# Patient Record
Sex: FEMALE | Race: WHITE | Employment: OTHER | URBAN - METROPOLITAN AREA
[De-identification: names, ages, dates, MRNs, and addresses within clinical notes are randomized per-mention and may not be internally consistent; named-entity substitution may affect disease eponyms.]

---

## 2017-10-04 PROBLEM — R01.1 MURMUR: Status: ACTIVE | Noted: 2017-10-04

## 2017-10-04 PROBLEM — R60.0 BILATERAL LEG EDEMA: Status: ACTIVE | Noted: 2017-10-04

## 2017-10-04 PROBLEM — R94.31 ABNORMAL EKG: Status: ACTIVE | Noted: 2017-10-04

## 2017-10-04 PROBLEM — R60.9 SWELLING: Status: ACTIVE | Noted: 2017-10-04

## 2017-10-27 PROBLEM — I10 ESSENTIAL HYPERTENSION WITH GOAL BLOOD PRESSURE LESS THAN 130/85: Status: ACTIVE | Noted: 2017-10-27

## 2018-01-01 ENCOUNTER — HOSPITAL ENCOUNTER (OUTPATIENT)
Dept: GENERAL RADIOLOGY | Age: 83
Discharge: HOME | End: 2018-12-27
Payer: MEDICARE

## 2018-01-01 ENCOUNTER — HOME CARE VISIT (OUTPATIENT)
Dept: SCHEDULING | Facility: HOME HEALTH | Age: 83
End: 2018-01-01
Payer: MEDICARE

## 2018-01-01 ENCOUNTER — HOME CARE VISIT (OUTPATIENT)
Dept: HOME HEALTH SERVICES | Facility: HOME HEALTH | Age: 83
End: 2018-01-01
Payer: MEDICARE

## 2018-01-01 VITALS
HEART RATE: 97 BPM | TEMPERATURE: 98.3 F | OXYGEN SATURATION: 94 % | RESPIRATION RATE: 16 BRPM | DIASTOLIC BLOOD PRESSURE: 50 MMHG | SYSTOLIC BLOOD PRESSURE: 130 MMHG

## 2018-01-01 VITALS
OXYGEN SATURATION: 96 % | HEART RATE: 82 BPM | TEMPERATURE: 98 F | SYSTOLIC BLOOD PRESSURE: 112 MMHG | DIASTOLIC BLOOD PRESSURE: 58 MMHG | RESPIRATION RATE: 18 BRPM

## 2018-01-01 VITALS
SYSTOLIC BLOOD PRESSURE: 120 MMHG | DIASTOLIC BLOOD PRESSURE: 60 MMHG | RESPIRATION RATE: 18 BRPM | TEMPERATURE: 97 F | OXYGEN SATURATION: 97 % | HEART RATE: 75 BPM

## 2018-01-01 VITALS
SYSTOLIC BLOOD PRESSURE: 136 MMHG | HEART RATE: 89 BPM | RESPIRATION RATE: 18 BRPM | DIASTOLIC BLOOD PRESSURE: 64 MMHG | TEMPERATURE: 97.3 F | OXYGEN SATURATION: 97 %

## 2018-01-01 VITALS
HEART RATE: 76 BPM | TEMPERATURE: 97.1 F | SYSTOLIC BLOOD PRESSURE: 126 MMHG | OXYGEN SATURATION: 94 % | RESPIRATION RATE: 18 BRPM | DIASTOLIC BLOOD PRESSURE: 62 MMHG

## 2018-01-01 VITALS
TEMPERATURE: 96.8 F | HEART RATE: 85 BPM | DIASTOLIC BLOOD PRESSURE: 72 MMHG | SYSTOLIC BLOOD PRESSURE: 120 MMHG | RESPIRATION RATE: 18 BRPM | OXYGEN SATURATION: 96 %

## 2018-01-01 VITALS
OXYGEN SATURATION: 98 % | RESPIRATION RATE: 18 BRPM | TEMPERATURE: 97.2 F | HEART RATE: 85 BPM | DIASTOLIC BLOOD PRESSURE: 70 MMHG | SYSTOLIC BLOOD PRESSURE: 140 MMHG

## 2018-01-01 VITALS
DIASTOLIC BLOOD PRESSURE: 50 MMHG | SYSTOLIC BLOOD PRESSURE: 98 MMHG | OXYGEN SATURATION: 92 % | RESPIRATION RATE: 16 BRPM | HEART RATE: 74 BPM | TEMPERATURE: 97.3 F

## 2018-01-01 VITALS
SYSTOLIC BLOOD PRESSURE: 118 MMHG | RESPIRATION RATE: 16 BRPM | DIASTOLIC BLOOD PRESSURE: 57 MMHG | OXYGEN SATURATION: 100 % | TEMPERATURE: 98.2 F | HEART RATE: 80 BPM

## 2018-01-01 VITALS
SYSTOLIC BLOOD PRESSURE: 132 MMHG | RESPIRATION RATE: 16 BRPM | TEMPERATURE: 98 F | HEART RATE: 90 BPM | DIASTOLIC BLOOD PRESSURE: 77 MMHG

## 2018-01-01 VITALS
DIASTOLIC BLOOD PRESSURE: 70 MMHG | TEMPERATURE: 97.4 F | SYSTOLIC BLOOD PRESSURE: 138 MMHG | HEART RATE: 88 BPM | OXYGEN SATURATION: 97 % | RESPIRATION RATE: 20 BRPM

## 2018-01-01 VITALS
SYSTOLIC BLOOD PRESSURE: 111 MMHG | RESPIRATION RATE: 18 BRPM | HEART RATE: 69 BPM | OXYGEN SATURATION: 99 % | TEMPERATURE: 96.4 F | DIASTOLIC BLOOD PRESSURE: 46 MMHG

## 2018-01-01 DIAGNOSIS — M1A.00X0 IDIOPATHIC CHRONIC GOUT: ICD-10-CM

## 2018-01-01 PROCEDURE — 3331090001 HH PPS REVENUE CREDIT

## 2018-01-01 PROCEDURE — 3331090002 HH PPS REVENUE DEBIT

## 2018-01-01 PROCEDURE — G0299 HHS/HOSPICE OF RN EA 15 MIN: HCPCS

## 2018-01-01 PROCEDURE — A6454 SELF-ADHER BAND W>=3" <5"/YD: HCPCS

## 2018-01-01 PROCEDURE — G0152 HHCP-SERV OF OT,EA 15 MIN: HCPCS

## 2018-01-01 PROCEDURE — 73120 X-RAY EXAM OF HAND: CPT

## 2018-01-01 PROCEDURE — 400014 HH F/U

## 2018-01-26 PROBLEM — I35.8 AORTIC VALVE SCLEROSIS: Status: ACTIVE | Noted: 2018-01-26

## 2018-01-26 PROBLEM — R00.0 RAPID HEART RATE: Status: ACTIVE | Noted: 2018-01-26

## 2018-04-30 ENCOUNTER — HOME HEALTH ADMISSION (OUTPATIENT)
Dept: HOME HEALTH SERVICES | Facility: HOME HEALTH | Age: 83
End: 2018-04-30
Payer: MEDICARE

## 2018-05-04 ENCOUNTER — HOME CARE VISIT (OUTPATIENT)
Dept: SCHEDULING | Facility: HOME HEALTH | Age: 83
End: 2018-05-04
Payer: MEDICARE

## 2018-05-04 VITALS
RESPIRATION RATE: 18 BRPM | SYSTOLIC BLOOD PRESSURE: 110 MMHG | TEMPERATURE: 97.4 F | DIASTOLIC BLOOD PRESSURE: 62 MMHG | HEART RATE: 80 BPM

## 2018-05-04 PROCEDURE — 400013 HH SOC

## 2018-05-04 PROCEDURE — G0151 HHCP-SERV OF PT,EA 15 MIN: HCPCS

## 2018-05-04 PROCEDURE — 3331090002 HH PPS REVENUE DEBIT

## 2018-05-04 PROCEDURE — 3331090001 HH PPS REVENUE CREDIT

## 2018-05-05 PROCEDURE — 3331090002 HH PPS REVENUE DEBIT

## 2018-05-05 PROCEDURE — 3331090001 HH PPS REVENUE CREDIT

## 2018-05-06 PROCEDURE — 3331090001 HH PPS REVENUE CREDIT

## 2018-05-06 PROCEDURE — 3331090002 HH PPS REVENUE DEBIT

## 2018-05-07 ENCOUNTER — HOME CARE VISIT (OUTPATIENT)
Dept: SCHEDULING | Facility: HOME HEALTH | Age: 83
End: 2018-05-07
Payer: MEDICARE

## 2018-05-07 VITALS
SYSTOLIC BLOOD PRESSURE: 118 MMHG | TEMPERATURE: 98.6 F | HEART RATE: 78 BPM | RESPIRATION RATE: 18 BRPM | DIASTOLIC BLOOD PRESSURE: 68 MMHG

## 2018-05-07 PROCEDURE — G0157 HHC PT ASSISTANT EA 15: HCPCS

## 2018-05-07 PROCEDURE — 3331090002 HH PPS REVENUE DEBIT

## 2018-05-07 PROCEDURE — 3331090001 HH PPS REVENUE CREDIT

## 2018-05-08 PROCEDURE — 3331090002 HH PPS REVENUE DEBIT

## 2018-05-08 PROCEDURE — 3331090001 HH PPS REVENUE CREDIT

## 2018-05-09 ENCOUNTER — HOME CARE VISIT (OUTPATIENT)
Dept: SCHEDULING | Facility: HOME HEALTH | Age: 83
End: 2018-05-09
Payer: MEDICARE

## 2018-05-09 VITALS
RESPIRATION RATE: 18 BRPM | DIASTOLIC BLOOD PRESSURE: 82 MMHG | HEART RATE: 78 BPM | TEMPERATURE: 97.6 F | SYSTOLIC BLOOD PRESSURE: 140 MMHG

## 2018-05-09 PROCEDURE — G0157 HHC PT ASSISTANT EA 15: HCPCS

## 2018-05-09 PROCEDURE — 3331090002 HH PPS REVENUE DEBIT

## 2018-05-09 PROCEDURE — 3331090001 HH PPS REVENUE CREDIT

## 2018-05-10 PROCEDURE — 3331090001 HH PPS REVENUE CREDIT

## 2018-05-10 PROCEDURE — 3331090002 HH PPS REVENUE DEBIT

## 2018-05-11 ENCOUNTER — HOME CARE VISIT (OUTPATIENT)
Dept: SCHEDULING | Facility: HOME HEALTH | Age: 83
End: 2018-05-11
Payer: MEDICARE

## 2018-05-11 VITALS
SYSTOLIC BLOOD PRESSURE: 100 MMHG | RESPIRATION RATE: 16 BRPM | HEART RATE: 72 BPM | TEMPERATURE: 97.1 F | DIASTOLIC BLOOD PRESSURE: 58 MMHG

## 2018-05-11 PROCEDURE — G0152 HHCP-SERV OF OT,EA 15 MIN: HCPCS

## 2018-05-11 PROCEDURE — 3331090001 HH PPS REVENUE CREDIT

## 2018-05-11 PROCEDURE — 3331090002 HH PPS REVENUE DEBIT

## 2018-05-12 PROCEDURE — 3331090001 HH PPS REVENUE CREDIT

## 2018-05-12 PROCEDURE — 3331090002 HH PPS REVENUE DEBIT

## 2018-05-13 PROCEDURE — 3331090002 HH PPS REVENUE DEBIT

## 2018-05-13 PROCEDURE — 3331090001 HH PPS REVENUE CREDIT

## 2018-05-14 ENCOUNTER — HOME CARE VISIT (OUTPATIENT)
Dept: SCHEDULING | Facility: HOME HEALTH | Age: 83
End: 2018-05-14
Payer: MEDICARE

## 2018-05-14 ENCOUNTER — HOME CARE VISIT (OUTPATIENT)
Dept: HOME HEALTH SERVICES | Facility: HOME HEALTH | Age: 83
End: 2018-05-14
Payer: MEDICARE

## 2018-05-14 VITALS
HEART RATE: 78 BPM | DIASTOLIC BLOOD PRESSURE: 66 MMHG | RESPIRATION RATE: 18 BRPM | SYSTOLIC BLOOD PRESSURE: 122 MMHG | TEMPERATURE: 96.8 F

## 2018-05-14 VITALS
TEMPERATURE: 97.9 F | RESPIRATION RATE: 16 BRPM | SYSTOLIC BLOOD PRESSURE: 140 MMHG | OXYGEN SATURATION: 96 % | DIASTOLIC BLOOD PRESSURE: 82 MMHG | HEART RATE: 90 BPM

## 2018-05-14 PROCEDURE — 3331090001 HH PPS REVENUE CREDIT

## 2018-05-14 PROCEDURE — 3331090002 HH PPS REVENUE DEBIT

## 2018-05-14 PROCEDURE — G0157 HHC PT ASSISTANT EA 15: HCPCS

## 2018-05-14 PROCEDURE — G0299 HHS/HOSPICE OF RN EA 15 MIN: HCPCS

## 2018-05-15 ENCOUNTER — HOME CARE VISIT (OUTPATIENT)
Dept: SCHEDULING | Facility: HOME HEALTH | Age: 83
End: 2018-05-15
Payer: MEDICARE

## 2018-05-15 VITALS
RESPIRATION RATE: 17 BRPM | TEMPERATURE: 97.5 F | DIASTOLIC BLOOD PRESSURE: 70 MMHG | HEART RATE: 89 BPM | SYSTOLIC BLOOD PRESSURE: 132 MMHG

## 2018-05-15 PROCEDURE — G0156 HHCP-SVS OF AIDE,EA 15 MIN: HCPCS

## 2018-05-15 PROCEDURE — 3331090001 HH PPS REVENUE CREDIT

## 2018-05-15 PROCEDURE — 3331090002 HH PPS REVENUE DEBIT

## 2018-05-16 ENCOUNTER — HOME CARE VISIT (OUTPATIENT)
Dept: SCHEDULING | Facility: HOME HEALTH | Age: 83
End: 2018-05-16
Payer: MEDICARE

## 2018-05-16 VITALS
RESPIRATION RATE: 17 BRPM | HEART RATE: 76 BPM | DIASTOLIC BLOOD PRESSURE: 62 MMHG | TEMPERATURE: 97.3 F | SYSTOLIC BLOOD PRESSURE: 118 MMHG

## 2018-05-16 VITALS
TEMPERATURE: 96.8 F | RESPIRATION RATE: 18 BRPM | HEART RATE: 78 BPM | SYSTOLIC BLOOD PRESSURE: 160 MMHG | DIASTOLIC BLOOD PRESSURE: 80 MMHG

## 2018-05-16 PROCEDURE — 3331090001 HH PPS REVENUE CREDIT

## 2018-05-16 PROCEDURE — G0157 HHC PT ASSISTANT EA 15: HCPCS

## 2018-05-16 PROCEDURE — 3331090002 HH PPS REVENUE DEBIT

## 2018-05-16 PROCEDURE — G0152 HHCP-SERV OF OT,EA 15 MIN: HCPCS

## 2018-05-17 ENCOUNTER — HOME CARE VISIT (OUTPATIENT)
Dept: SCHEDULING | Facility: HOME HEALTH | Age: 83
End: 2018-05-17
Payer: MEDICARE

## 2018-05-17 VITALS
OXYGEN SATURATION: 98 % | DIASTOLIC BLOOD PRESSURE: 62 MMHG | RESPIRATION RATE: 20 BRPM | SYSTOLIC BLOOD PRESSURE: 118 MMHG | TEMPERATURE: 98.2 F

## 2018-05-17 PROCEDURE — 3331090001 HH PPS REVENUE CREDIT

## 2018-05-17 PROCEDURE — G0299 HHS/HOSPICE OF RN EA 15 MIN: HCPCS

## 2018-05-17 PROCEDURE — 3331090002 HH PPS REVENUE DEBIT

## 2018-05-18 ENCOUNTER — HOME CARE VISIT (OUTPATIENT)
Dept: SCHEDULING | Facility: HOME HEALTH | Age: 83
End: 2018-05-18
Payer: MEDICARE

## 2018-05-18 VITALS
RESPIRATION RATE: 16 BRPM | SYSTOLIC BLOOD PRESSURE: 140 MMHG | HEART RATE: 80 BPM | TEMPERATURE: 97.6 F | DIASTOLIC BLOOD PRESSURE: 78 MMHG

## 2018-05-18 PROCEDURE — 3331090002 HH PPS REVENUE DEBIT

## 2018-05-18 PROCEDURE — 3331090001 HH PPS REVENUE CREDIT

## 2018-05-18 PROCEDURE — G0156 HHCP-SVS OF AIDE,EA 15 MIN: HCPCS

## 2018-05-18 PROCEDURE — G0152 HHCP-SERV OF OT,EA 15 MIN: HCPCS

## 2018-05-18 PROCEDURE — G0155 HHCP-SVS OF CSW,EA 15 MIN: HCPCS

## 2018-05-19 PROCEDURE — 3331090001 HH PPS REVENUE CREDIT

## 2018-05-19 PROCEDURE — 3331090002 HH PPS REVENUE DEBIT

## 2018-05-20 PROCEDURE — 3331090002 HH PPS REVENUE DEBIT

## 2018-05-20 PROCEDURE — 3331090001 HH PPS REVENUE CREDIT

## 2018-05-21 ENCOUNTER — HOME CARE VISIT (OUTPATIENT)
Dept: SCHEDULING | Facility: HOME HEALTH | Age: 83
End: 2018-05-21
Payer: MEDICARE

## 2018-05-21 VITALS
HEART RATE: 72 BPM | DIASTOLIC BLOOD PRESSURE: 76 MMHG | TEMPERATURE: 97 F | RESPIRATION RATE: 18 BRPM | SYSTOLIC BLOOD PRESSURE: 138 MMHG

## 2018-05-21 PROCEDURE — 3331090001 HH PPS REVENUE CREDIT

## 2018-05-21 PROCEDURE — 3331090002 HH PPS REVENUE DEBIT

## 2018-05-21 PROCEDURE — G0157 HHC PT ASSISTANT EA 15: HCPCS

## 2018-05-22 PROCEDURE — 3331090002 HH PPS REVENUE DEBIT

## 2018-05-22 PROCEDURE — 3331090001 HH PPS REVENUE CREDIT

## 2018-05-23 ENCOUNTER — HOME CARE VISIT (OUTPATIENT)
Dept: SCHEDULING | Facility: HOME HEALTH | Age: 83
End: 2018-05-23
Payer: MEDICARE

## 2018-05-23 VITALS
TEMPERATURE: 97.4 F | DIASTOLIC BLOOD PRESSURE: 72 MMHG | RESPIRATION RATE: 15 BRPM | HEART RATE: 68 BPM | SYSTOLIC BLOOD PRESSURE: 118 MMHG

## 2018-05-23 VITALS
TEMPERATURE: 97.2 F | DIASTOLIC BLOOD PRESSURE: 60 MMHG | RESPIRATION RATE: 18 BRPM | SYSTOLIC BLOOD PRESSURE: 122 MMHG | HEART RATE: 72 BPM

## 2018-05-23 PROCEDURE — G0157 HHC PT ASSISTANT EA 15: HCPCS

## 2018-05-23 PROCEDURE — G0152 HHCP-SERV OF OT,EA 15 MIN: HCPCS

## 2018-05-23 PROCEDURE — 3331090001 HH PPS REVENUE CREDIT

## 2018-05-23 PROCEDURE — 3331090002 HH PPS REVENUE DEBIT

## 2018-05-23 PROCEDURE — G0156 HHCP-SVS OF AIDE,EA 15 MIN: HCPCS

## 2018-05-24 VITALS — SYSTOLIC BLOOD PRESSURE: 110 MMHG | HEART RATE: 86 BPM | DIASTOLIC BLOOD PRESSURE: 56 MMHG | RESPIRATION RATE: 16 BRPM

## 2018-05-24 PROCEDURE — 3331090001 HH PPS REVENUE CREDIT

## 2018-05-24 PROCEDURE — 3331090002 HH PPS REVENUE DEBIT

## 2018-05-25 ENCOUNTER — HOME CARE VISIT (OUTPATIENT)
Dept: SCHEDULING | Facility: HOME HEALTH | Age: 83
End: 2018-05-25
Payer: MEDICARE

## 2018-05-25 PROCEDURE — 3331090001 HH PPS REVENUE CREDIT

## 2018-05-25 PROCEDURE — G0152 HHCP-SERV OF OT,EA 15 MIN: HCPCS

## 2018-05-25 PROCEDURE — 3331090002 HH PPS REVENUE DEBIT

## 2018-05-26 PROCEDURE — 3331090002 HH PPS REVENUE DEBIT

## 2018-05-26 PROCEDURE — 3331090001 HH PPS REVENUE CREDIT

## 2018-05-27 PROCEDURE — 3331090001 HH PPS REVENUE CREDIT

## 2018-05-27 PROCEDURE — 3331090002 HH PPS REVENUE DEBIT

## 2018-05-28 PROCEDURE — 3331090002 HH PPS REVENUE DEBIT

## 2018-05-28 PROCEDURE — 3331090001 HH PPS REVENUE CREDIT

## 2018-05-29 ENCOUNTER — HOME CARE VISIT (OUTPATIENT)
Dept: SCHEDULING | Facility: HOME HEALTH | Age: 83
End: 2018-05-29
Payer: MEDICARE

## 2018-05-29 ENCOUNTER — HOME CARE VISIT (OUTPATIENT)
Dept: HOME HEALTH SERVICES | Facility: HOME HEALTH | Age: 83
End: 2018-05-29
Payer: MEDICARE

## 2018-05-29 VITALS
SYSTOLIC BLOOD PRESSURE: 124 MMHG | RESPIRATION RATE: 16 BRPM | HEART RATE: 82 BPM | DIASTOLIC BLOOD PRESSURE: 68 MMHG | TEMPERATURE: 98.3 F

## 2018-05-29 VITALS
SYSTOLIC BLOOD PRESSURE: 140 MMHG | TEMPERATURE: 96.8 F | HEART RATE: 78 BPM | DIASTOLIC BLOOD PRESSURE: 78 MMHG | RESPIRATION RATE: 18 BRPM

## 2018-05-29 PROCEDURE — G0156 HHCP-SVS OF AIDE,EA 15 MIN: HCPCS

## 2018-05-29 PROCEDURE — 3331090001 HH PPS REVENUE CREDIT

## 2018-05-29 PROCEDURE — G0157 HHC PT ASSISTANT EA 15: HCPCS

## 2018-05-29 PROCEDURE — 3331090002 HH PPS REVENUE DEBIT

## 2018-05-30 ENCOUNTER — HOME CARE VISIT (OUTPATIENT)
Dept: SCHEDULING | Facility: HOME HEALTH | Age: 83
End: 2018-05-30
Payer: MEDICARE

## 2018-05-30 VITALS
TEMPERATURE: 97 F | HEART RATE: 84 BPM | DIASTOLIC BLOOD PRESSURE: 65 MMHG | SYSTOLIC BLOOD PRESSURE: 120 MMHG | RESPIRATION RATE: 16 BRPM

## 2018-05-30 PROCEDURE — 3331090002 HH PPS REVENUE DEBIT

## 2018-05-30 PROCEDURE — 3331090001 HH PPS REVENUE CREDIT

## 2018-05-30 PROCEDURE — G0151 HHCP-SERV OF PT,EA 15 MIN: HCPCS

## 2018-05-31 PROCEDURE — 3331090002 HH PPS REVENUE DEBIT

## 2018-05-31 PROCEDURE — 3331090001 HH PPS REVENUE CREDIT

## 2018-06-01 ENCOUNTER — HOME CARE VISIT (OUTPATIENT)
Dept: HOME HEALTH SERVICES | Facility: HOME HEALTH | Age: 83
End: 2018-06-01
Payer: MEDICARE

## 2018-06-01 ENCOUNTER — HOME CARE VISIT (OUTPATIENT)
Dept: SCHEDULING | Facility: HOME HEALTH | Age: 83
End: 2018-06-01
Payer: MEDICARE

## 2018-06-01 PROCEDURE — G0299 HHS/HOSPICE OF RN EA 15 MIN: HCPCS

## 2018-06-01 PROCEDURE — 3331090002 HH PPS REVENUE DEBIT

## 2018-06-01 PROCEDURE — 3331090001 HH PPS REVENUE CREDIT

## 2018-06-02 PROCEDURE — 3331090001 HH PPS REVENUE CREDIT

## 2018-06-02 PROCEDURE — 3331090002 HH PPS REVENUE DEBIT

## 2018-06-03 VITALS
SYSTOLIC BLOOD PRESSURE: 130 MMHG | RESPIRATION RATE: 16 BRPM | TEMPERATURE: 97.7 F | HEART RATE: 78 BPM | OXYGEN SATURATION: 94 % | DIASTOLIC BLOOD PRESSURE: 70 MMHG

## 2018-06-03 PROCEDURE — 3331090002 HH PPS REVENUE DEBIT

## 2018-06-03 PROCEDURE — 3331090001 HH PPS REVENUE CREDIT

## 2018-06-04 ENCOUNTER — HOME CARE VISIT (OUTPATIENT)
Dept: SCHEDULING | Facility: HOME HEALTH | Age: 83
End: 2018-06-04
Payer: MEDICARE

## 2018-06-04 VITALS
OXYGEN SATURATION: 96 % | DIASTOLIC BLOOD PRESSURE: 72 MMHG | HEART RATE: 92 BPM | RESPIRATION RATE: 16 BRPM | SYSTOLIC BLOOD PRESSURE: 140 MMHG | TEMPERATURE: 97.3 F

## 2018-06-04 PROCEDURE — A6456 ZINC PASTE BAND W >=3"<5"/YD: HCPCS

## 2018-06-04 PROCEDURE — 3331090001 HH PPS REVENUE CREDIT

## 2018-06-04 PROCEDURE — 3331090002 HH PPS REVENUE DEBIT

## 2018-06-04 PROCEDURE — A6454 SELF-ADHER BAND W>=3" <5"/YD: HCPCS

## 2018-06-04 PROCEDURE — G0299 HHS/HOSPICE OF RN EA 15 MIN: HCPCS

## 2018-06-04 PROCEDURE — A9270 NON-COVERED ITEM OR SERVICE: HCPCS

## 2018-06-05 PROCEDURE — 3331090002 HH PPS REVENUE DEBIT

## 2018-06-05 PROCEDURE — 3331090001 HH PPS REVENUE CREDIT

## 2018-06-06 ENCOUNTER — HOME CARE VISIT (OUTPATIENT)
Dept: SCHEDULING | Facility: HOME HEALTH | Age: 83
End: 2018-06-06
Payer: MEDICARE

## 2018-06-06 PROCEDURE — 3331090001 HH PPS REVENUE CREDIT

## 2018-06-06 PROCEDURE — 3331090002 HH PPS REVENUE DEBIT

## 2018-06-06 PROCEDURE — G0299 HHS/HOSPICE OF RN EA 15 MIN: HCPCS

## 2018-06-07 VITALS
TEMPERATURE: 98.1 F | RESPIRATION RATE: 16 BRPM | SYSTOLIC BLOOD PRESSURE: 106 MMHG | HEART RATE: 81 BPM | OXYGEN SATURATION: 95 % | DIASTOLIC BLOOD PRESSURE: 62 MMHG

## 2018-06-07 PROCEDURE — 3331090001 HH PPS REVENUE CREDIT

## 2018-06-07 PROCEDURE — 3331090002 HH PPS REVENUE DEBIT

## 2018-06-08 ENCOUNTER — HOME CARE VISIT (OUTPATIENT)
Dept: SCHEDULING | Facility: HOME HEALTH | Age: 83
End: 2018-06-08
Payer: MEDICARE

## 2018-06-08 PROCEDURE — G0299 HHS/HOSPICE OF RN EA 15 MIN: HCPCS

## 2018-06-08 PROCEDURE — 3331090001 HH PPS REVENUE CREDIT

## 2018-06-08 PROCEDURE — 3331090002 HH PPS REVENUE DEBIT

## 2018-06-09 VITALS
RESPIRATION RATE: 16 BRPM | HEART RATE: 96 BPM | DIASTOLIC BLOOD PRESSURE: 70 MMHG | TEMPERATURE: 97.8 F | OXYGEN SATURATION: 95 % | SYSTOLIC BLOOD PRESSURE: 130 MMHG

## 2018-06-09 PROCEDURE — 3331090002 HH PPS REVENUE DEBIT

## 2018-06-09 PROCEDURE — 3331090001 HH PPS REVENUE CREDIT

## 2018-06-10 PROCEDURE — 3331090001 HH PPS REVENUE CREDIT

## 2018-06-10 PROCEDURE — 3331090002 HH PPS REVENUE DEBIT

## 2018-06-11 ENCOUNTER — HOME CARE VISIT (OUTPATIENT)
Dept: SCHEDULING | Facility: HOME HEALTH | Age: 83
End: 2018-06-11
Payer: MEDICARE

## 2018-06-11 VITALS
TEMPERATURE: 97.8 F | RESPIRATION RATE: 16 BRPM | SYSTOLIC BLOOD PRESSURE: 122 MMHG | HEART RATE: 88 BPM | DIASTOLIC BLOOD PRESSURE: 62 MMHG | OXYGEN SATURATION: 96 %

## 2018-06-11 PROCEDURE — 3331090001 HH PPS REVENUE CREDIT

## 2018-06-11 PROCEDURE — G0299 HHS/HOSPICE OF RN EA 15 MIN: HCPCS

## 2018-06-11 PROCEDURE — 3331090002 HH PPS REVENUE DEBIT

## 2018-06-12 PROCEDURE — 3331090001 HH PPS REVENUE CREDIT

## 2018-06-12 PROCEDURE — 3331090002 HH PPS REVENUE DEBIT

## 2018-06-13 ENCOUNTER — HOME CARE VISIT (OUTPATIENT)
Dept: SCHEDULING | Facility: HOME HEALTH | Age: 83
End: 2018-06-13
Payer: MEDICARE

## 2018-06-13 VITALS
SYSTOLIC BLOOD PRESSURE: 128 MMHG | RESPIRATION RATE: 16 BRPM | OXYGEN SATURATION: 96 % | HEART RATE: 92 BPM | DIASTOLIC BLOOD PRESSURE: 60 MMHG | TEMPERATURE: 97.5 F

## 2018-06-13 PROCEDURE — G0299 HHS/HOSPICE OF RN EA 15 MIN: HCPCS

## 2018-06-13 PROCEDURE — 3331090002 HH PPS REVENUE DEBIT

## 2018-06-13 PROCEDURE — 3331090001 HH PPS REVENUE CREDIT

## 2018-06-14 PROCEDURE — 3331090001 HH PPS REVENUE CREDIT

## 2018-06-14 PROCEDURE — 3331090002 HH PPS REVENUE DEBIT

## 2018-06-15 ENCOUNTER — HOME CARE VISIT (OUTPATIENT)
Dept: SCHEDULING | Facility: HOME HEALTH | Age: 83
End: 2018-06-15
Payer: MEDICARE

## 2018-06-15 VITALS
HEART RATE: 92 BPM | TEMPERATURE: 97.7 F | SYSTOLIC BLOOD PRESSURE: 130 MMHG | RESPIRATION RATE: 16 BRPM | DIASTOLIC BLOOD PRESSURE: 72 MMHG | OXYGEN SATURATION: 93 %

## 2018-06-15 PROCEDURE — 3331090001 HH PPS REVENUE CREDIT

## 2018-06-15 PROCEDURE — 3331090002 HH PPS REVENUE DEBIT

## 2018-06-15 PROCEDURE — A6454 SELF-ADHER BAND W>=3" <5"/YD: HCPCS

## 2018-06-15 PROCEDURE — A6209 FOAM DRSG <=16 SQ IN W/O BDR: HCPCS

## 2018-06-15 PROCEDURE — G0299 HHS/HOSPICE OF RN EA 15 MIN: HCPCS

## 2018-06-15 PROCEDURE — A6456 ZINC PASTE BAND W >=3"<5"/YD: HCPCS

## 2018-06-16 PROCEDURE — 3331090001 HH PPS REVENUE CREDIT

## 2018-06-16 PROCEDURE — 3331090002 HH PPS REVENUE DEBIT

## 2018-06-17 PROCEDURE — 3331090001 HH PPS REVENUE CREDIT

## 2018-06-17 PROCEDURE — 3331090002 HH PPS REVENUE DEBIT

## 2018-06-18 ENCOUNTER — HOME CARE VISIT (OUTPATIENT)
Dept: SCHEDULING | Facility: HOME HEALTH | Age: 83
End: 2018-06-18
Payer: MEDICARE

## 2018-06-18 VITALS — DIASTOLIC BLOOD PRESSURE: 70 MMHG | OXYGEN SATURATION: 98 % | SYSTOLIC BLOOD PRESSURE: 140 MMHG | HEART RATE: 95 BPM

## 2018-06-18 PROCEDURE — 3331090001 HH PPS REVENUE CREDIT

## 2018-06-18 PROCEDURE — 3331090002 HH PPS REVENUE DEBIT

## 2018-06-18 PROCEDURE — G0299 HHS/HOSPICE OF RN EA 15 MIN: HCPCS

## 2018-06-19 PROCEDURE — 3331090001 HH PPS REVENUE CREDIT

## 2018-06-19 PROCEDURE — 3331090002 HH PPS REVENUE DEBIT

## 2018-06-20 ENCOUNTER — HOME CARE VISIT (OUTPATIENT)
Dept: SCHEDULING | Facility: HOME HEALTH | Age: 83
End: 2018-06-20
Payer: MEDICARE

## 2018-06-20 VITALS
SYSTOLIC BLOOD PRESSURE: 140 MMHG | RESPIRATION RATE: 16 BRPM | TEMPERATURE: 97 F | DIASTOLIC BLOOD PRESSURE: 60 MMHG | HEART RATE: 91 BPM | OXYGEN SATURATION: 94 %

## 2018-06-20 PROCEDURE — 3331090001 HH PPS REVENUE CREDIT

## 2018-06-20 PROCEDURE — 3331090002 HH PPS REVENUE DEBIT

## 2018-06-20 PROCEDURE — G0299 HHS/HOSPICE OF RN EA 15 MIN: HCPCS

## 2018-06-21 PROCEDURE — 3331090001 HH PPS REVENUE CREDIT

## 2018-06-21 PROCEDURE — 3331090002 HH PPS REVENUE DEBIT

## 2018-06-22 ENCOUNTER — HOME CARE VISIT (OUTPATIENT)
Dept: SCHEDULING | Facility: HOME HEALTH | Age: 83
End: 2018-06-22
Payer: MEDICARE

## 2018-06-22 VITALS
RESPIRATION RATE: 16 BRPM | SYSTOLIC BLOOD PRESSURE: 120 MMHG | TEMPERATURE: 97 F | HEART RATE: 89 BPM | OXYGEN SATURATION: 96 % | DIASTOLIC BLOOD PRESSURE: 62 MMHG

## 2018-06-22 PROCEDURE — 3331090002 HH PPS REVENUE DEBIT

## 2018-06-22 PROCEDURE — 3331090001 HH PPS REVENUE CREDIT

## 2018-06-22 PROCEDURE — G0299 HHS/HOSPICE OF RN EA 15 MIN: HCPCS

## 2018-06-23 PROCEDURE — 3331090001 HH PPS REVENUE CREDIT

## 2018-06-23 PROCEDURE — 3331090002 HH PPS REVENUE DEBIT

## 2018-06-24 PROCEDURE — 3331090002 HH PPS REVENUE DEBIT

## 2018-06-24 PROCEDURE — 3331090001 HH PPS REVENUE CREDIT

## 2018-06-25 ENCOUNTER — HOME CARE VISIT (OUTPATIENT)
Dept: SCHEDULING | Facility: HOME HEALTH | Age: 83
End: 2018-06-25
Payer: MEDICARE

## 2018-06-25 PROCEDURE — G0299 HHS/HOSPICE OF RN EA 15 MIN: HCPCS

## 2018-06-25 PROCEDURE — 3331090002 HH PPS REVENUE DEBIT

## 2018-06-25 PROCEDURE — 3331090001 HH PPS REVENUE CREDIT

## 2018-06-26 VITALS
SYSTOLIC BLOOD PRESSURE: 138 MMHG | HEART RATE: 100 BPM | OXYGEN SATURATION: 100 % | DIASTOLIC BLOOD PRESSURE: 68 MMHG | RESPIRATION RATE: 16 BRPM | TEMPERATURE: 97.3 F

## 2018-06-26 PROCEDURE — 3331090001 HH PPS REVENUE CREDIT

## 2018-06-26 PROCEDURE — 3331090002 HH PPS REVENUE DEBIT

## 2018-06-27 ENCOUNTER — HOME CARE VISIT (OUTPATIENT)
Dept: SCHEDULING | Facility: HOME HEALTH | Age: 83
End: 2018-06-27
Payer: MEDICARE

## 2018-06-27 PROCEDURE — 3331090001 HH PPS REVENUE CREDIT

## 2018-06-27 PROCEDURE — 3331090002 HH PPS REVENUE DEBIT

## 2018-06-28 ENCOUNTER — HOME CARE VISIT (OUTPATIENT)
Dept: SCHEDULING | Facility: HOME HEALTH | Age: 83
End: 2018-06-28
Payer: MEDICARE

## 2018-06-28 VITALS
SYSTOLIC BLOOD PRESSURE: 130 MMHG | OXYGEN SATURATION: 93 % | HEART RATE: 64 BPM | DIASTOLIC BLOOD PRESSURE: 70 MMHG | TEMPERATURE: 98.6 F | RESPIRATION RATE: 16 BRPM

## 2018-06-28 PROCEDURE — 3331090002 HH PPS REVENUE DEBIT

## 2018-06-28 PROCEDURE — 3331090001 HH PPS REVENUE CREDIT

## 2018-06-28 PROCEDURE — G0299 HHS/HOSPICE OF RN EA 15 MIN: HCPCS

## 2018-06-29 PROCEDURE — 3331090001 HH PPS REVENUE CREDIT

## 2018-06-29 PROCEDURE — 3331090002 HH PPS REVENUE DEBIT

## 2018-06-30 PROCEDURE — 3331090002 HH PPS REVENUE DEBIT

## 2018-06-30 PROCEDURE — 3331090001 HH PPS REVENUE CREDIT

## 2018-07-01 PROCEDURE — 3331090002 HH PPS REVENUE DEBIT

## 2018-07-01 PROCEDURE — 3331090001 HH PPS REVENUE CREDIT

## 2018-07-02 PROCEDURE — 3331090001 HH PPS REVENUE CREDIT

## 2018-07-02 PROCEDURE — 3331090002 HH PPS REVENUE DEBIT

## 2018-07-03 ENCOUNTER — HOME CARE VISIT (OUTPATIENT)
Dept: SCHEDULING | Facility: HOME HEALTH | Age: 83
End: 2018-07-03
Payer: MEDICARE

## 2018-07-03 PROCEDURE — 400014 HH F/U

## 2018-07-03 PROCEDURE — 3331090002 HH PPS REVENUE DEBIT

## 2018-07-03 PROCEDURE — G0299 HHS/HOSPICE OF RN EA 15 MIN: HCPCS

## 2018-07-03 PROCEDURE — 3331090001 HH PPS REVENUE CREDIT

## 2018-07-04 PROCEDURE — 3331090001 HH PPS REVENUE CREDIT

## 2018-07-04 PROCEDURE — 3331090002 HH PPS REVENUE DEBIT

## 2018-07-05 VITALS
HEART RATE: 62 BPM | OXYGEN SATURATION: 99 % | DIASTOLIC BLOOD PRESSURE: 72 MMHG | TEMPERATURE: 98.2 F | RESPIRATION RATE: 20 BRPM | SYSTOLIC BLOOD PRESSURE: 140 MMHG

## 2018-07-05 PROCEDURE — 3331090002 HH PPS REVENUE DEBIT

## 2018-07-05 PROCEDURE — 3331090001 HH PPS REVENUE CREDIT

## 2018-07-06 ENCOUNTER — HOME CARE VISIT (OUTPATIENT)
Dept: SCHEDULING | Facility: HOME HEALTH | Age: 83
End: 2018-07-06
Payer: MEDICARE

## 2018-07-06 PROCEDURE — 3331090002 HH PPS REVENUE DEBIT

## 2018-07-06 PROCEDURE — 3331090001 HH PPS REVENUE CREDIT

## 2018-07-06 PROCEDURE — G0299 HHS/HOSPICE OF RN EA 15 MIN: HCPCS

## 2018-07-07 PROCEDURE — 3331090001 HH PPS REVENUE CREDIT

## 2018-07-07 PROCEDURE — 3331090002 HH PPS REVENUE DEBIT

## 2018-07-08 VITALS
SYSTOLIC BLOOD PRESSURE: 100 MMHG | TEMPERATURE: 97.8 F | DIASTOLIC BLOOD PRESSURE: 54 MMHG | HEART RATE: 82 BPM | OXYGEN SATURATION: 98 % | RESPIRATION RATE: 16 BRPM

## 2018-07-08 PROCEDURE — 3331090001 HH PPS REVENUE CREDIT

## 2018-07-08 PROCEDURE — 3331090002 HH PPS REVENUE DEBIT

## 2018-07-09 PROCEDURE — 3331090001 HH PPS REVENUE CREDIT

## 2018-07-09 PROCEDURE — 3331090002 HH PPS REVENUE DEBIT

## 2018-07-10 ENCOUNTER — HOME CARE VISIT (OUTPATIENT)
Dept: SCHEDULING | Facility: HOME HEALTH | Age: 83
End: 2018-07-10
Payer: MEDICARE

## 2018-07-10 VITALS
HEART RATE: 90 BPM | RESPIRATION RATE: 16 BRPM | OXYGEN SATURATION: 96 % | DIASTOLIC BLOOD PRESSURE: 62 MMHG | TEMPERATURE: 97.7 F | SYSTOLIC BLOOD PRESSURE: 112 MMHG

## 2018-07-10 PROCEDURE — 3331090002 HH PPS REVENUE DEBIT

## 2018-07-10 PROCEDURE — G0299 HHS/HOSPICE OF RN EA 15 MIN: HCPCS

## 2018-07-10 PROCEDURE — 3331090001 HH PPS REVENUE CREDIT

## 2018-07-11 PROCEDURE — 3331090001 HH PPS REVENUE CREDIT

## 2018-07-11 PROCEDURE — 3331090002 HH PPS REVENUE DEBIT

## 2018-07-12 PROCEDURE — 3331090001 HH PPS REVENUE CREDIT

## 2018-07-12 PROCEDURE — 3331090002 HH PPS REVENUE DEBIT

## 2018-07-13 ENCOUNTER — HOME CARE VISIT (OUTPATIENT)
Dept: SCHEDULING | Facility: HOME HEALTH | Age: 83
End: 2018-07-13
Payer: MEDICARE

## 2018-07-13 VITALS
SYSTOLIC BLOOD PRESSURE: 118 MMHG | OXYGEN SATURATION: 96 % | HEART RATE: 86 BPM | DIASTOLIC BLOOD PRESSURE: 76 MMHG | TEMPERATURE: 97.7 F | RESPIRATION RATE: 16 BRPM

## 2018-07-13 PROCEDURE — G0299 HHS/HOSPICE OF RN EA 15 MIN: HCPCS

## 2018-07-13 PROCEDURE — 3331090001 HH PPS REVENUE CREDIT

## 2018-07-13 PROCEDURE — 3331090002 HH PPS REVENUE DEBIT

## 2018-07-14 PROCEDURE — 3331090002 HH PPS REVENUE DEBIT

## 2018-07-14 PROCEDURE — 3331090001 HH PPS REVENUE CREDIT

## 2018-07-15 PROCEDURE — 3331090002 HH PPS REVENUE DEBIT

## 2018-07-15 PROCEDURE — 3331090001 HH PPS REVENUE CREDIT

## 2018-07-16 ENCOUNTER — HOME CARE VISIT (OUTPATIENT)
Dept: SCHEDULING | Facility: HOME HEALTH | Age: 83
End: 2018-07-16
Payer: MEDICARE

## 2018-07-16 VITALS
RESPIRATION RATE: 16 BRPM | SYSTOLIC BLOOD PRESSURE: 130 MMHG | OXYGEN SATURATION: 97 % | HEART RATE: 92 BPM | DIASTOLIC BLOOD PRESSURE: 64 MMHG | TEMPERATURE: 97.4 F

## 2018-07-16 PROCEDURE — A6212 FOAM DRG <=16 SQ IN W/BORDER: HCPCS

## 2018-07-16 PROCEDURE — G0299 HHS/HOSPICE OF RN EA 15 MIN: HCPCS

## 2018-07-16 PROCEDURE — 3331090001 HH PPS REVENUE CREDIT

## 2018-07-16 PROCEDURE — 3331090002 HH PPS REVENUE DEBIT

## 2018-07-17 PROCEDURE — 3331090001 HH PPS REVENUE CREDIT

## 2018-07-17 PROCEDURE — 3331090002 HH PPS REVENUE DEBIT

## 2018-07-18 PROCEDURE — 3331090002 HH PPS REVENUE DEBIT

## 2018-07-18 PROCEDURE — 3331090001 HH PPS REVENUE CREDIT

## 2018-07-19 ENCOUNTER — HOME CARE VISIT (OUTPATIENT)
Dept: SCHEDULING | Facility: HOME HEALTH | Age: 83
End: 2018-07-19
Payer: MEDICARE

## 2018-07-19 PROCEDURE — A6454 SELF-ADHER BAND W>=3" <5"/YD: HCPCS

## 2018-07-19 PROCEDURE — 3331090002 HH PPS REVENUE DEBIT

## 2018-07-19 PROCEDURE — 3331090001 HH PPS REVENUE CREDIT

## 2018-07-19 PROCEDURE — G0299 HHS/HOSPICE OF RN EA 15 MIN: HCPCS

## 2018-07-19 PROCEDURE — A6456 ZINC PASTE BAND W >=3"<5"/YD: HCPCS

## 2018-07-20 PROCEDURE — 3331090001 HH PPS REVENUE CREDIT

## 2018-07-20 PROCEDURE — 3331090002 HH PPS REVENUE DEBIT

## 2018-07-21 ENCOUNTER — HOME CARE VISIT (OUTPATIENT)
Dept: SCHEDULING | Facility: HOME HEALTH | Age: 83
End: 2018-07-21
Payer: MEDICARE

## 2018-07-21 VITALS
SYSTOLIC BLOOD PRESSURE: 132 MMHG | RESPIRATION RATE: 18 BRPM | HEART RATE: 85 BPM | OXYGEN SATURATION: 97 % | DIASTOLIC BLOOD PRESSURE: 69 MMHG | TEMPERATURE: 97.9 F

## 2018-07-21 PROCEDURE — G0299 HHS/HOSPICE OF RN EA 15 MIN: HCPCS

## 2018-07-21 PROCEDURE — 3331090002 HH PPS REVENUE DEBIT

## 2018-07-21 PROCEDURE — 3331090001 HH PPS REVENUE CREDIT

## 2018-07-22 PROCEDURE — 3331090001 HH PPS REVENUE CREDIT

## 2018-07-22 PROCEDURE — 3331090002 HH PPS REVENUE DEBIT

## 2018-07-23 ENCOUNTER — HOME CARE VISIT (OUTPATIENT)
Dept: SCHEDULING | Facility: HOME HEALTH | Age: 83
End: 2018-07-23
Payer: MEDICARE

## 2018-07-23 VITALS
HEART RATE: 96 BPM | TEMPERATURE: 98.7 F | DIASTOLIC BLOOD PRESSURE: 68 MMHG | DIASTOLIC BLOOD PRESSURE: 72 MMHG | RESPIRATION RATE: 16 BRPM | SYSTOLIC BLOOD PRESSURE: 124 MMHG | RESPIRATION RATE: 16 BRPM | TEMPERATURE: 97.8 F | SYSTOLIC BLOOD PRESSURE: 130 MMHG | OXYGEN SATURATION: 95 % | OXYGEN SATURATION: 98 % | HEART RATE: 78 BPM

## 2018-07-23 PROCEDURE — 3331090001 HH PPS REVENUE CREDIT

## 2018-07-23 PROCEDURE — G0299 HHS/HOSPICE OF RN EA 15 MIN: HCPCS

## 2018-07-23 PROCEDURE — 3331090002 HH PPS REVENUE DEBIT

## 2018-07-24 PROCEDURE — 3331090001 HH PPS REVENUE CREDIT

## 2018-07-24 PROCEDURE — 3331090002 HH PPS REVENUE DEBIT

## 2018-07-25 PROCEDURE — 3331090001 HH PPS REVENUE CREDIT

## 2018-07-25 PROCEDURE — 3331090002 HH PPS REVENUE DEBIT

## 2018-07-26 ENCOUNTER — HOME CARE VISIT (OUTPATIENT)
Dept: SCHEDULING | Facility: HOME HEALTH | Age: 83
End: 2018-07-26
Payer: MEDICARE

## 2018-07-26 VITALS
RESPIRATION RATE: 16 BRPM | DIASTOLIC BLOOD PRESSURE: 62 MMHG | OXYGEN SATURATION: 91 % | SYSTOLIC BLOOD PRESSURE: 120 MMHG | TEMPERATURE: 97.8 F | HEART RATE: 78 BPM

## 2018-07-26 PROCEDURE — A6454 SELF-ADHER BAND W>=3" <5"/YD: HCPCS

## 2018-07-26 PROCEDURE — 3331090001 HH PPS REVENUE CREDIT

## 2018-07-26 PROCEDURE — A6456 ZINC PASTE BAND W >=3"<5"/YD: HCPCS

## 2018-07-26 PROCEDURE — G0299 HHS/HOSPICE OF RN EA 15 MIN: HCPCS

## 2018-07-26 PROCEDURE — 3331090002 HH PPS REVENUE DEBIT

## 2018-07-27 PROBLEM — E11.9 TYPE 2 DIABETES MELLITUS WITHOUT COMPLICATION, WITHOUT LONG-TERM CURRENT USE OF INSULIN (HCC): Status: ACTIVE | Noted: 2018-07-27

## 2018-07-27 PROBLEM — E78.2 MIXED HYPERLIPIDEMIA: Status: ACTIVE | Noted: 2018-07-27

## 2018-07-27 PROCEDURE — 3331090002 HH PPS REVENUE DEBIT

## 2018-07-27 PROCEDURE — 3331090001 HH PPS REVENUE CREDIT

## 2018-07-28 PROCEDURE — 3331090002 HH PPS REVENUE DEBIT

## 2018-07-28 PROCEDURE — 3331090001 HH PPS REVENUE CREDIT

## 2018-07-29 PROCEDURE — 3331090001 HH PPS REVENUE CREDIT

## 2018-07-29 PROCEDURE — 3331090002 HH PPS REVENUE DEBIT

## 2018-07-30 ENCOUNTER — HOME CARE VISIT (OUTPATIENT)
Dept: SCHEDULING | Facility: HOME HEALTH | Age: 83
End: 2018-07-30
Payer: MEDICARE

## 2018-07-30 VITALS
TEMPERATURE: 97.4 F | DIASTOLIC BLOOD PRESSURE: 82 MMHG | OXYGEN SATURATION: 98 % | SYSTOLIC BLOOD PRESSURE: 140 MMHG | RESPIRATION RATE: 16 BRPM | HEART RATE: 78 BPM

## 2018-07-30 PROCEDURE — G0299 HHS/HOSPICE OF RN EA 15 MIN: HCPCS

## 2018-07-30 PROCEDURE — 3331090001 HH PPS REVENUE CREDIT

## 2018-07-30 PROCEDURE — 3331090002 HH PPS REVENUE DEBIT

## 2018-07-31 PROCEDURE — 3331090002 HH PPS REVENUE DEBIT

## 2018-07-31 PROCEDURE — 3331090001 HH PPS REVENUE CREDIT

## 2018-08-01 PROCEDURE — 3331090001 HH PPS REVENUE CREDIT

## 2018-08-01 PROCEDURE — 3331090002 HH PPS REVENUE DEBIT

## 2018-08-02 ENCOUNTER — HOME CARE VISIT (OUTPATIENT)
Dept: SCHEDULING | Facility: HOME HEALTH | Age: 83
End: 2018-08-02
Payer: MEDICARE

## 2018-08-02 VITALS
SYSTOLIC BLOOD PRESSURE: 150 MMHG | TEMPERATURE: 97.7 F | HEART RATE: 62 BPM | OXYGEN SATURATION: 99 % | DIASTOLIC BLOOD PRESSURE: 70 MMHG | RESPIRATION RATE: 20 BRPM

## 2018-08-02 PROCEDURE — G0299 HHS/HOSPICE OF RN EA 15 MIN: HCPCS

## 2018-08-02 PROCEDURE — 3331090002 HH PPS REVENUE DEBIT

## 2018-08-02 PROCEDURE — 3331090001 HH PPS REVENUE CREDIT

## 2018-08-03 PROCEDURE — 3331090002 HH PPS REVENUE DEBIT

## 2018-08-03 PROCEDURE — 3331090001 HH PPS REVENUE CREDIT

## 2018-08-04 PROCEDURE — 3331090001 HH PPS REVENUE CREDIT

## 2018-08-04 PROCEDURE — 3331090002 HH PPS REVENUE DEBIT

## 2018-08-05 PROCEDURE — 3331090001 HH PPS REVENUE CREDIT

## 2018-08-05 PROCEDURE — 3331090002 HH PPS REVENUE DEBIT

## 2018-08-06 PROCEDURE — 3331090002 HH PPS REVENUE DEBIT

## 2018-08-06 PROCEDURE — 3331090001 HH PPS REVENUE CREDIT

## 2018-08-07 PROCEDURE — 3331090001 HH PPS REVENUE CREDIT

## 2018-08-07 PROCEDURE — 3331090002 HH PPS REVENUE DEBIT

## 2018-08-08 PROCEDURE — 3331090001 HH PPS REVENUE CREDIT

## 2018-08-08 PROCEDURE — 3331090002 HH PPS REVENUE DEBIT

## 2018-08-09 ENCOUNTER — HOME CARE VISIT (OUTPATIENT)
Dept: SCHEDULING | Facility: HOME HEALTH | Age: 83
End: 2018-08-09
Payer: MEDICARE

## 2018-08-09 VITALS
DIASTOLIC BLOOD PRESSURE: 60 MMHG | OXYGEN SATURATION: 97 % | TEMPERATURE: 97.7 F | RESPIRATION RATE: 20 BRPM | SYSTOLIC BLOOD PRESSURE: 102 MMHG | HEART RATE: 81 BPM

## 2018-08-09 PROCEDURE — 3331090001 HH PPS REVENUE CREDIT

## 2018-08-09 PROCEDURE — G0299 HHS/HOSPICE OF RN EA 15 MIN: HCPCS

## 2018-08-09 PROCEDURE — 3331090002 HH PPS REVENUE DEBIT

## 2018-08-10 PROCEDURE — 3331090001 HH PPS REVENUE CREDIT

## 2018-08-10 PROCEDURE — 3331090002 HH PPS REVENUE DEBIT

## 2018-08-11 ENCOUNTER — APPOINTMENT (OUTPATIENT)
Dept: GENERAL RADIOLOGY | Age: 83
End: 2018-08-11
Attending: EMERGENCY MEDICINE
Payer: MEDICARE

## 2018-08-11 ENCOUNTER — APPOINTMENT (OUTPATIENT)
Dept: CT IMAGING | Age: 83
End: 2018-08-11
Attending: EMERGENCY MEDICINE
Payer: MEDICARE

## 2018-08-11 ENCOUNTER — HOSPITAL ENCOUNTER (EMERGENCY)
Age: 83
Discharge: HOME | End: 2018-08-11
Attending: EMERGENCY MEDICINE
Payer: MEDICARE

## 2018-08-11 VITALS
WEIGHT: 160 LBS | SYSTOLIC BLOOD PRESSURE: 180 MMHG | OXYGEN SATURATION: 96 % | HEIGHT: 64 IN | HEART RATE: 80 BPM | TEMPERATURE: 98.3 F | RESPIRATION RATE: 16 BRPM | DIASTOLIC BLOOD PRESSURE: 90 MMHG | BODY MASS INDEX: 27.31 KG/M2

## 2018-08-11 DIAGNOSIS — S16.1XXA STRAIN OF NECK MUSCLE, INITIAL ENCOUNTER: ICD-10-CM

## 2018-08-11 DIAGNOSIS — S39.012A BACK STRAIN, INITIAL ENCOUNTER: ICD-10-CM

## 2018-08-11 DIAGNOSIS — S50.01XA CONTUSION OF RIGHT ELBOW, INITIAL ENCOUNTER: Primary | ICD-10-CM

## 2018-08-11 DIAGNOSIS — S40.011A CONTUSION OF RIGHT SHOULDER, INITIAL ENCOUNTER: ICD-10-CM

## 2018-08-11 PROCEDURE — 73030 X-RAY EXAM OF SHOULDER: CPT

## 2018-08-11 PROCEDURE — 72125 CT NECK SPINE W/O DYE: CPT

## 2018-08-11 PROCEDURE — 71046 X-RAY EXAM CHEST 2 VIEWS: CPT

## 2018-08-11 PROCEDURE — 74011250637 HC RX REV CODE- 250/637: Performed by: EMERGENCY MEDICINE

## 2018-08-11 PROCEDURE — 73080 X-RAY EXAM OF ELBOW: CPT

## 2018-08-11 PROCEDURE — 72070 X-RAY EXAM THORAC SPINE 2VWS: CPT

## 2018-08-11 PROCEDURE — 3331090001 HH PPS REVENUE CREDIT

## 2018-08-11 PROCEDURE — 73060 X-RAY EXAM OF HUMERUS: CPT

## 2018-08-11 PROCEDURE — 3331090002 HH PPS REVENUE DEBIT

## 2018-08-11 PROCEDURE — 99283 EMERGENCY DEPT VISIT LOW MDM: CPT | Performed by: EMERGENCY MEDICINE

## 2018-08-11 RX ORDER — MORPHINE SULFATE 15 MG/1
15 TABLET ORAL
Status: COMPLETED | OUTPATIENT
Start: 2018-08-11 | End: 2018-08-11

## 2018-08-11 RX ADMIN — MORPHINE SULFATE 15 MG: 15 TABLET ORAL at 23:27

## 2018-08-12 PROCEDURE — 3331090001 HH PPS REVENUE CREDIT

## 2018-08-12 PROCEDURE — 3331090002 HH PPS REVENUE DEBIT

## 2018-08-12 NOTE — ED NOTES
I have reviewed discharge instructions with the patient. The patient verbalized understanding. Patient left ED via Discharge Method: wheelchair to Home with nephew. Opportunity for questions and clarification provided. Patient given 0 scripts. To continue your aftercare when you leave the hospital, you may receive an automated call from our care team to check in on how you are doing. This is a free service and part of our promise to provide the best care and service to meet your aftercare needs.  If you have questions, or wish to unsubscribe from this service please call [phone]. Thank you for Choosing our [facility] Emergency Department.

## 2018-08-12 NOTE — ED PROVIDER NOTES
HPI Comments: Patient reports injury to this shoulder a few weeks ago also. Patient is a 80 y.o. female presenting with fall. The history is provided by the patient. Fall   The accident occurred 1 to 2 hours ago. The fall occurred while walking. She fell from a height of ground level. She landed on carpet. Point of impact: right elbow and right upper arm/shoulder area. The pain is present in the neck, right shoulder and right elbow. The pain is moderate. She was ambulatory at the scene. There was no entrapment after the fall. Pertinent negatives include no numbness, no abdominal pain, no nausea, no vomiting, no headaches, no loss of consciousness and no tingling. The symptoms are aggravated by use of injured limb. She has tried nothing for the symptoms.         Past Medical History:   Diagnosis Date    Arthritis     Chronic pain     bilat feet & hands    Edema     BLE & bilat fingers; pt takes diuretic daily; pt states her swelling is related to the nerve disease she is being treated for at [facility] arthritis     History of MI (myocardial infarction) 18's    \"light heart attack\"; pt states MD at [facility] mentioned it to her; pt stated MD told her \"was on the back part of her heart & would not give her any problems\"; pt takes aspirin 81 mg daily    Hypertension     managed w/med    Hypothyroidism     s/p partial thyroidectomy 1960; managed w/med    Mild heartburn     takes tums prn    Neuropathy 1980's    pt states has been treated by [facility] for \"nerve problems in her feet & hands\"    Post-operative nausea and vomiting     Type 2 diabetes mellitus without complication, without long-term current use of insulin ([facility] [address] 7/27/2018       Past Surgical History:   Procedure Laterality Date    HX CYST REMOVAL Right 1950's    upper eyelid    HX DILATION AND CURETTAGE  [id number]    HX GYN  1963    ectopic pregnancy; right BSO done    HX HEART CATHETERIZATION  late 1979    pt states no stents    HX HYSTERECTOMY  1972    HX LEFT SALPINGO-OOPHORECTOMY  1965    HX OPEN CHOLECYSTECTOMY  1970    pt states was exploratory abdominal surgery too    HX ORTHOPAEDIC Bilateral [id number]    Foot; bone taken out of 5th toe on right foot; 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 Social History Main Topics    Smoking status: Never Smoker    Smokeless tobacco: Never Used    Alcohol use No    Drug use: No    Sexual activity: Not on file     Other Topics Concern    Not on file     Social History Narrative         ALLERGIES: Contrast agent [iodine]; Actifed [triprolidine-pseudoephedrine]; Allopurinol; Decongestant [pseudoephedrine hcl]; Lexapro [escitalopram oxalate]; Lyrica [pregabalin]; Minizide [prazosin-polythiazide]; Mobic [meloxicam]; Omnipaque [iohexol]; Sertraline; Spironolactone; and Sulfamethazine    Review of Systems   HENT: Negative for facial swelling. Respiratory: Negative for shortness of breath. Cardiovascular: Negative for chest pain. Gastrointestinal: Negative for abdominal pain, nausea and vomiting. Musculoskeletal: Negative for back pain and neck pain. Neurological: Negative for tingling, loss of consciousness, weakness, numbness and headaches. Vitals:    08/11/18 2157   BP: 193/84   Pulse: 85   Resp: 20   Temp: 98.3 °F (36.8 °C)   SpO2: 96%   Weight: 72.6 kg (160 lb)   Height: 5' 3.5\" (1.613 m)            Physical Exam   Constitutional: She is oriented to person, place, and time. She appears well-developed and well-nourished.    HENT:   Head: Normocephalic and atraumatic. Mouth/Throat: Oropharynx is clear and moist.   Eyes: Conjunctivae and EOM are normal. Pupils are equal, round, and reactive to light. Neck: Trachea normal. Spinous process tenderness and muscular tenderness present. Cardiovascular: Normal rate, regular rhythm, normal heart sounds and intact distal pulses. Pulmonary/Chest: Effort normal and breath sounds normal. She exhibits no tenderness. Abdominal: Soft. Bowel sounds are normal. She exhibits no distension. There is no tenderness. There is no rebound and no guarding. Musculoskeletal: Normal range of motion. She exhibits tenderness (Midline tenderness and mid thoracic area). She exhibits no edema. Cervical back: She exhibits tenderness. Decreased range of motion of right shoulder and right elbow secondary to pain. Rather diffuse tenderness in the right upper arm down to the elbow. 2+ radial pulse. Motor and sensation intact. Neurological: She is alert and oriented to person, place, and time. She has normal strength. No sensory deficit. GCS eye subscore is 4. GCS verbal subscore is 5. GCS motor subscore is 6. Skin: Skin is warm and dry. Nursing note and vitals reviewed. MDM  Number of Diagnoses or Management Options  Diagnosis management comments: X-rays of the tender painful areas to exclude fracture. No headache or head injury or loss of consciousness. Patient tripped and fell there was no syncope. 11:35 PM  X-rays and CT cervical spine negative for acute fracture. Follow-up as previously instructed with PCP. Rice therapy.        Amount and/or Complexity of Data Reviewed  Tests in the radiology section of CPT®: ordered and reviewed (Results for orders placed or performed in visit on 16/48/81  -METABOLIC PANEL, BASIC       Result                                            Value                         Ref Range                       Glucose                                           143 (H) 65 - 99 mg/dL                   BUN                                               25                            8 - 27 mg/dL                    Creatinine                                        1.13 (H)                      0.57 - 1.00 mg/dL               GFR est non-AA                                    45 (L)                        >59 mL/min/1.73                 GFR est AA                                        52 (L)                        >59 mL/min/1.73                 BUN/Creatinine ratio                              22                            12 - 28                         Sodium                                            139                           134 - 144 mmol/L                Potassium                                         4.4                           3.5 - 5.2 mmol/L                Chloride                                          93 (L)                        96 - 106 mmol/L                 CO2                                               28                            18 - 29 mmol/L                  Calcium                                           9.3                           8.7 - 10.3 mg/dL           ')          ED Course       Procedures

## 2018-08-12 NOTE — DISCHARGE INSTRUCTIONS
Back Strain: Care Instructions  Your Care Instructions    Back strain happens when you overstretch, or pull, a muscle in your back. You may hurt your back in an accident or when you exercise or lift something. Most back pain will get better with rest and time. You can take care of yourself at home to help your back heal.  Follow-up care is a key part of your treatment and safety. Be sure to make and go to all appointments, and call your doctor if you are having problems. It's also a good idea to know your test results and keep a list of the medicines you take. How can you care for yourself at home? · Try to stay as active as you can, but stop or reduce any activity that causes pain. · Put ice or a cold pack on the sore muscle for 10 to 20 minutes at a time to stop swelling. Try this every 1 to 2 hours for 3 days (when you are awake) or until the swelling goes down. Put a thin cloth between the ice pack and your skin. · After 2 or 3 days, apply a heating pad on low or a warm cloth to your back. Some doctors suggest that you go back and forth between hot and cold treatments. · Take pain medicines exactly as directed. ¨ If the doctor gave you a prescription medicine for pain, take it as prescribed. ¨ If you are not taking a prescription pain medicine, ask your doctor if you can take an over-the-counter medicine. · Try sleeping on your side with a pillow between your legs. Or put a pillow under your knees when you lie on your back. These measures can ease pain in your lower back. · Return to your usual level of activity slowly. When should you call for help? Call 911 anytime you think you may need emergency care. For example, call if:    · You are unable to move a leg at all.   [facility] your doctor now or seek immediate medical care if:    · You have new or worse symptoms in your legs, belly, or buttocks. Symptoms may include:  ¨ Numbness or tingling. ¨ Weakness.   ¨ Pain.     · You lose bladder or bowel control.    Watch closely for changes in your health, and be sure to contact your doctor if:    · You have a fever, lose weight, or don't feel well.     · You are not getting better as expected. Where can you learn more? Go to http://ana maría-farrah.info/. Enter T221 in the search box to learn more about \"Back Strain: Care Instructions. \"  Current as of: November 29, 2017  Content Version: 11.7  © 0279-2331 [employer]. Care instructions adapted under license by [employer]Â® (which disclaims liability or warranty for this information). If you have questions about a medical condition or this instruction, always ask your healthcare professional. [address] any warranty or liability for your use of this information.

## 2018-08-13 ENCOUNTER — HOME CARE VISIT (OUTPATIENT)
Dept: SCHEDULING | Facility: HOME HEALTH | Age: 83
End: 2018-08-13
Payer: MEDICARE

## 2018-08-13 PROCEDURE — 3331090001 HH PPS REVENUE CREDIT

## 2018-08-13 PROCEDURE — G0299 HHS/HOSPICE OF RN EA 15 MIN: HCPCS

## 2018-08-13 PROCEDURE — 3331090002 HH PPS REVENUE DEBIT

## 2018-08-14 ENCOUNTER — HOSPITAL ENCOUNTER (OUTPATIENT)
Dept: LAB | Age: 83
Discharge: HOME | End: 2018-08-14
Attending: FAMILY MEDICINE
Payer: MEDICARE

## 2018-08-14 ENCOUNTER — HOME CARE VISIT (OUTPATIENT)
Dept: SCHEDULING | Facility: HOME HEALTH | Age: 83
End: 2018-08-14
Payer: MEDICARE

## 2018-08-14 VITALS
SYSTOLIC BLOOD PRESSURE: 132 MMHG | HEART RATE: 85 BPM | RESPIRATION RATE: 16 BRPM | DIASTOLIC BLOOD PRESSURE: 80 MMHG | OXYGEN SATURATION: 96 % | TEMPERATURE: 98 F

## 2018-08-14 LAB
APPEARANCE UR: ABNORMAL
BACTERIA URNS QL MICRO: ABNORMAL /HPF
BILIRUB UR QL: NEGATIVE
CASTS URNS QL MICRO: ABNORMAL /LPF
COLOR UR: YELLOW
CRYSTALS URNS QL MICRO: 0 /LPF
EPI CELLS #/AREA URNS HPF: ABNORMAL /HPF
GLUCOSE UR STRIP.AUTO-MCNC: NEGATIVE MG/DL
HGB UR QL STRIP: NEGATIVE
KETONES UR QL STRIP.AUTO: NEGATIVE MG/DL
LEUKOCYTE ESTERASE UR QL STRIP.AUTO: ABNORMAL
MUCOUS THREADS URNS QL MICRO: 0 /LPF
NITRITE UR QL STRIP.AUTO: NEGATIVE
PH UR STRIP: 5.5 [PH] (ref 5–9)
PROT UR STRIP-MCNC: NEGATIVE MG/DL
RBC #/AREA URNS HPF: 0 /HPF
SP GR UR REFRACTOMETRY: <1.005 (ref 1–1.02)
UROBILINOGEN UR QL STRIP.AUTO: 0.2 EU/DL (ref 0.2–1)
WBC URNS QL MICRO: ABNORMAL /HPF

## 2018-08-14 PROCEDURE — 81003 URINALYSIS AUTO W/O SCOPE: CPT

## 2018-08-14 PROCEDURE — 81015 MICROSCOPIC EXAM OF URINE: CPT

## 2018-08-14 PROCEDURE — 3331090001 HH PPS REVENUE CREDIT

## 2018-08-14 PROCEDURE — 3331090002 HH PPS REVENUE DEBIT

## 2018-08-14 PROCEDURE — G0299 HHS/HOSPICE OF RN EA 15 MIN: HCPCS

## 2018-08-15 PROCEDURE — 3331090002 HH PPS REVENUE DEBIT

## 2018-08-15 PROCEDURE — 3331090001 HH PPS REVENUE CREDIT

## 2018-08-16 ENCOUNTER — HOME CARE VISIT (OUTPATIENT)
Dept: SCHEDULING | Facility: HOME HEALTH | Age: 83
End: 2018-08-16
Payer: MEDICARE

## 2018-08-16 VITALS
DIASTOLIC BLOOD PRESSURE: 86 MMHG | OXYGEN SATURATION: 94 % | HEART RATE: 89 BPM | RESPIRATION RATE: 12 BRPM | TEMPERATURE: 98 F | SYSTOLIC BLOOD PRESSURE: 131 MMHG

## 2018-08-16 VITALS
HEART RATE: 93 BPM | SYSTOLIC BLOOD PRESSURE: 130 MMHG | TEMPERATURE: 97.4 F | DIASTOLIC BLOOD PRESSURE: 72 MMHG | RESPIRATION RATE: 16 BRPM | OXYGEN SATURATION: 96 %

## 2018-08-16 PROCEDURE — 3331090002 HH PPS REVENUE DEBIT

## 2018-08-16 PROCEDURE — 3331090001 HH PPS REVENUE CREDIT

## 2018-08-16 PROCEDURE — G0299 HHS/HOSPICE OF RN EA 15 MIN: HCPCS

## 2018-08-17 PROCEDURE — 3331090001 HH PPS REVENUE CREDIT

## 2018-08-17 PROCEDURE — 3331090002 HH PPS REVENUE DEBIT

## 2018-08-18 ENCOUNTER — HOME CARE VISIT (OUTPATIENT)
Dept: HOME HEALTH SERVICES | Facility: HOME HEALTH | Age: 83
End: 2018-08-18
Payer: MEDICARE

## 2018-08-18 PROCEDURE — 3331090002 HH PPS REVENUE DEBIT

## 2018-08-18 PROCEDURE — 3331090001 HH PPS REVENUE CREDIT

## 2018-08-19 PROCEDURE — 3331090001 HH PPS REVENUE CREDIT

## 2018-08-19 PROCEDURE — 3331090002 HH PPS REVENUE DEBIT

## 2018-08-20 ENCOUNTER — HOME CARE VISIT (OUTPATIENT)
Dept: SCHEDULING | Facility: HOME HEALTH | Age: 83
End: 2018-08-20
Payer: MEDICARE

## 2018-08-20 VITALS
RESPIRATION RATE: 16 BRPM | OXYGEN SATURATION: 95 % | SYSTOLIC BLOOD PRESSURE: 128 MMHG | DIASTOLIC BLOOD PRESSURE: 64 MMHG | TEMPERATURE: 98.1 F | HEART RATE: 84 BPM

## 2018-08-20 PROCEDURE — 3331090002 HH PPS REVENUE DEBIT

## 2018-08-20 PROCEDURE — A6454 SELF-ADHER BAND W>=3" <5"/YD: HCPCS

## 2018-08-20 PROCEDURE — A6260 WOUND CLEANSER ANY TYPE/SIZE: HCPCS

## 2018-08-20 PROCEDURE — G0299 HHS/HOSPICE OF RN EA 15 MIN: HCPCS

## 2018-08-20 PROCEDURE — A6209 FOAM DRSG <=16 SQ IN W/O BDR: HCPCS

## 2018-08-20 PROCEDURE — A6456 ZINC PASTE BAND W >=3"<5"/YD: HCPCS

## 2018-08-20 PROCEDURE — A4450 NON-WATERPROOF TAPE: HCPCS

## 2018-08-20 PROCEDURE — A6266 IMPREG GAUZE NO H20/SAL/YARD: HCPCS

## 2018-08-20 PROCEDURE — 3331090001 HH PPS REVENUE CREDIT

## 2018-08-20 PROCEDURE — A6223 GAUZE >16<=48 NO W/SAL W/O B: HCPCS

## 2018-08-20 PROCEDURE — A6222 GAUZE <=16 IN NO W/SAL W/O B: HCPCS

## 2018-08-21 PROCEDURE — 3331090001 HH PPS REVENUE CREDIT

## 2018-08-21 PROCEDURE — 3331090002 HH PPS REVENUE DEBIT

## 2018-08-22 ENCOUNTER — HOME CARE VISIT (OUTPATIENT)
Dept: SCHEDULING | Facility: HOME HEALTH | Age: 83
End: 2018-08-22
Payer: MEDICARE

## 2018-08-22 PROCEDURE — 3331090002 HH PPS REVENUE DEBIT

## 2018-08-22 PROCEDURE — 3331090001 HH PPS REVENUE CREDIT

## 2018-08-22 PROCEDURE — G0299 HHS/HOSPICE OF RN EA 15 MIN: HCPCS

## 2018-08-23 VITALS
SYSTOLIC BLOOD PRESSURE: 120 MMHG | RESPIRATION RATE: 16 BRPM | TEMPERATURE: 97.7 F | OXYGEN SATURATION: 93 % | DIASTOLIC BLOOD PRESSURE: 64 MMHG | HEART RATE: 82 BPM

## 2018-08-23 PROCEDURE — 3331090002 HH PPS REVENUE DEBIT

## 2018-08-23 PROCEDURE — 3331090001 HH PPS REVENUE CREDIT

## 2018-08-24 PROCEDURE — 3331090002 HH PPS REVENUE DEBIT

## 2018-08-24 PROCEDURE — 3331090001 HH PPS REVENUE CREDIT

## 2018-08-25 PROCEDURE — 3331090002 HH PPS REVENUE DEBIT

## 2018-08-25 PROCEDURE — 3331090001 HH PPS REVENUE CREDIT

## 2018-08-26 PROCEDURE — 3331090002 HH PPS REVENUE DEBIT

## 2018-08-26 PROCEDURE — 3331090001 HH PPS REVENUE CREDIT

## 2018-08-27 ENCOUNTER — HOME CARE VISIT (OUTPATIENT)
Dept: SCHEDULING | Facility: HOME HEALTH | Age: 83
End: 2018-08-27
Payer: MEDICARE

## 2018-08-27 VITALS
OXYGEN SATURATION: 97 % | DIASTOLIC BLOOD PRESSURE: 70 MMHG | SYSTOLIC BLOOD PRESSURE: 120 MMHG | TEMPERATURE: 98.1 F | RESPIRATION RATE: 16 BRPM | HEART RATE: 80 BPM

## 2018-08-27 PROCEDURE — G0299 HHS/HOSPICE OF RN EA 15 MIN: HCPCS

## 2018-08-27 PROCEDURE — 3331090001 HH PPS REVENUE CREDIT

## 2018-08-27 PROCEDURE — 3331090002 HH PPS REVENUE DEBIT

## 2018-08-28 ENCOUNTER — HOME CARE VISIT (OUTPATIENT)
Dept: SCHEDULING | Facility: HOME HEALTH | Age: 83
End: 2018-08-28
Payer: MEDICARE

## 2018-08-28 PROCEDURE — 3331090001 HH PPS REVENUE CREDIT

## 2018-08-28 PROCEDURE — 3331090002 HH PPS REVENUE DEBIT

## 2018-08-29 PROCEDURE — 3331090002 HH PPS REVENUE DEBIT

## 2018-08-29 PROCEDURE — 3331090001 HH PPS REVENUE CREDIT

## 2018-08-30 ENCOUNTER — HOME CARE VISIT (OUTPATIENT)
Dept: SCHEDULING | Facility: HOME HEALTH | Age: 83
End: 2018-08-30
Payer: MEDICARE

## 2018-08-30 PROCEDURE — 3331090002 HH PPS REVENUE DEBIT

## 2018-08-30 PROCEDURE — 3331090001 HH PPS REVENUE CREDIT

## 2018-08-30 PROCEDURE — G0299 HHS/HOSPICE OF RN EA 15 MIN: HCPCS

## 2018-08-31 VITALS
DIASTOLIC BLOOD PRESSURE: 60 MMHG | RESPIRATION RATE: 16 BRPM | OXYGEN SATURATION: 93 % | HEART RATE: 86 BPM | SYSTOLIC BLOOD PRESSURE: 110 MMHG | TEMPERATURE: 98.2 F

## 2018-08-31 PROCEDURE — 3331090002 HH PPS REVENUE DEBIT

## 2018-08-31 PROCEDURE — 3331090001 HH PPS REVENUE CREDIT

## 2018-09-01 PROCEDURE — 3331090002 HH PPS REVENUE DEBIT

## 2018-09-01 PROCEDURE — 3331090001 HH PPS REVENUE CREDIT

## 2018-09-02 PROCEDURE — 3331090002 HH PPS REVENUE DEBIT

## 2018-09-02 PROCEDURE — 3331090001 HH PPS REVENUE CREDIT

## 2018-09-03 PROCEDURE — 3331090002 HH PPS REVENUE DEBIT

## 2018-09-03 PROCEDURE — 3331090001 HH PPS REVENUE CREDIT

## 2018-09-04 ENCOUNTER — HOME CARE VISIT (OUTPATIENT)
Dept: SCHEDULING | Facility: HOME HEALTH | Age: 83
End: 2018-09-04
Payer: MEDICARE

## 2018-09-04 VITALS
OXYGEN SATURATION: 95 % | HEART RATE: 82 BPM | SYSTOLIC BLOOD PRESSURE: 100 MMHG | DIASTOLIC BLOOD PRESSURE: 52 MMHG | TEMPERATURE: 98.3 F | RESPIRATION RATE: 16 BRPM

## 2018-09-04 PROCEDURE — 3331090001 HH PPS REVENUE CREDIT

## 2018-09-04 PROCEDURE — 400014 HH F/U

## 2018-09-04 PROCEDURE — 3331090002 HH PPS REVENUE DEBIT

## 2018-09-04 PROCEDURE — G0299 HHS/HOSPICE OF RN EA 15 MIN: HCPCS

## 2018-09-05 PROCEDURE — 3331090001 HH PPS REVENUE CREDIT

## 2018-09-05 PROCEDURE — 3331090002 HH PPS REVENUE DEBIT

## 2018-09-06 PROCEDURE — 3331090002 HH PPS REVENUE DEBIT

## 2018-09-06 PROCEDURE — 3331090001 HH PPS REVENUE CREDIT

## 2018-09-07 ENCOUNTER — HOME CARE VISIT (OUTPATIENT)
Dept: SCHEDULING | Facility: HOME HEALTH | Age: 83
End: 2018-09-07
Payer: MEDICARE

## 2018-09-07 PROCEDURE — 3331090002 HH PPS REVENUE DEBIT

## 2018-09-07 PROCEDURE — G0299 HHS/HOSPICE OF RN EA 15 MIN: HCPCS

## 2018-09-07 PROCEDURE — 3331090001 HH PPS REVENUE CREDIT

## 2018-09-08 PROCEDURE — 3331090002 HH PPS REVENUE DEBIT

## 2018-09-08 PROCEDURE — 3331090001 HH PPS REVENUE CREDIT

## 2018-09-09 VITALS
RESPIRATION RATE: 16 BRPM | DIASTOLIC BLOOD PRESSURE: 58 MMHG | SYSTOLIC BLOOD PRESSURE: 130 MMHG | HEART RATE: 88 BPM | TEMPERATURE: 98.6 F | OXYGEN SATURATION: 92 %

## 2018-09-09 PROCEDURE — 3331090001 HH PPS REVENUE CREDIT

## 2018-09-09 PROCEDURE — 3331090002 HH PPS REVENUE DEBIT

## 2018-09-10 PROCEDURE — 3331090002 HH PPS REVENUE DEBIT

## 2018-09-10 PROCEDURE — 3331090001 HH PPS REVENUE CREDIT

## 2018-09-11 ENCOUNTER — HOME CARE VISIT (OUTPATIENT)
Dept: SCHEDULING | Facility: HOME HEALTH | Age: 83
End: 2018-09-11
Payer: MEDICARE

## 2018-09-11 VITALS
SYSTOLIC BLOOD PRESSURE: 142 MMHG | DIASTOLIC BLOOD PRESSURE: 74 MMHG | HEART RATE: 72 BPM | OXYGEN SATURATION: 95 % | RESPIRATION RATE: 16 BRPM | TEMPERATURE: 97.9 F

## 2018-09-11 PROCEDURE — A6456 ZINC PASTE BAND W >=3"<5"/YD: HCPCS

## 2018-09-11 PROCEDURE — 3331090001 HH PPS REVENUE CREDIT

## 2018-09-11 PROCEDURE — 3331090002 HH PPS REVENUE DEBIT

## 2018-09-11 PROCEDURE — A6454 SELF-ADHER BAND W>=3" <5"/YD: HCPCS

## 2018-09-11 PROCEDURE — G0299 HHS/HOSPICE OF RN EA 15 MIN: HCPCS

## 2018-09-12 PROCEDURE — 3331090002 HH PPS REVENUE DEBIT

## 2018-09-12 PROCEDURE — 3331090001 HH PPS REVENUE CREDIT

## 2018-09-13 PROCEDURE — 3331090001 HH PPS REVENUE CREDIT

## 2018-09-13 PROCEDURE — 3331090002 HH PPS REVENUE DEBIT

## 2018-09-14 ENCOUNTER — HOME CARE VISIT (OUTPATIENT)
Dept: SCHEDULING | Facility: HOME HEALTH | Age: 83
End: 2018-09-14
Payer: MEDICARE

## 2018-09-14 PROCEDURE — 3331090001 HH PPS REVENUE CREDIT

## 2018-09-14 PROCEDURE — G0299 HHS/HOSPICE OF RN EA 15 MIN: HCPCS

## 2018-09-14 PROCEDURE — 3331090002 HH PPS REVENUE DEBIT

## 2018-09-15 PROCEDURE — 3331090001 HH PPS REVENUE CREDIT

## 2018-09-15 PROCEDURE — 3331090002 HH PPS REVENUE DEBIT

## 2018-09-16 VITALS
SYSTOLIC BLOOD PRESSURE: 130 MMHG | OXYGEN SATURATION: 97 % | TEMPERATURE: 98 F | HEART RATE: 80 BPM | DIASTOLIC BLOOD PRESSURE: 68 MMHG | RESPIRATION RATE: 18 BRPM

## 2018-09-16 PROCEDURE — 3331090002 HH PPS REVENUE DEBIT

## 2018-09-16 PROCEDURE — 3331090001 HH PPS REVENUE CREDIT

## 2018-09-17 ENCOUNTER — HOME CARE VISIT (OUTPATIENT)
Dept: SCHEDULING | Facility: HOME HEALTH | Age: 83
End: 2018-09-17
Payer: MEDICARE

## 2018-09-17 VITALS — SYSTOLIC BLOOD PRESSURE: 104 MMHG | DIASTOLIC BLOOD PRESSURE: 60 MMHG | TEMPERATURE: 97.9 F | RESPIRATION RATE: 18 BRPM

## 2018-09-17 PROCEDURE — G0299 HHS/HOSPICE OF RN EA 15 MIN: HCPCS

## 2018-09-17 PROCEDURE — 3331090002 HH PPS REVENUE DEBIT

## 2018-09-17 PROCEDURE — 3331090001 HH PPS REVENUE CREDIT

## 2018-09-18 PROCEDURE — 3331090001 HH PPS REVENUE CREDIT

## 2018-09-18 PROCEDURE — 3331090002 HH PPS REVENUE DEBIT

## 2018-09-19 PROCEDURE — 3331090002 HH PPS REVENUE DEBIT

## 2018-09-19 PROCEDURE — 3331090001 HH PPS REVENUE CREDIT

## 2018-09-20 ENCOUNTER — HOME CARE VISIT (OUTPATIENT)
Dept: SCHEDULING | Facility: HOME HEALTH | Age: 83
End: 2018-09-20
Payer: MEDICARE

## 2018-09-20 PROCEDURE — 3331090002 HH PPS REVENUE DEBIT

## 2018-09-20 PROCEDURE — 3331090001 HH PPS REVENUE CREDIT

## 2018-09-20 PROCEDURE — G0299 HHS/HOSPICE OF RN EA 15 MIN: HCPCS

## 2018-09-21 PROCEDURE — 3331090002 HH PPS REVENUE DEBIT

## 2018-09-21 PROCEDURE — 3331090001 HH PPS REVENUE CREDIT

## 2018-09-22 PROCEDURE — 3331090002 HH PPS REVENUE DEBIT

## 2018-09-22 PROCEDURE — 3331090001 HH PPS REVENUE CREDIT

## 2018-09-23 VITALS
SYSTOLIC BLOOD PRESSURE: 122 MMHG | OXYGEN SATURATION: 96 % | TEMPERATURE: 98.2 F | DIASTOLIC BLOOD PRESSURE: 60 MMHG | HEART RATE: 77 BPM | RESPIRATION RATE: 16 BRPM

## 2018-09-23 PROCEDURE — 3331090001 HH PPS REVENUE CREDIT

## 2018-09-23 PROCEDURE — 3331090002 HH PPS REVENUE DEBIT

## 2018-09-24 PROCEDURE — 3331090001 HH PPS REVENUE CREDIT

## 2018-09-24 PROCEDURE — 3331090002 HH PPS REVENUE DEBIT

## 2018-09-25 ENCOUNTER — HOME CARE VISIT (OUTPATIENT)
Dept: SCHEDULING | Facility: HOME HEALTH | Age: 83
End: 2018-09-25
Payer: MEDICARE

## 2018-09-25 PROCEDURE — 3331090001 HH PPS REVENUE CREDIT

## 2018-09-25 PROCEDURE — 3331090002 HH PPS REVENUE DEBIT

## 2018-09-26 PROCEDURE — 3331090001 HH PPS REVENUE CREDIT

## 2018-09-26 PROCEDURE — 3331090002 HH PPS REVENUE DEBIT

## 2018-09-27 ENCOUNTER — HOME CARE VISIT (OUTPATIENT)
Dept: SCHEDULING | Facility: HOME HEALTH | Age: 83
End: 2018-09-27
Payer: MEDICARE

## 2018-09-27 VITALS
TEMPERATURE: 97.5 F | HEART RATE: 76 BPM | DIASTOLIC BLOOD PRESSURE: 64 MMHG | OXYGEN SATURATION: 98 % | SYSTOLIC BLOOD PRESSURE: 138 MMHG | RESPIRATION RATE: 18 BRPM

## 2018-09-27 PROCEDURE — G0299 HHS/HOSPICE OF RN EA 15 MIN: HCPCS

## 2018-09-27 PROCEDURE — 3331090002 HH PPS REVENUE DEBIT

## 2018-09-27 PROCEDURE — 3331090001 HH PPS REVENUE CREDIT

## 2018-09-28 PROCEDURE — 3331090002 HH PPS REVENUE DEBIT

## 2018-09-28 PROCEDURE — 3331090001 HH PPS REVENUE CREDIT

## 2018-09-29 PROCEDURE — 3331090001 HH PPS REVENUE CREDIT

## 2018-09-29 PROCEDURE — 3331090002 HH PPS REVENUE DEBIT

## 2018-09-30 PROCEDURE — 3331090001 HH PPS REVENUE CREDIT

## 2018-09-30 PROCEDURE — 3331090002 HH PPS REVENUE DEBIT

## 2018-10-01 PROCEDURE — 3331090002 HH PPS REVENUE DEBIT

## 2018-10-01 PROCEDURE — 3331090001 HH PPS REVENUE CREDIT

## 2018-10-02 ENCOUNTER — HOME CARE VISIT (OUTPATIENT)
Dept: SCHEDULING | Facility: HOME HEALTH | Age: 83
End: 2018-10-02
Payer: MEDICARE

## 2018-10-02 VITALS
RESPIRATION RATE: 18 BRPM | HEART RATE: 78 BPM | TEMPERATURE: 97.3 F | SYSTOLIC BLOOD PRESSURE: 142 MMHG | OXYGEN SATURATION: 98 % | DIASTOLIC BLOOD PRESSURE: 62 MMHG

## 2018-10-02 PROCEDURE — A6456 ZINC PASTE BAND W >=3"<5"/YD: HCPCS

## 2018-10-02 PROCEDURE — A4927 NON-STERILE GLOVES: HCPCS

## 2018-10-02 PROCEDURE — A6454 SELF-ADHER BAND W>=3" <5"/YD: HCPCS

## 2018-10-02 PROCEDURE — 3331090002 HH PPS REVENUE DEBIT

## 2018-10-02 PROCEDURE — G0299 HHS/HOSPICE OF RN EA 15 MIN: HCPCS

## 2018-10-02 PROCEDURE — 3331090001 HH PPS REVENUE CREDIT

## 2018-10-03 PROCEDURE — 3331090002 HH PPS REVENUE DEBIT

## 2018-10-03 PROCEDURE — 3331090001 HH PPS REVENUE CREDIT

## 2018-10-04 ENCOUNTER — HOME CARE VISIT (OUTPATIENT)
Dept: SCHEDULING | Facility: HOME HEALTH | Age: 83
End: 2018-10-04
Payer: MEDICARE

## 2018-10-04 VITALS
TEMPERATURE: 98 F | DIASTOLIC BLOOD PRESSURE: 60 MMHG | OXYGEN SATURATION: 98 % | SYSTOLIC BLOOD PRESSURE: 110 MMHG | RESPIRATION RATE: 20 BRPM | HEART RATE: 74 BPM

## 2018-10-04 PROCEDURE — 3331090002 HH PPS REVENUE DEBIT

## 2018-10-04 PROCEDURE — G0299 HHS/HOSPICE OF RN EA 15 MIN: HCPCS

## 2018-10-04 PROCEDURE — 3331090001 HH PPS REVENUE CREDIT

## 2019-01-01 ENCOUNTER — APPOINTMENT (OUTPATIENT)
Dept: CT IMAGING | Age: 84
DRG: 189 | End: 2019-01-01
Attending: EMERGENCY MEDICINE
Payer: MEDICARE

## 2019-01-01 ENCOUNTER — APPOINTMENT (OUTPATIENT)
Dept: GENERAL RADIOLOGY | Age: 84
DRG: 682 | End: 2019-01-01
Attending: INTERNAL MEDICINE
Payer: MEDICARE

## 2019-01-01 ENCOUNTER — HOSPITAL ENCOUNTER (INPATIENT)
Age: 84
LOS: 2 days | Discharge: HOME HEALTH | DRG: 189 | End: 2019-07-06
Attending: EMERGENCY MEDICINE | Admitting: INTERNAL MEDICINE
Payer: MEDICARE

## 2019-01-01 ENCOUNTER — PATIENT OUTREACH (OUTPATIENT)
Dept: CASE MANAGEMENT | Age: 84
End: 2019-01-01

## 2019-01-01 ENCOUNTER — APPOINTMENT (OUTPATIENT)
Dept: ULTRASOUND IMAGING | Age: 84
DRG: 682 | End: 2019-01-01
Attending: INTERNAL MEDICINE
Payer: MEDICARE

## 2019-01-01 ENCOUNTER — HOSPITAL ENCOUNTER (OUTPATIENT)
Dept: WOUND CARE | Age: 84
Discharge: HOME | End: 2019-09-13
Attending: SURGERY
Payer: MEDICARE

## 2019-01-01 ENCOUNTER — APPOINTMENT (OUTPATIENT)
Dept: MRI IMAGING | Age: 84
DRG: 682 | End: 2019-01-01
Attending: INTERNAL MEDICINE
Payer: MEDICARE

## 2019-01-01 ENCOUNTER — APPOINTMENT (OUTPATIENT)
Dept: ULTRASOUND IMAGING | Age: 84
DRG: 853 | End: 2019-01-01
Attending: HOSPITALIST
Payer: MEDICARE

## 2019-01-01 ENCOUNTER — HOSPITAL ENCOUNTER (OUTPATIENT)
Dept: WOUND CARE | Age: 84
Discharge: HOME | End: 2019-07-26
Attending: SURGERY
Payer: MEDICARE

## 2019-01-01 ENCOUNTER — APPOINTMENT (OUTPATIENT)
Dept: ULTRASOUND IMAGING | Age: 84
DRG: 252 | End: 2019-01-01
Attending: INTERNAL MEDICINE
Payer: MEDICARE

## 2019-01-01 ENCOUNTER — APPOINTMENT (OUTPATIENT)
Dept: GENERAL RADIOLOGY | Age: 84
DRG: 252 | End: 2019-01-01
Attending: INTERNAL MEDICINE
Payer: MEDICARE

## 2019-01-01 ENCOUNTER — APPOINTMENT (OUTPATIENT)
Dept: GENERAL RADIOLOGY | Age: 84
DRG: 252 | End: 2019-01-01
Attending: EMERGENCY MEDICINE
Payer: MEDICARE

## 2019-01-01 ENCOUNTER — HOSPITAL ENCOUNTER (OUTPATIENT)
Dept: WOUND CARE | Age: 84
Discharge: HOME | End: 2019-10-04
Attending: SURGERY

## 2019-01-01 ENCOUNTER — APPOINTMENT (OUTPATIENT)
Dept: GENERAL RADIOLOGY | Age: 84
DRG: 872 | End: 2019-01-01
Attending: EMERGENCY MEDICINE
Payer: MEDICARE

## 2019-01-01 ENCOUNTER — HOSPICE ADMISSION (OUTPATIENT)
Dept: HOSPICE | Facility: HOSPICE | Age: 84
End: 2019-01-01

## 2019-01-01 ENCOUNTER — APPOINTMENT (OUTPATIENT)
Dept: MRI IMAGING | Age: 84
DRG: 252 | End: 2019-01-01
Attending: INTERNAL MEDICINE
Payer: MEDICARE

## 2019-01-01 ENCOUNTER — APPOINTMENT (OUTPATIENT)
Dept: GENERAL RADIOLOGY | Age: 84
DRG: 189 | End: 2019-01-01
Attending: INTERNAL MEDICINE
Payer: MEDICARE

## 2019-01-01 ENCOUNTER — APPOINTMENT (OUTPATIENT)
Dept: GENERAL RADIOLOGY | Age: 84
DRG: 853 | End: 2019-01-01
Attending: EMERGENCY MEDICINE
Payer: MEDICARE

## 2019-01-01 ENCOUNTER — ANESTHESIA (OUTPATIENT)
Dept: SURGERY | Age: 84
DRG: 252 | End: 2019-01-01
Payer: MEDICARE

## 2019-01-01 ENCOUNTER — HOSPITAL ENCOUNTER (OUTPATIENT)
Dept: WOUND CARE | Age: 84
Discharge: HOME | End: 2019-08-23
Attending: SURGERY
Payer: MEDICARE

## 2019-01-01 ENCOUNTER — APPOINTMENT (OUTPATIENT)
Dept: GENERAL RADIOLOGY | Age: 84
DRG: 638 | End: 2019-01-01
Attending: EMERGENCY MEDICINE
Payer: MEDICARE

## 2019-01-01 ENCOUNTER — HOSPITAL ENCOUNTER (OUTPATIENT)
Dept: WOUND CARE | Age: 84
Discharge: HOME | End: 2019-06-14
Attending: SURGERY
Payer: MEDICARE

## 2019-01-01 ENCOUNTER — HOSPITAL ENCOUNTER (INPATIENT)
Age: 84
LOS: 5 days | Discharge: HOME HEALTH | DRG: 252 | End: 2019-05-24
Attending: EMERGENCY MEDICINE | Admitting: INTERNAL MEDICINE
Payer: MEDICARE

## 2019-01-01 ENCOUNTER — HOSPITAL ENCOUNTER (OUTPATIENT)
Dept: WOUND CARE | Age: 84
Discharge: HOME | End: 2019-06-28
Attending: SURGERY
Payer: MEDICARE

## 2019-01-01 ENCOUNTER — HOSPITAL ENCOUNTER (INPATIENT)
Age: 84
LOS: 8 days | DRG: 853 | End: 2019-10-05
Attending: EMERGENCY MEDICINE | Admitting: HOSPITALIST
Payer: MEDICARE

## 2019-01-01 ENCOUNTER — HOSPITAL ENCOUNTER (INPATIENT)
Age: 84
LOS: 4 days | Discharge: NURSING, REHAB OR LONG-TERM CARE | DRG: 872 | End: 2019-03-29
Attending: EMERGENCY MEDICINE | Admitting: INTERNAL MEDICINE
Payer: MEDICARE

## 2019-01-01 ENCOUNTER — APPOINTMENT (OUTPATIENT)
Dept: GENERAL RADIOLOGY | Age: 84
DRG: 872 | End: 2019-01-01
Attending: INTERNAL MEDICINE
Payer: MEDICARE

## 2019-01-01 ENCOUNTER — HOSPITAL ENCOUNTER (INPATIENT)
Age: 84
LOS: 4 days | Discharge: NURSING, REHAB OR LONG-TERM CARE | DRG: 638 | End: 2019-02-09
Attending: EMERGENCY MEDICINE | Admitting: HOSPITALIST
Payer: MEDICARE

## 2019-01-01 ENCOUNTER — ANESTHESIA EVENT (OUTPATIENT)
Dept: SURGERY | Age: 84
DRG: 252 | End: 2019-01-01
Payer: MEDICARE

## 2019-01-01 ENCOUNTER — APPOINTMENT (OUTPATIENT)
Dept: GENERAL RADIOLOGY | Age: 84
DRG: 189 | End: 2019-01-01
Attending: EMERGENCY MEDICINE
Payer: MEDICARE

## 2019-01-01 ENCOUNTER — ANESTHESIA (OUTPATIENT)
Dept: SURGERY | Age: 84
DRG: 853 | End: 2019-01-01
Payer: MEDICARE

## 2019-01-01 ENCOUNTER — APPOINTMENT (OUTPATIENT)
Dept: ULTRASOUND IMAGING | Age: 84
DRG: 252 | End: 2019-01-01
Attending: PHYSICIAN ASSISTANT
Payer: MEDICARE

## 2019-01-01 ENCOUNTER — HOSPITAL ENCOUNTER (INPATIENT)
Age: 84
LOS: 4 days | Discharge: NURSING, REHAB OR LONG-TERM CARE | DRG: 189 | End: 2019-01-22
Attending: EMERGENCY MEDICINE | Admitting: HOSPITALIST
Payer: MEDICARE

## 2019-01-01 ENCOUNTER — APPOINTMENT (OUTPATIENT)
Dept: ULTRASOUND IMAGING | Age: 84
DRG: 853 | End: 2019-01-01
Attending: NURSE PRACTITIONER
Payer: MEDICARE

## 2019-01-01 ENCOUNTER — ANESTHESIA EVENT (OUTPATIENT)
Dept: SURGERY | Age: 84
DRG: 853 | End: 2019-01-01
Payer: MEDICARE

## 2019-01-01 ENCOUNTER — APPOINTMENT (OUTPATIENT)
Dept: INTERVENTIONAL RADIOLOGY/VASCULAR | Age: 84
DRG: 252 | End: 2019-01-01
Attending: INTERNAL MEDICINE
Payer: MEDICARE

## 2019-01-01 ENCOUNTER — HOSPITAL ENCOUNTER (INPATIENT)
Age: 84
LOS: 8 days | Discharge: NURSING, REHAB OR LONG-TERM CARE | DRG: 682 | End: 2019-03-04
Attending: EMERGENCY MEDICINE | Admitting: INTERNAL MEDICINE
Payer: MEDICARE

## 2019-01-01 ENCOUNTER — HOSPITAL ENCOUNTER (OUTPATIENT)
Dept: WOUND CARE | Age: 84
Discharge: HOME | End: 2019-07-12
Attending: SURGERY
Payer: MEDICARE

## 2019-01-01 VITALS
HEART RATE: 86 BPM | WEIGHT: 172.6 LBS | RESPIRATION RATE: 16 BRPM | SYSTOLIC BLOOD PRESSURE: 126 MMHG | DIASTOLIC BLOOD PRESSURE: 69 MMHG | HEIGHT: 64 IN | OXYGEN SATURATION: 95 % | BODY MASS INDEX: 29.47 KG/M2 | TEMPERATURE: 98 F

## 2019-01-01 VITALS
SYSTOLIC BLOOD PRESSURE: 119 MMHG | DIASTOLIC BLOOD PRESSURE: 74 MMHG | BODY MASS INDEX: 27.79 KG/M2 | HEART RATE: 91 BPM | RESPIRATION RATE: 16 BRPM | WEIGHT: 162.8 LBS | OXYGEN SATURATION: 95 % | TEMPERATURE: 98.8 F | HEIGHT: 64 IN

## 2019-01-01 VITALS
RESPIRATION RATE: 20 BRPM | DIASTOLIC BLOOD PRESSURE: 76 MMHG | HEART RATE: 84 BPM | HEIGHT: 64 IN | WEIGHT: 170 LBS | OXYGEN SATURATION: 92 % | TEMPERATURE: 97.5 F | SYSTOLIC BLOOD PRESSURE: 133 MMHG | BODY MASS INDEX: 29.02 KG/M2

## 2019-01-01 VITALS
SYSTOLIC BLOOD PRESSURE: 165 MMHG | TEMPERATURE: 98.9 F | HEART RATE: 82 BPM | OXYGEN SATURATION: 95 % | RESPIRATION RATE: 18 BRPM | DIASTOLIC BLOOD PRESSURE: 81 MMHG

## 2019-01-01 VITALS
HEIGHT: 64 IN | BODY MASS INDEX: 29.09 KG/M2 | HEART RATE: 85 BPM | DIASTOLIC BLOOD PRESSURE: 86 MMHG | SYSTOLIC BLOOD PRESSURE: 169 MMHG | RESPIRATION RATE: 16 BRPM | TEMPERATURE: 98.3 F | WEIGHT: 170.4 LBS

## 2019-01-01 VITALS
RESPIRATION RATE: 18 BRPM | BODY MASS INDEX: 28.94 KG/M2 | DIASTOLIC BLOOD PRESSURE: 64 MMHG | HEIGHT: 64 IN | OXYGEN SATURATION: 95 % | TEMPERATURE: 97.8 F | WEIGHT: 169.5 LBS | HEART RATE: 70 BPM | SYSTOLIC BLOOD PRESSURE: 134 MMHG

## 2019-01-01 VITALS
WEIGHT: 163.8 LBS | RESPIRATION RATE: 18 BRPM | DIASTOLIC BLOOD PRESSURE: 64 MMHG | SYSTOLIC BLOOD PRESSURE: 122 MMHG | BODY MASS INDEX: 27.96 KG/M2 | TEMPERATURE: 98 F | HEART RATE: 82 BPM | HEIGHT: 64 IN | OXYGEN SATURATION: 92 %

## 2019-01-01 VITALS
WEIGHT: 159.4 LBS | HEART RATE: 75 BPM | BODY MASS INDEX: 27.21 KG/M2 | DIASTOLIC BLOOD PRESSURE: 81 MMHG | SYSTOLIC BLOOD PRESSURE: 159 MMHG | HEIGHT: 64 IN | RESPIRATION RATE: 18 BRPM | OXYGEN SATURATION: 94 % | TEMPERATURE: 98.3 F

## 2019-01-01 VITALS
OXYGEN SATURATION: 98 % | HEART RATE: 77 BPM | BODY MASS INDEX: 35.21 KG/M2 | SYSTOLIC BLOOD PRESSURE: 127 MMHG | TEMPERATURE: 98.6 F | WEIGHT: 198.7 LBS | HEIGHT: 63 IN | RESPIRATION RATE: 20 BRPM | DIASTOLIC BLOOD PRESSURE: 58 MMHG

## 2019-01-01 VITALS
DIASTOLIC BLOOD PRESSURE: 75 MMHG | TEMPERATURE: 98 F | HEART RATE: 76 BPM | OXYGEN SATURATION: 94 % | WEIGHT: 188.8 LBS | BODY MASS INDEX: 32.23 KG/M2 | RESPIRATION RATE: 20 BRPM | HEIGHT: 64 IN | SYSTOLIC BLOOD PRESSURE: 147 MMHG

## 2019-01-01 VITALS
HEIGHT: 64 IN | SYSTOLIC BLOOD PRESSURE: 143 MMHG | HEART RATE: 93 BPM | TEMPERATURE: 98.4 F | OXYGEN SATURATION: 92 % | DIASTOLIC BLOOD PRESSURE: 80 MMHG | RESPIRATION RATE: 18 BRPM | BODY MASS INDEX: 27.21 KG/M2 | WEIGHT: 159.4 LBS

## 2019-01-01 VITALS
SYSTOLIC BLOOD PRESSURE: 139 MMHG | TEMPERATURE: 98 F | DIASTOLIC BLOOD PRESSURE: 75 MMHG | OXYGEN SATURATION: 95 % | HEART RATE: 81 BPM | RESPIRATION RATE: 18 BRPM

## 2019-01-01 VITALS
DIASTOLIC BLOOD PRESSURE: 51 MMHG | HEART RATE: 81 BPM | TEMPERATURE: 98.1 F | RESPIRATION RATE: 18 BRPM | BODY MASS INDEX: 29.02 KG/M2 | HEIGHT: 64 IN | WEIGHT: 170 LBS | SYSTOLIC BLOOD PRESSURE: 165 MMHG | OXYGEN SATURATION: 92 %

## 2019-01-01 DIAGNOSIS — M86.9 OSTEOMYELITIS OF FINGER (HCC): ICD-10-CM

## 2019-01-01 DIAGNOSIS — M86.171 OTHER ACUTE OSTEOMYELITIS OF RIGHT FOOT (HCC): ICD-10-CM

## 2019-01-01 DIAGNOSIS — Z79.4 TYPE 2 DIABETES MELLITUS WITH COMPLICATION, WITH LONG-TERM CURRENT USE OF INSULIN (HCC): ICD-10-CM

## 2019-01-01 DIAGNOSIS — L97.514 DIABETIC ULCER OF TOE OF RIGHT FOOT ASSOCIATED WITH TYPE 2 DIABETES MELLITUS, WITH NECROSIS OF BONE (HCC): Primary | ICD-10-CM

## 2019-01-01 DIAGNOSIS — N17.9 ACUTE KIDNEY INJURY (HCC): ICD-10-CM

## 2019-01-01 DIAGNOSIS — L03.115 CELLULITIS OF RIGHT LOWER EXTREMITY: ICD-10-CM

## 2019-01-01 DIAGNOSIS — E13.621 DIABETIC ULCER OF LEFT FOOT ASSOCIATED WITH OTHER SPECIFIED DIABETES MELLITUS, UNSPECIFIED PART OF FOOT, UNSPECIFIED ULCER STAGE (HCC): Primary | ICD-10-CM

## 2019-01-01 DIAGNOSIS — A41.9 SEPSIS, DUE TO UNSPECIFIED ORGANISM: ICD-10-CM

## 2019-01-01 DIAGNOSIS — F41.9 ANXIETY: ICD-10-CM

## 2019-01-01 DIAGNOSIS — E11.621 DIABETIC ULCER OF TOE OF RIGHT FOOT ASSOCIATED WITH TYPE 2 DIABETES MELLITUS, WITH NECROSIS OF BONE (HCC): Primary | ICD-10-CM

## 2019-01-01 DIAGNOSIS — R41.82 ALTERED MENTAL STATUS, UNSPECIFIED ALTERED MENTAL STATUS TYPE: Primary | ICD-10-CM

## 2019-01-01 DIAGNOSIS — L97.529 DIABETIC ULCER OF LEFT FOOT ASSOCIATED WITH OTHER SPECIFIED DIABETES MELLITUS, UNSPECIFIED PART OF FOOT, UNSPECIFIED ULCER STAGE (HCC): Primary | ICD-10-CM

## 2019-01-01 DIAGNOSIS — N39.0 URINARY TRACT INFECTION WITHOUT HEMATURIA, SITE UNSPECIFIED: ICD-10-CM

## 2019-01-01 DIAGNOSIS — R82.81 BACTERIURIA WITH PYURIA: ICD-10-CM

## 2019-01-01 DIAGNOSIS — E11.8 TYPE 2 DIABETES MELLITUS WITH COMPLICATION, WITH LONG-TERM CURRENT USE OF INSULIN (HCC): ICD-10-CM

## 2019-01-01 DIAGNOSIS — M86.9 OSTEOMYELITIS OF FINGER (HCC): Primary | ICD-10-CM

## 2019-01-01 DIAGNOSIS — R82.71 BACTERIURIA WITH PYURIA: ICD-10-CM

## 2019-01-01 DIAGNOSIS — R50.9 ACUTE FEBRILE ILLNESS: Primary | ICD-10-CM

## 2019-01-01 DIAGNOSIS — G93.41 ACUTE METABOLIC ENCEPHALOPATHY: ICD-10-CM

## 2019-01-01 DIAGNOSIS — R09.02 HYPOXIA: ICD-10-CM

## 2019-01-01 DIAGNOSIS — S22.31XA CLOSED FRACTURE OF ONE RIB OF RIGHT SIDE, INITIAL ENCOUNTER: Primary | ICD-10-CM

## 2019-01-01 DIAGNOSIS — A41.9 SEVERE SEPSIS (HCC): ICD-10-CM

## 2019-01-01 DIAGNOSIS — R65.20 SEVERE SEPSIS (HCC): ICD-10-CM

## 2019-01-01 DIAGNOSIS — A41.9 SEPSIS, DUE TO UNSPECIFIED ORGANISM: Primary | ICD-10-CM

## 2019-01-01 LAB
ALBUMIN SERPL ELPH-MCNC: 2.65 G/DL (ref 3.2–5.6)
ALBUMIN SERPL-MCNC: 2.2 G/DL (ref 3.2–4.6)
ALBUMIN SERPL-MCNC: 2.2 G/DL (ref 3.2–4.6)
ALBUMIN SERPL-MCNC: 2.3 G/DL (ref 3.2–4.6)
ALBUMIN SERPL-MCNC: 2.5 G/DL (ref 3.2–4.6)
ALBUMIN SERPL-MCNC: 2.7 G/DL (ref 3.2–4.6)
ALBUMIN SERPL-MCNC: 2.7 G/DL (ref 3.2–4.6)
ALBUMIN SERPL-MCNC: 2.9 G/DL (ref 3.2–4.6)
ALBUMIN SERPL-MCNC: 2.9 G/DL (ref 3.2–4.6)
ALBUMIN SERPL-MCNC: 3 G/DL (ref 3.2–4.6)
ALBUMIN SERPL-MCNC: 3 G/DL (ref 3.2–4.6)
ALBUMIN UR ELPH-MCNC: 25.6 MG/DL
ALBUMIN/GLOB SERPL: 0.5 {RATIO} (ref 1.2–3.5)
ALBUMIN/GLOB SERPL: 0.5 {RATIO} (ref 1.2–3.5)
ALBUMIN/GLOB SERPL: 0.6 {RATIO} (ref 1.2–3.5)
ALBUMIN/GLOB SERPL: 0.7 {RATIO} (ref 1.2–3.5)
ALBUMIN/GLOB SERPL: 1 {RATIO}
ALBUMIN/GLOB SERPL: 1.8 {RATIO}
ALP SERPL-CCNC: 115 U/L (ref 50–136)
ALP SERPL-CCNC: 142 U/L (ref 50–136)
ALP SERPL-CCNC: 143 U/L (ref 50–136)
ALP SERPL-CCNC: 182 U/L (ref 50–136)
ALP SERPL-CCNC: 288 U/L (ref 50–136)
ALP SERPL-CCNC: 81 U/L (ref 50–136)
ALP SERPL-CCNC: 84 U/L (ref 50–136)
ALP SERPL-CCNC: 87 U/L (ref 50–136)
ALPHA1 GLOB 24H UR ELPH-MCNC: 2.1 MG/DL
ALPHA1 GLOB SERPL ELPH-MCNC: 0.18 G/DL (ref 0.1–0.4)
ALPHA2 GLOB SERPL ELPH-MCNC: 1.21 G/DL (ref 0.4–1.2)
ALPHA2 GLOB SERPL ELPH-MCNC: 3.8 MG/DL
ALT SERPL-CCNC: 22 U/L (ref 12–65)
ALT SERPL-CCNC: 23 U/L (ref 12–65)
ALT SERPL-CCNC: 24 U/L (ref 12–65)
ALT SERPL-CCNC: 25 U/L (ref 12–65)
ALT SERPL-CCNC: 25 U/L (ref 12–65)
ALT SERPL-CCNC: 31 U/L (ref 12–65)
ALT SERPL-CCNC: 37 U/L (ref 12–65)
ALT SERPL-CCNC: 43 U/L (ref 12–65)
ANA SER QL: NEGATIVE
ANION GAP SERPL CALC-SCNC: 10 MMOL/L (ref 7–16)
ANION GAP SERPL CALC-SCNC: 11 MMOL/L (ref 7–16)
ANION GAP SERPL CALC-SCNC: 5 MMOL/L (ref 7–16)
ANION GAP SERPL CALC-SCNC: 6 MMOL/L (ref 7–16)
ANION GAP SERPL CALC-SCNC: 7 MMOL/L (ref 7–16)
ANION GAP SERPL CALC-SCNC: 8 MMOL/L (ref 7–16)
ANION GAP SERPL CALC-SCNC: 9 MMOL/L (ref 7–16)
APPEARANCE UR: ABNORMAL
APPEARANCE UR: ABNORMAL
APPEARANCE UR: CLEAR
AST SERPL-CCNC: 13 U/L (ref 15–37)
AST SERPL-CCNC: 14 U/L (ref 15–37)
AST SERPL-CCNC: 17 U/L (ref 15–37)
AST SERPL-CCNC: 19 U/L (ref 15–37)
AST SERPL-CCNC: 20 U/L (ref 15–37)
AST SERPL-CCNC: 26 U/L (ref 15–37)
AST SERPL-CCNC: 30 U/L (ref 15–37)
AST SERPL-CCNC: 33 U/L (ref 15–37)
ATRIAL RATE: 115 BPM
ATRIAL RATE: 75 BPM
ATRIAL RATE: 82 BPM
ATRIAL RATE: 88 BPM
ATRIAL RATE: 91 BPM
B-GLOBULIN SERPL QL ELPH: 0.81 G/DL (ref 0.6–1.3)
B-GLOBULIN UR QL ELPH: 3.9 MG/DL
BACTERIA SPEC CULT: ABNORMAL
BACTERIA SPEC CULT: NORMAL
BACTERIA URNS QL MICRO: 0 /HPF
BACTERIA URNS QL MICRO: ABNORMAL /HPF
BACTERIA URNS QL MICRO: NORMAL /HPF
BASOPHILS # BLD: 0 K/UL (ref 0–0.2)
BASOPHILS # BLD: 0.1 K/UL (ref 0–0.2)
BASOPHILS NFR BLD: 0 % (ref 0–2)
BASOPHILS NFR BLD: 1 % (ref 0–2)
BILIRUB SERPL-MCNC: 0.2 MG/DL (ref 0.2–1.1)
BILIRUB SERPL-MCNC: 0.2 MG/DL (ref 0.2–1.1)
BILIRUB SERPL-MCNC: 0.3 MG/DL (ref 0.2–1.1)
BILIRUB SERPL-MCNC: 0.4 MG/DL (ref 0.2–1.1)
BILIRUB SERPL-MCNC: 0.5 MG/DL (ref 0.2–1.1)
BILIRUB UR QL: NEGATIVE
BNP SERPL-MCNC: 154 PG/ML
BNP SERPL-MCNC: 63 PG/ML
BNP SERPL-MCNC: 99 PG/ML
BUN SERPL-MCNC: 101 MG/DL (ref 8–23)
BUN SERPL-MCNC: 106 MG/DL (ref 8–23)
BUN SERPL-MCNC: 107 MG/DL (ref 8–23)
BUN SERPL-MCNC: 19 MG/DL (ref 8–23)
BUN SERPL-MCNC: 21 MG/DL (ref 8–23)
BUN SERPL-MCNC: 22 MG/DL (ref 8–23)
BUN SERPL-MCNC: 23 MG/DL (ref 8–23)
BUN SERPL-MCNC: 23 MG/DL (ref 8–23)
BUN SERPL-MCNC: 24 MG/DL (ref 8–23)
BUN SERPL-MCNC: 25 MG/DL (ref 8–23)
BUN SERPL-MCNC: 25 MG/DL (ref 8–23)
BUN SERPL-MCNC: 26 MG/DL (ref 8–23)
BUN SERPL-MCNC: 27 MG/DL (ref 8–23)
BUN SERPL-MCNC: 27 MG/DL (ref 8–23)
BUN SERPL-MCNC: 29 MG/DL (ref 8–23)
BUN SERPL-MCNC: 30 MG/DL (ref 8–23)
BUN SERPL-MCNC: 30 MG/DL (ref 8–23)
BUN SERPL-MCNC: 31 MG/DL (ref 8–23)
BUN SERPL-MCNC: 32 MG/DL (ref 8–23)
BUN SERPL-MCNC: 35 MG/DL (ref 8–23)
BUN SERPL-MCNC: 35 MG/DL (ref 8–23)
BUN SERPL-MCNC: 36 MG/DL (ref 8–23)
BUN SERPL-MCNC: 38 MG/DL (ref 8–23)
BUN SERPL-MCNC: 39 MG/DL (ref 8–23)
BUN SERPL-MCNC: 40 MG/DL (ref 8–23)
BUN SERPL-MCNC: 40 MG/DL (ref 8–23)
BUN SERPL-MCNC: 47 MG/DL (ref 8–23)
BUN SERPL-MCNC: 49 MG/DL (ref 8–23)
BUN SERPL-MCNC: 56 MG/DL (ref 8–23)
BUN SERPL-MCNC: 64 MG/DL (ref 8–23)
BUN SERPL-MCNC: 65 MG/DL (ref 8–23)
BUN SERPL-MCNC: 70 MG/DL (ref 8–23)
BUN SERPL-MCNC: 86 MG/DL (ref 8–23)
BUN SERPL-MCNC: 94 MG/DL (ref 8–23)
BUN SERPL-MCNC: 98 MG/DL (ref 8–23)
C-ANCA TITR SER IF: NORMAL TITER
C3 SERPL-MCNC: 143 MG/DL (ref 82–167)
C4 SERPL-MCNC: 31 MG/DL (ref 14–44)
CALCIUM SERPL-MCNC: 6.3 MG/DL (ref 8.3–10.4)
CALCIUM SERPL-MCNC: 7.2 MG/DL (ref 8.3–10.4)
CALCIUM SERPL-MCNC: 7.3 MG/DL (ref 8.3–10.4)
CALCIUM SERPL-MCNC: 7.4 MG/DL (ref 8.3–10.4)
CALCIUM SERPL-MCNC: 7.4 MG/DL (ref 8.3–10.4)
CALCIUM SERPL-MCNC: 7.5 MG/DL (ref 8.3–10.4)
CALCIUM SERPL-MCNC: 7.7 MG/DL (ref 8.3–10.4)
CALCIUM SERPL-MCNC: 7.8 MG/DL (ref 8.3–10.4)
CALCIUM SERPL-MCNC: 7.8 MG/DL (ref 8.3–10.4)
CALCIUM SERPL-MCNC: 7.9 MG/DL (ref 8.3–10.4)
CALCIUM SERPL-MCNC: 7.9 MG/DL (ref 8.3–10.4)
CALCIUM SERPL-MCNC: 8 MG/DL (ref 8.3–10.4)
CALCIUM SERPL-MCNC: 8 MG/DL (ref 8.3–10.4)
CALCIUM SERPL-MCNC: 8.1 MG/DL (ref 8.3–10.4)
CALCIUM SERPL-MCNC: 8.2 MG/DL (ref 8.3–10.4)
CALCIUM SERPL-MCNC: 8.2 MG/DL (ref 8.3–10.4)
CALCIUM SERPL-MCNC: 8.3 MG/DL (ref 8.3–10.4)
CALCIUM SERPL-MCNC: 8.3 MG/DL (ref 8.3–10.4)
CALCIUM SERPL-MCNC: 8.4 MG/DL (ref 8.3–10.4)
CALCIUM SERPL-MCNC: 8.5 MG/DL (ref 8.3–10.4)
CALCIUM SERPL-MCNC: 8.6 MG/DL (ref 8.3–10.4)
CALCIUM SERPL-MCNC: 8.7 MG/DL (ref 8.3–10.4)
CALCIUM SERPL-MCNC: 8.8 MG/DL (ref 8.3–10.4)
CALCIUM SERPL-MCNC: 9 MG/DL (ref 8.3–10.4)
CALCIUM SERPL-MCNC: 9.1 MG/DL (ref 8.3–10.4)
CALCIUM SERPL-MCNC: 9.1 MG/DL (ref 8.3–10.4)
CALCIUM SERPL-MCNC: 9.2 MG/DL (ref 8.3–10.4)
CALCIUM SERPL-MCNC: 9.9 MG/DL (ref 8.3–10.4)
CALCULATED P AXIS, ECG09: 42 DEGREES
CALCULATED P AXIS, ECG09: 45 DEGREES
CALCULATED P AXIS, ECG09: 50 DEGREES
CALCULATED P AXIS, ECG09: 57 DEGREES
CALCULATED P AXIS, ECG09: 70 DEGREES
CALCULATED R AXIS, ECG10: -16 DEGREES
CALCULATED R AXIS, ECG10: -19 DEGREES
CALCULATED R AXIS, ECG10: -29 DEGREES
CALCULATED R AXIS, ECG10: -34 DEGREES
CALCULATED R AXIS, ECG10: -38 DEGREES
CALCULATED T AXIS, ECG11: 15 DEGREES
CALCULATED T AXIS, ECG11: 20 DEGREES
CALCULATED T AXIS, ECG11: 25 DEGREES
CALCULATED T AXIS, ECG11: 27 DEGREES
CALCULATED T AXIS, ECG11: 7 DEGREES
CASTS URNS QL MICRO: 0 /LPF
CASTS URNS QL MICRO: ABNORMAL /LPF
CHLORIDE SERPL-SCNC: 100 MMOL/L (ref 98–107)
CHLORIDE SERPL-SCNC: 101 MMOL/L (ref 98–107)
CHLORIDE SERPL-SCNC: 102 MMOL/L (ref 98–107)
CHLORIDE SERPL-SCNC: 103 MMOL/L (ref 98–107)
CHLORIDE SERPL-SCNC: 104 MMOL/L (ref 98–107)
CHLORIDE SERPL-SCNC: 106 MMOL/L (ref 98–107)
CHLORIDE SERPL-SCNC: 106 MMOL/L (ref 98–107)
CHLORIDE SERPL-SCNC: 107 MMOL/L (ref 98–107)
CHLORIDE SERPL-SCNC: 108 MMOL/L (ref 98–107)
CHLORIDE SERPL-SCNC: 110 MMOL/L (ref 98–107)
CHLORIDE SERPL-SCNC: 110 MMOL/L (ref 98–107)
CHLORIDE SERPL-SCNC: 111 MMOL/L (ref 98–107)
CHLORIDE SERPL-SCNC: 97 MMOL/L (ref 98–107)
CHLORIDE SERPL-SCNC: 98 MMOL/L (ref 98–107)
CHLORIDE SERPL-SCNC: 99 MMOL/L (ref 98–107)
CK SERPL-CCNC: 81 U/L (ref 21–215)
CO2 SERPL-SCNC: 22 MMOL/L (ref 21–32)
CO2 SERPL-SCNC: 23 MMOL/L (ref 21–32)
CO2 SERPL-SCNC: 24 MMOL/L (ref 21–32)
CO2 SERPL-SCNC: 25 MMOL/L (ref 21–32)
CO2 SERPL-SCNC: 25 MMOL/L (ref 21–32)
CO2 SERPL-SCNC: 26 MMOL/L (ref 21–32)
CO2 SERPL-SCNC: 26 MMOL/L (ref 21–32)
CO2 SERPL-SCNC: 27 MMOL/L (ref 21–32)
CO2 SERPL-SCNC: 28 MMOL/L (ref 21–32)
CO2 SERPL-SCNC: 29 MMOL/L (ref 21–32)
CO2 SERPL-SCNC: 30 MMOL/L (ref 21–32)
CO2 SERPL-SCNC: 31 MMOL/L (ref 21–32)
CO2 SERPL-SCNC: 32 MMOL/L (ref 21–32)
CO2 SERPL-SCNC: 32 MMOL/L (ref 21–32)
CO2 SERPL-SCNC: 35 MMOL/L (ref 21–32)
COLOR UR: YELLOW
CREAT SERPL-MCNC: 0.89 MG/DL (ref 0.6–1)
CREAT SERPL-MCNC: 0.9 MG/DL (ref 0.6–1)
CREAT SERPL-MCNC: 0.91 MG/DL (ref 0.6–1)
CREAT SERPL-MCNC: 0.92 MG/DL (ref 0.6–1)
CREAT SERPL-MCNC: 0.93 MG/DL (ref 0.6–1)
CREAT SERPL-MCNC: 0.94 MG/DL (ref 0.6–1)
CREAT SERPL-MCNC: 0.95 MG/DL (ref 0.6–1)
CREAT SERPL-MCNC: 0.96 MG/DL (ref 0.6–1)
CREAT SERPL-MCNC: 1 MG/DL (ref 0.6–1)
CREAT SERPL-MCNC: 1.03 MG/DL (ref 0.6–1)
CREAT SERPL-MCNC: 1.04 MG/DL (ref 0.6–1)
CREAT SERPL-MCNC: 1.06 MG/DL (ref 0.6–1)
CREAT SERPL-MCNC: 1.09 MG/DL (ref 0.6–1)
CREAT SERPL-MCNC: 1.12 MG/DL (ref 0.6–1)
CREAT SERPL-MCNC: 1.14 MG/DL (ref 0.6–1)
CREAT SERPL-MCNC: 1.14 MG/DL (ref 0.6–1)
CREAT SERPL-MCNC: 1.15 MG/DL (ref 0.6–1)
CREAT SERPL-MCNC: 1.15 MG/DL (ref 0.6–1)
CREAT SERPL-MCNC: 1.2 MG/DL (ref 0.6–1)
CREAT SERPL-MCNC: 1.21 MG/DL (ref 0.6–1)
CREAT SERPL-MCNC: 1.29 MG/DL (ref 0.6–1)
CREAT SERPL-MCNC: 1.41 MG/DL (ref 0.6–1)
CREAT SERPL-MCNC: 1.43 MG/DL (ref 0.6–1)
CREAT SERPL-MCNC: 1.47 MG/DL (ref 0.6–1)
CREAT SERPL-MCNC: 1.48 MG/DL (ref 0.6–1)
CREAT SERPL-MCNC: 1.61 MG/DL (ref 0.6–1)
CREAT SERPL-MCNC: 1.65 MG/DL (ref 0.6–1)
CREAT SERPL-MCNC: 1.93 MG/DL (ref 0.6–1)
CREAT SERPL-MCNC: 1.94 MG/DL (ref 0.6–1)
CREAT SERPL-MCNC: 2.08 MG/DL (ref 0.6–1)
CREAT SERPL-MCNC: 2.1 MG/DL (ref 0.6–1)
CREAT SERPL-MCNC: 2.13 MG/DL (ref 0.6–1)
CREAT SERPL-MCNC: 2.21 MG/DL (ref 0.6–1)
CREAT SERPL-MCNC: 2.26 MG/DL (ref 0.6–1)
CREAT SERPL-MCNC: 2.27 MG/DL (ref 0.6–1)
CREAT SERPL-MCNC: 2.63 MG/DL (ref 0.6–1)
CREAT SERPL-MCNC: 2.89 MG/DL (ref 0.6–1)
CREAT SERPL-MCNC: 3.13 MG/DL (ref 0.6–1)
CREAT SERPL-MCNC: 3.35 MG/DL (ref 0.6–1)
CREAT UR-MCNC: 36.8 MG/DL
CREAT UR-MCNC: 43.4 MG/DL
CRP SERPL-MCNC: 0.8 MG/DL (ref 0–0.9)
CRP SERPL-MCNC: 2.7 MG/DL (ref 0–0.9)
CRP SERPL-MCNC: 7.6 MG/DL (ref 0–0.9)
CRP SERPL-MCNC: 8.6 MG/DL (ref 0–0.9)
CRYSTALS URNS QL MICRO: 0 /LPF
CRYSTALS URNS QL MICRO: 0 /LPF
DIAGNOSIS, 93000: NORMAL
DIFFERENTIAL METHOD BLD: ABNORMAL
EOSINOPHIL # BLD: 0 K/UL (ref 0–0.8)
EOSINOPHIL # BLD: 0.1 K/UL (ref 0–0.8)
EOSINOPHIL # BLD: 0.2 K/UL (ref 0–0.8)
EOSINOPHIL # BLD: 0.3 K/UL (ref 0–0.8)
EOSINOPHIL NFR BLD: 0 % (ref 0.5–7.8)
EOSINOPHIL NFR BLD: 1 % (ref 0.5–7.8)
EOSINOPHIL NFR BLD: 2 % (ref 0.5–7.8)
EOSINOPHIL NFR BLD: 3 % (ref 0.5–7.8)
EPI CELLS #/AREA URNS HPF: 0 /HPF
EPI CELLS #/AREA URNS HPF: 0 /HPF
EPI CELLS #/AREA URNS HPF: ABNORMAL /HPF
EPI CELLS #/AREA URNS HPF: NORMAL /HPF
ERYTHROCYTE [DISTWIDTH] IN BLOOD BY AUTOMATED COUNT: 14.6 % (ref 11.9–14.6)
ERYTHROCYTE [DISTWIDTH] IN BLOOD BY AUTOMATED COUNT: 14.6 % (ref 11.9–14.6)
ERYTHROCYTE [DISTWIDTH] IN BLOOD BY AUTOMATED COUNT: 15.5 % (ref 11.9–14.6)
ERYTHROCYTE [DISTWIDTH] IN BLOOD BY AUTOMATED COUNT: 15.6 % (ref 11.9–14.6)
ERYTHROCYTE [DISTWIDTH] IN BLOOD BY AUTOMATED COUNT: 15.6 % (ref 11.9–14.6)
ERYTHROCYTE [DISTWIDTH] IN BLOOD BY AUTOMATED COUNT: 15.7 % (ref 11.9–14.6)
ERYTHROCYTE [DISTWIDTH] IN BLOOD BY AUTOMATED COUNT: 15.8 % (ref 11.9–14.6)
ERYTHROCYTE [DISTWIDTH] IN BLOOD BY AUTOMATED COUNT: 15.8 % (ref 11.9–14.6)
ERYTHROCYTE [DISTWIDTH] IN BLOOD BY AUTOMATED COUNT: 15.9 % (ref 11.9–14.6)
ERYTHROCYTE [DISTWIDTH] IN BLOOD BY AUTOMATED COUNT: 16 % (ref 11.9–14.6)
ERYTHROCYTE [DISTWIDTH] IN BLOOD BY AUTOMATED COUNT: 16.2 % (ref 11.9–14.6)
ERYTHROCYTE [DISTWIDTH] IN BLOOD BY AUTOMATED COUNT: 16.3 % (ref 11.9–14.6)
ERYTHROCYTE [DISTWIDTH] IN BLOOD BY AUTOMATED COUNT: 16.8 % (ref 11.9–14.6)
ERYTHROCYTE [DISTWIDTH] IN BLOOD BY AUTOMATED COUNT: 17 % (ref 11.9–14.6)
ERYTHROCYTE [DISTWIDTH] IN BLOOD BY AUTOMATED COUNT: 17.1 % (ref 11.9–14.6)
ERYTHROCYTE [DISTWIDTH] IN BLOOD BY AUTOMATED COUNT: 17.2 % (ref 11.9–14.6)
ERYTHROCYTE [DISTWIDTH] IN BLOOD BY AUTOMATED COUNT: 17.7 % (ref 11.9–14.6)
ERYTHROCYTE [DISTWIDTH] IN BLOOD BY AUTOMATED COUNT: 17.9 % (ref 11.9–14.6)
ERYTHROCYTE [DISTWIDTH] IN BLOOD BY AUTOMATED COUNT: 18.3 % (ref 11.9–14.6)
ERYTHROCYTE [DISTWIDTH] IN BLOOD BY AUTOMATED COUNT: 18.5 % (ref 11.9–14.6)
ERYTHROCYTE [SEDIMENTATION RATE] IN BLOOD: 112 MM/HR (ref 0–30)
ERYTHROCYTE [SEDIMENTATION RATE] IN BLOOD: 67 MM/HR (ref 0–30)
EST. AVERAGE GLUCOSE BLD GHB EST-MCNC: 197 MG/DL
EST. AVERAGE GLUCOSE BLD GHB EST-MCNC: 246 MG/DL
GAMMA GLOB MFR SERPL ELPH: 0.55 G/DL (ref 0.5–1.6)
GAMMA GLOB MFR UR ELPH: 4.7 MG/DL
GLOBULIN SER CALC-MCNC: 4.1 G/DL (ref 2.3–3.5)
GLOBULIN SER CALC-MCNC: 4.1 G/DL (ref 2.3–3.5)
GLOBULIN SER CALC-MCNC: 4.2 G/DL (ref 2.3–3.5)
GLOBULIN SER CALC-MCNC: 4.4 G/DL (ref 2.3–3.5)
GLOBULIN SER CALC-MCNC: 4.5 G/DL (ref 2.3–3.5)
GLOBULIN SER CALC-MCNC: 4.7 G/DL (ref 2.3–3.5)
GLOBULIN SER CALC-MCNC: 5.1 G/DL (ref 2.3–3.5)
GLOBULIN SER CALC-MCNC: 5.1 G/DL (ref 2.3–3.5)
GLUCOSE BLD STRIP.AUTO-MCNC: 101 MG/DL (ref 65–100)
GLUCOSE BLD STRIP.AUTO-MCNC: 102 MG/DL (ref 65–100)
GLUCOSE BLD STRIP.AUTO-MCNC: 102 MG/DL (ref 65–100)
GLUCOSE BLD STRIP.AUTO-MCNC: 106 MG/DL (ref 65–100)
GLUCOSE BLD STRIP.AUTO-MCNC: 108 MG/DL (ref 65–100)
GLUCOSE BLD STRIP.AUTO-MCNC: 111 MG/DL (ref 65–100)
GLUCOSE BLD STRIP.AUTO-MCNC: 115 MG/DL (ref 65–100)
GLUCOSE BLD STRIP.AUTO-MCNC: 116 MG/DL (ref 65–100)
GLUCOSE BLD STRIP.AUTO-MCNC: 116 MG/DL (ref 65–100)
GLUCOSE BLD STRIP.AUTO-MCNC: 117 MG/DL (ref 65–100)
GLUCOSE BLD STRIP.AUTO-MCNC: 117 MG/DL (ref 65–100)
GLUCOSE BLD STRIP.AUTO-MCNC: 119 MG/DL (ref 65–100)
GLUCOSE BLD STRIP.AUTO-MCNC: 121 MG/DL (ref 65–100)
GLUCOSE BLD STRIP.AUTO-MCNC: 121 MG/DL (ref 65–100)
GLUCOSE BLD STRIP.AUTO-MCNC: 122 MG/DL (ref 65–100)
GLUCOSE BLD STRIP.AUTO-MCNC: 123 MG/DL (ref 65–100)
GLUCOSE BLD STRIP.AUTO-MCNC: 126 MG/DL (ref 65–100)
GLUCOSE BLD STRIP.AUTO-MCNC: 127 MG/DL (ref 65–100)
GLUCOSE BLD STRIP.AUTO-MCNC: 127 MG/DL (ref 65–100)
GLUCOSE BLD STRIP.AUTO-MCNC: 128 MG/DL (ref 65–100)
GLUCOSE BLD STRIP.AUTO-MCNC: 129 MG/DL (ref 65–100)
GLUCOSE BLD STRIP.AUTO-MCNC: 131 MG/DL (ref 65–100)
GLUCOSE BLD STRIP.AUTO-MCNC: 131 MG/DL (ref 65–100)
GLUCOSE BLD STRIP.AUTO-MCNC: 132 MG/DL (ref 65–100)
GLUCOSE BLD STRIP.AUTO-MCNC: 133 MG/DL (ref 65–100)
GLUCOSE BLD STRIP.AUTO-MCNC: 135 MG/DL (ref 65–100)
GLUCOSE BLD STRIP.AUTO-MCNC: 136 MG/DL (ref 65–100)
GLUCOSE BLD STRIP.AUTO-MCNC: 138 MG/DL (ref 65–100)
GLUCOSE BLD STRIP.AUTO-MCNC: 139 MG/DL (ref 65–100)
GLUCOSE BLD STRIP.AUTO-MCNC: 139 MG/DL (ref 65–100)
GLUCOSE BLD STRIP.AUTO-MCNC: 140 MG/DL (ref 65–100)
GLUCOSE BLD STRIP.AUTO-MCNC: 140 MG/DL (ref 65–100)
GLUCOSE BLD STRIP.AUTO-MCNC: 141 MG/DL (ref 65–100)
GLUCOSE BLD STRIP.AUTO-MCNC: 143 MG/DL (ref 65–100)
GLUCOSE BLD STRIP.AUTO-MCNC: 145 MG/DL (ref 65–100)
GLUCOSE BLD STRIP.AUTO-MCNC: 145 MG/DL (ref 65–100)
GLUCOSE BLD STRIP.AUTO-MCNC: 147 MG/DL (ref 65–100)
GLUCOSE BLD STRIP.AUTO-MCNC: 150 MG/DL (ref 65–100)
GLUCOSE BLD STRIP.AUTO-MCNC: 153 MG/DL (ref 65–100)
GLUCOSE BLD STRIP.AUTO-MCNC: 156 MG/DL (ref 65–100)
GLUCOSE BLD STRIP.AUTO-MCNC: 156 MG/DL (ref 65–100)
GLUCOSE BLD STRIP.AUTO-MCNC: 158 MG/DL (ref 65–100)
GLUCOSE BLD STRIP.AUTO-MCNC: 159 MG/DL (ref 65–100)
GLUCOSE BLD STRIP.AUTO-MCNC: 160 MG/DL (ref 65–100)
GLUCOSE BLD STRIP.AUTO-MCNC: 161 MG/DL (ref 65–100)
GLUCOSE BLD STRIP.AUTO-MCNC: 162 MG/DL (ref 65–100)
GLUCOSE BLD STRIP.AUTO-MCNC: 162 MG/DL (ref 65–100)
GLUCOSE BLD STRIP.AUTO-MCNC: 165 MG/DL (ref 65–100)
GLUCOSE BLD STRIP.AUTO-MCNC: 170 MG/DL (ref 65–100)
GLUCOSE BLD STRIP.AUTO-MCNC: 170 MG/DL (ref 65–100)
GLUCOSE BLD STRIP.AUTO-MCNC: 174 MG/DL (ref 65–100)
GLUCOSE BLD STRIP.AUTO-MCNC: 175 MG/DL (ref 65–100)
GLUCOSE BLD STRIP.AUTO-MCNC: 176 MG/DL (ref 65–100)
GLUCOSE BLD STRIP.AUTO-MCNC: 177 MG/DL (ref 65–100)
GLUCOSE BLD STRIP.AUTO-MCNC: 178 MG/DL (ref 65–100)
GLUCOSE BLD STRIP.AUTO-MCNC: 181 MG/DL (ref 65–100)
GLUCOSE BLD STRIP.AUTO-MCNC: 182 MG/DL (ref 65–100)
GLUCOSE BLD STRIP.AUTO-MCNC: 182 MG/DL (ref 65–100)
GLUCOSE BLD STRIP.AUTO-MCNC: 184 MG/DL (ref 65–100)
GLUCOSE BLD STRIP.AUTO-MCNC: 185 MG/DL (ref 65–100)
GLUCOSE BLD STRIP.AUTO-MCNC: 185 MG/DL (ref 65–100)
GLUCOSE BLD STRIP.AUTO-MCNC: 187 MG/DL (ref 65–100)
GLUCOSE BLD STRIP.AUTO-MCNC: 189 MG/DL (ref 65–100)
GLUCOSE BLD STRIP.AUTO-MCNC: 190 MG/DL (ref 65–100)
GLUCOSE BLD STRIP.AUTO-MCNC: 190 MG/DL (ref 65–100)
GLUCOSE BLD STRIP.AUTO-MCNC: 192 MG/DL (ref 65–100)
GLUCOSE BLD STRIP.AUTO-MCNC: 194 MG/DL (ref 65–100)
GLUCOSE BLD STRIP.AUTO-MCNC: 195 MG/DL (ref 65–100)
GLUCOSE BLD STRIP.AUTO-MCNC: 195 MG/DL (ref 65–100)
GLUCOSE BLD STRIP.AUTO-MCNC: 198 MG/DL (ref 65–100)
GLUCOSE BLD STRIP.AUTO-MCNC: 200 MG/DL (ref 65–100)
GLUCOSE BLD STRIP.AUTO-MCNC: 200 MG/DL (ref 65–100)
GLUCOSE BLD STRIP.AUTO-MCNC: 201 MG/DL (ref 65–100)
GLUCOSE BLD STRIP.AUTO-MCNC: 203 MG/DL (ref 65–100)
GLUCOSE BLD STRIP.AUTO-MCNC: 205 MG/DL (ref 65–100)
GLUCOSE BLD STRIP.AUTO-MCNC: 205 MG/DL (ref 65–100)
GLUCOSE BLD STRIP.AUTO-MCNC: 206 MG/DL (ref 65–100)
GLUCOSE BLD STRIP.AUTO-MCNC: 210 MG/DL (ref 65–100)
GLUCOSE BLD STRIP.AUTO-MCNC: 210 MG/DL (ref 65–100)
GLUCOSE BLD STRIP.AUTO-MCNC: 211 MG/DL (ref 65–100)
GLUCOSE BLD STRIP.AUTO-MCNC: 211 MG/DL (ref 65–100)
GLUCOSE BLD STRIP.AUTO-MCNC: 213 MG/DL (ref 65–100)
GLUCOSE BLD STRIP.AUTO-MCNC: 214 MG/DL (ref 65–100)
GLUCOSE BLD STRIP.AUTO-MCNC: 217 MG/DL (ref 65–100)
GLUCOSE BLD STRIP.AUTO-MCNC: 225 MG/DL (ref 65–100)
GLUCOSE BLD STRIP.AUTO-MCNC: 226 MG/DL (ref 65–100)
GLUCOSE BLD STRIP.AUTO-MCNC: 227 MG/DL (ref 65–100)
GLUCOSE BLD STRIP.AUTO-MCNC: 227 MG/DL (ref 65–100)
GLUCOSE BLD STRIP.AUTO-MCNC: 228 MG/DL (ref 65–100)
GLUCOSE BLD STRIP.AUTO-MCNC: 231 MG/DL (ref 65–100)
GLUCOSE BLD STRIP.AUTO-MCNC: 231 MG/DL (ref 65–100)
GLUCOSE BLD STRIP.AUTO-MCNC: 232 MG/DL (ref 65–100)
GLUCOSE BLD STRIP.AUTO-MCNC: 232 MG/DL (ref 65–100)
GLUCOSE BLD STRIP.AUTO-MCNC: 233 MG/DL (ref 65–100)
GLUCOSE BLD STRIP.AUTO-MCNC: 240 MG/DL (ref 65–100)
GLUCOSE BLD STRIP.AUTO-MCNC: 241 MG/DL (ref 65–100)
GLUCOSE BLD STRIP.AUTO-MCNC: 243 MG/DL (ref 65–100)
GLUCOSE BLD STRIP.AUTO-MCNC: 245 MG/DL (ref 65–100)
GLUCOSE BLD STRIP.AUTO-MCNC: 246 MG/DL (ref 65–100)
GLUCOSE BLD STRIP.AUTO-MCNC: 247 MG/DL (ref 65–100)
GLUCOSE BLD STRIP.AUTO-MCNC: 249 MG/DL (ref 65–100)
GLUCOSE BLD STRIP.AUTO-MCNC: 251 MG/DL (ref 65–100)
GLUCOSE BLD STRIP.AUTO-MCNC: 254 MG/DL (ref 65–100)
GLUCOSE BLD STRIP.AUTO-MCNC: 255 MG/DL (ref 65–100)
GLUCOSE BLD STRIP.AUTO-MCNC: 258 MG/DL (ref 65–100)
GLUCOSE BLD STRIP.AUTO-MCNC: 258 MG/DL (ref 65–100)
GLUCOSE BLD STRIP.AUTO-MCNC: 260 MG/DL (ref 65–100)
GLUCOSE BLD STRIP.AUTO-MCNC: 260 MG/DL (ref 65–100)
GLUCOSE BLD STRIP.AUTO-MCNC: 261 MG/DL (ref 65–100)
GLUCOSE BLD STRIP.AUTO-MCNC: 265 MG/DL (ref 65–100)
GLUCOSE BLD STRIP.AUTO-MCNC: 268 MG/DL (ref 65–100)
GLUCOSE BLD STRIP.AUTO-MCNC: 270 MG/DL (ref 65–100)
GLUCOSE BLD STRIP.AUTO-MCNC: 274 MG/DL (ref 65–100)
GLUCOSE BLD STRIP.AUTO-MCNC: 276 MG/DL (ref 65–100)
GLUCOSE BLD STRIP.AUTO-MCNC: 278 MG/DL (ref 65–100)
GLUCOSE BLD STRIP.AUTO-MCNC: 279 MG/DL (ref 65–100)
GLUCOSE BLD STRIP.AUTO-MCNC: 283 MG/DL (ref 65–100)
GLUCOSE BLD STRIP.AUTO-MCNC: 285 MG/DL (ref 65–100)
GLUCOSE BLD STRIP.AUTO-MCNC: 292 MG/DL (ref 65–100)
GLUCOSE BLD STRIP.AUTO-MCNC: 305 MG/DL (ref 65–100)
GLUCOSE BLD STRIP.AUTO-MCNC: 305 MG/DL (ref 65–100)
GLUCOSE BLD STRIP.AUTO-MCNC: 312 MG/DL (ref 65–100)
GLUCOSE BLD STRIP.AUTO-MCNC: 312 MG/DL (ref 65–100)
GLUCOSE BLD STRIP.AUTO-MCNC: 313 MG/DL (ref 65–100)
GLUCOSE BLD STRIP.AUTO-MCNC: 315 MG/DL (ref 65–100)
GLUCOSE BLD STRIP.AUTO-MCNC: 325 MG/DL (ref 65–100)
GLUCOSE BLD STRIP.AUTO-MCNC: 326 MG/DL (ref 65–100)
GLUCOSE BLD STRIP.AUTO-MCNC: 328 MG/DL (ref 65–100)
GLUCOSE BLD STRIP.AUTO-MCNC: 331 MG/DL (ref 65–100)
GLUCOSE BLD STRIP.AUTO-MCNC: 333 MG/DL (ref 65–100)
GLUCOSE BLD STRIP.AUTO-MCNC: 339 MG/DL (ref 65–100)
GLUCOSE BLD STRIP.AUTO-MCNC: 353 MG/DL (ref 65–100)
GLUCOSE BLD STRIP.AUTO-MCNC: 356 MG/DL (ref 65–100)
GLUCOSE BLD STRIP.AUTO-MCNC: 359 MG/DL (ref 65–100)
GLUCOSE BLD STRIP.AUTO-MCNC: 360 MG/DL (ref 65–100)
GLUCOSE BLD STRIP.AUTO-MCNC: 362 MG/DL (ref 65–100)
GLUCOSE BLD STRIP.AUTO-MCNC: 363 MG/DL (ref 65–100)
GLUCOSE BLD STRIP.AUTO-MCNC: 430 MG/DL (ref 65–100)
GLUCOSE BLD STRIP.AUTO-MCNC: 468 MG/DL (ref 65–100)
GLUCOSE BLD STRIP.AUTO-MCNC: 61 MG/DL (ref 65–100)
GLUCOSE BLD STRIP.AUTO-MCNC: 63 MG/DL (ref 65–100)
GLUCOSE BLD STRIP.AUTO-MCNC: 68 MG/DL (ref 65–100)
GLUCOSE BLD STRIP.AUTO-MCNC: 73 MG/DL (ref 65–100)
GLUCOSE BLD STRIP.AUTO-MCNC: 74 MG/DL (ref 65–100)
GLUCOSE BLD STRIP.AUTO-MCNC: 74 MG/DL (ref 65–100)
GLUCOSE BLD STRIP.AUTO-MCNC: 75 MG/DL (ref 65–100)
GLUCOSE BLD STRIP.AUTO-MCNC: 76 MG/DL (ref 65–100)
GLUCOSE BLD STRIP.AUTO-MCNC: 77 MG/DL (ref 65–100)
GLUCOSE BLD STRIP.AUTO-MCNC: 82 MG/DL (ref 65–100)
GLUCOSE BLD STRIP.AUTO-MCNC: 84 MG/DL (ref 65–100)
GLUCOSE BLD STRIP.AUTO-MCNC: 86 MG/DL (ref 65–100)
GLUCOSE BLD STRIP.AUTO-MCNC: 87 MG/DL (ref 65–100)
GLUCOSE BLD STRIP.AUTO-MCNC: 89 MG/DL (ref 65–100)
GLUCOSE BLD STRIP.AUTO-MCNC: 90 MG/DL (ref 65–100)
GLUCOSE BLD STRIP.AUTO-MCNC: 93 MG/DL (ref 65–100)
GLUCOSE BLD STRIP.AUTO-MCNC: 94 MG/DL (ref 65–100)
GLUCOSE BLD STRIP.AUTO-MCNC: 95 MG/DL (ref 65–100)
GLUCOSE BLD STRIP.AUTO-MCNC: 95 MG/DL (ref 65–100)
GLUCOSE BLD STRIP.AUTO-MCNC: 96 MG/DL (ref 65–100)
GLUCOSE BLD STRIP.AUTO-MCNC: 98 MG/DL (ref 65–100)
GLUCOSE SERPL-MCNC: 100 MG/DL (ref 65–100)
GLUCOSE SERPL-MCNC: 105 MG/DL (ref 65–100)
GLUCOSE SERPL-MCNC: 110 MG/DL (ref 65–100)
GLUCOSE SERPL-MCNC: 111 MG/DL (ref 65–100)
GLUCOSE SERPL-MCNC: 113 MG/DL (ref 65–100)
GLUCOSE SERPL-MCNC: 114 MG/DL (ref 65–100)
GLUCOSE SERPL-MCNC: 128 MG/DL (ref 65–100)
GLUCOSE SERPL-MCNC: 128 MG/DL (ref 65–100)
GLUCOSE SERPL-MCNC: 135 MG/DL (ref 65–100)
GLUCOSE SERPL-MCNC: 142 MG/DL (ref 65–100)
GLUCOSE SERPL-MCNC: 143 MG/DL (ref 65–100)
GLUCOSE SERPL-MCNC: 145 MG/DL (ref 65–100)
GLUCOSE SERPL-MCNC: 146 MG/DL (ref 65–100)
GLUCOSE SERPL-MCNC: 148 MG/DL (ref 65–100)
GLUCOSE SERPL-MCNC: 148 MG/DL (ref 65–100)
GLUCOSE SERPL-MCNC: 151 MG/DL (ref 65–100)
GLUCOSE SERPL-MCNC: 159 MG/DL (ref 65–100)
GLUCOSE SERPL-MCNC: 160 MG/DL (ref 65–100)
GLUCOSE SERPL-MCNC: 165 MG/DL (ref 65–100)
GLUCOSE SERPL-MCNC: 194 MG/DL (ref 65–100)
GLUCOSE SERPL-MCNC: 195 MG/DL (ref 65–100)
GLUCOSE SERPL-MCNC: 215 MG/DL (ref 65–100)
GLUCOSE SERPL-MCNC: 226 MG/DL (ref 65–100)
GLUCOSE SERPL-MCNC: 238 MG/DL (ref 65–100)
GLUCOSE SERPL-MCNC: 265 MG/DL (ref 65–100)
GLUCOSE SERPL-MCNC: 268 MG/DL (ref 65–100)
GLUCOSE SERPL-MCNC: 269 MG/DL (ref 65–100)
GLUCOSE SERPL-MCNC: 290 MG/DL (ref 65–100)
GLUCOSE SERPL-MCNC: 294 MG/DL (ref 65–100)
GLUCOSE SERPL-MCNC: 297 MG/DL (ref 65–100)
GLUCOSE SERPL-MCNC: 304 MG/DL (ref 65–100)
GLUCOSE SERPL-MCNC: 352 MG/DL (ref 65–100)
GLUCOSE SERPL-MCNC: 46 MG/DL (ref 65–100)
GLUCOSE SERPL-MCNC: 64 MG/DL (ref 65–100)
GLUCOSE SERPL-MCNC: 67 MG/DL (ref 65–100)
GLUCOSE SERPL-MCNC: 69 MG/DL (ref 65–100)
GLUCOSE SERPL-MCNC: 71 MG/DL (ref 65–100)
GLUCOSE SERPL-MCNC: 77 MG/DL (ref 65–100)
GLUCOSE SERPL-MCNC: 81 MG/DL (ref 65–100)
GLUCOSE SERPL-MCNC: 82 MG/DL (ref 65–100)
GLUCOSE SERPL-MCNC: 94 MG/DL (ref 65–100)
GLUCOSE SERPL-MCNC: 96 MG/DL (ref 65–100)
GLUCOSE UR STRIP.AUTO-MCNC: 100 MG/DL
GLUCOSE UR STRIP.AUTO-MCNC: 250 MG/DL
GLUCOSE UR STRIP.AUTO-MCNC: 250 MG/DL
GLUCOSE UR STRIP.AUTO-MCNC: NEGATIVE MG/DL
GRAM STN SPEC: ABNORMAL
GRAM STN SPEC: NORMAL
HBA1C MFR BLD: 10.2 % (ref 4.8–6)
HBA1C MFR BLD: 8.5 % (ref 4.8–6)
HCT VFR BLD AUTO: 25.1 % (ref 35.8–46.3)
HCT VFR BLD AUTO: 26.8 % (ref 35.8–46.3)
HCT VFR BLD AUTO: 28.5 % (ref 35.8–46.3)
HCT VFR BLD AUTO: 28.9 % (ref 35.8–46.3)
HCT VFR BLD AUTO: 28.9 % (ref 35.8–46.3)
HCT VFR BLD AUTO: 31.1 % (ref 35.8–46.3)
HCT VFR BLD AUTO: 31.3 % (ref 35.8–46.3)
HCT VFR BLD AUTO: 31.4 % (ref 35.8–46.3)
HCT VFR BLD AUTO: 31.4 % (ref 35.8–46.3)
HCT VFR BLD AUTO: 31.5 % (ref 35.8–46.3)
HCT VFR BLD AUTO: 31.7 % (ref 35.8–46.3)
HCT VFR BLD AUTO: 31.8 % (ref 35.8–46.3)
HCT VFR BLD AUTO: 32.5 % (ref 35.8–46.3)
HCT VFR BLD AUTO: 33 % (ref 35.8–46.3)
HCT VFR BLD AUTO: 33.5 % (ref 35.8–46.3)
HCT VFR BLD AUTO: 33.7 % (ref 35.8–46.3)
HCT VFR BLD AUTO: 34.4 % (ref 35.8–46.3)
HCT VFR BLD AUTO: 34.8 % (ref 35.8–46.3)
HCT VFR BLD AUTO: 35 % (ref 35.8–46.3)
HCT VFR BLD AUTO: 35.1 % (ref 35.8–46.3)
HCT VFR BLD AUTO: 35.5 % (ref 35.8–46.3)
HCT VFR BLD AUTO: 35.7 % (ref 35.8–46.3)
HCT VFR BLD AUTO: 36.1 % (ref 35.8–46.3)
HCT VFR BLD AUTO: 37.1 % (ref 35.8–46.3)
HCT VFR BLD AUTO: 37.8 % (ref 35.8–46.3)
HCT VFR BLD AUTO: 37.9 % (ref 35.8–46.3)
HCT VFR BLD AUTO: 40 % (ref 35.8–46.3)
HCT VFR BLD AUTO: 40.8 % (ref 35.8–46.3)
HCT VFR BLD AUTO: 42.9 % (ref 35.8–46.3)
HEMOCCULT STL QL: NEGATIVE
HGB BLD-MCNC: 10.2 G/DL (ref 11.7–15.4)
HGB BLD-MCNC: 10.4 G/DL (ref 11.7–15.4)
HGB BLD-MCNC: 10.5 G/DL (ref 11.7–15.4)
HGB BLD-MCNC: 10.7 G/DL (ref 11.7–15.4)
HGB BLD-MCNC: 10.8 G/DL (ref 11.7–15.4)
HGB BLD-MCNC: 11.1 G/DL (ref 11.7–15.4)
HGB BLD-MCNC: 11.2 G/DL (ref 11.7–15.4)
HGB BLD-MCNC: 11.4 G/DL (ref 11.7–15.4)
HGB BLD-MCNC: 11.4 G/DL (ref 11.7–15.4)
HGB BLD-MCNC: 11.9 G/DL (ref 11.7–15.4)
HGB BLD-MCNC: 11.9 G/DL (ref 11.7–15.4)
HGB BLD-MCNC: 12.2 G/DL (ref 11.7–15.4)
HGB BLD-MCNC: 12.4 G/DL (ref 11.7–15.4)
HGB BLD-MCNC: 13.3 G/DL (ref 11.7–15.4)
HGB BLD-MCNC: 7.5 G/DL (ref 11.7–15.4)
HGB BLD-MCNC: 8.1 G/DL (ref 11.7–15.4)
HGB BLD-MCNC: 8.3 G/DL (ref 11.7–15.4)
HGB BLD-MCNC: 8.6 G/DL (ref 11.7–15.4)
HGB BLD-MCNC: 8.8 G/DL (ref 11.7–15.4)
HGB BLD-MCNC: 9.1 G/DL (ref 11.7–15.4)
HGB BLD-MCNC: 9.2 G/DL (ref 11.7–15.4)
HGB BLD-MCNC: 9.2 G/DL (ref 11.7–15.4)
HGB BLD-MCNC: 9.3 G/DL (ref 11.7–15.4)
HGB BLD-MCNC: 9.3 G/DL (ref 11.7–15.4)
HGB BLD-MCNC: 9.4 G/DL (ref 11.7–15.4)
HGB BLD-MCNC: 9.5 G/DL (ref 11.7–15.4)
HGB BLD-MCNC: 9.5 G/DL (ref 11.7–15.4)
HGB BLD-MCNC: 9.7 G/DL (ref 11.7–15.4)
HGB BLD-MCNC: 9.7 G/DL (ref 11.7–15.4)
HGB UR QL STRIP: ABNORMAL
HGB UR QL STRIP: ABNORMAL
HGB UR QL STRIP: NEGATIVE
IGA SERPL-MCNC: 223 MG/DL (ref 85–499)
IGG SERPL-MCNC: 499 MG/DL (ref 610–1616)
IGM SERPL-MCNC: 67 MG/DL (ref 35–242)
IMM GRANULOCYTES # BLD AUTO: 0.1 K/UL (ref 0–0.5)
IMM GRANULOCYTES # BLD AUTO: 0.2 K/UL (ref 0–0.5)
IMM GRANULOCYTES # BLD AUTO: 0.3 K/UL (ref 0–0.5)
IMM GRANULOCYTES # BLD AUTO: 0.4 K/UL (ref 0–0.5)
IMM GRANULOCYTES # BLD AUTO: 0.5 K/UL (ref 0–0.5)
IMM GRANULOCYTES # BLD AUTO: 0.5 K/UL (ref 0–0.5)
IMM GRANULOCYTES # BLD AUTO: 0.8 K/UL (ref 0–0.5)
IMM GRANULOCYTES # BLD AUTO: 0.9 K/UL (ref 0–0.5)
IMM GRANULOCYTES # BLD AUTO: 0.9 K/UL (ref 0–0.5)
IMM GRANULOCYTES NFR BLD AUTO: 1 % (ref 0–5)
IMM GRANULOCYTES NFR BLD AUTO: 2 % (ref 0–5)
IMM GRANULOCYTES NFR BLD AUTO: 3 % (ref 0–5)
IMM GRANULOCYTES NFR BLD AUTO: 3 % (ref 0–5)
IMM GRANULOCYTES NFR BLD AUTO: 4 % (ref 0–5)
IMM GRANULOCYTES NFR BLD AUTO: 4 % (ref 0–5)
IMM GRANULOCYTES NFR BLD AUTO: 5 % (ref 0–5)
IMM GRANULOCYTES NFR BLD AUTO: 5 % (ref 0–5)
IMM GRANULOCYTES NFR BLD AUTO: 6 % (ref 0–5)
KAPPA LC FREE SER-MCNC: 51.78 MG/L (ref 3.3–19.4)
KAPPA LC FREE/LAMBDA FREE SER: 1.32 {RATIO} (ref 0.26–1.65)
KETONES UR QL STRIP.AUTO: ABNORMAL MG/DL
KETONES UR QL STRIP.AUTO: NEGATIVE MG/DL
LACTATE BLD-SCNC: 1.04 MMOL/L (ref 0.5–1.9)
LACTATE BLD-SCNC: 1.22 MMOL/L (ref 0.5–1.9)
LACTATE BLD-SCNC: 2.03 MMOL/L (ref 0.5–1.9)
LACTATE BLD-SCNC: 2.05 MMOL/L (ref 0.5–1.9)
LACTATE BLD-SCNC: 2.75 MMOL/L (ref 0.5–1.9)
LACTATE BLD-SCNC: 3.03 MMOL/L (ref 0.5–1.9)
LACTATE BLD-SCNC: 3.2 MMOL/L (ref 0.5–1.9)
LACTATE BLD-SCNC: 3.52 MMOL/L (ref 0.5–1.9)
LACTATE SERPL-SCNC: 0.9 MMOL/L (ref 0.4–2)
LACTATE SERPL-SCNC: 1.2 MMOL/L (ref 0.4–2)
LACTATE SERPL-SCNC: 1.8 MMOL/L (ref 0.4–2)
LACTATE SERPL-SCNC: 2 MMOL/L (ref 0.4–2)
LACTATE SERPL-SCNC: 2.8 MMOL/L (ref 0.4–2)
LACTATE SERPL-SCNC: 4.1 MMOL/L (ref 0.4–2)
LAMBDA LC FREE SERPL-MCNC: 39.1 MG/L (ref 5.71–26.3)
LEUKOCYTE ESTERASE UR QL STRIP.AUTO: ABNORMAL
LEUKOCYTE ESTERASE UR QL STRIP.AUTO: ABNORMAL
LEUKOCYTE ESTERASE UR QL STRIP.AUTO: NEGATIVE
LYMPHOCYTES # BLD: 0.9 K/UL (ref 0.5–4.6)
LYMPHOCYTES # BLD: 1.3 K/UL (ref 0.5–4.6)
LYMPHOCYTES # BLD: 1.5 K/UL (ref 0.5–4.6)
LYMPHOCYTES # BLD: 1.7 K/UL (ref 0.5–4.6)
LYMPHOCYTES # BLD: 2 K/UL (ref 0.5–4.6)
LYMPHOCYTES # BLD: 2.1 K/UL (ref 0.5–4.6)
LYMPHOCYTES # BLD: 2.1 K/UL (ref 0.5–4.6)
LYMPHOCYTES # BLD: 2.2 K/UL (ref 0.5–4.6)
LYMPHOCYTES # BLD: 2.3 K/UL (ref 0.5–4.6)
LYMPHOCYTES # BLD: 2.4 K/UL (ref 0.5–4.6)
LYMPHOCYTES # BLD: 2.4 K/UL (ref 0.5–4.6)
LYMPHOCYTES # BLD: 2.8 K/UL (ref 0.5–4.6)
LYMPHOCYTES # BLD: 3 K/UL (ref 0.5–4.6)
LYMPHOCYTES # BLD: 3.1 K/UL (ref 0.5–4.6)
LYMPHOCYTES # BLD: 3.2 K/UL (ref 0.5–4.6)
LYMPHOCYTES # BLD: 3.3 K/UL (ref 0.5–4.6)
LYMPHOCYTES # BLD: 3.7 K/UL (ref 0.5–4.6)
LYMPHOCYTES NFR BLD: 10 % (ref 13–44)
LYMPHOCYTES NFR BLD: 12 % (ref 13–44)
LYMPHOCYTES NFR BLD: 13 % (ref 13–44)
LYMPHOCYTES NFR BLD: 15 % (ref 13–44)
LYMPHOCYTES NFR BLD: 15 % (ref 13–44)
LYMPHOCYTES NFR BLD: 16 % (ref 13–44)
LYMPHOCYTES NFR BLD: 16 % (ref 13–44)
LYMPHOCYTES NFR BLD: 17 % (ref 13–44)
LYMPHOCYTES NFR BLD: 21 % (ref 13–44)
LYMPHOCYTES NFR BLD: 21 % (ref 13–44)
LYMPHOCYTES NFR BLD: 23 % (ref 13–44)
LYMPHOCYTES NFR BLD: 26 % (ref 13–44)
LYMPHOCYTES NFR BLD: 27 % (ref 13–44)
LYMPHOCYTES NFR BLD: 28 % (ref 13–44)
LYMPHOCYTES NFR BLD: 29 % (ref 13–44)
LYMPHOCYTES NFR BLD: 8 % (ref 13–44)
LYMPHOCYTES NFR BLD: 8 % (ref 13–44)
M PROTEIN SERPL ELPH-MCNC: ABNORMAL G/DL
M PROTEIN UR-MCNC: ABNORMAL MG/DL
MAGNESIUM SERPL-MCNC: 2.1 MG/DL (ref 1.8–2.4)
MAGNESIUM SERPL-MCNC: 2.2 MG/DL (ref 1.8–2.4)
MCH RBC QN AUTO: 22.9 PG (ref 26.1–32.9)
MCH RBC QN AUTO: 23 PG (ref 26.1–32.9)
MCH RBC QN AUTO: 23.2 PG (ref 26.1–32.9)
MCH RBC QN AUTO: 23.3 PG (ref 26.1–32.9)
MCH RBC QN AUTO: 23.8 PG (ref 26.1–32.9)
MCH RBC QN AUTO: 23.9 PG (ref 26.1–32.9)
MCH RBC QN AUTO: 24.3 PG (ref 26.1–32.9)
MCH RBC QN AUTO: 24.3 PG (ref 26.1–32.9)
MCH RBC QN AUTO: 24.6 PG (ref 26.1–32.9)
MCH RBC QN AUTO: 24.6 PG (ref 26.1–32.9)
MCH RBC QN AUTO: 25 PG (ref 26.1–32.9)
MCH RBC QN AUTO: 26.9 PG (ref 26.1–32.9)
MCH RBC QN AUTO: 27 PG (ref 26.1–32.9)
MCH RBC QN AUTO: 27.4 PG (ref 26.1–32.9)
MCH RBC QN AUTO: 27.6 PG (ref 26.1–32.9)
MCH RBC QN AUTO: 27.8 PG (ref 26.1–32.9)
MCH RBC QN AUTO: 27.9 PG (ref 26.1–32.9)
MCH RBC QN AUTO: 27.9 PG (ref 26.1–32.9)
MCH RBC QN AUTO: 28 PG (ref 26.1–32.9)
MCH RBC QN AUTO: 28.1 PG (ref 26.1–32.9)
MCH RBC QN AUTO: 28.2 PG (ref 26.1–32.9)
MCH RBC QN AUTO: 28.3 PG (ref 26.1–32.9)
MCH RBC QN AUTO: 28.3 PG (ref 26.1–32.9)
MCH RBC QN AUTO: 28.5 PG (ref 26.1–32.9)
MCH RBC QN AUTO: 28.5 PG (ref 26.1–32.9)
MCH RBC QN AUTO: 28.6 PG (ref 26.1–32.9)
MCH RBC QN AUTO: 28.7 PG (ref 26.1–32.9)
MCHC RBC AUTO-ENTMCNC: 28.9 G/DL (ref 31.4–35)
MCHC RBC AUTO-ENTMCNC: 29 G/DL (ref 31.4–35)
MCHC RBC AUTO-ENTMCNC: 29 G/DL (ref 31.4–35)
MCHC RBC AUTO-ENTMCNC: 29.1 G/DL (ref 31.4–35)
MCHC RBC AUTO-ENTMCNC: 29.2 G/DL (ref 31.4–35)
MCHC RBC AUTO-ENTMCNC: 29.3 G/DL (ref 31.4–35)
MCHC RBC AUTO-ENTMCNC: 29.4 G/DL (ref 31.4–35)
MCHC RBC AUTO-ENTMCNC: 29.6 G/DL (ref 31.4–35)
MCHC RBC AUTO-ENTMCNC: 29.7 G/DL (ref 31.4–35)
MCHC RBC AUTO-ENTMCNC: 29.7 G/DL (ref 31.4–35)
MCHC RBC AUTO-ENTMCNC: 29.8 G/DL (ref 31.4–35)
MCHC RBC AUTO-ENTMCNC: 29.8 G/DL (ref 31.4–35)
MCHC RBC AUTO-ENTMCNC: 29.9 G/DL (ref 31.4–35)
MCHC RBC AUTO-ENTMCNC: 30 G/DL (ref 31.4–35)
MCHC RBC AUTO-ENTMCNC: 30.2 G/DL (ref 31.4–35)
MCHC RBC AUTO-ENTMCNC: 30.2 G/DL (ref 31.4–35)
MCHC RBC AUTO-ENTMCNC: 30.3 G/DL (ref 31.4–35)
MCHC RBC AUTO-ENTMCNC: 30.4 G/DL (ref 31.4–35)
MCHC RBC AUTO-ENTMCNC: 30.5 G/DL (ref 31.4–35)
MCHC RBC AUTO-ENTMCNC: 30.5 G/DL (ref 31.4–35)
MCHC RBC AUTO-ENTMCNC: 30.9 G/DL (ref 31.4–35)
MCHC RBC AUTO-ENTMCNC: 31 G/DL (ref 31.4–35)
MCHC RBC AUTO-ENTMCNC: 31.4 G/DL (ref 31.4–35)
MCHC RBC AUTO-ENTMCNC: 31.4 G/DL (ref 31.4–35)
MCHC RBC AUTO-ENTMCNC: 31.6 G/DL (ref 31.4–35)
MCHC RBC AUTO-ENTMCNC: 31.9 G/DL (ref 31.4–35)
MCHC RBC AUTO-ENTMCNC: 32.1 G/DL (ref 31.4–35)
MCV RBC AUTO: 78 FL (ref 79.6–97.8)
MCV RBC AUTO: 78.4 FL (ref 79.6–97.8)
MCV RBC AUTO: 78.7 FL (ref 79.6–97.8)
MCV RBC AUTO: 80.5 FL (ref 79.6–97.8)
MCV RBC AUTO: 82.1 FL (ref 79.6–97.8)
MCV RBC AUTO: 82.6 FL (ref 79.6–97.8)
MCV RBC AUTO: 82.6 FL (ref 79.6–97.8)
MCV RBC AUTO: 82.9 FL (ref 79.6–97.8)
MCV RBC AUTO: 83.1 FL (ref 79.6–97.8)
MCV RBC AUTO: 83.6 FL (ref 79.6–97.8)
MCV RBC AUTO: 84.1 FL (ref 79.6–97.8)
MCV RBC AUTO: 84.7 FL (ref 79.6–97.8)
MCV RBC AUTO: 85.6 FL (ref 79.6–97.8)
MCV RBC AUTO: 88 FL (ref 79.6–97.8)
MCV RBC AUTO: 88.3 FL (ref 79.6–97.8)
MCV RBC AUTO: 90.3 FL (ref 79.6–97.8)
MCV RBC AUTO: 90.5 FL (ref 79.6–97.8)
MCV RBC AUTO: 90.6 FL (ref 79.6–97.8)
MCV RBC AUTO: 90.8 FL (ref 79.6–97.8)
MCV RBC AUTO: 91.3 FL (ref 79.6–97.8)
MCV RBC AUTO: 91.4 FL (ref 79.6–97.8)
MCV RBC AUTO: 92.4 FL (ref 79.6–97.8)
MCV RBC AUTO: 92.7 FL (ref 79.6–97.8)
MCV RBC AUTO: 93.3 FL (ref 79.6–97.8)
MCV RBC AUTO: 93.6 FL (ref 79.6–97.8)
MCV RBC AUTO: 94 FL (ref 79.6–97.8)
MCV RBC AUTO: 94.2 FL (ref 79.6–97.8)
MCV RBC AUTO: 95 FL (ref 79.6–97.8)
MCV RBC AUTO: 95.4 FL (ref 79.6–97.8)
MM INDURATION POC: 0 MM (ref 0–5)
MM INDURATION POC: NORMAL (ref 0–5)
MM INDURATION POC: NORMAL (ref 0–5)
MM INDURATION POC: NORMAL MM (ref 0–5)
MONOCYTES # BLD: 0.5 K/UL (ref 0.1–1.3)
MONOCYTES # BLD: 0.6 K/UL (ref 0.1–1.3)
MONOCYTES # BLD: 0.6 K/UL (ref 0.1–1.3)
MONOCYTES # BLD: 0.7 K/UL (ref 0.1–1.3)
MONOCYTES # BLD: 0.8 K/UL (ref 0.1–1.3)
MONOCYTES # BLD: 0.9 K/UL (ref 0.1–1.3)
MONOCYTES # BLD: 1 K/UL (ref 0.1–1.3)
MONOCYTES # BLD: 1.1 K/UL (ref 0.1–1.3)
MONOCYTES # BLD: 1.3 K/UL (ref 0.1–1.3)
MONOCYTES # BLD: 1.6 K/UL (ref 0.1–1.3)
MONOCYTES NFR BLD: 4 % (ref 4–12)
MONOCYTES NFR BLD: 5 % (ref 4–12)
MONOCYTES NFR BLD: 6 % (ref 4–12)
MONOCYTES NFR BLD: 7 % (ref 4–12)
MONOCYTES NFR BLD: 8 % (ref 4–12)
MONOCYTES NFR BLD: 9 % (ref 4–12)
MUCOUS THREADS URNS QL MICRO: 0 /LPF
MUCOUS THREADS URNS QL MICRO: 0 /LPF
MYELOPEROXIDASE AB SER IA-ACNC: <9 U/ML (ref 0–9)
NEUTS SEG # BLD: 10.4 K/UL (ref 1.7–8.2)
NEUTS SEG # BLD: 10.9 K/UL (ref 1.7–8.2)
NEUTS SEG # BLD: 11 K/UL (ref 1.7–8.2)
NEUTS SEG # BLD: 11.6 K/UL (ref 1.7–8.2)
NEUTS SEG # BLD: 11.8 K/UL (ref 1.7–8.2)
NEUTS SEG # BLD: 12.5 K/UL (ref 1.7–8.2)
NEUTS SEG # BLD: 12.6 K/UL (ref 1.7–8.2)
NEUTS SEG # BLD: 13.6 K/UL (ref 1.7–8.2)
NEUTS SEG # BLD: 17.5 K/UL (ref 1.7–8.2)
NEUTS SEG # BLD: 6.3 K/UL (ref 1.7–8.2)
NEUTS SEG # BLD: 6.4 K/UL (ref 1.7–8.2)
NEUTS SEG # BLD: 6.8 K/UL (ref 1.7–8.2)
NEUTS SEG # BLD: 6.8 K/UL (ref 1.7–8.2)
NEUTS SEG # BLD: 7.5 K/UL (ref 1.7–8.2)
NEUTS SEG # BLD: 7.6 K/UL (ref 1.7–8.2)
NEUTS SEG # BLD: 7.6 K/UL (ref 1.7–8.2)
NEUTS SEG # BLD: 7.9 K/UL (ref 1.7–8.2)
NEUTS SEG # BLD: 9.7 K/UL (ref 1.7–8.2)
NEUTS SEG # BLD: 9.8 K/UL (ref 1.7–8.2)
NEUTS SEG NFR BLD: 58 % (ref 43–78)
NEUTS SEG NFR BLD: 59 % (ref 43–78)
NEUTS SEG NFR BLD: 60 % (ref 43–78)
NEUTS SEG NFR BLD: 63 % (ref 43–78)
NEUTS SEG NFR BLD: 64 % (ref 43–78)
NEUTS SEG NFR BLD: 66 % (ref 43–78)
NEUTS SEG NFR BLD: 69 % (ref 43–78)
NEUTS SEG NFR BLD: 72 % (ref 43–78)
NEUTS SEG NFR BLD: 73 % (ref 43–78)
NEUTS SEG NFR BLD: 74 % (ref 43–78)
NEUTS SEG NFR BLD: 74 % (ref 43–78)
NEUTS SEG NFR BLD: 75 % (ref 43–78)
NEUTS SEG NFR BLD: 76 % (ref 43–78)
NEUTS SEG NFR BLD: 78 % (ref 43–78)
NEUTS SEG NFR BLD: 78 % (ref 43–78)
NEUTS SEG NFR BLD: 82 % (ref 43–78)
NEUTS SEG NFR BLD: 82 % (ref 43–78)
NEUTS SEG NFR BLD: 85 % (ref 43–78)
NEUTS SEG NFR BLD: 86 % (ref 43–78)
NITRITE UR QL STRIP.AUTO: NEGATIVE
NITRITE UR QL STRIP.AUTO: POSITIVE
NRBC # BLD: 0 K/UL (ref 0–0.2)
NRBC # BLD: 0.02 K/UL (ref 0–0.2)
NRBC # BLD: 0.02 K/UL (ref 0–0.2)
NRBC # BLD: 0.03 K/UL (ref 0–0.2)
NRBC # BLD: 0.05 K/UL (ref 0–0.2)
NRBC # BLD: 0.09 K/UL (ref 0–0.2)
NRBC # BLD: 0.09 K/UL (ref 0–0.2)
OTHER OBSERVATIONS,UCOM: NORMAL
P-ANCA ATYPICAL TITR SER IF: NORMAL TITER
P-ANCA TITR SER IF: NORMAL TITER
P-R INTERVAL, ECG05: 150 MS
P-R INTERVAL, ECG05: 156 MS
P-R INTERVAL, ECG05: 158 MS
P-R INTERVAL, ECG05: 170 MS
P-R INTERVAL, ECG05: 184 MS
PH UR STRIP: 5 [PH] (ref 5–9)
PH UR STRIP: 5.5 [PH] (ref 5–9)
PH UR STRIP: 5.5 [PH] (ref 5–9)
PH UR STRIP: 6 [PH] (ref 5–9)
PH UR STRIP: 6 [PH] (ref 5–9)
PH UR STRIP: 7 [PH] (ref 5–9)
PH UR STRIP: 7 [PH] (ref 5–9)
PHOSPHATE SERPL-MCNC: 4.4 MG/DL (ref 2.3–3.7)
PHOSPHATE SERPL-MCNC: 4.7 MG/DL (ref 2.3–3.7)
PLATELET # BLD AUTO: 210 K/UL (ref 150–450)
PLATELET # BLD AUTO: 215 K/UL (ref 150–450)
PLATELET # BLD AUTO: 222 K/UL (ref 150–450)
PLATELET # BLD AUTO: 222 K/UL (ref 150–450)
PLATELET # BLD AUTO: 224 K/UL (ref 150–450)
PLATELET # BLD AUTO: 224 K/UL (ref 150–450)
PLATELET # BLD AUTO: 234 K/UL (ref 150–450)
PLATELET # BLD AUTO: 238 K/UL (ref 150–450)
PLATELET # BLD AUTO: 242 K/UL (ref 150–450)
PLATELET # BLD AUTO: 245 K/UL (ref 150–450)
PLATELET # BLD AUTO: 247 K/UL (ref 150–450)
PLATELET # BLD AUTO: 248 K/UL (ref 150–450)
PLATELET # BLD AUTO: 252 K/UL (ref 150–450)
PLATELET # BLD AUTO: 275 K/UL (ref 150–450)
PLATELET # BLD AUTO: 289 K/UL (ref 150–450)
PLATELET # BLD AUTO: 297 K/UL (ref 150–450)
PLATELET # BLD AUTO: 305 K/UL (ref 150–450)
PLATELET # BLD AUTO: 338 K/UL (ref 150–450)
PLATELET # BLD AUTO: 345 K/UL (ref 150–450)
PLATELET # BLD AUTO: 349 K/UL (ref 150–450)
PLATELET # BLD AUTO: 351 K/UL (ref 150–450)
PLATELET # BLD AUTO: 371 K/UL (ref 150–450)
PLATELET # BLD AUTO: 390 K/UL (ref 150–450)
PLATELET # BLD AUTO: 393 K/UL (ref 150–450)
PLATELET # BLD AUTO: 415 K/UL (ref 150–450)
PLATELET # BLD AUTO: 429 K/UL (ref 150–450)
PLATELET # BLD AUTO: 446 K/UL (ref 150–450)
PLATELET # BLD AUTO: 449 K/UL (ref 150–450)
PLATELET # BLD AUTO: 452 K/UL (ref 150–450)
PLATELET COMMENTS,PCOM: ADEQUATE
PMV BLD AUTO: 10 FL (ref 9.4–12.3)
PMV BLD AUTO: 10 FL (ref 9.4–12.3)
PMV BLD AUTO: 10.1 FL (ref 9.4–12.3)
PMV BLD AUTO: 10.2 FL (ref 9.4–12.3)
PMV BLD AUTO: 10.3 FL (ref 9.4–12.3)
PMV BLD AUTO: 10.4 FL (ref 9.4–12.3)
PMV BLD AUTO: 10.5 FL (ref 9.4–12.3)
PMV BLD AUTO: 10.7 FL (ref 9.4–12.3)
PMV BLD AUTO: 10.9 FL (ref 9.4–12.3)
PMV BLD AUTO: 10.9 FL (ref 9.4–12.3)
PMV BLD AUTO: 11 FL (ref 9.4–12.3)
PMV BLD AUTO: 11.2 FL (ref 9.4–12.3)
PMV BLD AUTO: 11.2 FL (ref 9.4–12.3)
PMV BLD AUTO: 9.5 FL (ref 9.4–12.3)
PMV BLD AUTO: 9.5 FL (ref 9.4–12.3)
PMV BLD AUTO: 9.6 FL (ref 9.4–12.3)
PMV BLD AUTO: 9.7 FL (ref 9.4–12.3)
PMV BLD AUTO: 9.8 FL (ref 9.4–12.3)
PMV BLD AUTO: 9.9 FL (ref 9.4–12.3)
POTASSIUM SERPL-SCNC: 3 MMOL/L (ref 3.5–5.1)
POTASSIUM SERPL-SCNC: 3.1 MMOL/L (ref 3.5–5.1)
POTASSIUM SERPL-SCNC: 3.1 MMOL/L (ref 3.5–5.1)
POTASSIUM SERPL-SCNC: 3.3 MMOL/L (ref 3.5–5.1)
POTASSIUM SERPL-SCNC: 3.3 MMOL/L (ref 3.5–5.1)
POTASSIUM SERPL-SCNC: 3.4 MMOL/L (ref 3.5–5.1)
POTASSIUM SERPL-SCNC: 3.4 MMOL/L (ref 3.5–5.1)
POTASSIUM SERPL-SCNC: 3.5 MMOL/L (ref 3.5–5.1)
POTASSIUM SERPL-SCNC: 3.6 MMOL/L (ref 3.5–5.1)
POTASSIUM SERPL-SCNC: 3.7 MMOL/L (ref 3.5–5.1)
POTASSIUM SERPL-SCNC: 3.8 MMOL/L (ref 3.5–5.1)
POTASSIUM SERPL-SCNC: 3.9 MMOL/L (ref 3.5–5.1)
POTASSIUM SERPL-SCNC: 4 MMOL/L (ref 3.5–5.1)
POTASSIUM SERPL-SCNC: 4.1 MMOL/L (ref 3.5–5.1)
POTASSIUM SERPL-SCNC: 4.2 MMOL/L (ref 3.5–5.1)
POTASSIUM SERPL-SCNC: 4.3 MMOL/L (ref 3.5–5.1)
POTASSIUM SERPL-SCNC: 4.4 MMOL/L (ref 3.5–5.1)
POTASSIUM SERPL-SCNC: 4.4 MMOL/L (ref 3.5–5.1)
POTASSIUM SERPL-SCNC: 4.5 MMOL/L (ref 3.5–5.1)
POTASSIUM SERPL-SCNC: 4.8 MMOL/L (ref 3.5–5.1)
POTASSIUM SERPL-SCNC: 4.8 MMOL/L (ref 3.5–5.1)
POTASSIUM SERPL-SCNC: 4.9 MMOL/L (ref 3.5–5.1)
POTASSIUM SERPL-SCNC: 5 MMOL/L (ref 3.5–5.1)
POTASSIUM SERPL-SCNC: 5 MMOL/L (ref 3.5–5.1)
POTASSIUM SERPL-SCNC: 5.1 MMOL/L (ref 3.5–5.1)
POTASSIUM SERPL-SCNC: 5.1 MMOL/L (ref 3.5–5.1)
POTASSIUM SERPL-SCNC: 5.3 MMOL/L (ref 3.5–5.1)
POTASSIUM SERPL-SCNC: 5.4 MMOL/L (ref 3.5–5.1)
PPD POC: NEGATIVE NEGATIVE
PPD POC: NEGATIVE NEGATIVE
PPD POC: NORMAL
PPD POC: NORMAL
PPD POC: NORMAL NEGATIVE
PROCALCITONIN SERPL-MCNC: 0.1 NG/ML
PROCALCITONIN SERPL-MCNC: 0.2 NG/ML
PROCALCITONIN SERPL-MCNC: 0.3 NG/ML
PROCALCITONIN SERPL-MCNC: 0.4 NG/ML
PROCALCITONIN SERPL-MCNC: 1.6 NG/ML
PROT PATTERN SERPL ELPH-IMP: ABNORMAL
PROT PATTERN SPEC IFE-IMP: ABNORMAL
PROT PATTERN SPEC IFE-IMP: ABNORMAL
PROT PATTERN UR ELPH-IMP: ABNORMAL
PROT SERPL-MCNC: 5.4 G/DL (ref 6.3–8.2)
PROT SERPL-MCNC: 6.7 G/DL (ref 6.3–8.2)
PROT SERPL-MCNC: 6.8 G/DL (ref 6.3–8.2)
PROT SERPL-MCNC: 7 G/DL (ref 6.3–8.2)
PROT SERPL-MCNC: 7.1 G/DL (ref 6.3–8.2)
PROT SERPL-MCNC: 7.4 G/DL (ref 6.3–8.2)
PROT SERPL-MCNC: 7.4 G/DL (ref 6.3–8.2)
PROT SERPL-MCNC: 7.8 G/DL (ref 6.3–8.2)
PROT SERPL-MCNC: 8 G/DL (ref 6.3–8.2)
PROT UR STRIP-MCNC: 100 MG/DL
PROT UR STRIP-MCNC: 30 MG/DL
PROT UR STRIP-MCNC: NEGATIVE MG/DL
PROT UR-MCNC: 135 MG/DL
PROT UR-MCNC: 40 MG/DL
PROT UR-MCNC: 41 MG/DL
PROT/CREAT UR-RTO: 1.1
PROT/CREAT UR-RTO: 3.1
PROTEINASE3 AB SER IA-ACNC: <3.5 U/ML (ref 0–3.5)
Q-T INTERVAL, ECG07: 336 MS
Q-T INTERVAL, ECG07: 352 MS
Q-T INTERVAL, ECG07: 370 MS
Q-T INTERVAL, ECG07: 378 MS
Q-T INTERVAL, ECG07: 402 MS
QRS DURATION, ECG06: 112 MS
QRS DURATION, ECG06: 116 MS
QRS DURATION, ECG06: 126 MS
QRS DURATION, ECG06: 130 MS
QRS DURATION, ECG06: 132 MS
QTC CALCULATION (BEZET), ECG08: 432 MS
QTC CALCULATION (BEZET), ECG08: 432 MS
QTC CALCULATION (BEZET), ECG08: 448 MS
QTC CALCULATION (BEZET), ECG08: 457 MS
QTC CALCULATION (BEZET), ECG08: 464 MS
RBC # BLD AUTO: 2.63 M/UL (ref 4.05–5.2)
RBC # BLD AUTO: 2.82 M/UL (ref 4.05–5.2)
RBC # BLD AUTO: 3.19 M/UL (ref 4.05–5.2)
RBC # BLD AUTO: 3.35 M/UL (ref 4.05–5.2)
RBC # BLD AUTO: 3.41 M/UL (ref 4.05–5.2)
RBC # BLD AUTO: 3.47 M/UL (ref 4.05–5.2)
RBC # BLD AUTO: 3.5 M/UL (ref 4.05–5.2)
RBC # BLD AUTO: 3.72 M/UL (ref 4.05–5.2)
RBC # BLD AUTO: 3.73 M/UL (ref 4.05–5.2)
RBC # BLD AUTO: 3.75 M/UL (ref 4.05–5.2)
RBC # BLD AUTO: 3.75 M/UL (ref 4.05–5.2)
RBC # BLD AUTO: 3.78 M/UL (ref 4.05–5.2)
RBC # BLD AUTO: 3.83 M/UL (ref 4.05–5.2)
RBC # BLD AUTO: 3.84 M/UL (ref 4.05–5.2)
RBC # BLD AUTO: 3.85 M/UL (ref 4.05–5.2)
RBC # BLD AUTO: 3.86 M/UL (ref 4.05–5.2)
RBC # BLD AUTO: 3.93 M/UL (ref 4.05–5.2)
RBC # BLD AUTO: 4.04 M/UL (ref 4.05–5.2)
RBC # BLD AUTO: 4.1 M/UL (ref 4.05–5.2)
RBC # BLD AUTO: 4.11 M/UL (ref 4.05–5.2)
RBC # BLD AUTO: 4.13 M/UL (ref 4.05–5.2)
RBC # BLD AUTO: 4.16 M/UL (ref 4.05–5.2)
RBC # BLD AUTO: 4.2 M/UL (ref 4.05–5.2)
RBC # BLD AUTO: 4.23 M/UL (ref 4.05–5.2)
RBC # BLD AUTO: 4.33 M/UL (ref 4.05–5.2)
RBC # BLD AUTO: 4.33 M/UL (ref 4.05–5.2)
RBC # BLD AUTO: 4.39 M/UL (ref 4.05–5.2)
RBC # BLD AUTO: 4.43 M/UL (ref 4.05–5.2)
RBC # BLD AUTO: 4.7 M/UL (ref 4.05–5.2)
RBC #/AREA URNS HPF: 0 /HPF
RBC #/AREA URNS HPF: ABNORMAL /HPF
RBC #/AREA URNS HPF: ABNORMAL /HPF
RBC MORPH BLD: ABNORMAL
RBC MORPH BLD: ABNORMAL
SERVICE CMNT-IMP: ABNORMAL
SERVICE CMNT-IMP: NORMAL
SODIUM SERPL-SCNC: 132 MMOL/L (ref 136–145)
SODIUM SERPL-SCNC: 136 MMOL/L (ref 136–145)
SODIUM SERPL-SCNC: 136 MMOL/L (ref 136–145)
SODIUM SERPL-SCNC: 138 MMOL/L (ref 136–145)
SODIUM SERPL-SCNC: 139 MMOL/L (ref 136–145)
SODIUM SERPL-SCNC: 140 MMOL/L (ref 136–145)
SODIUM SERPL-SCNC: 141 MMOL/L (ref 136–145)
SODIUM SERPL-SCNC: 142 MMOL/L (ref 136–145)
SODIUM SERPL-SCNC: 143 MMOL/L (ref 136–145)
SODIUM SERPL-SCNC: 144 MMOL/L (ref 136–145)
SODIUM SERPL-SCNC: 145 MMOL/L (ref 136–145)
SODIUM SERPL-SCNC: 146 MMOL/L (ref 136–145)
SODIUM UR-SCNC: 35 MMOL/L
SODIUM UR-SCNC: 78 MMOL/L
SP GR UR REFRACTOMETRY: 1.01 (ref 1–1.02)
SP GR UR REFRACTOMETRY: 1.02 (ref 1–1.02)
TROPONIN I SERPL-MCNC: 0.02 NG/ML (ref 0.02–0.05)
TROPONIN I SERPL-MCNC: <0.02 NG/ML (ref 0.02–0.05)
TSH SERPL DL<=0.005 MIU/L-ACNC: 0.43 UIU/ML (ref 0.36–3.74)
UROBILINOGEN UR QL STRIP.AUTO: 0.2 EU/DL (ref 0.2–1)
VANCOMYCIN SERPL-MCNC: 13.6 UG/ML
VANCOMYCIN SERPL-MCNC: 15.2 UG/ML
VANCOMYCIN SERPL-MCNC: 15.7 UG/ML
VANCOMYCIN TROUGH SERPL-MCNC: 23.2 UG/ML (ref 5–20)
VENTRICULAR RATE, ECG03: 115 BPM
VENTRICULAR RATE, ECG03: 75 BPM
VENTRICULAR RATE, ECG03: 82 BPM
VENTRICULAR RATE, ECG03: 88 BPM
VENTRICULAR RATE, ECG03: 91 BPM
WBC # BLD AUTO: 10 K/UL (ref 4.3–11.1)
WBC # BLD AUTO: 10.4 K/UL (ref 4.3–11.1)
WBC # BLD AUTO: 10.6 K/UL (ref 4.3–11.1)
WBC # BLD AUTO: 10.6 K/UL (ref 4.3–11.1)
WBC # BLD AUTO: 10.7 K/UL (ref 4.3–11.1)
WBC # BLD AUTO: 10.9 K/UL (ref 4.3–11.1)
WBC # BLD AUTO: 11 K/UL (ref 4.3–11.1)
WBC # BLD AUTO: 11.1 K/UL (ref 4.3–11.1)
WBC # BLD AUTO: 11.4 K/UL (ref 4.3–11.1)
WBC # BLD AUTO: 11.8 K/UL (ref 4.3–11.1)
WBC # BLD AUTO: 11.8 K/UL (ref 4.3–11.1)
WBC # BLD AUTO: 12.1 K/UL (ref 4.3–11.1)
WBC # BLD AUTO: 12.4 K/UL (ref 4.3–11.1)
WBC # BLD AUTO: 12.6 K/UL (ref 4.3–11.1)
WBC # BLD AUTO: 12.6 K/UL (ref 4.3–11.1)
WBC # BLD AUTO: 13.6 K/UL (ref 4.3–11.1)
WBC # BLD AUTO: 14.6 K/UL (ref 4.3–11.1)
WBC # BLD AUTO: 14.7 K/UL (ref 4.3–11.1)
WBC # BLD AUTO: 14.9 K/UL (ref 4.3–11.1)
WBC # BLD AUTO: 15.6 K/UL (ref 4.3–11.1)
WBC # BLD AUTO: 17.4 K/UL (ref 4.3–11.1)
WBC # BLD AUTO: 17.4 K/UL (ref 4.3–11.1)
WBC # BLD AUTO: 17.7 K/UL (ref 4.3–11.1)
WBC # BLD AUTO: 21.3 K/UL (ref 4.3–11.1)
WBC # BLD AUTO: 9 K/UL (ref 4.3–11.1)
WBC # BLD AUTO: 9.3 K/UL (ref 4.3–11.1)
WBC # BLD AUTO: 9.9 K/UL (ref 4.3–11.1)
WBC #/AREA STL HPF: NORMAL /HPF (ref 0–4)
WBC MORPH BLD: ABNORMAL
WBC URNS QL MICRO: ABNORMAL /HPF
WBC URNS QL MICRO: NORMAL /HPF
WBC URNS QL MICRO: NORMAL /HPF
YEAST URNS QL MICRO: ABNORMAL

## 2019-01-01 PROCEDURE — 74011000258 HC RX REV CODE- 258: Performed by: NURSE PRACTITIONER

## 2019-01-01 PROCEDURE — 77010033678 HC OXYGEN DAILY

## 2019-01-01 PROCEDURE — 36415 COLL VENOUS BLD VENIPUNCTURE: CPT

## 2019-01-01 PROCEDURE — 77030012939 HC DRSG HYDRCOIL BMS -A

## 2019-01-01 PROCEDURE — 74011250637 HC RX REV CODE- 250/637: Performed by: NURSE PRACTITIONER

## 2019-01-01 PROCEDURE — 86160 COMPLEMENT ANTIGEN: CPT

## 2019-01-01 PROCEDURE — 81003 URINALYSIS AUTO W/O SCOPE: CPT

## 2019-01-01 PROCEDURE — 65270000029 HC RM PRIVATE

## 2019-01-01 PROCEDURE — 74011250636 HC RX REV CODE- 250/636: Performed by: INTERNAL MEDICINE

## 2019-01-01 PROCEDURE — 80048 BASIC METABOLIC PNL TOTAL CA: CPT

## 2019-01-01 PROCEDURE — 84145 PROCALCITONIN (PCT): CPT

## 2019-01-01 PROCEDURE — 74011000302 HC RX REV CODE- 302: Performed by: HOSPITALIST

## 2019-01-01 PROCEDURE — 0Y6R0Z0 DETACHMENT AT RIGHT 2ND TOE, COMPLETE, OPEN APPROACH: ICD-10-PCS | Performed by: SURGERY

## 2019-01-01 PROCEDURE — 74011250636 HC RX REV CODE- 250/636: Performed by: NURSE PRACTITIONER

## 2019-01-01 PROCEDURE — 82962 GLUCOSE BLOOD TEST: CPT

## 2019-01-01 PROCEDURE — 97110 THERAPEUTIC EXERCISES: CPT

## 2019-01-01 PROCEDURE — 86580 TB INTRADERMAL TEST: CPT | Performed by: HOSPITALIST

## 2019-01-01 PROCEDURE — 97162 PT EVAL MOD COMPLEX 30 MIN: CPT

## 2019-01-01 PROCEDURE — 85025 COMPLETE CBC W/AUTO DIFF WBC: CPT

## 2019-01-01 PROCEDURE — 74011250637 HC RX REV CODE- 250/637: Performed by: INTERNAL MEDICINE

## 2019-01-01 PROCEDURE — 74011636637 HC RX REV CODE- 636/637: Performed by: INTERNAL MEDICINE

## 2019-01-01 PROCEDURE — 74011000258 HC RX REV CODE- 258: Performed by: EMERGENCY MEDICINE

## 2019-01-01 PROCEDURE — C1751 CATH, INF, PER/CENT/MIDLINE: HCPCS

## 2019-01-01 PROCEDURE — 84156 ASSAY OF PROTEIN URINE: CPT

## 2019-01-01 PROCEDURE — 74011000258 HC RX REV CODE- 258: Performed by: INTERNAL MEDICINE

## 2019-01-01 PROCEDURE — 74011250636 HC RX REV CODE- 250/636: Performed by: HOSPITALIST

## 2019-01-01 PROCEDURE — 74011636637 HC RX REV CODE- 636/637: Performed by: FAMILY MEDICINE

## 2019-01-01 PROCEDURE — 74011250637 HC RX REV CODE- 250/637: Performed by: HOSPITALIST

## 2019-01-01 PROCEDURE — 99218 HC RM OBSERVATION: CPT

## 2019-01-01 PROCEDURE — 86335 IMMUNFIX E-PHORSIS/URINE/CSF: CPT

## 2019-01-01 PROCEDURE — 77030020263 HC SOL INJ SOD CL0.9% LFCR 1000ML

## 2019-01-01 PROCEDURE — 81015 MICROSCOPIC EXAM OF URINE: CPT

## 2019-01-01 PROCEDURE — 37224 HC PTA FEMPOP UNI: CPT

## 2019-01-01 PROCEDURE — 83880 ASSAY OF NATRIURETIC PEPTIDE: CPT

## 2019-01-01 PROCEDURE — 86140 C-REACTIVE PROTEIN: CPT

## 2019-01-01 PROCEDURE — 97535 SELF CARE MNGMENT TRAINING: CPT

## 2019-01-01 PROCEDURE — 80202 ASSAY OF VANCOMYCIN: CPT

## 2019-01-01 PROCEDURE — 97530 THERAPEUTIC ACTIVITIES: CPT

## 2019-01-01 PROCEDURE — 77030019908 HC STETH ESOPH SIMS -A: Performed by: ANESTHESIOLOGY

## 2019-01-01 PROCEDURE — 83520 IMMUNOASSAY QUANT NOS NONAB: CPT

## 2019-01-01 PROCEDURE — 85027 COMPLETE CBC AUTOMATED: CPT

## 2019-01-01 PROCEDURE — 85652 RBC SED RATE AUTOMATED: CPT

## 2019-01-01 PROCEDURE — 97166 OT EVAL MOD COMPLEX 45 MIN: CPT

## 2019-01-01 PROCEDURE — 74011000258 HC RX REV CODE- 258: Performed by: HOSPITALIST

## 2019-01-01 PROCEDURE — 74011000250 HC RX REV CODE- 250: Performed by: NURSE PRACTITIONER

## 2019-01-01 PROCEDURE — 74011250637 HC RX REV CODE- 250/637: Performed by: EMERGENCY MEDICINE

## 2019-01-01 PROCEDURE — 77030012935 HC DRSG AQUACEL BMS -B

## 2019-01-01 PROCEDURE — 93005 ELECTROCARDIOGRAM TRACING: CPT | Performed by: EMERGENCY MEDICINE

## 2019-01-01 PROCEDURE — 74011250636 HC RX REV CODE- 250/636: Performed by: SURGERY

## 2019-01-01 PROCEDURE — C1725 CATH, TRANSLUMIN NON-LASER: HCPCS

## 2019-01-01 PROCEDURE — 84443 ASSAY THYROID STIM HORMONE: CPT

## 2019-01-01 PROCEDURE — 94760 N-INVAS EAR/PLS OXIMETRY 1: CPT

## 2019-01-01 PROCEDURE — 73723 MRI JOINT LWR EXTR W/O&W/DYE: CPT

## 2019-01-01 PROCEDURE — 94760 N-INVAS EAR/PLS OXIMETRY 1: CPT | Performed by: EMERGENCY MEDICINE

## 2019-01-01 PROCEDURE — 96367 TX/PROPH/DG ADDL SEQ IV INF: CPT | Performed by: EMERGENCY MEDICINE

## 2019-01-01 PROCEDURE — 71045 X-RAY EXAM CHEST 1 VIEW: CPT

## 2019-01-01 PROCEDURE — 87205 SMEAR GRAM STAIN: CPT

## 2019-01-01 PROCEDURE — 87077 CULTURE AEROBIC IDENTIFY: CPT

## 2019-01-01 PROCEDURE — 87045 FECES CULTURE AEROBIC BACT: CPT

## 2019-01-01 PROCEDURE — 74011250636 HC RX REV CODE- 250/636: Performed by: PHYSICIAN ASSISTANT

## 2019-01-01 PROCEDURE — 74011250636 HC RX REV CODE- 250/636: Performed by: EMERGENCY MEDICINE

## 2019-01-01 PROCEDURE — 76010000138 HC OR TIME 0.5 TO 1 HR: Performed by: SURGERY

## 2019-01-01 PROCEDURE — 74011636637 HC RX REV CODE- 636/637: Performed by: NURSE PRACTITIONER

## 2019-01-01 PROCEDURE — 77030002996 HC SUT SLK J&J -A: Performed by: SURGERY

## 2019-01-01 PROCEDURE — 74011636637 HC RX REV CODE- 636/637: Performed by: HOSPITALIST

## 2019-01-01 PROCEDURE — 83605 ASSAY OF LACTIC ACID: CPT

## 2019-01-01 PROCEDURE — 77030019605

## 2019-01-01 PROCEDURE — 70551 MRI BRAIN STEM W/O DYE: CPT

## 2019-01-01 PROCEDURE — 74011250636 HC RX REV CODE- 250/636

## 2019-01-01 PROCEDURE — 77030019609 HC DSG COLGN S&N -A

## 2019-01-01 PROCEDURE — 80069 RENAL FUNCTION PANEL: CPT

## 2019-01-01 PROCEDURE — A9575 INJ GADOTERATE MEGLUMI 0.1ML: HCPCS | Performed by: INTERNAL MEDICINE

## 2019-01-01 PROCEDURE — 74011000250 HC RX REV CODE- 250

## 2019-01-01 PROCEDURE — C1887 CATHETER, GUIDING: HCPCS

## 2019-01-01 PROCEDURE — 87040 BLOOD CULTURE FOR BACTERIA: CPT

## 2019-01-01 PROCEDURE — 36569 INSJ PICC 5 YR+ W/O IMAGING: CPT | Performed by: INTERNAL MEDICINE

## 2019-01-01 PROCEDURE — 86580 TB INTRADERMAL TEST: CPT | Performed by: NURSE PRACTITIONER

## 2019-01-01 PROCEDURE — A9270 NON-COVERED ITEM OR SERVICE: HCPCS | Performed by: INTERNAL MEDICINE

## 2019-01-01 PROCEDURE — 80053 COMPREHEN METABOLIC PANEL: CPT

## 2019-01-01 PROCEDURE — 74011000302 HC RX REV CODE- 302: Performed by: INTERNAL MEDICINE

## 2019-01-01 PROCEDURE — 83883 ASSAY NEPHELOMETRY NOT SPEC: CPT

## 2019-01-01 PROCEDURE — 047N3ZZ DILATION OF LEFT POPLITEAL ARTERY, PERCUTANEOUS APPROACH: ICD-10-PCS | Performed by: SURGERY

## 2019-01-01 PROCEDURE — C1894 INTRO/SHEATH, NON-LASER: HCPCS

## 2019-01-01 PROCEDURE — 93925 LOWER EXTREMITY STUDY: CPT

## 2019-01-01 PROCEDURE — C8929 TTE W OR WO FOL WCON,DOPPLER: HCPCS

## 2019-01-01 PROCEDURE — 77030027688 HC DRSG MEPILEX 16-48IN NO BORD MOLN -A

## 2019-01-01 PROCEDURE — 87088 URINE BACTERIA CULTURE: CPT

## 2019-01-01 PROCEDURE — 96360 HYDRATION IV INFUSION INIT: CPT | Performed by: EMERGENCY MEDICINE

## 2019-01-01 PROCEDURE — 93922 UPR/L XTREMITY ART 2 LEVELS: CPT

## 2019-01-01 PROCEDURE — 82272 OCCULT BLD FECES 1-3 TESTS: CPT

## 2019-01-01 PROCEDURE — 97161 PT EVAL LOW COMPLEX 20 MIN: CPT

## 2019-01-01 PROCEDURE — C1760 CLOSURE DEV, VASC: HCPCS

## 2019-01-01 PROCEDURE — 74011000250 HC RX REV CODE- 250: Performed by: SURGERY

## 2019-01-01 PROCEDURE — 81003 URINALYSIS AUTO W/O SCOPE: CPT | Performed by: EMERGENCY MEDICINE

## 2019-01-01 PROCEDURE — 77030020256 HC SOL INJ NACL 0.9%  500ML

## 2019-01-01 PROCEDURE — 70450 CT HEAD/BRAIN W/O DYE: CPT

## 2019-01-01 PROCEDURE — 87186 SC STD MICRODIL/AGAR DIL: CPT

## 2019-01-01 PROCEDURE — 83036 HEMOGLOBIN GLYCOSYLATED A1C: CPT

## 2019-01-01 PROCEDURE — 96361 HYDRATE IV INFUSION ADD-ON: CPT | Performed by: EMERGENCY MEDICINE

## 2019-01-01 PROCEDURE — 84300 ASSAY OF URINE SODIUM: CPT

## 2019-01-01 PROCEDURE — 77030011256 HC DRSG MEPILEX <16IN NO BORD MOLN -A

## 2019-01-01 PROCEDURE — 96365 THER/PROPH/DIAG IV INF INIT: CPT | Performed by: EMERGENCY MEDICINE

## 2019-01-01 PROCEDURE — 81001 URINALYSIS AUTO W/SCOPE: CPT

## 2019-01-01 PROCEDURE — 77030030708 HC OINT IODOSRB S&N -A

## 2019-01-01 PROCEDURE — 83735 ASSAY OF MAGNESIUM: CPT

## 2019-01-01 PROCEDURE — 73140 X-RAY EXAM OF FINGER(S): CPT

## 2019-01-01 PROCEDURE — 77030020257 HC SOL INJ SOD CL 0.45% 1000ML BG

## 2019-01-01 PROCEDURE — 99214 OFFICE O/P EST MOD 30 MIN: CPT

## 2019-01-01 PROCEDURE — 77030027138 HC INCENT SPIROMETER -A

## 2019-01-01 PROCEDURE — 77030020255 HC SOL INJ LR 1000ML BG

## 2019-01-01 PROCEDURE — 96374 THER/PROPH/DIAG INJ IV PUSH: CPT | Performed by: EMERGENCY MEDICINE

## 2019-01-01 PROCEDURE — 90686 IIV4 VACC NO PRSV 0.5 ML IM: CPT | Performed by: HOSPITALIST

## 2019-01-01 PROCEDURE — B4101ZZ FLUOROSCOPY OF ABDOMINAL AORTA USING LOW OSMOLAR CONTRAST: ICD-10-PCS | Performed by: SURGERY

## 2019-01-01 PROCEDURE — 84165 PROTEIN E-PHORESIS SERUM: CPT

## 2019-01-01 PROCEDURE — 94761 N-INVAS EAR/PLS OXIMETRY MLT: CPT

## 2019-01-01 PROCEDURE — 77030013519 HC DEV INFL BASIX MRTM -B

## 2019-01-01 PROCEDURE — 77030020186 HC BOOT HL PROTCT SAGE -B

## 2019-01-01 PROCEDURE — 97163 PT EVAL HIGH COMPLEX 45 MIN: CPT

## 2019-01-01 PROCEDURE — 77030038269 HC DRN EXT URIN PURWCK BARD -A

## 2019-01-01 PROCEDURE — 84484 ASSAY OF TROPONIN QUANT: CPT

## 2019-01-01 PROCEDURE — 87086 URINE CULTURE/COLONY COUNT: CPT

## 2019-01-01 PROCEDURE — 74011000250 HC RX REV CODE- 250: Performed by: INTERNAL MEDICINE

## 2019-01-01 PROCEDURE — 97167 OT EVAL HIGH COMPLEX 60 MIN: CPT

## 2019-01-01 PROCEDURE — 99284 EMERGENCY DEPT VISIT MOD MDM: CPT | Performed by: EMERGENCY MEDICINE

## 2019-01-01 PROCEDURE — 86334 IMMUNOFIX E-PHORESIS SERUM: CPT

## 2019-01-01 PROCEDURE — 76060000032 HC ANESTHESIA 0.5 TO 1 HR: Performed by: SURGERY

## 2019-01-01 PROCEDURE — 0T9B70Z DRAINAGE OF BLADDER WITH DRAINAGE DEVICE, VIA NATURAL OR ARTIFICIAL OPENING: ICD-10-PCS | Performed by: INTERNAL MEDICINE

## 2019-01-01 PROCEDURE — 71046 X-RAY EXAM CHEST 2 VIEWS: CPT

## 2019-01-01 PROCEDURE — 99285 EMERGENCY DEPT VISIT HI MDM: CPT | Performed by: EMERGENCY MEDICINE

## 2019-01-01 PROCEDURE — 51701 INSERT BLADDER CATHETER: CPT | Performed by: EMERGENCY MEDICINE

## 2019-01-01 PROCEDURE — 74011000250 HC RX REV CODE- 250: Performed by: PHYSICIAN ASSISTANT

## 2019-01-01 PROCEDURE — 77030005518 HC CATH URETH FOL 2W BARD -B

## 2019-01-01 PROCEDURE — 77030031642 HC BOOT FT ROOKE HFS OSBM -B

## 2019-01-01 PROCEDURE — 89055 LEUKOCYTE ASSESSMENT FECAL: CPT

## 2019-01-01 PROCEDURE — 86580 TB INTRADERMAL TEST: CPT | Performed by: INTERNAL MEDICINE

## 2019-01-01 PROCEDURE — 74011250637 HC RX REV CODE- 250/637

## 2019-01-01 PROCEDURE — 77030031139 HC SUT VCRL2 J&J -A: Performed by: SURGERY

## 2019-01-01 PROCEDURE — 73660 X-RAY EXAM OF TOE(S): CPT

## 2019-01-01 PROCEDURE — 76210000063 HC OR PH I REC FIRST 0.5 HR: Performed by: SURGERY

## 2019-01-01 PROCEDURE — 76775 US EXAM ABDO BACK WALL LIM: CPT

## 2019-01-01 PROCEDURE — 76060000033 HC ANESTHESIA 1 TO 1.5 HR: Performed by: SURGERY

## 2019-01-01 PROCEDURE — 73630 X-RAY EXAM OF FOOT: CPT

## 2019-01-01 PROCEDURE — 99213 OFFICE O/P EST LOW 20 MIN: CPT

## 2019-01-01 PROCEDURE — 96366 THER/PROPH/DIAG IV INF ADDON: CPT | Performed by: EMERGENCY MEDICINE

## 2019-01-01 PROCEDURE — 71101 X-RAY EXAM UNILAT RIBS/CHEST: CPT

## 2019-01-01 PROCEDURE — 74011000302 HC RX REV CODE- 302: Performed by: NURSE PRACTITIONER

## 2019-01-01 PROCEDURE — C1769 GUIDE WIRE: HCPCS

## 2019-01-01 PROCEDURE — 86038 ANTINUCLEAR ANTIBODIES: CPT

## 2019-01-01 PROCEDURE — 74011636320 HC RX REV CODE- 636/320: Performed by: SURGERY

## 2019-01-01 PROCEDURE — 77030011943

## 2019-01-01 PROCEDURE — 96375 TX/PRO/DX INJ NEW DRUG ADDON: CPT | Performed by: EMERGENCY MEDICINE

## 2019-01-01 PROCEDURE — 97165 OT EVAL LOW COMPLEX 30 MIN: CPT

## 2019-01-01 PROCEDURE — 99215 OFFICE O/P EST HI 40 MIN: CPT

## 2019-01-01 PROCEDURE — 77030002916 HC SUT ETHLN J&J -A: Performed by: SURGERY

## 2019-01-01 PROCEDURE — 76770 US EXAM ABDO BACK WALL COMP: CPT

## 2019-01-01 PROCEDURE — 75716 ARTERY X-RAYS ARMS/LEGS: CPT

## 2019-01-01 PROCEDURE — 0T9B70Z DRAINAGE OF BLADDER WITH DRAINAGE DEVICE, VIA NATURAL OR ARTIFICIAL OPENING: ICD-10-PCS | Performed by: EMERGENCY MEDICINE

## 2019-01-01 PROCEDURE — 77030019940 HC BLNKT HYPOTHRM STRY -B: Performed by: ANESTHESIOLOGY

## 2019-01-01 PROCEDURE — 76000 FLUOROSCOPY <1 HR PHYS/QHP: CPT

## 2019-01-01 PROCEDURE — 77030018836 HC SOL IRR NACL ICUM -A: Performed by: SURGERY

## 2019-01-01 PROCEDURE — 77030011254 HC DRSG HYDRGEL S&N -A

## 2019-01-01 PROCEDURE — 74011250636 HC RX REV CODE- 250/636: Performed by: ANESTHESIOLOGY

## 2019-01-01 PROCEDURE — 73502 X-RAY EXAM HIP UNI 2-3 VIEWS: CPT

## 2019-01-01 PROCEDURE — 51798 US URINE CAPACITY MEASURE: CPT

## 2019-01-01 PROCEDURE — 90471 IMMUNIZATION ADMIN: CPT

## 2019-01-01 PROCEDURE — 76210000006 HC OR PH I REC 0.5 TO 1 HR: Performed by: SURGERY

## 2019-01-01 PROCEDURE — 76937 US GUIDE VASCULAR ACCESS: CPT

## 2019-01-01 PROCEDURE — 93971 EXTREMITY STUDY: CPT

## 2019-01-01 PROCEDURE — 93005 ELECTROCARDIOGRAM TRACING: CPT | Performed by: HOSPITALIST

## 2019-01-01 PROCEDURE — 88305 TISSUE EXAM BY PATHOLOGIST: CPT

## 2019-01-01 PROCEDURE — 82550 ASSAY OF CK (CPK): CPT

## 2019-01-01 PROCEDURE — 77030010509 HC AIRWY LMA MSK TELE -A: Performed by: ANESTHESIOLOGY

## 2019-01-01 RX ORDER — CEFPODOXIME PROXETIL 200 MG/1
400 TABLET, FILM COATED ORAL EVERY 12 HOURS
Status: DISCONTINUED | OUTPATIENT
Start: 2019-01-01 | End: 2019-01-01 | Stop reason: HOSPADM

## 2019-01-01 RX ORDER — FAMOTIDINE 20 MG/1
20 TABLET, FILM COATED ORAL 2 TIMES DAILY
Status: DISCONTINUED | OUTPATIENT
Start: 2019-01-01 | End: 2019-01-01

## 2019-01-01 RX ORDER — INSULIN LISPRO 100 [IU]/ML
INJECTION, SOLUTION INTRAVENOUS; SUBCUTANEOUS
Status: DISCONTINUED | OUTPATIENT
Start: 2019-01-01 | End: 2019-01-01 | Stop reason: HOSPADM

## 2019-01-01 RX ORDER — FUROSEMIDE 10 MG/ML
20 INJECTION INTRAMUSCULAR; INTRAVENOUS 2 TIMES DAILY
Status: DISCONTINUED | OUTPATIENT
Start: 2019-01-01 | End: 2019-01-01

## 2019-01-01 RX ORDER — PREDNISONE 10 MG/1
10 TABLET ORAL
Status: DISCONTINUED | OUTPATIENT
Start: 2019-01-01 | End: 2019-01-01 | Stop reason: HOSPADM

## 2019-01-01 RX ORDER — SODIUM CHLORIDE 0.9 % (FLUSH) 0.9 %
5-40 SYRINGE (ML) INJECTION AS NEEDED
Status: DISCONTINUED | OUTPATIENT
Start: 2019-01-01 | End: 2019-01-01 | Stop reason: HOSPADM

## 2019-01-01 RX ORDER — AMLODIPINE BESYLATE 10 MG/1
10 TABLET ORAL DAILY
Qty: 10 TAB | Refills: 0 | Status: SHIPPED
Start: 2019-01-01

## 2019-01-01 RX ORDER — SODIUM CHLORIDE, SODIUM LACTATE, POTASSIUM CHLORIDE, CALCIUM CHLORIDE 600; 310; 30; 20 MG/100ML; MG/100ML; MG/100ML; MG/100ML
50 INJECTION, SOLUTION INTRAVENOUS CONTINUOUS
Status: DISCONTINUED | OUTPATIENT
Start: 2019-01-01 | End: 2019-01-01

## 2019-01-01 RX ORDER — HYDRALAZINE HYDROCHLORIDE 20 MG/ML
10 INJECTION INTRAMUSCULAR; INTRAVENOUS
Status: DISCONTINUED | OUTPATIENT
Start: 2019-01-01 | End: 2019-01-01 | Stop reason: HOSPADM

## 2019-01-01 RX ORDER — POLYETHYLENE GLYCOL 3350 17 G/17G
17 POWDER, FOR SOLUTION ORAL
Status: ON HOLD | COMMUNITY
End: 2019-01-01

## 2019-01-01 RX ORDER — SAME BUTANEDISULFONATE/BETAINE 400-600 MG
500 POWDER IN PACKET (EA) ORAL 2 TIMES DAILY
Qty: 28 CAP | Refills: 0 | Status: SHIPPED | OUTPATIENT
Start: 2019-01-01 | End: 2019-10-12

## 2019-01-01 RX ORDER — HYDRALAZINE HYDROCHLORIDE 25 MG/1
25 TABLET, FILM COATED ORAL 3 TIMES DAILY
Status: DISCONTINUED | OUTPATIENT
Start: 2019-01-01 | End: 2019-01-01 | Stop reason: HOSPADM

## 2019-01-01 RX ORDER — HEPARIN SODIUM 5000 [USP'U]/ML
5000 INJECTION, SOLUTION INTRAVENOUS; SUBCUTANEOUS EVERY 12 HOURS
Status: DISCONTINUED | OUTPATIENT
Start: 2019-01-01 | End: 2019-01-01 | Stop reason: HOSPADM

## 2019-01-01 RX ORDER — ASPIRIN 81 MG/1
81 TABLET ORAL
Status: DISCONTINUED | OUTPATIENT
Start: 2019-01-01 | End: 2019-01-01 | Stop reason: HOSPADM

## 2019-01-01 RX ORDER — GLIPIZIDE 10 MG/1
10 TABLET, FILM COATED, EXTENDED RELEASE ORAL 2 TIMES DAILY WITH MEALS
Status: DISCONTINUED | OUTPATIENT
Start: 2019-01-01 | End: 2019-01-01

## 2019-01-01 RX ORDER — DULOXETIN HYDROCHLORIDE 60 MG/1
60 CAPSULE, DELAYED RELEASE ORAL DAILY
Qty: 10 CAP | Refills: 0 | Status: SHIPPED | OUTPATIENT
Start: 2019-01-01 | End: 2019-01-01

## 2019-01-01 RX ORDER — DEXTROSE 50 % IN WATER (D50W) INTRAVENOUS SYRINGE
25 AS NEEDED
Status: DISCONTINUED | OUTPATIENT
Start: 2019-01-01 | End: 2019-01-01 | Stop reason: HOSPADM

## 2019-01-01 RX ORDER — PREDNISONE 10 MG/1
20 TABLET ORAL 2 TIMES DAILY
Status: DISCONTINUED | OUTPATIENT
Start: 2019-01-01 | End: 2019-01-01

## 2019-01-01 RX ORDER — DOCUSATE SODIUM 100 MG/1
100 CAPSULE, LIQUID FILLED ORAL
Qty: 1 CAP | Refills: 0 | Status: SHIPPED
Start: 2019-01-01

## 2019-01-01 RX ORDER — CLINDAMYCIN PHOSPHATE 600 MG/50ML
600 INJECTION INTRAVENOUS EVERY 8 HOURS
Status: DISCONTINUED | OUTPATIENT
Start: 2019-01-01 | End: 2019-01-01

## 2019-01-01 RX ORDER — SODIUM CHLORIDE 9 MG/ML
100 INJECTION, SOLUTION INTRAVENOUS CONTINUOUS
Status: DISCONTINUED | OUTPATIENT
Start: 2019-01-01 | End: 2019-01-01

## 2019-01-01 RX ORDER — ENOXAPARIN SODIUM 100 MG/ML
40 INJECTION SUBCUTANEOUS EVERY 24 HOURS
Status: DISCONTINUED | OUTPATIENT
Start: 2019-01-01 | End: 2019-01-01 | Stop reason: HOSPADM

## 2019-01-01 RX ORDER — POLYETHYLENE GLYCOL 3350 17 G/17G
17 POWDER, FOR SOLUTION ORAL
Status: DISCONTINUED | OUTPATIENT
Start: 2019-01-01 | End: 2019-01-01

## 2019-01-01 RX ORDER — PREDNISONE 5 MG/1
5 TABLET ORAL
Status: DISCONTINUED | OUTPATIENT
Start: 2019-01-01 | End: 2019-01-01 | Stop reason: HOSPADM

## 2019-01-01 RX ORDER — BACITRACIN 50000 [IU]/1
INJECTION, POWDER, FOR SOLUTION INTRAMUSCULAR AS NEEDED
Status: DISCONTINUED | OUTPATIENT
Start: 2019-01-01 | End: 2019-01-01 | Stop reason: HOSPADM

## 2019-01-01 RX ORDER — OXYBUTYNIN CHLORIDE 5 MG/1
5 TABLET, EXTENDED RELEASE ORAL DAILY
Status: DISCONTINUED | OUTPATIENT
Start: 2019-01-01 | End: 2019-01-01 | Stop reason: HOSPADM

## 2019-01-01 RX ORDER — POTASSIUM CHLORIDE 20 MEQ/1
40 TABLET, EXTENDED RELEASE ORAL 3 TIMES DAILY
Status: COMPLETED | OUTPATIENT
Start: 2019-01-01 | End: 2019-01-01

## 2019-01-01 RX ORDER — LIDOCAINE HYDROCHLORIDE 10 MG/ML
0.1 INJECTION INFILTRATION; PERINEURAL AS NEEDED
Status: DISCONTINUED | OUTPATIENT
Start: 2019-01-01 | End: 2019-01-01 | Stop reason: HOSPADM

## 2019-01-01 RX ORDER — CIPROFLOXACIN 250 MG/1
250 TABLET, FILM COATED ORAL 2 TIMES DAILY
COMMUNITY
Start: 2019-01-01 | End: 2019-01-01

## 2019-01-01 RX ORDER — TORSEMIDE 20 MG/1
10 TABLET ORAL DAILY
Status: DISCONTINUED | OUTPATIENT
Start: 2019-01-01 | End: 2019-01-01 | Stop reason: HOSPADM

## 2019-01-01 RX ORDER — AMLODIPINE BESYLATE 5 MG/1
5 TABLET ORAL DAILY
Status: DISCONTINUED | OUTPATIENT
Start: 2019-01-01 | End: 2019-01-01 | Stop reason: HOSPADM

## 2019-01-01 RX ORDER — CARVEDILOL 3.12 MG/1
6.25 TABLET ORAL 2 TIMES DAILY WITH MEALS
Qty: 60 TAB | Refills: 11 | Status: SHIPPED | OUTPATIENT
Start: 2019-01-01 | End: 2019-01-01

## 2019-01-01 RX ORDER — MINOCYCLINE HYDROCHLORIDE 50 MG/1
100 CAPSULE ORAL EVERY 12 HOURS
Status: DISCONTINUED | OUTPATIENT
Start: 2019-01-01 | End: 2019-01-01 | Stop reason: HOSPADM

## 2019-01-01 RX ORDER — INSULIN GLARGINE 100 [IU]/ML
5 INJECTION, SOLUTION SUBCUTANEOUS EVERY 12 HOURS
Status: DISCONTINUED | OUTPATIENT
Start: 2019-01-01 | End: 2019-01-01

## 2019-01-01 RX ORDER — DULOXETIN HYDROCHLORIDE 60 MG/1
60 CAPSULE, DELAYED RELEASE ORAL DAILY
Status: DISCONTINUED | OUTPATIENT
Start: 2019-01-01 | End: 2019-01-01

## 2019-01-01 RX ORDER — PROTAMINE SULFATE 10 MG/ML
INJECTION, SOLUTION INTRAVENOUS AS NEEDED
Status: DISCONTINUED | OUTPATIENT
Start: 2019-01-01 | End: 2019-01-01 | Stop reason: HOSPADM

## 2019-01-01 RX ORDER — MIDAZOLAM HYDROCHLORIDE 1 MG/ML
2 INJECTION, SOLUTION INTRAMUSCULAR; INTRAVENOUS ONCE
Status: ACTIVE | OUTPATIENT
Start: 2019-01-01 | End: 2019-01-01

## 2019-01-01 RX ORDER — POTASSIUM CHLORIDE 1.5 G/1.77G
20 POWDER, FOR SOLUTION ORAL 2 TIMES DAILY WITH MEALS
Status: DISCONTINUED | OUTPATIENT
Start: 2019-01-01 | End: 2019-01-01 | Stop reason: HOSPADM

## 2019-01-01 RX ORDER — DULOXETIN HYDROCHLORIDE 60 MG/1
60 CAPSULE, DELAYED RELEASE ORAL DAILY
Status: DISCONTINUED | OUTPATIENT
Start: 2019-01-01 | End: 2019-01-01 | Stop reason: HOSPADM

## 2019-01-01 RX ORDER — METHADONE HYDROCHLORIDE 10 MG/1
10 TABLET ORAL EVERY 6 HOURS
Status: DISCONTINUED | OUTPATIENT
Start: 2019-01-01 | End: 2019-01-01 | Stop reason: HOSPADM

## 2019-01-01 RX ORDER — AMOXICILLIN 250 MG
1 CAPSULE ORAL 2 TIMES DAILY
Status: DISCONTINUED | OUTPATIENT
Start: 2019-01-01 | End: 2019-01-01 | Stop reason: HOSPADM

## 2019-01-01 RX ORDER — SODIUM CHLORIDE, SODIUM LACTATE, POTASSIUM CHLORIDE, CALCIUM CHLORIDE 600; 310; 30; 20 MG/100ML; MG/100ML; MG/100ML; MG/100ML
INJECTION, SOLUTION INTRAVENOUS
Status: DISCONTINUED | OUTPATIENT
Start: 2019-01-01 | End: 2019-01-01 | Stop reason: HOSPADM

## 2019-01-01 RX ORDER — PROPOFOL 10 MG/ML
INJECTION, EMULSION INTRAVENOUS AS NEEDED
Status: DISCONTINUED | OUTPATIENT
Start: 2019-01-01 | End: 2019-01-01 | Stop reason: HOSPADM

## 2019-01-01 RX ORDER — CLONAZEPAM 0.5 MG/1
0.5 TABLET ORAL 3 TIMES DAILY
Status: DISCONTINUED | OUTPATIENT
Start: 2019-01-01 | End: 2019-01-01 | Stop reason: HOSPADM

## 2019-01-01 RX ORDER — FENTANYL CITRATE 50 UG/ML
100 INJECTION, SOLUTION INTRAMUSCULAR; INTRAVENOUS ONCE
Status: ACTIVE | OUTPATIENT
Start: 2019-01-01 | End: 2019-01-01

## 2019-01-01 RX ORDER — SODIUM CHLORIDE 0.9 % (FLUSH) 0.9 %
5-40 SYRINGE (ML) INJECTION EVERY 8 HOURS
Status: DISCONTINUED | OUTPATIENT
Start: 2019-01-01 | End: 2019-01-01 | Stop reason: SDUPTHER

## 2019-01-01 RX ORDER — METHADONE HYDROCHLORIDE 5 MG/1
5 TABLET ORAL EVERY 6 HOURS
Qty: 12 TAB | Refills: 0 | Status: SHIPPED | OUTPATIENT
Start: 2019-01-01 | End: 2019-10-08

## 2019-01-01 RX ORDER — NALOXONE HYDROCHLORIDE 0.4 MG/ML
0.4 INJECTION, SOLUTION INTRAMUSCULAR; INTRAVENOUS; SUBCUTANEOUS AS NEEDED
Status: DISCONTINUED | OUTPATIENT
Start: 2019-01-01 | End: 2019-01-01 | Stop reason: HOSPADM

## 2019-01-01 RX ORDER — LEVOTHYROXINE SODIUM 75 UG/1
75 TABLET ORAL
Status: DISCONTINUED | OUTPATIENT
Start: 2019-01-01 | End: 2019-01-01 | Stop reason: HOSPADM

## 2019-01-01 RX ORDER — METRONIDAZOLE 500 MG/1
500 TABLET ORAL EVERY 12 HOURS
Status: DISCONTINUED | OUTPATIENT
Start: 2019-01-01 | End: 2019-01-01 | Stop reason: HOSPADM

## 2019-01-01 RX ORDER — POLYETHYLENE GLYCOL 3350 17 G/17G
17 POWDER, FOR SOLUTION ORAL DAILY
Status: DISCONTINUED | OUTPATIENT
Start: 2019-01-01 | End: 2019-01-01 | Stop reason: HOSPADM

## 2019-01-01 RX ORDER — SODIUM CHLORIDE 0.9 % (FLUSH) 0.9 %
5-40 SYRINGE (ML) INJECTION EVERY 8 HOURS
Status: DISCONTINUED | OUTPATIENT
Start: 2019-01-01 | End: 2019-01-01 | Stop reason: HOSPADM

## 2019-01-01 RX ORDER — ACETAMINOPHEN 500 MG
500 TABLET ORAL
Status: DISCONTINUED | OUTPATIENT
Start: 2019-01-01 | End: 2019-01-01 | Stop reason: HOSPADM

## 2019-01-01 RX ORDER — OXYCODONE AND ACETAMINOPHEN 5; 325 MG/1; MG/1
1 TABLET ORAL
Status: DISCONTINUED | OUTPATIENT
Start: 2019-01-01 | End: 2019-01-01 | Stop reason: HOSPADM

## 2019-01-01 RX ORDER — INSULIN GLARGINE 100 [IU]/ML
25 INJECTION, SOLUTION SUBCUTANEOUS
Status: DISCONTINUED | OUTPATIENT
Start: 2019-01-01 | End: 2019-01-01 | Stop reason: HOSPADM

## 2019-01-01 RX ORDER — FAMOTIDINE 20 MG/1
20 TABLET, FILM COATED ORAL DAILY
Status: DISCONTINUED | OUTPATIENT
Start: 2019-01-01 | End: 2019-01-01 | Stop reason: HOSPADM

## 2019-01-01 RX ORDER — ONDANSETRON 2 MG/ML
4 INJECTION INTRAMUSCULAR; INTRAVENOUS
Status: DISCONTINUED | OUTPATIENT
Start: 2019-01-01 | End: 2019-01-01 | Stop reason: HOSPADM

## 2019-01-01 RX ORDER — SODIUM CHLORIDE, SODIUM LACTATE, POTASSIUM CHLORIDE, CALCIUM CHLORIDE 600; 310; 30; 20 MG/100ML; MG/100ML; MG/100ML; MG/100ML
500 INJECTION, SOLUTION INTRAVENOUS ONCE
Status: COMPLETED | OUTPATIENT
Start: 2019-01-01 | End: 2019-01-01

## 2019-01-01 RX ORDER — LANOLIN ALCOHOL/MO/W.PET/CERES
200 CREAM (GRAM) TOPICAL DAILY
Status: ON HOLD | COMMUNITY
End: 2019-01-01 | Stop reason: ALTCHOICE

## 2019-01-01 RX ORDER — HEPARIN SODIUM 1000 [USP'U]/ML
INJECTION, SOLUTION INTRAVENOUS; SUBCUTANEOUS AS NEEDED
Status: DISCONTINUED | OUTPATIENT
Start: 2019-01-01 | End: 2019-01-01 | Stop reason: HOSPADM

## 2019-01-01 RX ORDER — METRONIDAZOLE 500 MG/1
500 TABLET ORAL EVERY 12 HOURS
Qty: 24 TAB | Refills: 0 | Status: SHIPPED | OUTPATIENT
Start: 2019-01-01 | End: 2019-01-01

## 2019-01-01 RX ORDER — CARVEDILOL 6.25 MG/1
6.25 TABLET ORAL 2 TIMES DAILY WITH MEALS
Status: DISCONTINUED | OUTPATIENT
Start: 2019-01-01 | End: 2019-01-01 | Stop reason: HOSPADM

## 2019-01-01 RX ORDER — LOPERAMIDE HYDROCHLORIDE 2 MG/1
4 CAPSULE ORAL
Status: DISCONTINUED | OUTPATIENT
Start: 2019-01-01 | End: 2019-01-01 | Stop reason: HOSPADM

## 2019-01-01 RX ORDER — PROPOFOL 10 MG/ML
INJECTION, EMULSION INTRAVENOUS
Status: DISCONTINUED | OUTPATIENT
Start: 2019-01-01 | End: 2019-01-01 | Stop reason: HOSPADM

## 2019-01-01 RX ORDER — INSULIN LISPRO 100 [IU]/ML
INJECTION, SOLUTION INTRAVENOUS; SUBCUTANEOUS
Status: CANCELLED | OUTPATIENT
Start: 2019-01-01

## 2019-01-01 RX ORDER — PREDNISONE 10 MG/1
10 TABLET ORAL 2 TIMES DAILY
Status: DISCONTINUED | OUTPATIENT
Start: 2019-01-01 | End: 2019-01-01 | Stop reason: HOSPADM

## 2019-01-01 RX ORDER — CARVEDILOL 3.12 MG/1
6.25 TABLET ORAL 2 TIMES DAILY WITH MEALS
Status: DISCONTINUED | OUTPATIENT
Start: 2019-01-01 | End: 2019-01-01

## 2019-01-01 RX ORDER — LISINOPRIL 5 MG/1
10 TABLET ORAL DAILY
Status: DISCONTINUED | OUTPATIENT
Start: 2019-01-01 | End: 2019-01-01

## 2019-01-01 RX ORDER — INSULIN GLARGINE 100 [IU]/ML
8 INJECTION, SOLUTION SUBCUTANEOUS EVERY 12 HOURS
Status: DISCONTINUED | OUTPATIENT
Start: 2019-01-01 | End: 2019-01-01

## 2019-01-01 RX ORDER — MIRTAZAPINE 15 MG/1
7.5 TABLET, FILM COATED ORAL
Status: DISCONTINUED | OUTPATIENT
Start: 2019-01-01 | End: 2019-01-01 | Stop reason: HOSPADM

## 2019-01-01 RX ORDER — CARVEDILOL 12.5 MG/1
12.5 TABLET ORAL 2 TIMES DAILY WITH MEALS
Status: DISCONTINUED | OUTPATIENT
Start: 2019-01-01 | End: 2019-01-01 | Stop reason: HOSPADM

## 2019-01-01 RX ORDER — SAME BUTANEDISULFONATE/BETAINE 400-600 MG
500 POWDER IN PACKET (EA) ORAL 2 TIMES DAILY
Status: DISCONTINUED | OUTPATIENT
Start: 2019-01-01 | End: 2019-01-01 | Stop reason: HOSPADM

## 2019-01-01 RX ORDER — HYDROMORPHONE HYDROCHLORIDE 1 MG/ML
1 INJECTION, SOLUTION INTRAMUSCULAR; INTRAVENOUS; SUBCUTANEOUS
Status: DISCONTINUED | OUTPATIENT
Start: 2019-01-01 | End: 2019-01-01 | Stop reason: SDUPTHER

## 2019-01-01 RX ORDER — INSULIN LISPRO 100 [IU]/ML
5 INJECTION, SOLUTION INTRAVENOUS; SUBCUTANEOUS
Qty: 1 ADJUSTABLE DOSE PRE-FILLED PEN SYRINGE | Refills: 0 | Status: SHIPPED | OUTPATIENT
Start: 2019-01-01 | End: 2019-01-01

## 2019-01-01 RX ORDER — ACETAMINOPHEN 325 MG/1
650 TABLET ORAL
Status: DISCONTINUED | OUTPATIENT
Start: 2019-01-01 | End: 2019-01-01 | Stop reason: HOSPADM

## 2019-01-01 RX ORDER — INSULIN LISPRO 100 [IU]/ML
INJECTION, SOLUTION INTRAVENOUS; SUBCUTANEOUS
Status: DISCONTINUED | OUTPATIENT
Start: 2019-01-01 | End: 2019-01-01

## 2019-01-01 RX ORDER — LOPERAMIDE HYDROCHLORIDE 2 MG/1
4 CAPSULE ORAL 2 TIMES DAILY
Status: DISCONTINUED | OUTPATIENT
Start: 2019-01-01 | End: 2019-01-01

## 2019-01-01 RX ORDER — CLONAZEPAM 1 MG/1
0.25 TABLET ORAL 3 TIMES DAILY
Status: DISCONTINUED | OUTPATIENT
Start: 2019-01-01 | End: 2019-01-01 | Stop reason: HOSPADM

## 2019-01-01 RX ORDER — HYDROMORPHONE HYDROCHLORIDE 1 MG/ML
0.5 INJECTION, SOLUTION INTRAMUSCULAR; INTRAVENOUS; SUBCUTANEOUS
Status: DISCONTINUED | OUTPATIENT
Start: 2019-01-01 | End: 2019-01-01

## 2019-01-01 RX ORDER — AMLODIPINE BESYLATE 5 MG/1
5 TABLET ORAL DAILY
Status: DISCONTINUED | OUTPATIENT
Start: 2019-01-01 | End: 2019-01-01

## 2019-01-01 RX ORDER — CEPHALEXIN 500 MG/1
500 CAPSULE ORAL EVERY 8 HOURS
Status: DISCONTINUED | OUTPATIENT
Start: 2019-01-01 | End: 2019-01-01 | Stop reason: HOSPADM

## 2019-01-01 RX ORDER — NITROGLYCERIN 0.4 MG/1
0.4 TABLET SUBLINGUAL AS NEEDED
Status: DISCONTINUED | OUTPATIENT
Start: 2019-01-01 | End: 2019-01-01

## 2019-01-01 RX ORDER — LIDOCAINE HYDROCHLORIDE 10 MG/ML
INJECTION INFILTRATION; PERINEURAL AS NEEDED
Status: DISCONTINUED | OUTPATIENT
Start: 2019-01-01 | End: 2019-01-01 | Stop reason: HOSPADM

## 2019-01-01 RX ORDER — GLIPIZIDE 10 MG/1
10 TABLET, FILM COATED, EXTENDED RELEASE ORAL DAILY
Status: DISCONTINUED | OUTPATIENT
Start: 2019-01-01 | End: 2019-01-01

## 2019-01-01 RX ORDER — NYSTATIN 100000 [USP'U]/ML
500000 SUSPENSION ORAL 4 TIMES DAILY
Status: DISCONTINUED | OUTPATIENT
Start: 2019-01-01 | End: 2019-01-01 | Stop reason: HOSPADM

## 2019-01-01 RX ORDER — CEFPODOXIME PROXETIL 200 MG/1
100 TABLET, FILM COATED ORAL EVERY 12 HOURS
Status: DISCONTINUED | OUTPATIENT
Start: 2019-01-01 | End: 2019-01-01 | Stop reason: HOSPADM

## 2019-01-01 RX ORDER — VANCOMYCIN/0.9 % SOD CHLORIDE 1.5G/250ML
1500 PLASTIC BAG, INJECTION (ML) INTRAVENOUS EVERY 24 HOURS
Status: COMPLETED | OUTPATIENT
Start: 2019-01-01 | End: 2019-01-01

## 2019-01-01 RX ORDER — MORPHINE SULFATE 2 MG/ML
1 INJECTION, SOLUTION INTRAMUSCULAR; INTRAVENOUS ONCE
Status: COMPLETED | OUTPATIENT
Start: 2019-01-01 | End: 2019-01-01

## 2019-01-01 RX ORDER — CEFPODOXIME PROXETIL 100 MG/1
100 TABLET, FILM COATED ORAL EVERY 12 HOURS
Qty: 4 TAB | Refills: 0 | Status: SHIPPED | OUTPATIENT
Start: 2019-01-01 | End: 2019-01-01

## 2019-01-01 RX ORDER — ACETAMINOPHEN 10 MG/ML
1000 INJECTION, SOLUTION INTRAVENOUS
Status: COMPLETED | OUTPATIENT
Start: 2019-01-01 | End: 2019-01-01

## 2019-01-01 RX ORDER — NYSTATIN 100000 [USP'U]/G
POWDER TOPICAL 2 TIMES DAILY
Status: DISCONTINUED | OUTPATIENT
Start: 2019-01-01 | End: 2019-01-01 | Stop reason: HOSPADM

## 2019-01-01 RX ORDER — INSULIN LISPRO 100 [IU]/ML
INJECTION, SOLUTION INTRAVENOUS; SUBCUTANEOUS
Status: ON HOLD | COMMUNITY
End: 2019-01-01

## 2019-01-01 RX ORDER — SODIUM CHLORIDE 9 MG/ML
125 INJECTION, SOLUTION INTRAVENOUS CONTINUOUS
Status: DISCONTINUED | OUTPATIENT
Start: 2019-01-01 | End: 2019-01-01

## 2019-01-01 RX ORDER — AMLODIPINE BESYLATE 10 MG/1
10 TABLET ORAL DAILY
Status: ON HOLD | COMMUNITY
End: 2019-01-01 | Stop reason: SDUPTHER

## 2019-01-01 RX ORDER — FERROUS GLUCONATE 324(38)MG
1 TABLET ORAL
Status: DISCONTINUED | OUTPATIENT
Start: 2019-01-01 | End: 2019-01-01 | Stop reason: HOSPADM

## 2019-01-01 RX ORDER — DIPHENHYDRAMINE HCL 25 MG
25 CAPSULE ORAL
Status: DISCONTINUED | OUTPATIENT
Start: 2019-01-01 | End: 2019-01-01

## 2019-01-01 RX ORDER — CLONAZEPAM 1 MG/1
0.5 TABLET ORAL 3 TIMES DAILY
Status: DISCONTINUED | OUTPATIENT
Start: 2019-01-01 | End: 2019-01-01 | Stop reason: HOSPADM

## 2019-01-01 RX ORDER — POTASSIUM CHLORIDE 750 MG/1
10 TABLET, EXTENDED RELEASE ORAL 2 TIMES DAILY
Status: DISCONTINUED | OUTPATIENT
Start: 2019-01-01 | End: 2019-01-01

## 2019-01-01 RX ORDER — MELATONIN
1000 DAILY
Status: DISCONTINUED | OUTPATIENT
Start: 2019-01-01 | End: 2019-01-01 | Stop reason: HOSPADM

## 2019-01-01 RX ORDER — LINEZOLID 2 MG/ML
600 INJECTION, SOLUTION INTRAVENOUS EVERY 12 HOURS
Status: DISCONTINUED | OUTPATIENT
Start: 2019-01-01 | End: 2019-01-01

## 2019-01-01 RX ORDER — POTASSIUM CHLORIDE 20 MEQ/1
60 TABLET, EXTENDED RELEASE ORAL
Status: COMPLETED | OUTPATIENT
Start: 2019-01-01 | End: 2019-01-01

## 2019-01-01 RX ORDER — INSULIN GLARGINE 100 [IU]/ML
10 INJECTION, SOLUTION SUBCUTANEOUS DAILY
Status: DISCONTINUED | OUTPATIENT
Start: 2019-01-01 | End: 2019-01-01 | Stop reason: HOSPADM

## 2019-01-01 RX ORDER — CEFAZOLIN SODIUM/WATER 2 G/20 ML
2 SYRINGE (ML) INTRAVENOUS
Status: COMPLETED | OUTPATIENT
Start: 2019-01-01 | End: 2019-01-01

## 2019-01-01 RX ORDER — SODIUM CHLORIDE, SODIUM LACTATE, POTASSIUM CHLORIDE, CALCIUM CHLORIDE 600; 310; 30; 20 MG/100ML; MG/100ML; MG/100ML; MG/100ML
100 INJECTION, SOLUTION INTRAVENOUS CONTINUOUS
Status: DISPENSED | OUTPATIENT
Start: 2019-01-01 | End: 2019-01-01

## 2019-01-01 RX ORDER — DEXTROSE 40 %
15 GEL (GRAM) ORAL AS NEEDED
Status: DISCONTINUED | OUTPATIENT
Start: 2019-01-01 | End: 2019-01-01 | Stop reason: HOSPADM

## 2019-01-01 RX ORDER — OXYBUTYNIN CHLORIDE 5 MG/1
5 TABLET, EXTENDED RELEASE ORAL DAILY
COMMUNITY

## 2019-01-01 RX ORDER — SILVER NITRATE 38.21; 12.74 MG/1; MG/1
3 STICK TOPICAL ONCE
Status: DISCONTINUED | OUTPATIENT
Start: 2019-01-01 | End: 2019-01-01

## 2019-01-01 RX ORDER — DULOXETIN HYDROCHLORIDE 60 MG/1
60 CAPSULE, DELAYED RELEASE ORAL DAILY
Qty: 30 CAP | Refills: 0 | Status: SHIPPED | OUTPATIENT
Start: 2019-10-06

## 2019-01-01 RX ORDER — VANCOMYCIN 2 GRAM/500 ML IN 0.9 % SODIUM CHLORIDE INTRAVENOUS
2000 ONCE
Status: COMPLETED | OUTPATIENT
Start: 2019-01-01 | End: 2019-01-01

## 2019-01-01 RX ORDER — INSULIN LISPRO 100 [IU]/ML
INJECTION, SOLUTION INTRAVENOUS; SUBCUTANEOUS
Qty: 1 VIAL | Refills: 0 | Status: SHIPPED
Start: 2019-01-01

## 2019-01-01 RX ORDER — MAGNESIUM CITRATE
296 SOLUTION, ORAL ORAL
Status: DISCONTINUED | OUTPATIENT
Start: 2019-01-01 | End: 2019-01-01 | Stop reason: HOSPADM

## 2019-01-01 RX ORDER — AMOXICILLIN 250 MG
1 CAPSULE ORAL DAILY
Status: DISCONTINUED | OUTPATIENT
Start: 2019-01-01 | End: 2019-01-01 | Stop reason: HOSPADM

## 2019-01-01 RX ORDER — HYDROXYZINE PAMOATE 25 MG/1
25 CAPSULE ORAL
Status: DISCONTINUED | OUTPATIENT
Start: 2019-01-01 | End: 2019-01-01 | Stop reason: HOSPADM

## 2019-01-01 RX ORDER — HEPARIN 100 UNIT/ML
600 SYRINGE INTRAVENOUS EVERY 8 HOURS
Status: DISCONTINUED | OUTPATIENT
Start: 2019-01-01 | End: 2019-01-01 | Stop reason: HOSPADM

## 2019-01-01 RX ORDER — AMLODIPINE BESYLATE 2.5 MG/1
2.5 TABLET ORAL DAILY
Status: ON HOLD | COMMUNITY
End: 2019-01-01 | Stop reason: SDUPTHER

## 2019-01-01 RX ORDER — DOXYCYCLINE 100 MG/1
100 CAPSULE ORAL EVERY 12 HOURS
Qty: 24 CAP | Refills: 0 | Status: SHIPPED | OUTPATIENT
Start: 2019-01-01 | End: 2019-01-01

## 2019-01-01 RX ORDER — METHADONE HYDROCHLORIDE 5 MG/1
5 TABLET ORAL EVERY 6 HOURS
Status: DISCONTINUED | OUTPATIENT
Start: 2019-01-01 | End: 2019-01-01 | Stop reason: HOSPADM

## 2019-01-01 RX ORDER — LIDOCAINE 4 G/100G
1 PATCH TOPICAL EVERY 24 HOURS
Status: DISCONTINUED | OUTPATIENT
Start: 2019-01-01 | End: 2019-01-01 | Stop reason: HOSPADM

## 2019-01-01 RX ORDER — AMLODIPINE BESYLATE 5 MG/1
2.5 TABLET ORAL DAILY
Status: DISCONTINUED | OUTPATIENT
Start: 2019-01-01 | End: 2019-01-01

## 2019-01-01 RX ORDER — INSULIN GLARGINE 100 [IU]/ML
10 INJECTION, SOLUTION SUBCUTANEOUS
Status: DISCONTINUED | OUTPATIENT
Start: 2019-01-01 | End: 2019-01-01 | Stop reason: HOSPADM

## 2019-01-01 RX ORDER — LOPERAMIDE HYDROCHLORIDE 2 MG/1
4 CAPSULE ORAL AS NEEDED
Status: DISCONTINUED | OUTPATIENT
Start: 2019-01-01 | End: 2019-01-01 | Stop reason: HOSPADM

## 2019-01-01 RX ORDER — INSULIN LISPRO 100 [IU]/ML
5 INJECTION, SOLUTION INTRAVENOUS; SUBCUTANEOUS
Status: DISCONTINUED | OUTPATIENT
Start: 2019-01-01 | End: 2019-01-01 | Stop reason: HOSPADM

## 2019-01-01 RX ORDER — HEPARIN SODIUM 5000 [USP'U]/ML
INJECTION, SOLUTION INTRAVENOUS; SUBCUTANEOUS AS NEEDED
Status: DISCONTINUED | OUTPATIENT
Start: 2019-01-01 | End: 2019-01-01 | Stop reason: HOSPADM

## 2019-01-01 RX ORDER — MIDAZOLAM HYDROCHLORIDE 1 MG/ML
2 INJECTION, SOLUTION INTRAMUSCULAR; INTRAVENOUS
Status: ACTIVE | OUTPATIENT
Start: 2019-01-01 | End: 2019-01-01

## 2019-01-01 RX ORDER — FERROUS GLUCONATE 324(38)MG
1 TABLET ORAL
Status: DISCONTINUED | OUTPATIENT
Start: 2019-01-01 | End: 2019-01-01

## 2019-01-01 RX ORDER — HEPARIN SODIUM 5000 [USP'U]/ML
5000 INJECTION, SOLUTION INTRAVENOUS; SUBCUTANEOUS EVERY 8 HOURS
Status: DISCONTINUED | OUTPATIENT
Start: 2019-01-01 | End: 2019-01-01 | Stop reason: HOSPADM

## 2019-01-01 RX ORDER — SODIUM CHLORIDE 0.9 % (FLUSH) 0.9 %
20 SYRINGE (ML) INJECTION EVERY 8 HOURS
Status: DISCONTINUED | OUTPATIENT
Start: 2019-01-01 | End: 2019-01-01 | Stop reason: HOSPADM

## 2019-01-01 RX ORDER — MIRTAZAPINE 7.5 MG/1
7.5 TABLET, FILM COATED ORAL
COMMUNITY

## 2019-01-01 RX ORDER — SODIUM CHLORIDE 9 MG/ML
125 INJECTION, SOLUTION INTRAVENOUS CONTINUOUS
Status: DISPENSED | OUTPATIENT
Start: 2019-01-01 | End: 2019-01-01

## 2019-01-01 RX ORDER — ONDANSETRON 2 MG/ML
INJECTION INTRAMUSCULAR; INTRAVENOUS AS NEEDED
Status: DISCONTINUED | OUTPATIENT
Start: 2019-01-01 | End: 2019-01-01 | Stop reason: HOSPADM

## 2019-01-01 RX ORDER — DIPHENHYDRAMINE HYDROCHLORIDE 50 MG/ML
50 INJECTION, SOLUTION INTRAMUSCULAR; INTRAVENOUS
Status: COMPLETED | OUTPATIENT
Start: 2019-01-01 | End: 2019-01-01

## 2019-01-01 RX ORDER — DIPHENHYDRAMINE HYDROCHLORIDE 50 MG/ML
12.5 INJECTION, SOLUTION INTRAMUSCULAR; INTRAVENOUS
Status: DISCONTINUED | OUTPATIENT
Start: 2019-01-01 | End: 2019-01-01 | Stop reason: HOSPADM

## 2019-01-01 RX ORDER — HYDROCODONE BITARTRATE AND ACETAMINOPHEN 7.5; 325 MG/1; MG/1
1 TABLET ORAL
Status: DISCONTINUED | OUTPATIENT
Start: 2019-01-01 | End: 2019-01-01 | Stop reason: HOSPADM

## 2019-01-01 RX ORDER — SODIUM CHLORIDE 9 MG/ML
75 INJECTION, SOLUTION INTRAVENOUS CONTINUOUS
Status: DISCONTINUED | OUTPATIENT
Start: 2019-01-01 | End: 2019-01-01

## 2019-01-01 RX ORDER — LISINOPRIL 20 MG/1
10 TABLET ORAL DAILY
Qty: 30 TAB | Refills: 0 | Status: SHIPPED | OUTPATIENT
Start: 2019-01-01 | End: 2019-01-01

## 2019-01-01 RX ORDER — HEPARIN SODIUM 5000 [USP'U]/ML
5000 INJECTION, SOLUTION INTRAVENOUS; SUBCUTANEOUS EVERY 8 HOURS
Status: DISCONTINUED | OUTPATIENT
Start: 2019-01-01 | End: 2019-01-01

## 2019-01-01 RX ORDER — METHADONE HYDROCHLORIDE 5 MG/1
5 TABLET ORAL 4 TIMES DAILY
Status: DISCONTINUED | OUTPATIENT
Start: 2019-01-01 | End: 2019-01-01 | Stop reason: HOSPADM

## 2019-01-01 RX ORDER — INSULIN GLARGINE 100 [IU]/ML
10 INJECTION, SOLUTION SUBCUTANEOUS
Status: DISCONTINUED | OUTPATIENT
Start: 2019-01-01 | End: 2019-01-01

## 2019-01-01 RX ORDER — SODIUM CHLORIDE, SODIUM LACTATE, POTASSIUM CHLORIDE, CALCIUM CHLORIDE 600; 310; 30; 20 MG/100ML; MG/100ML; MG/100ML; MG/100ML
75 INJECTION, SOLUTION INTRAVENOUS CONTINUOUS
Status: DISPENSED | OUTPATIENT
Start: 2019-01-01 | End: 2019-01-01

## 2019-01-01 RX ORDER — SENNOSIDES 8.6 MG/1
1 TABLET ORAL
Status: DISCONTINUED | OUTPATIENT
Start: 2019-01-01 | End: 2019-01-01 | Stop reason: HOSPADM

## 2019-01-01 RX ORDER — HYDRALAZINE HYDROCHLORIDE 20 MG/ML
10 INJECTION INTRAMUSCULAR; INTRAVENOUS EVERY 6 HOURS
Status: DISCONTINUED | OUTPATIENT
Start: 2019-01-01 | End: 2019-01-01

## 2019-01-01 RX ORDER — INSULIN GLARGINE 100 [IU]/ML
5 INJECTION, SOLUTION SUBCUTANEOUS
Status: DISCONTINUED | OUTPATIENT
Start: 2019-01-01 | End: 2019-01-01 | Stop reason: HOSPADM

## 2019-01-01 RX ORDER — AMLODIPINE BESYLATE 10 MG/1
10 TABLET ORAL DAILY
Status: DISCONTINUED | OUTPATIENT
Start: 2019-01-01 | End: 2019-01-01 | Stop reason: HOSPADM

## 2019-01-01 RX ORDER — SODIUM CHLORIDE 9 MG/ML
75 INJECTION, SOLUTION INTRAVENOUS CONTINUOUS
Status: DISCONTINUED | OUTPATIENT
Start: 2019-01-01 | End: 2019-01-01 | Stop reason: HOSPADM

## 2019-01-01 RX ORDER — CEPHALEXIN 500 MG/1
500 CAPSULE ORAL EVERY 8 HOURS
Qty: 6 CAP | Refills: 0 | Status: SHIPPED | OUTPATIENT
Start: 2019-01-01 | End: 2019-01-01

## 2019-01-01 RX ORDER — ACETAMINOPHEN 500 MG
500 TABLET ORAL
Status: DISCONTINUED | OUTPATIENT
Start: 2019-01-01 | End: 2019-01-01 | Stop reason: SDUPTHER

## 2019-01-01 RX ORDER — INSULIN LISPRO 100 [IU]/ML
3 INJECTION, SOLUTION INTRAVENOUS; SUBCUTANEOUS
Status: DISCONTINUED | OUTPATIENT
Start: 2019-01-01 | End: 2019-01-01 | Stop reason: HOSPADM

## 2019-01-01 RX ORDER — TAMSULOSIN HYDROCHLORIDE 0.4 MG/1
0.4 CAPSULE ORAL
Status: DISCONTINUED | OUTPATIENT
Start: 2019-01-01 | End: 2019-01-01 | Stop reason: HOSPADM

## 2019-01-01 RX ORDER — AMLODIPINE BESYLATE 2.5 MG/1
5 TABLET ORAL DAILY
Qty: 30 TAB | Refills: 0 | Status: ON HOLD
Start: 2019-01-01 | End: 2019-01-01

## 2019-01-01 RX ORDER — FAMOTIDINE 20 MG/1
20 TABLET, FILM COATED ORAL 2 TIMES DAILY
Status: DISCONTINUED | OUTPATIENT
Start: 2019-01-01 | End: 2019-01-01 | Stop reason: HOSPADM

## 2019-01-01 RX ORDER — ATORVASTATIN CALCIUM 40 MG/1
40 TABLET, FILM COATED ORAL
Qty: 30 TAB | Refills: 0 | Status: SHIPPED | OUTPATIENT
Start: 2019-01-01

## 2019-01-01 RX ORDER — FUROSEMIDE 10 MG/ML
40 INJECTION INTRAMUSCULAR; INTRAVENOUS ONCE
Status: COMPLETED | OUTPATIENT
Start: 2019-01-01 | End: 2019-01-01

## 2019-01-01 RX ORDER — ATORVASTATIN CALCIUM 40 MG/1
40 TABLET, FILM COATED ORAL
Status: DISCONTINUED | OUTPATIENT
Start: 2019-01-01 | End: 2019-01-01 | Stop reason: HOSPADM

## 2019-01-01 RX ORDER — OXYCODONE HYDROCHLORIDE 5 MG/1
5 TABLET ORAL
Status: DISPENSED | OUTPATIENT
Start: 2019-01-01 | End: 2019-01-01

## 2019-01-01 RX ORDER — KETOROLAC TROMETHAMINE 15 MG/ML
15 INJECTION, SOLUTION INTRAMUSCULAR; INTRAVENOUS
Status: ACTIVE | OUTPATIENT
Start: 2019-01-01 | End: 2019-01-01

## 2019-01-01 RX ORDER — PANTOPRAZOLE SODIUM 40 MG/1
40 TABLET, DELAYED RELEASE ORAL
Status: DISCONTINUED | OUTPATIENT
Start: 2019-01-01 | End: 2019-01-01 | Stop reason: HOSPADM

## 2019-01-01 RX ORDER — LIDOCAINE HYDROCHLORIDE 20 MG/ML
INJECTION, SOLUTION EPIDURAL; INFILTRATION; INTRACAUDAL; PERINEURAL AS NEEDED
Status: DISCONTINUED | OUTPATIENT
Start: 2019-01-01 | End: 2019-01-01 | Stop reason: HOSPADM

## 2019-01-01 RX ORDER — UREA 10 %
2 LOTION (ML) TOPICAL 2 TIMES DAILY
Status: DISCONTINUED | OUTPATIENT
Start: 2019-01-01 | End: 2019-01-01 | Stop reason: HOSPADM

## 2019-01-01 RX ORDER — SODIUM CHLORIDE 0.9 % (FLUSH) 0.9 %
20 SYRINGE (ML) INJECTION AS NEEDED
Status: DISCONTINUED | OUTPATIENT
Start: 2019-01-01 | End: 2019-01-01 | Stop reason: HOSPADM

## 2019-01-01 RX ORDER — SODIUM CHLORIDE 0.9 % (FLUSH) 0.9 %
5-40 SYRINGE (ML) INJECTION EVERY 8 HOURS
Status: DISCONTINUED | OUTPATIENT
Start: 2019-01-01 | End: 2019-01-01

## 2019-01-01 RX ORDER — HYDROMORPHONE HYDROCHLORIDE 1 MG/ML
0.5 INJECTION, SOLUTION INTRAMUSCULAR; INTRAVENOUS; SUBCUTANEOUS ONCE
Status: COMPLETED | OUTPATIENT
Start: 2019-01-01 | End: 2019-01-01

## 2019-01-01 RX ORDER — LIDOCAINE HYDROCHLORIDE 20 MG/ML
15 SOLUTION OROPHARYNGEAL 4 TIMES DAILY
Status: DISCONTINUED | OUTPATIENT
Start: 2019-01-01 | End: 2019-01-01

## 2019-01-01 RX ORDER — KETOROLAC TROMETHAMINE 15 MG/ML
15 INJECTION, SOLUTION INTRAMUSCULAR; INTRAVENOUS
Status: DISCONTINUED | OUTPATIENT
Start: 2019-01-01 | End: 2019-01-01 | Stop reason: HOSPADM

## 2019-01-01 RX ORDER — MORPHINE SULFATE 10 MG/ML
1 INJECTION, SOLUTION INTRAMUSCULAR; INTRAVENOUS
Status: DISCONTINUED | OUTPATIENT
Start: 2019-01-01 | End: 2019-01-01 | Stop reason: HOSPADM

## 2019-01-01 RX ORDER — SODIUM CHLORIDE 0.9 % (FLUSH) 0.9 %
5-10 SYRINGE (ML) INJECTION AS NEEDED
Status: DISCONTINUED | OUTPATIENT
Start: 2019-01-01 | End: 2019-01-01 | Stop reason: HOSPADM

## 2019-01-01 RX ORDER — NITROGLYCERIN 0.4 MG/1
0.4 TABLET SUBLINGUAL AS NEEDED
Status: DISCONTINUED | OUTPATIENT
Start: 2019-01-01 | End: 2019-01-01 | Stop reason: HOSPADM

## 2019-01-01 RX ORDER — GABAPENTIN 100 MG/1
100 CAPSULE ORAL 2 TIMES DAILY
Status: DISCONTINUED | OUTPATIENT
Start: 2019-01-01 | End: 2019-01-01

## 2019-01-01 RX ORDER — FERROUS SULFATE, DRIED 160(50) MG
1 TABLET, EXTENDED RELEASE ORAL DAILY
Status: DISCONTINUED | OUTPATIENT
Start: 2019-01-01 | End: 2019-01-01 | Stop reason: HOSPADM

## 2019-01-01 RX ORDER — CLONAZEPAM 0.5 MG/1
0.5 TABLET ORAL 3 TIMES DAILY
Qty: 5 TAB | Refills: 0 | Status: ON HOLD | OUTPATIENT
Start: 2019-01-01 | End: 2019-01-01 | Stop reason: SDUPTHER

## 2019-01-01 RX ORDER — CLONAZEPAM 1 MG/1
0.5 TABLET ORAL 3 TIMES DAILY
Status: DISCONTINUED | OUTPATIENT
Start: 2019-01-01 | End: 2019-01-01

## 2019-01-01 RX ORDER — NYSTATIN 100000 [USP'U]/G
POWDER TOPICAL 3 TIMES DAILY
COMMUNITY
End: 2019-01-01

## 2019-01-01 RX ORDER — FUROSEMIDE 10 MG/ML
60 INJECTION INTRAMUSCULAR; INTRAVENOUS ONCE
Status: COMPLETED | OUTPATIENT
Start: 2019-01-01 | End: 2019-01-01

## 2019-01-01 RX ORDER — METHADONE HYDROCHLORIDE 5 MG/1
5 TABLET ORAL EVERY 6 HOURS
Status: DISCONTINUED | OUTPATIENT
Start: 2019-01-01 | End: 2019-01-01

## 2019-01-01 RX ORDER — MINOCYCLINE HYDROCHLORIDE 100 MG/1
100 CAPSULE ORAL EVERY 12 HOURS
Qty: 4 CAP | Refills: 0 | Status: SHIPPED | OUTPATIENT
Start: 2019-01-01 | End: 2019-01-01

## 2019-01-01 RX ORDER — NYSTATIN 100000 [USP'U]/ML
500000 SUSPENSION ORAL 4 TIMES DAILY
Status: DISCONTINUED | OUTPATIENT
Start: 2019-01-01 | End: 2019-01-01

## 2019-01-01 RX ORDER — POTASSIUM CHLORIDE 750 MG/1
10 TABLET, EXTENDED RELEASE ORAL 2 TIMES DAILY
Status: DISCONTINUED | OUTPATIENT
Start: 2019-01-01 | End: 2019-01-01 | Stop reason: HOSPADM

## 2019-01-01 RX ORDER — SODIUM CHLORIDE 0.9 % (FLUSH) 0.9 %
10 SYRINGE (ML) INJECTION
Status: COMPLETED | OUTPATIENT
Start: 2019-01-01 | End: 2019-01-01

## 2019-01-01 RX ORDER — CLONAZEPAM 0.5 MG/1
0.5 TABLET ORAL 3 TIMES DAILY
Qty: 10 TAB | Refills: 0 | Status: SHIPPED | OUTPATIENT
Start: 2019-01-01 | End: 2019-01-01

## 2019-01-01 RX ORDER — PREDNISONE 5 MG/1
5 TABLET ORAL 2 TIMES DAILY
Status: DISCONTINUED | OUTPATIENT
Start: 2019-01-01 | End: 2019-01-01 | Stop reason: HOSPADM

## 2019-01-01 RX ORDER — PREDNISONE 5 MG/1
5 TABLET ORAL 2 TIMES DAILY
Status: DISCONTINUED | OUTPATIENT
Start: 2019-01-01 | End: 2019-01-01

## 2019-01-01 RX ORDER — INSULIN GLARGINE 100 [IU]/ML
5 INJECTION, SOLUTION SUBCUTANEOUS
Status: DISCONTINUED | OUTPATIENT
Start: 2019-01-01 | End: 2019-01-01

## 2019-01-01 RX ORDER — LISINOPRIL 5 MG/1
10 TABLET ORAL DAILY
Status: DISCONTINUED | OUTPATIENT
Start: 2019-01-01 | End: 2019-01-01 | Stop reason: HOSPADM

## 2019-01-01 RX ORDER — TORSEMIDE 10 MG/1
10 TABLET ORAL 2 TIMES DAILY
COMMUNITY

## 2019-01-01 RX ORDER — HYDROMORPHONE HYDROCHLORIDE 2 MG/ML
0.5 INJECTION, SOLUTION INTRAMUSCULAR; INTRAVENOUS; SUBCUTANEOUS
Status: DISCONTINUED | OUTPATIENT
Start: 2019-01-01 | End: 2019-01-01

## 2019-01-01 RX ORDER — COLCHICINE 0.6 MG/1
0.6 TABLET ORAL 2 TIMES DAILY
Status: DISCONTINUED | OUTPATIENT
Start: 2019-01-01 | End: 2019-01-01

## 2019-01-01 RX ORDER — ALBUTEROL SULFATE 0.83 MG/ML
2.5 SOLUTION RESPIRATORY (INHALATION) AS NEEDED
Status: DISCONTINUED | OUTPATIENT
Start: 2019-01-01 | End: 2019-01-01 | Stop reason: HOSPADM

## 2019-01-01 RX ORDER — HYDROCODONE BITARTRATE AND ACETAMINOPHEN 5; 325 MG/1; MG/1
1 TABLET ORAL
Qty: 9 TAB | Refills: 0 | Status: SHIPPED | OUTPATIENT
Start: 2019-01-01 | End: 2019-01-01

## 2019-01-01 RX ORDER — HYDROCORTISONE SODIUM SUCCINATE 100 MG/2ML
100 INJECTION, POWDER, FOR SOLUTION INTRAMUSCULAR; INTRAVENOUS
Status: COMPLETED | OUTPATIENT
Start: 2019-01-01 | End: 2019-01-01

## 2019-01-01 RX ORDER — METHADONE HYDROCHLORIDE 10 MG/1
5 TABLET ORAL EVERY 6 HOURS
Status: DISCONTINUED | OUTPATIENT
Start: 2019-01-01 | End: 2019-01-01 | Stop reason: HOSPADM

## 2019-01-01 RX ORDER — SODIUM CHLORIDE, SODIUM LACTATE, POTASSIUM CHLORIDE, CALCIUM CHLORIDE 600; 310; 30; 20 MG/100ML; MG/100ML; MG/100ML; MG/100ML
100 INJECTION, SOLUTION INTRAVENOUS CONTINUOUS
Status: DISCONTINUED | OUTPATIENT
Start: 2019-01-01 | End: 2019-01-01 | Stop reason: HOSPADM

## 2019-01-01 RX ORDER — PREDNISONE 5 MG/1
5 TABLET ORAL 2 TIMES DAILY
Qty: 1 TAB | Refills: 0 | Status: ON HOLD
Start: 2019-01-01 | End: 2019-01-01 | Stop reason: SDUPTHER

## 2019-01-01 RX ORDER — FACIAL-BODY WIPES
10 EACH TOPICAL DAILY PRN
Status: DISCONTINUED | OUTPATIENT
Start: 2019-01-01 | End: 2019-01-01 | Stop reason: HOSPADM

## 2019-01-01 RX ORDER — GADOTERATE MEGLUMINE 376.9 MG/ML
16 INJECTION INTRAVENOUS
Status: COMPLETED | OUTPATIENT
Start: 2019-01-01 | End: 2019-01-01

## 2019-01-01 RX ORDER — CEFADROXIL 500 MG/1
500 CAPSULE ORAL 2 TIMES DAILY
COMMUNITY
Start: 2019-01-01 | End: 2019-01-01

## 2019-01-01 RX ORDER — COLCHICINE 0.6 MG/1
0.6 TABLET ORAL 2 TIMES DAILY
COMMUNITY

## 2019-01-01 RX ORDER — TAMSULOSIN HYDROCHLORIDE 0.4 MG/1
0.4 CAPSULE ORAL DAILY
Qty: 30 CAP | Refills: 0 | Status: ON HOLD | OUTPATIENT
Start: 2019-01-01 | End: 2019-01-01 | Stop reason: ALTCHOICE

## 2019-01-01 RX ORDER — CARVEDILOL 3.12 MG/1
3.12 TABLET ORAL 2 TIMES DAILY WITH MEALS
Status: DISCONTINUED | OUTPATIENT
Start: 2019-01-01 | End: 2019-01-01

## 2019-01-01 RX ORDER — INSULIN GLARGINE 100 [IU]/ML
5 INJECTION, SOLUTION SUBCUTANEOUS DAILY
Status: DISCONTINUED | OUTPATIENT
Start: 2019-01-01 | End: 2019-01-01 | Stop reason: HOSPADM

## 2019-01-01 RX ORDER — POTASSIUM CHLORIDE 20 MEQ/1
40 TABLET, EXTENDED RELEASE ORAL
Status: COMPLETED | OUTPATIENT
Start: 2019-01-01 | End: 2019-01-01

## 2019-01-01 RX ORDER — CARVEDILOL 3.12 MG/1
3.12 TABLET ORAL 2 TIMES DAILY WITH MEALS
Status: DISCONTINUED | OUTPATIENT
Start: 2019-01-01 | End: 2019-01-01 | Stop reason: HOSPADM

## 2019-01-01 RX ORDER — LABETALOL HYDROCHLORIDE 5 MG/ML
20 INJECTION, SOLUTION INTRAVENOUS ONCE
Status: ACTIVE | OUTPATIENT
Start: 2019-01-01 | End: 2019-01-01

## 2019-01-01 RX ORDER — ACETAMINOPHEN 500 MG
1000 TABLET ORAL
Status: COMPLETED | OUTPATIENT
Start: 2019-01-01 | End: 2019-01-01

## 2019-01-01 RX ORDER — INSULIN GLARGINE 100 [IU]/ML
15 INJECTION, SOLUTION SUBCUTANEOUS
Status: DISCONTINUED | OUTPATIENT
Start: 2019-01-01 | End: 2019-01-01 | Stop reason: HOSPADM

## 2019-01-01 RX ORDER — CLINDAMYCIN PHOSPHATE 600 MG/50ML
600 INJECTION INTRAVENOUS
Status: COMPLETED | OUTPATIENT
Start: 2019-01-01 | End: 2019-01-01

## 2019-01-01 RX ORDER — DOCUSATE SODIUM 100 MG/1
100 CAPSULE, LIQUID FILLED ORAL 2 TIMES DAILY
Status: DISCONTINUED | OUTPATIENT
Start: 2019-01-01 | End: 2019-01-01 | Stop reason: HOSPADM

## 2019-01-01 RX ORDER — CARVEDILOL 6.25 MG/1
6.25 TABLET ORAL 2 TIMES DAILY WITH MEALS
Qty: 10 TAB | Refills: 0 | Status: SHIPPED
Start: 2019-01-01

## 2019-01-01 RX ORDER — OXYBUTYNIN CHLORIDE 5 MG/1
5 TABLET ORAL 3 TIMES DAILY
Status: DISCONTINUED | OUTPATIENT
Start: 2019-01-01 | End: 2019-01-01 | Stop reason: HOSPADM

## 2019-01-01 RX ORDER — CEFAZOLIN SODIUM/WATER 2 G/20 ML
2 SYRINGE (ML) INTRAVENOUS ONCE
Status: ACTIVE | OUTPATIENT
Start: 2019-01-01 | End: 2019-01-01

## 2019-01-01 RX ORDER — HYDROMORPHONE HYDROCHLORIDE 1 MG/ML
0.2 INJECTION, SOLUTION INTRAMUSCULAR; INTRAVENOUS; SUBCUTANEOUS
Status: DISCONTINUED | OUTPATIENT
Start: 2019-01-01 | End: 2019-01-01 | Stop reason: HOSPADM

## 2019-01-01 RX ORDER — PREDNISONE 5 MG/1
5 TABLET ORAL 2 TIMES DAILY
Qty: 1 TAB | Refills: 0 | Status: SHIPPED
Start: 2019-01-01

## 2019-01-01 RX ORDER — DOXYCYCLINE 100 MG/1
100 CAPSULE ORAL EVERY 12 HOURS
Status: DISCONTINUED | OUTPATIENT
Start: 2019-01-01 | End: 2019-01-01 | Stop reason: HOSPADM

## 2019-01-01 RX ORDER — METHADONE HYDROCHLORIDE 5 MG/1
5 TABLET ORAL EVERY 6 HOURS
Qty: 20 TAB | Refills: 0 | Status: ON HOLD | OUTPATIENT
Start: 2019-01-01 | End: 2019-01-01 | Stop reason: SDUPTHER

## 2019-01-01 RX ORDER — DIPHENHYDRAMINE HCL 25 MG
50 CAPSULE ORAL
Status: DISCONTINUED | OUTPATIENT
Start: 2019-01-01 | End: 2019-01-01 | Stop reason: HOSPADM

## 2019-01-01 RX ORDER — METHADONE HYDROCHLORIDE 10 MG/1
5 TABLET ORAL EVERY 6 HOURS
Status: DISCONTINUED | OUTPATIENT
Start: 2019-01-01 | End: 2019-01-01

## 2019-01-01 RX ORDER — TAMSULOSIN HYDROCHLORIDE 0.4 MG/1
0.4 CAPSULE ORAL DAILY
Status: DISCONTINUED | OUTPATIENT
Start: 2019-01-01 | End: 2019-01-01 | Stop reason: HOSPADM

## 2019-01-01 RX ORDER — INSULIN GLARGINE 100 [IU]/ML
15 INJECTION, SOLUTION SUBCUTANEOUS DAILY
Status: DISCONTINUED | OUTPATIENT
Start: 2019-01-01 | End: 2019-01-01 | Stop reason: SDUPTHER

## 2019-01-01 RX ORDER — CEFPODOXIME PROXETIL 200 MG/1
400 TABLET, FILM COATED ORAL EVERY 12 HOURS
Qty: 48 TAB | Refills: 0 | Status: SHIPPED | OUTPATIENT
Start: 2019-01-01 | End: 2019-01-01

## 2019-01-01 RX ORDER — INSULIN LISPRO 100 [IU]/ML
5 INJECTION, SOLUTION INTRAVENOUS; SUBCUTANEOUS
COMMUNITY
End: 2019-01-01

## 2019-01-01 RX ORDER — METHADONE HYDROCHLORIDE 5 MG/1
5 TABLET ORAL EVERY 6 HOURS
Status: DISCONTINUED | OUTPATIENT
Start: 2019-01-01 | End: 2019-01-01 | Stop reason: CLARIF

## 2019-01-01 RX ORDER — DOCUSATE SODIUM 100 MG/1
200 CAPSULE, LIQUID FILLED ORAL
Status: ON HOLD | COMMUNITY
End: 2019-01-01 | Stop reason: SDUPTHER

## 2019-01-01 RX ORDER — CLONAZEPAM 0.5 MG/1
0.5 TABLET ORAL 3 TIMES DAILY
Qty: 9 TAB | Refills: 0 | Status: SHIPPED | OUTPATIENT
Start: 2019-01-01 | End: 2019-10-08

## 2019-01-01 RX ORDER — HEPARIN 100 UNIT/ML
600 SYRINGE INTRAVENOUS AS NEEDED
Status: DISCONTINUED | OUTPATIENT
Start: 2019-01-01 | End: 2019-01-01 | Stop reason: HOSPADM

## 2019-01-01 RX ORDER — SENNOSIDES 8.6 MG/1
1 TABLET ORAL DAILY PRN
Status: DISCONTINUED | OUTPATIENT
Start: 2019-01-01 | End: 2019-01-01 | Stop reason: HOSPADM

## 2019-01-01 RX ORDER — HYDROMORPHONE HYDROCHLORIDE 1 MG/ML
0.2 INJECTION, SOLUTION INTRAMUSCULAR; INTRAVENOUS; SUBCUTANEOUS ONCE
Status: COMPLETED | OUTPATIENT
Start: 2019-01-01 | End: 2019-01-01

## 2019-01-01 RX ORDER — HYDROMORPHONE HYDROCHLORIDE 1 MG/ML
0.2 INJECTION, SOLUTION INTRAMUSCULAR; INTRAVENOUS; SUBCUTANEOUS
Status: DISCONTINUED | OUTPATIENT
Start: 2019-01-01 | End: 2019-01-01

## 2019-01-01 RX ORDER — COLCHICINE 0.6 MG/1
0.6 TABLET ORAL 3 TIMES DAILY
Status: COMPLETED | OUTPATIENT
Start: 2019-01-01 | End: 2019-01-01

## 2019-01-01 RX ORDER — VANCOMYCIN/0.9 % SOD CHLORIDE 1.5G/250ML
1500 PLASTIC BAG, INJECTION (ML) INTRAVENOUS EVERY 24 HOURS
Status: DISCONTINUED | OUTPATIENT
Start: 2019-01-01 | End: 2019-01-01 | Stop reason: SDUPTHER

## 2019-01-01 RX ORDER — AMLODIPINE BESYLATE 5 MG/1
2.5 TABLET ORAL DAILY
Status: DISCONTINUED | OUTPATIENT
Start: 2019-01-01 | End: 2019-01-01 | Stop reason: HOSPADM

## 2019-01-01 RX ORDER — TORSEMIDE 20 MG/1
10 TABLET ORAL DAILY
Status: DISCONTINUED | OUTPATIENT
Start: 2019-01-01 | End: 2019-01-01

## 2019-01-01 RX ORDER — SODIUM CHLORIDE 9 MG/ML
150 INJECTION, SOLUTION INTRAVENOUS CONTINUOUS
Status: DISCONTINUED | OUTPATIENT
Start: 2019-01-01 | End: 2019-01-01

## 2019-01-01 RX ORDER — INSULIN GLARGINE 100 [IU]/ML
20 INJECTION, SOLUTION SUBCUTANEOUS
Status: DISCONTINUED | OUTPATIENT
Start: 2019-01-01 | End: 2019-01-01

## 2019-01-01 RX ORDER — DEXTROSE 50 % IN WATER (D50W) INTRAVENOUS SYRINGE
25-50 AS NEEDED
Status: DISCONTINUED | OUTPATIENT
Start: 2019-01-01 | End: 2019-01-01 | Stop reason: HOSPADM

## 2019-01-01 RX ORDER — LISINOPRIL 10 MG/1
10 TABLET ORAL DAILY
COMMUNITY
End: 2019-01-01

## 2019-01-01 RX ORDER — FUROSEMIDE 40 MG/1
40 TABLET ORAL 2 TIMES DAILY
Status: DISCONTINUED | OUTPATIENT
Start: 2019-01-01 | End: 2019-01-01 | Stop reason: HOSPADM

## 2019-01-01 RX ORDER — NITROGLYCERIN 0.4 MG/1
TABLET SUBLINGUAL
Status: COMPLETED
Start: 2019-01-01 | End: 2019-01-01

## 2019-01-01 RX ORDER — DOCUSATE SODIUM 100 MG/1
100 CAPSULE, LIQUID FILLED ORAL
Status: DISCONTINUED | OUTPATIENT
Start: 2019-01-01 | End: 2019-01-01

## 2019-01-01 RX ORDER — NITROFURANTOIN (MACROCRYSTALS) 100 MG/1
100 CAPSULE ORAL
COMMUNITY
End: 2019-01-01

## 2019-01-01 RX ADMIN — Medication 10 ML: at 12:18

## 2019-01-01 RX ADMIN — LEVOTHYROXINE SODIUM 75 MCG: 75 TABLET ORAL at 06:50

## 2019-01-01 RX ADMIN — Medication 10 ML: at 14:26

## 2019-01-01 RX ADMIN — OXYBUTYNIN CHLORIDE 5 MG: 5 TABLET, EXTENDED RELEASE ORAL at 08:03

## 2019-01-01 RX ADMIN — DULOXETINE HYDROCHLORIDE 60 MG: 60 CAPSULE, DELAYED RELEASE ORAL at 07:52

## 2019-01-01 RX ADMIN — DULOXETINE HYDROCHLORIDE 60 MG: 60 CAPSULE, DELAYED RELEASE ORAL at 08:30

## 2019-01-01 RX ADMIN — METRONIDAZOLE 500 MG: 500 TABLET, FILM COATED ORAL at 21:09

## 2019-01-01 RX ADMIN — ATORVASTATIN CALCIUM 40 MG: 40 TABLET, FILM COATED ORAL at 20:56

## 2019-01-01 RX ADMIN — CLONAZEPAM 0.5 MG: 1 TABLET ORAL at 08:10

## 2019-01-01 RX ADMIN — SODIUM CHLORIDE 1000 ML: 900 INJECTION, SOLUTION INTRAVENOUS at 22:05

## 2019-01-01 RX ADMIN — METHADONE HYDROCHLORIDE 5 MG: 5 TABLET ORAL at 00:05

## 2019-01-01 RX ADMIN — CLONAZEPAM 0.5 MG: 1 TABLET ORAL at 21:43

## 2019-01-01 RX ADMIN — ATORVASTATIN CALCIUM 40 MG: 40 TABLET, FILM COATED ORAL at 22:11

## 2019-01-01 RX ADMIN — LIDOCAINE HYDROCHLORIDE 80 MG: 20 INJECTION, SOLUTION EPIDURAL; INFILTRATION; INTRACAUDAL; PERINEURAL at 12:50

## 2019-01-01 RX ADMIN — Medication 5 ML: at 06:49

## 2019-01-01 RX ADMIN — INSULIN LISPRO 4 UNITS: 100 INJECTION, SOLUTION INTRAVENOUS; SUBCUTANEOUS at 17:26

## 2019-01-01 RX ADMIN — INSULIN LISPRO 6 UNITS: 100 INJECTION, SOLUTION INTRAVENOUS; SUBCUTANEOUS at 11:47

## 2019-01-01 RX ADMIN — METHADONE HYDROCHLORIDE 5 MG: 5 TABLET ORAL at 12:17

## 2019-01-01 RX ADMIN — Medication 10 ML: at 22:45

## 2019-01-01 RX ADMIN — METHADONE HYDROCHLORIDE 5 MG: 5 TABLET ORAL at 17:28

## 2019-01-01 RX ADMIN — INSULIN LISPRO 6 UNITS: 100 INJECTION, SOLUTION INTRAVENOUS; SUBCUTANEOUS at 21:35

## 2019-01-01 RX ADMIN — Medication 1 AMPULE: at 10:27

## 2019-01-01 RX ADMIN — LOPERAMIDE HYDROCHLORIDE 4 MG: 2 CAPSULE ORAL at 16:38

## 2019-01-01 RX ADMIN — METHADONE HYDROCHLORIDE 5 MG: 5 TABLET ORAL at 17:00

## 2019-01-01 RX ADMIN — SENNOSIDES, DOCUSATE SODIUM 1 TABLET: 50; 8.6 TABLET, FILM COATED ORAL at 11:17

## 2019-01-01 RX ADMIN — INSULIN LISPRO 4 UNITS: 100 INJECTION, SOLUTION INTRAVENOUS; SUBCUTANEOUS at 21:52

## 2019-01-01 RX ADMIN — Medication 10 ML: at 15:04

## 2019-01-01 RX ADMIN — CARVEDILOL 6.25 MG: 3.12 TABLET, FILM COATED ORAL at 16:50

## 2019-01-01 RX ADMIN — CARVEDILOL 3.12 MG: 3.12 TABLET, FILM COATED ORAL at 08:28

## 2019-01-01 RX ADMIN — LEVOTHYROXINE SODIUM 75 MCG: 75 TABLET ORAL at 05:44

## 2019-01-01 RX ADMIN — INSULIN LISPRO 5 UNITS: 100 INJECTION, SOLUTION INTRAVENOUS; SUBCUTANEOUS at 08:57

## 2019-01-01 RX ADMIN — INSULIN LISPRO 5 UNITS: 100 INJECTION, SOLUTION INTRAVENOUS; SUBCUTANEOUS at 12:17

## 2019-01-01 RX ADMIN — BISACODYL 10 MG: 10 SUPPOSITORY RECTAL at 14:43

## 2019-01-01 RX ADMIN — HEPARIN SODIUM 5000 UNITS: 5000 INJECTION INTRAVENOUS; SUBCUTANEOUS at 05:45

## 2019-01-01 RX ADMIN — Medication 10 ML: at 05:16

## 2019-01-01 RX ADMIN — LEVOTHYROXINE SODIUM 75 MCG: 75 TABLET ORAL at 06:26

## 2019-01-01 RX ADMIN — METHADONE HYDROCHLORIDE 10 MG: 10 TABLET ORAL at 17:25

## 2019-01-01 RX ADMIN — PREDNISONE 10 MG: 10 TABLET ORAL at 08:59

## 2019-01-01 RX ADMIN — ASPIRIN 81 MG: 81 TABLET, COATED ORAL at 22:37

## 2019-01-01 RX ADMIN — POTASSIUM CHLORIDE 60 MEQ: 20 TABLET, EXTENDED RELEASE ORAL at 17:47

## 2019-01-01 RX ADMIN — PROPOFOL 30 MG: 10 INJECTION, EMULSION INTRAVENOUS at 16:15

## 2019-01-01 RX ADMIN — TUBERCULIN PURIFIED PROTEIN DERIVATIVE 5 UNITS: 5 INJECTION, SOLUTION INTRADERMAL at 15:11

## 2019-01-01 RX ADMIN — Medication 1 AMPULE: at 21:26

## 2019-01-01 RX ADMIN — CLONAZEPAM 0.5 MG: 1 TABLET ORAL at 11:18

## 2019-01-01 RX ADMIN — HYDRALAZINE HYDROCHLORIDE 25 MG: 25 TABLET, FILM COATED ORAL at 11:09

## 2019-01-01 RX ADMIN — METRONIDAZOLE 500 MG: 500 TABLET, FILM COATED ORAL at 21:42

## 2019-01-01 RX ADMIN — INSULIN GLARGINE 10 UNITS: 100 INJECTION, SOLUTION SUBCUTANEOUS at 07:44

## 2019-01-01 RX ADMIN — POTASSIUM CHLORIDE 10 MEQ: 10 TABLET, EXTENDED RELEASE ORAL at 17:04

## 2019-01-01 RX ADMIN — AMLODIPINE BESYLATE 2.5 MG: 5 TABLET ORAL at 08:33

## 2019-01-01 RX ADMIN — METHADONE HYDROCHLORIDE 5 MG: 5 TABLET ORAL at 12:29

## 2019-01-01 RX ADMIN — FAMOTIDINE 20 MG: 20 TABLET ORAL at 17:15

## 2019-01-01 RX ADMIN — Medication 500 MG: at 22:37

## 2019-01-01 RX ADMIN — CLONAZEPAM 0.25 MG: 1 TABLET ORAL at 10:28

## 2019-01-01 RX ADMIN — HYDRALAZINE HYDROCHLORIDE 25 MG: 25 TABLET, FILM COATED ORAL at 18:41

## 2019-01-01 RX ADMIN — Medication 10 ML: at 02:12

## 2019-01-01 RX ADMIN — ASPIRIN 81 MG: 81 TABLET, COATED ORAL at 21:42

## 2019-01-01 RX ADMIN — SODIUM CHLORIDE 75 ML/HR: 900 INJECTION, SOLUTION INTRAVENOUS at 11:08

## 2019-01-01 RX ADMIN — METHADONE HYDROCHLORIDE 10 MG: 10 TABLET ORAL at 22:37

## 2019-01-01 RX ADMIN — ACETAMINOPHEN 650 MG: 325 TABLET, FILM COATED ORAL at 16:10

## 2019-01-01 RX ADMIN — ASPIRIN 81 MG: 81 TABLET, COATED ORAL at 21:00

## 2019-01-01 RX ADMIN — TAMSULOSIN HYDROCHLORIDE 0.4 MG: 0.4 CAPSULE ORAL at 09:03

## 2019-01-01 RX ADMIN — PREDNISONE 10 MG: 10 TABLET ORAL at 08:35

## 2019-01-01 RX ADMIN — OXYBUTYNIN CHLORIDE 5 MG: 5 TABLET, EXTENDED RELEASE ORAL at 08:35

## 2019-01-01 RX ADMIN — FERROUS GLUCONATE 1 TABLET: 324 TABLET ORAL at 05:43

## 2019-01-01 RX ADMIN — PIPERACILLIN SODIUM,TAZOBACTAM SODIUM 3.38 G: 3; .375 INJECTION, POWDER, FOR SOLUTION INTRAVENOUS at 21:05

## 2019-01-01 RX ADMIN — CARVEDILOL 6.25 MG: 6.25 TABLET, FILM COATED ORAL at 17:27

## 2019-01-01 RX ADMIN — LEVOTHYROXINE SODIUM 75 MCG: 75 TABLET ORAL at 05:32

## 2019-01-01 RX ADMIN — FAMOTIDINE 20 MG: 20 TABLET ORAL at 17:38

## 2019-01-01 RX ADMIN — STANDARDIZED SENNA CONCENTRATE AND DOCUSATE SODIUM 1 TABLET: 8.6; 5 TABLET, FILM COATED ORAL at 08:26

## 2019-01-01 RX ADMIN — FAMOTIDINE 20 MG: 20 TABLET ORAL at 08:33

## 2019-01-01 RX ADMIN — METHADONE HYDROCHLORIDE 5 MG: 5 TABLET ORAL at 12:56

## 2019-01-01 RX ADMIN — PREDNISONE 10 MG: 10 TABLET ORAL at 10:30

## 2019-01-01 RX ADMIN — GLIPIZIDE 10 MG: 10 TABLET, FILM COATED, EXTENDED RELEASE ORAL at 08:45

## 2019-01-01 RX ADMIN — HEPARIN SODIUM 5000 UNITS: 5000 INJECTION INTRAVENOUS; SUBCUTANEOUS at 15:04

## 2019-01-01 RX ADMIN — PREDNISONE 10 MG: 10 TABLET ORAL at 17:25

## 2019-01-01 RX ADMIN — Medication 10 ML: at 06:14

## 2019-01-01 RX ADMIN — DULOXETINE HYDROCHLORIDE 60 MG: 60 CAPSULE, DELAYED RELEASE ORAL at 08:46

## 2019-01-01 RX ADMIN — AMLODIPINE BESYLATE 10 MG: 10 TABLET ORAL at 08:15

## 2019-01-01 RX ADMIN — METHADONE HYDROCHLORIDE 5 MG: 5 TABLET ORAL at 08:30

## 2019-01-01 RX ADMIN — METRONIDAZOLE 500 MG: 500 TABLET, FILM COATED ORAL at 08:32

## 2019-01-01 RX ADMIN — METHADONE HYDROCHLORIDE 5 MG: 5 TABLET ORAL at 05:13

## 2019-01-01 RX ADMIN — HYDROMORPHONE HYDROCHLORIDE 0.2 MG: 1 INJECTION, SOLUTION INTRAMUSCULAR; INTRAVENOUS; SUBCUTANEOUS at 08:04

## 2019-01-01 RX ADMIN — STANDARDIZED SENNA CONCENTRATE AND DOCUSATE SODIUM 1 TABLET: 8.6; 5 TABLET, FILM COATED ORAL at 18:18

## 2019-01-01 RX ADMIN — METHADONE HYDROCHLORIDE 10 MG: 10 TABLET ORAL at 23:37

## 2019-01-01 RX ADMIN — AMLODIPINE BESYLATE 10 MG: 10 TABLET ORAL at 09:40

## 2019-01-01 RX ADMIN — TUBERCULIN PURIFIED PROTEIN DERIVATIVE 5 UNITS: 5 INJECTION, SOLUTION INTRADERMAL at 11:50

## 2019-01-01 RX ADMIN — AMLODIPINE BESYLATE 10 MG: 10 TABLET ORAL at 08:32

## 2019-01-01 RX ADMIN — INSULIN HUMAN 4 UNITS: 100 INJECTION, SOLUTION PARENTERAL at 05:42

## 2019-01-01 RX ADMIN — LEVOTHYROXINE SODIUM 75 MCG: 75 TABLET ORAL at 05:13

## 2019-01-01 RX ADMIN — Medication 10 ML: at 05:21

## 2019-01-01 RX ADMIN — CARVEDILOL 6.25 MG: 6.25 TABLET, FILM COATED ORAL at 10:28

## 2019-01-01 RX ADMIN — PIPERACILLIN SODIUM,TAZOBACTAM SODIUM 3.38 G: 3; .375 INJECTION, POWDER, FOR SOLUTION INTRAVENOUS at 12:28

## 2019-01-01 RX ADMIN — METHADONE HYDROCHLORIDE 10 MG: 10 TABLET ORAL at 22:11

## 2019-01-01 RX ADMIN — INSULIN LISPRO 5 UNITS: 100 INJECTION, SOLUTION INTRAVENOUS; SUBCUTANEOUS at 08:50

## 2019-01-01 RX ADMIN — METHADONE HYDROCHLORIDE 5 MG: 5 TABLET ORAL at 22:37

## 2019-01-01 RX ADMIN — FUROSEMIDE 20 MG: 10 INJECTION, SOLUTION INTRAMUSCULAR; INTRAVENOUS at 17:10

## 2019-01-01 RX ADMIN — HYDRALAZINE HYDROCHLORIDE 25 MG: 25 TABLET, FILM COATED ORAL at 21:31

## 2019-01-01 RX ADMIN — INSULIN LISPRO 3 UNITS: 100 INJECTION, SOLUTION INTRAVENOUS; SUBCUTANEOUS at 12:31

## 2019-01-01 RX ADMIN — POTASSIUM CHLORIDE 10 MEQ: 10 TABLET, EXTENDED RELEASE ORAL at 09:41

## 2019-01-01 RX ADMIN — NYSTATIN 500000 UNITS: 100000 SUSPENSION ORAL at 18:00

## 2019-01-01 RX ADMIN — INSULIN LISPRO 2 UNITS: 100 INJECTION, SOLUTION INTRAVENOUS; SUBCUTANEOUS at 17:27

## 2019-01-01 RX ADMIN — HYDROMORPHONE HYDROCHLORIDE 1 MG: 1 INJECTION, SOLUTION INTRAMUSCULAR; INTRAVENOUS; SUBCUTANEOUS at 19:41

## 2019-01-01 RX ADMIN — PREDNISONE 10 MG: 10 TABLET ORAL at 08:00

## 2019-01-01 RX ADMIN — FUROSEMIDE 20 MG: 10 INJECTION, SOLUTION INTRAMUSCULAR; INTRAVENOUS at 07:58

## 2019-01-01 RX ADMIN — INSULIN LISPRO 6 UNITS: 100 INJECTION, SOLUTION INTRAVENOUS; SUBCUTANEOUS at 11:55

## 2019-01-01 RX ADMIN — DULOXETINE HYDROCHLORIDE 60 MG: 60 CAPSULE, DELAYED RELEASE ORAL at 08:25

## 2019-01-01 RX ADMIN — INSULIN GLARGINE 20 UNITS: 100 INJECTION, SOLUTION SUBCUTANEOUS at 22:46

## 2019-01-01 RX ADMIN — Medication 10 ML: at 05:18

## 2019-01-01 RX ADMIN — METHADONE HYDROCHLORIDE 5 MG: 5 TABLET ORAL at 18:45

## 2019-01-01 RX ADMIN — METHADONE HYDROCHLORIDE 5 MG: 5 TABLET ORAL at 21:37

## 2019-01-01 RX ADMIN — Medication 10 ML: at 21:10

## 2019-01-01 RX ADMIN — OXYBUTYNIN CHLORIDE 5 MG: 5 TABLET ORAL at 16:50

## 2019-01-01 RX ADMIN — SENNOSIDES, DOCUSATE SODIUM 1 TABLET: 50; 8.6 TABLET, FILM COATED ORAL at 07:54

## 2019-01-01 RX ADMIN — AMLODIPINE BESYLATE 10 MG: 10 TABLET ORAL at 07:50

## 2019-01-01 RX ADMIN — Medication 10 ML: at 21:36

## 2019-01-01 RX ADMIN — Medication 10 ML: at 03:29

## 2019-01-01 RX ADMIN — HEPARIN SODIUM 5000 UNITS: 5000 INJECTION INTRAVENOUS; SUBCUTANEOUS at 05:44

## 2019-01-01 RX ADMIN — METHADONE HYDROCHLORIDE 5 MG: 10 TABLET ORAL at 05:32

## 2019-01-01 RX ADMIN — HEPARIN SODIUM 5000 UNITS: 5000 INJECTION INTRAVENOUS; SUBCUTANEOUS at 14:19

## 2019-01-01 RX ADMIN — Medication 10 ML: at 21:50

## 2019-01-01 RX ADMIN — METHADONE HYDROCHLORIDE 5 MG: 5 TABLET ORAL at 11:35

## 2019-01-01 RX ADMIN — INSULIN LISPRO 10 UNITS: 100 INJECTION, SOLUTION INTRAVENOUS; SUBCUTANEOUS at 18:17

## 2019-01-01 RX ADMIN — FUROSEMIDE 20 MG: 10 INJECTION, SOLUTION INTRAMUSCULAR; INTRAVENOUS at 08:40

## 2019-01-01 RX ADMIN — POLYETHYLENE GLYCOL 3350 17 G: 17 POWDER, FOR SOLUTION ORAL at 15:55

## 2019-01-01 RX ADMIN — Medication 1 AMPULE: at 21:36

## 2019-01-01 RX ADMIN — Medication 10 ML: at 06:26

## 2019-01-01 RX ADMIN — LACTOBACILLUS TAB 2 TABLET: TAB at 17:11

## 2019-01-01 RX ADMIN — CLONAZEPAM 0.5 MG: 0.5 TABLET ORAL at 21:53

## 2019-01-01 RX ADMIN — Medication 10 ML: at 22:25

## 2019-01-01 RX ADMIN — INSULIN LISPRO 2 UNITS: 100 INJECTION, SOLUTION INTRAVENOUS; SUBCUTANEOUS at 07:30

## 2019-01-01 RX ADMIN — OXYBUTYNIN CHLORIDE 5 MG: 5 TABLET, EXTENDED RELEASE ORAL at 08:15

## 2019-01-01 RX ADMIN — CARVEDILOL 6.25 MG: 6.25 TABLET, FILM COATED ORAL at 09:47

## 2019-01-01 RX ADMIN — DULOXETINE 60 MG: 60 CAPSULE, DELAYED RELEASE ORAL at 08:29

## 2019-01-01 RX ADMIN — PREDNISONE 10 MG: 10 TABLET ORAL at 17:14

## 2019-01-01 RX ADMIN — METHADONE HYDROCHLORIDE 5 MG: 5 TABLET ORAL at 04:12

## 2019-01-01 RX ADMIN — CLONAZEPAM 0.5 MG: 0.5 TABLET ORAL at 17:36

## 2019-01-01 RX ADMIN — METHADONE HYDROCHLORIDE 5 MG: 5 TABLET ORAL at 23:38

## 2019-01-01 RX ADMIN — Medication 1 AMPULE: at 08:32

## 2019-01-01 RX ADMIN — SODIUM CHLORIDE, SODIUM LACTATE, POTASSIUM CHLORIDE, AND CALCIUM CHLORIDE 100 ML/HR: 600; 310; 30; 20 INJECTION, SOLUTION INTRAVENOUS at 22:46

## 2019-01-01 RX ADMIN — ASPIRIN 81 MG: 81 TABLET, COATED ORAL at 21:50

## 2019-01-01 RX ADMIN — CLONAZEPAM 0.5 MG: 0.5 TABLET ORAL at 08:30

## 2019-01-01 RX ADMIN — HEPARIN SODIUM 5000 UNITS: 5000 INJECTION INTRAVENOUS; SUBCUTANEOUS at 10:14

## 2019-01-01 RX ADMIN — CLONAZEPAM 0.5 MG: 0.5 TABLET ORAL at 22:43

## 2019-01-01 RX ADMIN — VANCOMYCIN HYDROCHLORIDE 1000 MG: 1 INJECTION, POWDER, LYOPHILIZED, FOR SOLUTION INTRAVENOUS at 09:10

## 2019-01-01 RX ADMIN — INSULIN LISPRO 6 UNITS: 100 INJECTION, SOLUTION INTRAVENOUS; SUBCUTANEOUS at 17:44

## 2019-01-01 RX ADMIN — PREDNISONE 10 MG: 10 TABLET ORAL at 09:00

## 2019-01-01 RX ADMIN — HEPARIN SODIUM 5000 UNITS: 5000 INJECTION INTRAVENOUS; SUBCUTANEOUS at 17:37

## 2019-01-01 RX ADMIN — FERROUS GLUCONATE 1 TABLET: 324 TABLET ORAL at 06:32

## 2019-01-01 RX ADMIN — SODIUM CHLORIDE, SODIUM LACTATE, POTASSIUM CHLORIDE, AND CALCIUM CHLORIDE 50 ML/HR: 600; 310; 30; 20 INJECTION, SOLUTION INTRAVENOUS at 19:30

## 2019-01-01 RX ADMIN — NYSTATIN 500000 UNITS: 100000 SUSPENSION ORAL at 10:30

## 2019-01-01 RX ADMIN — AMLODIPINE BESYLATE 10 MG: 10 TABLET ORAL at 08:46

## 2019-01-01 RX ADMIN — LEVOTHYROXINE SODIUM 75 MCG: 75 TABLET ORAL at 05:19

## 2019-01-01 RX ADMIN — INSULIN LISPRO 5 UNITS: 100 INJECTION, SOLUTION INTRAVENOUS; SUBCUTANEOUS at 08:16

## 2019-01-01 RX ADMIN — Medication 10 ML: at 16:36

## 2019-01-01 RX ADMIN — CLONAZEPAM 0.25 MG: 1 TABLET ORAL at 21:27

## 2019-01-01 RX ADMIN — LIDOCAINE HYDROCHLORIDE 15 ML: 20 SOLUTION ORAL; TOPICAL at 23:01

## 2019-01-01 RX ADMIN — METHADONE HYDROCHLORIDE 5 MG: 5 TABLET ORAL at 03:10

## 2019-01-01 RX ADMIN — Medication 500 MG: at 11:08

## 2019-01-01 RX ADMIN — METHADONE HYDROCHLORIDE 5 MG: 5 TABLET ORAL at 05:25

## 2019-01-01 RX ADMIN — PIPERACILLIN SODIUM,TAZOBACTAM SODIUM 4.5 G: 4; .5 INJECTION, POWDER, FOR SOLUTION INTRAVENOUS at 16:49

## 2019-01-01 RX ADMIN — LACTOBACILLUS TAB 2 TABLET: TAB at 09:46

## 2019-01-01 RX ADMIN — INSULIN LISPRO 5 UNITS: 100 INJECTION, SOLUTION INTRAVENOUS; SUBCUTANEOUS at 18:45

## 2019-01-01 RX ADMIN — CARVEDILOL 6.25 MG: 6.25 TABLET, FILM COATED ORAL at 08:19

## 2019-01-01 RX ADMIN — CLONAZEPAM 0.5 MG: 1 TABLET ORAL at 17:13

## 2019-01-01 RX ADMIN — METHADONE HYDROCHLORIDE 5 MG: 5 TABLET ORAL at 11:47

## 2019-01-01 RX ADMIN — Medication 1 AMPULE: at 08:30

## 2019-01-01 RX ADMIN — CARVEDILOL 3.12 MG: 3.12 TABLET, FILM COATED ORAL at 17:36

## 2019-01-01 RX ADMIN — PREDNISONE 10 MG: 10 TABLET ORAL at 08:46

## 2019-01-01 RX ADMIN — CLONAZEPAM 0.5 MG: 0.5 TABLET ORAL at 09:08

## 2019-01-01 RX ADMIN — NYSTATIN 500000 UNITS: 100000 SUSPENSION ORAL at 14:01

## 2019-01-01 RX ADMIN — SODIUM CHLORIDE 500 ML: 900 INJECTION, SOLUTION INTRAVENOUS at 17:38

## 2019-01-01 RX ADMIN — LEVOTHYROXINE SODIUM 75 MCG: 75 TABLET ORAL at 05:06

## 2019-01-01 RX ADMIN — CALCIUM CARBONATE 500 MG (1,250 MG)-VITAMIN D3 200 UNIT TABLET 1 TABLET: at 08:51

## 2019-01-01 RX ADMIN — METHADONE HYDROCHLORIDE 5 MG: 5 TABLET ORAL at 12:23

## 2019-01-01 RX ADMIN — HEPARIN SODIUM 5000 UNITS: 5000 INJECTION INTRAVENOUS; SUBCUTANEOUS at 12:47

## 2019-01-01 RX ADMIN — MIRTAZAPINE 7.5 MG: 15 TABLET, FILM COATED ORAL at 22:38

## 2019-01-01 RX ADMIN — LACTOBACILLUS TAB 2 TABLET: TAB at 08:17

## 2019-01-01 RX ADMIN — CEFEPIME 2 G: 2 INJECTION, POWDER, FOR SOLUTION INTRAVENOUS at 01:54

## 2019-01-01 RX ADMIN — ATORVASTATIN CALCIUM 40 MG: 40 TABLET, FILM COATED ORAL at 21:47

## 2019-01-01 RX ADMIN — LISINOPRIL 10 MG: 5 TABLET ORAL at 08:33

## 2019-01-01 RX ADMIN — CLONAZEPAM 0.5 MG: 0.5 TABLET ORAL at 17:25

## 2019-01-01 RX ADMIN — Medication 10 ML: at 21:54

## 2019-01-01 RX ADMIN — FUROSEMIDE 20 MG: 10 INJECTION, SOLUTION INTRAMUSCULAR; INTRAVENOUS at 15:09

## 2019-01-01 RX ADMIN — METHADONE HYDROCHLORIDE 10 MG: 10 TABLET ORAL at 09:17

## 2019-01-01 RX ADMIN — CEFEPIME 2 G: 2 INJECTION, POWDER, FOR SOLUTION INTRAVENOUS at 15:13

## 2019-01-01 RX ADMIN — AMLODIPINE BESYLATE 5 MG: 5 TABLET ORAL at 08:33

## 2019-01-01 RX ADMIN — INSULIN LISPRO 4 UNITS: 100 INJECTION, SOLUTION INTRAVENOUS; SUBCUTANEOUS at 13:02

## 2019-01-01 RX ADMIN — AMLODIPINE BESYLATE 10 MG: 10 TABLET ORAL at 08:23

## 2019-01-01 RX ADMIN — METHADONE HYDROCHLORIDE 5 MG: 5 TABLET ORAL at 22:43

## 2019-01-01 RX ADMIN — ASPIRIN 81 MG: 81 TABLET, COATED ORAL at 21:53

## 2019-01-01 RX ADMIN — METHADONE HYDROCHLORIDE 5 MG: 5 TABLET ORAL at 00:14

## 2019-01-01 RX ADMIN — CARVEDILOL 12.5 MG: 12.5 TABLET, FILM COATED ORAL at 07:51

## 2019-01-01 RX ADMIN — AMLODIPINE BESYLATE 5 MG: 5 TABLET ORAL at 08:30

## 2019-01-01 RX ADMIN — ACETAMINOPHEN 500 MG: 500 TABLET, FILM COATED ORAL at 08:33

## 2019-01-01 RX ADMIN — CARVEDILOL 12.5 MG: 12.5 TABLET, FILM COATED ORAL at 17:14

## 2019-01-01 RX ADMIN — Medication 500 MG: at 17:17

## 2019-01-01 RX ADMIN — Medication 1 AMPULE: at 21:37

## 2019-01-01 RX ADMIN — HEPARIN SODIUM 5000 UNITS: 5000 INJECTION INTRAVENOUS; SUBCUTANEOUS at 06:47

## 2019-01-01 RX ADMIN — HYDROCODONE BITARTRATE AND ACETAMINOPHEN 1 TABLET: 7.5; 325 TABLET ORAL at 10:42

## 2019-01-01 RX ADMIN — INSULIN LISPRO 5 UNITS: 100 INJECTION, SOLUTION INTRAVENOUS; SUBCUTANEOUS at 17:10

## 2019-01-01 RX ADMIN — INSULIN HUMAN 6 UNITS: 100 INJECTION, SOLUTION PARENTERAL at 17:29

## 2019-01-01 RX ADMIN — CARVEDILOL 6.25 MG: 6.25 TABLET, FILM COATED ORAL at 17:51

## 2019-01-01 RX ADMIN — PIPERACILLIN SODIUM,TAZOBACTAM SODIUM 3.38 G: 3; .375 INJECTION, POWDER, FOR SOLUTION INTRAVENOUS at 11:58

## 2019-01-01 RX ADMIN — CLONAZEPAM 0.5 MG: 0.5 TABLET ORAL at 16:49

## 2019-01-01 RX ADMIN — METHADONE HYDROCHLORIDE 5 MG: 5 TABLET ORAL at 17:08

## 2019-01-01 RX ADMIN — CARVEDILOL 3.12 MG: 3.12 TABLET, FILM COATED ORAL at 08:18

## 2019-01-01 RX ADMIN — INSULIN LISPRO 2 UNITS: 100 INJECTION, SOLUTION INTRAVENOUS; SUBCUTANEOUS at 08:58

## 2019-01-01 RX ADMIN — Medication 5 ML: at 14:21

## 2019-01-01 RX ADMIN — HEPARIN SODIUM 5000 UNITS: 5000 INJECTION INTRAVENOUS; SUBCUTANEOUS at 05:25

## 2019-01-01 RX ADMIN — AMLODIPINE BESYLATE 10 MG: 10 TABLET ORAL at 08:50

## 2019-01-01 RX ADMIN — Medication 1 AMPULE: at 08:50

## 2019-01-01 RX ADMIN — INSULIN LISPRO 5 UNITS: 100 INJECTION, SOLUTION INTRAVENOUS; SUBCUTANEOUS at 17:00

## 2019-01-01 RX ADMIN — CARVEDILOL 6.25 MG: 6.25 TABLET, FILM COATED ORAL at 17:33

## 2019-01-01 RX ADMIN — INSULIN GLARGINE 20 UNITS: 100 INJECTION, SOLUTION SUBCUTANEOUS at 22:11

## 2019-01-01 RX ADMIN — METHADONE HYDROCHLORIDE 5 MG: 5 TABLET ORAL at 06:20

## 2019-01-01 RX ADMIN — Medication 500 MG: at 17:04

## 2019-01-01 RX ADMIN — LIDOCAINE HYDROCHLORIDE 60 MG: 20 INJECTION, SOLUTION EPIDURAL; INFILTRATION; INTRACAUDAL; PERINEURAL at 16:15

## 2019-01-01 RX ADMIN — Medication 2 G: at 16:20

## 2019-01-01 RX ADMIN — SODIUM CHLORIDE, PRESERVATIVE FREE 600 UNITS: 5 INJECTION INTRAVENOUS at 13:48

## 2019-01-01 RX ADMIN — METHADONE HYDROCHLORIDE 10 MG: 10 TABLET ORAL at 06:09

## 2019-01-01 RX ADMIN — METHADONE HYDROCHLORIDE 5 MG: 5 TABLET ORAL at 23:21

## 2019-01-01 RX ADMIN — ACETAMINOPHEN 1000 MG: 10 INJECTION, SOLUTION INTRAVENOUS at 13:57

## 2019-01-01 RX ADMIN — Medication 10 ML: at 05:34

## 2019-01-01 RX ADMIN — METHADONE HYDROCHLORIDE 5 MG: 5 TABLET ORAL at 23:01

## 2019-01-01 RX ADMIN — Medication 5 ML: at 22:00

## 2019-01-01 RX ADMIN — HEPARIN SODIUM 5000 UNITS: 5000 INJECTION INTRAVENOUS; SUBCUTANEOUS at 23:36

## 2019-01-01 RX ADMIN — OXYBUTYNIN CHLORIDE 5 MG: 5 TABLET ORAL at 21:23

## 2019-01-01 RX ADMIN — INSULIN LISPRO 2 UNITS: 100 INJECTION, SOLUTION INTRAVENOUS; SUBCUTANEOUS at 18:44

## 2019-01-01 RX ADMIN — LEVOTHYROXINE SODIUM 75 MCG: 75 TABLET ORAL at 06:47

## 2019-01-01 RX ADMIN — PREDNISONE 10 MG: 10 TABLET ORAL at 08:53

## 2019-01-01 RX ADMIN — PREDNISONE 10 MG: 10 TABLET ORAL at 09:36

## 2019-01-01 RX ADMIN — AMLODIPINE BESYLATE 10 MG: 10 TABLET ORAL at 08:04

## 2019-01-01 RX ADMIN — HYDRALAZINE HYDROCHLORIDE 25 MG: 25 TABLET, FILM COATED ORAL at 08:35

## 2019-01-01 RX ADMIN — METHADONE HYDROCHLORIDE 5 MG: 5 TABLET ORAL at 21:45

## 2019-01-01 RX ADMIN — DOXYCYCLINE HYCLATE 100 MG: 100 CAPSULE ORAL at 08:32

## 2019-01-01 RX ADMIN — Medication 10 ML: at 05:37

## 2019-01-01 RX ADMIN — INSULIN LISPRO 2 UNITS: 100 INJECTION, SOLUTION INTRAVENOUS; SUBCUTANEOUS at 08:11

## 2019-01-01 RX ADMIN — POTASSIUM CHLORIDE 40 MEQ: 20 TABLET, EXTENDED RELEASE ORAL at 21:09

## 2019-01-01 RX ADMIN — STANDARDIZED SENNA CONCENTRATE AND DOCUSATE SODIUM 1 TABLET: 8.6; 5 TABLET, FILM COATED ORAL at 16:50

## 2019-01-01 RX ADMIN — Medication 5 ML: at 21:58

## 2019-01-01 RX ADMIN — METHADONE HYDROCHLORIDE 5 MG: 5 TABLET ORAL at 17:44

## 2019-01-01 RX ADMIN — DULOXETINE HYDROCHLORIDE 60 MG: 60 CAPSULE, DELAYED RELEASE ORAL at 09:02

## 2019-01-01 RX ADMIN — CLONAZEPAM 0.5 MG: 0.5 TABLET ORAL at 08:28

## 2019-01-01 RX ADMIN — METHADONE HYDROCHLORIDE 5 MG: 5 TABLET ORAL at 22:02

## 2019-01-01 RX ADMIN — CLONAZEPAM 0.5 MG: 0.5 TABLET ORAL at 08:54

## 2019-01-01 RX ADMIN — DOXYCYCLINE HYCLATE 100 MG: 100 CAPSULE ORAL at 21:06

## 2019-01-01 RX ADMIN — VANCOMYCIN HYDROCHLORIDE 1000 MG: 1 INJECTION, POWDER, LYOPHILIZED, FOR SOLUTION INTRAVENOUS at 14:14

## 2019-01-01 RX ADMIN — SODIUM CHLORIDE 125 ML/HR: 900 INJECTION, SOLUTION INTRAVENOUS at 15:04

## 2019-01-01 RX ADMIN — CARVEDILOL 6.25 MG: 6.25 TABLET, FILM COATED ORAL at 16:57

## 2019-01-01 RX ADMIN — INSULIN GLARGINE 20 UNITS: 100 INJECTION, SOLUTION SUBCUTANEOUS at 21:47

## 2019-01-01 RX ADMIN — HEPARIN SODIUM 5000 UNITS: 5000 INJECTION INTRAVENOUS; SUBCUTANEOUS at 05:02

## 2019-01-01 RX ADMIN — NYSTATIN 500000 UNITS: 100000 SUSPENSION ORAL at 13:00

## 2019-01-01 RX ADMIN — INSULIN LISPRO 2 UNITS: 100 INJECTION, SOLUTION INTRAVENOUS; SUBCUTANEOUS at 11:45

## 2019-01-01 RX ADMIN — METHADONE HYDROCHLORIDE 5 MG: 5 TABLET ORAL at 12:31

## 2019-01-01 RX ADMIN — CARVEDILOL 6.25 MG: 3.12 TABLET, FILM COATED ORAL at 16:57

## 2019-01-01 RX ADMIN — NYSTATIN: 100000 POWDER TOPICAL at 17:19

## 2019-01-01 RX ADMIN — Medication 10 ML: at 22:52

## 2019-01-01 RX ADMIN — INSULIN LISPRO 4 UNITS: 100 INJECTION, SOLUTION INTRAVENOUS; SUBCUTANEOUS at 07:40

## 2019-01-01 RX ADMIN — HEPARIN SODIUM 5000 UNITS: 5000 INJECTION INTRAVENOUS; SUBCUTANEOUS at 10:03

## 2019-01-01 RX ADMIN — NYSTATIN 500000 UNITS: 100000 SUSPENSION ORAL at 17:51

## 2019-01-01 RX ADMIN — CEFPODOXIME PROXETIL 400 MG: 200 TABLET, FILM COATED ORAL at 13:01

## 2019-01-01 RX ADMIN — SODIUM CHLORIDE, PRESERVATIVE FREE 600 UNITS: 5 INJECTION INTRAVENOUS at 21:43

## 2019-01-01 RX ADMIN — FERROUS GLUCONATE 1 TABLET: 324 TABLET ORAL at 04:37

## 2019-01-01 RX ADMIN — HEPARIN SODIUM 5000 UNITS: 5000 INJECTION INTRAVENOUS; SUBCUTANEOUS at 02:08

## 2019-01-01 RX ADMIN — CLONAZEPAM 0.5 MG: 1 TABLET ORAL at 21:23

## 2019-01-01 RX ADMIN — METHADONE HYDROCHLORIDE 5 MG: 5 TABLET ORAL at 05:07

## 2019-01-01 RX ADMIN — CALCIUM CARBONATE 500 MG (1,250 MG)-VITAMIN D3 200 UNIT TABLET 1 TABLET: at 08:18

## 2019-01-01 RX ADMIN — MIRTAZAPINE 7.5 MG: 15 TABLET, FILM COATED ORAL at 21:50

## 2019-01-01 RX ADMIN — METHADONE HYDROCHLORIDE 10 MG: 10 TABLET ORAL at 17:04

## 2019-01-01 RX ADMIN — CEFTRIAXONE SODIUM 2 G: 2 INJECTION, POWDER, FOR SOLUTION INTRAMUSCULAR; INTRAVENOUS at 11:45

## 2019-01-01 RX ADMIN — ASPIRIN 81 MG: 81 TABLET, COATED ORAL at 03:05

## 2019-01-01 RX ADMIN — FAMOTIDINE 20 MG: 20 TABLET ORAL at 08:35

## 2019-01-01 RX ADMIN — INSULIN HUMAN 10 UNITS: 100 INJECTION, SOLUTION PARENTERAL at 11:43

## 2019-01-01 RX ADMIN — METHADONE HYDROCHLORIDE 5 MG: 10 TABLET ORAL at 17:14

## 2019-01-01 RX ADMIN — INFLUENZA VIRUS VACCINE 0.5 ML: 15; 15; 15; 15 SUSPENSION INTRAMUSCULAR at 11:03

## 2019-01-01 RX ADMIN — PREDNISONE 10 MG: 10 TABLET ORAL at 17:08

## 2019-01-01 RX ADMIN — INSULIN LISPRO 2 UNITS: 100 INJECTION, SOLUTION INTRAVENOUS; SUBCUTANEOUS at 11:58

## 2019-01-01 RX ADMIN — AMLODIPINE BESYLATE 5 MG: 5 TABLET ORAL at 08:08

## 2019-01-01 RX ADMIN — HYDROMORPHONE HYDROCHLORIDE 0.2 MG: 1 INJECTION, SOLUTION INTRAMUSCULAR; INTRAVENOUS; SUBCUTANEOUS at 01:51

## 2019-01-01 RX ADMIN — POTASSIUM CHLORIDE 10 MEQ: 10 TABLET, EXTENDED RELEASE ORAL at 16:59

## 2019-01-01 RX ADMIN — NITROGLYCERIN 0.4 MG: 0.4 TABLET SUBLINGUAL at 20:00

## 2019-01-01 RX ADMIN — OXYBUTYNIN CHLORIDE 5 MG: 5 TABLET ORAL at 08:33

## 2019-01-01 RX ADMIN — INSULIN LISPRO 2 UNITS: 100 INJECTION, SOLUTION INTRAVENOUS; SUBCUTANEOUS at 17:10

## 2019-01-01 RX ADMIN — METHADONE HYDROCHLORIDE 5 MG: 5 TABLET ORAL at 12:49

## 2019-01-01 RX ADMIN — HYDRALAZINE HYDROCHLORIDE 25 MG: 25 TABLET, FILM COATED ORAL at 16:58

## 2019-01-01 RX ADMIN — DULOXETINE HYDROCHLORIDE 60 MG: 60 CAPSULE, DELAYED RELEASE ORAL at 07:47

## 2019-01-01 RX ADMIN — AMLODIPINE BESYLATE 5 MG: 5 TABLET ORAL at 09:46

## 2019-01-01 RX ADMIN — CEPHALEXIN 500 MG: 500 CAPSULE ORAL at 21:24

## 2019-01-01 RX ADMIN — CARVEDILOL 6.25 MG: 6.25 TABLET, FILM COATED ORAL at 17:28

## 2019-01-01 RX ADMIN — CARVEDILOL 6.25 MG: 6.25 TABLET, FILM COATED ORAL at 17:09

## 2019-01-01 RX ADMIN — INSULIN LISPRO 8 UNITS: 100 INJECTION, SOLUTION INTRAVENOUS; SUBCUTANEOUS at 16:49

## 2019-01-01 RX ADMIN — FAMOTIDINE 20 MG: 20 TABLET ORAL at 08:00

## 2019-01-01 RX ADMIN — CLONAZEPAM 0.5 MG: 0.5 TABLET ORAL at 20:42

## 2019-01-01 RX ADMIN — LEVOTHYROXINE SODIUM 75 MCG: 75 TABLET ORAL at 05:10

## 2019-01-01 RX ADMIN — NYSTATIN 500000 UNITS: 100000 SUSPENSION ORAL at 09:50

## 2019-01-01 RX ADMIN — AMLODIPINE BESYLATE 5 MG: 5 TABLET ORAL at 08:34

## 2019-01-01 RX ADMIN — METRONIDAZOLE 500 MG: 500 TABLET, FILM COATED ORAL at 21:08

## 2019-01-01 RX ADMIN — SENNOSIDES AND DOCUSATE SODIUM 1 TABLET: 8.6; 5 TABLET ORAL at 07:50

## 2019-01-01 RX ADMIN — Medication 10 ML: at 05:14

## 2019-01-01 RX ADMIN — AMLODIPINE BESYLATE 10 MG: 10 TABLET ORAL at 07:51

## 2019-01-01 RX ADMIN — HEPARIN SODIUM 5000 UNITS: 5000 INJECTION INTRAVENOUS; SUBCUTANEOUS at 14:31

## 2019-01-01 RX ADMIN — CLONAZEPAM 0.5 MG: 1 TABLET ORAL at 07:52

## 2019-01-01 RX ADMIN — INSULIN GLARGINE 5 UNITS: 100 INJECTION, SOLUTION SUBCUTANEOUS at 21:43

## 2019-01-01 RX ADMIN — Medication 500 MG: at 08:19

## 2019-01-01 RX ADMIN — Medication 10 ML: at 06:29

## 2019-01-01 RX ADMIN — CLONAZEPAM 0.5 MG: 1 TABLET ORAL at 18:42

## 2019-01-01 RX ADMIN — INSULIN GLARGINE 5 UNITS: 100 INJECTION, SOLUTION SUBCUTANEOUS at 09:47

## 2019-01-01 RX ADMIN — SODIUM CHLORIDE, SODIUM LACTATE, POTASSIUM CHLORIDE, AND CALCIUM CHLORIDE 100 ML/HR: 600; 310; 30; 20 INJECTION, SOLUTION INTRAVENOUS at 03:28

## 2019-01-01 RX ADMIN — Medication 10 ML: at 21:28

## 2019-01-01 RX ADMIN — ASPIRIN 81 MG: 81 TABLET, COATED ORAL at 21:37

## 2019-01-01 RX ADMIN — FAMOTIDINE 20 MG: 20 TABLET ORAL at 08:32

## 2019-01-01 RX ADMIN — HEPARIN SODIUM 5000 UNITS: 5000 INJECTION INTRAVENOUS; SUBCUTANEOUS at 21:24

## 2019-01-01 RX ADMIN — PREDNISONE 10 MG: 10 TABLET ORAL at 22:38

## 2019-01-01 RX ADMIN — NYSTATIN 500000 UNITS: 100000 SUSPENSION ORAL at 09:06

## 2019-01-01 RX ADMIN — CARVEDILOL 6.25 MG: 6.25 TABLET, FILM COATED ORAL at 09:40

## 2019-01-01 RX ADMIN — LEVOTHYROXINE SODIUM 75 MCG: 75 TABLET ORAL at 06:25

## 2019-01-01 RX ADMIN — HEPARIN SODIUM 3000 UNITS: 1000 INJECTION, SOLUTION INTRAVENOUS; SUBCUTANEOUS at 16:35

## 2019-01-01 RX ADMIN — PIPERACILLIN SODIUM,TAZOBACTAM SODIUM 3.38 G: 3; .375 INJECTION, POWDER, FOR SOLUTION INTRAVENOUS at 06:45

## 2019-01-01 RX ADMIN — INSULIN GLARGINE 10 UNITS: 100 INJECTION, SOLUTION SUBCUTANEOUS at 08:22

## 2019-01-01 RX ADMIN — PROPOFOL 120 MG: 10 INJECTION, EMULSION INTRAVENOUS at 12:50

## 2019-01-01 RX ADMIN — SODIUM CHLORIDE, SODIUM LACTATE, POTASSIUM CHLORIDE, AND CALCIUM CHLORIDE 100 ML/HR: 600; 310; 30; 20 INJECTION, SOLUTION INTRAVENOUS at 09:47

## 2019-01-01 RX ADMIN — Medication 500 MG: at 17:12

## 2019-01-01 RX ADMIN — AMLODIPINE BESYLATE 2.5 MG: 5 TABLET ORAL at 08:50

## 2019-01-01 RX ADMIN — INSULIN LISPRO 6 UNITS: 100 INJECTION, SOLUTION INTRAVENOUS; SUBCUTANEOUS at 21:59

## 2019-01-01 RX ADMIN — CARVEDILOL 6.25 MG: 6.25 TABLET, FILM COATED ORAL at 16:38

## 2019-01-01 RX ADMIN — PIPERACILLIN SODIUM,TAZOBACTAM SODIUM 3.38 G: 3; .375 INJECTION, POWDER, FOR SOLUTION INTRAVENOUS at 04:30

## 2019-01-01 RX ADMIN — INSULIN GLARGINE 20 UNITS: 100 INJECTION, SOLUTION SUBCUTANEOUS at 22:00

## 2019-01-01 RX ADMIN — INSULIN LISPRO 4 UNITS: 100 INJECTION, SOLUTION INTRAVENOUS; SUBCUTANEOUS at 21:25

## 2019-01-01 RX ADMIN — Medication 10 ML: at 06:00

## 2019-01-01 RX ADMIN — ASPIRIN 81 MG: 81 TABLET, COATED ORAL at 22:12

## 2019-01-01 RX ADMIN — OXYBUTYNIN CHLORIDE 5 MG: 5 TABLET ORAL at 08:26

## 2019-01-01 RX ADMIN — METHADONE HYDROCHLORIDE 5 MG: 5 TABLET ORAL at 22:12

## 2019-01-01 RX ADMIN — PIPERACILLIN SODIUM,TAZOBACTAM SODIUM 3.38 G: 3; .375 INJECTION, POWDER, FOR SOLUTION INTRAVENOUS at 13:27

## 2019-01-01 RX ADMIN — DOXYCYCLINE HYCLATE 100 MG: 100 CAPSULE ORAL at 21:42

## 2019-01-01 RX ADMIN — NYSTATIN 500000 UNITS: 100000 SUSPENSION ORAL at 23:10

## 2019-01-01 RX ADMIN — HEPARIN SODIUM 5000 UNITS: 5000 INJECTION INTRAVENOUS; SUBCUTANEOUS at 17:10

## 2019-01-01 RX ADMIN — CLONAZEPAM 0.5 MG: 0.5 TABLET ORAL at 08:44

## 2019-01-01 RX ADMIN — VITAMIN D, TAB 1000IU (100/BT) 1000 UNITS: 25 TAB at 12:07

## 2019-01-01 RX ADMIN — CARVEDILOL 6.25 MG: 3.12 TABLET, FILM COATED ORAL at 08:34

## 2019-01-01 RX ADMIN — LACTOBACILLUS TAB 2 TABLET: TAB at 17:44

## 2019-01-01 RX ADMIN — CLONAZEPAM 0.25 MG: 1 TABLET ORAL at 22:25

## 2019-01-01 RX ADMIN — INSULIN HUMAN 4 UNITS: 100 INJECTION, SOLUTION PARENTERAL at 12:54

## 2019-01-01 RX ADMIN — HEPARIN SODIUM 5000 UNITS: 5000 INJECTION INTRAVENOUS; SUBCUTANEOUS at 22:44

## 2019-01-01 RX ADMIN — LEVOTHYROXINE SODIUM 75 MCG: 75 TABLET ORAL at 05:15

## 2019-01-01 RX ADMIN — VANCOMYCIN HYDROCHLORIDE 1000 MG: 1 INJECTION, POWDER, LYOPHILIZED, FOR SOLUTION INTRAVENOUS at 08:20

## 2019-01-01 RX ADMIN — SENNOSIDES 8.6 MG: 8.6 TABLET, FILM COATED ORAL at 08:32

## 2019-01-01 RX ADMIN — VITAMIN D, TAB 1000IU (100/BT) 1000 UNITS: 25 TAB at 08:15

## 2019-01-01 RX ADMIN — INSULIN LISPRO 4 UNITS: 100 INJECTION, SOLUTION INTRAVENOUS; SUBCUTANEOUS at 11:48

## 2019-01-01 RX ADMIN — Medication 10 ML: at 22:00

## 2019-01-01 RX ADMIN — CLONAZEPAM 0.5 MG: 0.5 TABLET ORAL at 23:00

## 2019-01-01 RX ADMIN — PREDNISONE 10 MG: 10 TABLET ORAL at 17:00

## 2019-01-01 RX ADMIN — AMLODIPINE BESYLATE 5 MG: 5 TABLET ORAL at 09:36

## 2019-01-01 RX ADMIN — METRONIDAZOLE 500 MG: 500 TABLET, FILM COATED ORAL at 21:12

## 2019-01-01 RX ADMIN — SENNOSIDES AND DOCUSATE SODIUM 1 TABLET: 8.6; 5 TABLET ORAL at 08:00

## 2019-01-01 RX ADMIN — VITAMIN D, TAB 1000IU (100/BT) 1000 UNITS: 25 TAB at 08:30

## 2019-01-01 RX ADMIN — DULOXETINE 60 MG: 60 CAPSULE, DELAYED RELEASE ORAL at 08:44

## 2019-01-01 RX ADMIN — HYDROMORPHONE HYDROCHLORIDE 0.2 MG: 1 INJECTION, SOLUTION INTRAMUSCULAR; INTRAVENOUS; SUBCUTANEOUS at 17:15

## 2019-01-01 RX ADMIN — OXYBUTYNIN CHLORIDE 5 MG: 5 TABLET ORAL at 22:01

## 2019-01-01 RX ADMIN — MIRTAZAPINE 7.5 MG: 15 TABLET, FILM COATED ORAL at 21:46

## 2019-01-01 RX ADMIN — SODIUM CHLORIDE 1000 ML: 900 INJECTION, SOLUTION INTRAVENOUS at 14:22

## 2019-01-01 RX ADMIN — CARVEDILOL 6.25 MG: 6.25 TABLET, FILM COATED ORAL at 09:01

## 2019-01-01 RX ADMIN — SODIUM CHLORIDE 100 ML/HR: 900 INJECTION, SOLUTION INTRAVENOUS at 18:19

## 2019-01-01 RX ADMIN — HEPARIN SODIUM 5000 UNITS: 5000 INJECTION INTRAVENOUS; SUBCUTANEOUS at 08:47

## 2019-01-01 RX ADMIN — METHADONE HYDROCHLORIDE 5 MG: 5 TABLET ORAL at 05:50

## 2019-01-01 RX ADMIN — HEPARIN SODIUM 5000 UNITS: 5000 INJECTION INTRAVENOUS; SUBCUTANEOUS at 05:07

## 2019-01-01 RX ADMIN — PREDNISONE 5 MG: 5 TABLET ORAL at 17:22

## 2019-01-01 RX ADMIN — Medication 10 ML: at 06:47

## 2019-01-01 RX ADMIN — Medication 10 ML: at 17:24

## 2019-01-01 RX ADMIN — VITAMIN D, TAB 1000IU (100/BT) 1000 UNITS: 25 TAB at 09:01

## 2019-01-01 RX ADMIN — VITAMIN D, TAB 1000IU (100/BT) 1000 UNITS: 25 TAB at 08:50

## 2019-01-01 RX ADMIN — Medication 5 ML: at 01:55

## 2019-01-01 RX ADMIN — FERROUS GLUCONATE 1 TABLET: 324 TABLET ORAL at 05:10

## 2019-01-01 RX ADMIN — Medication 5 ML: at 21:59

## 2019-01-01 RX ADMIN — LEVOTHYROXINE SODIUM 75 MCG: 75 TABLET ORAL at 06:20

## 2019-01-01 RX ADMIN — INSULIN LISPRO 4 UNITS: 100 INJECTION, SOLUTION INTRAVENOUS; SUBCUTANEOUS at 17:39

## 2019-01-01 RX ADMIN — INSULIN HUMAN 8 UNITS: 100 INJECTION, SOLUTION PARENTERAL at 21:32

## 2019-01-01 RX ADMIN — ACETAMINOPHEN 650 MG: 325 TABLET, FILM COATED ORAL at 23:42

## 2019-01-01 RX ADMIN — HYDROMORPHONE HYDROCHLORIDE 0.2 MG: 1 INJECTION, SOLUTION INTRAMUSCULAR; INTRAVENOUS; SUBCUTANEOUS at 08:03

## 2019-01-01 RX ADMIN — SODIUM CHLORIDE, SODIUM LACTATE, POTASSIUM CHLORIDE, AND CALCIUM CHLORIDE 100 ML/HR: 600; 310; 30; 20 INJECTION, SOLUTION INTRAVENOUS at 00:29

## 2019-01-01 RX ADMIN — INSULIN LISPRO 6 UNITS: 100 INJECTION, SOLUTION INTRAVENOUS; SUBCUTANEOUS at 17:38

## 2019-01-01 RX ADMIN — METHADONE HYDROCHLORIDE 10 MG: 10 TABLET ORAL at 12:29

## 2019-01-01 RX ADMIN — INSULIN LISPRO 2 UNITS: 100 INJECTION, SOLUTION INTRAVENOUS; SUBCUTANEOUS at 12:07

## 2019-01-01 RX ADMIN — CLONAZEPAM 0.5 MG: 1 TABLET ORAL at 16:57

## 2019-01-01 RX ADMIN — FAMOTIDINE 20 MG: 20 TABLET ORAL at 17:19

## 2019-01-01 RX ADMIN — HYDROMORPHONE HYDROCHLORIDE 0.2 MG: 1 INJECTION, SOLUTION INTRAMUSCULAR; INTRAVENOUS; SUBCUTANEOUS at 08:58

## 2019-01-01 RX ADMIN — NYSTATIN 500000 UNITS: 100000 SUSPENSION ORAL at 09:36

## 2019-01-01 RX ADMIN — ACETAMINOPHEN 650 MG: 325 TABLET, FILM COATED ORAL at 10:11

## 2019-01-01 RX ADMIN — METHADONE HYDROCHLORIDE 5 MG: 5 TABLET ORAL at 18:41

## 2019-01-01 RX ADMIN — DOXYCYCLINE HYCLATE 100 MG: 100 CAPSULE ORAL at 08:40

## 2019-01-01 RX ADMIN — HYDROMORPHONE HYDROCHLORIDE 0.2 MG: 1 INJECTION, SOLUTION INTRAMUSCULAR; INTRAVENOUS; SUBCUTANEOUS at 12:13

## 2019-01-01 RX ADMIN — HYDROCODONE BITARTRATE AND ACETAMINOPHEN 1 TABLET: 7.5; 325 TABLET ORAL at 22:36

## 2019-01-01 RX ADMIN — Medication 10 ML: at 05:15

## 2019-01-01 RX ADMIN — METHADONE HYDROCHLORIDE 5 MG: 5 TABLET ORAL at 12:22

## 2019-01-01 RX ADMIN — METHADONE HYDROCHLORIDE 5 MG: 5 TABLET ORAL at 12:26

## 2019-01-01 RX ADMIN — HYDRALAZINE HYDROCHLORIDE 25 MG: 25 TABLET, FILM COATED ORAL at 17:04

## 2019-01-01 RX ADMIN — HYDROCODONE BITARTRATE AND ACETAMINOPHEN 1 TABLET: 7.5; 325 TABLET ORAL at 06:31

## 2019-01-01 RX ADMIN — FAMOTIDINE 20 MG: 20 TABLET ORAL at 08:15

## 2019-01-01 RX ADMIN — CALCIUM CARBONATE 500 MG (1,250 MG)-VITAMIN D3 200 UNIT TABLET 1 TABLET: at 08:15

## 2019-01-01 RX ADMIN — PREDNISONE 20 MG: 10 TABLET ORAL at 17:33

## 2019-01-01 RX ADMIN — CEFTRIAXONE SODIUM 1 G: 1 INJECTION, POWDER, FOR SOLUTION INTRAMUSCULAR; INTRAVENOUS at 22:05

## 2019-01-01 RX ADMIN — METHADONE HYDROCHLORIDE 5 MG: 5 TABLET ORAL at 01:06

## 2019-01-01 RX ADMIN — CARVEDILOL 3.12 MG: 3.12 TABLET, FILM COATED ORAL at 18:45

## 2019-01-01 RX ADMIN — CLONAZEPAM 0.25 MG: 1 TABLET ORAL at 17:56

## 2019-01-01 RX ADMIN — INSULIN LISPRO 4 UNITS: 100 INJECTION, SOLUTION INTRAVENOUS; SUBCUTANEOUS at 21:50

## 2019-01-01 RX ADMIN — HEPARIN SODIUM 5000 UNITS: 5000 INJECTION INTRAVENOUS; SUBCUTANEOUS at 05:11

## 2019-01-01 RX ADMIN — Medication 10 ML: at 12:27

## 2019-01-01 RX ADMIN — CLONAZEPAM 0.5 MG: 0.5 TABLET ORAL at 07:50

## 2019-01-01 RX ADMIN — INSULIN LISPRO 2 UNITS: 100 INJECTION, SOLUTION INTRAVENOUS; SUBCUTANEOUS at 12:55

## 2019-01-01 RX ADMIN — LEVOTHYROXINE SODIUM 75 MCG: 75 TABLET ORAL at 05:35

## 2019-01-01 RX ADMIN — PROPOFOL 20 MG: 10 INJECTION, EMULSION INTRAVENOUS at 13:06

## 2019-01-01 RX ADMIN — PREDNISONE 5 MG: 5 TABLET ORAL at 12:26

## 2019-01-01 RX ADMIN — LIDOCAINE HYDROCHLORIDE 15 ML: 20 SOLUTION ORAL; TOPICAL at 10:30

## 2019-01-01 RX ADMIN — INSULIN LISPRO 5 UNITS: 100 INJECTION, SOLUTION INTRAVENOUS; SUBCUTANEOUS at 16:52

## 2019-01-01 RX ADMIN — NYSTATIN 500000 UNITS: 100000 SUSPENSION ORAL at 12:14

## 2019-01-01 RX ADMIN — SODIUM CHLORIDE 1 G: 900 INJECTION, SOLUTION INTRAVENOUS at 04:46

## 2019-01-01 RX ADMIN — PREDNISONE 10 MG: 10 TABLET ORAL at 08:41

## 2019-01-01 RX ADMIN — Medication 500 MG: at 07:54

## 2019-01-01 RX ADMIN — FUROSEMIDE 60 MG: 10 INJECTION, SOLUTION INTRAMUSCULAR; INTRAVENOUS at 05:05

## 2019-01-01 RX ADMIN — LEVOTHYROXINE SODIUM 75 MCG: 75 TABLET ORAL at 06:01

## 2019-01-01 RX ADMIN — Medication 10 ML: at 06:52

## 2019-01-01 RX ADMIN — Medication 10 ML: at 13:36

## 2019-01-01 RX ADMIN — METHADONE HYDROCHLORIDE 10 MG: 10 TABLET ORAL at 17:00

## 2019-01-01 RX ADMIN — METHADONE HYDROCHLORIDE 10 MG: 10 TABLET ORAL at 05:13

## 2019-01-01 RX ADMIN — PIPERACILLIN SODIUM,TAZOBACTAM SODIUM 3.38 G: 3; .375 INJECTION, POWDER, FOR SOLUTION INTRAVENOUS at 01:00

## 2019-01-01 RX ADMIN — INSULIN GLARGINE 10 UNITS: 100 INJECTION, SOLUTION SUBCUTANEOUS at 21:59

## 2019-01-01 RX ADMIN — Medication 10 ML: at 22:11

## 2019-01-01 RX ADMIN — TORSEMIDE 10 MG: 20 TABLET ORAL at 08:31

## 2019-01-01 RX ADMIN — Medication 10 ML: at 05:13

## 2019-01-01 RX ADMIN — DOCUSATE SODIUM 100 MG: 100 CAPSULE, LIQUID FILLED ORAL at 09:08

## 2019-01-01 RX ADMIN — SODIUM CHLORIDE, SODIUM LACTATE, POTASSIUM CHLORIDE, CALCIUM CHLORIDE: 600; 310; 30; 20 INJECTION, SOLUTION INTRAVENOUS at 16:03

## 2019-01-01 RX ADMIN — DOXYCYCLINE HYCLATE 100 MG: 100 CAPSULE ORAL at 08:15

## 2019-01-01 RX ADMIN — Medication 10 ML: at 06:03

## 2019-01-01 RX ADMIN — INSULIN LISPRO 2 UNITS: 100 INJECTION, SOLUTION INTRAVENOUS; SUBCUTANEOUS at 17:43

## 2019-01-01 RX ADMIN — HEPARIN SODIUM 5000 UNITS: 5000 INJECTION INTRAVENOUS; SUBCUTANEOUS at 10:30

## 2019-01-01 RX ADMIN — PIPERACILLIN SODIUM,TAZOBACTAM SODIUM 3.38 G: 3; .375 INJECTION, POWDER, FOR SOLUTION INTRAVENOUS at 11:47

## 2019-01-01 RX ADMIN — FERROUS GLUCONATE 1 TABLET: 324 TABLET ORAL at 09:02

## 2019-01-01 RX ADMIN — METHADONE HYDROCHLORIDE 5 MG: 5 TABLET ORAL at 08:25

## 2019-01-01 RX ADMIN — INSULIN LISPRO 2 UNITS: 100 INJECTION, SOLUTION INTRAVENOUS; SUBCUTANEOUS at 08:47

## 2019-01-01 RX ADMIN — Medication 1 AMPULE: at 08:58

## 2019-01-01 RX ADMIN — LISINOPRIL 10 MG: 5 TABLET ORAL at 08:45

## 2019-01-01 RX ADMIN — CARVEDILOL 6.25 MG: 6.25 TABLET, FILM COATED ORAL at 08:17

## 2019-01-01 RX ADMIN — VITAMIN D, TAB 1000IU (100/BT) 1000 UNITS: 25 TAB at 09:30

## 2019-01-01 RX ADMIN — PREDNISONE 10 MG: 10 TABLET ORAL at 18:45

## 2019-01-01 RX ADMIN — PREDNISONE 10 MG: 10 TABLET ORAL at 17:51

## 2019-01-01 RX ADMIN — PREDNISONE 5 MG: 5 TABLET ORAL at 22:38

## 2019-01-01 RX ADMIN — CARVEDILOL 12.5 MG: 12.5 TABLET, FILM COATED ORAL at 17:04

## 2019-01-01 RX ADMIN — PROPOFOL 20 MG: 10 INJECTION, EMULSION INTRAVENOUS at 13:10

## 2019-01-01 RX ADMIN — METHADONE HYDROCHLORIDE 10 MG: 10 TABLET ORAL at 21:43

## 2019-01-01 RX ADMIN — POTASSIUM CHLORIDE 10 MEQ: 10 TABLET, EXTENDED RELEASE ORAL at 08:15

## 2019-01-01 RX ADMIN — Medication 1 AMPULE: at 03:05

## 2019-01-01 RX ADMIN — ONDANSETRON 4 MG: 2 INJECTION INTRAMUSCULAR; INTRAVENOUS at 11:45

## 2019-01-01 RX ADMIN — INSULIN LISPRO 4 UNITS: 100 INJECTION, SOLUTION INTRAVENOUS; SUBCUTANEOUS at 17:21

## 2019-01-01 RX ADMIN — TAMSULOSIN HYDROCHLORIDE 0.4 MG: 0.4 CAPSULE ORAL at 08:41

## 2019-01-01 RX ADMIN — CLONAZEPAM 0.5 MG: 1 TABLET ORAL at 15:27

## 2019-01-01 RX ADMIN — GLIPIZIDE 10 MG: 10 TABLET, FILM COATED, EXTENDED RELEASE ORAL at 13:04

## 2019-01-01 RX ADMIN — METHADONE HYDROCHLORIDE 5 MG: 5 TABLET ORAL at 23:55

## 2019-01-01 RX ADMIN — HYDRALAZINE HYDROCHLORIDE 10 MG: 20 INJECTION INTRAMUSCULAR; INTRAVENOUS at 04:50

## 2019-01-01 RX ADMIN — LINEZOLID 600 MG: 600 INJECTION, SOLUTION INTRAVENOUS at 08:56

## 2019-01-01 RX ADMIN — SODIUM CHLORIDE, SODIUM LACTATE, POTASSIUM CHLORIDE, AND CALCIUM CHLORIDE 500 ML: 600; 310; 30; 20 INJECTION, SOLUTION INTRAVENOUS at 13:06

## 2019-01-01 RX ADMIN — MIRTAZAPINE 7.5 MG: 15 TABLET, FILM COATED ORAL at 20:56

## 2019-01-01 RX ADMIN — FAMOTIDINE 20 MG: 20 TABLET ORAL at 08:29

## 2019-01-01 RX ADMIN — AMLODIPINE BESYLATE 5 MG: 5 TABLET ORAL at 08:59

## 2019-01-01 RX ADMIN — HYDRALAZINE HYDROCHLORIDE 25 MG: 25 TABLET, FILM COATED ORAL at 15:41

## 2019-01-01 RX ADMIN — ASPIRIN 81 MG: 81 TABLET, COATED ORAL at 22:11

## 2019-01-01 RX ADMIN — HYDRALAZINE HYDROCHLORIDE 25 MG: 25 TABLET, FILM COATED ORAL at 07:47

## 2019-01-01 RX ADMIN — FERROUS GLUCONATE 1 TABLET: 324 TABLET ORAL at 05:22

## 2019-01-01 RX ADMIN — ASPIRIN 81 MG: 81 TABLET, COATED ORAL at 21:09

## 2019-01-01 RX ADMIN — LISINOPRIL 10 MG: 5 TABLET ORAL at 08:30

## 2019-01-01 RX ADMIN — METHADONE HYDROCHLORIDE 10 MG: 10 TABLET ORAL at 10:18

## 2019-01-01 RX ADMIN — INSULIN LISPRO 5 UNITS: 100 INJECTION, SOLUTION INTRAVENOUS; SUBCUTANEOUS at 22:14

## 2019-01-01 RX ADMIN — FUROSEMIDE 20 MG: 10 INJECTION, SOLUTION INTRAMUSCULAR; INTRAVENOUS at 07:49

## 2019-01-01 RX ADMIN — SODIUM CHLORIDE 100 ML/HR: 900 INJECTION, SOLUTION INTRAVENOUS at 11:01

## 2019-01-01 RX ADMIN — LIDOCAINE HYDROCHLORIDE 15 ML: 20 SOLUTION ORAL; TOPICAL at 14:01

## 2019-01-01 RX ADMIN — HYDRALAZINE HYDROCHLORIDE 25 MG: 25 TABLET, FILM COATED ORAL at 08:04

## 2019-01-01 RX ADMIN — DOCUSATE SODIUM 100 MG: 100 CAPSULE, LIQUID FILLED ORAL at 08:20

## 2019-01-01 RX ADMIN — POTASSIUM CHLORIDE 10 MEQ: 10 TABLET, EXTENDED RELEASE ORAL at 08:50

## 2019-01-01 RX ADMIN — ASPIRIN 81 MG: 81 TABLET, COATED ORAL at 21:12

## 2019-01-01 RX ADMIN — DULOXETINE HYDROCHLORIDE 60 MG: 60 CAPSULE, DELAYED RELEASE ORAL at 08:45

## 2019-01-01 RX ADMIN — PREDNISONE 10 MG: 10 TABLET ORAL at 08:33

## 2019-01-01 RX ADMIN — DULOXETINE HYDROCHLORIDE 60 MG: 60 CAPSULE, DELAYED RELEASE ORAL at 09:30

## 2019-01-01 RX ADMIN — TAMSULOSIN HYDROCHLORIDE 0.4 MG: 0.4 CAPSULE ORAL at 08:15

## 2019-01-01 RX ADMIN — CLONAZEPAM 0.5 MG: 0.5 TABLET ORAL at 21:06

## 2019-01-01 RX ADMIN — Medication 10 ML: at 22:46

## 2019-01-01 RX ADMIN — DULOXETINE HYDROCHLORIDE 60 MG: 60 CAPSULE, DELAYED RELEASE ORAL at 08:51

## 2019-01-01 RX ADMIN — NYSTATIN 500000 UNITS: 100000 SUSPENSION ORAL at 14:19

## 2019-01-01 RX ADMIN — HYDROCODONE BITARTRATE AND ACETAMINOPHEN 1 TABLET: 7.5; 325 TABLET ORAL at 16:45

## 2019-01-01 RX ADMIN — HEPARIN SODIUM 5000 UNITS: 5000 INJECTION INTRAVENOUS; SUBCUTANEOUS at 17:28

## 2019-01-01 RX ADMIN — OXYBUTYNIN CHLORIDE 5 MG: 5 TABLET ORAL at 22:43

## 2019-01-01 RX ADMIN — PREDNISONE 10 MG: 10 TABLET ORAL at 17:17

## 2019-01-01 RX ADMIN — HYDROCODONE BITARTRATE AND ACETAMINOPHEN 1 TABLET: 7.5; 325 TABLET ORAL at 14:42

## 2019-01-01 RX ADMIN — PREDNISONE 10 MG: 10 TABLET ORAL at 17:01

## 2019-01-01 RX ADMIN — METHADONE HYDROCHLORIDE 5 MG: 5 TABLET ORAL at 17:38

## 2019-01-01 RX ADMIN — VANCOMYCIN HYDROCHLORIDE 1000 MG: 1 INJECTION, POWDER, LYOPHILIZED, FOR SOLUTION INTRAVENOUS at 14:02

## 2019-01-01 RX ADMIN — Medication 10 ML: at 14:00

## 2019-01-01 RX ADMIN — HYDROCODONE BITARTRATE AND ACETAMINOPHEN 1 TABLET: 7.5; 325 TABLET ORAL at 05:02

## 2019-01-01 RX ADMIN — CLONAZEPAM 0.25 MG: 1 TABLET ORAL at 08:58

## 2019-01-01 RX ADMIN — METHADONE HYDROCHLORIDE 5 MG: 5 TABLET ORAL at 13:59

## 2019-01-01 RX ADMIN — LISINOPRIL 10 MG: 5 TABLET ORAL at 08:54

## 2019-01-01 RX ADMIN — HEPARIN SODIUM 5000 UNITS: 5000 INJECTION INTRAVENOUS; SUBCUTANEOUS at 22:39

## 2019-01-01 RX ADMIN — OXYBUTYNIN CHLORIDE 5 MG: 5 TABLET ORAL at 08:36

## 2019-01-01 RX ADMIN — METHADONE HYDROCHLORIDE 5 MG: 5 TABLET ORAL at 17:51

## 2019-01-01 RX ADMIN — POTASSIUM CHLORIDE 10 MEQ: 10 TABLET, EXTENDED RELEASE ORAL at 17:15

## 2019-01-01 RX ADMIN — INSULIN LISPRO 5 UNITS: 100 INJECTION, SOLUTION INTRAVENOUS; SUBCUTANEOUS at 21:44

## 2019-01-01 RX ADMIN — LEVOTHYROXINE SODIUM 75 MCG: 75 TABLET ORAL at 06:23

## 2019-01-01 RX ADMIN — NYSTATIN 500000 UNITS: 100000 SUSPENSION ORAL at 21:28

## 2019-01-01 RX ADMIN — COLCHICINE 0.6 MG: 0.6 TABLET, FILM COATED ORAL at 21:24

## 2019-01-01 RX ADMIN — MINOCYCLINE HYDROCHLORIDE 100 MG: 50 CAPSULE ORAL at 21:24

## 2019-01-01 RX ADMIN — POTASSIUM CHLORIDE 10 MEQ: 10 TABLET, EXTENDED RELEASE ORAL at 08:03

## 2019-01-01 RX ADMIN — PREDNISONE 10 MG: 10 TABLET ORAL at 17:56

## 2019-01-01 RX ADMIN — CARVEDILOL 6.25 MG: 6.25 TABLET, FILM COATED ORAL at 17:22

## 2019-01-01 RX ADMIN — CARVEDILOL 6.25 MG: 6.25 TABLET, FILM COATED ORAL at 08:33

## 2019-01-01 RX ADMIN — SODIUM CHLORIDE, SODIUM LACTATE, POTASSIUM CHLORIDE, AND CALCIUM CHLORIDE 100 ML/HR: 600; 310; 30; 20 INJECTION, SOLUTION INTRAVENOUS at 21:49

## 2019-01-01 RX ADMIN — METHADONE HYDROCHLORIDE 5 MG: 5 TABLET ORAL at 11:44

## 2019-01-01 RX ADMIN — VITAMIN D, TAB 1000IU (100/BT) 1000 UNITS: 25 TAB at 08:51

## 2019-01-01 RX ADMIN — CLONAZEPAM 0.25 MG: 1 TABLET ORAL at 08:18

## 2019-01-01 RX ADMIN — METHADONE HYDROCHLORIDE 5 MG: 5 TABLET ORAL at 23:30

## 2019-01-01 RX ADMIN — KETOROLAC TROMETHAMINE 15 MG: 15 INJECTION, SOLUTION INTRAMUSCULAR; INTRAVENOUS at 12:05

## 2019-01-01 RX ADMIN — INSULIN GLARGINE 10 UNITS: 100 INJECTION, SOLUTION SUBCUTANEOUS at 08:11

## 2019-01-01 RX ADMIN — METHADONE HYDROCHLORIDE 5 MG: 5 TABLET ORAL at 09:22

## 2019-01-01 RX ADMIN — HEPARIN SODIUM 5000 UNITS: 5000 INJECTION INTRAVENOUS; SUBCUTANEOUS at 11:09

## 2019-01-01 RX ADMIN — NYSTATIN 500000 UNITS: 100000 SUSPENSION ORAL at 09:00

## 2019-01-01 RX ADMIN — HEPARIN SODIUM 5000 UNITS: 5000 INJECTION INTRAVENOUS; SUBCUTANEOUS at 05:13

## 2019-01-01 RX ADMIN — LACTOBACILLUS TAB 2 TABLET: TAB at 17:38

## 2019-01-01 RX ADMIN — Medication 5 ML: at 13:41

## 2019-01-01 RX ADMIN — CLONAZEPAM 0.5 MG: 0.5 TABLET ORAL at 21:12

## 2019-01-01 RX ADMIN — CLONAZEPAM 0.25 MG: 1 TABLET ORAL at 16:37

## 2019-01-01 RX ADMIN — PREDNISONE 10 MG: 10 TABLET ORAL at 08:30

## 2019-01-01 RX ADMIN — MIRTAZAPINE 7.5 MG: 15 TABLET, FILM COATED ORAL at 21:27

## 2019-01-01 RX ADMIN — INSULIN LISPRO 5 UNITS: 100 INJECTION, SOLUTION INTRAVENOUS; SUBCUTANEOUS at 09:07

## 2019-01-01 RX ADMIN — Medication 10 ML: at 22:01

## 2019-01-01 RX ADMIN — PIPERACILLIN SODIUM AND TAZOBACTAM SODIUM 4.5 G: 4; .5 INJECTION, POWDER, LYOPHILIZED, FOR SOLUTION INTRAVENOUS at 20:19

## 2019-01-01 RX ADMIN — CLONAZEPAM 0.5 MG: 0.5 TABLET ORAL at 08:15

## 2019-01-01 RX ADMIN — METHADONE HYDROCHLORIDE 5 MG: 5 TABLET ORAL at 08:54

## 2019-01-01 RX ADMIN — INSULIN LISPRO 2 UNITS: 100 INJECTION, SOLUTION INTRAVENOUS; SUBCUTANEOUS at 08:27

## 2019-01-01 RX ADMIN — PIPERACILLIN SODIUM,TAZOBACTAM SODIUM 4.5 G: 4; .5 INJECTION, POWDER, FOR SOLUTION INTRAVENOUS at 08:48

## 2019-01-01 RX ADMIN — METHADONE HYDROCHLORIDE 5 MG: 10 TABLET ORAL at 00:28

## 2019-01-01 RX ADMIN — CLONAZEPAM 0.5 MG: 0.5 TABLET ORAL at 08:49

## 2019-01-01 RX ADMIN — INSULIN GLARGINE 25 UNITS: 100 INJECTION, SOLUTION SUBCUTANEOUS at 22:45

## 2019-01-01 RX ADMIN — AMLODIPINE BESYLATE 10 MG: 10 TABLET ORAL at 08:41

## 2019-01-01 RX ADMIN — CLONAZEPAM 0.5 MG: 0.5 TABLET ORAL at 08:19

## 2019-01-01 RX ADMIN — ASPIRIN 81 MG: 81 TABLET, COATED ORAL at 22:17

## 2019-01-01 RX ADMIN — Medication 500 MG: at 08:34

## 2019-01-01 RX ADMIN — LISINOPRIL 10 MG: 5 TABLET ORAL at 08:35

## 2019-01-01 RX ADMIN — METHADONE HYDROCHLORIDE 5 MG: 5 TABLET ORAL at 09:01

## 2019-01-01 RX ADMIN — DULOXETINE HYDROCHLORIDE 60 MG: 60 CAPSULE, DELAYED RELEASE ORAL at 08:15

## 2019-01-01 RX ADMIN — HYDROMORPHONE HYDROCHLORIDE 0.5 MG: 1 INJECTION, SOLUTION INTRAMUSCULAR; INTRAVENOUS; SUBCUTANEOUS at 10:53

## 2019-01-01 RX ADMIN — FUROSEMIDE 40 MG: 40 TABLET ORAL at 21:21

## 2019-01-01 RX ADMIN — LIDOCAINE HYDROCHLORIDE 15 ML: 20 SOLUTION ORAL; TOPICAL at 17:11

## 2019-01-01 RX ADMIN — CLONAZEPAM 0.5 MG: 1 TABLET ORAL at 22:37

## 2019-01-01 RX ADMIN — HEPARIN SODIUM 5000 UNITS: 5000 INJECTION INTRAVENOUS; SUBCUTANEOUS at 09:17

## 2019-01-01 RX ADMIN — Medication 10 ML: at 21:41

## 2019-01-01 RX ADMIN — Medication 1 AMPULE: at 08:54

## 2019-01-01 RX ADMIN — PREDNISONE 10 MG: 10 TABLET ORAL at 08:19

## 2019-01-01 RX ADMIN — CLONAZEPAM 0.5 MG: 0.5 TABLET ORAL at 21:42

## 2019-01-01 RX ADMIN — ONDANSETRON 4 MG: 2 INJECTION INTRAMUSCULAR; INTRAVENOUS at 18:41

## 2019-01-01 RX ADMIN — DOCUSATE SODIUM 100 MG: 100 CAPSULE, LIQUID FILLED ORAL at 17:12

## 2019-01-01 RX ADMIN — INSULIN GLARGINE 5 UNITS: 100 INJECTION, SOLUTION SUBCUTANEOUS at 22:00

## 2019-01-01 RX ADMIN — PREDNISONE 10 MG: 10 TABLET ORAL at 17:15

## 2019-01-01 RX ADMIN — TAMSULOSIN HYDROCHLORIDE 0.4 MG: 0.4 CAPSULE ORAL at 07:50

## 2019-01-01 RX ADMIN — METHADONE HYDROCHLORIDE 5 MG: 5 TABLET ORAL at 00:10

## 2019-01-01 RX ADMIN — METHADONE HYDROCHLORIDE 5 MG: 5 TABLET ORAL at 17:27

## 2019-01-01 RX ADMIN — TAMSULOSIN HYDROCHLORIDE 0.4 MG: 0.4 CAPSULE ORAL at 08:33

## 2019-01-01 RX ADMIN — INSULIN LISPRO 2 UNITS: 100 INJECTION, SOLUTION INTRAVENOUS; SUBCUTANEOUS at 17:25

## 2019-01-01 RX ADMIN — HEPARIN SODIUM 5000 UNITS: 5000 INJECTION INTRAVENOUS; SUBCUTANEOUS at 05:52

## 2019-01-01 RX ADMIN — HYDROCORTISONE SODIUM SUCCINATE 100 MG: 100 INJECTION, POWDER, FOR SOLUTION INTRAMUSCULAR; INTRAVENOUS at 15:04

## 2019-01-01 RX ADMIN — Medication 10 ML: at 16:37

## 2019-01-01 RX ADMIN — CEFEPIME 2 G: 2 INJECTION, POWDER, FOR SOLUTION INTRAVENOUS at 03:29

## 2019-01-01 RX ADMIN — Medication 1 AMPULE: at 21:35

## 2019-01-01 RX ADMIN — VANCOMYCIN HYDROCHLORIDE 1000 MG: 1 INJECTION, POWDER, LYOPHILIZED, FOR SOLUTION INTRAVENOUS at 08:59

## 2019-01-01 RX ADMIN — PREDNISONE 10 MG: 10 TABLET ORAL at 17:13

## 2019-01-01 RX ADMIN — CLONAZEPAM 0.5 MG: 0.5 TABLET ORAL at 15:55

## 2019-01-01 RX ADMIN — LIDOCAINE HYDROCHLORIDE 15 ML: 20 SOLUTION ORAL; TOPICAL at 09:36

## 2019-01-01 RX ADMIN — HEPARIN SODIUM 5000 UNITS: 5000 INJECTION INTRAVENOUS; SUBCUTANEOUS at 22:15

## 2019-01-01 RX ADMIN — CARVEDILOL 12.5 MG: 12.5 TABLET, FILM COATED ORAL at 08:10

## 2019-01-01 RX ADMIN — AMLODIPINE BESYLATE 2.5 MG: 5 TABLET ORAL at 08:55

## 2019-01-01 RX ADMIN — NYSTATIN 500000 UNITS: 100000 SUSPENSION ORAL at 22:23

## 2019-01-01 RX ADMIN — HEPARIN SODIUM 5000 UNITS: 5000 INJECTION INTRAVENOUS; SUBCUTANEOUS at 21:50

## 2019-01-01 RX ADMIN — VANCOMYCIN HYDROCHLORIDE 2500 MG: 10 INJECTION, POWDER, LYOPHILIZED, FOR SOLUTION INTRAVENOUS at 22:47

## 2019-01-01 RX ADMIN — INSULIN LISPRO 4 UNITS: 100 INJECTION, SOLUTION INTRAVENOUS; SUBCUTANEOUS at 16:52

## 2019-01-01 RX ADMIN — SODIUM CHLORIDE, SODIUM LACTATE, POTASSIUM CHLORIDE, AND CALCIUM CHLORIDE 500 ML: 600; 310; 30; 20 INJECTION, SOLUTION INTRAVENOUS at 10:36

## 2019-01-01 RX ADMIN — CLONAZEPAM 0.5 MG: 0.5 TABLET ORAL at 08:32

## 2019-01-01 RX ADMIN — HEPARIN SODIUM 5000 UNITS: 5000 INJECTION INTRAVENOUS; SUBCUTANEOUS at 23:46

## 2019-01-01 RX ADMIN — DULOXETINE HYDROCHLORIDE 60 MG: 60 CAPSULE, DELAYED RELEASE ORAL at 08:32

## 2019-01-01 RX ADMIN — PREDNISONE 5 MG: 5 TABLET ORAL at 16:59

## 2019-01-01 RX ADMIN — DOCUSATE SODIUM 100 MG: 100 CAPSULE, LIQUID FILLED ORAL at 15:55

## 2019-01-01 RX ADMIN — POTASSIUM CHLORIDE 10 MEQ: 10 TABLET, EXTENDED RELEASE ORAL at 08:35

## 2019-01-01 RX ADMIN — PROPOFOL 20 MG: 10 INJECTION, EMULSION INTRAVENOUS at 13:03

## 2019-01-01 RX ADMIN — AMLODIPINE BESYLATE 5 MG: 5 TABLET ORAL at 07:48

## 2019-01-01 RX ADMIN — Medication 20 ML: at 21:47

## 2019-01-01 RX ADMIN — METHADONE HYDROCHLORIDE 5 MG: 5 TABLET ORAL at 06:23

## 2019-01-01 RX ADMIN — CLONAZEPAM 0.5 MG: 1 TABLET ORAL at 08:16

## 2019-01-01 RX ADMIN — NYSTATIN 500000 UNITS: 100000 SUSPENSION ORAL at 17:29

## 2019-01-01 RX ADMIN — METHADONE HYDROCHLORIDE 5 MG: 5 TABLET ORAL at 15:58

## 2019-01-01 RX ADMIN — STANDARDIZED SENNA CONCENTRATE AND DOCUSATE SODIUM 1 TABLET: 8.6; 5 TABLET, FILM COATED ORAL at 08:37

## 2019-01-01 RX ADMIN — CLONAZEPAM 0.25 MG: 1 TABLET ORAL at 17:51

## 2019-01-01 RX ADMIN — METHADONE HYDROCHLORIDE 5 MG: 5 TABLET ORAL at 17:24

## 2019-01-01 RX ADMIN — VANCOMYCIN HYDROCHLORIDE 2000 MG: 10 INJECTION, POWDER, LYOPHILIZED, FOR SOLUTION INTRAVENOUS at 12:58

## 2019-01-01 RX ADMIN — CLONAZEPAM 0.25 MG: 1 TABLET ORAL at 21:49

## 2019-01-01 RX ADMIN — AMLODIPINE BESYLATE 5 MG: 5 TABLET ORAL at 08:17

## 2019-01-01 RX ADMIN — Medication 10 ML: at 21:46

## 2019-01-01 RX ADMIN — LEVOTHYROXINE SODIUM 75 MCG: 75 TABLET ORAL at 05:14

## 2019-01-01 RX ADMIN — CARVEDILOL 6.25 MG: 6.25 TABLET, FILM COATED ORAL at 08:08

## 2019-01-01 RX ADMIN — CARVEDILOL 6.25 MG: 6.25 TABLET, FILM COATED ORAL at 08:15

## 2019-01-01 RX ADMIN — HYDRALAZINE HYDROCHLORIDE 25 MG: 25 TABLET, FILM COATED ORAL at 22:11

## 2019-01-01 RX ADMIN — ASPIRIN 81 MG: 81 TABLET, COATED ORAL at 21:27

## 2019-01-01 RX ADMIN — INSULIN LISPRO 5 UNITS: 100 INJECTION, SOLUTION INTRAVENOUS; SUBCUTANEOUS at 08:20

## 2019-01-01 RX ADMIN — Medication 1 AMPULE: at 09:47

## 2019-01-01 RX ADMIN — TAMSULOSIN HYDROCHLORIDE 0.4 MG: 0.4 CAPSULE ORAL at 21:54

## 2019-01-01 RX ADMIN — HYDROMORPHONE HYDROCHLORIDE 0.2 MG: 1 INJECTION, SOLUTION INTRAMUSCULAR; INTRAVENOUS; SUBCUTANEOUS at 18:32

## 2019-01-01 RX ADMIN — ENOXAPARIN SODIUM 40 MG: 40 INJECTION SUBCUTANEOUS at 12:55

## 2019-01-01 RX ADMIN — CLONAZEPAM 0.25 MG: 1 TABLET ORAL at 21:37

## 2019-01-01 RX ADMIN — LEVOTHYROXINE SODIUM 75 MCG: 75 TABLET ORAL at 05:38

## 2019-01-01 RX ADMIN — Medication 10 ML: at 05:01

## 2019-01-01 RX ADMIN — Medication 10 ML: at 21:53

## 2019-01-01 RX ADMIN — METHADONE HYDROCHLORIDE 5 MG: 5 TABLET ORAL at 23:14

## 2019-01-01 RX ADMIN — SENNOSIDES, DOCUSATE SODIUM 1 TABLET: 50; 8.6 TABLET, FILM COATED ORAL at 08:04

## 2019-01-01 RX ADMIN — FERROUS GLUCONATE 1 TABLET: 324 TABLET ORAL at 06:24

## 2019-01-01 RX ADMIN — AMLODIPINE BESYLATE 5 MG: 5 TABLET ORAL at 08:26

## 2019-01-01 RX ADMIN — CARVEDILOL 6.25 MG: 6.25 TABLET, FILM COATED ORAL at 08:27

## 2019-01-01 RX ADMIN — Medication 10 ML: at 14:04

## 2019-01-01 RX ADMIN — METHADONE HYDROCHLORIDE 5 MG: 5 TABLET ORAL at 17:56

## 2019-01-01 RX ADMIN — FUROSEMIDE 40 MG: 10 INJECTION, SOLUTION INTRAMUSCULAR; INTRAVENOUS at 02:11

## 2019-01-01 RX ADMIN — Medication 500 MG: at 08:10

## 2019-01-01 RX ADMIN — ASPIRIN 81 MG: 81 TABLET, COATED ORAL at 21:07

## 2019-01-01 RX ADMIN — INSULIN GLARGINE 15 UNITS: 100 INJECTION, SOLUTION SUBCUTANEOUS at 21:36

## 2019-01-01 RX ADMIN — CEFTRIAXONE SODIUM 2 G: 2 INJECTION, POWDER, FOR SOLUTION INTRAMUSCULAR; INTRAVENOUS at 13:40

## 2019-01-01 RX ADMIN — LEVOTHYROXINE SODIUM 75 MCG: 75 TABLET ORAL at 05:18

## 2019-01-01 RX ADMIN — PREDNISONE 10 MG: 10 TABLET ORAL at 17:39

## 2019-01-01 RX ADMIN — FERROUS GLUCONATE 1 TABLET: 324 TABLET ORAL at 06:30

## 2019-01-01 RX ADMIN — FAMOTIDINE 20 MG: 20 TABLET ORAL at 08:49

## 2019-01-01 RX ADMIN — Medication 10 ML: at 13:25

## 2019-01-01 RX ADMIN — METRONIDAZOLE 500 MG: 500 TABLET, FILM COATED ORAL at 08:15

## 2019-01-01 RX ADMIN — SODIUM CHLORIDE 100 ML/HR: 900 INJECTION, SOLUTION INTRAVENOUS at 05:08

## 2019-01-01 RX ADMIN — METHADONE HYDROCHLORIDE 5 MG: 5 TABLET ORAL at 12:52

## 2019-01-01 RX ADMIN — Medication 5 ML: at 05:10

## 2019-01-01 RX ADMIN — HYDROCODONE BITARTRATE AND ACETAMINOPHEN 1 TABLET: 7.5; 325 TABLET ORAL at 04:34

## 2019-01-01 RX ADMIN — SENNOSIDES, DOCUSATE SODIUM 1 TABLET: 50; 8.6 TABLET, FILM COATED ORAL at 07:49

## 2019-01-01 RX ADMIN — PREDNISONE 10 MG: 10 TABLET ORAL at 09:08

## 2019-01-01 RX ADMIN — CLONAZEPAM 0.5 MG: 1 TABLET ORAL at 21:51

## 2019-01-01 RX ADMIN — INSULIN LISPRO 5 UNITS: 100 INJECTION, SOLUTION INTRAVENOUS; SUBCUTANEOUS at 07:30

## 2019-01-01 RX ADMIN — CARVEDILOL 3.12 MG: 3.12 TABLET, FILM COATED ORAL at 08:55

## 2019-01-01 RX ADMIN — INSULIN LISPRO 5 UNITS: 100 INJECTION, SOLUTION INTRAVENOUS; SUBCUTANEOUS at 17:25

## 2019-01-01 RX ADMIN — GADOTERATE MEGLUMINE 16 ML: 376.9 INJECTION INTRAVENOUS at 18:02

## 2019-01-01 RX ADMIN — SENNOSIDES AND DOCUSATE SODIUM 1 TABLET: 8.6; 5 TABLET ORAL at 08:41

## 2019-01-01 RX ADMIN — METHADONE HYDROCHLORIDE 5 MG: 5 TABLET ORAL at 16:50

## 2019-01-01 RX ADMIN — PREDNISONE 10 MG: 10 TABLET ORAL at 08:37

## 2019-01-01 RX ADMIN — HEPARIN SODIUM 5000 UNITS: 5000 INJECTION INTRAVENOUS; SUBCUTANEOUS at 05:38

## 2019-01-01 RX ADMIN — FERROUS GLUCONATE 1 TABLET: 324 TABLET ORAL at 05:51

## 2019-01-01 RX ADMIN — PIPERACILLIN SODIUM,TAZOBACTAM SODIUM 3.38 G: 3; .375 INJECTION, POWDER, FOR SOLUTION INTRAVENOUS at 20:17

## 2019-01-01 RX ADMIN — AMLODIPINE BESYLATE 2.5 MG: 5 TABLET ORAL at 09:30

## 2019-01-01 RX ADMIN — ASPIRIN 81 MG: 81 TABLET, COATED ORAL at 20:52

## 2019-01-01 RX ADMIN — POTASSIUM CHLORIDE 10 MEQ: 10 TABLET, EXTENDED RELEASE ORAL at 11:08

## 2019-01-01 RX ADMIN — Medication 1 AMPULE: at 22:24

## 2019-01-01 RX ADMIN — INSULIN LISPRO 2 UNITS: 100 INJECTION, SOLUTION INTRAVENOUS; SUBCUTANEOUS at 21:32

## 2019-01-01 RX ADMIN — PERFLUTREN 1 ML: 6.52 INJECTION, SUSPENSION INTRAVENOUS at 11:00

## 2019-01-01 RX ADMIN — POTASSIUM CHLORIDE 10 MEQ: 10 TABLET, EXTENDED RELEASE ORAL at 07:47

## 2019-01-01 RX ADMIN — INSULIN LISPRO 3 UNITS: 100 INJECTION, SOLUTION INTRAVENOUS; SUBCUTANEOUS at 09:24

## 2019-01-01 RX ADMIN — INSULIN LISPRO 2 UNITS: 100 INJECTION, SOLUTION INTRAVENOUS; SUBCUTANEOUS at 12:22

## 2019-01-01 RX ADMIN — INSULIN GLARGINE 10 UNITS: 100 INJECTION, SOLUTION SUBCUTANEOUS at 09:46

## 2019-01-01 RX ADMIN — Medication 1 AMPULE: at 08:07

## 2019-01-01 RX ADMIN — DULOXETINE HYDROCHLORIDE 60 MG: 60 CAPSULE, DELAYED RELEASE ORAL at 09:41

## 2019-01-01 RX ADMIN — METHADONE HYDROCHLORIDE 5 MG: 10 TABLET ORAL at 05:35

## 2019-01-01 RX ADMIN — POLYETHYLENE GLYCOL 3350 17 G: 17 POWDER, FOR SOLUTION ORAL at 08:45

## 2019-01-01 RX ADMIN — VANCOMYCIN HYDROCHLORIDE 1000 MG: 1 INJECTION, POWDER, LYOPHILIZED, FOR SOLUTION INTRAVENOUS at 22:50

## 2019-01-01 RX ADMIN — POLYETHYLENE GLYCOL 3350 17 G: 17 POWDER, FOR SOLUTION ORAL at 12:05

## 2019-01-01 RX ADMIN — HEPARIN SODIUM 5000 UNITS: 5000 INJECTION INTRAVENOUS; SUBCUTANEOUS at 23:29

## 2019-01-01 RX ADMIN — VITAMIN D, TAB 1000IU (100/BT) 1000 UNITS: 25 TAB at 08:31

## 2019-01-01 RX ADMIN — LACTOBACILLUS TAB 2 TABLET: TAB at 08:54

## 2019-01-01 RX ADMIN — NYSTATIN 500000 UNITS: 100000 SUSPENSION ORAL at 21:36

## 2019-01-01 RX ADMIN — INSULIN LISPRO 5 UNITS: 100 INJECTION, SOLUTION INTRAVENOUS; SUBCUTANEOUS at 11:30

## 2019-01-01 RX ADMIN — PIPERACILLIN SODIUM,TAZOBACTAM SODIUM 3.38 G: 3; .375 INJECTION, POWDER, FOR SOLUTION INTRAVENOUS at 01:23

## 2019-01-01 RX ADMIN — CLONAZEPAM 0.5 MG: 1 TABLET ORAL at 08:50

## 2019-01-01 RX ADMIN — FAMOTIDINE 20 MG: 20 TABLET ORAL at 09:02

## 2019-01-01 RX ADMIN — Medication 1 AMPULE: at 08:17

## 2019-01-01 RX ADMIN — METHADONE HYDROCHLORIDE 5 MG: 5 TABLET ORAL at 06:01

## 2019-01-01 RX ADMIN — METRONIDAZOLE 500 MG: 500 TABLET, FILM COATED ORAL at 07:50

## 2019-01-01 RX ADMIN — FERROUS GLUCONATE 1 TABLET: 324 TABLET ORAL at 09:30

## 2019-01-01 RX ADMIN — FERROUS GLUCONATE 1 TABLET: 324 TABLET ORAL at 08:47

## 2019-01-01 RX ADMIN — HYDROMORPHONE HYDROCHLORIDE 0.5 MG: 2 INJECTION INTRAMUSCULAR; INTRAVENOUS; SUBCUTANEOUS at 14:16

## 2019-01-01 RX ADMIN — DOXYCYCLINE HYCLATE 100 MG: 100 CAPSULE ORAL at 09:43

## 2019-01-01 RX ADMIN — ASPIRIN 81 MG: 81 TABLET, COATED ORAL at 21:58

## 2019-01-01 RX ADMIN — SODIUM CHLORIDE 1 G: 900 INJECTION, SOLUTION INTRAVENOUS at 05:25

## 2019-01-01 RX ADMIN — HYDROCORTISONE SODIUM SUCCINATE 100 MG: 100 INJECTION, POWDER, FOR SOLUTION INTRAMUSCULAR; INTRAVENOUS at 19:41

## 2019-01-01 RX ADMIN — CARVEDILOL 3.12 MG: 3.12 TABLET, FILM COATED ORAL at 16:45

## 2019-01-01 RX ADMIN — Medication 20 ML: at 17:08

## 2019-01-01 RX ADMIN — INSULIN LISPRO 6 UNITS: 100 INJECTION, SOLUTION INTRAVENOUS; SUBCUTANEOUS at 21:30

## 2019-01-01 RX ADMIN — METHADONE HYDROCHLORIDE 5 MG: 5 TABLET ORAL at 21:23

## 2019-01-01 RX ADMIN — METHADONE HYDROCHLORIDE 5 MG: 5 TABLET ORAL at 05:18

## 2019-01-01 RX ADMIN — INSULIN LISPRO 6 UNITS: 100 INJECTION, SOLUTION INTRAVENOUS; SUBCUTANEOUS at 22:41

## 2019-01-01 RX ADMIN — CLONAZEPAM 0.5 MG: 0.5 TABLET ORAL at 17:08

## 2019-01-01 RX ADMIN — Medication 10 ML: at 21:49

## 2019-01-01 RX ADMIN — METHADONE HYDROCHLORIDE 5 MG: 5 TABLET ORAL at 06:45

## 2019-01-01 RX ADMIN — LEVOTHYROXINE SODIUM 75 MCG: 75 TABLET ORAL at 05:22

## 2019-01-01 RX ADMIN — METHADONE HYDROCHLORIDE 5 MG: 5 TABLET ORAL at 00:13

## 2019-01-01 RX ADMIN — CARVEDILOL 6.25 MG: 6.25 TABLET, FILM COATED ORAL at 08:51

## 2019-01-01 RX ADMIN — DULOXETINE HYDROCHLORIDE 60 MG: 60 CAPSULE, DELAYED RELEASE ORAL at 09:08

## 2019-01-01 RX ADMIN — CARVEDILOL 6.25 MG: 6.25 TABLET, FILM COATED ORAL at 08:55

## 2019-01-01 RX ADMIN — INSULIN LISPRO 6 UNITS: 100 INJECTION, SOLUTION INTRAVENOUS; SUBCUTANEOUS at 17:16

## 2019-01-01 RX ADMIN — HYDRALAZINE HYDROCHLORIDE 25 MG: 25 TABLET, FILM COATED ORAL at 08:19

## 2019-01-01 RX ADMIN — AMLODIPINE BESYLATE 2.5 MG: 5 TABLET ORAL at 08:43

## 2019-01-01 RX ADMIN — CARVEDILOL 12.5 MG: 12.5 TABLET, FILM COATED ORAL at 08:15

## 2019-01-01 RX ADMIN — ATORVASTATIN CALCIUM 40 MG: 40 TABLET, FILM COATED ORAL at 21:43

## 2019-01-01 RX ADMIN — CLONAZEPAM 0.5 MG: 1 TABLET ORAL at 16:53

## 2019-01-01 RX ADMIN — CLONAZEPAM 0.5 MG: 0.5 TABLET ORAL at 08:50

## 2019-01-01 RX ADMIN — ACETAMINOPHEN 500 MG: 500 TABLET, FILM COATED ORAL at 01:18

## 2019-01-01 RX ADMIN — MIRTAZAPINE 7.5 MG: 15 TABLET, FILM COATED ORAL at 22:36

## 2019-01-01 RX ADMIN — OXYBUTYNIN CHLORIDE 5 MG: 5 TABLET ORAL at 16:30

## 2019-01-01 RX ADMIN — INSULIN LISPRO 8 UNITS: 100 INJECTION, SOLUTION INTRAVENOUS; SUBCUTANEOUS at 22:12

## 2019-01-01 RX ADMIN — METHADONE HYDROCHLORIDE 5 MG: 5 TABLET ORAL at 05:00

## 2019-01-01 RX ADMIN — Medication 10 ML: at 05:19

## 2019-01-01 RX ADMIN — PIPERACILLIN SODIUM,TAZOBACTAM SODIUM 3.38 G: 3; .375 INJECTION, POWDER, FOR SOLUTION INTRAVENOUS at 04:16

## 2019-01-01 RX ADMIN — PREDNISONE 20 MG: 10 TABLET ORAL at 08:50

## 2019-01-01 RX ADMIN — ASPIRIN 81 MG: 81 TABLET, COATED ORAL at 22:48

## 2019-01-01 RX ADMIN — POLYETHYLENE GLYCOL 3350 17 G: 17 POWDER, FOR SOLUTION ORAL at 08:49

## 2019-01-01 RX ADMIN — METHADONE HYDROCHLORIDE 5 MG: 5 TABLET ORAL at 05:10

## 2019-01-01 RX ADMIN — OXYBUTYNIN CHLORIDE 5 MG: 5 TABLET ORAL at 08:53

## 2019-01-01 RX ADMIN — CLONAZEPAM 0.5 MG: 0.5 TABLET ORAL at 08:24

## 2019-01-01 RX ADMIN — AMLODIPINE BESYLATE 2.5 MG: 5 TABLET ORAL at 08:24

## 2019-01-01 RX ADMIN — SODIUM CHLORIDE 75 ML/HR: 900 INJECTION, SOLUTION INTRAVENOUS at 18:43

## 2019-01-01 RX ADMIN — METHADONE HYDROCHLORIDE 5 MG: 10 TABLET ORAL at 23:30

## 2019-01-01 RX ADMIN — CARVEDILOL 3.12 MG: 3.12 TABLET, FILM COATED ORAL at 08:50

## 2019-01-01 RX ADMIN — METHADONE HYDROCHLORIDE 10 MG: 10 TABLET ORAL at 15:41

## 2019-01-01 RX ADMIN — INSULIN LISPRO 5 UNITS: 100 INJECTION, SOLUTION INTRAVENOUS; SUBCUTANEOUS at 08:47

## 2019-01-01 RX ADMIN — LIDOCAINE HYDROCHLORIDE 15 ML: 20 SOLUTION ORAL; TOPICAL at 22:49

## 2019-01-01 RX ADMIN — CLONAZEPAM 0.5 MG: 0.5 TABLET ORAL at 15:54

## 2019-01-01 RX ADMIN — ONDANSETRON 4 MG: 2 INJECTION INTRAMUSCULAR; INTRAVENOUS at 08:17

## 2019-01-01 RX ADMIN — HEPARIN SODIUM 5000 UNITS: 5000 INJECTION INTRAVENOUS; SUBCUTANEOUS at 15:10

## 2019-01-01 RX ADMIN — LEVOTHYROXINE SODIUM 75 MCG: 75 TABLET ORAL at 06:32

## 2019-01-01 RX ADMIN — Medication 10 ML: at 05:05

## 2019-01-01 RX ADMIN — FERROUS GLUCONATE 1 TABLET: 324 TABLET ORAL at 05:06

## 2019-01-01 RX ADMIN — HEPARIN SODIUM 5000 UNITS: 5000 INJECTION INTRAVENOUS; SUBCUTANEOUS at 21:46

## 2019-01-01 RX ADMIN — VANCOMYCIN HYDROCHLORIDE 2000 MG: 10 INJECTION, POWDER, LYOPHILIZED, FOR SOLUTION INTRAVENOUS at 14:21

## 2019-01-01 RX ADMIN — SODIUM CHLORIDE 1 G: 900 INJECTION, SOLUTION INTRAVENOUS at 22:31

## 2019-01-01 RX ADMIN — Medication 10 ML: at 14:39

## 2019-01-01 RX ADMIN — OXYBUTYNIN CHLORIDE 5 MG: 5 TABLET, EXTENDED RELEASE ORAL at 08:50

## 2019-01-01 RX ADMIN — Medication 5 ML: at 13:27

## 2019-01-01 RX ADMIN — INSULIN LISPRO 5 UNITS: 100 INJECTION, SOLUTION INTRAVENOUS; SUBCUTANEOUS at 13:11

## 2019-01-01 RX ADMIN — HEPARIN SODIUM 5000 UNITS: 5000 INJECTION INTRAVENOUS; SUBCUTANEOUS at 17:39

## 2019-01-01 RX ADMIN — INSULIN GLARGINE 5 UNITS: 100 INJECTION, SOLUTION SUBCUTANEOUS at 23:37

## 2019-01-01 RX ADMIN — Medication 10 ML: at 14:19

## 2019-01-01 RX ADMIN — Medication 5 ML: at 14:30

## 2019-01-01 RX ADMIN — FAMOTIDINE 20 MG: 20 TABLET ORAL at 08:19

## 2019-01-01 RX ADMIN — INSULIN LISPRO 8 UNITS: 100 INJECTION, SOLUTION INTRAVENOUS; SUBCUTANEOUS at 22:00

## 2019-01-01 RX ADMIN — METRONIDAZOLE 500 MG: 500 TABLET, FILM COATED ORAL at 09:59

## 2019-01-01 RX ADMIN — CEFTRIAXONE SODIUM 1 G: 1 INJECTION, POWDER, FOR SOLUTION INTRAMUSCULAR; INTRAVENOUS at 14:41

## 2019-01-01 RX ADMIN — LEVOTHYROXINE SODIUM 75 MCG: 75 TABLET ORAL at 05:52

## 2019-01-01 RX ADMIN — SENNOSIDES, DOCUSATE SODIUM 1 TABLET: 50; 8.6 TABLET, FILM COATED ORAL at 08:11

## 2019-01-01 RX ADMIN — HYDROCODONE BITARTRATE AND ACETAMINOPHEN 1 TABLET: 7.5; 325 TABLET ORAL at 04:19

## 2019-01-01 RX ADMIN — METHADONE HYDROCHLORIDE 5 MG: 5 TABLET ORAL at 05:12

## 2019-01-01 RX ADMIN — PREDNISONE 5 MG: 5 TABLET ORAL at 17:00

## 2019-01-01 RX ADMIN — CARVEDILOL 12.5 MG: 12.5 TABLET, FILM COATED ORAL at 17:18

## 2019-01-01 RX ADMIN — ASPIRIN 81 MG: 81 TABLET, COATED ORAL at 22:24

## 2019-01-01 RX ADMIN — HEPARIN SODIUM 5000 UNITS: 5000 INJECTION INTRAVENOUS; SUBCUTANEOUS at 08:36

## 2019-01-01 RX ADMIN — METHADONE HYDROCHLORIDE 5 MG: 5 TABLET ORAL at 23:36

## 2019-01-01 RX ADMIN — PROPOFOL 20 MG: 10 INJECTION, EMULSION INTRAVENOUS at 13:15

## 2019-01-01 RX ADMIN — MIRTAZAPINE 7.5 MG: 15 TABLET, FILM COATED ORAL at 21:23

## 2019-01-01 RX ADMIN — CLONAZEPAM 0.5 MG: 1 TABLET ORAL at 08:15

## 2019-01-01 RX ADMIN — ASPIRIN 81 MG: 81 TABLET, COATED ORAL at 22:36

## 2019-01-01 RX ADMIN — CLONAZEPAM 0.5 MG: 1 TABLET ORAL at 07:48

## 2019-01-01 RX ADMIN — DULOXETINE HYDROCHLORIDE 60 MG: 60 CAPSULE, DELAYED RELEASE ORAL at 08:41

## 2019-01-01 RX ADMIN — DULOXETINE 60 MG: 60 CAPSULE, DELAYED RELEASE ORAL at 09:00

## 2019-01-01 RX ADMIN — VANCOMYCIN HYDROCHLORIDE 1500 MG: 10 INJECTION, POWDER, LYOPHILIZED, FOR SOLUTION INTRAVENOUS at 22:01

## 2019-01-01 RX ADMIN — CLONAZEPAM 0.5 MG: 0.5 TABLET ORAL at 22:37

## 2019-01-01 RX ADMIN — OXYBUTYNIN CHLORIDE 5 MG: 5 TABLET, EXTENDED RELEASE ORAL at 08:30

## 2019-01-01 RX ADMIN — LIDOCAINE HYDROCHLORIDE 15 ML: 20 SOLUTION ORAL; TOPICAL at 21:36

## 2019-01-01 RX ADMIN — INSULIN LISPRO 2 UNITS: 100 INJECTION, SOLUTION INTRAVENOUS; SUBCUTANEOUS at 12:12

## 2019-01-01 RX ADMIN — HYDRALAZINE HYDROCHLORIDE 25 MG: 25 TABLET, FILM COATED ORAL at 07:48

## 2019-01-01 RX ADMIN — PIPERACILLIN SODIUM,TAZOBACTAM SODIUM 3.38 G: 3; .375 INJECTION, POWDER, FOR SOLUTION INTRAVENOUS at 21:52

## 2019-01-01 RX ADMIN — SODIUM CHLORIDE 250 ML/HR: 900 INJECTION, SOLUTION INTRAVENOUS at 22:30

## 2019-01-01 RX ADMIN — CLONAZEPAM 0.25 MG: 1 TABLET ORAL at 21:25

## 2019-01-01 RX ADMIN — Medication 1 AMPULE: at 21:27

## 2019-01-01 RX ADMIN — PREDNISONE 10 MG: 10 TABLET ORAL at 08:50

## 2019-01-01 RX ADMIN — CLONAZEPAM 0.5 MG: 0.5 TABLET ORAL at 16:29

## 2019-01-01 RX ADMIN — CLONAZEPAM 0.5 MG: 0.5 TABLET ORAL at 21:54

## 2019-01-01 RX ADMIN — VANCOMYCIN HYDROCHLORIDE 1500 MG: 10 INJECTION, POWDER, LYOPHILIZED, FOR SOLUTION INTRAVENOUS at 21:58

## 2019-01-01 RX ADMIN — Medication 500 MG: at 17:14

## 2019-01-01 RX ADMIN — OXYBUTYNIN CHLORIDE 5 MG: 5 TABLET, EXTENDED RELEASE ORAL at 08:10

## 2019-01-01 RX ADMIN — HEPARIN SODIUM 5000 UNITS: 5000 INJECTION INTRAVENOUS; SUBCUTANEOUS at 08:15

## 2019-01-01 RX ADMIN — INSULIN LISPRO 4 UNITS: 100 INJECTION, SOLUTION INTRAVENOUS; SUBCUTANEOUS at 21:57

## 2019-01-01 RX ADMIN — PREDNISONE 5 MG: 5 TABLET ORAL at 08:45

## 2019-01-01 RX ADMIN — METHADONE HYDROCHLORIDE 5 MG: 5 TABLET ORAL at 08:36

## 2019-01-01 RX ADMIN — CEPHALEXIN 500 MG: 500 CAPSULE ORAL at 05:36

## 2019-01-01 RX ADMIN — LACTOBACILLUS TAB 2 TABLET: TAB at 08:59

## 2019-01-01 RX ADMIN — INSULIN LISPRO 2 UNITS: 100 INJECTION, SOLUTION INTRAVENOUS; SUBCUTANEOUS at 12:00

## 2019-01-01 RX ADMIN — Medication 10 ML: at 06:34

## 2019-01-01 RX ADMIN — CLONAZEPAM 0.25 MG: 1 TABLET ORAL at 09:45

## 2019-01-01 RX ADMIN — CLONAZEPAM 0.5 MG: 1 TABLET ORAL at 22:11

## 2019-01-01 RX ADMIN — PREDNISONE 10 MG: 10 TABLET ORAL at 17:44

## 2019-01-01 RX ADMIN — CLONAZEPAM 0.5 MG: 0.5 TABLET ORAL at 15:11

## 2019-01-01 RX ADMIN — CLONAZEPAM 0.5 MG: 1 TABLET ORAL at 21:27

## 2019-01-01 RX ADMIN — INSULIN LISPRO 5 UNITS: 100 INJECTION, SOLUTION INTRAVENOUS; SUBCUTANEOUS at 12:06

## 2019-01-01 RX ADMIN — CEFPODOXIME PROXETIL 400 MG: 200 TABLET, FILM COATED ORAL at 21:09

## 2019-01-01 RX ADMIN — METHADONE HYDROCHLORIDE 5 MG: 5 TABLET ORAL at 19:54

## 2019-01-01 RX ADMIN — CARVEDILOL 6.25 MG: 6.25 TABLET, FILM COATED ORAL at 08:00

## 2019-01-01 RX ADMIN — INSULIN GLARGINE 20 UNITS: 100 INJECTION, SOLUTION SUBCUTANEOUS at 21:51

## 2019-01-01 RX ADMIN — PREDNISONE 5 MG: 5 TABLET ORAL at 08:50

## 2019-01-01 RX ADMIN — CARVEDILOL 3.12 MG: 3.12 TABLET, FILM COATED ORAL at 08:49

## 2019-01-01 RX ADMIN — CLONAZEPAM 0.5 MG: 0.5 TABLET ORAL at 17:12

## 2019-01-01 RX ADMIN — METHADONE HYDROCHLORIDE 5 MG: 5 TABLET ORAL at 23:10

## 2019-01-01 RX ADMIN — METHADONE HYDROCHLORIDE 5 MG: 5 TABLET ORAL at 06:19

## 2019-01-01 RX ADMIN — FAMOTIDINE 20 MG: 20 TABLET ORAL at 07:50

## 2019-01-01 RX ADMIN — OXYBUTYNIN CHLORIDE 5 MG: 5 TABLET ORAL at 08:31

## 2019-01-01 RX ADMIN — METHADONE HYDROCHLORIDE 5 MG: 5 TABLET ORAL at 12:01

## 2019-01-01 RX ADMIN — Medication 10 ML: at 07:46

## 2019-01-01 RX ADMIN — ASPIRIN 81 MG: 81 TABLET, COATED ORAL at 21:45

## 2019-01-01 RX ADMIN — LEVOTHYROXINE SODIUM 75 MCG: 75 TABLET ORAL at 05:36

## 2019-01-01 RX ADMIN — LEVOTHYROXINE SODIUM 75 MCG: 75 TABLET ORAL at 07:26

## 2019-01-01 RX ADMIN — INSULIN LISPRO 2 UNITS: 100 INJECTION, SOLUTION INTRAVENOUS; SUBCUTANEOUS at 17:02

## 2019-01-01 RX ADMIN — CARVEDILOL 6.25 MG: 6.25 TABLET, FILM COATED ORAL at 16:17

## 2019-01-01 RX ADMIN — CALCIUM CARBONATE 500 MG (1,250 MG)-VITAMIN D3 200 UNIT TABLET 1 TABLET: at 08:45

## 2019-01-01 RX ADMIN — HYDROMORPHONE HYDROCHLORIDE 0.2 MG: 1 INJECTION, SOLUTION INTRAMUSCULAR; INTRAVENOUS; SUBCUTANEOUS at 07:46

## 2019-01-01 RX ADMIN — HEPARIN SODIUM 5000 UNITS: 5000 INJECTION INTRAVENOUS; SUBCUTANEOUS at 07:57

## 2019-01-01 RX ADMIN — AMLODIPINE BESYLATE 10 MG: 10 TABLET ORAL at 08:10

## 2019-01-01 RX ADMIN — Medication 10 ML: at 18:45

## 2019-01-01 RX ADMIN — CLONAZEPAM 0.25 MG: 1 TABLET ORAL at 17:27

## 2019-01-01 RX ADMIN — CLONAZEPAM 0.5 MG: 0.5 TABLET ORAL at 21:58

## 2019-01-01 RX ADMIN — CALCIUM CARBONATE 500 MG (1,250 MG)-VITAMIN D3 200 UNIT TABLET 1 TABLET: at 08:30

## 2019-01-01 RX ADMIN — HEPARIN SODIUM 5000 UNITS: 5000 INJECTION INTRAVENOUS; SUBCUTANEOUS at 05:09

## 2019-01-01 RX ADMIN — SODIUM CHLORIDE 1 G: 900 INJECTION, SOLUTION INTRAVENOUS at 09:49

## 2019-01-01 RX ADMIN — Medication 10 ML: at 22:24

## 2019-01-01 RX ADMIN — OXYBUTYNIN CHLORIDE 5 MG: 5 TABLET, EXTENDED RELEASE ORAL at 07:46

## 2019-01-01 RX ADMIN — INSULIN LISPRO 5 UNITS: 100 INJECTION, SOLUTION INTRAVENOUS; SUBCUTANEOUS at 12:03

## 2019-01-01 RX ADMIN — HEPARIN SODIUM 5000 UNITS: 5000 INJECTION INTRAVENOUS; SUBCUTANEOUS at 22:11

## 2019-01-01 RX ADMIN — Medication 1 AMPULE: at 08:15

## 2019-01-01 RX ADMIN — Medication 1 AMPULE: at 09:39

## 2019-01-01 RX ADMIN — NYSTATIN 500000 UNITS: 100000 SUSPENSION ORAL at 22:00

## 2019-01-01 RX ADMIN — OXYBUTYNIN CHLORIDE 5 MG: 5 TABLET, EXTENDED RELEASE ORAL at 08:20

## 2019-01-01 RX ADMIN — Medication 5 ML: at 21:30

## 2019-01-01 RX ADMIN — Medication 10 ML: at 15:13

## 2019-01-01 RX ADMIN — OXYBUTYNIN CHLORIDE 5 MG: 5 TABLET, EXTENDED RELEASE ORAL at 08:40

## 2019-01-01 RX ADMIN — VITAMIN D, TAB 1000IU (100/BT) 1000 UNITS: 25 TAB at 08:40

## 2019-01-01 RX ADMIN — CLONAZEPAM 0.5 MG: 0.5 TABLET ORAL at 21:36

## 2019-01-01 RX ADMIN — TAMSULOSIN HYDROCHLORIDE 0.4 MG: 0.4 CAPSULE ORAL at 08:00

## 2019-01-01 RX ADMIN — TAMSULOSIN HYDROCHLORIDE 0.4 MG: 0.4 CAPSULE ORAL at 09:41

## 2019-01-01 RX ADMIN — PREDNISONE 5 MG: 5 TABLET ORAL at 16:54

## 2019-01-01 RX ADMIN — Medication 1 AMPULE: at 21:23

## 2019-01-01 RX ADMIN — LINEZOLID 600 MG: 600 INJECTION, SOLUTION INTRAVENOUS at 21:35

## 2019-01-01 RX ADMIN — CARVEDILOL 6.25 MG: 6.25 TABLET, FILM COATED ORAL at 08:32

## 2019-01-01 RX ADMIN — INSULIN LISPRO 4 UNITS: 100 INJECTION, SOLUTION INTRAVENOUS; SUBCUTANEOUS at 21:31

## 2019-01-01 RX ADMIN — HEPARIN SODIUM 5000 UNITS: 5000 INJECTION INTRAVENOUS; SUBCUTANEOUS at 17:44

## 2019-01-01 RX ADMIN — METHADONE HYDROCHLORIDE 10 MG: 10 TABLET ORAL at 17:15

## 2019-01-01 RX ADMIN — METHADONE HYDROCHLORIDE 5 MG: 5 TABLET ORAL at 18:19

## 2019-01-01 RX ADMIN — FERROUS GLUCONATE 1 TABLET: 324 TABLET ORAL at 08:00

## 2019-01-01 RX ADMIN — CLONAZEPAM 0.5 MG: 0.5 TABLET ORAL at 17:09

## 2019-01-01 RX ADMIN — PIPERACILLIN SODIUM,TAZOBACTAM SODIUM 4.5 G: 4; .5 INJECTION, POWDER, FOR SOLUTION INTRAVENOUS at 08:19

## 2019-01-01 RX ADMIN — SODIUM CHLORIDE, SODIUM LACTATE, POTASSIUM CHLORIDE, CALCIUM CHLORIDE: 600; 310; 30; 20 INJECTION, SOLUTION INTRAVENOUS at 10:36

## 2019-01-01 RX ADMIN — ATORVASTATIN CALCIUM 40 MG: 40 TABLET, FILM COATED ORAL at 21:30

## 2019-01-01 RX ADMIN — PREDNISONE 5 MG: 5 TABLET ORAL at 12:47

## 2019-01-01 RX ADMIN — PIPERACILLIN SODIUM,TAZOBACTAM SODIUM 3.38 G: 3; .375 INJECTION, POWDER, FOR SOLUTION INTRAVENOUS at 17:33

## 2019-01-01 RX ADMIN — FUROSEMIDE 40 MG: 40 TABLET ORAL at 08:15

## 2019-01-01 RX ADMIN — METHADONE HYDROCHLORIDE 5 MG: 5 TABLET ORAL at 00:22

## 2019-01-01 RX ADMIN — GLIPIZIDE 10 MG: 10 TABLET, FILM COATED, EXTENDED RELEASE ORAL at 09:45

## 2019-01-01 RX ADMIN — ASPIRIN 81 MG: 81 TABLET, COATED ORAL at 21:23

## 2019-01-01 RX ADMIN — CLONAZEPAM 0.5 MG: 0.5 TABLET ORAL at 08:00

## 2019-01-01 RX ADMIN — PREDNISONE 10 MG: 10 TABLET ORAL at 08:31

## 2019-01-01 RX ADMIN — METHADONE HYDROCHLORIDE 5 MG: 10 TABLET ORAL at 11:58

## 2019-01-01 RX ADMIN — AMLODIPINE BESYLATE 10 MG: 10 TABLET ORAL at 08:00

## 2019-01-01 RX ADMIN — FAMOTIDINE 20 MG: 20 TABLET ORAL at 08:25

## 2019-01-01 RX ADMIN — LIDOCAINE HYDROCHLORIDE 15 ML: 20 SOLUTION ORAL; TOPICAL at 17:50

## 2019-01-01 RX ADMIN — OXYBUTYNIN CHLORIDE 5 MG: 5 TABLET ORAL at 15:09

## 2019-01-01 RX ADMIN — OXYBUTYNIN CHLORIDE 5 MG: 5 TABLET ORAL at 22:12

## 2019-01-01 RX ADMIN — TAMSULOSIN HYDROCHLORIDE 0.4 MG: 0.4 CAPSULE ORAL at 21:37

## 2019-01-01 RX ADMIN — HEPARIN SODIUM 5000 UNITS: 5000 INJECTION INTRAVENOUS; SUBCUTANEOUS at 22:43

## 2019-01-01 RX ADMIN — PANTOPRAZOLE SODIUM 40 MG: 40 TABLET, DELAYED RELEASE ORAL at 05:42

## 2019-01-01 RX ADMIN — LEVOTHYROXINE SODIUM 75 MCG: 75 TABLET ORAL at 04:36

## 2019-01-01 RX ADMIN — CLONAZEPAM 0.5 MG: 0.5 TABLET ORAL at 08:34

## 2019-01-01 RX ADMIN — HEPARIN SODIUM 5000 UNITS: 5000 INJECTION INTRAVENOUS; SUBCUTANEOUS at 22:50

## 2019-01-01 RX ADMIN — HEPARIN SODIUM 5000 UNITS: 5000 INJECTION INTRAVENOUS; SUBCUTANEOUS at 07:49

## 2019-01-01 RX ADMIN — AMLODIPINE BESYLATE 5 MG: 5 TABLET ORAL at 08:55

## 2019-01-01 RX ADMIN — INSULIN LISPRO 6 UNITS: 100 INJECTION, SOLUTION INTRAVENOUS; SUBCUTANEOUS at 17:52

## 2019-01-01 RX ADMIN — INSULIN LISPRO 2 UNITS: 100 INJECTION, SOLUTION INTRAVENOUS; SUBCUTANEOUS at 18:49

## 2019-01-01 RX ADMIN — ACETAMINOPHEN 500 MG: 500 TABLET, FILM COATED ORAL at 19:33

## 2019-01-01 RX ADMIN — LEVOTHYROXINE SODIUM 75 MCG: 75 TABLET ORAL at 05:42

## 2019-01-01 RX ADMIN — INSULIN LISPRO 5 UNITS: 100 INJECTION, SOLUTION INTRAVENOUS; SUBCUTANEOUS at 12:41

## 2019-01-01 RX ADMIN — AMLODIPINE BESYLATE 10 MG: 10 TABLET ORAL at 09:01

## 2019-01-01 RX ADMIN — METHADONE HYDROCHLORIDE 5 MG: 5 TABLET ORAL at 12:07

## 2019-01-01 RX ADMIN — Medication 10 ML: at 21:34

## 2019-01-01 RX ADMIN — METHADONE HYDROCHLORIDE 10 MG: 10 TABLET ORAL at 18:42

## 2019-01-01 RX ADMIN — Medication 10 ML: at 06:23

## 2019-01-01 RX ADMIN — INSULIN LISPRO 5 UNITS: 100 INJECTION, SOLUTION INTRAVENOUS; SUBCUTANEOUS at 16:54

## 2019-01-01 RX ADMIN — SODIUM CHLORIDE 1 G: 900 INJECTION, SOLUTION INTRAVENOUS at 04:14

## 2019-01-01 RX ADMIN — SODIUM CHLORIDE 500 ML: 900 INJECTION, SOLUTION INTRAVENOUS at 19:41

## 2019-01-01 RX ADMIN — PREDNISONE 10 MG: 10 TABLET ORAL at 07:50

## 2019-01-01 RX ADMIN — CEFPODOXIME PROXETIL 100 MG: 200 TABLET, FILM COATED ORAL at 22:43

## 2019-01-01 RX ADMIN — CARVEDILOL 6.25 MG: 6.25 TABLET, FILM COATED ORAL at 16:37

## 2019-01-01 RX ADMIN — HEPARIN SODIUM 5000 UNITS: 5000 INJECTION INTRAVENOUS; SUBCUTANEOUS at 08:40

## 2019-01-01 RX ADMIN — INSULIN LISPRO 5 UNITS: 100 INJECTION, SOLUTION INTRAVENOUS; SUBCUTANEOUS at 17:09

## 2019-01-01 RX ADMIN — DULOXETINE 60 MG: 60 CAPSULE, DELAYED RELEASE ORAL at 08:54

## 2019-01-01 RX ADMIN — INSULIN LISPRO 6 UNITS: 100 INJECTION, SOLUTION INTRAVENOUS; SUBCUTANEOUS at 11:42

## 2019-01-01 RX ADMIN — HEPARIN SODIUM 5000 UNITS: 5000 INJECTION INTRAVENOUS; SUBCUTANEOUS at 21:47

## 2019-01-01 RX ADMIN — INSULIN LISPRO 8 UNITS: 100 INJECTION, SOLUTION INTRAVENOUS; SUBCUTANEOUS at 22:16

## 2019-01-01 RX ADMIN — HEPARIN SODIUM 5000 UNITS: 5000 INJECTION INTRAVENOUS; SUBCUTANEOUS at 05:18

## 2019-01-01 RX ADMIN — INSULIN LISPRO 4 UNITS: 100 INJECTION, SOLUTION INTRAVENOUS; SUBCUTANEOUS at 21:47

## 2019-01-01 RX ADMIN — FUROSEMIDE 40 MG: 40 TABLET ORAL at 17:19

## 2019-01-01 RX ADMIN — HYDROMORPHONE HYDROCHLORIDE 0.2 MG: 1 INJECTION, SOLUTION INTRAMUSCULAR; INTRAVENOUS; SUBCUTANEOUS at 15:22

## 2019-01-01 RX ADMIN — PREDNISONE 10 MG: 10 TABLET ORAL at 18:43

## 2019-01-01 RX ADMIN — INSULIN LISPRO 2 UNITS: 100 INJECTION, SOLUTION INTRAVENOUS; SUBCUTANEOUS at 22:45

## 2019-01-01 RX ADMIN — PIPERACILLIN, TAZOBACTAM 4.5 G: 4; .5 INJECTION, POWDER, LYOPHILIZED, FOR SOLUTION INTRAVENOUS at 22:30

## 2019-01-01 RX ADMIN — Medication 10 ML: at 05:51

## 2019-01-01 RX ADMIN — DULOXETINE HYDROCHLORIDE 60 MG: 60 CAPSULE, DELAYED RELEASE ORAL at 08:00

## 2019-01-01 RX ADMIN — CLONAZEPAM 0.5 MG: 1 TABLET ORAL at 21:50

## 2019-01-01 RX ADMIN — ASPIRIN 81 MG: 81 TABLET, COATED ORAL at 21:54

## 2019-01-01 RX ADMIN — FERROUS GLUCONATE 1 TABLET: 324 TABLET ORAL at 06:23

## 2019-01-01 RX ADMIN — CARVEDILOL 3.12 MG: 3.12 TABLET, FILM COATED ORAL at 17:12

## 2019-01-01 RX ADMIN — ASPIRIN 81 MG: 81 TABLET, COATED ORAL at 21:35

## 2019-01-01 RX ADMIN — HYDROCODONE BITARTRATE AND ACETAMINOPHEN 1 TABLET: 7.5; 325 TABLET ORAL at 10:58

## 2019-01-01 RX ADMIN — SODIUM CHLORIDE 100 ML/HR: 900 INJECTION, SOLUTION INTRAVENOUS at 03:27

## 2019-01-01 RX ADMIN — INSULIN GLARGINE 5 UNITS: 100 INJECTION, SOLUTION SUBCUTANEOUS at 08:17

## 2019-01-01 RX ADMIN — PREDNISONE 10 MG: 10 TABLET ORAL at 17:37

## 2019-01-01 RX ADMIN — AMLODIPINE BESYLATE 2.5 MG: 5 TABLET ORAL at 09:08

## 2019-01-01 RX ADMIN — FERROUS GLUCONATE 1 TABLET: 324 TABLET ORAL at 12:07

## 2019-01-01 RX ADMIN — INSULIN LISPRO 8 UNITS: 100 INJECTION, SOLUTION INTRAVENOUS; SUBCUTANEOUS at 17:32

## 2019-01-01 RX ADMIN — CLINDAMYCIN PHOSPHATE 600 MG: 600 INJECTION, SOLUTION INTRAVENOUS at 01:41

## 2019-01-01 RX ADMIN — HEPARIN SODIUM 5000 UNITS: 5000 INJECTION INTRAVENOUS; SUBCUTANEOUS at 17:52

## 2019-01-01 RX ADMIN — INSULIN GLARGINE 10 UNITS: 100 INJECTION, SOLUTION SUBCUTANEOUS at 08:57

## 2019-01-01 RX ADMIN — HYDRALAZINE HYDROCHLORIDE 25 MG: 25 TABLET, FILM COATED ORAL at 21:43

## 2019-01-01 RX ADMIN — SODIUM CHLORIDE, PRESERVATIVE FREE 600 UNITS: 5 INJECTION INTRAVENOUS at 17:09

## 2019-01-01 RX ADMIN — HEPARIN SODIUM 5000 UNITS: 5000 INJECTION INTRAVENOUS; SUBCUTANEOUS at 17:11

## 2019-01-01 RX ADMIN — CARVEDILOL 3.12 MG: 3.12 TABLET, FILM COATED ORAL at 16:48

## 2019-01-01 RX ADMIN — INSULIN LISPRO 10 UNITS: 100 INJECTION, SOLUTION INTRAVENOUS; SUBCUTANEOUS at 17:00

## 2019-01-01 RX ADMIN — CLONAZEPAM 0.5 MG: 0.5 TABLET ORAL at 08:33

## 2019-01-01 RX ADMIN — CLONAZEPAM 0.25 MG: 1 TABLET ORAL at 08:55

## 2019-01-01 RX ADMIN — CALCIUM CARBONATE 500 MG (1,250 MG)-VITAMIN D3 200 UNIT TABLET 1 TABLET: at 09:31

## 2019-01-01 RX ADMIN — Medication 5 ML: at 20:43

## 2019-01-01 RX ADMIN — VITAMIN D, TAB 1000IU (100/BT) 1000 UNITS: 25 TAB at 08:00

## 2019-01-01 RX ADMIN — SODIUM CHLORIDE, SODIUM LACTATE, POTASSIUM CHLORIDE, AND CALCIUM CHLORIDE 100 ML/HR: 600; 310; 30; 20 INJECTION, SOLUTION INTRAVENOUS at 22:26

## 2019-01-01 RX ADMIN — INSULIN GLARGINE 25 UNITS: 100 INJECTION, SOLUTION SUBCUTANEOUS at 21:30

## 2019-01-01 RX ADMIN — KETOROLAC TROMETHAMINE 15 MG: 15 INJECTION, SOLUTION INTRAMUSCULAR; INTRAVENOUS at 08:52

## 2019-01-01 RX ADMIN — INSULIN GLARGINE 10 UNITS: 100 INJECTION, SOLUTION SUBCUTANEOUS at 21:57

## 2019-01-01 RX ADMIN — INSULIN GLARGINE 5 UNITS: 100 INJECTION, SOLUTION SUBCUTANEOUS at 12:25

## 2019-01-01 RX ADMIN — PREDNISONE 10 MG: 10 TABLET ORAL at 08:49

## 2019-01-01 RX ADMIN — PANTOPRAZOLE SODIUM 40 MG: 40 TABLET, DELAYED RELEASE ORAL at 09:22

## 2019-01-01 RX ADMIN — FAMOTIDINE 20 MG: 20 TABLET ORAL at 21:21

## 2019-01-01 RX ADMIN — CLONAZEPAM 0.25 MG: 1 TABLET ORAL at 09:36

## 2019-01-01 RX ADMIN — HEPARIN SODIUM 5000 UNITS: 5000 INJECTION INTRAVENOUS; SUBCUTANEOUS at 22:36

## 2019-01-01 RX ADMIN — HEPARIN SODIUM 5000 UNITS: 5000 INJECTION INTRAVENOUS; SUBCUTANEOUS at 00:05

## 2019-01-01 RX ADMIN — TAMSULOSIN HYDROCHLORIDE 0.4 MG: 0.4 CAPSULE ORAL at 21:31

## 2019-01-01 RX ADMIN — CALCIUM CARBONATE 500 MG (1,250 MG)-VITAMIN D3 200 UNIT TABLET 1 TABLET: at 08:50

## 2019-01-01 RX ADMIN — CARVEDILOL 6.25 MG: 6.25 TABLET, FILM COATED ORAL at 09:35

## 2019-01-01 RX ADMIN — INSULIN LISPRO 2 UNITS: 100 INJECTION, SOLUTION INTRAVENOUS; SUBCUTANEOUS at 12:30

## 2019-01-01 RX ADMIN — ONDANSETRON 4 MG: 2 INJECTION INTRAMUSCULAR; INTRAVENOUS at 22:36

## 2019-01-01 RX ADMIN — CLONAZEPAM 0.5 MG: 0.5 TABLET ORAL at 15:09

## 2019-01-01 RX ADMIN — HEPARIN SODIUM 5000 UNITS: 5000 INJECTION INTRAVENOUS; SUBCUTANEOUS at 15:55

## 2019-01-01 RX ADMIN — Medication 10 ML: at 21:32

## 2019-01-01 RX ADMIN — METHADONE HYDROCHLORIDE 5 MG: 5 TABLET ORAL at 17:42

## 2019-01-01 RX ADMIN — CARVEDILOL 6.25 MG: 6.25 TABLET, FILM COATED ORAL at 17:19

## 2019-01-01 RX ADMIN — CLONAZEPAM 0.5 MG: 0.5 TABLET ORAL at 15:46

## 2019-01-01 RX ADMIN — VITAMIN D, TAB 1000IU (100/BT) 1000 UNITS: 25 TAB at 09:08

## 2019-01-01 RX ADMIN — CARVEDILOL 12.5 MG: 12.5 TABLET, FILM COATED ORAL at 18:42

## 2019-01-01 RX ADMIN — FAMOTIDINE 20 MG: 10 INJECTION, SOLUTION INTRAVENOUS at 15:04

## 2019-01-01 RX ADMIN — DULOXETINE HYDROCHLORIDE 60 MG: 60 CAPSULE, DELAYED RELEASE ORAL at 11:07

## 2019-01-01 RX ADMIN — ENOXAPARIN SODIUM 40 MG: 40 INJECTION SUBCUTANEOUS at 14:19

## 2019-01-01 RX ADMIN — MINOCYCLINE HYDROCHLORIDE 100 MG: 50 CAPSULE ORAL at 09:41

## 2019-01-01 RX ADMIN — Medication 10 ML: at 13:07

## 2019-01-01 RX ADMIN — FAMOTIDINE 20 MG: 20 TABLET ORAL at 08:45

## 2019-01-01 RX ADMIN — HYDRALAZINE HYDROCHLORIDE 25 MG: 25 TABLET, FILM COATED ORAL at 17:23

## 2019-01-01 RX ADMIN — SODIUM CHLORIDE 100 ML/HR: 900 INJECTION, SOLUTION INTRAVENOUS at 21:00

## 2019-01-01 RX ADMIN — INSULIN LISPRO 4 UNITS: 100 INJECTION, SOLUTION INTRAVENOUS; SUBCUTANEOUS at 22:00

## 2019-01-01 RX ADMIN — VITAMIN D, TAB 1000IU (100/BT) 1000 UNITS: 25 TAB at 08:19

## 2019-01-01 RX ADMIN — PREDNISONE 10 MG: 10 TABLET ORAL at 17:28

## 2019-01-01 RX ADMIN — CLONAZEPAM 0.5 MG: 0.5 TABLET ORAL at 08:41

## 2019-01-01 RX ADMIN — PIPERACILLIN SODIUM,TAZOBACTAM SODIUM 3.38 G: 3; .375 INJECTION, POWDER, FOR SOLUTION INTRAVENOUS at 14:14

## 2019-01-01 RX ADMIN — VANCOMYCIN HYDROCHLORIDE 2000 MG: 10 INJECTION, POWDER, LYOPHILIZED, FOR SOLUTION INTRAVENOUS at 20:53

## 2019-01-01 RX ADMIN — ATORVASTATIN CALCIUM 40 MG: 40 TABLET, FILM COATED ORAL at 22:36

## 2019-01-01 RX ADMIN — Medication 1 AMPULE: at 09:34

## 2019-01-01 RX ADMIN — CEPHALEXIN 500 MG: 500 CAPSULE ORAL at 13:36

## 2019-01-01 RX ADMIN — METHADONE HYDROCHLORIDE 10 MG: 10 TABLET ORAL at 10:14

## 2019-01-01 RX ADMIN — DOCUSATE SODIUM 100 MG: 100 CAPSULE, LIQUID FILLED ORAL at 17:37

## 2019-01-01 RX ADMIN — Medication 1 AMPULE: at 21:49

## 2019-01-01 RX ADMIN — METHADONE HYDROCHLORIDE 5 MG: 5 TABLET ORAL at 17:14

## 2019-01-01 RX ADMIN — AMLODIPINE BESYLATE 10 MG: 10 TABLET ORAL at 11:19

## 2019-01-01 RX ADMIN — VANCOMYCIN HYDROCHLORIDE 2000 MG: 10 INJECTION, POWDER, LYOPHILIZED, FOR SOLUTION INTRAVENOUS at 05:02

## 2019-01-01 RX ADMIN — HYDROCODONE BITARTRATE AND ACETAMINOPHEN 1 TABLET: 7.5; 325 TABLET ORAL at 19:44

## 2019-01-01 RX ADMIN — HEPARIN SODIUM 5000 UNITS: 5000 INJECTION INTRAVENOUS; SUBCUTANEOUS at 15:46

## 2019-01-01 RX ADMIN — HEPARIN SODIUM 5000 UNITS: 5000 INJECTION INTRAVENOUS; SUBCUTANEOUS at 06:25

## 2019-01-01 RX ADMIN — SODIUM CHLORIDE, SODIUM LACTATE, POTASSIUM CHLORIDE, AND CALCIUM CHLORIDE 100 ML/HR: 600; 310; 30; 20 INJECTION, SOLUTION INTRAVENOUS at 09:37

## 2019-01-01 RX ADMIN — CARVEDILOL 12.5 MG: 12.5 TABLET, FILM COATED ORAL at 11:09

## 2019-01-01 RX ADMIN — CARVEDILOL 12.5 MG: 12.5 TABLET, FILM COATED ORAL at 17:22

## 2019-01-01 RX ADMIN — NYSTATIN: 100000 POWDER TOPICAL at 08:16

## 2019-01-01 RX ADMIN — NYSTATIN 500000 UNITS: 100000 SUSPENSION ORAL at 12:00

## 2019-01-01 RX ADMIN — CLONAZEPAM 0.5 MG: 0.5 TABLET ORAL at 09:01

## 2019-01-01 RX ADMIN — TUBERCULIN PURIFIED PROTEIN DERIVATIVE 5 UNITS: 5 INJECTION, SOLUTION INTRADERMAL at 22:50

## 2019-01-01 RX ADMIN — INSULIN GLARGINE 5 UNITS: 100 INJECTION, SOLUTION SUBCUTANEOUS at 21:51

## 2019-01-01 RX ADMIN — HEPARIN SODIUM 5000 UNITS: 5000 INJECTION INTRAVENOUS; SUBCUTANEOUS at 17:08

## 2019-01-01 RX ADMIN — LACTOBACILLUS TAB 2 TABLET: TAB at 10:30

## 2019-01-01 RX ADMIN — Medication 10 ML: at 21:12

## 2019-01-01 RX ADMIN — ONDANSETRON 4 MG: 2 INJECTION INTRAMUSCULAR; INTRAVENOUS at 13:00

## 2019-01-01 RX ADMIN — HYDRALAZINE HYDROCHLORIDE 25 MG: 25 TABLET, FILM COATED ORAL at 08:10

## 2019-01-01 RX ADMIN — TAMSULOSIN HYDROCHLORIDE 0.4 MG: 0.4 CAPSULE ORAL at 08:46

## 2019-01-01 RX ADMIN — METHADONE HYDROCHLORIDE 5 MG: 5 TABLET ORAL at 21:53

## 2019-01-01 RX ADMIN — INSULIN GLARGINE 15 UNITS: 100 INJECTION, SOLUTION SUBCUTANEOUS at 21:31

## 2019-01-01 RX ADMIN — HYDROMORPHONE HYDROCHLORIDE 0.2 MG: 1 INJECTION, SOLUTION INTRAMUSCULAR; INTRAVENOUS; SUBCUTANEOUS at 05:47

## 2019-01-01 RX ADMIN — INSULIN LISPRO 2 UNITS: 100 INJECTION, SOLUTION INTRAVENOUS; SUBCUTANEOUS at 13:12

## 2019-01-01 RX ADMIN — PROPOFOL 75 MCG/KG/MIN: 10 INJECTION, EMULSION INTRAVENOUS at 16:15

## 2019-01-01 RX ADMIN — CARVEDILOL 3.12 MG: 3.12 TABLET, FILM COATED ORAL at 16:30

## 2019-01-01 RX ADMIN — LEVOTHYROXINE SODIUM 75 MCG: 75 TABLET ORAL at 05:21

## 2019-01-01 RX ADMIN — CARVEDILOL 6.25 MG: 6.25 TABLET, FILM COATED ORAL at 16:53

## 2019-01-01 RX ADMIN — PROTAMINE SULFATE 20 MG: 10 INJECTION, SOLUTION INTRAVENOUS at 16:48

## 2019-01-01 RX ADMIN — ASPIRIN 81 MG: 81 TABLET, COATED ORAL at 23:02

## 2019-01-01 RX ADMIN — Medication 1 AMPULE: at 08:25

## 2019-01-01 RX ADMIN — OXYBUTYNIN CHLORIDE 5 MG: 5 TABLET, EXTENDED RELEASE ORAL at 07:49

## 2019-01-01 RX ADMIN — LEVOTHYROXINE SODIUM 75 MCG: 75 TABLET ORAL at 06:30

## 2019-01-01 RX ADMIN — INSULIN LISPRO 5 UNITS: 100 INJECTION, SOLUTION INTRAVENOUS; SUBCUTANEOUS at 11:34

## 2019-01-01 RX ADMIN — MIRTAZAPINE 7.5 MG: 15 TABLET, FILM COATED ORAL at 21:30

## 2019-01-01 RX ADMIN — FERROUS GLUCONATE 1 TABLET: 324 TABLET ORAL at 06:47

## 2019-01-01 RX ADMIN — SENNOSIDES, DOCUSATE SODIUM 1 TABLET: 50; 8.6 TABLET, FILM COATED ORAL at 08:35

## 2019-01-01 RX ADMIN — INSULIN LISPRO 2 UNITS: 100 INJECTION, SOLUTION INTRAVENOUS; SUBCUTANEOUS at 12:54

## 2019-01-01 RX ADMIN — CLONAZEPAM 0.25 MG: 1 TABLET ORAL at 16:25

## 2019-01-01 RX ADMIN — Medication 10 ML: at 12:56

## 2019-01-01 RX ADMIN — PREDNISONE 5 MG: 5 TABLET ORAL at 08:48

## 2019-01-01 RX ADMIN — CLONAZEPAM 0.5 MG: 0.5 TABLET ORAL at 22:01

## 2019-01-01 RX ADMIN — PIPERACILLIN SODIUM,TAZOBACTAM SODIUM 3.38 G: 3; .375 INJECTION, POWDER, FOR SOLUTION INTRAVENOUS at 06:19

## 2019-01-01 RX ADMIN — PREDNISONE 10 MG: 10 TABLET ORAL at 08:04

## 2019-01-01 RX ADMIN — INSULIN LISPRO 2 UNITS: 100 INJECTION, SOLUTION INTRAVENOUS; SUBCUTANEOUS at 08:38

## 2019-01-01 RX ADMIN — PREDNISONE 10 MG: 10 TABLET ORAL at 17:04

## 2019-01-01 RX ADMIN — CARVEDILOL 3.12 MG: 3.12 TABLET, FILM COATED ORAL at 09:08

## 2019-01-01 RX ADMIN — ASPIRIN 81 MG: 81 TABLET, COATED ORAL at 21:26

## 2019-01-01 RX ADMIN — CLONAZEPAM 0.5 MG: 0.5 TABLET ORAL at 08:46

## 2019-01-01 RX ADMIN — SODIUM CHLORIDE 75 ML/HR: 900 INJECTION, SOLUTION INTRAVENOUS at 12:55

## 2019-01-01 RX ADMIN — DULOXETINE HYDROCHLORIDE 60 MG: 60 CAPSULE, DELAYED RELEASE ORAL at 08:04

## 2019-01-01 RX ADMIN — SODIUM CHLORIDE 100 ML/HR: 900 INJECTION, SOLUTION INTRAVENOUS at 02:30

## 2019-01-01 RX ADMIN — LEVOTHYROXINE SODIUM 75 MCG: 75 TABLET ORAL at 05:16

## 2019-01-01 RX ADMIN — PIPERACILLIN, TAZOBACTAM 4.5 G: 4; .5 INJECTION, POWDER, LYOPHILIZED, FOR SOLUTION INTRAVENOUS at 17:40

## 2019-01-01 RX ADMIN — INSULIN LISPRO 2 UNITS: 100 INJECTION, SOLUTION INTRAVENOUS; SUBCUTANEOUS at 22:11

## 2019-01-01 RX ADMIN — CARVEDILOL 3.12 MG: 3.12 TABLET, FILM COATED ORAL at 08:33

## 2019-01-01 RX ADMIN — LEVOTHYROXINE SODIUM 75 MCG: 75 TABLET ORAL at 08:49

## 2019-01-01 RX ADMIN — INSULIN LISPRO 10 UNITS: 100 INJECTION, SOLUTION INTRAVENOUS; SUBCUTANEOUS at 21:40

## 2019-01-01 RX ADMIN — FERROUS GLUCONATE 1 TABLET: 324 TABLET ORAL at 08:54

## 2019-01-01 RX ADMIN — Medication 5 ML: at 14:00

## 2019-01-01 RX ADMIN — CARVEDILOL 6.25 MG: 6.25 TABLET, FILM COATED ORAL at 18:43

## 2019-01-01 RX ADMIN — NYSTATIN 500000 UNITS: 100000 SUSPENSION ORAL at 08:58

## 2019-01-01 RX ADMIN — CARVEDILOL 6.25 MG: 6.25 TABLET, FILM COATED ORAL at 17:39

## 2019-01-01 RX ADMIN — CARVEDILOL 6.25 MG: 6.25 TABLET, FILM COATED ORAL at 08:30

## 2019-01-01 RX ADMIN — POTASSIUM CHLORIDE 10 MEQ: 10 TABLET, EXTENDED RELEASE ORAL at 17:00

## 2019-01-01 RX ADMIN — HEPARIN SODIUM 5000 UNITS: 5000 INJECTION INTRAVENOUS; SUBCUTANEOUS at 17:21

## 2019-01-01 RX ADMIN — INSULIN GLARGINE 10 UNITS: 100 INJECTION, SOLUTION SUBCUTANEOUS at 10:29

## 2019-01-01 RX ADMIN — METHADONE HYDROCHLORIDE 5 MG: 5 TABLET ORAL at 17:12

## 2019-01-01 RX ADMIN — METHADONE HYDROCHLORIDE 5 MG: 5 TABLET ORAL at 12:12

## 2019-01-01 RX ADMIN — METHADONE HYDROCHLORIDE 5 MG: 5 TABLET ORAL at 17:19

## 2019-01-01 RX ADMIN — INSULIN LISPRO 2 UNITS: 100 INJECTION, SOLUTION INTRAVENOUS; SUBCUTANEOUS at 22:36

## 2019-01-01 RX ADMIN — PREDNISONE 10 MG: 10 TABLET ORAL at 08:55

## 2019-01-01 RX ADMIN — CARVEDILOL 6.25 MG: 6.25 TABLET, FILM COATED ORAL at 08:41

## 2019-01-01 RX ADMIN — DULOXETINE HYDROCHLORIDE 60 MG: 60 CAPSULE, DELAYED RELEASE ORAL at 08:19

## 2019-01-01 RX ADMIN — CARVEDILOL 6.25 MG: 6.25 TABLET, FILM COATED ORAL at 16:27

## 2019-01-01 RX ADMIN — CLONAZEPAM 0.5 MG: 0.5 TABLET ORAL at 17:19

## 2019-01-01 RX ADMIN — CLONAZEPAM 0.5 MG: 0.5 TABLET ORAL at 22:12

## 2019-01-01 RX ADMIN — CARVEDILOL 3.12 MG: 3.12 TABLET, FILM COATED ORAL at 08:44

## 2019-01-01 RX ADMIN — Medication 500 MG: at 08:03

## 2019-01-01 RX ADMIN — METHADONE HYDROCHLORIDE 10 MG: 10 TABLET ORAL at 04:28

## 2019-01-01 RX ADMIN — PREDNISONE 5 MG: 5 TABLET ORAL at 12:07

## 2019-01-01 RX ADMIN — POTASSIUM CHLORIDE 20 MEQ: 1.5 POWDER, FOR SOLUTION ORAL at 17:25

## 2019-01-01 RX ADMIN — ACETAMINOPHEN 650 MG: 325 TABLET, FILM COATED ORAL at 02:16

## 2019-01-01 RX ADMIN — LOPERAMIDE HYDROCHLORIDE 4 MG: 2 CAPSULE ORAL at 08:08

## 2019-01-01 RX ADMIN — CARVEDILOL 12.5 MG: 12.5 TABLET, FILM COATED ORAL at 08:04

## 2019-01-01 RX ADMIN — FAMOTIDINE 20 MG: 20 TABLET ORAL at 08:46

## 2019-01-01 RX ADMIN — INSULIN LISPRO 4 UNITS: 100 INJECTION, SOLUTION INTRAVENOUS; SUBCUTANEOUS at 17:00

## 2019-01-01 RX ADMIN — DOCUSATE SODIUM 100 MG: 100 CAPSULE, LIQUID FILLED ORAL at 08:51

## 2019-01-01 RX ADMIN — METHADONE HYDROCHLORIDE 5 MG: 5 TABLET ORAL at 11:38

## 2019-01-01 RX ADMIN — CLONAZEPAM 0.5 MG: 1 TABLET ORAL at 08:33

## 2019-01-01 RX ADMIN — LEVOTHYROXINE SODIUM 75 MCG: 75 TABLET ORAL at 05:59

## 2019-01-01 RX ADMIN — PIPERACILLIN SODIUM,TAZOBACTAM SODIUM 3.38 G: 3; .375 INJECTION, POWDER, FOR SOLUTION INTRAVENOUS at 14:47

## 2019-01-01 RX ADMIN — INSULIN LISPRO 4 UNITS: 100 INJECTION, SOLUTION INTRAVENOUS; SUBCUTANEOUS at 12:22

## 2019-01-01 RX ADMIN — CEFPODOXIME PROXETIL 400 MG: 200 TABLET, FILM COATED ORAL at 08:15

## 2019-01-01 RX ADMIN — CLONAZEPAM 0.5 MG: 0.5 TABLET ORAL at 22:17

## 2019-01-01 RX ADMIN — HYDRALAZINE HYDROCHLORIDE 25 MG: 25 TABLET, FILM COATED ORAL at 08:15

## 2019-01-01 RX ADMIN — INSULIN GLARGINE 10 UNITS: 100 INJECTION, SOLUTION SUBCUTANEOUS at 09:00

## 2019-01-01 RX ADMIN — CEFTRIAXONE SODIUM 1 G: 1 INJECTION, POWDER, FOR SOLUTION INTRAMUSCULAR; INTRAVENOUS at 15:04

## 2019-01-01 RX ADMIN — PREDNISONE 20 MG: 10 TABLET ORAL at 08:26

## 2019-01-01 RX ADMIN — MIRTAZAPINE 7.5 MG: 15 TABLET, FILM COATED ORAL at 21:24

## 2019-01-01 RX ADMIN — HYDROCODONE BITARTRATE AND ACETAMINOPHEN 1 TABLET: 7.5; 325 TABLET ORAL at 11:13

## 2019-01-01 RX ADMIN — Medication 10 ML: at 21:55

## 2019-01-01 RX ADMIN — ONDANSETRON 4 MG: 2 INJECTION INTRAMUSCULAR; INTRAVENOUS at 21:05

## 2019-01-01 RX ADMIN — Medication 500 MG: at 17:00

## 2019-01-01 RX ADMIN — METHADONE HYDROCHLORIDE 5 MG: 5 TABLET ORAL at 17:30

## 2019-01-01 RX ADMIN — CARVEDILOL 6.25 MG: 6.25 TABLET, FILM COATED ORAL at 08:45

## 2019-01-01 RX ADMIN — Medication 500 MG: at 08:15

## 2019-01-01 RX ADMIN — METHADONE HYDROCHLORIDE 5 MG: 5 TABLET ORAL at 05:04

## 2019-01-01 RX ADMIN — SODIUM CHLORIDE 1 G: 900 INJECTION, SOLUTION INTRAVENOUS at 10:03

## 2019-01-01 RX ADMIN — LACTOBACILLUS TAB 2 TABLET: TAB at 17:56

## 2019-01-01 RX ADMIN — PREDNISONE 10 MG: 10 TABLET ORAL at 08:32

## 2019-01-01 RX ADMIN — DULOXETINE HYDROCHLORIDE 60 MG: 60 CAPSULE, DELAYED RELEASE ORAL at 08:35

## 2019-01-01 RX ADMIN — CLONAZEPAM 0.5 MG: 1 TABLET ORAL at 08:29

## 2019-01-01 RX ADMIN — ASPIRIN 81 MG: 81 TABLET, COATED ORAL at 22:01

## 2019-01-01 RX ADMIN — Medication 1 AMPULE: at 08:44

## 2019-01-01 RX ADMIN — FERROUS GLUCONATE 1 TABLET: 324 TABLET ORAL at 08:29

## 2019-01-01 RX ADMIN — METHADONE HYDROCHLORIDE 5 MG: 5 TABLET ORAL at 05:43

## 2019-01-01 RX ADMIN — CLONAZEPAM 0.5 MG: 1 TABLET ORAL at 15:41

## 2019-01-01 RX ADMIN — CEFEPIME 2 G: 2 INJECTION, POWDER, FOR SOLUTION INTRAVENOUS at 13:09

## 2019-01-01 RX ADMIN — PREDNISONE 10 MG: 10 TABLET ORAL at 09:47

## 2019-01-01 RX ADMIN — HYDRALAZINE HYDROCHLORIDE 25 MG: 25 TABLET, FILM COATED ORAL at 16:51

## 2019-01-01 RX ADMIN — LEVOTHYROXINE SODIUM 75 MCG: 75 TABLET ORAL at 08:00

## 2019-01-01 RX ADMIN — VITAMIN D, TAB 1000IU (100/BT) 1000 UNITS: 25 TAB at 08:45

## 2019-01-01 RX ADMIN — INSULIN GLARGINE 5 UNITS: 100 INJECTION, SOLUTION SUBCUTANEOUS at 22:15

## 2019-01-01 RX ADMIN — Medication 5 ML: at 06:20

## 2019-01-01 RX ADMIN — CARVEDILOL 6.25 MG: 6.25 TABLET, FILM COATED ORAL at 07:51

## 2019-01-01 RX ADMIN — SODIUM CHLORIDE 100 ML/HR: 900 INJECTION, SOLUTION INTRAVENOUS at 16:08

## 2019-01-01 RX ADMIN — HYDROMORPHONE HYDROCHLORIDE 0.2 MG: 1 INJECTION, SOLUTION INTRAMUSCULAR; INTRAVENOUS; SUBCUTANEOUS at 07:40

## 2019-01-01 RX ADMIN — HYDROMORPHONE HYDROCHLORIDE 0.2 MG: 1 INJECTION, SOLUTION INTRAMUSCULAR; INTRAVENOUS; SUBCUTANEOUS at 14:05

## 2019-01-01 RX ADMIN — PREDNISONE 10 MG: 10 TABLET ORAL at 07:48

## 2019-01-01 RX ADMIN — PREDNISONE 5 MG: 5 TABLET ORAL at 08:15

## 2019-01-01 RX ADMIN — HEPARIN SODIUM 5000 UNITS: 5000 INJECTION INTRAVENOUS; SUBCUTANEOUS at 05:21

## 2019-01-01 RX ADMIN — METHADONE HYDROCHLORIDE 5 MG: 5 TABLET ORAL at 05:14

## 2019-01-01 RX ADMIN — Medication 5 ML: at 05:44

## 2019-01-01 RX ADMIN — CARVEDILOL 6.25 MG: 6.25 TABLET, FILM COATED ORAL at 16:26

## 2019-01-01 RX ADMIN — DULOXETINE HYDROCHLORIDE 60 MG: 60 CAPSULE, DELAYED RELEASE ORAL at 08:24

## 2019-01-01 RX ADMIN — CLONAZEPAM 0.5 MG: 1 TABLET ORAL at 08:03

## 2019-01-01 RX ADMIN — INSULIN LISPRO 10 UNITS: 100 INJECTION, SOLUTION INTRAVENOUS; SUBCUTANEOUS at 21:26

## 2019-01-01 RX ADMIN — ASPIRIN 81 MG: 81 TABLET, COATED ORAL at 20:42

## 2019-01-01 RX ADMIN — SODIUM CHLORIDE, SODIUM LACTATE, POTASSIUM CHLORIDE, AND CALCIUM CHLORIDE 100 ML/HR: 600; 310; 30; 20 INJECTION, SOLUTION INTRAVENOUS at 23:01

## 2019-01-01 RX ADMIN — Medication 10 ML: at 23:02

## 2019-01-01 RX ADMIN — METHADONE HYDROCHLORIDE 5 MG: 5 TABLET ORAL at 00:30

## 2019-01-01 RX ADMIN — CLONAZEPAM 0.5 MG: 0.5 TABLET ORAL at 21:45

## 2019-01-01 RX ADMIN — TAMSULOSIN HYDROCHLORIDE 0.4 MG: 0.4 CAPSULE ORAL at 08:50

## 2019-01-01 RX ADMIN — Medication 10 ML: at 14:03

## 2019-01-01 RX ADMIN — POTASSIUM CHLORIDE 10 MEQ: 10 TABLET, EXTENDED RELEASE ORAL at 22:38

## 2019-01-01 RX ADMIN — CLONAZEPAM 0.5 MG: 0.5 TABLET ORAL at 21:23

## 2019-01-01 RX ADMIN — METHADONE HYDROCHLORIDE 5 MG: 5 TABLET ORAL at 05:36

## 2019-01-01 RX ADMIN — CLINDAMYCIN PHOSPHATE 600 MG: 600 INJECTION, SOLUTION INTRAVENOUS at 10:00

## 2019-01-01 RX ADMIN — INSULIN LISPRO 2 UNITS: 100 INJECTION, SOLUTION INTRAVENOUS; SUBCUTANEOUS at 08:53

## 2019-01-01 RX ADMIN — CARVEDILOL 12.5 MG: 12.5 TABLET, FILM COATED ORAL at 08:24

## 2019-01-01 RX ADMIN — PIPERACILLIN SODIUM,TAZOBACTAM SODIUM 3.38 G: 3; .375 INJECTION, POWDER, FOR SOLUTION INTRAVENOUS at 20:15

## 2019-01-01 RX ADMIN — MIRTAZAPINE 7.5 MG: 15 TABLET, FILM COATED ORAL at 22:11

## 2019-01-01 RX ADMIN — Medication 10 ML: at 14:14

## 2019-01-01 RX ADMIN — METHADONE HYDROCHLORIDE 10 MG: 10 TABLET ORAL at 10:37

## 2019-01-01 RX ADMIN — FAMOTIDINE 20 MG: 20 TABLET ORAL at 08:51

## 2019-01-01 RX ADMIN — NYSTATIN 500000 UNITS: 100000 SUSPENSION ORAL at 17:38

## 2019-01-01 RX ADMIN — INSULIN LISPRO 5 UNITS: 100 INJECTION, SOLUTION INTRAVENOUS; SUBCUTANEOUS at 17:39

## 2019-01-01 RX ADMIN — ASPIRIN 81 MG: 81 TABLET, COATED ORAL at 21:30

## 2019-01-01 RX ADMIN — SENNOSIDES, DOCUSATE SODIUM 1 TABLET: 50; 8.6 TABLET, FILM COATED ORAL at 08:23

## 2019-01-01 RX ADMIN — PREDNISONE 10 MG: 10 TABLET ORAL at 08:15

## 2019-01-01 RX ADMIN — PREDNISONE 20 MG: 10 TABLET ORAL at 18:44

## 2019-01-01 RX ADMIN — CEFEPIME 2 G: 2 INJECTION, POWDER, FOR SOLUTION INTRAVENOUS at 14:39

## 2019-01-01 RX ADMIN — HEPARIN SODIUM 5000 UNITS: 5000 INJECTION INTRAVENOUS; SUBCUTANEOUS at 10:38

## 2019-01-01 RX ADMIN — VITAMIN D, TAB 1000IU (100/BT) 1000 UNITS: 25 TAB at 07:50

## 2019-01-01 RX ADMIN — Medication 5 ML: at 21:37

## 2019-01-01 RX ADMIN — METHADONE HYDROCHLORIDE 5 MG: 5 TABLET ORAL at 13:02

## 2019-01-01 RX ADMIN — METHADONE HYDROCHLORIDE 5 MG: 5 TABLET ORAL at 17:09

## 2019-01-01 RX ADMIN — FAMOTIDINE 20 MG: 20 TABLET ORAL at 08:41

## 2019-01-01 RX ADMIN — HEPARIN SODIUM 5000 UNITS: 5000 INJECTION INTRAVENOUS; SUBCUTANEOUS at 17:01

## 2019-01-01 RX ADMIN — Medication 20 ML: at 13:09

## 2019-01-01 RX ADMIN — Medication 10 ML: at 05:42

## 2019-01-01 RX ADMIN — SODIUM PHOSPHATE 1 ENEMA: 7; 19 ENEMA RECTAL at 11:50

## 2019-01-01 RX ADMIN — DULOXETINE HYDROCHLORIDE 60 MG: 60 CAPSULE, DELAYED RELEASE ORAL at 07:50

## 2019-01-01 RX ADMIN — CLONAZEPAM 0.5 MG: 1 TABLET ORAL at 09:06

## 2019-01-01 RX ADMIN — FAMOTIDINE 20 MG: 20 TABLET ORAL at 08:53

## 2019-01-01 RX ADMIN — PREDNISONE 10 MG: 10 TABLET ORAL at 08:10

## 2019-01-01 RX ADMIN — INSULIN GLARGINE 10 UNITS: 100 INJECTION, SOLUTION SUBCUTANEOUS at 08:59

## 2019-01-01 RX ADMIN — INSULIN LISPRO 2 UNITS: 100 INJECTION, SOLUTION INTRAVENOUS; SUBCUTANEOUS at 09:05

## 2019-01-01 RX ADMIN — CARVEDILOL 3.12 MG: 3.12 TABLET, FILM COATED ORAL at 08:24

## 2019-01-01 RX ADMIN — POTASSIUM CHLORIDE 10 MEQ: 10 TABLET, EXTENDED RELEASE ORAL at 07:49

## 2019-01-01 RX ADMIN — ACETAMINOPHEN 1000 MG: 500 TABLET, FILM COATED ORAL at 17:38

## 2019-01-01 RX ADMIN — CALCIUM CARBONATE 500 MG (1,250 MG)-VITAMIN D3 200 UNIT TABLET 1 TABLET: at 12:07

## 2019-01-01 RX ADMIN — NYSTATIN 500000 UNITS: 100000 SUSPENSION ORAL at 21:53

## 2019-01-01 RX ADMIN — CARVEDILOL 6.25 MG: 6.25 TABLET, FILM COATED ORAL at 17:37

## 2019-01-01 RX ADMIN — CLONAZEPAM 0.5 MG: 1 TABLET ORAL at 16:50

## 2019-01-01 RX ADMIN — CLONAZEPAM 0.5 MG: 1 TABLET ORAL at 21:46

## 2019-01-01 RX ADMIN — Medication 500 MG: at 18:43

## 2019-01-01 RX ADMIN — OXYBUTYNIN CHLORIDE 5 MG: 5 TABLET ORAL at 18:19

## 2019-01-01 RX ADMIN — POTASSIUM CHLORIDE 10 MEQ: 10 TABLET, EXTENDED RELEASE ORAL at 17:22

## 2019-01-01 RX ADMIN — METHADONE HYDROCHLORIDE 5 MG: 5 TABLET ORAL at 03:48

## 2019-01-01 RX ADMIN — HEPARIN SODIUM 5000 UNITS: 5000 INJECTION INTRAVENOUS; SUBCUTANEOUS at 15:54

## 2019-01-01 RX ADMIN — ASPIRIN 81 MG: 81 TABLET, COATED ORAL at 21:31

## 2019-01-01 RX ADMIN — Medication 1 AMPULE: at 21:28

## 2019-01-01 RX ADMIN — HEPARIN SODIUM 5000 UNITS: 5000 INJECTION INTRAVENOUS; SUBCUTANEOUS at 21:30

## 2019-01-01 RX ADMIN — LEVOTHYROXINE SODIUM 75 MCG: 75 TABLET ORAL at 05:26

## 2019-01-01 RX ADMIN — SODIUM CHLORIDE 1 G: 900 INJECTION, SOLUTION INTRAVENOUS at 04:00

## 2019-01-01 RX ADMIN — OXYBUTYNIN CHLORIDE 5 MG: 5 TABLET ORAL at 15:46

## 2019-01-01 RX ADMIN — INSULIN LISPRO 6 UNITS: 100 INJECTION, SOLUTION INTRAVENOUS; SUBCUTANEOUS at 21:51

## 2019-01-01 RX ADMIN — CLONAZEPAM 0.5 MG: 1 TABLET ORAL at 17:22

## 2019-01-01 RX ADMIN — INSULIN GLARGINE 5 UNITS: 100 INJECTION, SOLUTION SUBCUTANEOUS at 21:45

## 2019-01-01 RX ADMIN — Medication 10 ML: at 05:06

## 2019-01-01 RX ADMIN — HEPARIN SODIUM 5000 UNITS: 5000 INJECTION INTRAVENOUS; SUBCUTANEOUS at 06:26

## 2019-01-01 RX ADMIN — PIPERACILLIN SODIUM,TAZOBACTAM SODIUM 3.38 G: 3; .375 INJECTION, POWDER, FOR SOLUTION INTRAVENOUS at 18:44

## 2019-01-01 RX ADMIN — METHADONE HYDROCHLORIDE 5 MG: 5 TABLET ORAL at 11:24

## 2019-01-01 RX ADMIN — ATORVASTATIN CALCIUM 40 MG: 40 TABLET, FILM COATED ORAL at 21:50

## 2019-01-01 RX ADMIN — CARVEDILOL 3.12 MG: 3.12 TABLET, FILM COATED ORAL at 17:08

## 2019-01-01 RX ADMIN — METHADONE HYDROCHLORIDE 10 MG: 10 TABLET ORAL at 21:30

## 2019-01-01 RX ADMIN — INSULIN LISPRO 2 UNITS: 100 INJECTION, SOLUTION INTRAVENOUS; SUBCUTANEOUS at 16:30

## 2019-01-01 RX ADMIN — MORPHINE SULFATE 1 MG: 2 INJECTION, SOLUTION INTRAMUSCULAR; INTRAVENOUS at 04:16

## 2019-01-01 RX ADMIN — ASPIRIN 81 MG: 81 TABLET, COATED ORAL at 21:36

## 2019-01-01 RX ADMIN — LEVOTHYROXINE SODIUM 75 MCG: 75 TABLET ORAL at 05:47

## 2019-01-01 RX ADMIN — SODIUM CHLORIDE 1 G: 900 INJECTION, SOLUTION INTRAVENOUS at 03:28

## 2019-01-01 RX ADMIN — Medication 5 ML: at 15:17

## 2019-01-01 RX ADMIN — METHADONE HYDROCHLORIDE 5 MG: 5 TABLET ORAL at 12:47

## 2019-01-01 RX ADMIN — INSULIN LISPRO 5 UNITS: 100 INJECTION, SOLUTION INTRAVENOUS; SUBCUTANEOUS at 09:48

## 2019-01-01 RX ADMIN — PREDNISONE 5 MG: 5 TABLET ORAL at 12:56

## 2019-01-01 RX ADMIN — Medication 1 AMPULE: at 21:52

## 2019-01-01 RX ADMIN — Medication 10 ML: at 13:56

## 2019-01-01 RX ADMIN — INSULIN LISPRO 6 UNITS: 100 INJECTION, SOLUTION INTRAVENOUS; SUBCUTANEOUS at 12:24

## 2019-01-01 RX ADMIN — Medication 10 ML: at 14:30

## 2019-01-01 RX ADMIN — Medication 10 ML: at 05:35

## 2019-01-01 RX ADMIN — TAMSULOSIN HYDROCHLORIDE 0.4 MG: 0.4 CAPSULE ORAL at 12:05

## 2019-01-01 RX ADMIN — METHADONE HYDROCHLORIDE 5 MG: 5 TABLET ORAL at 23:13

## 2019-01-01 RX ADMIN — INSULIN LISPRO 5 UNITS: 100 INJECTION, SOLUTION INTRAVENOUS; SUBCUTANEOUS at 08:51

## 2019-01-01 RX ADMIN — METHADONE HYDROCHLORIDE 5 MG: 5 TABLET ORAL at 08:33

## 2019-01-01 RX ADMIN — POLYETHYLENE GLYCOL 3350 17 G: 17 POWDER, FOR SOLUTION ORAL at 09:41

## 2019-01-01 RX ADMIN — CLONAZEPAM 0.5 MG: 1 TABLET ORAL at 20:56

## 2019-01-01 RX ADMIN — CEFPODOXIME PROXETIL 100 MG: 200 TABLET, FILM COATED ORAL at 14:30

## 2019-01-01 RX ADMIN — OXYBUTYNIN CHLORIDE 5 MG: 5 TABLET, EXTENDED RELEASE ORAL at 11:07

## 2019-01-01 RX ADMIN — CARVEDILOL 6.25 MG: 3.12 TABLET, FILM COATED ORAL at 07:46

## 2019-01-01 RX ADMIN — FUROSEMIDE 20 MG: 10 INJECTION, SOLUTION INTRAMUSCULAR; INTRAVENOUS at 17:16

## 2019-01-01 RX ADMIN — MIRTAZAPINE 7.5 MG: 15 TABLET, FILM COATED ORAL at 21:53

## 2019-01-01 RX ADMIN — PREDNISONE 5 MG: 5 TABLET ORAL at 09:41

## 2019-01-01 RX ADMIN — PIPERACILLIN SODIUM,TAZOBACTAM SODIUM 4.5 G: 4; .5 INJECTION, POWDER, FOR SOLUTION INTRAVENOUS at 23:21

## 2019-01-01 RX ADMIN — METHADONE HYDROCHLORIDE 5 MG: 5 TABLET ORAL at 13:28

## 2019-01-01 RX ADMIN — HYDROMORPHONE HYDROCHLORIDE 0.2 MG: 1 INJECTION, SOLUTION INTRAMUSCULAR; INTRAVENOUS; SUBCUTANEOUS at 13:59

## 2019-01-01 RX ADMIN — Medication 10 ML: at 05:25

## 2019-01-01 RX ADMIN — HYDRALAZINE HYDROCHLORIDE 25 MG: 25 TABLET, FILM COATED ORAL at 21:51

## 2019-01-01 RX ADMIN — CEFPODOXIME PROXETIL 100 MG: 200 TABLET, FILM COATED ORAL at 08:33

## 2019-01-01 RX ADMIN — AMLODIPINE BESYLATE 5 MG: 5 TABLET ORAL at 10:28

## 2019-01-01 RX ADMIN — CLONAZEPAM 0.5 MG: 1 TABLET ORAL at 17:04

## 2019-01-01 RX ADMIN — METHADONE HYDROCHLORIDE 5 MG: 5 TABLET ORAL at 15:12

## 2019-01-01 RX ADMIN — METHADONE HYDROCHLORIDE 5 MG: 5 TABLET ORAL at 21:21

## 2019-01-01 RX ADMIN — POTASSIUM CHLORIDE 20 MEQ: 1.5 POWDER, FOR SOLUTION ORAL at 08:30

## 2019-01-01 RX ADMIN — Medication 10 ML: at 14:52

## 2019-01-01 RX ADMIN — METHADONE HYDROCHLORIDE 5 MG: 5 TABLET ORAL at 12:14

## 2019-01-01 RX ADMIN — METHADONE HYDROCHLORIDE 5 MG: 5 TABLET ORAL at 08:49

## 2019-01-01 RX ADMIN — Medication 10 ML: at 05:55

## 2019-01-01 RX ADMIN — CLONAZEPAM 0.5 MG: 0.5 TABLET ORAL at 18:18

## 2019-01-01 RX ADMIN — INSULIN LISPRO 10 UNITS: 100 INJECTION, SOLUTION INTRAVENOUS; SUBCUTANEOUS at 22:49

## 2019-01-01 RX ADMIN — FERROUS GLUCONATE 1 TABLET: 324 TABLET ORAL at 05:17

## 2019-01-01 RX ADMIN — SODIUM CHLORIDE 1 G: 900 INJECTION, SOLUTION INTRAVENOUS at 03:38

## 2019-01-01 RX ADMIN — DULOXETINE 60 MG: 60 CAPSULE, DELAYED RELEASE ORAL at 08:24

## 2019-01-01 RX ADMIN — Medication 10 ML: at 18:02

## 2019-01-01 RX ADMIN — POTASSIUM CHLORIDE 10 MEQ: 10 TABLET, EXTENDED RELEASE ORAL at 16:53

## 2019-01-01 RX ADMIN — GLIPIZIDE 10 MG: 10 TABLET, FILM COATED, EXTENDED RELEASE ORAL at 12:07

## 2019-01-01 RX ADMIN — LINEZOLID 600 MG: 600 INJECTION, SOLUTION INTRAVENOUS at 21:55

## 2019-01-01 RX ADMIN — HYDRALAZINE HYDROCHLORIDE 25 MG: 25 TABLET, FILM COATED ORAL at 21:47

## 2019-01-01 RX ADMIN — INSULIN GLARGINE 20 UNITS: 100 INJECTION, SOLUTION SUBCUTANEOUS at 22:36

## 2019-01-01 RX ADMIN — Medication 10 ML: at 22:02

## 2019-01-01 RX ADMIN — OXYBUTYNIN CHLORIDE 5 MG: 5 TABLET ORAL at 21:45

## 2019-01-01 RX ADMIN — STANDARDIZED SENNA CONCENTRATE AND DOCUSATE SODIUM 1 TABLET: 8.6; 5 TABLET, FILM COATED ORAL at 08:53

## 2019-01-01 RX ADMIN — POTASSIUM CHLORIDE 40 MEQ: 20 TABLET, EXTENDED RELEASE ORAL at 21:25

## 2019-01-01 RX ADMIN — CLONAZEPAM 0.25 MG: 1 TABLET ORAL at 22:48

## 2019-01-01 RX ADMIN — METHADONE HYDROCHLORIDE 5 MG: 5 TABLET ORAL at 17:15

## 2019-01-01 RX ADMIN — INSULIN LISPRO 4 UNITS: 100 INJECTION, SOLUTION INTRAVENOUS; SUBCUTANEOUS at 11:56

## 2019-01-01 RX ADMIN — FAMOTIDINE 20 MG: 20 TABLET ORAL at 17:09

## 2019-01-01 RX ADMIN — Medication 5 ML: at 23:01

## 2019-01-01 RX ADMIN — METHADONE HYDROCHLORIDE 10 MG: 10 TABLET ORAL at 05:21

## 2019-01-01 RX ADMIN — INSULIN LISPRO 3 UNITS: 100 INJECTION, SOLUTION INTRAVENOUS; SUBCUTANEOUS at 12:55

## 2019-01-01 RX ADMIN — CLONAZEPAM 0.5 MG: 1 TABLET ORAL at 08:23

## 2019-01-01 RX ADMIN — DOCUSATE SODIUM 100 MG: 100 CAPSULE, LIQUID FILLED ORAL at 08:30

## 2019-01-01 RX ADMIN — SODIUM CHLORIDE 1 G: 900 INJECTION, SOLUTION INTRAVENOUS at 21:45

## 2019-01-01 RX ADMIN — CLONAZEPAM 0.5 MG: 0.5 TABLET ORAL at 21:31

## 2019-01-01 RX ADMIN — HYDRALAZINE HYDROCHLORIDE 25 MG: 25 TABLET, FILM COATED ORAL at 22:39

## 2019-01-01 RX ADMIN — MIRTAZAPINE 7.5 MG: 15 TABLET, FILM COATED ORAL at 21:44

## 2019-01-01 RX ADMIN — Medication 10 ML: at 06:27

## 2019-01-01 RX ADMIN — HEPARIN SODIUM 5000 UNITS: 5000 INJECTION INTRAVENOUS; SUBCUTANEOUS at 09:02

## 2019-01-01 RX ADMIN — ASPIRIN 81 MG: 81 TABLET, COATED ORAL at 21:46

## 2019-01-01 RX ADMIN — METHADONE HYDROCHLORIDE 5 MG: 5 TABLET ORAL at 08:46

## 2019-01-01 RX ADMIN — DIPHENHYDRAMINE HYDROCHLORIDE 50 MG: 50 INJECTION, SOLUTION INTRAMUSCULAR; INTRAVENOUS at 15:04

## 2019-01-01 RX ADMIN — TUBERCULIN PURIFIED PROTEIN DERIVATIVE 5 UNITS: 5 INJECTION, SOLUTION INTRADERMAL at 11:13

## 2019-01-01 RX ADMIN — CLONAZEPAM 0.5 MG: 0.5 TABLET ORAL at 16:27

## 2019-01-01 RX ADMIN — Medication 1 AMPULE: at 22:48

## 2019-01-01 RX ADMIN — Medication 10 ML: at 21:37

## 2019-01-01 RX ADMIN — METHADONE HYDROCHLORIDE 5 MG: 5 TABLET ORAL at 06:30

## 2019-01-01 RX ADMIN — METHADONE HYDROCHLORIDE 10 MG: 10 TABLET ORAL at 11:08

## 2019-01-01 RX ADMIN — METHADONE HYDROCHLORIDE 5 MG: 5 TABLET ORAL at 17:37

## 2019-01-01 RX ADMIN — HEPARIN SODIUM 5000 UNITS: 5000 INJECTION INTRAVENOUS; SUBCUTANEOUS at 10:00

## 2019-01-01 RX ADMIN — FUROSEMIDE 40 MG: 40 TABLET ORAL at 08:32

## 2019-01-01 RX ADMIN — Medication 10 ML: at 14:46

## 2019-01-01 RX ADMIN — LISINOPRIL 10 MG: 5 TABLET ORAL at 08:25

## 2019-01-01 RX ADMIN — NYSTATIN 500000 UNITS: 100000 SUSPENSION ORAL at 18:11

## 2019-01-01 RX ADMIN — CLONAZEPAM 0.5 MG: 1 TABLET ORAL at 17:18

## 2019-01-01 RX ADMIN — CARVEDILOL 3.12 MG: 3.12 TABLET, FILM COATED ORAL at 18:19

## 2019-01-01 RX ADMIN — CALCIUM CARBONATE 500 MG (1,250 MG)-VITAMIN D3 200 UNIT TABLET 1 TABLET: at 09:08

## 2019-01-01 RX ADMIN — FAMOTIDINE 20 MG: 20 TABLET ORAL at 09:08

## 2019-01-01 RX ADMIN — PIPERACILLIN SODIUM,TAZOBACTAM SODIUM 3.38 G: 3; .375 INJECTION, POWDER, FOR SOLUTION INTRAVENOUS at 21:54

## 2019-01-01 RX ADMIN — AMLODIPINE BESYLATE 2.5 MG: 5 TABLET ORAL at 08:28

## 2019-01-01 RX ADMIN — INSULIN LISPRO 2 UNITS: 100 INJECTION, SOLUTION INTRAVENOUS; SUBCUTANEOUS at 22:26

## 2019-01-01 RX ADMIN — HEPARIN SODIUM 5000 UNITS: 5000 INJECTION INTRAVENOUS; SUBCUTANEOUS at 22:01

## 2019-01-01 RX ADMIN — INSULIN LISPRO 8 UNITS: 100 INJECTION, SOLUTION INTRAVENOUS; SUBCUTANEOUS at 21:33

## 2019-01-01 RX ADMIN — HYDROMORPHONE HYDROCHLORIDE 0.2 MG: 1 INJECTION, SOLUTION INTRAMUSCULAR; INTRAVENOUS; SUBCUTANEOUS at 09:48

## 2019-01-01 RX ADMIN — CARVEDILOL 6.25 MG: 6.25 TABLET, FILM COATED ORAL at 16:55

## 2019-01-01 RX ADMIN — PREDNISONE 10 MG: 10 TABLET ORAL at 08:26

## 2019-01-01 RX ADMIN — GLIPIZIDE 10 MG: 10 TABLET, FILM COATED, EXTENDED RELEASE ORAL at 06:30

## 2019-01-01 RX ADMIN — INSULIN GLARGINE 5 UNITS: 100 INJECTION, SOLUTION SUBCUTANEOUS at 08:56

## 2019-01-01 RX ADMIN — CLONAZEPAM 0.5 MG: 0.5 TABLET ORAL at 15:58

## 2019-01-01 RX ADMIN — INSULIN LISPRO 4 UNITS: 100 INJECTION, SOLUTION INTRAVENOUS; SUBCUTANEOUS at 21:59

## 2019-01-01 RX ADMIN — CEFTRIAXONE SODIUM 1 G: 1 INJECTION, POWDER, FOR SOLUTION INTRAMUSCULAR; INTRAVENOUS at 10:36

## 2019-01-01 RX ADMIN — LACTOBACILLUS TAB 2 TABLET: TAB at 17:27

## 2019-01-01 RX ADMIN — INSULIN GLARGINE 5 UNITS: 100 INJECTION, SOLUTION SUBCUTANEOUS at 08:46

## 2019-01-01 RX ADMIN — PIPERACILLIN SODIUM,TAZOBACTAM SODIUM 3.38 G: 3; .375 INJECTION, POWDER, FOR SOLUTION INTRAVENOUS at 04:10

## 2019-01-01 RX ADMIN — CLONAZEPAM 0.5 MG: 0.5 TABLET ORAL at 08:26

## 2019-01-01 RX ADMIN — SODIUM CHLORIDE 1 G: 900 INJECTION, SOLUTION INTRAVENOUS at 03:47

## 2019-01-01 RX ADMIN — CLONAZEPAM 0.5 MG: 1 TABLET ORAL at 09:40

## 2019-01-01 RX ADMIN — FERROUS GLUCONATE 1 TABLET: 324 TABLET ORAL at 08:35

## 2019-01-01 RX ADMIN — METHADONE HYDROCHLORIDE 5 MG: 5 TABLET ORAL at 05:37

## 2019-01-01 RX ADMIN — ASPIRIN 81 MG: 81 TABLET, COATED ORAL at 21:44

## 2019-01-01 RX ADMIN — INSULIN LISPRO 2 UNITS: 100 INJECTION, SOLUTION INTRAVENOUS; SUBCUTANEOUS at 21:08

## 2019-01-01 RX ADMIN — Medication 10 ML: at 12:00

## 2019-01-01 RX ADMIN — Medication 1 AMPULE: at 21:31

## 2019-01-01 RX ADMIN — SENNOSIDES AND DOCUSATE SODIUM 1 TABLET: 8.6; 5 TABLET ORAL at 09:03

## 2019-01-01 RX ADMIN — METRONIDAZOLE 500 MG: 500 TABLET, FILM COATED ORAL at 08:41

## 2019-01-01 RX ADMIN — DULOXETINE 60 MG: 60 CAPSULE, DELAYED RELEASE ORAL at 08:34

## 2019-01-01 RX ADMIN — ASPIRIN 81 MG: 81 TABLET, COATED ORAL at 02:09

## 2019-01-01 RX ADMIN — HEPARIN SODIUM 5000 UNITS: 5000 INJECTION INTRAVENOUS; SUBCUTANEOUS at 05:14

## 2019-01-01 RX ADMIN — INSULIN LISPRO 6 UNITS: 100 INJECTION, SOLUTION INTRAVENOUS; SUBCUTANEOUS at 12:25

## 2019-01-01 RX ADMIN — LACTOBACILLUS TAB 2 TABLET: TAB at 09:36

## 2019-01-01 RX ADMIN — METHADONE HYDROCHLORIDE 10 MG: 10 TABLET ORAL at 04:55

## 2019-01-01 RX ADMIN — Medication 10 ML: at 21:26

## 2019-01-01 RX ADMIN — METHADONE HYDROCHLORIDE 5 MG: 5 TABLET ORAL at 17:36

## 2019-01-01 RX ADMIN — PIPERACILLIN SODIUM,TAZOBACTAM SODIUM 3.38 G: 3; .375 INJECTION, POWDER, FOR SOLUTION INTRAVENOUS at 05:59

## 2019-01-01 RX ADMIN — INSULIN LISPRO 4 UNITS: 100 INJECTION, SOLUTION INTRAVENOUS; SUBCUTANEOUS at 17:08

## 2019-01-01 RX ADMIN — ACETAMINOPHEN 650 MG: 325 TABLET, FILM COATED ORAL at 02:35

## 2019-01-01 RX ADMIN — SENNOSIDES AND DOCUSATE SODIUM 1 TABLET: 8.6; 5 TABLET ORAL at 08:32

## 2019-01-01 RX ADMIN — HYDRALAZINE HYDROCHLORIDE 25 MG: 25 TABLET, FILM COATED ORAL at 17:17

## 2019-01-01 RX ADMIN — SODIUM CHLORIDE 1 G: 900 INJECTION, SOLUTION INTRAVENOUS at 08:50

## 2019-01-01 RX ADMIN — LEVOTHYROXINE SODIUM 75 MCG: 75 TABLET ORAL at 06:14

## 2019-01-01 RX ADMIN — INSULIN GLARGINE 10 UNITS: 100 INJECTION, SOLUTION SUBCUTANEOUS at 21:56

## 2019-01-01 RX ADMIN — POTASSIUM CHLORIDE 10 MEQ: 10 TABLET, EXTENDED RELEASE ORAL at 08:45

## 2019-01-01 RX ADMIN — HEPARIN SODIUM 5000 UNITS: 5000 INJECTION INTRAVENOUS; SUBCUTANEOUS at 16:27

## 2019-01-01 RX ADMIN — PREDNISONE 10 MG: 10 TABLET ORAL at 17:12

## 2019-01-01 RX ADMIN — PREDNISONE 10 MG: 10 TABLET ORAL at 08:08

## 2019-01-01 RX ADMIN — PREDNISONE 10 MG: 10 TABLET ORAL at 11:08

## 2019-01-01 RX ADMIN — SODIUM CHLORIDE 100 ML/HR: 900 INJECTION, SOLUTION INTRAVENOUS at 23:48

## 2019-01-01 RX ADMIN — Medication 500 MG: at 07:49

## 2019-01-01 RX ADMIN — HEPARIN SODIUM 5000 UNITS: 5000 INJECTION INTRAVENOUS; SUBCUTANEOUS at 22:38

## 2019-01-01 RX ADMIN — LIDOCAINE HYDROCHLORIDE 15 ML: 20 SOLUTION ORAL; TOPICAL at 17:58

## 2019-01-01 RX ADMIN — COLCHICINE 0.6 MG: 0.6 TABLET, FILM COATED ORAL at 14:03

## 2019-01-01 RX ADMIN — METHADONE HYDROCHLORIDE 5 MG: 5 TABLET ORAL at 17:39

## 2019-01-01 RX ADMIN — VITAMIN D, TAB 1000IU (100/BT) 1000 UNITS: 25 TAB at 08:46

## 2019-01-01 RX ADMIN — FAMOTIDINE 20 MG: 20 TABLET ORAL at 18:41

## 2019-01-01 RX ADMIN — HEPARIN SODIUM 5000 UNITS: 5000 INJECTION INTRAVENOUS; SUBCUTANEOUS at 05:15

## 2019-01-01 RX ADMIN — SENNOSIDES, DOCUSATE SODIUM 1 TABLET: 50; 8.6 TABLET, FILM COATED ORAL at 08:15

## 2019-01-01 RX ADMIN — DOXYCYCLINE HYCLATE 100 MG: 100 CAPSULE ORAL at 21:09

## 2019-01-01 RX ADMIN — COLCHICINE 0.6 MG: 0.6 TABLET, FILM COATED ORAL at 08:50

## 2019-01-01 RX ADMIN — FAMOTIDINE 20 MG: 20 TABLET ORAL at 08:30

## 2019-01-01 RX ADMIN — INSULIN GLARGINE 10 UNITS: 100 INJECTION, SOLUTION SUBCUTANEOUS at 08:24

## 2019-01-01 RX ADMIN — SODIUM CHLORIDE, SODIUM LACTATE, POTASSIUM CHLORIDE, AND CALCIUM CHLORIDE 100 ML/HR: 600; 310; 30; 20 INJECTION, SOLUTION INTRAVENOUS at 20:41

## 2019-01-01 RX ADMIN — Medication 10 ML: at 05:32

## 2019-01-01 RX ADMIN — CARVEDILOL 6.25 MG: 6.25 TABLET, FILM COATED ORAL at 08:59

## 2019-01-01 RX ADMIN — CARVEDILOL 6.25 MG: 6.25 TABLET, FILM COATED ORAL at 17:56

## 2019-01-01 RX ADMIN — INSULIN LISPRO 3 UNITS: 100 INJECTION, SOLUTION INTRAVENOUS; SUBCUTANEOUS at 17:26

## 2019-01-01 RX ADMIN — SODIUM CHLORIDE 1 G: 900 INJECTION, SOLUTION INTRAVENOUS at 09:43

## 2019-01-01 RX ADMIN — DULOXETINE HYDROCHLORIDE 60 MG: 60 CAPSULE, DELAYED RELEASE ORAL at 08:11

## 2019-01-01 RX ADMIN — POTASSIUM CHLORIDE 10 MEQ: 10 TABLET, EXTENDED RELEASE ORAL at 17:14

## 2019-01-01 RX ADMIN — Medication 10 ML: at 15:40

## 2019-01-01 RX ADMIN — MINOCYCLINE HYDROCHLORIDE 100 MG: 50 CAPSULE ORAL at 11:41

## 2019-01-01 RX ADMIN — SODIUM CHLORIDE 125 ML/HR: 900 INJECTION, SOLUTION INTRAVENOUS at 18:32

## 2019-01-01 RX ADMIN — PREDNISONE 5 MG: 5 TABLET ORAL at 12:31

## 2019-01-01 RX ADMIN — PREDNISONE 10 MG: 10 TABLET ORAL at 08:18

## 2019-01-01 RX ADMIN — INSULIN GLARGINE 10 UNITS: 100 INJECTION, SOLUTION SUBCUTANEOUS at 08:33

## 2019-01-01 RX ADMIN — PREDNISONE 10 MG: 10 TABLET ORAL at 08:47

## 2019-01-01 RX ADMIN — STANDARDIZED SENNA CONCENTRATE AND DOCUSATE SODIUM 1 TABLET: 8.6; 5 TABLET, FILM COATED ORAL at 17:27

## 2019-01-01 RX ADMIN — SENNOSIDES AND DOCUSATE SODIUM 1 TABLET: 8.6; 5 TABLET ORAL at 08:47

## 2019-01-01 RX ADMIN — VANCOMYCIN HYDROCHLORIDE 1500 MG: 10 INJECTION, POWDER, LYOPHILIZED, FOR SOLUTION INTRAVENOUS at 11:45

## 2019-01-01 RX ADMIN — LACTOBACILLUS TAB 2 TABLET: TAB at 17:52

## 2019-01-01 RX ADMIN — METHADONE HYDROCHLORIDE 10 MG: 10 TABLET ORAL at 20:59

## 2019-01-01 RX ADMIN — AMLODIPINE BESYLATE 10 MG: 10 TABLET ORAL at 08:44

## 2019-01-01 RX ADMIN — CEFTRIAXONE SODIUM 1 G: 1 INJECTION, POWDER, FOR SOLUTION INTRAMUSCULAR; INTRAVENOUS at 15:09

## 2019-01-01 RX ADMIN — INSULIN LISPRO 5 UNITS: 100 INJECTION, SOLUTION INTRAVENOUS; SUBCUTANEOUS at 11:45

## 2019-01-01 RX ADMIN — Medication 1 AMPULE: at 21:51

## 2019-01-01 RX ADMIN — METHADONE HYDROCHLORIDE 5 MG: 5 TABLET ORAL at 21:36

## 2019-01-01 RX ADMIN — INSULIN HUMAN 4 UNITS: 100 INJECTION, SOLUTION PARENTERAL at 17:32

## 2019-01-01 RX ADMIN — POLYETHYLENE GLYCOL 3350 17 G: 17 POWDER, FOR SOLUTION ORAL at 08:22

## 2019-01-01 RX ADMIN — AMLODIPINE BESYLATE 2.5 MG: 5 TABLET ORAL at 08:19

## 2019-01-01 RX ADMIN — INSULIN GLARGINE 10 UNITS: 100 INJECTION, SOLUTION SUBCUTANEOUS at 08:45

## 2019-01-01 RX ADMIN — SENNOSIDES AND DOCUSATE SODIUM 1 TABLET: 8.6; 5 TABLET ORAL at 08:15

## 2019-01-01 RX ADMIN — INSULIN LISPRO 2 UNITS: 100 INJECTION, SOLUTION INTRAVENOUS; SUBCUTANEOUS at 22:00

## 2019-01-01 RX ADMIN — ASPIRIN 81 MG: 81 TABLET, COATED ORAL at 21:49

## 2019-01-01 RX ADMIN — CLONAZEPAM 0.25 MG: 1 TABLET ORAL at 13:27

## 2019-01-01 RX ADMIN — SODIUM CHLORIDE, PRESERVATIVE FREE 600 UNITS: 5 INJECTION INTRAVENOUS at 05:18

## 2019-01-01 RX ADMIN — TAMSULOSIN HYDROCHLORIDE 0.4 MG: 0.4 CAPSULE ORAL at 08:45

## 2019-01-01 RX ADMIN — INSULIN LISPRO 2 UNITS: 100 INJECTION, SOLUTION INTRAVENOUS; SUBCUTANEOUS at 12:03

## 2019-01-01 RX ADMIN — CARVEDILOL 6.25 MG: 6.25 TABLET, FILM COATED ORAL at 08:46

## 2019-01-01 RX ADMIN — CLONAZEPAM 0.5 MG: 0.5 TABLET ORAL at 21:09

## 2019-01-01 RX ADMIN — LIDOCAINE HYDROCHLORIDE 15 ML: 20 SOLUTION ORAL; TOPICAL at 14:19

## 2019-01-01 RX ADMIN — LIDOCAINE HYDROCHLORIDE 15 ML: 20 SOLUTION ORAL; TOPICAL at 09:45

## 2019-01-01 RX ADMIN — METHADONE HYDROCHLORIDE 5 MG: 5 TABLET ORAL at 12:05

## 2019-01-01 RX ADMIN — ACETAMINOPHEN 650 MG: 325 TABLET, FILM COATED ORAL at 05:53

## 2019-01-01 RX ADMIN — CLONAZEPAM 0.25 MG: 1 TABLET ORAL at 17:39

## 2019-01-01 RX ADMIN — ASPIRIN 81 MG: 81 TABLET, COATED ORAL at 05:02

## 2019-01-01 RX ADMIN — HEPARIN SODIUM 5000 UNITS: 5000 INJECTION INTRAVENOUS; SUBCUTANEOUS at 23:38

## 2019-01-01 RX ADMIN — FERROUS GLUCONATE 1 TABLET: 324 TABLET ORAL at 06:25

## 2019-01-01 RX ADMIN — INSULIN LISPRO 4 UNITS: 100 INJECTION, SOLUTION INTRAVENOUS; SUBCUTANEOUS at 12:41

## 2019-01-01 RX ADMIN — INSULIN GLARGINE 10 UNITS: 100 INJECTION, SOLUTION SUBCUTANEOUS at 11:16

## 2019-01-01 RX ADMIN — HEPARIN SODIUM 5000 UNITS: 5000 INJECTION INTRAVENOUS; SUBCUTANEOUS at 17:57

## 2019-01-16 PROBLEM — S22.39XA CLOSED RIB FRACTURE: Status: ACTIVE | Noted: 2019-01-01

## 2019-01-16 PROBLEM — J96.01 ACUTE RESPIRATORY FAILURE WITH HYPOXIA (HCC): Status: ACTIVE | Noted: 2019-01-01

## 2019-01-16 PROBLEM — K21.9 GERD (GASTROESOPHAGEAL REFLUX DISEASE): Status: ACTIVE | Noted: 2019-01-01

## 2019-01-16 PROBLEM — N19 ACUTE PRERENAL AZOTEMIA: Status: ACTIVE | Noted: 2019-01-01

## 2019-01-16 PROBLEM — I50.32 CHRONIC DIASTOLIC CONGESTIVE HEART FAILURE (HCC): Status: ACTIVE | Noted: 2019-01-01

## 2019-01-16 PROBLEM — E03.9 ACQUIRED HYPOTHYROIDISM: Status: ACTIVE | Noted: 2019-01-01

## 2019-01-16 NOTE — ED TRIAGE NOTES
Pt arrives to room via EMS from home. Pt fell about 45 mins ago. Pt was in restroom and fell against side of tub on her right side/ rib cage and c/o pain to this area. Pt denies SOB in route. VSS in route per EMS with some /86. BGL = 380. Pt has had multiple falls over the last week. Pt appears drowsy and does take methadone at home. Pt last dose at 0830 and takes meds QID. Pt family at bedside and denies any new confusion.

## 2019-01-16 NOTE — H&P
HOSPITALIST H&P 
 
NAME:  Cipriano Griffiths Age:  80 y.o. 
:   1933 MRN:   [id number] PCP: Esteban Samson MD 
 
Attending MD: Addis Caballero DO Treatment Team: Attending Provider: Maranda Warner MD; Primary Nurse: Suly Benz RN 
 
HPI:  
 
Cipriano Griffiths is a 80year old CF with a PMH of chronic dCHF, DM2, heel ulcers, peripheral neuropathy, and chronic balance issues presented to the ER with right sided chest wall pain after sustaining 2 mechanical falls in the 3 days prior to admission. She fell into a window sill 3 days prior to admission after she lost her balance then fell again the day prior to admission into the side of the bath tub while trying to get up from the commode and she lost her balance. Then the day of admission, her daughter, who is a nurse tried to help her sit up in bed and in her chair and thought she was in too much pain to be functional so she called EMS to take her to the ER to be evaluated. Upon arrival to the ER the patient was hypoxic at 87% on room air. She then came up to 96%. CXR revealed a posterior rib fracture. She was monitored in the ER for a couple of hours and given an incentive spirometer. She was retested and was satting 88% on room air. The patient denies near syncope/syncope. Denies dizziness. Denies CP/SOB. Christina F/C/N/V. Complete ROS done and is as stated in HPI or otherwise negative Past Medical History:  
Diagnosis Date  Arthritis  Chronic diastolic congestive heart failure ([facility] [address] 2019  Chronic pain   
 bilat feet & hands  Edema BLE & bilat fingers; pt takes diuretic daily; pt states her swelling is related to the nerve disease she is being treated for at [facility]  GERD (gastroesophageal reflux disease) 2019  Gouty arthritis  History of MI (myocardial infarction) s  \"light heart attack\"; pt states MD at [place] mentioned it to her; pt stated MD told her \"was on the back part of her heart & would not give her any problems\"; pt takes aspirin 81 mg daily  Hypertension   
 managed w/med  Hypothyroidism   
 s/p partial thyroidectomy 1960; managed w/med  Mild heartburn   
 takes tums prn  Neuropathy 1980's  
 pt states has been treated by [facility] for \"nerve problems in her feet & hands\"  Post-operative nausea and vomiting  Type 2 diabetes mellitus without complication, without long-term current use of insulin ([facility] [address] 7/27/2018 Past Surgical History:  
Procedure Laterality Date  HX CYST REMOVAL Right 1950's  
 upper eyelid  HX DILATION AND CURETTAGE  [id number]  
 ectopic pregnancy; right BSO done  HX HEART CATHETERIZATION  late 1979  
 pt states no stents [address] [facility]  
 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  
 nerve removed out of left leg [address] [facility]  HX UROLOGICAL    
 cystoscopy Prior to Admission Medications Prescriptions Last Dose Informant Patient Reported? Taking? CALCIUM CARBONATE/VITAMIN D3 (CALCIUM 600 + D,3, PO) 1/16/2019 at Unknown time  Yes Yes Sig: Take 600 mg by mouth daily. CHOLECALCIFEROL, VITAMIN D3, (VITAMIN D3 PO) 1/16/2019 at Unknown time  Yes Yes Sig: Take 1,000 mg by mouth daily. DULoxetine (CYMBALTA) 30 mg capsule 1/16/2019 at Unknown time  Yes Yes Sig: Take 60 mg by mouth daily. KRILL/OM-3/DHA/EPA/PHOSPHO/AST (MEGARED OMEGA-3 KRILL OIL PO) 1/16/2019 at Unknown time  Yes Yes Sig: Take 1 Cap by mouth daily. SENNOSIDES (LAXATIVE PILLS PO) 12/16/2018 at Unknown time  Yes Yes Sig: Take  by mouth.   
acetaminophen (TYLENOL EXTRA STRENGTH) 500 mg tablet Not Taking at Unknown time  Yes No  
 Sig: Take 500 mg by mouth every six (6) hours as needed for Pain. amLODIPine (NORVASC) 2.5 mg tablet   Yes Yes Sig: Take 2.5 mg by mouth daily. aspirin delayed-release 81 mg tablet 1/16/2019 at Unknown time  Yes Yes Sig: Take 81 mg by mouth nightly. Ok to continue per anesthesia guidelines. carvedilol (COREG) 3.125 mg tablet 1/16/2019 at Unknown time  No Yes Sig: Take 1 Tab by mouth two (2) times daily (with meals). clonazePAM (KLONOPIN) 0.5 mg tablet 1/16/2019 at Unknown time  Yes Yes Sig: Take 0.5 mg by mouth three (3) times daily. colchicine (COLCRYS) 0.6 mg tablet 1/16/2019 at Unknown time  Yes Yes Sig: Take 0.6 mg by mouth two (2) times a day. diphenhydrAMINE-acetaminophen (PERCOGESIC EXTRA STRENGTH) 12.5-500 mg tab 1/9/2019 at Unknown time  Yes Yes Sig: Take 1 Tab by mouth. As needed for pain  
ferrous gluconate 324 mg (38 mg iron) tablet 1/16/2019 at Unknown time  Yes Yes Sig: Take 324 mg by mouth Daily (before breakfast). furosemide (LASIX) 40 mg tablet 1/16/2019 at Unknown time  Yes Yes Sig: Take 40 mg by mouth two (2) times a day. glipiZIDE SR (GLUCOTROL XL) 10 mg CR tablet   Yes No  
Sig: Take 10 mg by mouth daily. hydrOXYzine pamoate (VISTARIL) 50 mg capsule 1/16/2019 at Unknown time  Yes Yes Sig: Take 25 mg by mouth two (2) times daily as needed for Itching. levothyroxine (SYNTHROID) 75 mcg tablet 1/16/2019 at Unknown time  Yes Yes Sig: Take 75 mcg by mouth Daily (before breakfast). lisinopril (PRINIVIL, ZESTRIL) 20 mg tablet 1/16/2019 at Unknown time  No Yes Sig: Take 1 Tab by mouth daily. Indications: hypertension Patient taking differently: Take 10 mg by mouth daily. loperamide (IMODIUM) 2 mg capsule Not Taking at Unknown time  Yes No  
Sig: Take 4 mg by mouth as needed for Diarrhea. methadone (DOLOPHINE) 5 mg tablet 1/16/2019 at Unknown time  Yes Yes Sig: Take 5 mg by mouth every six (6) hours.  Instructed to take DOS per Anesthesia guidelines. Indications: CHRONIC PAIN  
multivitamin with minerals (HAIR,SKIN AND NAILS PO) 1/16/2019 at Unknown time  Yes Yes Sig: Take 1 Tab by mouth daily. nitrofurantoin (MACRODANTIN) 100 mg capsule   Yes Yes Sig: Take 100 mg by mouth every morning. potassium chloride (KLOR-CON) 10 mEq tablet 1/16/2019 at Unknown time  Yes Yes Sig: Take 10 mEq by mouth two (2) times a day. potassium chloride SR (KLOR-CON 10) 10 mEq tablet   Yes No  
Sig: Take 10 mEq by mouth two (2) times a day. predniSONE (DELTASONE) 10 mg tablet 1/16/2019 at Unknown time  Yes Yes Sig: Take 5 mg by mouth two (2) times a day. predniSONE (DELTASONE) 10 mg tablet 1/16/2019 at Unknown time  Yes Yes Sig: Take 10 mg by mouth two (2) times a day. 5mg BID  
raNITIdine (ZANTAC) 150 mg tablet 1/16/2019 at Unknown time  Yes Yes Sig: Take 150 mg by mouth two (2) times a day. sennosides (LAXATIVE, SENNOSIDES,) 25 mg tab 12/16/2018 at Unknown time  Yes Yes Sig: Take 25 mg by mouth as needed (constipaton). Facility-Administered Medications: None Allergies Allergen Reactions  Contrast Agent [Iodine] Anaphylaxis  Actifed [Triprolidine-Pseudoephedrine] Nausea Only  Allopurinol Other (comments) Elevated blood pressure & pt states could not walk or see until medication was out of her system.  Decongestant [Pseudoephedrine Hcl] Rash and Itching  Lexapro [Escitalopram Oxalate] Other (comments) Lips/Mouth swelling  Lyrica [Pregabalin] Unknown (comments)  Minizide [Prazosin-Polythiazide] Unknown (comments)  Mobic [Meloxicam] Other (comments) Facial swelling  Omnipaque [Iohexol] Hives, Swelling and Other (comments) Wheezing and/or shortness of breath  Sertraline Other (comments) Mouth, lips swell  Spironolactone Itching   Pt complains that she breaks out in clammy sweat with itching and stinging in her upper arms, dry mouth, funny feeling tongue, SOB, and increased anxiety.  Sulfamethazine Other (comments) Mouth/lips swelling Social History Tobacco Use  Smoking status: Never Smoker  Smokeless tobacco: Never Used Substance Use Topics  Alcohol use: No  
  
 
Family History Problem Relation Age of Onset  Cancer Mother  Heart Disease Father  Stroke Father Objective:  
 
 
Visit Vitals /81 Pulse 84 Temp 98.6 °F (37 °C) Resp 16 Ht 5' 4\" (1.626 m) Wt 72.6 kg (160 lb) SpO2 (!) 89% BMI 27.46 kg/m² Temp (24hrs), Av.6 °F (37 °C), Min:98.6 °F (37 °C), Max:98.6 °F (37 °C) Oxygen Therapy O2 Sat (%): (!) 89 % (19) Pulse via Oximetry: 86 beats per minute (19) O2 Device: Nasal cannula (19) O2 Flow Rate (L/min): 2 l/min (19) Physical Exam: 
 
General:    Alert, cooperative, no distress, appears stated age. Eyes:   PERRLA EOMI Anicteric Head:   Normocephalic, without obvious abnormality, atraumatic. ENT:  Nares normal. No drainage. Dry lips. Lungs:   Clear to auscultation bilaterally. No Wheezing or Rhonchi. No rales. Heart:   Regular rate and rhythm,  no murmur, rub or gallop. No JVD. Abdomen:   Soft, non-tender. Not distended. Bowel sounds normal.  
MSK:  1+ BLE edema. No clubbing or cyanosis. No deformities/lesions. Skin:     Right chest wall ecchymosis. Tender to touch. Hell wound with eschar. Neurologic: Alert and oriented x 3, no focal deficits Psychiatry:      No depression/anxiety. Mood congruent for context. Heme/Lymph/Immune: Right chest wall ecchymosis. No overt signs of bleeding. Lab/Data Review: 
Recent Results (from the past 24 hour(s)) CK Collection Time: 19  3:35 PM  
Result Value Ref Range CK 81 21 - 215 U/L  
CBC WITH AUTOMATED DIFF Collection Time: 19  3:35 PM  
Result Value Ref Range WBC 10.0 4.3 - 11.1 K/uL  
 RBC 4.43 4.05 - 5.2 M/uL  
 HGB 11.9 11.7 - 15.4 g/dL HCT 37.9 35.8 - 46.3 % MCV 85.6 79.6 - 97.8 FL  
 MCH 26.9 26.1 - 32.9 PG  
 MCHC 31.4 31.4 - 35.0 g/dL  
 RDW 14.6 11.9 - 14.6 % PLATELET 527 617 - 949 K/uL MPV 10.4 9.4 - 12.3 FL ABSOLUTE NRBC 0.00 0.0 - 0.2 K/uL  
 DF AUTOMATED NEUTROPHILS 75 43 - 78 % LYMPHOCYTES 17 13 - 44 % MONOCYTES 6 4.0 - 12.0 % EOSINOPHILS 0 (L) 0.5 - 7.8 % BASOPHILS 1 0.0 - 2.0 % IMMATURE GRANULOCYTES 2 0.0 - 5.0 %  
 ABS. NEUTROPHILS 7.5 1.7 - 8.2 K/UL  
 ABS. LYMPHOCYTES 1.7 0.5 - 4.6 K/UL  
 ABS. MONOCYTES 0.6 0.1 - 1.3 K/UL  
 ABS. EOSINOPHILS 0.0 0.0 - 0.8 K/UL  
 ABS. BASOPHILS 0.1 0.0 - 0.2 K/UL  
 ABS. IMM. GRANS. 0.2 0.0 - 0.5 K/UL METABOLIC PANEL, COMPREHENSIVE Collection Time: 01/16/19  3:35 PM  
Result Value Ref Range Sodium 136 136 - 145 mmol/L Potassium 5.0 3.5 - 5.1 mmol/L Chloride 100 98 - 107 mmol/L  
 CO2 26 21 - 32 mmol/L Anion gap 10 7 - 16 mmol/L Glucose 294 (H) 65 - 100 mg/dL BUN 40 (H) 8 - 23 MG/DL Creatinine 1.48 (H) 0.6 - 1.0 MG/DL  
 GFR est AA 43 (L) >60 ml/min/1.73m2 GFR est non-AA 36 (L) >60 ml/min/1.73m2 Calcium 8.8 8.3 - 10.4 MG/DL Bilirubin, total 0.5 0.2 - 1.1 MG/DL  
 ALT (SGPT) 31 12 - 65 U/L  
 AST (SGOT) 26 15 - 37 U/L Alk. 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 Ct Head Wo Cont Result Date: 1/16/2019 Impression: Chronic appearing white matter changes and generalized cerebral atrophy. No acute intracranial abnormality. Xr Ribs Rt W Pa Cxr Min 3 V Result Date: 1/16/2019 IMPRESSION: Nondisplaced right posterior lateral sixth rib fracture. Cultures: All Micro Results None Assessment/Plan:  
 
Principal Problem: Acute respiratory failure with hypoxia ([facility] [address] (1/16/2019) - Likely due to poor respiratory effort with rib fracture - Start incentive spirometer - Wean oxygen as appropriate Active Problems: 
  Closed rib fracture (1/16/2019) - Due to mechanical fall 
- Continue home Methadone - Add PRN Norco 
- PT/OT Acute prerenal azotemia (1/16/2019) - Likely due to decreased PO intake and increased Lasix 
- Hold Lasix - Give 1L NS @ 100 ml/hr Essential hypertension with goal blood pressure less than 130/85 (10/27/2017) - Stable - Continue Lisinopril 
- Continue Amlodipine - Continue Coreg Type 2 diabetes mellitus without complication, without long-term current use of insulin ([facility] [address] (7/27/2018) - No acute issues - Continue Glipizide - Add Humalog SSI Chronic diastolic congestive heart failure ([facility] [address] (1/16/2019) - No acute issues 
- Hold Lasix - Continue Lisinopril 
- Continue Coreg Acquired hypothyroidism (1/16/2019) 
- Continue Hypothyroidism GERD (gastroesophageal reflux disease) (1/16/2019) 
- Continue Pepcid Mixed hyperlipidemia (7/27/2018) Code Status: DNR (No CPR/Shock/ACLS)/DNI 
DVT Prophylaxis: Heparin Anticipated discharge: 48 hours Jaguar Mayer DO 
6:34 PM

## 2019-01-17 NOTE — PROGRESS NOTES
Problem: Self Care Deficits Care Plan (Adult) Goal: *Acute Goals and Plan of Care (Insert Text) 1. Pt will transfer to toilet min assist of 1. 
2.  Pt will toilet self with min assist. 
3.  Pt will feed self after set up. 4.  Pt will go from supine to sit with CGA. Time frame:  7 visits OCCUPATIONAL THERAPY: Initial Assessment, Daily Note, Treatment Day: 1st and PM 1/17/2019OBSERVATION: Hospital Day: 2 [place] / Product Type: Medicare /  
  
NAME/AGE/GENDER: Mignon Bartlett is a 80 y.o. female PRIMARY DIAGNOSIS:  Closed rib fracture Acute respiratory failure with hypoxia (HCC) Acute respiratory failure with hypoxia (HCC) Acute respiratory failure with hypoxia ([facility] [address] ICD-10: Treatment Diagnosis:  
 · Generalized Muscle Weakness (M62.81) · Other lack of cordination (R27.8) Precautions/Allergies:  Fall risk, rib fracture with severe pain Contrast agent [iodine]; Actifed [triprolidine-pseudoephedrine]; Allopurinol; Decongestant [pseudoephedrine hcl]; Lexapro [escitalopram oxalate]; Lyrica [pregabalin]; Minizide [prazosin-polythiazide]; Mobic [meloxicam]; Omnipaque [iohexol]; Sertraline; Spironolactone; and Sulfamethazine ASSESSMENT:  
Ms. Dwayne Bruce presents initially in bed with daughter present. Daughter is a nurse here and moved back to area to help care for her mother. A sitter stays with pt when daughter working. Pt feeds herself at home with set up and helps with her hair care. Daughter performs toileting, bathing and dressing for pt. Pt with contractures in bilateral hands primarily at PIP joints and fingers do not straighten out . Pt with rotator cuff tear in right shoulder and active range limited. Active range to about 90 degrees in left shoudler due to OA.   Pt does help get herself out of bed at home with min assist and walks with RW with supervision to min assist.  Pt has a hx of many falls, one with fractured sternum. After suffering this fall, pt sat on floor for 6 hours. She would not let the sitter call her daughter or EMS. Pt with impaired sensation bilateral UEs and LEs. Fine motor skills impaired due to contractures in fingers. Currently pt dependent in most self care. Pt helps brush her hair. Feeding is more dependent due to bed positioning. This therapist attempted to sit pt EOB but pt screamed in 10 /10 pain. Asked nurse about pain meds and meds given. Therapist returned in pm with physical therapist and pt attempted to help herself move much more. Pt transferred to [facility] with min assist of 2. Pt mod assist with toileting. Pt mod assist to get back into bed. Daughter reports pt receiving  OT and Pt  And was making progress. Pt is not functioning at her prior level and feel pt might benefit from more intense therapy as STR, but this may not be possible. Home health therapy is certainly an option. Will continue to see pt while hospitalized 3 X per week. This section established at most recent assessment PROBLEM LIST (Impairments causing functional limitations): 1. Decreased Strength 2. Decreased ADL/Functional Activities 3. Decreased Transfer Abilities 4. Decreased Balance 5. Increased Pain 6. Decreased Activity Tolerance 7. Increased Shortness of Breath INTERVENTIONS PLANNED: (Benefits and precautions of occupational therapy have been discussed with the patient.) 1. Activities of daily living training 2. Balance training 3. Clothing management 4. Ruel tech training 5. Therapeutic activity 6. Therapeutic exercise TREATMENT PLAN: Frequency/Duration: Follow patient 3 times per week to address above goals. Rehabilitation Potential For Stated Goals: Good RECOMMENDED REHABILITATION/EQUIPMENT: (at time of discharge pending progress): Due to the probability of continued deficits (see above) this patient will likely need continued skilled occupational therapy after discharge. Equipment:  
? None at this time OCCUPATIONAL PROFILE AND HISTORY:  
History of Present Injury/Illness (Reason for Referral): SEE H and P Past Medical History/Comorbidities: Ms. Tera Nichole  has a past medical history of Arthritis, Chronic diastolic congestive heart failure ([address] (1/16/2019), Chronic pain, Edema, GERD (gastroesophageal reflux disease) (1/16/2019), Gouty arthritis, History of MI (myocardial infarction) (1980's), Hypertension, Hypothyroidism, Mild heartburn, Neuropathy (1980's), Post-operative nausea and vomiting, and Type 2 diabetes mellitus without complication, without long-term current use of insulin ([address] (7/27/2018). She also has no past medical history of Adverse effect of anesthesia, Aneurysm ([address] Arrhythmia, Asthma, Autoimmune disease ([address] CAD (coronary artery disease), Cancer ([address] Chronic kidney disease, Chronic obstructive pulmonary disease ([address] Coagulation disorder ([address] Difficult intubation, Endocarditis, Ill-defined condition, Liver disease, Malignant hyperthermia due to anesthesia, Morbid obesity ([address] Pseudocholinesterase deficiency, Psychiatric disorder, PUD (peptic ulcer disease), Rheumatic fever, Seizures ([address] Sleep apnea, Stroke ([address] or Thromboembolus ([address] Ms. Tera Nichole  has a past surgical history that includes hx partial thyroidectomy (1960); hx orthopaedic (Bilateral, 9081-7344); hx orthopaedic (Left, 1992); hx open cholecystectomy (1970); hx cyst removal (Right, 1950's); hx urological; hx heart catheterization (late 1979); hx hysterectomy (1972); hx gyn (1963); hx dilation and curettage (4429-7241); and hx left salpingo-oophorectomy (1965). Social History/Living Environment:  
Home Environment: Private residence # Steps to Enter: 0 One/Two Story Residence: One story # of Interior Steps: 0 Interior Rails: None Lift Chair Available: No 
Living Alone: No 
 Support Systems: Family member(s) Patient Expects to be Discharged to[de-identified] Private residence Current DME Used/Available at Home: Hospital bed, [facility] Prior Level of Function/Work/Activity: Pt lives with daughter who is a nurse here. When daughter is working, a sitter stays with pt. Pt fed herself at home and helped with her hair care. Daughter bathed , dressed , and helped pt with toileting. Pt was CGA to min assist with transfers and ambulation. Dominant Side:  
      RIGHT Number of Personal Factors/Comorbidities that affect the Plan of Care: Extensive review of physical, cognitive, and psychosocial performance (3+):  HIGH COMPLEXITY ASSESSMENT OF OCCUPATIONAL PERFORMANCE[de-identified]  
Activities of Daily Living:  
Basic ADLs (From Assessment) Complex ADLs (From Assessment) Feeding: Moderate assistance(Pt feeds self with set up at home. Feeding  in bed hard) Oral Facial Hygiene/Grooming: Moderate assistance Bathing: Total assistance Upper Body Dressing: Total assistance Lower Body Dressing: Total assistance Toileting: Moderate assistance Instrumental ADL Meal Preparation: Total assistance Grooming/Bathing/Dressing Activities of Daily Living Cognitive Retraining Safety/Judgement: Fall prevention; Awareness of environment Functional Transfers Toilet Transfer : Minimum assistance;Assist x2 Bed/Mat Mobility Rolling: Contact guard assistance;Stand-by assistance; Additional time Supine to Sit: Minimum assistance; Moderate assistance; Additional time Sit to Supine: Minimum assistance; Additional time Sit to Stand: Minimum assistance; Moderate assistance;Assist x2(used bed elevation ) Bed to Chair: Minimum assistance(very slow to move herself) Scooting: Minimum assistance; Moderate assistance Most Recent Physical Functioning:  
Gross Assessment: 
AROM: Generally decreased, functional(Pt with rotator cuff tear on right and OAlimiting movementL) Strength: Generally decreased, functional 
Coordination: Generally decreased, functional 
Tone: Normal 
Sensation: Impaired Posture: 
Posture (WDL): Exceptions to [facility] Posture Assessment: Forward head, Trunk flexion, Rounded shoulders Balance: 
Sitting: Impaired Sitting - Static: Fair (occasional) Sitting - Dynamic: Fair (occasional) Standing: Impaired Standing - Static: Fair;Constant support Standing - Dynamic : Fair Bed Mobility: 
Rolling: Contact guard assistance;Stand-by assistance; Additional time Supine to Sit: Minimum assistance; Moderate assistance; Additional time Sit to Supine: Minimum assistance; Additional time Scooting: Minimum assistance; Moderate assistance Wheelchair Mobility: 
  
Transfers: 
Sit to Stand: Minimum assistance; Moderate assistance;Assist x2(used bed elevation ) Stand to Sit: Minimum assistance Bed to Chair: Minimum assistance(very slow to move herself) Patient Vitals for the past 6 hrs: 
 BP SpO2 Pulse 01/17/19 1120 130/61 95 % 71  
01/17/19 1535 144/63 95 % 75 Mental Status Neurologic State: Alert Orientation Level: Oriented X4 Cognition: Follows commands, Appropriate for age attention/concentration Perception: Appears intact Perseveration: Perseverates during conversation Safety/Judgement: Fall prevention, Awareness of environment Physical Skills Involved: 1. Range of Motion 2. Balance 3. Strength 4. Activity Tolerance 5. Fine Motor Control 6. Gross Motor Control 7. Pain (acute) Cognitive Skills Affected (resulting in the inability to perform in a timely and safe manner): 1. Executive Function 2. Short Term Recall Psychosocial Skills Affected: 1. Habits/Routines 2. Environmental Adaptation 3. Social Roles Number of elements that affect the Plan of Care: 5+:  HIGH COMPLEXITY CLINICAL DECISION MAKING:  
[employer] AM-PAC 6 Clicks Daily Activity Inpatient Short Form How much help from another person does the patient currently need. .. Total A Lot A Little None 1. Putting on and taking off regular lower body clothing? [x] 1   [] 2   [] 3   [] 4  
2. Bathing (including washing, rinsing, drying)? [] 1   [x] 2   [] 3   [] 4  
3. Toileting, which includes using toilet, bedpan or urinal?   [] 1   [x] 2   [] 3   [] 4  
4. Putting on and taking off regular upper body clothing? [x] 1   [] 2   [] 3   [] 4  
5. Taking care of personal grooming such as brushing teeth? [] 1   [] 2   [x] 3   [] 4  
6. Eating meals? [] 1   [x] 2   [] 3   [] 4  
© 2007, Trustees of [address] [place] [address] [address] [id number], under license to [employer]. All rights reserved Score:  Initial: 11 Most Recent: X (Date: -- ) Interpretation of Tool:  Represents activities that are increasingly more difficult (i.e. Bed mobility, Transfers, Gait). Score 24 23 22-20 19-15 14-10 9-7 6 Modifier CH CI CJ CK CL CM CN   
 
? Self Care:  
  - CURRENT STATUS: CL - 60%-79% impaired, limited or restricted  - GOAL STATUS: CK - 40%-59% impaired, limited or restricted  - D/C STATUS:  ---------------To be determined--------------- [place] / [place] / Product Type: Medicare /   
 
Medical Necessity:    
· Patient demonstrates good rehab potential due to higher previous functional level. Reason for Services/Other Comments: 
· Patient continues to require skilled intervention due to medical complications and patient unable to attend/participate in therapy as expected. Use of outcome tool(s) and clinical judgement create a POC that gives a: HIGH COMPLEXITY  
 
 
 
TREATMENT:  
(In addition to Assessment/Re-Assessment sessions the following treatments were rendered) Pre-treatment Symptoms/Complaints:   
Pain: Initial:  
Pain Intensity 1: 10 
Pain Location 1: Rib cage Pain Orientation 1: Right Pain Intervention(s) 1: Medication (see MAR)  Post Session:  6 Self Care: (25 min): Pt able to hold finger food and a cup. Daughter feeding pt. Pt min assist of 2 to transfer to toilet. Pt required max  assist to to manage clothing . Braces/Orthotics/Lines/Etc:  
· O2 Device: Nasal cannula Treatment/Session Assessment:   
· Response to Treatment:  After pain meds, pt cooperative with movement activities. · Interdisciplinary Collaboration:  
o Physical Therapist 
o Occupational Therapist 
o Registered Nurse · After treatment position/precautions:  
o Supine in bed 
o Bed alarm/tab alert on 
o Bed/Chair-wheels locked 
o Bed in low position 
o Call light within reach · Compliance with Program/Exercises: Will assess as treatment progresses. · Recommendations/Intent for next treatment session: \"Next visit will focus on advancements to more challenging activities and reduction in assistance provided\". Total Treatment Duration: OT Patient Time In/Time Out Time In: 8220(9907) Time Out: 9630(2265) Stacy Morfin OT

## 2019-01-17 NOTE — PROGRESS NOTES
In accordance with Medicare guidelines, a copy of the Medicare Outpatient Observation Notice was provided to the patient's daughter. Oral explanation was provided and all questions answered. This MOON document was signed by patient's daughter & placed in the medical record under media tab. Copy provided to patient's daughter.  notified.

## 2019-01-17 NOTE — PROGRESS NOTES
Patient with small stage 2 inside left buttock small applied and large foam dressing to sacrum coccyx.

## 2019-01-17 NOTE — PROGRESS NOTES
Results of Oximetry with Exercise Study Patient was unable to ambulate because of generalized weakness. Was able to raise arms and legs to promote exercise and determine need for O2. RA Restin% 1L  Restin% 2L  Restin% RA Exercisin% 1L  Exercisin% 2L  Exercisin% 3L Exercisin% 4L Exercisin% Patient requires 2L Resting and 4L Ambulating to maintain a SAT above 90%.

## 2019-01-17 NOTE — PROGRESS NOTES
Initial visit by  to convey care and concern and encourage patient that  services are available if desired. Ms. Breanna Garibay was receiving care from staff. Fe [facility] MDiv Board Certified Mathieu Dudley

## 2019-01-17 NOTE — PROGRESS NOTES
Pt seen for left heel ulcer. Noted open unstageable  wound over calcaneous bone. Wound measuring 3.7 X 3.5 X0.2. Pt family stated that it started as a blister and it just progressively got worse. Wound bed is pink, with slough and eschar, intact tiara wound, pt unable to feel BLE due to neuropathy. Pedal and post-tibialis pulses palpable, feet warm and normal capillary refill. Cleansed wound with dermal wound cleanser. Applied solisite wound gel to wound bed, covered with gauze and wrapped with kerlix gauze wrap. Recommend dressing change daily and as needed. Also recommend keeping heels offloaded with pillow. Will continue to follow.

## 2019-01-17 NOTE — PROGRESS NOTES
Spoke to Ms. Breanna Garibay and her daughter Lauralyn Bamberger (RN on 2nd floor) in room 218 about Case Management and discharge planning. Ms. Breanna Garibay and daughter live in [facility]. She is fairly independent with ADLs with use of a rollator. She also has OT and PT from [facility] coming out for home therapy. Discussed discharge options, including STR at a SNF (must have 3 inpatient, medically necessary midnights at the hospital; currently she is observation status) versus home with [facility] (per patient and daughter request). May also need home supplemental oxygen. Will follow. Care Management Interventions Plan discussed with Pt/Family/Caregiver:  Yes

## 2019-01-17 NOTE — ED NOTES
Pt has been using incentive spirometer per pt and her daughter at bedside. Pt daughter is a nurse and is encouraging pt to use incentive spirometer. Pt resting on stretcher at this time.

## 2019-01-17 NOTE — PROGRESS NOTES
Hospitalist Progress Note Admit Date:  2019  3:23 PM  
Name:  Austin Medrano Age:  80 y.o. 
:  1933 MRN:  [id number] PCP:  Masoud Andino MD 
Treatment Team: Attending Provider: Henry Yanes DO; Utilization Review: Alex Oh RN; Care Manager: Justin Montoya RN Subjective:  
CC: fall at home Pt is an 81 yo female with PMH of dCHF, DM2, peripheral neuropathy, balance issues, as well as a pressure ulcer on her left heel. Pt presented to the ED via EMS after 2 falls in the previous 3 days. In one fall the pt hit a window sill and in another struck the side of the bath tub. In the ED it was noted pt was hypoxic with a sat of 87% on room air, on oxygen came up to 96%. CXR demonstrated a posterior rib fracture. Per daughter the patient had a fall last year in which she broke her sternum. Pt denies any syncope or near syncope, dizziness, new CP, SOB, n/v/d. Pt lives with her daughter who is a nurse. Referrals to PT/OT as well as CM for d/c planning. PT sitting up in bed at time of exam, complains of right side and shoulder pain. Trying to use the IS but pain is a limiting factor. Will ask PT to do a 6 min walk test to determine if O2 is needed at home. Anticipate d/c to home in am if cleared by therapies. Objective:  
 
Patient Vitals for the past 24 hrs: 
 Temp Pulse Resp BP SpO2  
19 0825     93 % 19 0725 97.4 °F (36.3 °C) 73 18 163/86 96 % 19 0557 98.3 °F (36.8 °C) 80  151/78 95 % 19 0506 98 °F (36.7 °C) 79 18 (!) 170/101 99 % 19 0001 98.3 °F (36.8 °C) 89 18 153/71 98 % 19 98.6 °F (37 °C) 78 18 (!) 162/92 98 % 19 1931 98.7 °F (37.1 °C) 82 18 134/62 93 % 19 1902    132/64 95 % 19 1852     98 % 19 1802    169/74 96 % 19 1751     (!) 89 % 19 1750     (!) 89 % 19 1749     90 % 19 1748  84  196/81 (!) 88 % 01/16/19 1744  86   (!) 89 % 01/16/19 1740  89   90 % 01/16/19 1737  89   91 % 01/16/19 1733     94 % 01/16/19 1732     94 % 01/16/19 1731     97 % 01/16/19 1730     97 % 01/16/19 1659    193/86 96 % 01/16/19 1540 98.6 °F (37 °C) 97 16 163/73 96 % 01/16/19 1534  86   (!) 89 % 01/16/19 1532 98.6 °F (37 °C) 87 16 163/73 (!) 89 % 01/16/19 1529  88   (!) 88 % Oxygen Therapy O2 Sat (%): 93 % (01/17/19 0825) Pulse via Oximetry: 82 beats per minute (01/17/19 0825) O2 Device: Nasal cannula (01/17/19 0825) O2 Flow Rate (L/min): 2 l/min (01/17/19 0825) Intake/Output Summary (Last 24 hours) at 1/17/2019 1141 Last data filed at 1/17/2019 5053 Gross per 24 hour Intake 986 ml Output  Net 986 ml Physical Examination: 
General:    Well nourished. Awake and alert. Head:  Normocephalic, atraumatic Eyes:  Extraocular movements intact, normal sclera CV:   RRR. No  Murmurs, clicks, or gallops Lungs:   Unlabored, no cyanosis, CTAB but decreased in bases Abdomen:   Soft, nondistended, Pt tender on left side of chest and around the back Extremities: Warm and dry. No cyanosis or edema. 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 Skin:     No rashes or jaundice. Ecchymotic along right rib cage. Pressure ulcer left heel. Neuro:  No gross focal deficits Psych:  Mood and affect appropriate Data Review: 
I have reviewed all labs, meds, telemetry events, and studies from the last 24 hours. Recent Results (from the past 24 hour(s)) CK Collection Time: 01/16/19  3:35 PM  
Result Value Ref Range CK 81 21 - 215 U/L  
CBC WITH AUTOMATED DIFF Collection Time: 01/16/19  3:35 PM  
Result Value Ref Range WBC 10.0 4.3 - 11.1 K/uL  
 RBC 4.43 4.05 - 5.2 M/uL  
 HGB 11.9 11.7 - 15.4 g/dL HCT 37.9 35.8 - 46.3 % MCV 85.6 79.6 - 97.8 FL  
 MCH 26.9 26.1 - 32.9 PG  
 MCHC 31.4 31.4 - 35.0 g/dL  
 RDW 14.6 11.9 - 14.6 % PLATELET 687 801 - 181 K/uL MPV 10.4 9.4 - 12.3 FL ABSOLUTE NRBC 0.00 0.0 - 0.2 K/uL  
 DF AUTOMATED NEUTROPHILS 75 43 - 78 % LYMPHOCYTES 17 13 - 44 % MONOCYTES 6 4.0 - 12.0 % EOSINOPHILS 0 (L) 0.5 - 7.8 % BASOPHILS 1 0.0 - 2.0 % IMMATURE GRANULOCYTES 2 0.0 - 5.0 %  
 ABS. NEUTROPHILS 7.5 1.7 - 8.2 K/UL  
 ABS. LYMPHOCYTES 1.7 0.5 - 4.6 K/UL  
 ABS. MONOCYTES 0.6 0.1 - 1.3 K/UL  
 ABS. EOSINOPHILS 0.0 0.0 - 0.8 K/UL  
 ABS. BASOPHILS 0.1 0.0 - 0.2 K/UL  
 ABS. IMM. GRANS. 0.2 0.0 - 0.5 K/UL METABOLIC PANEL, COMPREHENSIVE Collection Time: 01/16/19  3:35 PM  
Result Value Ref Range Sodium 136 136 - 145 mmol/L Potassium 5.0 3.5 - 5.1 mmol/L Chloride 100 98 - 107 mmol/L  
 CO2 26 21 - 32 mmol/L Anion gap 10 7 - 16 mmol/L Glucose 294 (H) 65 - 100 mg/dL BUN 40 (H) 8 - 23 MG/DL Creatinine 1.48 (H) 0.6 - 1.0 MG/DL  
 GFR est AA 43 (L) >60 ml/min/1.73m2 GFR est non-AA 36 (L) >60 ml/min/1.73m2 Calcium 8.8 8.3 - 10.4 MG/DL Bilirubin, total 0.5 0.2 - 1.1 MG/DL  
 ALT (SGPT) 31 12 - 65 U/L  
 AST (SGOT) 26 15 - 37 U/L Alk. phosphatase 115 50 - 136 U/L Protein, total 7.1 6.3 - 8.2 g/dL Albumin 3.0 (L) 3.2 - 4.6 g/dL Globulin 4.1 (H) 2.3 - 3.5 g/dL A-G Ratio 0.7 (L) 1.2 - 3.5 URINALYSIS W/ RFLX MICROSCOPIC Collection Time: 01/16/19 10:16 PM  
Result Value Ref Range Color YELLOW Appearance CLOUDY Specific gravity 1.015 1.001 - 1.023    
 pH (UA) 6.0 5.0 - 9.0 Protein 30 (A) NEG mg/dL Glucose 250 mg/dL Ketone TRACE (A) NEG mg/dL Bilirubin NEGATIVE  NEG Blood SMALL (A) NEG Urobilinogen 0.2 0.2 - 1.0 EU/dL Nitrites POSITIVE (A) NEG Leukocyte Esterase SMALL (A) NEG Bacteria 0 0 /hpf  
GLUCOSE, POC Collection Time: 01/17/19  5:59 AM  
Result Value Ref Range Glucose (POC) 213 (H) 65 - 100 mg/dL METABOLIC PANEL, BASIC Collection Time: 01/17/19 10:04 AM  
Result Value Ref Range Sodium 136 136 - 145 mmol/L Potassium 4.3 3.5 - 5.1 mmol/L Chloride 101 98 - 107 mmol/L  
 CO2 27 21 - 32 mmol/L Anion gap 8 7 - 16 mmol/L Glucose 304 (H) 65 - 100 mg/dL BUN 30 (H) 8 - 23 MG/DL Creatinine 1.29 (H) 0.6 - 1.0 MG/DL  
 GFR est AA 51 (L) >60 ml/min/1.73m2 GFR est non-AA 42 (L) >60 ml/min/1.73m2 Calcium 8.2 (L) 8.3 - 10.4 MG/DL All Micro Results Procedure Component Value Units Date/Time CULTURE, URINE [[id number]] Collected:  01/17/19 9258 Order Status:  Completed Specimen:  Urine from Clean catch Updated:  01/17/19 5733 Current Meds: 
Current Facility-Administered Medications Medication Dose Route Frequency  oxybutynin (DITROPAN) tablet 5 mg  5 mg Oral TID  acetaminophen (TYLENOL) tablet 500 mg  500 mg Oral Q6H PRN  
 amLODIPine (NORVASC) tablet 2.5 mg  2.5 mg Oral DAILY  aspirin delayed-release tablet 81 mg  81 mg Oral QHS  carvedilol (COREG) tablet 3.125 mg  3.125 mg Oral BID WITH MEALS  clonazePAM (KlonoPIN) tablet 0.5 mg  0.5 mg Oral TID  DULoxetine (CYMBALTA) capsule 60 mg  60 mg Oral DAILY  ferrous gluconate 324 mg (38 mg iron) tablet 1 Tab  1 Tab Oral ACB  glipiZIDE SR (GLUCOTROL XL) tablet 10 mg  10 mg Oral DAILY  hydrOXYzine pamoate (VISTARIL) capsule 25 mg  25 mg Oral BID PRN  
 levothyroxine (SYNTHROID) tablet 75 mcg  75 mcg Oral ACB  lisinopril (PRINIVIL, ZESTRIL) tablet 10 mg  10 mg Oral DAILY  predniSONE (DELTASONE) tablet 5 mg  5 mg Oral BID  sodium chloride (NS) flush 5-40 mL  5-40 mL IntraVENous Q8H  
 sodium chloride (NS) flush 5-40 mL  5-40 mL IntraVENous PRN  
 HYDROcodone-acetaminophen (NORCO) 7.5-325 mg per tablet 1 Tab  1 Tab Oral Q4H PRN  
 naloxone (NARCAN) injection 0.4 mg  0.4 mg IntraVENous PRN  
 heparin (porcine) injection 5,000 Units  5,000 Units SubCUTAneous Q8H  
 methadone (DOLOPHINE) tablet 5 mg  5 mg Oral QID  predniSONE (DELTASONE) tablet 10 mg  10 mg Oral DAILY WITH BREAKFAST  predniSONE (DELTASONE) tablet 5 mg  5 mg Oral DAILY WITH LUNCH  
 famotidine (PEPCID) tablet 20 mg  20 mg Oral DAILY Diet: DIET DIABETIC CONSISTENT CARB Other Studies (last 24 hours): Xr Hip Rt W Or Wo Pelv 2-3 Vws Result Date: 1/16/2019 History: Fall with right hip pain EXAM: Right hip series FINDINGS: AP view the pelvis and AP and frog-leg lateral views of the right hip demonstrate bilateral osteoarthritic change of the hips and osteopenia. There is degenerative change of the low lumbar spine. No additional bony abnormality demonstrated. 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 Ct Head Wo Cont Result Date: 1/16/2019 History: fall with head injury Exam: CT head without contrast Technique: Thin section axial CT images were obtained from the skullbase through the vertex. Radiation dose reduction techniques were used for this study. Our CT scanners use one or all of the following: Automated exposure control, adjustment of the mA and/or kV according to patient size, use of iterative reconstruction. Findings: The ventricles are normal in size, shape, and position. There is generalized cerebral atrophy with chronic appearing white matter change in the corona radiata and centrum semiovale. No acute intracranial hemorrhage. No extra-axial fluid collection is present. There is no mass or mass-effect. The basilar cisterns are patent. The paranasal sinuses and mastoid air cells are clear. Impression: Chronic appearing white matter changes and generalized cerebral atrophy. No acute intracranial abnormality. Xr Ribs Rt W Pa Cxr Min 3 V Result Date: 1/16/2019 History: Right rib pain status post fall. EXAM: Right rib series and single view chest FINDINGS: There is a nondisplaced right posterior lateral sixth rib fracture.  There is no pneumothorax or widening the superior mediastinum. Again noted is elevation of the right hemidiaphragm, as was seen on a chest x-ray dated 8/11/2018. There is no new alveolar infiltrate or pleural effusion. IMPRESSION: Nondisplaced right posterior lateral sixth rib fracture. Assessment and Plan:  
 
Hospital Problems as of 1/17/2019 Date Reviewed: 5/25/2016 Codes Class Noted - Resolved POA Closed rib fracture ICD-10-CM: S22.39XA ICD-9-CM: 807.00  1/16/2019 - Present Yes * (Principal) Acute respiratory failure with hypoxia ([address] ICD-10-CM: J96.01 
ICD-9-CM: 518.81  1/16/2019 - Present Yes Acute prerenal azotemia ICD-10-CM: R79.89 ICD-9-CM: 790.6  1/16/2019 - Present Yes Chronic diastolic congestive heart failure (HCC) ICD-10-CM: I50.32 
ICD-9-CM: 428.32, 428.0  1/16/2019 - Present Yes Acquired hypothyroidism ICD-10-CM: E03.9 ICD-9-CM: 244.9  1/16/2019 - Present Yes GERD (gastroesophageal reflux disease) ICD-10-CM: K21.9 ICD-9-CM: 530.81  1/16/2019 - Present Yes Mixed hyperlipidemia ICD-10-CM: E78.2 ICD-9-CM: 272.2  7/27/2018 - Present Yes Type 2 diabetes mellitus without complication, without long-term current use of insulin (HCC) ICD-10-CM: E11.9 ICD-9-CM: 250.00  7/27/2018 - Present Yes Essential hypertension with goal blood pressure less than 130/85 ICD-10-CM: I10 
ICD-9-CM: 401.9  10/27/2017 - Present Yes A/P:   
1. Acute respiratory failure with hypoxia - Likely due to poor respiratory effort with rib fracture - Start incentive spirometer - Wean oxygen as appropriate 
- RT 6 min walk test 
  
2.Closed rib fracture - Due to mechanical fall 
- Continue home Methadone - Add PRN Norco 
- PT/OT 
  
3.Acute prerenal azotemia - Likely due to decreased PO intake and increased Lasix 
- Hold Lasix - Give 1L NS @ 100 ml/hr 
- Cr improving - 1.29 today 
  4.Essential hypertension with goal blood pressure less than 130/85  
- Stable - Continue Lisinopril 
- Continue Amlodipine - Continue Coreg 
  
5. DM2 
- No acute issues - Continue Glipizide - Add Humalog SSI 
  
6. Chronic diastolic congestive heart failure - No acute issues 
- Hold Lasix - Continue Lisinopril 
- Continue Coreg 
  
7. Acquired hypothyroidism 
- Continue synthroid 
  
8. GERD 
- Continue Pepcid 
  9. Mixed hyperlipidemia  
  
  
Code Status: DNR (No CPR/Shock/ACLS)/DNI 
DVT Prophylaxis: Heparin Case reviewed with supervising physician - ANJUM [id number] Signed: 
ADAIR Almonte

## 2019-01-17 NOTE — ED NOTES
TRANSFER - OUT REPORT: 
 
Verbal report given to HEATHER Morrison on Dodie Thomas  being transferred to  for routine progression of care Report consisted of patients Situation, Background, Assessment and  
Recommendations(SBAR). Information from the following report(s) SBAR, ED Summary, [facility] and Recent Results was reviewed with the receiving nurse. Lines:  
Peripheral IV 01/16/19 Left Antecubital (Active) Site Assessment Clean, dry, & intact 1/16/2019  3:41 PM  
Phlebitis Assessment 0 1/16/2019  3:41 PM  
Infiltration Assessment 0 1/16/2019  3:41 PM  
Dressing Status Clean, dry, & intact 1/16/2019  3:41 PM  
Dressing Type Transparent 1/16/2019  3:41 PM  
Hub Color/Line Status Green 1/16/2019  3:41 PM  
  
 
Opportunity for questions and clarification was provided. Patient transported with: 
 O2 @ 2 liters

## 2019-01-17 NOTE — PROGRESS NOTES
01/16/19 2237 Dual Skin Pressure Injury Assessment Dual Skin Pressure Injury Assessment X Second Care Provider (Based on [address] [place] [address]) [address] [place] Foot Left Sacrum  Mid  
Skin Integumentary Skin Integumentary (WDL) X Skin Color Appropriate for ethnicity; Red 
(redness to sacrum between buttock,feet) Skin Condition/Temp Dry; Warm  
Skin Integrity Wound (add Wound LDA) Redness to sacrum, between buttocks, BLE. Bruising to right back and right side. Small open area inside left buttock. Left heel wound black /gray color. xerofoam gauze, telfa and kerlix applied. Right heel red and boggy. BUE with very small scabbed over areas.

## 2019-01-17 NOTE — PROGRESS NOTES
Problem: Mobility Impaired (Adult and Pediatric) Goal: *Acute Goals and Plan of Care (Insert Text) Goals: 
(1.)Ms. Rhodia Spatz will move from supine to sit and sit to supine , scoot up and down and roll side to side with MODIFIED INDEPENDENCE within 6 treatment day(s). (2.)Ms. Rhodia Spatz will transfer from bed to chair and chair to bed with CONTACT GUARD ASSIST using the least restrictive device within 6 treatment day(s). (3.)Ms. Rhodia Spatz will ambulate with CONTACT GUARD ASSIST for 50-75 feet with the least restrictive device within 6 treatment day(s). PHYSICAL THERAPY: Initial Assessment, Treatment Day: Day of Assessment, PM 1/17/2019OBSERVATION: Hospital Day: 2 [place] / Product Type: Medicare /  
  
NAME/AGE/GENDER: Brian Man is a 80 y.o. female PRIMARY DIAGNOSIS: Closed rib fracture Acute respiratory failure with hypoxia (HCC) Acute respiratory failure with hypoxia (HCC) Acute respiratory failure with hypoxia ([facility] ICD-10: Treatment Diagnosis:  
 · Generalized Muscle Weakness (M62.81) · Difficulty in walking, Not elsewhere classified (R26.2) · Other abnormalities of gait and mobility (R26.89) Precaution/Allergies: 
Contrast agent [iodine]; Actifed [triprolidine-pseudoephedrine]; Allopurinol; Decongestant [pseudoephedrine hcl]; Lexapro [escitalopram oxalate]; Lyrica [pregabalin]; Minizide [prazosin-polythiazide]; Mobic [meloxicam]; Omnipaque [iohexol]; Sertraline; Spironolactone; and Sulfamethazine ASSESSMENT:  
Ms. Rhodia Spatz is an 80year old WF with an admitting diagnosis of closed rib Fx with acute respiratory failure. She has a PMH that includes CHF, DM2, neuropathy, arthritis and foot ulcers. She presents today supine in the bed with her daughter at the bedside (who is an nurse). The patient and daughter live together and the daughter has been providing support and care. The patient has a hospital bed and walker at home for use.   She reports that prior to her fall and fractured rib, she was able to ambulate with the r/walker short distances in her home. She is now very fearful of falling and has pain and sensitivity with all touch/pressure or mobility. She requests to do things herself, but she is very slow and methodical with all mobility and additional time is required for her to complete mobility tasks. She requires moderate assist with bed mobility with fair sitting balance. Sit to stand was moderate assist x 2 using bed elevation to assist.  Once up in standing with the r/walker, she managed 6-8 steps over to a bedside commode for voiding. Sit to stand from the toilet was moderate assist x 2 and she was able to transition back over to the bed. She returned to supine with min/mod assist and was positioned for comfort. She primarily complains of right side rib cage pain and SOB at times secondary to anxiety with mobility and pain. Ms. Lily Osborne would benefit from continued skilled PT to maximize her functional abilities while in the hospital.  
 
This section established at most recent assessment PROBLEM LIST (Impairments causing functional limitations): 1. Decreased Strength 2. Decreased ADL/Functional Activities 3. Decreased Transfer Abilities 4. Decreased Ambulation Ability/Technique 5. Decreased Balance 6. Increased Pain 7. Decreased Activity Tolerance INTERVENTIONS PLANNED: (Benefits and precautions of physical therapy have been discussed with the patient.) 1. Bed Mobility 2. Gait Training 3. Therapeutic Activites 4. Therapeutic Exercise/Strengthening 5. Transfer Training TREATMENT PLAN: Frequency/Duration: 3 times a week for duration of hospital stay Rehabilitation Potential For Stated Goals: Fair RECOMMENDED REHABILITATION/EQUIPMENT: (at time of discharge pending progress): Due to the probability of continued deficits (see above) this patient will likely need continued skilled physical therapy after discharge. Equipment:  
? would benefit from having a power w/c for mobility ? None at this time HISTORY:  
History of Present Injury/Illness (Reason for Referral): 
 
Past Heraclio Perkins is a 80year old CF with a PMH of chronic dCHF, DM2, heel ulcers, peripheral neuropathy, and chronic balance issues presented to the ER with right sided chest wall pain after sustaining 2 mechanical falls in the 3 days prior to admission. She fell into a window sill 3 days prior to admission after she lost her balance then fell again the day prior to admission into the side of the bath tub while trying to get up from the commode and she lost her balance. Then the day of admission, her daughter, who is a nurse tried to help her sit up in bed and in her chair and thought she was in too much pain to be functional so she called EMS to take her to the ER to be evaluated. Upon arrival to the ER the patient was hypoxic at 87% on room air. She then came up to 96%. CXR revealed a posterior rib fracture. She was monitored in the ER for a couple of hours and given an incentive spirometer. She was retested and was satting 88% on room air. The patient denies near syncope/syncope. Denies dizziness. Denies CP/SOB. Matias Gilliams F/C/N/V. 
 
edical History/Comorbidities: Ms. Brendan Choudhury  has a past medical history of Arthritis, Chronic diastolic congestive heart failure ([address] (1/16/2019), Chronic pain, Edema, GERD (gastroesophageal reflux disease) (1/16/2019), Gouty arthritis, History of MI (myocardial infarction) (1980's), Hypertension, Hypothyroidism, Mild heartburn, Neuropathy (1980's), Post-operative nausea and vomiting, and Type 2 diabetes mellitus without complication, without long-term current use of insulin ([address] (7/27/2018).  She also has no past medical history of Adverse effect of anesthesia, Aneurysm ([address] Arrhythmia, Asthma, Autoimmune disease ([address] CAD (coronary artery disease), Cancer ([address] Chronic kidney disease, Chronic obstructive pulmonary disease ([address] Coagulation disorder ([address] Difficult intubation, Endocarditis, Ill-defined condition, Liver disease, Malignant hyperthermia due to anesthesia, Morbid obesity ([address] Pseudocholinesterase deficiency, Psychiatric disorder, PUD (peptic ulcer disease), Rheumatic fever, Seizures ([address] Sleep apnea, Stroke ([address] or Thromboembolus ([address] Ms. Sweta Russo  has a past surgical history that includes hx partial thyroidectomy (1960); hx orthopaedic (Bilateral, 2084-4671); hx orthopaedic (Left, 1992); hx open cholecystectomy (1970); hx cyst removal (Right, 1950's); hx urological; hx heart catheterization (late 1979); hx hysterectomy (1972); hx gyn (1963); hx dilation and curettage (1799-6270); and hx left salpingo-oophorectomy (1965). Social History/Living Environment:  
Home Environment: Private residence # Steps to Enter: 0 One/Two Story Residence: One story # of Interior Steps: 0 Interior Rails: None Lift Chair Available: No 
Living Alone: No 
Support Systems: Family member(s) Patient Expects to be Discharged to[de-identified] Private residence Current DME Used/Available at Home: Hospital bed, [employer] Prior Level of Function/Work/Activity: 
Patient and her daughter report limited mobility with the r/walker at home with slow movements for all tasks Number of Personal Factors/Comorbidities that affect the Plan of Care: 3+: HIGH COMPLEXITY EXAMINATION:  
Most Recent Physical Functioning:  
Gross Assessment: 
AROM: Generally decreased, functional 
PROM: Generally decreased, functional 
Strength: Generally decreased, functional 
Coordination: Generally decreased, functional 
Tone: Normal 
Sensation: Intact Posture: 
Posture (WDL): Exceptions to [facility] Posture Assessment: Forward head, Trunk flexion, Rounded shoulders Balance: 
Sitting: Impaired Sitting - Static: Fair (occasional) Sitting - Dynamic: Fair (occasional) Standing: Impaired Standing - Static: Fair;Constant support Standing - Dynamic : Fair Bed Mobility: 
Rolling: Contact guard assistance;Stand-by assistance; Additional time Supine to Sit: Minimum assistance; Moderate assistance; Additional time Sit to Supine: Minimum assistance; Additional time Scooting: Minimum assistance; Moderate assistance Wheelchair Mobility: 
  
Transfers: 
Sit to Stand: Minimum assistance; Moderate assistance;Assist x2(used bed elevation ) Stand to Sit: Minimum assistance Bed to Chair: Minimum assistance(very slow to move herself) Gait: 
  
Base of Support: Narrowed Speed/Shannon: Slow;Shuffled Step Length: Left shortened;Right shortened Gait Abnormalities: Decreased step clearance;Trunk sway increased; Path deviations Distance (ft): 8 Feet (ft)(6-8 steps transitioning from bed to/from toilet) Assistive Device: Walker, rolling Ambulation - Level of Assistance: Minimal assistance;Assist x2 Interventions: Safety awareness training;Manual cues; Verbal cues Body Structures Involved: 1. Thoracic Cage 2. Metabolic 3. Endocrine Body Functions Affected: 1. Neuromusculoskeletal 
2. Movement Related 3. Metobolic/Endocrine Activities and Participation Affected: 1. General Tasks and Demands 2. Mobility 3. Self Care Number of elements that affect the Plan of Care: 3: MODERATE COMPLEXITY CLINICAL PRESENTATION:  
Presentation: Evolving clinical presentation with unstable and unpredictable characteristics: HIGH COMPLEXITY CLINICAL DECISION MAKING:  
[facility] 6 Clicks Basic Mobility Inpatient Short Form How much difficulty does the patient currently have. .. Unable A Lot A Little None 1. Turning over in bed (including adjusting bedclothes, sheets and blankets)? [] 1   [x] 2   [] 3   [] 4  
2.   Sitting down on and standing up from a chair with arms ( e.g., wheelchair, bedside commode, etc.)   [] 1   [x] 2   [] 3   [] 4  
 3. Moving from lying on back to sitting on the side of the bed? [] 1   [x] 2   [] 3   [] 4 How much help from another person does the patient currently need. .. Total A Lot A Little None 4. Moving to and from a bed to a chair (including a wheelchair)? [] 1   [x] 2   [] 3   [] 4  
5. Need to walk in hospital room? [] 1   [x] 2   [] 3   [] 4  
6. Climbing 3-5 steps with a railing? [] 1   [x] 2   [] 3   [] 4  
© 2007, Trustees of [facility], under license to [employer]. All rights reserved Score:  Initial: 12 Most Recent: X (Date: -- ) Interpretation of Tool:  Represents activities that are increasingly more difficult (i.e. Bed mobility, Transfers, Gait). Score 24 23 22-20 19-15 14-10 9-7 6 Modifier CH CI CJ CK CL CM CN   
 
? Mobility - Walking and Moving Around:  
  - CURRENT STATUS: CL - 60%-79% impaired, limited or restricted  - GOAL STATUS: CK - 40%-59% impaired, limited or restricted  - D/C STATUS:  ---------------To be determined--------------- [place] / [place] / Product Type: Medicare /   
 
Medical Necessity:    
· Patient is expected to demonstrate progress in strength and functional technique to decrease assistance required with bed mobility, SPT and gait with r/walker. Reason for Services/Other Comments: 
· Patient continues to require skilled intervention due to medical complications. Use of outcome tool(s) and clinical judgement create a POC that gives a: Questionable prediction of patient's progress: MODERATE COMPLEXITY  
  
 
 
 
TREATMENT:  
(In addition to Assessment/Re-Assessment sessions the following treatments were rendered) Pre-treatment Symptoms/Complaints:  Patient has complaints of pain with all mobility. Very sensitive with touch and pressure on body. Pain: Initial:  
Pain Intensity 1: 10 
Pain Location 1: Rib cage Pain Orientation 1: Right  Post Session:  10/10 Assessment/Reassessment only, no treatment provided today Braces/Orthotics/Lines/Etc:  
· O2 Device: Nasal cannula 2L with O2 sats at 97% at rest  
Treatment/Session Assessment:   
· Response to Treatment: Patient participated and tolerated therapy well but she moves very slow and methodically and additional time is required with all mobility. · Interdisciplinary Collaboration:  
o Physical Therapist 
o Occupational Therapist 
o Registered Nurse · After treatment position/precautions:  
o Supine in bed 
o Bed in low position 
o Call light within reach 
o RN notified · Compliance with Program/Exercises: Will assess as treatment progresses · Recommendations/Intent for next treatment session: \"Next visit will focus on advancements to more challenging activities and reduction in assistance provided\". Total Treatment Duration: PT Patient Time In/Time Out Time In: 4703 Time Out: 5409 Jaswinder Harvey [place]

## 2019-01-18 NOTE — PROGRESS NOTES
Dispo update:  Spoke to Ms. Christi Cee and her daughter Mayra Orourke again about discharge planning. Ms. Christal Horton admission status changed to inpatient, and thus will be eligible for short-term rehab at a skilled nursing facility. From list provided, they are interested in [facility]. Referral faxed to [facility] fax [phone] [facility] [place] via [employer] link. Await their reviews.

## 2019-01-18 NOTE — PROGRESS NOTES
END OF SHIFT NOTE: 
 
INTAKE/OUTPUT 
01/16 0701 - 01/17 0700 In: 636 [I.V.:636] Out: -  
Voiding: YES Catheter: NO 
Drain:   
 
 
 
 
 
Flatus: Patient does have flatus present. Stool:  0 occurrences. Characteristics: 
  
Emesis: 0 occurrences. Characteristics: VITAL SIGNS Patient Vitals for the past 12 hrs: 
 Temp Pulse Resp BP SpO2  
01/17/19 1535 97.9 °F (36.6 °C) 75 18 144/63 95 % 01/17/19 1120 97.4 °F (36.3 °C) 71 18 130/61 95 % 01/17/19 0825     93 % 01/17/19 0725 97.4 °F (36.3 °C) 73 18 163/86 96 % Pain Assessment Pain Intensity 1: 6 (01/17/19 1820) Pain Location 1: Rib cage Pain Intervention(s) 1: Medication (see MAR) Patient Stated Pain Goal: 0 Ambulating Yes, max 2 person assistance Shift report given to oncoming nurse at the bedside.  
 
Roland Angeles RN

## 2019-01-18 NOTE — PROGRESS NOTES
Pt's humalog SSI d/c due to drop in bgl from 300s to 70s. Daughter said at home she takes 10 mg glipizide BID with breakfast and lunch. 1630 bgl was 468. Pt was asymptomatic. Spoke with Kelly CARRANZA and she ordered humalog SSI to be ordered again. Gave pt the 10 units per protocol. Pt is on prednisone and antibiotics.

## 2019-01-18 NOTE — PROGRESS NOTES
Problem: Nutrition Deficit Goal: *Optimize nutritional status Nutrition Reason for assessment: Received pressure ulcer referral 
Assessment:  
Diet order(s): CCHO, 2 gram NaFood/Nutrition History:  PMH of chronic dCHF, DM2, heel ulcers, peripheral neuropathy, and chronic balance issues presented to the ER with right sided chest wall pain after sustaining 2 mechanical falls in the 3 days prior to admission. Findings of rib fracture. Pt reports she eats \"all I can\" which is around 50% of meals which she says is not a change for her. She had been drinking 2 bottles/d of Boost Glucose control up until the past ~ 1 month when she dropped down to 1 bottle a day. Decline in Boost intake was associated with UTI but meal intake remained the same per daughter. Pt has some difficulty with chewing and swallowing r/t ill fitting dentures and thyroid surgery but eats tender meats cooked in a pressure cooker at home. Pt would prefer ground meats with gravy while she is here. Skin Status: Unstageablee wound on left heel, stage 2 on buttock Anthropometrics:Height: 5' 4\" (162.6 cm),  Weight: 72.6 kg (160 lb)-unspecified source, Body mass index is 27.46 kg/m². [facility] BMI class of normal weight for age >71 Macronutrient needs: EER:  4701-5687 kcal /day (20-25 kcal/kg listed BW) EPR:  65-82 grams protein/day (1.2-1.5 grams/kg IBW) Intake/Comparative Standards: Average intake for past 2 day(s)/2 recorded meal(s): 75%. This potentially meets ~% of kcal and ~100% of protein needs Nutrition Diagnosis: Increased kcal and protein needs r/t wound healing as evidenced by wounds as above Intervention: 
Meals and snacks: Continue current diet. Nutritional supplement therapy: Glucerna shake tid. Discharge nutrition plan: Resume Boost Gluccse control once a day. Vianca Langley, [address], [facility] [address] [place] [address], [address] [place] [address]

## 2019-01-18 NOTE — PROGRESS NOTES
Hospitalist Progress Note Admit Date:  2019  3:23 PM  
Name:  Dena Perry Age:  80 y.o. 
:  1933 MRN:  [id number] PCP:  Jimmy Huynh MD 
Treatment Team: Attending Provider: Anayeli Leigh DO; Utilization Review: Kendra Cortez RN; Care Manager: Devante Barry RN Subjective:  
CC: fall at home Pt is an 81 yo female with PMH of dCHF, DM2, peripheral neuropathy, balance issues, as well as a pressure ulcer on her left heel. Pt presented to the ED via EMS after 2 falls in the previous 3 days. In one fall the pt hit a window sill and in another struck the side of the bath tub. In the ED it was noted pt was hypoxic with a sat of 87% on room air, on oxygen came up to 96%. CXR demonstrated a posterior rib fracture. Per daughter the patient had a fall last year in which she broke her sternum. Pt denies any syncope or near syncope, dizziness, new CP, SOB, n/v/d. Pt continues to be limited by pain, on oxygen 2L. Pt eating well, passing flatus. Urine culture postive for 10-50K GNR, sensitivities pending, started on IV rocephin. Pt lives with her daughter who is a nurse. Referrals to PT/OT as well as CM for d/c planning. PT sitting up in bed at time of exam, complains of right side and shoulder pain. Trying to use the IS but pain is a limiting factor. Therapy is recommending STR and pt/daughter are in agreement, referrals to facilities of choice, no prior auth needed. Anticipate d/c  or Monday. Objective:  
 
Patient Vitals for the past 24 hrs: 
 Temp Pulse Resp BP SpO2  
19 1121 97.9 °F (36.6 °C) 84 17 114/70 93 % 19 0719 98.1 °F (36.7 °C) 80 17 146/71 94 % 19 0325 99 °F (37.2 °C) 79 18 175/86 96 % 19 2339 98.9 °F (37.2 °C) 74 18 154/76 96 % 19 1935 99 °F (37.2 °C) 76 18 150/69 96 % 19 1535 97.9 °F (36.6 °C) 75 18 144/63 95 % Oxygen Therapy O2 Sat (%): 93 % (19 1121) Pulse via Oximetry: 82 beats per minute (01/17/19 0825) O2 Device: Nasal cannula (01/18/19 0745) O2 Flow Rate (L/min): 2 l/min (01/18/19 0745) Intake/Output Summary (Last 24 hours) at 1/18/2019 1401 Last data filed at 1/18/2019 2783 Gross per 24 hour Intake 240 ml Output 1150 ml Net -910 ml Physical Examination: 
General:    Well nourished. Awake and alert. Head:  Normocephalic, atraumatic Eyes:  Extraocular movements intact, normal sclera CV:   RRR. No  Murmurs, clicks, or gallops Lungs:   Unlabored, no cyanosis, CTAB but decreased in bases Abdomen:   Soft, nondistended, Pt tender on left side of chest and around the back Extremities: Warm and dry. No cyanosis or edema. 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 Skin:     No rashes or jaundice. Ecchymotic along right rib cage. Pressure ulcer left heel. Neuro:  No gross focal deficits Psych:  Mood and affect appropriate Data Review: 
I have reviewed all labs, meds, telemetry events, and studies from the last 24 hours. Recent Results (from the past 24 hour(s)) GLUCOSE, POC Collection Time: 01/17/19  9:34 PM  
Result Value Ref Range Glucose (POC) 333 (H) 65 - 100 mg/dL METABOLIC PANEL, BASIC Collection Time: 01/18/19  4:39 AM  
Result Value Ref Range Sodium 140 136 - 145 mmol/L Potassium 3.7 3.5 - 5.1 mmol/L Chloride 103 98 - 107 mmol/L  
 CO2 28 21 - 32 mmol/L Anion gap 9 7 - 16 mmol/L Glucose 64 (L) 65 - 100 mg/dL BUN 26 (H) 8 - 23 MG/DL Creatinine 1.04 (H) 0.6 - 1.0 MG/DL  
 GFR est AA >60 >60 ml/min/1.73m2 GFR est non-AA 54 (L) >60 ml/min/1.73m2 Calcium 8.7 8.3 - 10.4 MG/DL  
GLUCOSE, POC Collection Time: 01/18/19  6:43 AM  
Result Value Ref Range Glucose (POC) 74 65 - 100 mg/dL GLUCOSE, POC Collection Time: 01/18/19  9:37 AM  
Result Value Ref Range Glucose (POC) 203 (H) 65 - 100 mg/dL GLUCOSE, POC Collection Time: 01/18/19 11:23 AM  
Result Value Ref Range Glucose (POC) 305 (H) 65 - 100 mg/dL All Micro Results Procedure Component Value Units Date/Time CULTURE, URINE [[id number]]  (Abnormal) Collected:  01/17/19 0730 Order Status:  Completed Specimen:  Urine from Clean catch Updated:  01/18/19 5076 Special Requests: NO SPECIAL REQUESTS Culture result:    
  10,000 to 50,000 COLONIES/mL GRAM NEGATIVE RODS SUBCULTURE IN PROGRESS  
     
      
  10,000 to 50,000 COLONIES/mL MIXED SKIN PRITI ISOLATED  
     
      
  CULTURE IN PROGRESS,FURTHER UPDATES TO FOLLOW Current Meds: 
Current Facility-Administered Medications Medication Dose Route Frequency  tuberculin injection 5 Units  5 Units IntraDERMal ONCE  
 lip protectant (BLISTEX) ointment   Topical PRN  
 glipiZIDE SR (GLUCOTROL XL) tablet 10 mg  10 mg Oral BID WITH MEALS  diphenhydrAMINE (BENADRYL) capsule 25 mg  25 mg Oral QHS PRN  
 oxybutynin (DITROPAN) tablet 5 mg  5 mg Oral TID  acetaminophen (TYLENOL) tablet 500 mg  500 mg Oral Q6H PRN  
 amLODIPine (NORVASC) tablet 2.5 mg  2.5 mg Oral DAILY  aspirin delayed-release tablet 81 mg  81 mg Oral QHS  carvedilol (COREG) tablet 3.125 mg  3.125 mg Oral BID WITH MEALS  clonazePAM (KlonoPIN) tablet 0.5 mg  0.5 mg Oral TID  DULoxetine (CYMBALTA) capsule 60 mg  60 mg Oral DAILY  ferrous gluconate 324 mg (38 mg iron) tablet 1 Tab  1 Tab Oral ACB  hydrOXYzine pamoate (VISTARIL) capsule 25 mg  25 mg Oral BID PRN  
 levothyroxine (SYNTHROID) tablet 75 mcg  75 mcg Oral ACB  lisinopril (PRINIVIL, ZESTRIL) tablet 10 mg  10 mg Oral DAILY  sodium chloride (NS) flush 5-40 mL  5-40 mL IntraVENous Q8H  
 sodium chloride (NS) flush 5-40 mL  5-40 mL IntraVENous PRN  
 HYDROcodone-acetaminophen (NORCO) 7.5-325 mg per tablet 1 Tab  1 Tab Oral Q4H PRN  
 naloxone (NARCAN) injection 0.4 mg  0.4 mg IntraVENous PRN  
 heparin (porcine) injection 5,000 Units  5,000 Units SubCUTAneous Q8H  
  methadone (DOLOPHINE) tablet 5 mg  5 mg Oral QID  predniSONE (DELTASONE) tablet 10 mg  10 mg Oral DAILY WITH BREAKFAST  predniSONE (DELTASONE) tablet 5 mg  5 mg Oral DAILY WITH LUNCH  
 famotidine (PEPCID) tablet 20 mg  20 mg Oral DAILY Diet: DIET DIABETIC CONSISTENT CARB 
FOOD SVCS COMMENTS 
DIET NUTRITIONAL SUPPLEMENTS Other Studies (last 24 hours): No results found. Assessment and Plan:  
 
Hospital Problems as of 1/18/2019 Date Reviewed: 5/25/2016 Codes Class Noted - Resolved POA Closed rib fracture ICD-10-CM: S22.39XA ICD-9-CM: 807.00  1/16/2019 - Present Yes * (Principal) Acute respiratory failure with hypoxia ([facility] [address] ICD-10-CM: J96.01 
ICD-9-CM: 518.81  1/16/2019 - Present Yes Acute prerenal azotemia ICD-10-CM: R79.89 ICD-9-CM: 790.6  1/16/2019 - Present Yes Chronic diastolic congestive heart failure (HCC) ICD-10-CM: I50.32 
ICD-9-CM: 428.32, 428.0  1/16/2019 - Present Yes Acquired hypothyroidism ICD-10-CM: E03.9 ICD-9-CM: 244.9  1/16/2019 - Present Yes GERD (gastroesophageal reflux disease) ICD-10-CM: K21.9 ICD-9-CM: 530.81  1/16/2019 - Present Yes Mixed hyperlipidemia ICD-10-CM: E78.2 ICD-9-CM: 272.2  7/27/2018 - Present Yes Type 2 diabetes mellitus without complication, without long-term current use of insulin (HCC) ICD-10-CM: E11.9 ICD-9-CM: 250.00  7/27/2018 - Present Yes Essential hypertension with goal blood pressure less than 130/85 ICD-10-CM: I10 
ICD-9-CM: 401.9  10/27/2017 - Present Yes A/P:   
1. Acute respiratory failure with hypoxia - Likely due to poor respiratory effort with rib fracture - Start incentive spirometer - Wean oxygen as appropriate 
- RT 6 min walk test 
  
2.Closed rib fracture - Due to mechanical fall 
- Continue home Methadone - Add PRN Norco 
- PT/OT 
  
3.Acute prerenal azotemia - Likely due to decreased PO intake and increased Lasix 
- Hold Lasix - Give 1L NS @ 100 ml/hr 
- Cr improving - 1.29 today 
  4.Essential hypertension with goal blood pressure less than 130/85  
- Stable - Continue Lisinopril 
- Continue Amlodipine - Continue Coreg 
  
5. DM2 
- No acute issues - Continue Glipizide - Add Humalog SSI 
  
6. Chronic diastolic congestive heart failure - No acute issues 
- Hold Lasix - Continue Lisinopril 
- Continue Coreg 
  
7. Acquired hypothyroidism 
- Continue synthroid 
  
8. GERD 
- Continue Pepcid 
  9. Mixed hyperlipidemia 10. UTI 
-Culture with 10-50K GNR 
-Start IV rocephin 
  
  
Code Status: DNR (No CPR/Shock/ACLS)/DNI 
DVT Prophylaxis: Heparin Case reviewed with supervising physician - ARTEMIO. [address],  Signed: 
ADAIR De Jesus

## 2019-01-18 NOTE — PHYSICIAN ADVISORY
Letter of Determination: Upgrade from Observation to Inpatient Status This patient was originally hospitalized as Outpatient Status with Observation Services on 1/16/2019 for acute respiratory failure. This patient now meets for Inpatient Admission in accordance with CMS regulation Section 43 .3. Specifically, patient's stay is now over Two Midnights and was medically necessary. The patient's stay was medically necessary based on extreme advanced age, uncontrolled diabetes mellitus with hemoglobin A1c of 10.2 %, and vital signs significant for an oxygen saturation of 88% on room air. .  Consistent with CMS guidelines, patient meets for inpatient status. It is our recommendation that this patient's hospitalization status should be upgraded from OBSERVATION to INPATIENT status.  
  
The final decision regarding the patient's hospitalization status depends on the attending physician's judgement. Duong Johnson MD, ELEAZAR, Physician Advisor [address] [place] [address].

## 2019-01-18 NOTE — PROGRESS NOTES
Dispo update:  Have bed offers from [facility], no response from [facility], and just now sent referral to [facility] via [employer] link, as MsVern Mariel Grullon has decided that is now her first choice (based on talking to some friends). Await [facility] review.

## 2019-01-19 NOTE — PROGRESS NOTES
END OF SHIFT NOTE: 
 
INTAKE/OUTPUT 
01/18 0701 - 01/19 0700 In: -  
Out: 1000 [Urine:1000] Voiding: YES Catheter: NO 
Drain:   
 
 
 
 
 
Flatus: Patient does have flatus present. Stool:  0 occurrences. Characteristics: 
  
Emesis: 0 occurrences. Characteristics: VITAL SIGNS Patient Vitals for the past 12 hrs: 
 Temp Pulse Resp BP SpO2  
01/19/19 0400 98.3 °F (36.8 °C) 82 18 158/77 95 % 01/18/19 2020 99.5 °F (37.5 °C) 70 20 132/88  Pain Assessment Pain Intensity 1: 5 (01/19/19 0149) Pain Location 1: Rib cage Pain Intervention(s) 1: Medication (see MAR) Patient Stated Pain Goal: 0 Ambulating Yes Shift report given to oncoming nurse at the bedside.  
 
Irene Salazar, RN

## 2019-01-19 NOTE — PROGRESS NOTES
Hospitalist Progress Note Admit Date:  2019  3:23 PM  
Name:  Fernando Land Age:  80 y.o. 
:  1933 MRN:  [id number] PCP:  Pal Voss MD 
Treatment Team: Attending Provider: Dio Negron DO; Utilization Review: Lonny Resendiz RN; Care Manager: Kiko Silva RN Subjective:  
CC: fall at home Pt is an 81 yo female with PMH of dCHF, DM2, peripheral neuropathy, balance issues, as well as a pressure ulcer on her left heel. Pt presented to the ED via EMS after 2 falls in the previous 3 days. In one fall the pt hit a window sill and in another struck the side of the bath tub. In the ED it was noted pt was hypoxic with a sat of 87% on room air, on oxygen came up to 96%. CXR demonstrated a posterior rib fracture. Per daughter the patient had a fall last year in which she broke her sternum. Pt denies any syncope or near syncope, dizziness, new CP, SOB, n/v/d. Pt continues to be limited by pain, on oxygen 2L. Pt eating well, passing flatus. Urine culture postive for 10-50K GNR, sensitivities pending, started on IV rocephin. Pt lives with her daughter who is a nurse. Referrals to PT/OT as well as CM for d/c planning. PT sitting up in bed at time of exam, complains of right side and shoulder pain. Trying to use the IS but pain is a limiting factor. Therapy is recommending STR and pt/daughter are in agreement, referrals to facilities of choice, no prior auth needed. Anticipate d/c Monday. Objective:  
 
Patient Vitals for the past 24 hrs: 
 Temp Pulse Resp BP SpO2  
19 1518 97.8 °F (36.6 °C) 67 18 136/70 95 % 19 1141     92 % 19 1115 98.3 °F (36.8 °C) 66 18 136/70 95 % 19 0715 98 °F (36.7 °C) 77 18 166/72 95 % 19 0400 98.3 °F (36.8 °C) 82 18 158/77 95 % 19 2020 99.5 °F (37.5 °C) 70 20 132/88  Oxygen Therapy O2 Sat (%): 95 % (19 1518) Pulse via Oximetry: 71 beats per minute (19 1141) O2 Device: Nasal cannula (01/19/19 1141) O2 Flow Rate (L/min): 3 l/min (01/19/19 1141) ETCO2 (mmHg): 2 mmHg (01/19/19 0400) Intake/Output Summary (Last 24 hours) at 1/19/2019 1521 Last data filed at 1/19/2019 1224 Gross per 24 hour Intake 500 ml Output 1000 ml Net -500 ml Physical Examination: 
General:    Well nourished. Awake and alert. Head:  Normocephalic, atraumatic Eyes:  Extraocular movements intact, normal sclera CV:   RRR. No  Murmurs, clicks, or gallops Lungs:   Unlabored, no cyanosis, CTAB but decreased in bases Abdomen:   Soft, nondistended, Pt tender on left side of chest and around the back Extremities: Warm and dry. No cyanosis or edema. 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 Skin:     No rashes or jaundice. Ecchymotic along right rib cage. Pressure ulcer left heel. Neuro:  No gross focal deficits Psych:  Mood and affect appropriate Data Review: 
I have reviewed all labs, meds, telemetry events, and studies from the last 24 hours. Recent Results (from the past 24 hour(s)) GLUCOSE, POC Collection Time: 01/18/19  4:26 PM  
Result Value Ref Range Glucose (POC) 468 (HH) 65 - 100 mg/dL GLUCOSE, POC Collection Time: 01/18/19  5:46 PM  
Result Value Ref Range Glucose (POC) 430 (H) 65 - 100 mg/dL GLUCOSE, POC Collection Time: 01/18/19  6:35 PM  
Result Value Ref Range Glucose (POC) 339 (H) 65 - 100 mg/dL GLUCOSE, POC Collection Time: 01/18/19  9:41 PM  
Result Value Ref Range Glucose (POC) 328 (H) 65 - 100 mg/dL METABOLIC PANEL, BASIC Collection Time: 01/19/19  5:21 AM  
Result Value Ref Range Sodium 138 136 - 145 mmol/L Potassium 4.1 3.5 - 5.1 mmol/L Chloride 103 98 - 107 mmol/L  
 CO2 25 21 - 32 mmol/L Anion gap 10 7 - 16 mmol/L Glucose 96 65 - 100 mg/dL BUN 25 (H) 8 - 23 MG/DL Creatinine 1.00 0.6 - 1.0 MG/DL  
 GFR est AA >60 >60 ml/min/1.73m2 GFR est non-AA 56 (L) >60 ml/min/1.73m2 Calcium 8.7 8.3 - 10.4 MG/DL  
GLUCOSE, POC Collection Time: 01/19/19  7:20 AM  
Result Value Ref Range Glucose (POC) 90 65 - 100 mg/dL GLUCOSE, POC Collection Time: 01/19/19 12:10 PM  
Result Value Ref Range Glucose (POC) 279 (H) 65 - 100 mg/dL All Micro Results Procedure Component Value Units Date/Time CULTURE, URINE [[id number]]  (Abnormal) Collected:  01/17/19 6946 Order Status:  Completed Specimen:  Urine from Clean catch Updated:  01/19/19 9239 Special Requests: NO SPECIAL REQUESTS Culture result:    
  10,000 to 50,000 COLONIES/mL GRAM NEGATIVE RODS IDENTIFICATION AND SUSCEPTIBILITY TO FOLLOW  
     
      
  10,000 to 50,000 COLONIES/mL MIXED SKIN PRITI ISOLATED Current Meds: 
Current Facility-Administered Medications Medication Dose Route Frequency  lip protectant (BLISTEX) ointment   Topical PRN  
 glipiZIDE SR (GLUCOTROL XL) tablet 10 mg  10 mg Oral BID WITH MEALS  diphenhydrAMINE (BENADRYL) capsule 25 mg  25 mg Oral QHS PRN  
 cefTRIAXone (ROCEPHIN) 1 g in 0.9% sodium chloride (MBP/ADV) 50 mL  1 g IntraVENous Q24H  
 insulin lispro (HUMALOG) injection   SubCUTAneous AC&HS  insulin glargine (LANTUS) injection 5 Units  5 Units SubCUTAneous QHS  senna (SENOKOT) tablet 8.6 mg  1 Tab Oral BID PRN  
 oxybutynin (DITROPAN) tablet 5 mg  5 mg Oral TID  acetaminophen (TYLENOL) tablet 500 mg  500 mg Oral Q6H PRN  
 amLODIPine (NORVASC) tablet 2.5 mg  2.5 mg Oral DAILY  aspirin delayed-release tablet 81 mg  81 mg Oral QHS  carvedilol (COREG) tablet 3.125 mg  3.125 mg Oral BID WITH MEALS  clonazePAM (KlonoPIN) tablet 0.5 mg  0.5 mg Oral TID  DULoxetine (CYMBALTA) capsule 60 mg  60 mg Oral DAILY  ferrous gluconate 324 mg (38 mg iron) tablet 1 Tab  1 Tab Oral ACB  hydrOXYzine pamoate (VISTARIL) capsule 25 mg  25 mg Oral BID PRN  
 levothyroxine (SYNTHROID) tablet 75 mcg  75 mcg Oral ACB  lisinopril (PRINIVIL, ZESTRIL) tablet 10 mg  10 mg Oral DAILY  sodium chloride (NS) flush 5-40 mL  5-40 mL IntraVENous Q8H  
 sodium chloride (NS) flush 5-40 mL  5-40 mL IntraVENous PRN  
 HYDROcodone-acetaminophen (NORCO) 7.5-325 mg per tablet 1 Tab  1 Tab Oral Q4H PRN  
 naloxone (NARCAN) injection 0.4 mg  0.4 mg IntraVENous PRN  
 heparin (porcine) injection 5,000 Units  5,000 Units SubCUTAneous Q8H  
 methadone (DOLOPHINE) tablet 5 mg  5 mg Oral QID  predniSONE (DELTASONE) tablet 10 mg  10 mg Oral DAILY WITH BREAKFAST  predniSONE (DELTASONE) tablet 5 mg  5 mg Oral DAILY WITH LUNCH  
 famotidine (PEPCID) tablet 20 mg  20 mg Oral DAILY Diet: DIET DIABETIC CONSISTENT CARB 
FOOD SVCS COMMENTS 
DIET NUTRITIONAL SUPPLEMENTS Other Studies (last 24 hours): No results found. Assessment and Plan:  
 
Hospital Problems as of 1/19/2019 Date Reviewed: 5/25/2016 Codes Class Noted - Resolved POA Closed rib fracture ICD-10-CM: S22.39XA ICD-9-CM: 807.00  1/16/2019 - Present Yes * (Principal) Acute respiratory failure with hypoxia ([facility] [address] ICD-10-CM: J96.01 
ICD-9-CM: 518.81  1/16/2019 - Present Yes Acute prerenal azotemia ICD-10-CM: R79.89 ICD-9-CM: 790.6  1/16/2019 - Present Yes Chronic diastolic congestive heart failure (HCC) ICD-10-CM: I50.32 
ICD-9-CM: 428.32, 428.0  1/16/2019 - Present Yes Acquired hypothyroidism ICD-10-CM: E03.9 ICD-9-CM: 244.9  1/16/2019 - Present Yes GERD (gastroesophageal reflux disease) ICD-10-CM: K21.9 ICD-9-CM: 530.81  1/16/2019 - Present Yes Mixed hyperlipidemia ICD-10-CM: E78.2 ICD-9-CM: 272.2  7/27/2018 - Present Yes Type 2 diabetes mellitus without complication, without long-term current use of insulin (HCC) ICD-10-CM: E11.9 ICD-9-CM: 250.00  7/27/2018 - Present Yes Essential hypertension with goal blood pressure less than 130/85 ICD-10-CM: I10 
ICD-9-CM: 401.9  10/27/2017 - Present Yes A/P: 1. Acute respiratory failure with hypoxia - Likely due to poor respiratory effort with rib fracture - Start incentive spirometer - Wean oxygen as appropriate 
- RT 6 min walk test 
  
2.Closed rib fracture - Due to mechanical fall 
- Continue home Methadone 
- PRN Norco 
- PT/OT 
-1 x dose toradol 
-Lidocaine patch 
  
3. Acute prerenal azotemia - Likely due to decreased PO intake and increased Lasix - Resolved 
  4.Essential hypertension with goal blood pressure less than 130/85  
- Stable - Continue Lisinopril 
- Continue Amlodipine - Continue Coreg 
  
5. DM2 
- No acute issues - Continue Glipizide - Add Humalog SSI 
  
6. Chronic diastolic congestive heart failure - No acute issues 
- Hold Lasix - Continue Lisinopril 
- Continue Coreg 
  
7. Acquired hypothyroidism 
- Continue synthroid 
  
8. GERD 
- Continue Pepcid 
  9. Mixed hyperlipidemia  
- Home med 10. UTI 
-Culture with 10-50K GNR 
-Start IV rocephin 
  
  
Code Status: DNR (No CPR/Shock/ACLS)/DNI 
DVT Prophylaxis: Heparin Case reviewed with supervising physician - ANJUM [address],  Signed: 
ADAIR Lares

## 2019-01-20 NOTE — PROGRESS NOTES
END OF SHIFT NOTE: 
 
INTAKE/OUTPUT 
01/18 0701 - 01/19 0700 In: -  
Out: 1000 [Urine:1000] Voiding: YES Catheter: NO 
Drain:   
 
 
 
 
 
Flatus: Patient does have flatus present. Stool:  0 occurrences. Characteristics: 
  
Emesis: 0 occurrences. Characteristics: VITAL SIGNS Patient Vitals for the past 12 hrs: 
 Temp Pulse Resp BP SpO2  
01/19/19 1518 97.8 °F (36.6 °C) 67 18 136/70 95 % 01/19/19 1141     92 % 01/19/19 1115 98.3 °F (36.8 °C) 66 18 136/70 95 % Pain Assessment Pain Intensity 1: 9 (01/19/19 1518) Pain Location 1: Rib cage Pain Intervention(s) 1: Medication (see MAR) Patient Stated Pain Goal: 0 Ambulating Yes, max 2 person assistance Shift report given to oncoming nurse at the bedside.  
 
Reece Coello RN

## 2019-01-20 NOTE — PROGRESS NOTES
Hospitalist Progress Note Admit Date:  2019  3:23 PM  
Name:  Ravi Elliott Age:  80 y.o. 
:  1933 MRN:  [id number] PCP:  Ramses Johnson MD 
Treatment Team: Attending Provider: Diane Lundberg DO; Utilization Review: Amira Capone RN; Care Manager: Ehsan Head RN Subjective:  
CC: fall at home Pt is an 81 yo female with PMH of dCHF, DM2, peripheral neuropathy, balance issues, as well as a pressure ulcer on her left heel. Pt presented to the ED via EMS after 2 falls in the previous 3 days. In the ED pt was hypoxic with a sat of 87% on RA, 96% 2L. CXR demonstrated a posterior rib fracture. Pt denies any syncope or near syncope, dizziness, new CP, SOB, n/v/d. Pt eating, passing flatus. Urine culture postive for 10-50K klebsiella, sensitive to rocephin. Pt with hypoglycemia this am - discussed with pharmacy and daughter, will stop glipizide 2/2 renal fx, SSI only at meal time, basal rate of lantus 5 units starting at 50% of weight based calculation. Pt is limited by her rib pain. Antibx to be continued thru . Pt lives with her daughter who is a nurse. Therapy recommend STR and pt/daughter are in agreement. Anticipate d/c Monday. Objective:  
 
Patient Vitals for the past 24 hrs: 
 Temp Pulse Resp BP SpO2  
19 0730 97.4 °F (36.3 °C) 79 20 146/76 96 % 19 0533 97 °F (36.1 °C) 74 20 169/76 93 % 19 0035 98.2 °F (36.8 °C) 71 20 158/71 97 % 19 2052 98.6 °F (37 °C) 76 24 118/47 93 % 19 1518 97.8 °F (36.6 °C) 67 18 136/70 95 % 19 1141     92 % Oxygen Therapy O2 Sat (%): 96 % (19 0730) Pulse via Oximetry: 71 beats per minute (19 1141) O2 Device: Nasal cannula (19) O2 Flow Rate (L/min): 3 l/min (19) ETCO2 (mmHg): 2 mmHg (19 0400) Intake/Output Summary (Last 24 hours) at 2019 1123 Last data filed at 2019 205 Gross per 24 hour Intake 250 ml Output 400 ml Net -150 ml Physical Examination: 
General:    Well nourished. Awake and alert. Head:  Normocephalic, atraumatic Eyes:  Extraocular movements intact, normal sclera CV:   RRR. No  Murmurs, clicks, or gallops Lungs:   Unlabored, no cyanosis, CTAB but decreased in bases Abdomen:   Soft, nondistended, Pt tender on left side of chest and around the back Extremities: Warm and dry. No cyanosis or edema. 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 Skin:     No rashes or jaundice. Ecchymotic along right rib cage. Pressure ulcer left heel. Neuro:  No gross focal deficits Psych:  Mood and affect appropriate Data Review: 
I have reviewed all labs, meds, telemetry events, and studies from the last 24 hours. Recent Results (from the past 24 hour(s)) GLUCOSE, POC Collection Time: 01/19/19 12:10 PM  
Result Value Ref Range Glucose (POC) 279 (H) 65 - 100 mg/dL GLUCOSE, POC Collection Time: 01/19/19  4:33 PM  
Result Value Ref Range Glucose (POC) 331 (H) 65 - 100 mg/dL PLEASE READ & DOCUMENT PPD TEST IN 24 HRS Collection Time: 01/19/19  4:53 PM  
Result Value Ref Range PPD  Negative  
 mm Induration 0 mm GLUCOSE, POC Collection Time: 01/19/19  8:42 PM  
Result Value Ref Range Glucose (POC) 312 (H) 65 - 100 mg/dL METABOLIC PANEL, BASIC Collection Time: 01/20/19  4:32 AM  
Result Value Ref Range Sodium 139 136 - 145 mmol/L Potassium 4.8 3.5 - 5.1 mmol/L Chloride 103 98 - 107 mmol/L  
 CO2 27 21 - 32 mmol/L Anion gap 9 7 - 16 mmol/L Glucose 46 (L) 65 - 100 mg/dL BUN 23 8 - 23 MG/DL Creatinine 0.96 0.6 - 1.0 MG/DL  
 GFR est AA >60 >60 ml/min/1.73m2 GFR est non-AA 59 (L) >60 ml/min/1.73m2 Calcium 9.0 8.3 - 10.4 MG/DL  
GLUCOSE, POC Collection Time: 01/20/19  5:26 AM  
Result Value Ref Range Glucose (POC) 63 (L) 65 - 100 mg/dL GLUCOSE, POC Collection Time: 01/20/19  5:58 AM  
Result Value Ref Range Glucose (POC) 75 65 - 100 mg/dL GLUCOSE, POC Collection Time: 01/20/19  6:21 AM  
Result Value Ref Range Glucose (POC) 139 (H) 65 - 100 mg/dL All Micro Results Procedure Component Value Units Date/Time CULTURE, URINE [[id number]]  (Abnormal)  (Susceptibility) Collected:  01/17/19 0763 Order Status:  Completed Specimen:  Urine from Clean catch Updated:  01/20/19 0529 Special Requests: NO SPECIAL REQUESTS Culture result:    
  10,000 to 50,000 COLONIES/mL KLEBSIELLA PNEUMONIAE  
     
      
  10,000 to 50,000 COLONIES/mL MIXED SKIN PRITI ISOLATED Current Meds: 
Current Facility-Administered Medications Medication Dose Route Frequency  dextrose 40% (GLUTOSE) oral gel 1 Tube  15 g Oral PRN  
 glucagon (GLUCAGEN) injection 1 mg  1 mg IntraMUSCular PRN  
 dextrose (D50W) injection syrg 12.5-25 g  25-50 mL IntraVENous PRN  
 ketorolac (TORADOL) injection 15 mg  15 mg IntraVENous Q12H PRN  
 lidocaine 4 % patch 1 Patch  1 Patch TransDERmal Q24H  
 senna-docusate (PERICOLACE) 8.6-50 mg per tablet 1 Tab  1 Tab Oral BID  diphenhydrAMINE (BENADRYL) capsule 50 mg  50 mg Oral QHS PRN  
 insulin glargine (LANTUS) injection 5 Units  5 Units SubCUTAneous Q12H  lip protectant (BLISTEX) ointment   Topical PRN  
 glipiZIDE SR (GLUCOTROL XL) tablet 10 mg  10 mg Oral BID WITH MEALS  cefTRIAXone (ROCEPHIN) 1 g in 0.9% sodium chloride (MBP/ADV) 50 mL  1 g IntraVENous Q24H  
 insulin lispro (HUMALOG) injection   SubCUTAneous AC&HS  senna (SENOKOT) tablet 8.6 mg  1 Tab Oral BID PRN  
 oxybutynin (DITROPAN) tablet 5 mg  5 mg Oral TID  acetaminophen (TYLENOL) tablet 500 mg  500 mg Oral Q6H PRN  
 amLODIPine (NORVASC) tablet 2.5 mg  2.5 mg Oral DAILY  aspirin delayed-release tablet 81 mg  81 mg Oral QHS  carvedilol (COREG) tablet 3.125 mg  3.125 mg Oral BID WITH MEALS  clonazePAM (KlonoPIN) tablet 0.5 mg  0.5 mg Oral TID  DULoxetine (CYMBALTA) capsule 60 mg  60 mg Oral DAILY  ferrous gluconate 324 mg (38 mg iron) tablet 1 Tab  1 Tab Oral ACB  hydrOXYzine pamoate (VISTARIL) capsule 25 mg  25 mg Oral BID PRN  
 levothyroxine (SYNTHROID) tablet 75 mcg  75 mcg Oral ACB  lisinopril (PRINIVIL, ZESTRIL) tablet 10 mg  10 mg Oral DAILY  sodium chloride (NS) flush 5-40 mL  5-40 mL IntraVENous Q8H  
 sodium chloride (NS) flush 5-40 mL  5-40 mL IntraVENous PRN  
 HYDROcodone-acetaminophen (NORCO) 7.5-325 mg per tablet 1 Tab  1 Tab Oral Q4H PRN  
 naloxone (NARCAN) injection 0.4 mg  0.4 mg IntraVENous PRN  
 heparin (porcine) injection 5,000 Units  5,000 Units SubCUTAneous Q8H  
 methadone (DOLOPHINE) tablet 5 mg  5 mg Oral QID  predniSONE (DELTASONE) tablet 10 mg  10 mg Oral DAILY WITH BREAKFAST  predniSONE (DELTASONE) tablet 5 mg  5 mg Oral DAILY WITH LUNCH  
 famotidine (PEPCID) tablet 20 mg  20 mg Oral DAILY Diet: DIET DIABETIC CONSISTENT CARB 
FOOD SVCS COMMENTS 
DIET NUTRITIONAL SUPPLEMENTS Other Studies (last 24 hours): No results found. Assessment and Plan:  
 
Hospital Problems as of 1/20/2019 Date Reviewed: 5/25/2016 Codes Class Noted - Resolved POA Closed rib fracture ICD-10-CM: S22.39XA ICD-9-CM: 807.00  1/16/2019 - Present Yes * (Principal) Acute respiratory failure with hypoxia ([facility] ICD-10-CM: J96.01 
ICD-9-CM: 518.81  1/16/2019 - Present Yes Acute prerenal azotemia ICD-10-CM: R79.89 ICD-9-CM: 790.6  1/16/2019 - Present Yes Chronic diastolic congestive heart failure (HCC) ICD-10-CM: I50.32 
ICD-9-CM: 428.32, 428.0  1/16/2019 - Present Yes Acquired hypothyroidism ICD-10-CM: E03.9 ICD-9-CM: 244.9  1/16/2019 - Present Yes GERD (gastroesophageal reflux disease) ICD-10-CM: K21.9 ICD-9-CM: 530.81  1/16/2019 - Present Yes Mixed hyperlipidemia ICD-10-CM: E78.2 ICD-9-CM: 272.2  7/27/2018 - Present Yes Type 2 diabetes mellitus without complication, without long-term current use of insulin (HCC) ICD-10-CM: E11.9 ICD-9-CM: 250.00  7/27/2018 - Present Yes Essential hypertension with goal blood pressure less than 130/85 ICD-10-CM: I10 
ICD-9-CM: 401.9  10/27/2017 - Present Yes A/P:   
1. Acute respiratory failure with hypoxia - Likely due to poor respiratory effort with rib fracture - Start incentive spirometer - Wean oxygen as appropriate 
- RT 6 min walk test 
  
2.Closed rib fracture - Due to mechanical fall 
- Continue home Methadone 
- PRN Norco 
- PT/OT 
-1 x dose toradol 
-Lidocaine patch 
  
3. Acute prerenal azotemia - Likely due to decreased PO intake and increased Lasix - Resolved 
  4.Essential hypertension with goal blood pressure less than 130/85  
- Stable - Continue Lisinopril 
- Continue Amlodipine - Continue Coreg 
  
5. DM2 
- Try lantus at HS, titrate up as needed - Stop Glipizide - Add Humalog SSI at meal time only 
  6. Chronic diastolic congestive heart failure - No acute issues 
- Hold Lasix - Continue Lisinopril 
- Continue Coreg 
  
7. Acquired hypothyroidism 
- Continue synthroid 
  
8. GERD 
- Continue Pepcid 
  9. Mixed hyperlipidemia  
- Home med 10. UTI 
-Culture with Klebsiella 
-IV rocephin, end of treatment 1/24 
  
  
Code Status: DNR (No CPR/Shock/ACLS)/DNI 
DVT Prophylaxis: Heparin Case reviewed with supervising physician - ANJUM Mcgowan Found, DO Signed: 
ADAIR Wolfe

## 2019-01-20 NOTE — PROGRESS NOTES
END OF SHIFT NOTE: 
 
INTAKE/OUTPUT 
01/19 0701 - 01/20 0700 In: 500 [P.O.:500] Out: 400 [Urine:400] Voiding: YES Catheter: NO 
Drain:   
 
 
 
 
 
Flatus: Patient does have flatus present. Stool:  0 occurrences. Characteristics: 
  
Emesis: 0 occurrences. Characteristics: VITAL SIGNS Patient Vitals for the past 12 hrs: 
 Temp Pulse Resp BP SpO2  
01/20/19 0533 97 °F (36.1 °C) 74 20 169/76 93 % 01/20/19 0035 98.2 °F (36.8 °C) 71 20 158/71 97 % 01/19/19 2052 98.6 °F (37 °C) 76 24 118/47 93 % Pain Assessment Pain Intensity 1: 4 (01/19/19 2114) Pain Location 1: Rib cage Pain Intervention(s) 1: Medication (see MAR) Patient Stated Pain Goal: 0 Ambulating YES Shift report given to oncoming nurse at the bedside. [facility]

## 2019-01-20 NOTE — PROGRESS NOTES
Morning BS: 53.  
Hypoglycemic protocol initiated. 8 oz of juice given. 15 minutes BS: 75 
8 oz of juice, PB and nolberto crackers given. 15 minutes BS: 139 Held the morning Glucotrol due to BS this morning.

## 2019-01-21 NOTE — PROGRESS NOTES
Oxygen Qualifier Room air: SpO2 with O2 and liter flow Resting SpO2  86%  89% on 1L / 93% on 2L Ambulating SpO2   88% on 2L / 91% on 3L Pt. Was only able to move from lying down to sitting on the side of the bed with assistance. Unable not stand or walk.  
 
Completed by: 
 
Claudette Thomason, RT

## 2019-01-21 NOTE — PROGRESS NOTES
Problem: Mobility Impaired (Adult and Pediatric) Goal: *Acute Goals and Plan of Care (Insert Text) Goals: 
(1.)Ms. Jin Lawson will move from supine to sit and sit to supine , scoot up and down and roll side to side with MODIFIED INDEPENDENCE within 6 treatment day(s). (2.)Ms. Jin Lawson will transfer from bed to chair and chair to bed with CONTACT GUARD ASSIST using the least restrictive device within 6 treatment day(s). (3.)Ms. Jin Lawson will ambulate with CONTACT GUARD ASSIST for 50-75 feet with the least restrictive device within 6 treatment day(s). PHYSICAL THERAPY: Daily Note, Treatment Day: 1st, PM 1/21/2019INPATIENT: Hospital Day: 6 [place] / Product Type: Medicare /  
  
NAME/AGE/GENDER: Kvng Lauren is a 80 y.o. female PRIMARY DIAGNOSIS: Closed rib fracture Acute respiratory failure with hypoxia ([facility] Closed rib fracture Acute respiratory failure with hypoxia (HCC) Acute respiratory failure with hypoxia (HCC) Acute respiratory failure with hypoxia ([address] ICD-10: Treatment Diagnosis:  
 · Generalized Muscle Weakness (M62.81) · Difficulty in walking, Not elsewhere classified (R26.2) · Other abnormalities of gait and mobility (R26.89) Precaution/Allergies: 
Contrast agent [iodine]; Actifed [triprolidine-pseudoephedrine]; Allopurinol; Decongestant [pseudoephedrine hcl]; Lexapro [escitalopram oxalate]; Lyrica [pregabalin]; Minizide [prazosin-polythiazide]; Mobic [meloxicam]; Omnipaque [iohexol]; Sertraline; Spironolactone; and Sulfamethazine ASSESSMENT:  
Ms. Jin Lawson is an 80year old WF with an admitting diagnosis of closed rib Fx with acute respiratory failure. She has a PMH that includes CHF, DM2, neuropathy, arthritis and foot ulcers. 1/21/19:  Pt supine in bed on arrival.  She is agreeable to PT with some encouragement.  Supine to sit with lots of time due to pain in ribs with movement. Pt has her own way of doing things and does not like suggestions. Once sitting EOB, she sat for a few minutes and then stood to transfer over to chair. Pt left sitting up with needs in reach. She is to call when she is ready to go back to bed. This section established at most recent assessment PROBLEM LIST (Impairments causing functional limitations): 1. Decreased Strength 2. Decreased ADL/Functional Activities 3. Decreased Transfer Abilities 4. Decreased Ambulation Ability/Technique 5. Decreased Balance 6. Increased Pain 7. Decreased Activity Tolerance INTERVENTIONS PLANNED: (Benefits and precautions of physical therapy have been discussed with the patient.) 1. Bed Mobility 2. Gait Training 3. Therapeutic Activites 4. Therapeutic Exercise/Strengthening 5. Transfer Training TREATMENT PLAN: Frequency/Duration: 3 times a week for duration of hospital stay Rehabilitation Potential For Stated Goals: Fair RECOMMENDED REHABILITATION/EQUIPMENT: (at time of discharge pending progress): Due to the probability of continued deficits (see above) this patient will likely need continued skilled physical therapy after discharge. Equipment:  
? would benefit from having a power w/c for mobility ? None at this time HISTORY:  
History of Present Injury/Illness (Reason for Referral): 
 
Past Shannan Walker is a 80year old CF with a PMH of chronic dCHF, DM2, heel ulcers, peripheral neuropathy, and chronic balance issues presented to the ER with right sided chest wall pain after sustaining 2 mechanical falls in the 3 days prior to admission. She fell into a window sill 3 days prior to admission after she lost her balance then fell again the day prior to admission into the side of the bath tub while trying to get up from the commode and she lost her balance.  Then the day of admission, her daughter, who is a nurse tried to help her sit up in bed and in her chair and thought she was in too much pain to be functional so she called EMS to take her to the ER to be evaluated. Upon arrival to the ER the patient was hypoxic at 87% on room air. She then came up to 96%. CXR revealed a posterior rib fracture. She was monitored in the ER for a couple of hours and given an incentive spirometer. She was retested and was satting 88% on room air. The patient denies near syncope/syncope. Denies dizziness. Denies CP/SOB. Ardean Perfect F/C/N/V. 
 
edical History/Comorbidities: Ms. Jin Lawson  has a past medical history of Arthritis, Chronic diastolic congestive heart failure ([address] (1/16/2019), Chronic pain, Edema, GERD (gastroesophageal reflux disease) (1/16/2019), Gouty arthritis, History of MI (myocardial infarction) (1980's), Hypertension, Hypothyroidism, Mild heartburn, Neuropathy (1980's), Post-operative nausea and vomiting, and Type 2 diabetes mellitus without complication, without long-term current use of insulin ([address] (7/27/2018). She also has no past medical history of Adverse effect of anesthesia, Aneurysm ([address] Arrhythmia, Asthma, Autoimmune disease ([address] CAD (coronary artery disease), Cancer ([address] Chronic kidney disease, Chronic obstructive pulmonary disease ([address] Coagulation disorder ([address] Difficult intubation, Endocarditis, Ill-defined condition, Liver disease, Malignant hyperthermia due to anesthesia, Morbid obesity ([address] Pseudocholinesterase deficiency, Psychiatric disorder, PUD (peptic ulcer disease), Rheumatic fever, Seizures ([address] Sleep apnea, Stroke ([address] or Thromboembolus ([address] Ms. Jin Lawson  has a past surgical history that includes hx partial thyroidectomy (1960); hx orthopaedic (Bilateral, 9864-4032); hx orthopaedic (Left, 1992); hx open cholecystectomy (1970); hx cyst removal (Right, 1950's); hx urological; hx heart catheterization (late 1979); hx hysterectomy (1972); hx gyn (1963); hx dilation and curettage (1956-8610); and hx left salpingo-oophorectomy (1965). Social History/Living Environment:  
Home Environment: Private residence # Steps to Enter: 0 One/Two Story Residence: One story # of Interior Steps: 0 Interior Rails: None Lift Chair Available: No 
Living Alone: No 
Support Systems: Family member(s) Patient Expects to be Discharged to[de-identified] Private residence Current DME Used/Available at Home: Hospital bed, [address] Prior Level of Function/Work/Activity: 
Patient and her daughter report limited mobility with the r/walker at home with slow movements for all tasks Number of Personal Factors/Comorbidities that affect the Plan of Care: 3+: HIGH COMPLEXITY EXAMINATION:  
Most Recent Physical Functioning:  
Gross Assessment: 
  
         
  
Posture: 
  
Balance: 
  Bed Mobility: 
Supine to Sit: Moderate assistance; Additional time Sit to Supine: (pt left up in chair) Wheelchair Mobility: 
  
Transfers: 
Sit to Stand: Contact guard assistance Stand to Sit: Contact guard assistance Bed to Chair: Contact guard assistance Gait: 
  
Base of Support: Narrowed Speed/Shannon: Delayed;Pace decreased (<100 feet/min) Step Length: Left shortened;Right shortened Gait Abnormalities: Decreased step clearance Distance (ft): 5 Feet (ft) Assistive Device: Walker, rolling Ambulation - Level of Assistance: Contact guard assistance Interventions: Safety awareness training;Verbal cues Body Structures Involved: 1. Thoracic Cage 2. Metabolic 3. Endocrine Body Functions Affected: 1. Neuromusculoskeletal 
2. Movement Related 3. Metobolic/Endocrine Activities and Participation Affected: 1. General Tasks and Demands 2. Mobility 3. Self Care Number of elements that affect the Plan of Care: 3: MODERATE COMPLEXITY CLINICAL PRESENTATION:  
Presentation: Evolving clinical presentation with unstable and unpredictable characteristics: HIGH COMPLEXITY CLINICAL DECISION MAKIN [facility] [address] [address] AM-PAC 6 Clicks Basic Mobility Inpatient Short Form How much difficulty does the patient currently have. .. Unable A Lot A Little None 1. Turning over in bed (including adjusting bedclothes, sheets and blankets)? [] 1   [x] 2   [] 3   [] 4  
2. Sitting down on and standing up from a chair with arms ( e.g., wheelchair, bedside commode, etc.)   [] 1   [x] 2   [] 3   [] 4  
3. Moving from lying on back to sitting on the side of the bed? [] 1   [x] 2   [] 3   [] 4 How much help from another person does the patient currently need. .. Total A Lot A Little None 4. Moving to and from a bed to a chair (including a wheelchair)? [] 1   [x] 2   [] 3   [] 4  
5. Need to walk in hospital room? [] 1   [x] 2   [] 3   [] 4  
6. Climbing 3-5 steps with a railing? [] 1   [x] 2   [] 3   [] 4  
© 2007, Trustees of [facility] [employer], under license to [employer]. All rights reserved Score:  Initial: 12 Most Recent: X (Date: -- ) Interpretation of Tool:  Represents activities that are increasingly more difficult (i.e. Bed mobility, Transfers, Gait). Score 24 23 22-20 19-15 14-10 9-7 6 Modifier CH CI CJ CK CL CM CN   
 
? Mobility - Walking and Moving Around:  
  - CURRENT STATUS: CL - 60%-79% impaired, limited or restricted  - GOAL STATUS: CK - 40%-59% impaired, limited or restricted  - D/C STATUS:  ---------------To be determined--------------- [place] / [place] A [place] B / Product Type: Medicare /   
 
Medical Necessity:    
· Patient is expected to demonstrate progress in strength and functional technique to decrease assistance required with bed mobility, SPT and gait with r/walker. Reason for Services/Other Comments: 
· Patient continues to require skilled intervention due to medical complications.   
Use of outcome tool(s) and clinical judgement create a POC that gives a: Questionable prediction of patient's progress: MODERATE COMPLEXITY  
  
 
 
 
TREATMENT:  
 (In addition to Assessment/Re-Assessment sessions the following treatments were rendered) Pre-treatment Symptoms/Complaints:   Complaints of rib pain and shoulder pain. Pain: Initial:  
   Post Session:  Not rated Therapeutic Activity: (    25 minutes): Therapeutic activities including Chair transfers to improve mobility, strength and balance. Required minimal Safety awareness training;Verbal cues to promote dynamic balance in standing. Braces/Orthotics/Lines/Etc:  
· O2 Device: Nasal cannula Treatment/Session Assessment:   
· Response to Treatment:  Pt required increased time with movement. · Interdisciplinary Collaboration:  
o Physical Therapy Assistant 
o Registered Nurse · After treatment position/precautions:  
o Up in chair 
o Bed in low position 
o Call light within reach 
o RN notified · Compliance with Program/Exercises: Will assess as treatment progresses · Recommendations/Intent for next treatment session: \"Next visit will focus on advancements to more challenging activities and reduction in assistance provided\". Total Treatment Duration: PT Patient Time In/Time Out Time In: 1330 Time Out: 1355 Daniel Rodriguez PTA

## 2019-01-21 NOTE — DIABETES MGMT
Patient admitted 1/16/19 after fall at home seen by RN, TAMARA. History of type 2 DM since June 5398, chronic diastolic CHF, heel ulcers, HTN, hypothyroidism, arthritis, peripheral neuropathy and chronic balance issues. Pt had 2 falls in the 3 days prior to admission resulting in a posterior rib fracture. Daughter, who is a nurse here at [address] [place] [address], states that pt has been on steroids for years and was recently diagnosed in June 2018 with DM. Prior to admission medications per daughter include Glipizide 10 mg bid. Pt states that she lives with her daughter who helps her with medications. Pt has a working blood glucose meter and monitors blood glucose 3-4 times per day. Daughter states that pt's blood glucose is usually in the 118 range in the morning, but is elevated after the Prednisone doses are given. Pt states that she will occasionally have a low blood glucose from the Glipizide at home-60 in the early morning hours. Discussed signs, symptoms and treatments for hyper/hypoglycemia. FBS 71. Blood glucose range yesterday  with pt receiving a total of 26 units of insulin (Lantus 10 units and Humalog 16 units). Reviewed current insulin regimen: Humalog 3 units 3x/day ac and Humalog sliding scale coverage 3x/day ac, including type of insulin, timing with meals, onset, peak of action and duration of effect. Pt and daughter verbalize understanding. Stressed the importance of follow up care for diabetes management with PCP and to bring blood glucose log book to appointments to assist with medication titration. Plan is for pt to discharge to [facility]. Pt and daughter have no further questions at this time.

## 2019-01-21 NOTE — PROGRESS NOTES
Hospitalist Progress Note Admit Date:  2019  3:23 PM  
Name:  Shannan Wlaker Age:  80 y.o. 
:  1933 MRN:  [id number] PCP:  Shannen Venegas MD 
Treatment Team: Attending Provider: Alonzo Tipton DO; Utilization Review: Sindy Archer RN; Care Manager: Fadia Izaguirre RN Subjective:  
CC: fall at home Patient is an 81 yo female with PMH of dCHF, T2DM, peripheral neuropathy, balance issues, as well as a pressure ulcer on her left heel who presented to the ED via EMS with complaints of  2 falls in the previous 3 days. In the ER patient was hypoxic with an O2 sat of 87% on RA, 96% 2L. CXR demonstrated a posterior rib fracture. Patient with UTI. Urine cx positive for  Klebsiella pneumonae, sensitive to rocephin. Patient transitioned to oral Vantin tonight in anticipation of discharge in am.  
 
Patient with hypoglycemia in the mornings. With spikes in the afternoon after steroid administration. Diabetic educator consulting. Wants to dc lantus and observe overnight before discharging to STR. Objective:  
 
Patient Vitals for the past 24 hrs: 
 Temp Pulse Resp BP SpO2  
19 1107 98 °F (36.7 °C) 86 20 132/63 93 % 19 0650 98 °F (36.7 °C) 77 20 138/60 96 % 19 0312 98.5 °F (36.9 °C) 88 20 113/78 95 % 19 2351 98.8 °F (37.1 °C) 77 20 141/81 95 % 19 1928 98.1 °F (36.7 °C) 86 20 138/65 98 % Oxygen Therapy O2 Sat (%): 93 % (19 1107) Pulse via Oximetry: 71 beats per minute (19 1141) O2 Device: Room air (19 1107) O2 Flow Rate (L/min): 0 l/min (19 1107) ETCO2 (mmHg): 2 mmHg (19 0400) Intake/Output Summary (Last 24 hours) at 2019 1352 Last data filed at 2019 7293 Gross per 24 hour Intake  Output 950 ml Net -950 ml Physical Examination: 
General:    Well nourished. Awake and alert. Head:  Normocephalic, atraumatic Eyes:  Extraocular movements intact, normal sclera CV: RRR. No  Murmurs, clicks, or gallops Lungs:   Unlabored, no cyanosis, CTAB but decreased in bases Abdomen:   Soft, nondistended, Pt tender on left side of chest and around the back Extremities: Warm and dry. No cyanosis or edema. 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 Skin:     No rashes or jaundice. Ecchymotic along right rib cage. Pressure ulcer left heel. Neuro:  No gross focal deficits Psych:  Mood and affect appropriate Data Review: 
I have reviewed all labs, meds, telemetry events, and studies from the last 24 hours. Recent Results (from the past 24 hour(s)) GLUCOSE, POC Collection Time: 01/20/19  3:49 PM  
Result Value Ref Range Glucose (POC) 353 (H) 65 - 100 mg/dL GLUCOSE, POC Collection Time: 01/20/19  9:44 PM  
Result Value Ref Range Glucose (POC) 278 (H) 65 - 100 mg/dL METABOLIC PANEL, BASIC Collection Time: 01/21/19  3:37 AM  
Result Value Ref Range Sodium 138 136 - 145 mmol/L Potassium 4.0 3.5 - 5.1 mmol/L Chloride 102 98 - 107 mmol/L  
 CO2 31 21 - 32 mmol/L Anion gap 5 (L) 7 - 16 mmol/L Glucose 71 65 - 100 mg/dL BUN 26 (H) 8 - 23 MG/DL Creatinine 1.00 0.6 - 1.0 MG/DL  
 GFR est AA >60 >60 ml/min/1.73m2 GFR est non-AA 56 (L) >60 ml/min/1.73m2 Calcium 9.2 8.3 - 10.4 MG/DL  
GLUCOSE, POC Collection Time: 01/21/19  6:45 AM  
Result Value Ref Range Glucose (POC) 73 65 - 100 mg/dL GLUCOSE, POC Collection Time: 01/21/19  9:23 AM  
Result Value Ref Range Glucose (POC) 116 (H) 65 - 100 mg/dL GLUCOSE, POC Collection Time: 01/21/19 11:15 AM  
Result Value Ref Range Glucose (POC) 182 (H) 65 - 100 mg/dL All Micro Results Procedure Component Value Units Date/Time CULTURE, URINE [[id number]]  (Abnormal)  (Susceptibility) Collected:  01/17/19 1051 Order Status:  Completed Specimen:  Urine from Clean catch Updated:  01/20/19 1471 Special Requests: NO SPECIAL REQUESTS Culture result: 10,000 to 50,000 COLONIES/mL KLEBSIELLA PNEUMONIAE  
     
      
  10,000 to 50,000 COLONIES/mL MIXED SKIN PRITI ISOLATED Current Meds: 
Current Facility-Administered Medications Medication Dose Route Frequency  dextrose 40% (GLUTOSE) oral gel 1 Tube  15 g Oral PRN  
 glucagon (GLUCAGEN) injection 1 mg  1 mg IntraMUSCular PRN  
 dextrose (D50W) injection syrg 12.5-25 g  25-50 mL IntraVENous PRN  
 insulin lispro (HUMALOG) injection   SubCUTAneous TIDAC  insulin glargine (LANTUS) injection 5 Units  5 Units SubCUTAneous Q12H  
 dextrose (D50W) injection syrg 25 g  25 g IntraVENous PRN  
 insulin lispro (HUMALOG) injection 3 Units  3 Units SubCUTAneous TIDAC  lidocaine 4 % patch 1 Patch  1 Patch TransDERmal Q24H  
 senna-docusate (PERICOLACE) 8.6-50 mg per tablet 1 Tab  1 Tab Oral BID  diphenhydrAMINE (BENADRYL) capsule 50 mg  50 mg Oral QHS PRN  
 lip protectant (BLISTEX) ointment   Topical PRN  
 cefTRIAXone (ROCEPHIN) 1 g in 0.9% sodium chloride (MBP/ADV) 50 mL  1 g IntraVENous Q24H  
 senna (SENOKOT) tablet 8.6 mg  1 Tab Oral BID PRN  
 oxybutynin (DITROPAN) tablet 5 mg  5 mg Oral TID  acetaminophen (TYLENOL) tablet 500 mg  500 mg Oral Q6H PRN  
 amLODIPine (NORVASC) tablet 2.5 mg  2.5 mg Oral DAILY  aspirin delayed-release tablet 81 mg  81 mg Oral QHS  carvedilol (COREG) tablet 3.125 mg  3.125 mg Oral BID WITH MEALS  clonazePAM (KlonoPIN) tablet 0.5 mg  0.5 mg Oral TID  DULoxetine (CYMBALTA) capsule 60 mg  60 mg Oral DAILY  ferrous gluconate 324 mg (38 mg iron) tablet 1 Tab  1 Tab Oral ACB  hydrOXYzine pamoate (VISTARIL) capsule 25 mg  25 mg Oral BID PRN  
 levothyroxine (SYNTHROID) tablet 75 mcg  75 mcg Oral ACB  lisinopril (PRINIVIL, ZESTRIL) tablet 10 mg  10 mg Oral DAILY  sodium chloride (NS) flush 5-40 mL  5-40 mL IntraVENous Q8H  
 sodium chloride (NS) flush 5-40 mL  5-40 mL IntraVENous PRN  
  HYDROcodone-acetaminophen (NORCO) 7.5-325 mg per tablet 1 Tab  1 Tab Oral Q4H PRN  
 naloxone (NARCAN) injection 0.4 mg  0.4 mg IntraVENous PRN  
 heparin (porcine) injection 5,000 Units  5,000 Units SubCUTAneous Q8H  
 methadone (DOLOPHINE) tablet 5 mg  5 mg Oral QID  predniSONE (DELTASONE) tablet 10 mg  10 mg Oral DAILY WITH BREAKFAST  predniSONE (DELTASONE) tablet 5 mg  5 mg Oral DAILY WITH LUNCH  
 famotidine (PEPCID) tablet 20 mg  20 mg Oral DAILY Diet: DIET DIABETIC CONSISTENT CARB 
FOOD SVCS COMMENTS 
DIET NUTRITIONAL SUPPLEMENTS Other Studies (last 24 hours): No results found. Assessment and Plan:  
 
Hospital Problems as of 1/21/2019 Date Reviewed: 5/25/2016 Codes Class Noted - Resolved POA Closed rib fracture ICD-10-CM: S22.39XA ICD-9-CM: 807.00  1/16/2019 - Present Yes * (Principal) Acute respiratory failure with hypoxia ([facility] [address] ICD-10-CM: J96.01 
ICD-9-CM: 518.81  1/16/2019 - Present Yes Acute prerenal azotemia ICD-10-CM: R79.89 ICD-9-CM: 790.6  1/16/2019 - Present Yes Chronic diastolic congestive heart failure (HCC) ICD-10-CM: I50.32 
ICD-9-CM: 428.32, 428.0  1/16/2019 - Present Yes Acquired hypothyroidism ICD-10-CM: E03.9 ICD-9-CM: 244.9  1/16/2019 - Present Yes GERD (gastroesophageal reflux disease) ICD-10-CM: K21.9 ICD-9-CM: 530.81  1/16/2019 - Present Yes Mixed hyperlipidemia ICD-10-CM: E78.2 ICD-9-CM: 272.2  7/27/2018 - Present Yes Type 2 diabetes mellitus without complication, without long-term current use of insulin (HCC) ICD-10-CM: E11.9 ICD-9-CM: 250.00  7/27/2018 - Present Yes Essential hypertension with goal blood pressure less than 130/85 ICD-10-CM: I10 
ICD-9-CM: 401.9  10/27/2017 - Present Yes A/P:   
Acute respiratory failure with hypoxia - Likely due to poor respiratory effort with rib fracture - Start incentive spirometer - Wean oxygen as appropriate 
- RT 6 min walk test 
   
Closed rib fracture - Due to mechanical fall 
- Continue home Methadone 
- PRN Norco 
- PT/OT 
-Lidocaine patch 
  
Acute prerenal azotemia - Likely due to decreased PO intake and increased Lasix - Resolved 
  
Essential hypertension with goal blood pressure less than 130/85  
- Stable - Continue Lisinopril 
- Continue Amlodipine - Continue Coreg 
  
T2DM 
- Hold lantus for morning hypoglycemia - Stop Glipizide - Add Humalog SSI at meal time only 
-Diabetic educator consulting 
  
Chronic diastolic congestive heart failure - No acute issues 
- Hold Lasix - Continue Lisinopril 
- Continue Coreg 
  
Acquired hypothyroidism 
- Continue synthroid 
  
GERD 
- Continue Pepcid 
  
Mixed hyperlipidemia   
- Home med UTI 
-Culture with Klebsiella 
-IV rocephin changed to oral vantin  
  
  
Code Status: DNR (No CPR/Shock/ACLS)/DNI 
DVT Prophylaxis: Heparin Case reviewed with supervising physician - ANJUM [id number] Signed: 
ADAIR Du

## 2019-01-21 NOTE — PROGRESS NOTES
END OF SHIFT NOTE: 
 
INTAKE/OUTPUT 
01/20 0701 - 01/21 0700 In: 120 [P.O.:120] Out: 1000 [Urine:1000] Voiding: YES Catheter: NO 
Drain:   
 
 
 
 
 
Flatus: Patient does have flatus present. Stool:  0 occurrences. Characteristics: 
  
Emesis: 0 occurrences. Characteristics: VITAL SIGNS Patient Vitals for the past 12 hrs: 
 Temp Pulse Resp BP SpO2  
01/21/19 0312 98.5 °F (36.9 °C) 88 20 113/78 95 % 01/20/19 2351 98.8 °F (37.1 °C) 77 20 141/81 95 % 01/20/19 1928 98.1 °F (36.7 °C) 86 20 138/65 98 % Pain Assessment Pain Intensity 1: 0 (01/20/19 2351) Pain Location 1: Rib cage Pain Intervention(s) 1: Medication (see MAR) Patient Stated Pain Goal: 0 Ambulating Yes Shift report given to oncoming nurse at the bedside.  
 
Claire Dai RN

## 2019-01-21 NOTE — PROGRESS NOTES
END OF SHIFT NOTE: 
 
INTAKE/OUTPUT 
01/19 0701 - 01/20 0700 In: 500 [P.O.:500] Out: 400 [Urine:400] Voiding: YES Catheter: NO 
Drain:   
 
 
 
 
 
Flatus: Patient does have flatus present. Stool:  0 occurrences. Characteristics: 
  
Emesis: 0 occurrences. Characteristics: VITAL SIGNS Patient Vitals for the past 12 hrs: 
 Temp Pulse Resp BP SpO2  
01/20/19 1115 97.4 °F (36.3 °C) 69 20 136/80 95 % 01/20/19 0730 97.4 °F (36.3 °C) 79 20 146/76 96 % Pain Assessment Pain Intensity 1: 0 (01/20/19 1115) Pain Location 1: Rib cage Pain Intervention(s) 1: Medication (see MAR) Patient Stated Pain Goal: 0 Ambulating Yes with assistance Shift report given to oncoming nurse at the bedside.  
 
Shalini Monahan RN

## 2019-01-21 NOTE — CDMP QUERY
Patient admitted with acute respiratory failure. Per nursing, noted to also have stage 2 ulcer. If possible, please document in progress notes and D/C Summary the following regarding the ulcer: 
 
? TYPE of Ulcer (Decubitus, Diabetic, Venous stasis, other) ? SITE of Ulcer (Including laterality, if applicable) ? STAGE of Ulcer (If applicable) ? POA Status (Present on Admission (POA) or Hospital Acquired) ? Other, please specify ? Unable to be determined The medical record reflects the following: 
  Risk Factors: limited mobility, DM, advanced age Clinical Indicators: Per  Nursing and Nutrition notes patient has stage 2 pressure ulcer on buttocks Treatment: dressing changes, increased protein intake for healing per nutrition

## 2019-01-21 NOTE — PROGRESS NOTES
Advanced Directive Consult with patient and her daughter. HCPOA completed and information placed on the patient's chart. Satinder Edwards

## 2019-01-21 NOTE — PROGRESS NOTES
Provided paperwork for directives Will call when ready [place], staff [address], [id number] [facility] [place]  /   [email or web address]

## 2019-01-21 NOTE — PROGRESS NOTES
Dispo update:  Accepted rehab bed offer at [facility] (first choice), with potential transfer tomorrow.

## 2019-01-22 NOTE — PROGRESS NOTES
[facility] [place] up for 12:30 pm for transfer to [address] [place] [address], room 332b, report [phone]. This plan is ok with Ms. Lia Gruber and her daughter Abby Carpenter in room 218, as well as HEATHER Cooper.

## 2019-01-22 NOTE — DISCHARGE SUMMARY
Hospitalist Discharge Summary Admit Date:  2019  3:23 PM  
Name:  Dena Perry Age:  80 y.o. 
:  1933 MRN:  [id number] PCP:  Jimmy Huynh MD 
Treatment Team: Attending Provider: Anayeli Leigh DO; Utilization Review: Kendra Cortez RN; Care Manager: Devante Barry RN 
 
Problem List for this Hospitalization: 
Hospital Problems as of 2019 Date Reviewed: 2016 Codes Class Noted - Resolved POA Closed rib fracture ICD-10-CM: S22.39XA ICD-9-CM: 807.00  2019 - Present Yes * (Principal) Acute respiratory failure with hypoxia ([facility] [address] ICD-10-CM: J96.01 
ICD-9-CM: 518.81  2019 - Present Yes Acute prerenal azotemia ICD-10-CM: R79.89 ICD-9-CM: 790.6  2019 - Present Yes Chronic diastolic congestive heart failure (HCC) ICD-10-CM: I50.32 
ICD-9-CM: 428.32, 428.0  2019 - Present Yes Acquired hypothyroidism ICD-10-CM: E03.9 ICD-9-CM: 244.9  2019 - Present Yes GERD (gastroesophageal reflux disease) ICD-10-CM: K21.9 ICD-9-CM: 530.81  2019 - Present Yes Mixed hyperlipidemia ICD-10-CM: E78.2 ICD-9-CM: 272.2  2018 - Present Yes Type 2 diabetes mellitus without complication, without long-term current use of insulin (HCC) ICD-10-CM: E11.9 ICD-9-CM: 250.00  2018 - Present Yes Essential hypertension with goal blood pressure less than 130/85 ICD-10-CM: I10 
ICD-9-CM: 401.9  10/27/2017 - Present Yes Admission HPI from 2019: \"Review h&P for details of admission  \" Hospital Course: 
Patient is an 81 yo female with PMH of dCHF, T2DM, peripheral neuropathy, balance issues, as well as a pressure ulcer on her left heel who presented to the ED via EMS with complaints of  2 falls in the previous 3 days. In the ER patient was hypoxic with an O2 sat of 87% on RA, 96% 2L.  CXR demonstrated a posterior rib fracture.   Patient requiring 2L oxygen at rest and 3L with ambulation at discharge. Patient with UTI. Urine cx positive for  Klebsiella pneumonae, sensitive to rocephin. Patient transitioned to oral Vantin to continue at discharge. Patient with wound to left heel on admission that was unstageable Wound care consulted. Patient to have dressing changes daily and follow up with primary care provider to monitor. Pt has sacral stage 1  And left buttock stage wound. Patient evaluated by PT/OT and recommended further therapy at discharge. Patient to discharge to [facility] for further treatment.  
  
  
 
 
 
Follow up instructions and discharge meds at bottom of this note. Plan was discussed with patient and daughter. All questions answered. Patient was stable at time of discharge. Diagnostic Imaging/Tests:  
Xr Hip Rt W Or Wo Pelv 2-3 Vws Result Date: 1/16/2019 History: Fall with right hip pain EXAM: Right hip series FINDINGS: AP view the pelvis and AP and frog-leg lateral views of the right hip demonstrate bilateral osteoarthritic change of the hips and osteopenia. There is degenerative change of the low lumbar spine. No additional bony abnormality demonstrated. 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 Ct Head Wo Cont Result Date: 1/16/2019 History: fall with head injury Exam: CT head without contrast Technique: Thin section axial CT images were obtained from the skullbase through the vertex. Radiation dose reduction techniques were used for this study. Our CT scanners use one or all of the following: Automated exposure control, adjustment of the mA and/or kV according to patient size, use of iterative reconstruction. Findings: The ventricles are normal in size, shape, and position. There is generalized cerebral atrophy with chronic appearing white matter change in the corona radiata and centrum semiovale. No acute intracranial hemorrhage. No extra-axial fluid collection is present.  There is no mass or mass-effect. The basilar cisterns are patent. The paranasal sinuses and mastoid air cells are clear. Impression: Chronic appearing white matter changes and generalized cerebral atrophy. No acute intracranial abnormality. Xr Ribs Rt W Pa Cxr Min 3 V Result Date: 1/16/2019 History: Right rib pain status post fall. EXAM: Right rib series and single view chest FINDINGS: There is a nondisplaced right posterior lateral sixth rib fracture. There is no pneumothorax or widening the superior mediastinum. Again noted is elevation of the right hemidiaphragm, as was seen on a chest x-ray dated 8/11/2018. There is no new alveolar infiltrate or pleural effusion. IMPRESSION: Nondisplaced right posterior lateral sixth rib fracture. Echocardiogram results: No results found for this visit on 01/16/19. All Micro Results Procedure Component Value Units Date/Time CULTURE, URINE [[id number]]  (Abnormal)  (Susceptibility) Collected:  01/17/19 0730 Order Status:  Completed Specimen:  Urine from Clean catch Updated:  01/20/19 7350 Special Requests: NO SPECIAL REQUESTS Culture result:    
  10,000 to 50,000 COLONIES/mL KLEBSIELLA PNEUMONIAE  
     
      
  10,000 to 50,000 COLONIES/mL MIXED SKIN PRITI ISOLATED Labs: Results:  
   
BMP, Mg, Phos Recent Labs  
  01/22/19 
0604 01/21/19 
0075 01/20/19 
8224  138 139  
K 3.9 4.0 4.8  
 102 103 CO2 30 31 27 AGAP 6* 5* 9  
BUN 24* 26* 23  
CREA 0.94 1.00 0.96  
CA 9.1 9.2 9.0  
GLU 69 71 46* CBC Recent Labs  
  01/22/19 
0604 WBC 10.6 RBC 4.11  
HGB 11.1*  
HCT 34.8*  
 GRANS 64 LYMPH 23 EOS 2 MONOS 8  
BASOS 1 IG 3 ANEU 6.8 ABL 2.4 BOZENA 0.2 ABM 0.9 ABB 0.1 AIG 0.3 LFT No results for input(s): SGOT, ALT, TBIL, AP, TP, ALB, GLOB, AGRAT, GPT in the last 72 hours. Cardiac Testing Lab Results Component Value Date/Time  BNP 63 (H) 01/21/2019 03:36 AM  
 B-type Natriuretic Peptide 67.8 10/04/2017 11:45 AM  
 CK 81 01/16/2019 03:35 PM  
  
Coagulation Tests Lab Results Component Value Date/Time Prothrombin time 10.5 05/18/2016 04:32 PM  
 INR 1.0 05/18/2016 04:32 PM  
 aPTT 30.1 05/18/2016 04:32 PM  
  
A1c Lab Results Component Value Date/Time Hemoglobin A1c 10.2 (H) 01/17/2019 10:03 AM  
  
Lipid Panel No results found for: CHOL, CHOLPOCT, CHOLX, CHLST, CHOLV, 133682, HDL, LDL, LDLC, DLDLP, 677205, VLDLC, VLDL, TGLX, TRIGL, TRIGP, TGLPOCT, CHHD, CHHDX Thyroid Panel Lab Results Component Value Date/Time TSH 2.530 05/18/2016 04:32 PM  
 T4, Total 8.3 05/18/2016 04:32 PM  
    
Most Recent UA Lab Results Component Value Date/Time Color YELLOW 01/16/2019 10:16 PM  
 Appearance CLOUDY 01/16/2019 10:16 PM  
 Specific gravity 1.015 01/16/2019 10:16 PM  
 pH (UA) 6.0 01/16/2019 10:16 PM  
 Protein 30 (A) 01/16/2019 10:16 PM  
 Glucose 250 01/16/2019 10:16 PM  
 Ketone TRACE (A) 01/16/2019 10:16 PM  
 Bilirubin NEGATIVE  01/16/2019 10:16 PM  
 Blood SMALL (A) 01/16/2019 10:16 PM  
 Urobilinogen 0.2 01/16/2019 10:16 PM  
 Nitrites POSITIVE (A) 01/16/2019 10:16 PM  
 Leukocyte Esterase SMALL (A) 01/16/2019 10:16 PM  
  
 
Allergies Allergen Reactions  Contrast Agent [Iodine] Anaphylaxis  Actifed [Triprolidine-Pseudoephedrine] Nausea Only  Allopurinol Other (comments) Elevated blood pressure & pt states could not walk or see until medication was out of her system.  Decongestant [Pseudoephedrine Hcl] Rash and Itching  Lexapro [Escitalopram Oxalate] Other (comments) Lips/Mouth swelling  Lyrica [Pregabalin] Unknown (comments)  Minizide [Prazosin-Polythiazide] Unknown (comments)  Mobic [Meloxicam] Other (comments) Facial swelling  Omnipaque [Iohexol] Hives, Swelling and Other (comments) Wheezing and/or shortness of breath  Sertraline Other (comments) Mouth, lips swell  Spironolactone Itching Pt complains that she breaks out in clammy sweat with itching and stinging in her upper arms, dry mouth, funny feeling tongue, SOB, and increased anxiety.  Sulfamethazine Other (comments) Mouth/lips swelling Immunization History Administered Date(s) Administered  TB Skin Test (PPD) Intradermal 01/18/2019 All Labs from Last 24 Hrs: 
Recent Results (from the past 24 hour(s)) PLEASE READ & DOCUMENT PPD TEST IN 72 HRS Collection Time: 01/21/19 11:13 AM  
Result Value Ref Range PPD Negative Negative  
 mm Induration 0mm of reness or induration mm GLUCOSE, POC Collection Time: 01/21/19 11:15 AM  
Result Value Ref Range Glucose (POC) 182 (H) 65 - 100 mg/dL GLUCOSE, POC Collection Time: 01/21/19  4:09 PM  
Result Value Ref Range Glucose (POC) 214 (H) 65 - 100 mg/dL GLUCOSE, POC Collection Time: 01/21/19  9:20 PM  
Result Value Ref Range Glucose (POC) 176 (H) 65 - 100 mg/dL GLUCOSE, POC Collection Time: 01/22/19  5:51 AM  
Result Value Ref Range Glucose (POC) 74 65 - 100 mg/dL METABOLIC PANEL, BASIC Collection Time: 01/22/19  6:04 AM  
Result Value Ref Range Sodium 138 136 - 145 mmol/L Potassium 3.9 3.5 - 5.1 mmol/L Chloride 102 98 - 107 mmol/L  
 CO2 30 21 - 32 mmol/L Anion gap 6 (L) 7 - 16 mmol/L Glucose 69 65 - 100 mg/dL BUN 24 (H) 8 - 23 MG/DL Creatinine 0.94 0.6 - 1.0 MG/DL  
 GFR est AA >60 >60 ml/min/1.73m2 GFR est non-AA >60 >60 ml/min/1.73m2 Calcium 9.1 8.3 - 10.4 MG/DL  
CBC WITH AUTOMATED DIFF Collection Time: 01/22/19  6:04 AM  
Result Value Ref Range WBC 10.6 4.3 - 11.1 K/uL  
 RBC 4.11 4.05 - 5.2 M/uL  
 HGB 11.1 (L) 11.7 - 15.4 g/dL HCT 34.8 (L) 35.8 - 46.3 % MCV 84.7 79.6 - 97.8 FL  
 MCH 27.0 26.1 - 32.9 PG  
 MCHC 31.9 31.4 - 35.0 g/dL  
 RDW 14.6 11.9 - 14.6 % PLATELET 319 132 - 146 K/uL MPV 10.3 9.4 - 12.3 FL ABSOLUTE NRBC 0.00 0.0 - 0.2 K/uL  
 DF AUTOMATED NEUTROPHILS 64 43 - 78 % LYMPHOCYTES 23 13 - 44 % MONOCYTES 8 4.0 - 12.0 % EOSINOPHILS 2 0.5 - 7.8 % BASOPHILS 1 0.0 - 2.0 % IMMATURE GRANULOCYTES 3 0.0 - 5.0 %  
 ABS. NEUTROPHILS 6.8 1.7 - 8.2 K/UL  
 ABS. LYMPHOCYTES 2.4 0.5 - 4.6 K/UL  
 ABS. MONOCYTES 0.9 0.1 - 1.3 K/UL  
 ABS. EOSINOPHILS 0.2 0.0 - 0.8 K/UL  
 ABS. BASOPHILS 0.1 0.0 - 0.2 K/UL  
 ABS. IMM. GRANS. 0.3 0.0 - 0.5 K/UL Discharge Exam: 
Patient Vitals for the past 24 hrs: 
 Temp Pulse Resp BP SpO2  
01/22/19 0709 98.1 °F (36.7 °C) 81 18 165/51 92 % 01/22/19 0300 98 °F (36.7 °C) 73 18 161/72 91 % 01/21/19 2300 98 °F (36.7 °C) 77 19 177/86 94 % 01/21/19 1900 97.7 °F (36.5 °C) 76 18 129/71 98 % 01/21/19 1517 98.8 °F (37.1 °C) 82 20 108/40 93 % 01/21/19 1107 98 °F (36.7 °C) 86 20 132/63 93 % Oxygen Therapy O2 Sat (%): 92 % (01/22/19 0709) Pulse via Oximetry: 71 beats per minute (01/19/19 1141) O2 Device: Nasal cannula (01/21/19 1945) O2 Flow Rate (L/min): 2 l/min (01/21/19 1945) ETCO2 (mmHg): 2 mmHg (01/19/19 0400) Intake/Output Summary (Last 24 hours) at 1/22/2019 3467 Last data filed at 1/22/2019 0105 Gross per 24 hour Intake  Output 500 ml Net -500 ml General:    Well nourished. Alert. No distress. Eyes:   Normal sclera. Extraocular movements intact. ENT:  Normocephalic, atraumatic. Moist mucous membranes CV:   Regular rate and rhythm. No murmur, rub, or gallop. Lungs:  Clear to auscultation bilaterally. No wheezing, rhonchi, or rales. Abdomen: Soft, nontender, nondistended. Bowel sounds normal.  
Extremities: Warm and dry. No cyanosis or edema. Neurologic: CN II-XII grossly intact. Sensation intact. Skin:     No rashes or jaundice. Psych:  Normal mood and affect. Discharge Info:  
Current Discharge Medication List  
  
START taking these medications Details  
cefpodoxime (VANTIN) 100 mg tablet Take 1 Tab by mouth every twelve (12) hours for 2 days. Qty: 4 Tab, Refills: 0 HYDROcodone-acetaminophen (NORCO) 5-325 mg per tablet Take 1 Tab by mouth every eight (8) hours as needed for Pain for up to 3 days. Max Daily Amount: 3 Tabs. Qty: 9 Tab, Refills: 0 Associated Diagnoses: Closed fracture of one rib of right side, initial encounter  
  
insulin lispro (HUMALOG) 100 unit/mL kwikpen 5 Units by SubCUTAneous route Before breakfast, lunch, and dinner for 30 days. Qty: 1 Adjustable Dose Pre-filled Pen Syringe, Refills: 0 CONTINUE these medications which have CHANGED Details  
lisinopril (PRINIVIL, ZESTRIL) 20 mg tablet Take 0.5 Tabs by mouth daily for 30 days. Qty: 30 Tab, Refills: 0 CONTINUE these medications which have NOT CHANGED Details  
amLODIPine (NORVASC) 2.5 mg tablet Take 2.5 mg by mouth daily. sennosides (LAXATIVE, SENNOSIDES,) 25 mg tab Take 25 mg by mouth as needed (constipaton). predniSONE (DELTASONE) 10 mg tablet Take 10 mg by mouth two (2) times a day. 5mg BID  
  
potassium chloride (KLOR-CON) 10 mEq tablet Take 10 mEq by mouth two (2) times a day. ferrous gluconate 324 mg (38 mg iron) tablet Take 324 mg by mouth Daily (before breakfast). clonazePAM (KLONOPIN) 0.5 mg tablet Take 0.5 mg by mouth three (3) times daily. levothyroxine (SYNTHROID) 75 mcg tablet Take 75 mcg by mouth Daily (before breakfast). DULoxetine (CYMBALTA) 30 mg capsule Take 60 mg by mouth daily. hydrOXYzine pamoate (VISTARIL) 50 mg capsule Take 25 mg by mouth two (2) times daily as needed for Itching. multivitamin with minerals (HAIR,SKIN AND NAILS PO) Take 1 Tab by mouth daily. furosemide (LASIX) 40 mg tablet Take 40 mg by mouth two (2) times a day. raNITIdine (ZANTAC) 150 mg tablet Take 150 mg by mouth two (2) times a day. carvedilol (COREG) 3.125 mg tablet Take 1 Tab by mouth two (2) times daily (with meals).  
Qty: 60 Tab, Refills: 11  
  
 CHOLECALCIFEROL, VITAMIN D3, (VITAMIN D3 PO) Take 1,000 mg by mouth daily. CALCIUM CARBONATE/VITAMIN D3 (CALCIUM 600 + D,3, PO) Take 600 mg by mouth daily. KRILL/OM-3/DHA/EPA/PHOSPHO/AST (MEGARED OMEGA-3 KRILL OIL PO) Take 1 Cap by mouth daily. aspirin delayed-release 81 mg tablet Take 81 mg by mouth nightly. Ok to continue per anesthesia guidelines. methadone (DOLOPHINE) 5 mg tablet Take 5 mg by mouth every six (6) hours. Instructed to take DOS per Anesthesia guidelines. Indications: CHRONIC PAIN  
  
acetaminophen (TYLENOL EXTRA STRENGTH) 500 mg tablet Take 500 mg by mouth every six (6) hours as needed for Pain. loperamide (IMODIUM) 2 mg capsule Take 4 mg by mouth as needed for Diarrhea. STOP taking these medications  
  
 nitrofurantoin (MACRODANTIN) 100 mg capsule Comments:  
Reason for Stopping:   
   
 glipiZIDE SR (GLUCOTROL XL) 10 mg CR tablet Comments:  
Reason for Stopping:   
   
 diphenhydrAMINE-acetaminophen (PERCOGESIC EXTRA STRENGTH) 12.5-500 mg tab Comments:  
Reason for Stopping:   
   
 colchicine (COLCRYS) 0.6 mg tablet Comments:  
Reason for Stopping:   
   
 potassium chloride SR (KLOR-CON 10) 10 mEq tablet Comments:  
Reason for Stopping:   
   
  
 
 
 
Disposition: STR Activity: PT/OT Diet: DIET DIABETIC CONSISTENT CARB Mechanical Soft; 2 GM NA (House Low NA) FOOD SVCS COMMENTS 
DIET NUTRITIONAL SUPPLEMENTS All Meals; Glucerna Shake Follow-up Appointments Procedures  FOLLOW UP VISIT Appointment in: 3 - 5 Days Standing Status:   Standing Number of Occurrences:   1 Order Specific Question:   Appointment in Answer:   3 - 5 Days Follow-up Information Follow up With Specialties Details Why Contact Info [address] [facility] [id number] [phone]  Rekha Castro MD Family Practice In 3 days FAMILY WILL SCHEDULE AFTER DISCHARGE FROM [facility] [address] [facility] [address] 
[phone] Time spent in patient discharge planning and coordination 35 minutes. Discharge plan discussed with Dr. Denver Gower and Diabetic educator, Signed: 
ADAIR Aldrich

## 2019-01-22 NOTE — PROGRESS NOTES
TRANSFER - OUT REPORT: 
 
Verbal report given to Remberto FalconRosy on Sophie Aquas  being transferred to [facility] for routine progression of care Report consisted of patients Situation, Background, Assessment and  
Recommendations(SBAR). Information from the following report(s) Kardex was reviewed with the receiving nurse. Lines:    
 
Opportunity for questions and clarification was provided. Patient transported with: 
 [facility]

## 2019-01-22 NOTE — DISCHARGE SUMMARY
Hospitalist Discharge Summary Admit Date:  2019  3:23 PM  
Name:  Shannan Walker Age:  80 y.o. 
:  1933 MRN:  [id number] PCP:  Shannen Venegas MD 
Treatment Team: Attending Provider: Alonzo Tipton DO; Utilization Review: Sindy Archer RN; Care Manager: Fadia Izaguirre RN 
 
Problem List for this Hospitalization: 
Hospital Problems as of 2019 Date Reviewed: 2016 Codes Class Noted - Resolved POA Closed rib fracture ICD-10-CM: S22.39XA ICD-9-CM: 807.00  2019 - Present Yes * (Principal) Acute respiratory failure with hypoxia ([facility] [address] ICD-10-CM: J96.01 
ICD-9-CM: 518.81  2019 - Present Yes Acute prerenal azotemia ICD-10-CM: R79.89 ICD-9-CM: 790.6  2019 - Present Yes Chronic diastolic congestive heart failure (HCC) ICD-10-CM: I50.32 
ICD-9-CM: 428.32, 428.0  2019 - Present Yes Acquired hypothyroidism ICD-10-CM: E03.9 ICD-9-CM: 244.9  2019 - Present Yes GERD (gastroesophageal reflux disease) ICD-10-CM: K21.9 ICD-9-CM: 530.81  2019 - Present Yes Mixed hyperlipidemia ICD-10-CM: E78.2 ICD-9-CM: 272.2  2018 - Present Yes Type 2 diabetes mellitus without complication, without long-term current use of insulin (HCC) ICD-10-CM: E11.9 ICD-9-CM: 250.00  2018 - Present Yes Essential hypertension with goal blood pressure less than 130/85 ICD-10-CM: I10 
ICD-9-CM: 401.9  10/27/2017 - Present Yes Admission HPI from 2019:   
\" Review H&P for details of admission \" Hospital Course: 
Patient is an 79 yo female with PMH of dCHF, T2DM, peripheral neuropathy, balance issues, as well as a pressure ulcer on her left heel who presented to the ED via EMS with complaints of  2 falls in the previous 3 days. In the ER patient was hypoxic with an O2 sat of 87% on RA, 96% 2L. CXR demonstrated a posterior rib fracture.   Patient requiring 2L oxygen at rest and 3L with ambulation at discharge. Patient with UTI. Urine cx positive for  Klebsiella pneumonae, sensitive to rocephin. Patient transitioned to oral Vantin to continue at discharge. Patient with wound to left heel on admission that was unstageable Wound care consulted. Patient to have dressing changes daily and follow up with primary care provider to monitor. Patient evaluated by PT/OT and recommended further therapy at discharge. Patient to discharge to [facility] for further treatment. Follow up instructions and discharge meds at bottom of this note. Plan was discussed with patient. All questions answered. Patient was stable at time of discharge. Diagnostic Imaging/Tests:  
Xr Hip Rt W Or Wo Pelv 2-3 Vws Result Date: 1/16/2019 History: Fall with right hip pain EXAM: Right hip series FINDINGS: AP view the pelvis and AP and frog-leg lateral views of the right hip demonstrate bilateral osteoarthritic change of the hips and osteopenia. There is degenerative change of the low lumbar spine. No additional bony abnormality demonstrated. 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 Ct Head Wo Cont Result Date: 1/16/2019 History: fall with head injury Exam: CT head without contrast Technique: Thin section axial CT images were obtained from the skullbase through the vertex. Radiation dose reduction techniques were used for this study. Our CT scanners use one or all of the following: Automated exposure control, adjustment of the mA and/or kV according to patient size, use of iterative reconstruction. Findings: The ventricles are normal in size, shape, and position. There is generalized cerebral atrophy with chronic appearing white matter change in the corona radiata and centrum semiovale. No acute intracranial hemorrhage. No extra-axial fluid collection is present. There is no mass or mass-effect. The basilar cisterns are patent.  The paranasal sinuses and mastoid air cells are clear. Impression: Chronic appearing white matter changes and generalized cerebral atrophy. No acute intracranial abnormality. Xr Ribs Rt W Pa Cxr Min 3 V Result Date: 1/16/2019 History: Right rib pain status post fall. EXAM: Right rib series and single view chest FINDINGS: There is a nondisplaced right posterior lateral sixth rib fracture. There is no pneumothorax or widening the superior mediastinum. Again noted is elevation of the right hemidiaphragm, as was seen on a chest x-ray dated 8/11/2018. There is no new alveolar infiltrate or pleural effusion. IMPRESSION: Nondisplaced right posterior lateral sixth rib fracture. Echocardiogram results: No results found for this visit on 01/16/19. All Micro Results Procedure Component Value Units Date/Time CULTURE, URINE [[id number]]  (Abnormal)  (Susceptibility) Collected:  01/17/19 0730 Order Status:  Completed Specimen:  Urine from Clean catch Updated:  01/20/19 8333 Special Requests: NO SPECIAL REQUESTS Culture result:    
  10,000 to 50,000 COLONIES/mL KLEBSIELLA PNEUMONIAE  
     
      
  10,000 to 50,000 COLONIES/mL MIXED SKIN PRITI ISOLATED Labs: Results:  
   
BMP, Mg, Phos Recent Labs  
  01/22/19 
0604 01/21/19 
6143 01/20/19 
8812  138 139  
K 3.9 4.0 4.8  
 102 103 CO2 30 31 27 AGAP 6* 5* 9  
BUN 24* 26* 23  
CREA 0.94 1.00 0.96  
CA 9.1 9.2 9.0  
GLU 69 71 46* CBC Recent Labs  
  01/22/19 
0604 WBC 10.6 RBC 4.11  
HGB 11.1*  
HCT 34.8*  
 GRANS 64 LYMPH 23 EOS 2 MONOS 8  
BASOS 1 IG 3 ANEU 6.8 ABL 2.4 BOZENA 0.2 ABM 0.9 ABB 0.1 AIG 0.3 LFT No results for input(s): SGOT, ALT, TBIL, AP, TP, ALB, GLOB, AGRAT, GPT in the last 72 hours. Cardiac Testing Lab Results Component Value Date/Time  BNP 63 (H) 01/21/2019 03:36 AM  
 B-type Natriuretic Peptide 67.8 10/04/2017 11:45 AM  
 CK 81 01/16/2019 03:35 PM  
 Coagulation Tests Lab Results Component Value Date/Time Prothrombin time 10.5 05/18/2016 04:32 PM  
 INR 1.0 05/18/2016 04:32 PM  
 aPTT 30.1 05/18/2016 04:32 PM  
  
A1c Lab Results Component Value Date/Time Hemoglobin A1c 10.2 (H) 01/17/2019 10:03 AM  
  
Lipid Panel No results found for: CHOL, CHOLPOCT, CHOLX, CHLST, CHOLV, 074462, HDL, LDL, LDLC, DLDLP, 443611, VLDLC, VLDL, TGLX, TRIGL, TRIGP, TGLPOCT, CHHD, CHHDX Thyroid Panel Lab Results Component Value Date/Time TSH 2.530 05/18/2016 04:32 PM  
 T4, Total 8.3 05/18/2016 04:32 PM  
    
Most Recent UA Lab Results Component Value Date/Time Color YELLOW 01/16/2019 10:16 PM  
 Appearance CLOUDY 01/16/2019 10:16 PM  
 Specific gravity 1.015 01/16/2019 10:16 PM  
 pH (UA) 6.0 01/16/2019 10:16 PM  
 Protein 30 (A) 01/16/2019 10:16 PM  
 Glucose 250 01/16/2019 10:16 PM  
 Ketone TRACE (A) 01/16/2019 10:16 PM  
 Bilirubin NEGATIVE  01/16/2019 10:16 PM  
 Blood SMALL (A) 01/16/2019 10:16 PM  
 Urobilinogen 0.2 01/16/2019 10:16 PM  
 Nitrites POSITIVE (A) 01/16/2019 10:16 PM  
 Leukocyte Esterase SMALL (A) 01/16/2019 10:16 PM  
  
 
Allergies Allergen Reactions  Contrast Agent [Iodine] Anaphylaxis  Actifed [Triprolidine-Pseudoephedrine] Nausea Only  Allopurinol Other (comments) Elevated blood pressure & pt states could not walk or see until medication was out of her system.  Decongestant [Pseudoephedrine Hcl] Rash and Itching  Lexapro [Escitalopram Oxalate] Other (comments) Lips/Mouth swelling  Lyrica [Pregabalin] Unknown (comments)  Minizide [Prazosin-Polythiazide] Unknown (comments)  Mobic [Meloxicam] Other (comments) Facial swelling  Omnipaque [Iohexol] Hives, Swelling and Other (comments) Wheezing and/or shortness of breath  Sertraline Other (comments) Mouth, lips swell  Spironolactone Itching   Pt complains that she breaks out in clammy sweat with itching and stinging in her upper arms, dry mouth, funny feeling tongue, SOB, and increased anxiety.  Sulfamethazine Other (comments) Mouth/lips swelling Immunization History Administered Date(s) Administered  TB Skin Test (PPD) Intradermal 01/18/2019 All Labs from Last 24 Hrs: 
Recent Results (from the past 24 hour(s)) GLUCOSE, POC Collection Time: 01/21/19  9:23 AM  
Result Value Ref Range Glucose (POC) 116 (H) 65 - 100 mg/dL PLEASE READ & DOCUMENT PPD TEST IN 72 HRS Collection Time: 01/21/19 11:13 AM  
Result Value Ref Range PPD Negative Negative  
 mm Induration 0mm of reness or induration mm GLUCOSE, POC Collection Time: 01/21/19 11:15 AM  
Result Value Ref Range Glucose (POC) 182 (H) 65 - 100 mg/dL GLUCOSE, POC Collection Time: 01/21/19  4:09 PM  
Result Value Ref Range Glucose (POC) 214 (H) 65 - 100 mg/dL GLUCOSE, POC Collection Time: 01/21/19  9:20 PM  
Result Value Ref Range Glucose (POC) 176 (H) 65 - 100 mg/dL GLUCOSE, POC Collection Time: 01/22/19  5:51 AM  
Result Value Ref Range Glucose (POC) 74 65 - 100 mg/dL METABOLIC PANEL, BASIC Collection Time: 01/22/19  6:04 AM  
Result Value Ref Range Sodium 138 136 - 145 mmol/L Potassium 3.9 3.5 - 5.1 mmol/L Chloride 102 98 - 107 mmol/L  
 CO2 30 21 - 32 mmol/L Anion gap 6 (L) 7 - 16 mmol/L Glucose 69 65 - 100 mg/dL BUN 24 (H) 8 - 23 MG/DL Creatinine 0.94 0.6 - 1.0 MG/DL  
 GFR est AA >60 >60 ml/min/1.73m2 GFR est non-AA >60 >60 ml/min/1.73m2 Calcium 9.1 8.3 - 10.4 MG/DL  
CBC WITH AUTOMATED DIFF Collection Time: 01/22/19  6:04 AM  
Result Value Ref Range WBC 10.6 4.3 - 11.1 K/uL  
 RBC 4.11 4.05 - 5.2 M/uL  
 HGB 11.1 (L) 11.7 - 15.4 g/dL HCT 34.8 (L) 35.8 - 46.3 % MCV 84.7 79.6 - 97.8 FL  
 MCH 27.0 26.1 - 32.9 PG  
 MCHC 31.9 31.4 - 35.0 g/dL  
 RDW 14.6 11.9 - 14.6 % PLATELET 711 600 - 064 K/uL  MPV 10.3 9.4 - 12.3 FL  
 ABSOLUTE NRBC 0.00 0.0 - 0.2 K/uL  
 DF AUTOMATED NEUTROPHILS 64 43 - 78 % LYMPHOCYTES 23 13 - 44 % MONOCYTES 8 4.0 - 12.0 % EOSINOPHILS 2 0.5 - 7.8 % BASOPHILS 1 0.0 - 2.0 % IMMATURE GRANULOCYTES 3 0.0 - 5.0 %  
 ABS. NEUTROPHILS 6.8 1.7 - 8.2 K/UL  
 ABS. LYMPHOCYTES 2.4 0.5 - 4.6 K/UL  
 ABS. MONOCYTES 0.9 0.1 - 1.3 K/UL  
 ABS. EOSINOPHILS 0.2 0.0 - 0.8 K/UL  
 ABS. BASOPHILS 0.1 0.0 - 0.2 K/UL  
 ABS. IMM. GRANS. 0.3 0.0 - 0.5 K/UL Discharge Exam: 
Patient Vitals for the past 24 hrs: 
 Temp Pulse Resp BP SpO2  
01/22/19 0709 98.1 °F (36.7 °C) 81 18 165/51 92 % 01/22/19 0300 98 °F (36.7 °C) 73 18 161/72 91 % 01/21/19 2300 98 °F (36.7 °C) 77 19 177/86 94 % 01/21/19 1900 97.7 °F (36.5 °C) 76 18 129/71 98 % 01/21/19 1517 98.8 °F (37.1 °C) 82 20 108/40 93 % 01/21/19 1107 98 °F (36.7 °C) 86 20 132/63 93 % Oxygen Therapy O2 Sat (%): 92 % (01/22/19 0709) Pulse via Oximetry: 71 beats per minute (01/19/19 1141) O2 Device: Nasal cannula (01/21/19 1945) O2 Flow Rate (L/min): 2 l/min (01/21/19 1945) ETCO2 (mmHg): 2 mmHg (01/19/19 0400) Intake/Output Summary (Last 24 hours) at 1/22/2019 6173 Last data filed at 1/22/2019 0105 Gross per 24 hour Intake  Output 500 ml Net -500 ml General:    Well nourished. Alert. No distress. Eyes:   Normal sclera. Extraocular movements intact. ENT:  Normocephalic, atraumatic. Moist mucous membranes CV:   Regular rate and rhythm. No murmur, rub, or gallop. Lungs:  Clear to auscultation bilaterally. No wheezing, rhonchi, or rales. Abdomen: Soft, nontender, nondistended. Bowel sounds normal.  
Extremities: Warm and dry. No cyanosis or edema. Neurologic: CN II-XII grossly intact. Sensation intact. Skin:     No rashes or jaundice. Psych:  Normal mood and affect. Discharge Info:  
Current Discharge Medication List  
  
START taking these medications Details cefpodoxime (VANTIN) 100 mg tablet Take 1 Tab by mouth every twelve (12) hours for 2 days. Qty: 4 Tab, Refills: 0 HYDROcodone-acetaminophen (NORCO) 5-325 mg per tablet Take 1 Tab by mouth every eight (8) hours as needed for Pain for up to 3 days. Max Daily Amount: 3 Tabs. Qty: 9 Tab, Refills: 0 Associated Diagnoses: Closed fracture of one rib of right side, initial encounter CONTINUE these medications which have CHANGED Details  
lisinopril (PRINIVIL, ZESTRIL) 20 mg tablet Take 0.5 Tabs by mouth daily for 30 days. Qty: 30 Tab, Refills: 0 CONTINUE these medications which have NOT CHANGED Details  
amLODIPine (NORVASC) 2.5 mg tablet Take 2.5 mg by mouth daily. glipiZIDE SR (GLUCOTROL XL) 10 mg CR tablet Take 10 mg by mouth two (2) times a day. sennosides (LAXATIVE, SENNOSIDES,) 25 mg tab Take 25 mg by mouth as needed (constipaton). predniSONE (DELTASONE) 10 mg tablet Take 10 mg by mouth two (2) times a day. 5mg BID  
  
potassium chloride (KLOR-CON) 10 mEq tablet Take 10 mEq by mouth two (2) times a day. ferrous gluconate 324 mg (38 mg iron) tablet Take 324 mg by mouth Daily (before breakfast). clonazePAM (KLONOPIN) 0.5 mg tablet Take 0.5 mg by mouth three (3) times daily. levothyroxine (SYNTHROID) 75 mcg tablet Take 75 mcg by mouth Daily (before breakfast). DULoxetine (CYMBALTA) 30 mg capsule Take 60 mg by mouth daily. hydrOXYzine pamoate (VISTARIL) 50 mg capsule Take 25 mg by mouth two (2) times daily as needed for Itching. multivitamin with minerals (HAIR,SKIN AND NAILS PO) Take 1 Tab by mouth daily. furosemide (LASIX) 40 mg tablet Take 40 mg by mouth two (2) times a day. raNITIdine (ZANTAC) 150 mg tablet Take 150 mg by mouth two (2) times a day. carvedilol (COREG) 3.125 mg tablet Take 1 Tab by mouth two (2) times daily (with meals).  
Qty: 60 Tab, Refills: 11  
  
 CHOLECALCIFEROL, VITAMIN D3, (VITAMIN D3 PO) Take 1,000 mg by mouth daily. CALCIUM CARBONATE/VITAMIN D3 (CALCIUM 600 + D,3, PO) Take 600 mg by mouth daily. KRILL/OM-3/DHA/EPA/PHOSPHO/AST (MEGARED OMEGA-3 KRILL OIL PO) Take 1 Cap by mouth daily. aspirin delayed-release 81 mg tablet Take 81 mg by mouth nightly. Ok to continue per anesthesia guidelines. methadone (DOLOPHINE) 5 mg tablet Take 5 mg by mouth every six (6) hours. Instructed to take DOS per Anesthesia guidelines. Indications: CHRONIC PAIN  
  
acetaminophen (TYLENOL EXTRA STRENGTH) 500 mg tablet Take 500 mg by mouth every six (6) hours as needed for Pain. loperamide (IMODIUM) 2 mg capsule Take 4 mg by mouth as needed for Diarrhea. STOP taking these medications  
  
 nitrofurantoin (MACRODANTIN) 100 mg capsule Comments:  
Reason for Stopping:   
   
 diphenhydrAMINE-acetaminophen (PERCOGESIC EXTRA STRENGTH) 12.5-500 mg tab Comments:  
Reason for Stopping:   
   
 colchicine (COLCRYS) 0.6 mg tablet Comments:  
Reason for Stopping:   
   
 potassium chloride SR (KLOR-CON 10) 10 mEq tablet Comments:  
Reason for Stopping:   
   
  
 
 
 
Disposition: STR Activity: PT/OT Eval and Treat Diet: DIET DIABETIC CONSISTENT CARB Mechanical Soft; 2 GM NA (House Low NA) FOOD SVCS COMMENTS 
DIET NUTRITIONAL SUPPLEMENTS All Meals; Glucerna Shake Follow-up Appointments Procedures  FOLLOW UP VISIT Appointment in: 3 - 5 Days Standing Status:   Standing Number of Occurrences:   1 Order Specific Question:   Appointment in Answer:   3 - 5 Days Follow-up Information Follow up With Specialties Details Why Contact Info [address] [address] [phone] Masoud Andino MD Family Practice In 3 days follow up post hospitlaization [address] [facility] [address] [facility] [address] [phone] Time spent in patient discharge planning and coordination 35 minutes.  
Discharge plan discussed with Dr. Stan Ortiz,  
Signed: 
[facility] ADAIR GIFFORD

## 2019-01-22 NOTE — DISCHARGE INSTRUCTIONS
Acute Respiratory Failure improved    DISCHARGE SUMMARY from Nurse    PATIENT INSTRUCTIONS:    After general anesthesia or intravenous sedation, for 24 hours or while taking prescription Narcotics:  · Limit your activities  · Do not drive and operate hazardous machinery  · Do not make important personal or business decisions  · Do  not drink alcoholic beverages  · If you have not urinated within 8 hours after discharge, please contact your surgeon on call. Report the following to your surgeon:  · Excessive pain, swelling, redness or odor of or around the surgical area  · Temperature over 100.5  · Nausea and vomiting lasting longer than 4 hours or if unable to take medications  · Any signs of decreased circulation or nerve impairment to extremity: change in color, persistent  numbness, tingling, coldness or increase pain  · Any questions    What to do at Home:  Recommended activity: Activity as tolerated. If you experience any of the following symptoms: increased shortness of breath, fever greaterv than 100.5  please follow up with your MD.    *  Please give a list of your current medications to your Primary Care Provider. *  Please update this list whenever your medications are discontinued, doses are      changed, or new medications (including over-the-counter products) are added. *  Please carry medication information at all times in case of emergency situations. These are general instructions for a healthy lifestyle:    No smoking/ No tobacco products/ Avoid exposure to second hand smoke  Surgeon General's Warning:  Quitting smoking now greatly reduces serious risk to your health.     Obesity, smoking, and sedentary lifestyle greatly increases your risk for illness    A healthy diet, regular physical exercise & weight monitoring are important for maintaining a healthy lifestyle    You may be retaining fluid if you have a history of heart failure or if you experience any of the following symptoms: Weight gain of 3 pounds or more overnight or 5 pounds in a week, increased swelling in our hands or feet or shortness of breath while lying flat in bed. Please call your doctor as soon as you notice any of these symptoms; do not wait until your next office visit. Recognize signs and symptoms of STROKE:    F-face looks uneven    A-arms unable to move or move unevenly    S-speech slurred or non-existent    T-time-call 911 as soon as signs and symptoms begin-DO NOT go       Back to bed or wait to see if you get better-TIME IS BRAIN. Warning Signs of HEART ATTACK     Call 911 if you have these symptoms:   Chest discomfort. Most heart attacks involve discomfort in the center of the chest that lasts more than a few minutes, or that goes away and comes back. It can feel like uncomfortable pressure, squeezing, fullness, or pain.  Discomfort in other areas of the upper body. Symptoms can include pain or discomfort in one or both arms, the back, neck, jaw, or stomach.  Shortness of breath with or without chest discomfort.  Other signs may include breaking out in a cold sweat, nausea, or lightheadedness. Don't wait more than five minutes to call 911 - MINUTES MATTER! Fast action can save your life. Calling 911 is almost always the fastest way to get lifesaving treatment. Emergency Medical Services staff can begin treatment when they arrive -- up to an hour sooner than if someone gets to the hospital by car. The discharge information has been reviewed with the {PATIENT PARENT GUARDIAN:22015}. The {PATIENT PARENT GUARDIAN:83747} verbalized understanding. Discharge medications reviewed with the {Dishcarge meds reviewed IJKD:28360} and appropriate educational materials and side effects teaching were provided. ___________________________________________________________________________________________________________________________________NUTRITION       Continue Oral Nutrition Supplement (ONS) at discharge. Recommend Boost Glucose Control or a comparable/similar product Once daily Shani Thomas RD, RUSTAM     Cleanse wound with dermal wound cleanser. Apply solisite wound gel to wound bed, cover with gauze and wrap with kerlix gauze wrap. Dressing change daily. Also recommend keeping heels offloaded with pillow.

## 2019-01-22 NOTE — DIABETES MGMT
Patient's blood glucose ranged  yesterday with patient receiving Humalog 13 units and prednisone 15 mg. Blood glucose 74 this morning. Would recommend a regimen of Humalog 5 units with meals while patient is on steroids. Patient is steroid driven and once steroids wear off patient will be at risk for hypoglycemia on glipizide which patient was experiencing prior to admission. A1C 10.2 (eA) on glipizide regimen prior to admission. Discussed with provider. If steroids are ever tapered down patient's insulin needs would decrease as well.

## 2019-01-23 NOTE — PROGRESS NOTES
This note will not be viewable in [address] Patient discharged to [facility] on 1/22/19. Patient discharged to a  Preferred Provider [facility] facility. Patient will be included in weekly care coordination calls. Information forwarded to Armando Salazar, [facility] Provider [facility] RN Care Manager.

## 2019-01-23 NOTE — Clinical Note
Pt dc 1.22.19 to [address], dx: Acute respiratory failure with [id number] yo female with PMH of dCHF, T2DM, peripheral neuropathy, balance issues, as well as a pressure ulcer on her left heel who presented to the ED via EMS with complaints of  2 falls in the previous 3 days

## 2019-02-05 PROBLEM — M86.9 OSTEOMYELITIS OF FINGER (HCC): Status: ACTIVE | Noted: 2019-01-01

## 2019-02-05 PROBLEM — L08.9 FINGER INFECTION: Status: ACTIVE | Noted: 2019-01-01

## 2019-02-05 PROBLEM — D72.829 LEUKOCYTOSIS: Status: ACTIVE | Noted: 2019-01-01

## 2019-02-05 PROBLEM — N17.9 AKI (ACUTE KIDNEY INJURY) (HCC): Status: ACTIVE | Noted: 2019-01-01

## 2019-02-05 NOTE — PROGRESS NOTES
TRANSFER - IN REPORT: 
 
Verbal report received from HEATHER Coreas(name) on Phillip Cutter  being received from ED(unit) for routine progression of care Report consisted of patients Situation, Background, Assessment and  
Recommendations(SBAR). Information from the following report(s) SBAR, Kardex, ED Summary, Intake/Output, MAR, Accordion and Recent Results was reviewed with the receiving nurse. Opportunity for questions and clarification was provided. Assessment completed upon patients arrival to unit and care assumed.

## 2019-02-05 NOTE — H&P
Hospitalist H&P/Consult Note Admit Date:  2019  1:08 AM  
Name:  Dodie Thomas Age:  80 y.o. 
:  1933 MRN:  [id number] PCP:  Anita Marroquin MD 
Treatment Team: Attending Provider: Sharon Bell MD; Primary Nurse: Romana Cooler, RN 
 
HPI:  
Patient is a very pleasant 81 y/o female with hx DM2, HTN, hypothyroidism, neuropathy, chronic pain who was recently hospitalized - for acute respiratory failure with hypoxia. She had had a fall with resultant right rib fracture. She was discharged to rehab facility. When discharged she said she had a blister on the tip of her right forefinger. This later ruptured. At facility they noticed her fingertip draining and the finger red and swollen so sent to ED. She has open raw ulcer. Was cultured in ED. Antibiotics started. Xray is suggestive of osteomyelitis as well. Will admit for further treatment. 10 systems reviewed and negative except as noted in HPI. Past Medical History:  
Diagnosis Date  Arthritis  Chronic diastolic congestive heart failure ([address] 2019  Chronic pain   
 bilat feet & hands  Edema BLE & bilat fingers; pt takes diuretic daily; pt states her swelling is related to the nerve disease she is being treated for at [facility]  GERD (gastroesophageal reflux disease) 2019  Gouty arthritis  History of MI (myocardial infarction)  \"light heart attack\"; pt states MD at [facility] mentioned it to her; pt stated MD told her \"was on the back part of her heart & would not give her any problems\"; pt takes aspirin 81 mg daily  Hypertension   
 managed w/med  Hypothyroidism   
 s/p partial thyroidectomy ; managed w/med  Mild heartburn   
 takes tums prn  Neuropathy   
 pt states has been treated by [facility] for \"nerve problems in her feet & hands\"  Post-operative nausea and vomiting  Type 2 diabetes mellitus without complication, without long-term current use of insulin ([facility] 7/27/2018 Past Surgical History:  
Procedure Laterality Date  HX CYST REMOVAL Right 1950's  
 upper eyelid  HX DILATION AND CURETTAGE  [id number]  
 ectopic pregnancy; right BSO done  HX HEART CATHETERIZATION  late 1979  
 pt states no stents [address]  [address] Cardenas [facility]  
 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  
 nerve removed out of left leg [address] [facility]  HX UROLOGICAL    
 cystoscopy Prior to Admission Medications Prescriptions Last Dose Informant Patient Reported? Taking? CALCIUM CARBONATE/VITAMIN D3 (CALCIUM 600 + D,3, PO)   Yes No  
Sig: Take 600 mg by mouth daily. CHOLECALCIFEROL, VITAMIN D3, (VITAMIN D3 PO)   Yes No  
Sig: Take 1,000 mg by mouth daily. DULoxetine (CYMBALTA) 30 mg capsule   Yes No  
Sig: Take 60 mg by mouth daily. KRILL/OM-3/DHA/EPA/PHOSPHO/AST (MEGARED OMEGA-3 KRILL OIL PO)   Yes No  
Sig: Take 1 Cap by mouth daily. acetaminophen (TYLENOL EXTRA STRENGTH) 500 mg tablet   Yes No  
Sig: Take 500 mg by mouth every six (6) hours as needed for Pain. amLODIPine (NORVASC) 2.5 mg tablet   Yes No  
Sig: Take 2.5 mg by mouth daily. aspirin delayed-release 81 mg tablet   Yes No  
Sig: Take 81 mg by mouth nightly. Ok to continue per anesthesia guidelines. carvedilol (COREG) 3.125 mg tablet   No No  
Sig: Take 1 Tab by mouth two (2) times daily (with meals). clonazePAM (KLONOPIN) 0.5 mg tablet   Yes No  
Sig: Take 0.5 mg by mouth three (3) times daily. ferrous gluconate 324 mg (38 mg iron) tablet   Yes No  
Sig: Take 324 mg by mouth Daily (before breakfast). furosemide (LASIX) 40 mg tablet   Yes No  
Sig: Take 40 mg by mouth two (2) times a day. hydrOXYzine pamoate (VISTARIL) 50 mg capsule   Yes No  
Sig: Take 25 mg by mouth two (2) times daily as needed for Itching. insulin lispro (HUMALOG) 100 unit/mL kwikpen   No No  
Si Units by SubCUTAneous route Before breakfast, lunch, and dinner for 30 days. levothyroxine (SYNTHROID) 75 mcg tablet   Yes No  
Sig: Take 75 mcg by mouth Daily (before breakfast). lisinopril (PRINIVIL, ZESTRIL) 20 mg tablet   No No  
Sig: Take 0.5 Tabs by mouth daily for 30 days. loperamide (IMODIUM) 2 mg capsule   Yes No  
Sig: Take 4 mg by mouth as needed for Diarrhea. methadone (DOLOPHINE) 5 mg tablet   Yes No  
Sig: Take 5 mg by mouth every six (6) hours. Instructed to take DOS per Anesthesia guidelines. Indications: CHRONIC PAIN  
multivitamin with minerals (HAIR,SKIN AND NAILS PO)   Yes No  
Sig: Take 1 Tab by mouth daily. potassium chloride (KLOR-CON) 10 mEq tablet   Yes No  
Sig: Take 10 mEq by mouth two (2) times a day. predniSONE (DELTASONE) 10 mg tablet   Yes No  
Sig: Take 10 mg by mouth two (2) times a day. 5mg BID  
raNITIdine (ZANTAC) 150 mg tablet   Yes No  
Sig: Take 150 mg by mouth two (2) times a day. sennosides (LAXATIVE, SENNOSIDES,) 25 mg tab   Yes No  
Sig: Take 25 mg by mouth as needed (constipaton). Facility-Administered Medications: None Allergies Allergen Reactions  Contrast Agent [Iodine] Anaphylaxis  Actifed [Triprolidine-Pseudoephedrine] Nausea Only  Allopurinol Other (comments) Elevated blood pressure & pt states could not walk or see until medication was out of her system.  Decongestant [Pseudoephedrine Hcl] Rash and Itching  Lexapro [Escitalopram Oxalate] Other (comments) Lips/Mouth swelling  Lyrica [Pregabalin] Unknown (comments)  Minizide [Prazosin-Polythiazide] Unknown (comments)  Mobic [Meloxicam] Other (comments) Facial swelling  Omnipaque [Iohexol] Hives, Swelling and Other (comments) Wheezing and/or shortness of breath  Sertraline Other (comments) Mouth, lips swell  Spironolactone Itching Pt complains that she breaks out in clammy sweat with itching and stinging in her upper arms, dry mouth, funny feeling tongue, SOB, and increased anxiety.  Sulfamethazine Other (comments) Mouth/lips swelling Social History Tobacco Use  Smoking status: Never Smoker  Smokeless tobacco: Never Used Substance Use Topics  Alcohol use: No  
  
Family History Problem Relation Age of Onset  Cancer Mother  Heart Disease Father  Stroke Father Immunization History Administered Date(s) Administered  Influenza High Dose Vaccine PF 10/14/2014, 10/05/2017, 10/25/2018  Influenza Vaccine 09/16/2009, 09/09/2013, 10/12/2015, 09/15/2016  Influenza Vaccine (Whole Virus) 10/01/2011, 08/27/2012  Pneumococcal Conjugate (PCV-13) 03/18/2015  Pneumococcal Polysaccharide (PPSV-23) 10/01/2000  TB Skin Test (PPD) Intradermal 01/18/2019  Zoster Vaccine, Live 06/03/2013 Objective:  
 
Patient Vitals for the past 24 hrs: 
 Temp Pulse Resp BP SpO2  
02/05/19 0100     96 % 02/04/19 2204 98.6 °F (37 °C) 86 16 110/61 95 % Oxygen Therapy O2 Sat (%): 96 % (02/05/19 0100) O2 Device: Room air (02/05/19 0100) Intake/Output Summary (Last 24 hours) at 2/5/2019 0534 Last data filed at 2/5/2019 2769 Gross per 24 hour Intake  Output 1000 ml Net -1000 ml Physical Exam: 
General:    Well nourished. Alert. Pale. Chronically ill appearing Eyes:   Normal sclera. Extraocular movements intact. ENT:  Normocephalic, atraumatic. Moist mucous membranes CV:   RRR. No murmur, rub, or gallop. Lungs:  CTAB. No wheezing, rhonchi, or rales. Abdomen: Soft, nontender, nondistended. Bowel sounds normal.  
Extremities: Warm and dry. No cyanosis some edema Neurologic: CN II-XII grossly intact. Sensation intact. Skin:     No rashes or jaundice.  Right distal forefinger with exposed tissue at tip with ulceration. Has surrounding cellulitis Psych:  Normal mood and pleasant affect. I reviewed the labs, imaging, EKGs, telemetry, and other studies done this admission. Data Review:  
Recent Results (from the past 24 hour(s)) CBC WITH AUTOMATED DIFF Collection Time: 02/04/19 10:13 PM  
Result Value Ref Range WBC 14.9 (H) 4.3 - 11.1 K/uL  
 RBC 4.10 4.05 - 5.2 M/uL  
 HGB 11.4 (L) 11.7 - 15.4 g/dL HCT 36.1 35.8 - 46.3 % MCV 88.0 79.6 - 97.8 FL  
 MCH 27.8 26.1 - 32.9 PG  
 MCHC 31.6 31.4 - 35.0 g/dL  
 RDW 15.8 (H) 11.9 - 14.6 % PLATELET 892 717 - 063 K/uL MPV 10.5 9.4 - 12.3 FL ABSOLUTE NRBC 0.00 0.0 - 0.2 K/uL  
 DF AUTOMATED NEUTROPHILS 85 (H) 43 - 78 % LYMPHOCYTES 8 (L) 13 - 44 % MONOCYTES 5 4.0 - 12.0 % EOSINOPHILS 0 (L) 0.5 - 7.8 % BASOPHILS 0 0.0 - 2.0 % IMMATURE GRANULOCYTES 2 0.0 - 5.0 %  
 ABS. NEUTROPHILS 12.6 (H) 1.7 - 8.2 K/UL  
 ABS. LYMPHOCYTES 1.3 0.5 - 4.6 K/UL  
 ABS. MONOCYTES 0.7 0.1 - 1.3 K/UL  
 ABS. EOSINOPHILS 0.0 0.0 - 0.8 K/UL  
 ABS. BASOPHILS 0.0 0.0 - 0.2 K/UL  
 ABS. IMM. GRANS. 0.3 0.0 - 0.5 K/UL METABOLIC PANEL, COMPREHENSIVE Collection Time: 02/04/19 10:13 PM  
Result Value Ref Range Sodium 132 (L) 136 - 145 mmol/L Potassium 4.5 3.5 - 5.1 mmol/L Chloride 97 (L) 98 - 107 mmol/L  
 CO2 27 21 - 32 mmol/L Anion gap 8 7 - 16 mmol/L Glucose 268 (H) 65 - 100 mg/dL BUN 65 (H) 8 - 23 MG/DL Creatinine 1.93 (H) 0.6 - 1.0 MG/DL  
 GFR est AA 32 (L) >60 ml/min/1.73m2 GFR est non-AA 26 (L) >60 ml/min/1.73m2 Calcium 8.5 8.3 - 10.4 MG/DL Bilirubin, total 0.4 0.2 - 1.1 MG/DL  
 ALT (SGPT) 43 12 - 65 U/L  
 AST (SGOT) 33 15 - 37 U/L Alk. phosphatase 288 (H) 50 - 136 U/L Protein, total 7.4 6.3 - 8.2 g/dL Albumin 3.0 (L) 3.2 - 4.6 g/dL Globulin 4.4 (H) 2.3 - 3.5 g/dL A-G Ratio 0.7 (L) 1.2 - 3.5 POC LACTIC ACID Collection Time: 02/04/19 10:13 PM  
Result Value Ref Range Lactic Acid (POC) 2.75 (H) 0.5 - 1.9 mmol/L  
C REACTIVE PROTEIN, QT Collection Time: 02/04/19 10:13 PM  
Result Value Ref Range C-Reactive protein 8.6 (H) 0.0 - 0.9 mg/dL PROCALCITONIN Collection Time: 02/04/19 10:13 PM  
Result Value Ref Range Procalcitonin 0.3 ng/mL URINALYSIS W/ RFLX MICROSCOPIC Collection Time: 02/05/19  5:11 AM  
Result Value Ref Range Color YELLOW Appearance CLEAR Specific gravity 1.014 1.001 - 1.023    
 pH (UA) 5.5 5.0 - 9.0 Protein NEGATIVE  NEG mg/dL Glucose NEGATIVE  mg/dL Ketone NEGATIVE  NEG mg/dL Bilirubin NEGATIVE  NEG Blood NEGATIVE  NEG Urobilinogen 0.2 0.2 - 1.0 EU/dL Nitrites NEGATIVE  NEG Leukocyte Esterase NEGATIVE  NEG Imaging Studies: CXR Results  (Last 48 hours) None CT Results  (Last 48 hours) None Assessment and Plan:  
 
Hospital Problems as of 2/5/2019 Date Reviewed: 5/25/2016 Codes Class Noted - Resolved POA * (Principal) Finger infection ICD-10-CM: L08.9 ICD-9-CM: 686.9  2/5/2019 - Present Yes Osteomyelitis of finger (HCC) ICD-10-CM: M86.9 ICD-9-CM: 730.24  2/5/2019 - Present Yes RAYMON (acute kidney injury) ([facility] ICD-10-CM: N17.9 ICD-9-CM: 584.9  2/5/2019 - Present Yes DM2 (diabetes mellitus, type 2) (HCC) ICD-10-CM: E11.9 ICD-9-CM: 250.00  7/27/2018 - Present Yes Leukocytosis ICD-10-CM: W58.983 ICD-9-CM: 288.60  2/5/2019 - Present Yes Chronic diastolic congestive heart failure (HCC) ICD-10-CM: I50.32 
ICD-9-CM: 428.32, 428.0  1/16/2019 - Present Yes Acquired hypothyroidism ICD-10-CM: E03.9 ICD-9-CM: 244.9  1/16/2019 - Present Yes GERD (gastroesophageal reflux disease) ICD-10-CM: K21.9 ICD-9-CM: 530.81  1/16/2019 - Present Yes PLAN: 
· Admit patient to medical bed · It appears from exam and imaging that she has osteomyelitis of right forefinger distally at the tip. Has exposed tissue · Start IV clindamycin and vancomycin · Need tight glucose control · Consult ID in am to guide antibiotics · She may need surgical debridement · Also possible that tip may have to be amputated if no improvement · Continue home medications · IV fluids for RAYMON. Stop diuretics and lisinopril for now · SQ heparin for DVT prophylaxis Estimated LOS:  Greater than 2 midnights Signed: 
Bria Vallejo MD

## 2019-02-05 NOTE — ED TRIAGE NOTES
Pt arrives via EMS, from [facility], pt has a wound on her left index finger, the tip of finger is exposed, yellow/white pus. Pt has this wound from a blister, it opened up over the weekend sometime. Pt was in rehab at [facility] due to a fall. Pt states she still has some pain from her fall in her rib cage area, pt has some pain in her finger, pt is also diabetic. Will send one set of blood cultures with temp labels to the lab off of first draw in triage.

## 2019-02-05 NOTE — ED NOTES
TRANSFER - OUT REPORT: 
 
Verbal report given to Armandyaneli Salazar  on Shalom Dangelo  being transferred to for routine progression of care Report consisted of patients Situation, Background, Assessment and  
Recommendations(SBAR). Information from the following report(s) SBAR, MAR and Recent Results was reviewed with the receiving nurse. Lines:  
Peripheral IV Right; Outer Forearm (Active) Site Assessment Clean, dry, & intact 2/5/2019  4:09 AM  
Phlebitis Assessment 0 2/5/2019  4:09 AM  
Infiltration Assessment 0 2/5/2019  4:09 AM  
Dressing Status Clean, dry, & intact 2/5/2019  4:09 AM  
   
Peripheral IV 02/05/19 Left Antecubital (Active) Site Assessment Clean, dry, & intact 2/5/2019  4:09 AM  
Phlebitis Assessment 0 2/5/2019  4:09 AM  
Infiltration Assessment 0 2/5/2019  4:09 AM  
Dressing Status Clean, dry, & intact 2/5/2019  4:09 AM  
  
 
Opportunity for questions and clarification was provided. Patient transported with: 
 O2 @ 2 liters

## 2019-02-05 NOTE — CONSULTS
Infectious Disease Consult Today's Date: 2/5/2019 Admit Date: 2/5/2019 Impression: · Osteomyelitis, L index finger distal phalanx, doubt herpetic (dayo) more likely bacterial 
· CKD · DM Plan:  
·  Start vancomycin and ceftriaxone empirically pending culture, will likely recommend 4-6 week course IV therapy · Monitor culture result · Consult Case Management Anti-infectives: · Clindamycin Subjective:  
Date of Consultation:  February 5, 2019 Referring Physician: Deion Boogie Patient is a 80 y.o. female recently hospitalized for respiratory failure, discharged to rehab, had \"blister\" on L index finger, which has progressed to draining ulcer on tip of finger. X ray shows erosive changes of distal phalanx consistent with osteomyelitis. Patient Active Problem List  
Diagnosis Code  Urinary tract infection, site not specified N39.0  Murmur R01.1  Bilateral leg edema R60.0  Swelling R60.9  Abnormal EKG R94.31  
 Essential hypertension with goal blood pressure less than 130/85 I10  Aortic valve sclerosis I35.8  Rapid heart rate R00.0  Mixed hyperlipidemia E78.2  DM2 (diabetes mellitus, type 2) ([facility]) E11.9  Closed rib fracture S22.39XA  Acute respiratory failure with hypoxia ([facility]) J96.01  
 Acute prerenal azotemia R79.89  Chronic diastolic congestive heart failure ([facility]) I50.32  
 Acquired hypothyroidism E03.9  GERD (gastroesophageal reflux disease) K21.9  Finger infection L08.9  Leukocytosis D72.829  
 RAYMON (acute kidney injury) ([facility] [address] N17.9  Osteomyelitis of finger ([facility]) M86.9 Past Medical History:  
Diagnosis Date  Arthritis  Chronic diastolic congestive heart failure ([facility] 1/16/2019  Chronic pain   
 bilat feet & hands  Edema BLE & bilat fingers; pt takes diuretic daily; pt states her swelling is related to the nerve disease she is being treated for at [facility]  GERD (gastroesophageal reflux disease) 1/16/2019  Gouty arthritis  History of MI (myocardial infarction) 1980's \"light heart attack\"; pt states MD at [facility] mentioned it to her; pt stated MD told her \"was on the back part of her heart & would not give her any problems\"; pt takes aspirin 81 mg daily  Hypertension   
 managed w/med  Hypothyroidism   
 s/p partial thyroidectomy 1960; managed w/med  Mild heartburn   
 takes tums prn  Neuropathy 1980's  
 pt states has been treated by [facility] for \"nerve problems in her feet & hands\"  Post-operative nausea and vomiting  Type 2 diabetes mellitus without complication, without long-term current use of insulin ([facility] 7/27/2018 Family History Problem Relation Age of Onset  Cancer Mother  Heart Disease Father  Stroke Father Social History Tobacco Use  Smoking status: Never Smoker  Smokeless tobacco: Never Used Substance Use Topics  Alcohol use: No  
 
Past Surgical History:  
Procedure Laterality Date  HX CYST REMOVAL Right 1950's  
 upper eyelid  HX DILATION AND CURETTAGE  [id number] [address]  
 ectopic pregnancy; right BSO done  HX HEART CATHETERIZATION  late 1979  
 pt states no stents [address] [facility] [address] Valeriano [facility]  
 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  
 nerve removed out of left leg [address] [facility]  HX UROLOGICAL    
 cystoscopy Prior to Admission medications Medication Sig Start Date End Date Taking? Authorizing Provider  
lisinopril (PRINIVIL, ZESTRIL) 20 mg tablet Take 0.5 Tabs by mouth daily for 30 days.  1/22/19 2/21/19  Rosalva Hagan NP  
insulin lispro (HUMALOG) 100 unit/mL kwikpen 5 Units by SubCUTAneous route Before breakfast, lunch, and dinner for 30 days. 1/22/19 2/21/19  Gennaro Holley NP  
amLODIPine (NORVASC) 2.5 mg tablet Take 2.5 mg by mouth daily. Other, MD Rufino  
sennosides (LAXATIVE, SENNOSIDES,) 25 mg tab Take 25 mg by mouth as needed (constipaton). 9/10/18   Provider, Historical  
predniSONE (DELTASONE) 10 mg tablet Take 10 mg by mouth two (2) times a day. 5mg BID    Provider, Historical  
potassium chloride (KLOR-CON) 10 mEq tablet Take 10 mEq by mouth two (2) times a day. Provider, Historical  
acetaminophen (TYLENOL EXTRA STRENGTH) 500 mg tablet Take 500 mg by mouth every six (6) hours as needed for Pain. Provider, Historical  
ferrous gluconate 324 mg (38 mg iron) tablet Take 324 mg by mouth Daily (before breakfast). Provider, Historical  
clonazePAM (KLONOPIN) 0.5 mg tablet Take 0.5 mg by mouth three (3) times daily. Provider, Historical  
levothyroxine (SYNTHROID) 75 mcg tablet Take 75 mcg by mouth Daily (before breakfast). Provider, Historical  
DULoxetine (CYMBALTA) 30 mg capsule Take 60 mg by mouth daily. Provider, Historical  
hydrOXYzine pamoate (VISTARIL) 50 mg capsule Take 25 mg by mouth two (2) times daily as needed for Itching. Provider, Historical  
multivitamin with minerals (HAIR,SKIN AND NAILS PO) Take 1 Tab by mouth daily. Provider, Historical  
furosemide (LASIX) 40 mg tablet Take 40 mg by mouth two (2) times a day. Provider, Historical  
loperamide (IMODIUM) 2 mg capsule Take 4 mg by mouth as needed for Diarrhea. 8/87/57   Rekha Castro MD  
raNITIdine (ZANTAC) 150 mg tablet Take 150 mg by mouth two (2) times a day. Provider, Historical  
carvedilol (COREG) 3.125 mg tablet Take 1 Tab by mouth two (2) times daily (with meals). 1/26/18   Home Syed MD  
CHOLECALCIFEROL, VITAMIN D3, (VITAMIN D3 PO) Take 1,000 mg by mouth daily.     Provider, Historical  
CALCIUM CARBONATE/VITAMIN D3 (CALCIUM 600 + D,3, PO) Take 600 mg by mouth daily.    Provider, Historical  
KRILL/OM-3/DHA/EPA/PHOSPHO/AST (MEGARED OMEGA-3 KRILL OIL PO) Take 1 Cap by mouth daily. Provider, Historical  
aspirin delayed-release 81 mg tablet Take 81 mg by mouth nightly. Ok to continue per anesthesia guidelines. Provider, Historical  
methadone (DOLOPHINE) 5 mg tablet Take 5 mg by mouth every six (6) hours. Instructed to take DOS per Anesthesia guidelines. Indications: CHRONIC PAIN    Provider, Historical  
 
 
Allergies Allergen Reactions  Contrast Agent [Iodine] Anaphylaxis  Actifed [Triprolidine-Pseudoephedrine] Nausea Only  Allopurinol Other (comments) Elevated blood pressure & pt states could not walk or see until medication was out of her system.  Decongestant [Pseudoephedrine Hcl] Rash and Itching  Lexapro [Escitalopram Oxalate] Other (comments) Lips/Mouth swelling  Lyrica [Pregabalin] Unknown (comments)  Minizide [Prazosin-Polythiazide] Unknown (comments)  Mobic [Meloxicam] Other (comments) Facial swelling  Omnipaque [Iohexol] Hives, Swelling and Other (comments) Wheezing and/or shortness of breath  Sertraline Other (comments) Mouth, lips swell  Spironolactone Itching Pt complains that she breaks out in clammy sweat with itching and stinging in her upper arms, dry mouth, funny feeling tongue, SOB, and increased anxiety.  Sulfamethazine Other (comments) Mouth/lips swelling Review of Systems:  A comprehensive review of systems was negative except for that written in the History of Present Illness. Objective:  
 
Visit Vitals BP 92/52 Pulse 76 Temp 98.6 °F (37 °C) Resp 16 Ht 5' 4\" (1.626 m) Wt 77.1 kg (170 lb) SpO2 96% BMI 29.18 kg/m² Temp (24hrs), Av.6 °F (37 °C), Min:98.6 °F (37 °C), Max:98.6 °F (37 °C) Lines:  Peripheral IV:    
 
Physical Exam:   
General:  Alert, cooperative, well noursished, well developed, appears stated age Eyes:  Sclera anicteric. Pupils equally round and reactive to light. Mouth/Throat: Mucous membranes normal, oral pharynx clear Neck: Supple Lungs:   Clear to auscultation bilaterally, good effort CV:  Regular rate and rhythm,no murmur, click, rub or gallop Abdomen:   Soft, non-tender. bowel sounds normal. non-distended Extremities: Tip of L index finger with ulceration, serous drainage, surrounding erythema, swelling Skin: See above Musculoskeletal: See above Lines/Devices:  Intact, no erythema, drainage or tenderness Psych: Alert and oriented, normal mood affect given the setting Data Review: CBC: 
Recent Labs 02/04/19 2213 WBC 14.9* GRANS 85* MONOS 5  
EOS 0* ANEU 12.6* ABL 1.3 HGB 11.4* HCT 36.1  BMP: 
Recent Labs 02/04/19 2213 CREA 1.93* BUN 65* * K 4.5  
CL 97* CO2 27 AGAP 8  
* LFTS: 
Recent Labs 02/04/19 2213 TBILI 0.4 ALT 43 SGOT 33  
* TP 7.4 ALB 3.0* Microbiology:  
 
All Micro Results Procedure Component Value Units Date/Time Annette Farnsworth STAIN [[id number]] Collected:  02/05/19 6680 Order Status:  Completed Specimen:  Wound from Finger Updated:  02/05/19 1019 Special Requests: NO SPECIAL REQUESTS     
  GRAM STAIN PENDING Culture result:    
  NO GROWTH AFTER SHORT PERIOD OF INCUBATION. FURTHER RESULTS TO FOLLOW AFTER OVERNIGHT INCUBATION. CULTURE, BLOOD [[id number]] Collected:  02/04/19 2212 Order Status:  Completed Specimen:  Whole Blood Updated:  02/05/19 0315 CULTURE, BLOOD [[id number]] Collected:  02/05/19 0246 Order Status:  Completed Specimen:  Whole Blood Updated:  02/05/19 0315 Imaging:  
L finger xray Signed By: Luigi Romero MD   
 February 5, 2019

## 2019-02-05 NOTE — PROGRESS NOTES
Hospitalist Progress Note Admit Date:  2019  1:08 AM  
Name:  Teresa Acevedo Age:  80 y.o. 
:  1933 MRN:  [id number] PCP:  Zaria Quiles MD 
Treatment Team: Attending Provider: Berto Roque MD; Consulting Provider: Vania Kaur MD; Utilization Review: Su Rowell RN; Primary Nurse: Jaclyn Kaur, RN; Primary Nurse: Vel Gallardo RN; Consulting Provider: Kaylin Craig MD 
 
Subjective:  
 
 
Ms. Mary Lui is a 81 yo female with PMH of chronic pain, HTN, depression/anxiety, Dm2 admitted with left 2nd digit osteomyeltis. She has been seen by ID, placed on rocephin/vancomycin, pending EOT. BC pending. She will need to return to rehab upon discharge 19 daughter present, has pain/ drainage left 2nd digit tip, has neuropathy of hands, has constipation Objective:  
 
Patient Vitals for the past 24 hrs: 
 Temp Pulse Resp BP SpO2  
19 1116 98.1 °F (36.7 °C) 69 16 103/55 97 % 19 1005    92/52   
19 0742 98.6 °F (37 °C) 76 16 141/66 96 % 19 0536  84  138/65 98 % 19 0100     96 % 19 2204 98.6 °F (37 °C) 86 16 110/61 95 % Oxygen Therapy O2 Sat (%): 97 % (19 1116) Pulse via Oximetry: 84 beats per minute (19 0536) O2 Device: Nasal cannula (19 1116) Intake/Output Summary (Last 24 hours) at 2019 1433 Last data filed at 2019 1230 Gross per 24 hour Intake 500 ml Output 1330 ml Net -830 ml  
   
*Note that automatically entered I/Os may not be accurate; dependent on patient compliance with collection and accurate  by assistants. General:    Well nourished. Alert. Elderly, no distress CV:   RRR. No murmur, rub, or gallop. No edema Lungs:   CTAB. No wheezing, rhonchi, or rales. anterior exam 
Abdomen:   Soft, nontender, nondistended. Extremities: Left 2nd digit with ulcerated area with purulence Neuro:  No gross focal deficits Data Review: I have reviewed all labs, meds, telemetry events, and studies from the last 24 hours: 
 
Recent Results (from the past 24 hour(s)) CBC WITH AUTOMATED DIFF Collection Time: 02/04/19 10:13 PM  
Result Value Ref Range WBC 14.9 (H) 4.3 - 11.1 K/uL  
 RBC 4.10 4.05 - 5.2 M/uL  
 HGB 11.4 (L) 11.7 - 15.4 g/dL HCT 36.1 35.8 - 46.3 % MCV 88.0 79.6 - 97.8 FL  
 MCH 27.8 26.1 - 32.9 PG  
 MCHC 31.6 31.4 - 35.0 g/dL  
 RDW 15.8 (H) 11.9 - 14.6 % PLATELET 648 889 - 293 K/uL MPV 10.5 9.4 - 12.3 FL ABSOLUTE NRBC 0.00 0.0 - 0.2 K/uL  
 DF AUTOMATED NEUTROPHILS 85 (H) 43 - 78 % LYMPHOCYTES 8 (L) 13 - 44 % MONOCYTES 5 4.0 - 12.0 % EOSINOPHILS 0 (L) 0.5 - 7.8 % BASOPHILS 0 0.0 - 2.0 % IMMATURE GRANULOCYTES 2 0.0 - 5.0 %  
 ABS. NEUTROPHILS 12.6 (H) 1.7 - 8.2 K/UL  
 ABS. LYMPHOCYTES 1.3 0.5 - 4.6 K/UL  
 ABS. MONOCYTES 0.7 0.1 - 1.3 K/UL  
 ABS. EOSINOPHILS 0.0 0.0 - 0.8 K/UL  
 ABS. BASOPHILS 0.0 0.0 - 0.2 K/UL  
 ABS. IMM. GRANS. 0.3 0.0 - 0.5 K/UL METABOLIC PANEL, COMPREHENSIVE Collection Time: 02/04/19 10:13 PM  
Result Value Ref Range Sodium 132 (L) 136 - 145 mmol/L Potassium 4.5 3.5 - 5.1 mmol/L Chloride 97 (L) 98 - 107 mmol/L  
 CO2 27 21 - 32 mmol/L Anion gap 8 7 - 16 mmol/L Glucose 268 (H) 65 - 100 mg/dL BUN 65 (H) 8 - 23 MG/DL Creatinine 1.93 (H) 0.6 - 1.0 MG/DL  
 GFR est AA 32 (L) >60 ml/min/1.73m2 GFR est non-AA 26 (L) >60 ml/min/1.73m2 Calcium 8.5 8.3 - 10.4 MG/DL Bilirubin, total 0.4 0.2 - 1.1 MG/DL  
 ALT (SGPT) 43 12 - 65 U/L  
 AST (SGOT) 33 15 - 37 U/L Alk. phosphatase 288 (H) 50 - 136 U/L Protein, total 7.4 6.3 - 8.2 g/dL Albumin 3.0 (L) 3.2 - 4.6 g/dL Globulin 4.4 (H) 2.3 - 3.5 g/dL A-G Ratio 0.7 (L) 1.2 - 3.5 POC LACTIC ACID Collection Time: 02/04/19 10:13 PM  
Result Value Ref Range Lactic Acid (POC) 2.75 (H) 0.5 - 1.9 mmol/L  
C REACTIVE PROTEIN, QT  Collection Time: 02/04/19 10:13 PM  
 Result Value Ref Range C-Reactive protein 8.6 (H) 0.0 - 0.9 mg/dL PROCALCITONIN Collection Time: 02/04/19 10:13 PM  
Result Value Ref Range Procalcitonin 0.3 ng/mL CULTURE, WOUND W GRAM STAIN Collection Time: 02/05/19  2:33 AM  
Result Value Ref Range Special Requests: NO SPECIAL REQUESTS    
 GRAM STAIN PENDING Culture result:     
  NO GROWTH AFTER SHORT PERIOD OF INCUBATION. FURTHER RESULTS TO FOLLOW AFTER OVERNIGHT INCUBATION. URINALYSIS W/ RFLX MICROSCOPIC Collection Time: 02/05/19  5:11 AM  
Result Value Ref Range Color YELLOW Appearance CLEAR Specific gravity 1.014 1.001 - 1.023    
 pH (UA) 5.5 5.0 - 9.0 Protein NEGATIVE  NEG mg/dL Glucose NEGATIVE  mg/dL Ketone NEGATIVE  NEG mg/dL Bilirubin NEGATIVE  NEG Blood NEGATIVE  NEG Urobilinogen 0.2 0.2 - 1.0 EU/dL Nitrites NEGATIVE  NEG Leukocyte Esterase NEGATIVE  NEG    
GLUCOSE, POC Collection Time: 02/05/19  8:54 AM  
Result Value Ref Range Glucose (POC) 162 (H) 65 - 100 mg/dL GLUCOSE, POC Collection Time: 02/05/19  1:07 PM  
Result Value Ref Range Glucose (POC) 190 (H) 65 - 100 mg/dL All Micro Results Procedure Component Value Units Date/Time Dewain Sauce STAIN [[id number]] Collected:  02/05/19 1861 Order Status:  Completed Specimen:  Wound from Finger Updated:  02/05/19 1019 Special Requests: NO SPECIAL REQUESTS     
  GRAM STAIN PENDING Culture result:    
  NO GROWTH AFTER SHORT PERIOD OF INCUBATION. FURTHER RESULTS TO FOLLOW AFTER OVERNIGHT INCUBATION. CULTURE, BLOOD [[id number]] Collected:  02/04/19 2212 Order Status:  Completed Specimen:  Whole Blood Updated:  02/05/19 0315 CULTURE, BLOOD [[id number]] Collected:  02/05/19 0246 Order Status:  Completed Specimen:  Whole Blood Updated:  02/05/19 0315 No results found for this visit on 02/05/19. Current Meds: 
Current Facility-Administered Medications Medication Dose Route Frequency  amLODIPine (NORVASC) tablet 2.5 mg  2.5 mg Oral DAILY  aspirin delayed-release tablet 81 mg  81 mg Oral QHS  calcium-vitamin D (OS-JENN) 500 mg-200 unit tablet  1 Tab Oral DAILY  carvedilol (COREG) tablet 3.125 mg  3.125 mg Oral BID WITH MEALS  cholecalciferol (VITAMIN D3) tablet 1,000 Units  1,000 Units Oral DAILY  clonazePAM (KlonoPIN) tablet 0.5 mg  0.5 mg Oral TID  ferrous gluconate 324 mg (38 mg iron) tablet 1 Tab  1 Tab Oral ACB  DULoxetine (CYMBALTA) capsule 60 mg  60 mg Oral DAILY  hydrOXYzine pamoate (VISTARIL) capsule 25 mg  25 mg Oral BID PRN  
 insulin lispro (HUMALOG) injection 5 Units  5 Units SubCUTAneous TIDAC  levothyroxine (SYNTHROID) tablet 75 mcg  75 mcg Oral ACB  methadone (DOLOPHINE) tablet 5 mg  5 mg Oral Q6H  
 loperamide (IMODIUM) capsule 4 mg  4 mg Oral PRN  predniSONE (DELTASONE) tablet 10 mg  10 mg Oral BID  senna (SENOKOT) tablet 8.6 mg  1 Tab Oral DAILY PRN  
 famotidine (PEPCID) tablet 20 mg  20 mg Oral BID  dextrose 40% (GLUTOSE) oral gel 1 Tube  15 g Oral PRN  
 glucagon (GLUCAGEN) injection 1 mg  1 mg IntraMUSCular PRN  
 dextrose (D50W) injection syrg 12.5-25 g  25-50 mL IntraVENous PRN  
 insulin lispro (HUMALOG) injection   SubCUTAneous AC&HS  sodium chloride (NS) flush 5-40 mL  5-40 mL IntraVENous Q8H  
 sodium chloride (NS) flush 5-40 mL  5-40 mL IntraVENous PRN  
 0.9% sodium chloride infusion  100 mL/hr IntraVENous CONTINUOUS  
 acetaminophen (TYLENOL) tablet 650 mg  650 mg Oral Q4H PRN  
 naloxone (NARCAN) injection 0.4 mg  0.4 mg IntraVENous PRN  
 ondansetron (ZOFRAN) injection 4 mg  4 mg IntraVENous Q4H PRN  
 heparin (porcine) injection 5,000 Units  5,000 Units SubCUTAneous Q12H  cefTRIAXone (ROCEPHIN) 2 g in 0.9% sodium chloride (MBP/ADV) 50 mL  2 g IntraVENous Q24H  Vancomycin intermittent dosing placeholder   Other Rx Dosing/Monitoring Current Outpatient Medications Medication Sig  
 lisinopril (PRINIVIL, ZESTRIL) 20 mg tablet Take 0.5 Tabs by mouth daily for 30 days.  insulin lispro (HUMALOG) 100 unit/mL kwikpen 5 Units by SubCUTAneous route Before breakfast, lunch, and dinner for 30 days.  amLODIPine (NORVASC) 2.5 mg tablet Take 2.5 mg by mouth daily.  sennosides (LAXATIVE, SENNOSIDES,) 25 mg tab Take 25 mg by mouth as needed (constipaton).  predniSONE (DELTASONE) 10 mg tablet Take 10 mg by mouth two (2) times a day. 5mg BID  potassium chloride (KLOR-CON) 10 mEq tablet Take 10 mEq by mouth two (2) times a day.  acetaminophen (TYLENOL EXTRA STRENGTH) 500 mg tablet Take 500 mg by mouth every six (6) hours as needed for Pain.  ferrous gluconate 324 mg (38 mg iron) tablet Take 324 mg by mouth Daily (before breakfast).  clonazePAM (KLONOPIN) 0.5 mg tablet Take 0.5 mg by mouth three (3) times daily.  levothyroxine (SYNTHROID) 75 mcg tablet Take 75 mcg by mouth Daily (before breakfast).  DULoxetine (CYMBALTA) 30 mg capsule Take 60 mg by mouth daily.  hydrOXYzine pamoate (VISTARIL) 50 mg capsule Take 25 mg by mouth two (2) times daily as needed for Itching.  multivitamin with minerals (HAIR,SKIN AND NAILS PO) Take 1 Tab by mouth daily.  furosemide (LASIX) 40 mg tablet Take 40 mg by mouth two (2) times a day.  loperamide (IMODIUM) 2 mg capsule Take 4 mg by mouth as needed for Diarrhea.  raNITIdine (ZANTAC) 150 mg tablet Take 150 mg by mouth two (2) times a day.  carvedilol (COREG) 3.125 mg tablet Take 1 Tab by mouth two (2) times daily (with meals).  CHOLECALCIFEROL, VITAMIN D3, (VITAMIN D3 PO) Take 1,000 mg by mouth daily.  CALCIUM CARBONATE/VITAMIN D3 (CALCIUM 600 + D,3, PO) Take 600 mg by mouth daily.  KRILL/OM-3/DHA/EPA/PHOSPHO/AST (MEGARED OMEGA-3 KRILL OIL PO) Take 1 Cap by mouth daily.  aspirin delayed-release 81 mg tablet Take 81 mg by mouth nightly. Ok to continue per anesthesia guidelines.  methadone (DOLOPHINE) 5 mg tablet Take 5 mg by mouth every six (6) hours. Instructed to take DOS per Anesthesia guidelines. Indications: CHRONIC PAIN Other Studies (last 24 hours): Xr 2nd [id number] 2 V Result Date: 2/5/2019 EXAM: Left index finger x-rays. INDICATION: Pain. COMPARISON: December 27, 2018. TECHNIQUE: 3 views of the left index finger were obtained. FINDINGS: There is soft tissue ulceration in the distal tip of the index finger, with surrounding soft tissue swelling. The tuft of the distal phalanx is eroded, suggesting osteomyelitis in the distal phalanx as well. No fracture or radiopaque foreign body is identified. Moderate to advanced arthritic changes are seen in the fingers. IMPRESSION: Findings consistent with osteomyelitis in the tuft of the index finger distal phalanx. Assessment and Plan:  
 
Hospital Problems as of 2/5/2019 Date Reviewed: 5/25/2016 Codes Class Noted - Resolved POA * (Principal) Finger infection ICD-10-CM: L08.9 ICD-9-CM: 686.9  2/5/2019 - Present Yes Leukocytosis ICD-10-CM: U63.296 ICD-9-CM: 288.60  2/5/2019 - Present Yes [facility]acute kidney [facility]) [facility] ICD-10-CM: N17.9 ICD-9-CM: 584.9  2/5/2019 - Present Yes Osteomyelitis of finger (HCC) ICD-10-CM: M86.9 ICD-9-CM: 730.24  2/5/2019 - Present Yes Chronic diastolic congestive heart failure (HCC) ICD-10-CM: I50.32 
ICD-9-CM: 428.32, 428.0  1/16/2019 - Present Yes Acquired hypothyroidism ICD-10-CM: E03.9 ICD-9-CM: 244.9  1/16/2019 - Present Yes GERD (gastroesophageal reflux disease) ICD-10-CM: K21.9 ICD-9-CM: 530.81  1/16/2019 - Present Yes DM2 (diabetes mellitus, type 2) ([facility]) ICD-10-CM: E11.9 ICD-9-CM: 250.00  7/27/2018 - Present Yes Plan: · Left 2nd digit osteomyelitis: continue rocephin/vancomycin, appreciate ID input · DM2: on SSI and premeal insulin · Constipation: start miralax and colace · HTN: continue norvasc, coreg · Chronic pain: on methadone prior to admit · RAYMON: hydrate and recheck BMP · Chronic steroid use:  
 
DC planning/Dispo:  pending Diet:  DIET DIABETIC CONSISTENT CARB 
DVT ppx:  heparin Signed: Tj Hayden MD

## 2019-02-05 NOTE — PROGRESS NOTES
Pharmacokinetic Consult to Pharmacist 
 
Taiwo Maura is a 80 y.o. female being treated for osteomyelitis with vancomycin. Height: 5' 4\" (162.6 cm)  Weight: 77.1 kg (170 lb) Lab Results Component Value Date/Time BUN 65 (H) 02/04/2019 10:13 PM  
 Creatinine 1.93 (H) 02/04/2019 10:13 PM  
 WBC 14.9 (H) 02/04/2019 10:13 PM  
 Procalcitonin 0.3 02/04/2019 10:13 PM  
 Lactic Acid (POC) 2.75 (H) 02/04/2019 10:13 PM  
  
Estimated Creatinine Clearance: 21.4 mL/min (A) (based on SCr of 1.93 mg/dL (H)). Day 1 of vancomycin. Goal trough is 15-20. Vancomycin dose initiated at 2000 mg IV x 1. Will dose intermittently for now given RAYMON. Levels will be ordered as clinically indicated. Pharmacy will continue to follow. Please call with any questions. Thank you, Leidy Flor, PharmD Clinical Pharmacist 
[phone]

## 2019-02-05 NOTE — ED PROVIDER NOTES
Patient was sent to the ER for evaluation of a draining wound of the left index finger. The patient states she had a blister on that finger while hospitalized prior to discharge to the rehabilitation facility. The blister then ruptured and has been draining purulent material since. She denies any fever or chills she has been having some nausea but no vomiting and decreased appetite. Patient is diabetic and has a diabetic ulcer on the left heel as well as some sores on the bilateral shins. The history is provided by the patient and medical records. Finger Pain This is a new problem. The current episode started more than 1 week ago. The problem occurs constantly. The problem has not changed since onset. Pain location: left index finger. The quality of the pain is described as aching. The pain is mild. Pertinent negatives include no numbness, full range of motion, no stiffness, no tingling, no itching, no back pain and no neck pain. She has tried nothing for the symptoms. The treatment provided no relief. There has been no history of extremity trauma. Past Medical History:  
Diagnosis Date  Arthritis  Chronic diastolic congestive heart failure ([facility] 1/16/2019  Chronic pain   
 bilat feet & hands  Edema BLE & bilat fingers; pt takes diuretic daily; pt states her swelling is related to the nerve disease she is being treated for at [facility]  GERD (gastroesophageal reflux disease) 1/16/2019  Gouty arthritis  History of MI (myocardial infarction) 1980's \"light heart attack\"; pt states MD at [facility] mentioned it to her; pt stated MD told her \"was on the back part of her heart & would not give her any problems\"; pt takes aspirin 81 mg daily  Hypertension   
 managed w/med  Hypothyroidism   
 s/p partial thyroidectomy 1960; managed w/med  Mild heartburn   
 takes tums prn  Neuropathy 1980's  
 pt states has been treated by [facility] for \"nerve problems in her feet & hands\"  Post-operative nausea and vomiting  Type 2 diabetes mellitus without complication, without long-term current use of insulin ([facility] 7/27/2018 Past Surgical History:  
Procedure Laterality Date  HX CYST REMOVAL Right 1950's  
 upper eyelid  HX DILATION AND CURETTAGE  [id number]  
 ectopic pregnancy; right BSO done  HX HEART CATHETERIZATION  late 1979  
 pt states no stents [address] [facility]"" [address] [facility]  
 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  
 nerve removed out of left leg [address] [facility]  HX UROLOGICAL    
 cystoscopy Family History:  
Problem Relation Age of Onset  Cancer Mother  Heart Disease Father  Stroke Father Social History Socioeconomic History  Marital status:  Spouse name: Not on file  Number of children: Not on file  Years of education: Not on file  Highest education level: Not on file Social Needs  Financial resource strain: Not on file  Food insecurity - worry: Not on file  Food insecurity - inability: Not on file  Transportation needs - medical: Not on file  Transportation needs - non-medical: Not on file Occupational History  Not on file Tobacco Use  Smoking status: Never Smoker  Smokeless tobacco: Never Used Substance and Sexual Activity  Alcohol use: No  
 Drug use: No  
 Sexual activity: Not on file Other Topics Concern  Not on file Social History Narrative  Not on file ALLERGIES: Contrast agent [iodine]; Actifed [triprolidine-pseudoephedrine]; Allopurinol; Decongestant [pseudoephedrine hcl]; Lexapro [escitalopram oxalate]; Lyrica [pregabalin];  Minizide [prazosin-polythiazide]; Mobic [meloxicam]; Omnipaque [iohexol]; Sertraline; Spironolactone; and Sulfamethazine Review of Systems Musculoskeletal: Negative for back pain, neck pain and stiffness. Skin: Negative for itching. Neurological: Negative for tingling and numbness. All other systems reviewed and are negative. Vitals:  
 02/04/19 2204 BP: 110/61 Pulse: 86 Resp: 16 Temp: 98.6 °F (37 °C) SpO2: 95% Weight: 77.1 kg (170 lb) Height: 5' 4\" (1.626 m) Physical Exam  
Constitutional: She is oriented to person, place, and time. She appears well-developed and well-nourished. No distress. HENT:  
Head: Normocephalic and atraumatic. Eyes: Conjunctivae and EOM are normal. Pupils are equal, round, and reactive to light. Neck: Normal range of motion. Neck supple. Cardiovascular: Normal rate, regular rhythm, normal heart sounds and intact distal pulses. Pulmonary/Chest: Effort normal and breath sounds normal.  
Abdominal: Soft. Bowel sounds are normal.  
Musculoskeletal: Normal range of motion. She exhibits edema. She exhibits no tenderness or deformity. Neurological: She is alert and oriented to person, place, and time. Skin: Skin is warm and dry. She is not diaphoretic. The distal phalanx of the left index finger is notably swollen with ulcer at the tip draining purulent material.  
Psychiatric: She has a normal mood and affect. Her behavior is normal.  
Nursing note and vitals reviewed. MDM Number of Diagnoses or Management Options Amount and/or Complexity of Data Reviewed Clinical lab tests: ordered and reviewed Tests in the radiology section of CPT®: ordered and reviewed Discuss the patient with other providers: yes Independent visualization of images, tracings, or specimens: yes Risk of Complications, Morbidity, and/or Mortality Presenting problems: moderate Diagnostic procedures: moderate Management options: moderate Patient Progress Patient progress: stable Procedures

## 2019-02-06 NOTE — PROGRESS NOTES
Hospitalist Progress Note Admit Date:  2019  1:08 AM  
Name:  Taiwo Lorenzo Age:  80 y.o. 
:  1933 MRN:  [id number] PCP:  Crys Bull MD 
Treatment Team: Attending Provider: Clement Barraza MD; Consulting Provider: Shayy Chase MD; Utilization Review: Kurt Melendez RN; Consulting Provider: Neetu Bourgeois MD 
 
Subjective:  
 
 
Ms. Tabatha Baker is a 79 yo female with PMH of chronic pain, HTN, depression/anxiety, Dm2 admitted with left 2nd digit osteomyelitis. She has been seen by ID, placed on rocephin/vancomycin, pending EOT. BC pending. Pt reports her finger feels better now but was \"boiling\" this morning. States she does NOT want to return to rehab. Has a friend coming into town who could stay with her. Objective:  
 
Patient Vitals for the past 24 hrs: 
 Temp Pulse Resp BP SpO2  
19 0804 97.7 °F (36.5 °C) 73 17 121/63 91 % 19 0450 97.7 °F (36.5 °C) 75 17 122/67 93 % 19 0033 97.8 °F (36.6 °C) 76 18 121/65 94 % 19 1911 97.8 °F (36.6 °C) 74 17 124/57 92 % 19 1628 97.5 °F (36.4 °C) 77 16 112/56 97 % 19 1116 98.1 °F (36.7 °C) 69 16 103/55 97 % 19 [address] [facility] Oxygen Therapy O2 Sat (%): 91 % (19 0804) Pulse via Oximetry: 84 beats per minute (19 0536) O2 Device: Nasal cannula (19 1116) Intake/Output Summary (Last 24 hours) at 2019 1152 Last data filed at 2019 5323 Gross per 24 hour Intake 1405 ml Output 1780 ml Net -375 ml *Note that automatically entered I/Os may not be accurate; dependent on patient compliance with collection and accurate  by assistants. General:    Well nourished. Alert. Elderly, no distress CV:   RRR. No murmur, rub, or gallop. No edema Lungs:   CTAB. No wheezing, rhonchi, or rales. anterior exam 
Abdomen:   Soft, nontender, nondistended. Extremities: Left 2nd digit bandaged Neuro:  No gross focal deficits Data Review: 
I have reviewed all labs, meds, telemetry events, and studies from the last 24 hours: 
 
Recent Results (from the past 24 hour(s)) GLUCOSE, POC Collection Time: 02/05/19  1:07 PM  
Result Value Ref Range Glucose (POC) 190 (H) 65 - 100 mg/dL GLUCOSE, POC Collection Time: 02/05/19  4:21 PM  
Result Value Ref Range Glucose (POC) 174 (H) 65 - 100 mg/dL GLUCOSE, POC Collection Time: 02/05/19  9:16 PM  
Result Value Ref Range Glucose (POC) 119 (H) 65 - 100 mg/dL CBC W/O DIFF Collection Time: 02/06/19  5:49 AM  
Result Value Ref Range WBC 9.3 4.3 - 11.1 K/uL  
 RBC 3.47 (L) 4.05 - 5.2 M/uL HGB 9.5 (L) 11.7 - 15.4 g/dL HCT 31.4 (L) 35.8 - 46.3 % MCV 90.5 79.6 - 97.8 FL  
 MCH 27.4 26.1 - 32.9 PG  
 MCHC 30.3 (L) 31.4 - 35.0 g/dL  
 RDW 15.5 (H) 11.9 - 14.6 % PLATELET 390 663 - 642 K/uL MPV 10.7 9.4 - 12.3 FL ABSOLUTE NRBC 0.00 0.0 - 0.2 K/uL METABOLIC PANEL, BASIC Collection Time: 02/06/19  5:49 AM  
Result Value Ref Range Sodium 140 136 - 145 mmol/L Potassium 4.4 3.5 - 5.1 mmol/L Chloride 107 98 - 107 mmol/L  
 CO2 26 21 - 32 mmol/L Anion gap 7 7 - 16 mmol/L Glucose 111 (H) 65 - 100 mg/dL BUN 40 (H) 8 - 23 MG/DL Creatinine 1.09 (H) 0.6 - 1.0 MG/DL  
 GFR est AA >60 >60 ml/min/1.73m2 GFR est non-AA 51 (L) >60 ml/min/1.73m2 Calcium 7.8 (L) 8.3 - 10.4 MG/DL [facility] Collection Time: 02/06/19  5:49 AM  
Result Value Ref Range Vancomycin, random 13.6 UG/ML  
GLUCOSE, POC Collection Time: 02/06/19  8:02 AM  
Result Value Ref Range Glucose (POC) 108 (H) 65 - 100 mg/dL All Micro Results Procedure Component Value Units Date/Time CULTURE, BLOOD [[id number]] Collected:  02/05/19 0246 Order Status:  Completed Specimen:  Whole Blood Updated:  02/06/19 6771 Special Requests: --     
  NO SPECIAL REQUESTS 
LEFT Antecubital 
  
  Culture result: NO GROWTH 1 DAY CULTURE, BLOOD [[id number]] Collected:  02/04/19 2212 Order Status:  Completed Specimen:  Whole Blood Updated:  02/06/19 7417 Special Requests: NO SPECIAL REQUESTS Culture result: NO GROWTH 1 DAY     
 CULTURE, Ramses Lunch STAIN [[id number]] Collected:  02/05/19 0233 Order Status:  Completed Specimen:  Wound from Finger Updated:  02/05/19 1019 Special Requests: NO SPECIAL REQUESTS     
  GRAM STAIN PENDING Culture result:    
  NO GROWTH AFTER SHORT PERIOD OF INCUBATION. FURTHER RESULTS TO FOLLOW AFTER OVERNIGHT INCUBATION. No results found for this visit on 02/05/19. Current Meds: 
Current Facility-Administered Medications Medication Dose Route Frequency  vancomycin (VANCOCIN) 1,000 mg in 0.9% sodium chloride (MBP/ADV) 250 mL  1,000 mg IntraVENous Q24H  
 gabapentin (NEURONTIN) capsule 100 mg  100 mg Oral BID  amLODIPine (NORVASC) tablet 2.5 mg  2.5 mg Oral DAILY  aspirin delayed-release tablet 81 mg  81 mg Oral QHS  calcium-vitamin D (OS-JENN) 500 mg-200 unit tablet  1 Tab Oral DAILY  carvedilol (COREG) tablet 3.125 mg  3.125 mg Oral BID WITH MEALS  cholecalciferol (VITAMIN D3) tablet 1,000 Units  1,000 Units Oral DAILY  clonazePAM (KlonoPIN) tablet 0.5 mg  0.5 mg Oral TID  ferrous gluconate 324 mg (38 mg iron) tablet 1 Tab  1 Tab Oral ACB  DULoxetine (CYMBALTA) capsule 60 mg  60 mg Oral DAILY  hydrOXYzine pamoate (VISTARIL) capsule 25 mg  25 mg Oral BID PRN  
 insulin lispro (HUMALOG) injection 5 Units  5 Units SubCUTAneous TIDAC  levothyroxine (SYNTHROID) tablet 75 mcg  75 mcg Oral ACB  methadone (DOLOPHINE) tablet 5 mg  5 mg Oral Q6H  
 loperamide (IMODIUM) capsule 4 mg  4 mg Oral PRN  predniSONE (DELTASONE) tablet 10 mg  10 mg Oral BID  senna (SENOKOT) tablet 8.6 mg  1 Tab Oral DAILY PRN  
 dextrose 40% (GLUTOSE) oral gel 1 Tube  15 g Oral PRN  
 glucagon (GLUCAGEN) injection 1 mg  1 mg IntraMUSCular PRN  
  dextrose (D50W) injection syrg 12.5-25 g  25-50 mL IntraVENous PRN  
 insulin lispro (HUMALOG) injection   SubCUTAneous AC&HS  sodium chloride (NS) flush 5-40 mL  5-40 mL IntraVENous Q8H  
 sodium chloride (NS) flush 5-40 mL  5-40 mL IntraVENous PRN  
 0.9% sodium chloride infusion  100 mL/hr IntraVENous CONTINUOUS  
 acetaminophen (TYLENOL) tablet 650 mg  650 mg Oral Q4H PRN  
 naloxone (NARCAN) injection 0.4 mg  0.4 mg IntraVENous PRN  
 ondansetron (ZOFRAN) injection 4 mg  4 mg IntraVENous Q4H PRN  
 heparin (porcine) injection 5,000 Units  5,000 Units SubCUTAneous Q12H  cefTRIAXone (ROCEPHIN) 2 g in 0.9% sodium chloride (MBP/ADV) 50 mL  2 g IntraVENous Q24H  polyethylene glycol (MIRALAX) packet 17 g  17 g Oral DAILY  docusate sodium (COLACE) capsule 100 mg  100 mg Oral BID  famotidine (PEPCID) tablet 20 mg  20 mg Oral DAILY Other Studies (last 24 hours): No results found. Assessment and Plan:  
 
Hospital Problems as of 2/6/2019 Date Reviewed: 5/25/2016 Codes Class Noted - Resolved POA * (Principal) Finger infection ICD-10-CM: L08.9 ICD-9-CM: 686.9  2/5/2019 - Present Yes Leukocytosis ICD-10-CM: Z02.475 ICD-9-CM: 288.60  2/5/2019 - Present Yes RAYMON (acute kidney injury) ([facility] ICD-10-CM: N17.9 ICD-9-CM: 584.9  2/5/2019 - Present Yes Osteomyelitis of finger (HCC) ICD-10-CM: M86.9 ICD-9-CM: 730.24  2/5/2019 - Present Yes Chronic diastolic congestive heart failure (HCC) ICD-10-CM: I50.32 
ICD-9-CM: 428.32, 428.0  1/16/2019 - Present Yes Acquired hypothyroidism ICD-10-CM: E03.9 ICD-9-CM: 244.9  1/16/2019 - Present Yes GERD (gastroesophageal reflux disease) ICD-10-CM: K21.9 ICD-9-CM: 530.81  1/16/2019 - Present Yes DM2 (diabetes mellitus, type 2) (HCC) ICD-10-CM: E11.9 ICD-9-CM: 250.00  7/27/2018 - Present Yes Plan: · Left 2nd digit osteomyelitis · continue rocephin/vancomycin ·  appreciate ID input - anticipating 4-6 week IV abx · Will need PICC prior to d/c 
 
· DM2 
·  on SSI and premeal insulin · Constipation · miralax and colace · HTN 
· continue norvasc, coreg · Chronic pain · on methadone prior to admit · RAYMON · Resolved with IVFs. · Chronic steroid use DC planning/Dispo:  Daughter wants pt to return to [facility], pt is refusing. PT consulted. Diet:  DIET DIABETIC CONSISTENT CARB 
DVT ppx:  heparin Signed: 
Ponce Frazier MD

## 2019-02-06 NOTE — PROGRESS NOTES
Problem: Mobility Impaired (Adult and Pediatric) Goal: *Acute Goals and Plan of Care (Insert Text) Goals: 
(1.)Ms. Mahad Badillo will move from supine to sit and sit to supine , scoot up and down and roll side to side with CONTACT GUARD ASSIST within 5 treatment day(s). (2.)Ms. Mahad Badillo will transfer from bed to chair and chair to bed with MINIMAL ASSIST using the least restrictive device within 5 treatment day(s 
(3.)Ms. Mahad Badillo will transfer sit to stand with minimal assist with the least restrictive device within 5 day(s) (4.)Ms. Mahad Badillo will ambulate with MINIMAL ASSIST for 25-50 feet with the least restrictive device within 5 treatment day(s). PHYSICAL THERAPY: Initial Assessment and PM 2/6/2019INPATIENT:   
[place] / [place] / Product Type: Medicare /   
  
NAME/AGE/GENDER: Aleksandr Martin is a 80 y.o. female PRIMARY DIAGNOSIS: Finger infection [L08.9] Finger infection Finger infection ICD-10: Treatment Diagnosis:  
 · Generalized Muscle Weakness (M62.81) · Difficulty in walking, Not elsewhere classified (R26.2) · Other abnormalities of gait and mobility (R26.89) · History of falling (Z91.81) Precaution/Allergies: 
Contrast agent [iodine]; Actifed [triprolidine-pseudoephedrine]; Allopurinol; Decongestant [pseudoephedrine hcl]; Lexapro [escitalopram oxalate]; Lyrica [pregabalin]; Minizide [prazosin-polythiazide]; Mobic [meloxicam]; Omnipaque [iohexol]; Sertraline; Spironolactone; and Sulfamethazine ASSESSMENT:  
Ms. Mahad Badillo is an 80year old WF with an admitting diagnosis of LUE finger infection. Her PMH includes RAYMON, CHF, DM2, HTN, Neuorpathy, Chronic pain and a recent fall with right rib fractures. She presents sitting up in the bed agreeable to have therapy. She was on 2L of O2 with her resting sats at 96%. Her daughter ( a nurse here on 2nd floor) was a the bedside at the start of care.   Patient reports that she and her daughter live together in a 1 level home with a level entry and that prior to this admission she has been functioning independently with her r/walker. She reports she uses a lift chair for sit to stand at home. She reports recent falls at home related to weakness and her neuropathy. She has active isolated BLE movements. Supine to sit required mod/max assist with fair sitting balance on the edge of the bed. Sit to stand was mod/max assist using bed elevation to assist with standing. Once up on her feet she used the r/walker for BUE support and she was able to move her BLEs backward and get her balance at the bedside. She required minimal assist for static standing at the bedside. She stood ~ 2-3 minutes working on balance and active stepping in place 4-5 times with minimal/CGA. She returned to sitting then sit to supine with max assist.  She was positioned for comfort in the bed with her  at the bedside. Ms. Carole Moreno would benefit from continued skilled PT to maximize her functional abilities while in the hospital.   
 
This section established at most recent assessment PROBLEM LIST (Impairments causing functional limitations): 1. Decreased Strength 2. Decreased ADL/Functional Activities 3. Decreased Transfer Abilities 4. Decreased Ambulation Ability/Technique 5. Decreased Balance 6. Decreased Activity Tolerance 7. Increased Fatigue 8. Edema/Girth INTERVENTIONS PLANNED: (Benefits and precautions of physical therapy have been discussed with the patient.) 1. Bed Mobility 2. Gait Training 3. Therapeutic Activites 4. Therapeutic Exercise/Strengthening 5. Transfer Training TREATMENT PLAN: Frequency/Duration: 3 times a week for duration of hospital stay Rehabilitation Potential For Stated Goals: Good RECOMMENDED REHABILITATION/EQUIPMENT: (at time of discharge pending progress): Due to the probability of continued deficits (see above) this patient will likely need continued skilled physical therapy after discharge. Equipment:  
? None at this time HISTORY:  
History of Present Injury/Illness (Reason for Referral): 
Patient is a very pleasant 79 y/o female with hx DM2, HTN, hypothyroidism, neuropathy, chronic pain who was recently hospitalized 1/16-1/22 for acute respiratory failure with hypoxia. She had had a fall with resultant right rib fracture. She was discharged to rehab facility. When discharged she said she had a blister on the tip of her right forefinger. This later ruptured. At facility they noticed her fingertip draining and the finger red and swollen so sent to ED. She has open raw ulcer. Was cultured in ED. Antibiotics started. Xray is suggestive of osteomyelitis as well. Will admit for further treatment. Past Medical History/Comorbidities: Ms. Mahad Badillo  has a past medical history of Arthritis, Chronic diastolic congestive heart failure ([address] (1/16/2019), Chronic pain, Edema, GERD (gastroesophageal reflux disease) (1/16/2019), Gouty arthritis, History of MI (myocardial infarction) (1980's), Hypertension, Hypothyroidism, Mild heartburn, Neuropathy (1980's), Post-operative nausea and vomiting, and Type 2 diabetes mellitus without complication, without long-term current use of insulin ([address] (7/27/2018). She also has no past medical history of Adverse effect of anesthesia, Aneurysm ([address] Arrhythmia, Asthma, Autoimmune disease ([address] CAD (coronary artery disease), Cancer ([address] Chronic kidney disease, Chronic obstructive pulmonary disease ([address] Coagulation disorder ([address] Difficult intubation, Endocarditis, Ill-defined condition, Liver disease, Malignant hyperthermia due to anesthesia, Morbid obesity ([address] Pseudocholinesterase deficiency, Psychiatric disorder, PUD (peptic ulcer disease), Rheumatic fever, Seizures ([address] Sleep apnea, Stroke ([address] or Thromboembolus ([address]   Ms. Adalgisa Martinez  has a past surgical history that includes hx partial thyroidectomy (1960); hx orthopaedic (Bilateral, 1273-2060); hx orthopaedic (Left, 1992); hx open cholecystectomy (1970); hx cyst removal (Right, 1950's); hx urological; hx heart catheterization (late 1979); hx hysterectomy (1972); hx gyn (1963); hx dilation and curettage (1790-7282); and hx left salpingo-oophorectomy (1965). Social History/Living Environment:  
Home Environment: Private residence # Steps to Enter: 0 One/Two Story Residence: One story Living Alone: No 
Support Systems: Child(cassidy)(but daughter works) Patient Expects to be Discharged to[de-identified] Rehabilitation facility Current DME Used/Available at Home: Lift chair, Walker, rolling Prior Level of Function/Work/Activity: 
Patient reports that before she got sick this time, she has been able to functioning independently with her r/walker using her lift chair for sit to stand. Number of Personal Factors/Comorbidities that affect the Plan of Care: 3+: HIGH COMPLEXITY EXAMINATION:  
Most Recent Physical Functioning:  
Gross Assessment: 
AROM: Generally decreased, functional 
PROM: Generally decreased, functional 
Strength: Generally decreased, functional 
Coordination: Grossly decreased, non-functional 
Tone: Normal 
Sensation: Impaired Posture: 
Posture (WDL): (BLE neuropathy) Balance: 
Sitting: Impaired Sitting - Static: Fair (occasional) Sitting - Dynamic: Fair (occasional) Standing: Impaired Standing - Static: Fair;Constant support Standing - Dynamic : Poor Bed Mobility: 
Rolling: Minimum assistance Supine to Sit: Moderate assistance;Maximum assistance Sit to Supine: Moderate assistance;Maximum assistance Scooting: Maximum assistance Wheelchair Mobility: 
  
Transfers: 
Sit to Stand: Moderate assistance;Maximum assistance(used bed elevation to assist) Stand to Sit: Moderate assistance Gait: 
 Patient was able to step in place 4-5 steps using the r/walker with CGA/minimal assist at the bedside Body Structures Involved: 1. Metabolic 2. Endocrine 3. Bones 4. Joints 5. Muscles Body Functions Affected: 1. Neuromusculoskeletal 
2. Movement Related 3. Metobolic/Endocrine Activities and Participation Affected: 1. General Tasks and Demands 2. Mobility Number of elements that affect the Plan of Care: 3: MODERATE COMPLEXITY CLINICAL PRESENTATION:  
Presentation: Evolving clinical presentation with changing clinical characteristics: MODERATE COMPLEXITY CLINICAL DECISION MAKIN[address] [place] [address] AM-PAC 6 Clicks Basic Mobility Inpatient Short Form How much difficulty does the patient currently have. .. Unable A Lot A Little None 1. Turning over in bed (including adjusting bedclothes, sheets and blankets)? [] 1   [] 2   [x] 3   [] 4  
2. Sitting down on and standing up from a chair with arms ( e.g., wheelchair, bedside commode, etc.)   [] 1   [x] 2   [] 3   [] 4  
3. Moving from lying on back to sitting on the side of the bed? [] 1   [x] 2   [] 3   [] 4 How much help from another person does the patient currently need. .. Total A Lot A Little None 4. Moving to and from a bed to a chair (including a wheelchair)? [x] 1   [] 2   [] 3   [] 4  
5. Need to walk in hospital room? [x] 1   [] 2   [] 3   [] 4  
6. Climbing 3-5 steps with a railing? [x] 1   [] 2   [] 3   [] 4  
© , Trustees of [address] [place] [id number], under license to [employer]. All rights reserved Score:  Initial: 10 Most Recent: X (Date: -- ) Interpretation of Tool:  Represents activities that are increasingly more difficult (i.e. Bed mobility, Transfers, Gait). Medical Necessity:    
· Patient is expected to demonstrate progress in strength and functional technique to decrease assistance required with bed mobility, SPT and short distance amb. with r/walker.  
Reason for Services/Other Comments: 
· Patient continues to require skilled intervention due to medical complications. Use of outcome tool(s) and clinical judgement create a POC that gives a: Questionable prediction of patient's progress: MODERATE COMPLEXITY  
  
 
 
 
TREATMENT:  
(In addition to Assessment/Re-Assessment sessions the following treatments were rendered) Pre-treatment Symptoms/Complaints:  Patient complains of rib pain and LUE hand/finger pain 
Pain: Initial:  
Pain Intensity 1: 3 Pain Location 1: Rib cage, Finger (comment which one)(index on left hand)  Post Session:  3/10 Assessment/Reassessment only, no treatment provided today Braces/Orthotics/Lines/Etc:  
· IV 
· cortes catheter · O2 Device: Nasal cannula 2L with O2 sats at 96% in sitting Treatment/Session Assessment:   
· Response to Treatment:  Patient participated in therapy and tolerated the treatment well. She appears nervous with sit to stand, fearful of falling. · Interdisciplinary Collaboration:  
o Physical Therapist 
o Registered Nurse · After treatment position/precautions:  
o Supine in bed 
o Bed in low position 
o Call light within reach 
o RN notified 
o Visitors at bedside · Compliance with Program/Exercises: Will assess as treatment progresses · Recommendations/Intent for next treatment session: \"Next visit will focus on advancements to more challenging activities and reduction in assistance provided\". Total Treatment Duration: PT Patient Time In/Time Out Time In: 1325 Time Out: 2932 Rachael Neri

## 2019-02-06 NOTE — PROGRESS NOTES
Daughter spoke with patient regarding need for STR at time of hospital discharge. Patient is now agreeable to returning to [facility] at time of discharge. Shahrzad (clinical liason at [facility]) notified of patients request to return to facility. CM will continue to follow.

## 2019-02-06 NOTE — PROGRESS NOTES
Infectious Disease Progress Note Today's Date: 2019 Admit Date: 2019 Impression: · Osteomyelitis, L index finger distal phalanx, doubt herpetic (dayo) more likely bacterial 
· L heel wound · CKD · DM Plan:  
· Continue vancomycin and change ceftriaxone to cefepime. Will follow pending cultures. · If wound worsens, consider consult to hand surgery. Anti-infectives: · Vancomycin ( - 
· Cefepime ( - 
· Ceftriaxone ( - ) · Clindamycin Subjective: Interval History: Afebrile. WBCs 9.3K, creatinine improved, now 1.09, vancomycin trough 13.6. Blood and wound cultures no growth to date. She is concerned that her 3rd finger is also starting to get a wound, and she has similar pink areas on her L 5th finger. She has a draining wound on her L heel that concerns her. She denies nausea, vomiting, diarrhea, but reports that she has been cold and cannot breathe well if the room gets too hot. Review of Systems:  A comprehensive review of systems was negative except for that written in the History of Present Illness. Objective:  
 
Visit Vitals /78 (BP 1 Location: Right arm, BP Patient Position: Supine) Pulse 76 Temp 97.9 °F (36.6 °C) Resp 18 Ht 5' 4\" (1.626 m) Wt 77.1 kg (170 lb) SpO2 92% BMI 29.18 kg/m² Temp (24hrs), Av.7 °F (36.5 °C), Min:97.5 °F (36.4 °C), Max:97.9 °F (36.6 °C) Lines:  Peripheral IV:    
 
Physical Exam:   
General:  Alert, cooperative, no acute distress, appears stated age Eyes:  Sclera anicteric. Pupils equally round and reactive to light. Mouth/Throat: Mucous membranes normal, oral pharynx clear Neck: Supple Lungs:   Clear to auscultation bilaterally, good effort CV:  Regular rate and rhythm,no murmur, click, rub or gallop Abdomen:   Soft, non-tender. bowel sounds normal. non-distended Extremities: Decreased sensation all extremities, L heel wound with eschar and serous drainage; L hand 3rd finger with blood under the tip of the nail and small raised pink lesions on the distal phalanx; 2nd finger with raised nail, pus at the tip, violaceous discoloration; 5th finger with pink discoloration along the lateral surface Skin: No rash Musculoskeletal: Generally weak Lines/Devices:  Intact, no erythema, drainage or tenderness Psych: Alert and oriented, appropriate mood and affect given the setting Data Review: CBC: 
Recent Labs 02/06/19 0549 02/04/19 2213 WBC 9.3 14.9* GRANS  --  85* MONOS  --  5  
EOS  --  0* ANEU  --  12.6* ABL  --  1.3 HGB 9.5* 11.4* HCT 31.4* 36.1  351 BMP: 
Recent Labs 02/06/19 0549 02/04/19 2213 CREA 1.09* 1.93* BUN 40* 65*  132* K 4.4 4.5  
 97* CO2 26 27 AGAP 7 8 * 268* LFTS: 
Recent Labs 02/04/19 2213 TBILI 0.4 ALT 43 SGOT 33  
* TP 7.4 ALB 3.0* Microbiology:  
 
All Micro Results Procedure Component Value Units Date/Time CULTURE, BLOOD [[id number]] Collected:  02/05/19 0246 Order Status:  Completed Specimen:  Whole Blood Updated:  02/06/19 2021 Special Requests: --     
  NO SPECIAL REQUESTS 
LEFT Antecubital 
  
  Culture result: NO GROWTH 1 DAY     
 CULTURE, BLOOD [[id number]] Collected:  02/04/19 2212 Order Status:  Completed Specimen:  Whole Blood Updated:  02/06/19 4630 Special Requests: NO SPECIAL REQUESTS Culture result: NO GROWTH 1 DAY     
 CULTURE, [facility] STAIN [[id number]] Collected:  02/05/19 0233 Order Status:  Completed Specimen:  Wound from Finger Updated:  02/05/19 1019 Special Requests: NO SPECIAL REQUESTS     
  GRAM STAIN PENDING Culture result:    
  NO GROWTH AFTER SHORT PERIOD OF INCUBATION. FURTHER RESULTS TO FOLLOW AFTER OVERNIGHT INCUBATION. Imaging:  
L finger xray Signed By: Kaylah Nina NP February 6, 2019

## 2019-02-06 NOTE — PROGRESS NOTES
Problem: Pressure Injury - Risk of 
Goal: *Prevention of pressure injury Document Dimitry Scale and appropriate interventions in the flowsheet. Outcome: Progressing Towards Goal 
Pressure Injury Interventions: 
Sensory Interventions: Assess changes in LOC, Avoid rigorous massage over bony prominences, Check visual cues for pain, Discuss PT/OT consult with provider, Float heels, Keep linens dry and wrinkle-free, Maintain/enhance activity level, Minimize linen layers, Pad between skin to skin Moisture Interventions: Absorbent underpads, Check for incontinence Q2 hours and as needed, Contain wound drainage, Internal/External urinary devices, Limit adult briefs, Maintain skin hydration (lotion/cream), Minimize layers, Moisture barrier Activity Interventions: Increase time out of bed, Pressure redistribution bed/mattress(bed type), PT/OT evaluation Mobility Interventions: HOB 30 degrees or less, Pressure redistribution bed/mattress (bed type), PT/OT evaluation Nutrition Interventions: Document food/fluid/supplement intake Friction and Shear Interventions: Apply protective barrier, creams and emollients, Foam dressings/transparent film/skin sealants, HOB 30 degrees or less, Lift sheet, Lift team/patient mobility team, Minimize layers

## 2019-02-06 NOTE — WOUND CARE
Called by RN about pt heels and sacrum, noted on left heel ulcer measuring 4 x 2.5 X 0.4 cm, with slough and eschar present in wound bed, open wound edges, covered with mepilex dressing and wrapped with kerlix gauze. Recommend dressing change daily and as needed. Keep heels offloaded with pillows. Right heel noted blanchable redness. Asked to look at sacrum, noted blanchable redness, no open areas. Keep clean and dry. Wound team will continue to follow.

## 2019-02-06 NOTE — PROGRESS NOTES
Spoke with daughter Christina William regarding discharge plan. Daughter states patient is not able to return home after discharge from hospital as she was not able to ambulate with her walker safely. Daughter states patient had had 3 falls in 2 weeks and she is concerned for her safety. PT was working with her at [facility] and she was getting a little stronger but needed continued therapy and the discharge date was scheduled for 2-18-19 from rehab. Daughter is not sure why patient does not want to return to [facility] as they did nothing wrong she feels her mother just does not want to go to any rehab and wishes to return home. CM requested that daughter speak to patient regarding need for therapy and possible other referrals if patient really does not want to return to [facility]. Daughter will be in to speak with patient today. CM will continue to follow.

## 2019-02-06 NOTE — PROGRESS NOTES
02/05/19 1902 Dual Skin Pressure Injury Assessment Dual Skin Pressure Injury Assessment X Second Care Provider (Based on [address] [place] [address]) Cam Loaiza RN Sacrum  Left Side;Right Side 
(nonblanchable redness) Heel  Bilateral 
(L heel: open wound R heel: blachable redness) Skin Integumentary Skin Integumentary (WDL) X Skin Color Pale Skin Condition/Temp Warm;Dry;Flaky;Fragile Skin Integrity Excoriation;Tear;Wound (add Wound LDA); Cracked 
(wound to L heel and sacrum, BLE skin tear) Turgor Epidermis thin w/ loss of subcut tissue Hair Growth Sparce Varicosities Present Pressure  Injury Documentation Pressure Injury Noted-See Wound LDA to Document Excoriation to breast and groin area.  
Skin tears to BLE

## 2019-02-06 NOTE — PROGRESS NOTES
Spoke with patient regarding discharge planning. Pateint admitted to hospital from [facility] after a two week stay. Patient lives with daughter Abram Houser and AURORA in 2 story home with no step entry. She resides on main floor. Daughter, AURORA and son provide most of her support and assistance. However patient states he  was a  and she has multiple friends she can call if needed. Prior to last admission patient states daughter was assisting her with bathing and dressing. She was driving as well. PCP- Dr Frank Miranda and unsure of when she last saw MD POWER- walker, cane, crutches Patient denies any home oxygen or previous dialysis Has had previous [facility] services with [facility]. Denies any problems affording Rx at this time. Patient is very insistent that she does not want to return to Rehab and she is requesting to go home with [facility] services with [facility]. CM discussed the need for continued ABT and if she is to return home someone will need to be able to administer the medications for her. Patient states she understands and that her daughter would be able to provide the medication administration. CM left message for daughter to call back to discuss patients wishes for discharge. CM will wait for return call. Care Management Interventions PCP Verified by CM: Yes Mode of Transport at Discharge: Other (see comment)(family) Transition of Care Consult (CM Consult): Discharge Planning Discharge Durable Medical Equipment: No 
Physical Therapy Consult: Yes Occupational Therapy Consult: Yes Current Support Network: Relative's Home(Lives with daughter Reyes Shown and SIL) Confirm Follow Up Transport: Family Plan discussed with Pt/Family/Caregiver: Yes Freedom of Choice Offered: Yes Discharge Location Discharge Placement: Home with home health

## 2019-02-06 NOTE — WOUND CARE
Pt seen for wound on 2nd digit on left hand. Noted open area to tip of pointer finger that extends from the nail bed around to the back of the nail. Wound measuring 1 X 1.5 X0.3 wound bed pink with some purulent drainage noted. Cleansed with normal saline covered wound with xeroform gauze, covered with gauze and wrapped with ajay guaze wrap. Recommend dressing change daily and as needed. Wound recommend ortho surgery consult as well in pt or out pt. . Wound team will continue to follow.

## 2019-02-06 NOTE — INTERDISCIPLINARY ROUNDS
Interdisciplinary team rounds were held 2/6/2019 with the following team members:Care Management, Nursing, Physical Therapy and Physician and the patient. Plan of care discussed. See clinical pathway and/or care plan for interventions and desired outcomes. Returning to SNF thur/Fri is medically ready.

## 2019-02-07 NOTE — PROGRESS NOTES
Hospitalist Progress Note Admit Date:  2019  1:08 AM  
Name:  Elaine Aly Age:  80 y.o. 
:  1933 MRN:  [id number] PCP:  Shauna Monroe MD 
Treatment Team: Attending Provider: Sly Marc MD; Consulting Provider: Jefe Ayala MD; Utilization Review: Neeta Sterling RN; Consulting Provider: Yan Gay MD; Care Manager: Cheli Vega RN Subjective:  
 
 
Ms. Hailee Magallon is a 81 yo female with PMH of chronic pain, HTN, depression/anxiety, Dm2 admitted with left 2nd digit osteomyelitis. She has been seen by ID, placed on rocephin/vancomycin, pending EOT. BC pending. No new complaints this AM. Agreeable to return to [facility] at discharge. Objective:  
 
Patient Vitals for the past 24 hrs: 
 Temp Pulse Resp BP SpO2  
19 0725 98.2 °F (36.8 °C) 72 18 144/80 91 % 19 0308 98.3 °F (36.8 °C) 79 17 152/74 91 % 19 0013 98.3 °F (36.8 °C) 78 18 158/68 94 % 19 1951 98.4 °F (36.9 °C) 78 18 160/70 92 % 19 1502 98.1 °F (36.7 °C) 69 18 108/62 91 % 19 1215 97.9 °F (36.6 °C) 76 18 130/78 92 % Oxygen Therapy O2 Sat (%): 91 % (19 0725) Pulse via Oximetry: 84 beats per minute (19 0536) O2 Device: Nasal cannula (19 1116) Intake/Output Summary (Last 24 hours) at 2019 9016 Last data filed at 2019 0730 Gross per 24 hour Intake  Output 1900 ml Net -1900 ml *Note that automatically entered I/Os may not be accurate; dependent on patient compliance with collection and accurate  by assistants. General:    Well nourished. Alert. Elderly, no distress CV:   RRR. No murmur, rub, or gallop. No edema Lungs:   CTAB. No wheezing, rhonchi, or rales. anterior exam 
Abdomen:   Soft, nontender, nondistended. Extremities: Left 2nd digit bandaged Neuro:  No gross focal deficits Data Review: 
I have reviewed all labs, meds, telemetry events, and studies from the last 24 hours: Recent Results (from the past 24 hour(s)) GLUCOSE, POC Collection Time: 02/06/19 11:41 AM  
Result Value Ref Range Glucose (POC) 128 (H) 65 - 100 mg/dL GLUCOSE, POC Collection Time: 02/06/19  4:31 PM  
Result Value Ref Range Glucose (POC) 170 (H) 65 - 100 mg/dL GLUCOSE, POC Collection Time: 02/06/19  8:43 PM  
Result Value Ref Range Glucose (POC) 184 (H) 65 - 100 mg/dL GLUCOSE, POC Collection Time: 02/07/19  7:24 AM  
Result Value Ref Range Glucose (POC) 95 65 - 100 mg/dL All Micro Results Procedure Component Value Units Date/Time CULTURE, BLOOD [[id number]] Collected:  02/05/19 0246 Order Status:  Completed Specimen:  Whole Blood Updated:  02/07/19 8611 Special Requests: --     
  NO SPECIAL REQUESTS 
LEFT Antecubital 
  
  Culture result: NO GROWTH 2 DAYS     
 CULTURE, BLOOD [[id number]] Collected:  02/04/19 2212 Order Status:  Completed Specimen:  Whole Blood Updated:  02/07/19 9256 Special Requests: NO SPECIAL REQUESTS Culture result: NO GROWTH 2 DAYS     
 CULTURE, Adeelsusie Chavo STAIN [[id number]]  (Abnormal) Collected:  02/05/19 6403 Order Status:  Completed Specimen:  Wound from Finger Updated:  02/06/19 1334 Special Requests: NO SPECIAL REQUESTS     
  GRAM STAIN 2 TO 10 WBC'S/OIF  
   FEW GRAM POSITIVE COCCI     
   FEW GRAM NEGATIVE RODS Culture result:    
  HEAVY STAPHYLOCOCCUS AUREUS SENSITIVITY TO FOLLOW  
     
      
  HEAVY GRAM NEGATIVE RODS SUBCULTURE IN PROGRESS  
     
      
  CULTURE IN PROGRESS,FURTHER UPDATES TO FOLLOW No results found for this visit on 02/05/19. Current Meds: 
Current Facility-Administered Medications Medication Dose Route Frequency  vancomycin (VANCOCIN) 1,000 mg in 0.9% sodium chloride (MBP/ADV) 250 mL  1,000 mg IntraVENous Q24H  cefepime (MAXIPIME) 2 g in 0.9% sodium chloride (MBP/ADV) 100 mL  2 g IntraVENous Q24H  amLODIPine (NORVASC) tablet 2.5 mg  2.5 mg Oral DAILY  aspirin delayed-release tablet 81 mg  81 mg Oral QHS  calcium-vitamin D (OS-JENN) 500 mg-200 unit tablet  1 Tab Oral DAILY  carvedilol (COREG) tablet 3.125 mg  3.125 mg Oral BID WITH MEALS  cholecalciferol (VITAMIN D3) tablet 1,000 Units  1,000 Units Oral DAILY  clonazePAM (KlonoPIN) tablet 0.5 mg  0.5 mg Oral TID  ferrous gluconate 324 mg (38 mg iron) tablet 1 Tab  1 Tab Oral ACB  DULoxetine (CYMBALTA) capsule 60 mg  60 mg Oral DAILY  hydrOXYzine pamoate (VISTARIL) capsule 25 mg  25 mg Oral BID PRN  
 insulin lispro (HUMALOG) injection 5 Units  5 Units SubCUTAneous TIDAC  levothyroxine (SYNTHROID) tablet 75 mcg  75 mcg Oral ACB  methadone (DOLOPHINE) tablet 5 mg  5 mg Oral Q6H  
 loperamide (IMODIUM) capsule 4 mg  4 mg Oral PRN  predniSONE (DELTASONE) tablet 10 mg  10 mg Oral BID  senna (SENOKOT) tablet 8.6 mg  1 Tab Oral DAILY PRN  
 dextrose 40% (GLUTOSE) oral gel 1 Tube  15 g Oral PRN  
 glucagon (GLUCAGEN) injection 1 mg  1 mg IntraMUSCular PRN  
 dextrose (D50W) injection syrg 12.5-25 g  25-50 mL IntraVENous PRN  
 insulin lispro (HUMALOG) injection   SubCUTAneous AC&HS  sodium chloride (NS) flush 5-40 mL  5-40 mL IntraVENous Q8H  
 sodium chloride (NS) flush 5-40 mL  5-40 mL IntraVENous PRN  
 0.9% sodium chloride infusion  100 mL/hr IntraVENous CONTINUOUS  
 acetaminophen (TYLENOL) tablet 650 mg  650 mg Oral Q4H PRN  
 naloxone (NARCAN) injection 0.4 mg  0.4 mg IntraVENous PRN  
 ondansetron (ZOFRAN) injection 4 mg  4 mg IntraVENous Q4H PRN  
 heparin (porcine) injection 5,000 Units  5,000 Units SubCUTAneous Q12H  polyethylene glycol (MIRALAX) packet 17 g  17 g Oral DAILY  docusate sodium (COLACE) capsule 100 mg  100 mg Oral BID  famotidine (PEPCID) tablet 20 mg  20 mg Oral DAILY Other Studies (last 24 hours): No results found. Assessment and Plan: Hospital Problems as of 2/7/2019 Date Reviewed: 5/25/2016 Codes Class Noted - Resolved POA * (Principal) Finger infection ICD-10-CM: L08.9 ICD-9-CM: 686.9  2/5/2019 - Present Yes Leukocytosis ICD-10-CM: F09.880 ICD-9-CM: 288.60  2/5/2019 - Present Yes RAYMON (acute kidney injury) ([facility] [address] ICD-10-CM: N17.9 ICD-9-CM: 584.9  2/5/2019 - Present Yes Osteomyelitis of finger (HCC) ICD-10-CM: M86.9 ICD-9-CM: 730.24  2/5/2019 - Present Yes Chronic diastolic congestive heart failure (HCC) ICD-10-CM: I50.32 
ICD-9-CM: 428.32, 428.0  1/16/2019 - Present Yes Acquired hypothyroidism ICD-10-CM: E03.9 ICD-9-CM: 244.9  1/16/2019 - Present Yes GERD (gastroesophageal reflux disease) ICD-10-CM: K21.9 ICD-9-CM: 530.81  1/16/2019 - Present Yes DM2 (diabetes mellitus, type 2) (HCC) ICD-10-CM: E11.9 ICD-9-CM: 250.00  7/27/2018 - Present Yes Plan: · Left 2nd digit osteomyelitis · continue cefepime, Vanc · Wound culture growing heavy staph, GNR ·  appreciate ID input - anticipating 4-6 week IV abx · Will likely need PICC prior to d/c - will confirm with ID and proceed · DM2 
·  on SSI and premeal insulin · Constipation · miralax and colace · HTN 
· continue norvasc, coreg · Chronic pain · on methadone prior to admit · RAYMON · Resolved with IVFs. · Chronic steroid use DC planning/Dispo:  Back to [facility] Diet:  DIET DIABETIC CONSISTENT CARB 
DVT ppx:  heparin Signed: 
Josué Koroma MD

## 2019-02-07 NOTE — PROGRESS NOTES
Problem: Falls - Risk of 
Goal: *Absence of Falls Document Orbeni Ng Fall Risk and appropriate interventions in the flowsheet. Outcome: Progressing Towards Goal 
Fall Risk Interventions: 
Mobility Interventions: Bed/chair exit alarm, Communicate number of staff needed for ambulation/transfer, OT consult for ADLs, Patient to call before getting OOB, PT Consult for mobility concerns, PT Consult for assist device competence Medication Interventions: Assess postural VS orthostatic hypotension, Bed/chair exit alarm, Evaluate medications/consider consulting pharmacy, Patient to call before getting OOB, Teach patient to arise slowly Elimination Interventions: Bed/chair exit alarm, Call light in reach, Patient to call for help with toileting needs, Toilet paper/wipes in reach, Toileting schedule/hourly rounds History of Falls Interventions: Bed/chair exit alarm, Consult care management for discharge planning, Evaluate medications/consider consulting pharmacy, Investigate reason for fall

## 2019-02-07 NOTE — INTERDISCIPLINARY ROUNDS
Interdisciplinary team rounds were held 2/7/2019 with the following team members:Care Management, Nursing, Occupational Therapy and Physician and the patient. Plan of care discussed. See clinical pathway and/or care plan for interventions and desired outcomes. Plan to discharge back to [facility] when medically ready. Final cultures pending.

## 2019-02-07 NOTE — PROGRESS NOTES
Patient's ulcer located on left heal 4 x 2.5 cm was slough, no eschar, and no foul odor. Wound edges are open. Performed wound cleanse and dressing change with mepilex foam and kerlix guaze. Lower extremities elevated and bilateral heals off of the bed. Ulcer of second digit of left hand has slough, no eschar, and no foul odor. Wound cleanse and dressing change completed with xerofoam gauze. Patient denied pain.

## 2019-02-07 NOTE — PROGRESS NOTES
Infectious Disease Progress Note Today's Date: 2019 Admit Date: 2019 Impression: · Osteomyelitis, L index finger distal phalanx, ED swab culture with staph aureus and GNRs, pending · L heel wound · CKD · DM Plan:  
· Continue vancomycin and cefepime. · Follow pending cultures. Need susceptibilities before finalizing ID plan. · Ok to place PICC. Anti-infectives: · Vancomycin ( - 
· Cefepime ( - 
· Ceftriaxone ( - ) · Clindamycin Subjective: Interval History: Afebrile. Denies nausea, vomiting, diarrhea, but is complaining of significant abdominal pain related to constipation. Thinks her finger is improving a little. Review of Systems:  A comprehensive review of systems was negative except for that written in the History of Present Illness. Objective:  
 
Visit Vitals /70 Pulse 72 Temp 98 °F (36.7 °C) Resp 18 Ht 5' 4\" (1.626 m) Wt 78.7 kg (173 lb 6.4 oz) SpO2 91% BMI 29.76 kg/m² Temp (24hrs), Av.2 °F (36.8 °C), Min:98 °F (36.7 °C), Max:98.4 °F (36.9 °C) Lines:  Peripheral IV:    
 
Physical Exam:   
General:  Alert, cooperative, uncomfortable, appears stated age Eyes:  Sclera anicteric. Pupils equally round and reactive to light. Mouth/Throat: Mucous membranes normal, oral pharynx clear Neck: Supple Lungs:   Lungs clear without increased work of breathing CV:  RRR without audible murmur Abdomen:   Soft, tender, active bowel sounds all quadrants Extremities: Decreased sensation all extremities, L heel wound with eschar and serous drainage; L 2nd finger with pale tip, macerated skin and small amount of purulent drainage, 3rd finger nail deformed Skin: No rash Musculoskeletal: Generally weak Lines/Devices:  Intact, no erythema, drainage or tenderness Psych: Alert and oriented, appropriate mood and affect given the setting Data Review: CBC: 
Recent Labs 19 
0549 19 
2447 WBC 9.3 14.9* GRANS  --  85* MONOS  --  5  
EOS  --  0* ANEU  --  12.6* ABL  --  1.3 HGB 9.5* 11.4* HCT 31.4* 36.1  351 BMP: 
Recent Labs 02/06/19 
0549 02/04/19 2213 CREA 1.09* 1.93* BUN 40* 65*  132* K 4.4 4.5  
 97* CO2 26 27 AGAP 7 8 * 268* LFTS: 
Recent Labs 02/04/19 2213 TBILI 0.4 ALT 43 SGOT 33  
* TP 7.4 ALB 3.0* Microbiology:  
 
All Micro Results Procedure Component Value Units Date/Time CULTURE, Sherly Johnvinicio STAIN [[id number]]  (Abnormal) Collected:  02/05/19 0154 Order Status:  Completed Specimen:  Wound from Finger Updated:  02/07/19 3845 Special Requests: NO SPECIAL REQUESTS     
  GRAM STAIN 2 TO 10 WBC'S/OIF  
   FEW GRAM POSITIVE COCCI     
   FEW GRAM NEGATIVE RODS Culture result:    
  HEAVY STAPHYLOCOCCUS AUREUS SENSITIVITY TO FOLLOW  
     
      
  HEAVY GRAM NEGATIVE RODS IDENTIFICATION AND SUSCEPTIBILITY TO FOLLOW CULTURE IN PROGRESS,FURTHER UPDATES TO FOLLOW CULTURE, BLOOD [[id number]] Collected:  02/05/19 0246 Order Status:  Completed Specimen:  Whole Blood Updated:  02/07/19 9816 Special Requests: --     
  NO SPECIAL REQUESTS 
LEFT Antecubital 
  
  Culture result: NO GROWTH 2 DAYS     
 CULTURE, BLOOD [[id number]] Collected:  02/04/19 2212 Order Status:  Completed Specimen:  Whole Blood Updated:  02/07/19 6394 Special Requests: NO SPECIAL REQUESTS Culture result: NO GROWTH 2 DAYS Imaging:  
L finger xray 2/5/19 Radiologist's IMPRESSION: Findings consistent with osteomyelitis in the tuft of the index 
finger distal phalanx. Signed By: Yayo Calloway NP February 7, 2019

## 2019-02-07 NOTE — PROGRESS NOTES
Problem: Pressure Injury - Risk of 
Goal: *Prevention of pressure injury Document Dimitry Scale and appropriate interventions in the flowsheet. Outcome: Progressing Towards Goal 
Pressure Injury Interventions: 
Sensory Interventions: Assess changes in LOC, Check visual cues for pain Moisture Interventions: Absorbent underpads, Minimize layers, Limit adult briefs Activity Interventions: PT/OT evaluation, Increase time out of bed, Pressure redistribution bed/mattress(bed type) Mobility Interventions: HOB 30 degrees or less, Pressure redistribution bed/mattress (bed type), PT/OT evaluation Nutrition Interventions: Document food/fluid/supplement intake Friction and Shear Interventions: Foam dressings/transparent film/skin sealants, HOB 30 degrees or less Problem: Falls - Risk of 
Goal: *Absence of Falls Document Reino Horn Fall Risk and appropriate interventions in the flowsheet. Outcome: Progressing Towards Goal 
Fall Risk Interventions: 
Mobility Interventions: Bed/chair exit alarm, Communicate number of staff needed for ambulation/transfer, Patient to call before getting OOB Medication Interventions: Assess postural VS orthostatic hypotension, Teach patient to arise slowly Elimination Interventions: Call light in reach, Bed/chair exit alarm, Patient to call for help with toileting needs History of Falls Interventions: Bed/chair exit alarm, Door open when patient unattended, Investigate reason for fall, Room close to nurse's station

## 2019-02-08 NOTE — INTERDISCIPLINARY ROUNDS
Interdisciplinary team rounds were held 2/8/2019 with the following team members:Care Management, Nursing, Occupational Therapy, Physical Therapy and Physician and the patient. Plan of care discussed. See clinical pathway and/or care plan for interventions and desired outcomes.

## 2019-02-08 NOTE — PROGRESS NOTES
Infectious Disease Progress Note Today's Date: 2019 Admit Date: 2019 Impression: · Osteomyelitis, L index finger distal phalanx, ED swab culture with MSSA, pan-S E coli, and Morganella morganii, resistant to ampicillin and cefazolin. · L heel wound · CKD · DM Plan:  
· PICC placed today. · Would continue cefepime, which hopefully she can continue at [facility]. ID Recommendations. Please note that ID cannot follow this patient at [facility]. Routine PICC care Continue cefepime 2g IV Q 12 hrs for four weeks, EOT 3/12/19 Q Monday labs: CBC with diff, serum creatinine, LFTs Follow up with ID 3/12/19 at 0940. Pt seen and examined with ID Nurse Practitioner. Agree with exam, assessment, and plan except for any differences as noted above. Planning a course of cefepime as outlined. Anti-infectives: · Vancomycin (2/5 - 
· Cefepime (2/6 - 
· Ceftriaxone (2/5 - 2/6) · Clindamycin Subjective: Interval History:  
Resting quietly, afebrile. Had PICC placed today. Denies nausea, vomiting, diarrhea, fevers, chills or sweats. Review of Systems:  A comprehensive review of systems was negative except for that written in the History of Present Illness. Objective:  
 
Visit Vitals /70 (BP 1 Location: Right arm, BP Patient Position: At rest) Pulse 79 Temp 98.1 °F (36.7 °C) Resp 17 Ht 5' 4\" (1.626 m) Wt 83.1 kg (183 lb 3.2 oz) SpO2 98% BMI 31.45 kg/m² Temp (24hrs), Av.7 °F (37.1 °C), Min:98.1 °F (36.7 °C), Max:99.5 °F (37.5 °C) Lines:  PICC Physical Exam:   
General:  Drowsy, cooperative, appears stated age, no acute distress Eyes:  Sclera anicteric. Pupils equally round and reactive to light. Mouth/Throat: Mucous membranes normal, oral pharynx clear Neck: Supple Lungs:   Lungs clear without increased work of breathing CV:  RRR without audible murmur Abdomen:   Soft, tender, active bowel sounds all quadrants Extremities: Decreased sensation all extremities, L heel wound with eschar and serous drainage; L 2nd finger with pale tip, macerated skin, no active drainage, 3rd finger nail deformed Skin: No rash Musculoskeletal: Generally weak Lines/Devices:  Intact, no erythema, drainage or tenderness Psych: Alert and oriented, appropriate mood and affect given the setting Data Review: CBC: 
Recent Labs 02/06/19 
2803 WBC 9.3 HGB 9.5* HCT 31.4*  BMP: 
Recent Labs 02/06/19 
2512 CREA 1.09* BUN 40*   
K 4.4  
 CO2 26 AGAP 7 * LFTS: 
No results for input(s): TBILI, ALT, SGOT, AP, TP, ALB in the last 72 hours. Microbiology:  
 
All Micro Results Procedure Component Value Units Date/Time CULTURE, Saima Chimera STAIN [[id number]]  (Abnormal)  (Susceptibility) Collected:  02/05/19 9903 Order Status:  Completed Specimen:  Wound from Finger Updated:  02/08/19 1115 Special Requests: NO SPECIAL REQUESTS     
  GRAM STAIN 2 TO 10 WBC'S/OIF  
   FEW GRAM POSITIVE COCCI     
   FEW GRAM NEGATIVE RODS Culture result:    
  HEAVY STAPHYLOCOCCUS AUREUS  
     
   HEAVY ESCHERICHIA COLI MORGANELLA MORGANII ISOLATED FROM BROTH ONLY  
     
   SCANT 
NORMAL SKIN PRITI ISOLATED 
   
 CULTURE, BLOOD [[id number]] Collected:  02/05/19 0246 Order Status:  Completed Specimen:  Whole Blood Updated:  02/08/19 6003 Special Requests: --     
  NO SPECIAL REQUESTS 
LEFT Antecubital 
  
  Culture result: NO GROWTH 3 DAYS     
 CULTURE, BLOOD [[id number]] Collected:  02/04/19 2212 Order Status:  Completed Specimen:  Whole Blood Updated:  02/08/19 2226 Special Requests: NO SPECIAL REQUESTS Culture result: NO GROWTH 3 DAYS Imaging:  
L finger xray 2/5/19 Radiologist's IMPRESSION: Findings consistent with osteomyelitis in the tuft of the index 
finger distal phalanx.  
 
Signed By: Sanna Villanueva NP   
 February 8, 2019

## 2019-02-08 NOTE — PROGRESS NOTES
Hospitalist Progress Note Admit Date:  2019  1:08 AM  
Name:  Philip France Age:  80 y.o. 
:  1933 MRN:  [id number] PCP:  Anne Frye MD 
Treatment Team: Attending Provider: Sanaz Anthony MD; Consulting Provider: Fay Floyd MD; Utilization Review: Jose Temple RN; Consulting Provider: Terell Elizondo MD; Care Manager: Tiffanie Shelby, RN; Primary Nurse: Melanie Terrell RN; Charge Nurse: Ozzie Simon Subjective:  
 
 
Ms. Johnnie Feliz is a 81 yo female with PMH of chronic pain, HTN, depression/anxiety, Dm2 admitted with left 2nd digit osteomyelitis. She has been seen by ID, placed on rocephin/vancomycin, pending EOT. BC pending. No new complaints this AM. Feels well. Discussed PICC line, and she is agreeable. Objective:  
 
Patient Vitals for the past 24 hrs: 
 Temp Pulse Resp BP SpO2  
19 98.4 °F (36.9 °C) 76 15 171/77 93 % 19 0429 98.7 °F (37.1 °C) 77 18 173/79 93 % 19 0045 99.5 °F (37.5 °C) 72 17 146/70 92 % 19 2007 98.9 °F (37.2 °C) 67 18 149/76 93 % 19 1558 98.5 °F (36.9 °C) 88 18 171/76 92 % 19 1100 98 °F (36.7 °C) 72 18 154/70  Oxygen Therapy O2 Sat (%): 93 % (19) Pulse via Oximetry: 76 beats per minute (19) O2 Device: Nasal cannula (19) O2 Flow Rate (L/min): 2 l/min (19) Intake/Output Summary (Last 24 hours) at 2019 8903 Last data filed at 2019 3763 Gross per 24 hour Intake 650 ml Output 2910 ml Net -2260 ml  
   
*Note that automatically entered I/Os may not be accurate; dependent on patient compliance with collection and accurate  by assistants. General:    Well nourished. Alert. Elderly, no distress CV:   RRR. No murmur, rub, or gallop. No edema Lungs:   CTAB. No wheezing, rhonchi, or rales. anterior exam 
Abdomen:   Soft, nontender, nondistended. Extremities: Left 2nd digit bandaged Neuro:  No gross focal deficits Data Review: 
I have reviewed all labs, meds, telemetry events, and studies from the last 24 hours: 
 
Recent Results (from the past 24 hour(s)) GLUCOSE, POC Collection Time: 02/07/19 11:28 AM  
Result Value Ref Range Glucose (POC) 136 (H) 65 - 100 mg/dL GLUCOSE, POC Collection Time: 02/07/19  5:02 PM  
Result Value Ref Range Glucose (POC) 226 (H) 65 - 100 mg/dL GLUCOSE, POC Collection Time: 02/07/19  9:23 PM  
Result Value Ref Range Glucose (POC) 116 (H) 65 - 100 mg/dL GLUCOSE, POC Collection Time: 02/08/19  7:45 AM  
Result Value Ref Range Glucose (POC) 86 65 - 100 mg/dL Julius [place] Collection Time: 02/08/19  7:50 AM  
Result Value Ref Range Vancomycin, random 15.7 UG/ML All Micro Results Procedure Component Value Units Date/Time CULTURE, Marylee Roys STAIN [[id number]]  (Abnormal)  (Susceptibility) Collected:  02/05/19 1835 Order Status:  Completed Specimen:  Wound from Finger Updated:  02/08/19 4600 Special Requests: NO SPECIAL REQUESTS     
  GRAM STAIN 2 TO 10 WBC'S/OIF  
   FEW GRAM POSITIVE COCCI     
   FEW GRAM NEGATIVE RODS Culture result:    
  HEAVY STAPHYLOCOCCUS AUREUS  
     
   HEAVY ESCHERICHIA COLI MORGANELLA MORGANII ISOLATED FROM BROTH ONLY  
     
   SCANT 
NORMAL SKIN PRITI ISOLATED 
   
 CULTURE, BLOOD [[id number]] Collected:  02/05/19 0246 Order Status:  Completed Specimen:  Whole Blood Updated:  02/08/19 6963 Special Requests: --     
  NO SPECIAL REQUESTS 
LEFT Antecubital 
  
  Culture result: NO GROWTH 3 DAYS     
 CULTURE, BLOOD [[id number]] Collected:  02/04/19 2212 Order Status:  Completed Specimen:  Whole Blood Updated:  02/08/19 1576 Special Requests: NO SPECIAL REQUESTS Culture result: NO GROWTH 3 DAYS No results found for this visit on 02/05/19. Current Meds: 
Current Facility-Administered Medications Medication Dose Route Frequency  Vancomycin random reminder   Other ONCE  
 cefepime (MAXIPIME) 2 g in 0.9% sodium chloride (MBP/ADV) 100 mL  2 g IntraVENous Q12H  
 bisacodyl (DULCOLAX) suppository 10 mg  10 mg Rectal DAILY PRN  
 vancomycin (VANCOCIN) 1,000 mg in 0.9% sodium chloride (MBP/ADV) 250 mL  1,000 mg IntraVENous Q24H  
 amLODIPine (NORVASC) tablet 2.5 mg  2.5 mg Oral DAILY  aspirin delayed-release tablet 81 mg  81 mg Oral QHS  calcium-vitamin D (OS-JENN) 500 mg-200 unit tablet  1 Tab Oral DAILY  carvedilol (COREG) tablet 3.125 mg  3.125 mg Oral BID WITH MEALS  cholecalciferol (VITAMIN D3) tablet 1,000 Units  1,000 Units Oral DAILY  clonazePAM (KlonoPIN) tablet 0.5 mg  0.5 mg Oral TID  ferrous gluconate 324 mg (38 mg iron) tablet 1 Tab  1 Tab Oral ACB  DULoxetine (CYMBALTA) capsule 60 mg  60 mg Oral DAILY  hydrOXYzine pamoate (VISTARIL) capsule 25 mg  25 mg Oral BID PRN  
 insulin lispro (HUMALOG) injection 5 Units  5 Units SubCUTAneous TIDAC  levothyroxine (SYNTHROID) tablet 75 mcg  75 mcg Oral ACB  methadone (DOLOPHINE) tablet 5 mg  5 mg Oral Q6H  
 loperamide (IMODIUM) capsule 4 mg  4 mg Oral PRN  predniSONE (DELTASONE) tablet 10 mg  10 mg Oral BID  senna (SENOKOT) tablet 8.6 mg  1 Tab Oral DAILY PRN  
 dextrose 40% (GLUTOSE) oral gel 1 Tube  15 g Oral PRN  
 glucagon (GLUCAGEN) injection 1 mg  1 mg IntraMUSCular PRN  
 dextrose (D50W) injection syrg 12.5-25 g  25-50 mL IntraVENous PRN  
 insulin lispro (HUMALOG) injection   SubCUTAneous AC&HS  sodium chloride (NS) flush 5-40 mL  5-40 mL IntraVENous Q8H  
 sodium chloride (NS) flush 5-40 mL  5-40 mL IntraVENous PRN  
 acetaminophen (TYLENOL) tablet 650 mg  650 mg Oral Q4H PRN  
 naloxone (NARCAN) injection 0.4 mg  0.4 mg IntraVENous PRN  
 ondansetron (ZOFRAN) injection 4 mg  4 mg IntraVENous Q4H PRN  
 heparin (porcine) injection 5,000 Units  5,000 Units SubCUTAneous Q12H  polyethylene glycol (MIRALAX) packet 17 g  17 g Oral DAILY  docusate sodium (COLACE) capsule 100 mg  100 mg Oral BID  famotidine (PEPCID) tablet 20 mg  20 mg Oral DAILY Other Studies (last 24 hours): No results found. Assessment and Plan:  
 
Hospital Problems as of 2/8/2019 Date Reviewed: 5/25/2016 Codes Class Noted - Resolved POA * (Principal) Finger infection ICD-10-CM: L08.9 ICD-9-CM: 686.9  2/5/2019 - Present Yes Leukocytosis ICD-10-CM: K98.450 ICD-9-CM: 288.60  2/5/2019 - Present Yes RAYMON (acute kidney [facility]) [facility] ICD-10-CM: N17.9 ICD-9-CM: 584.9  2/5/2019 - Present Yes Osteomyelitis of finger (HCC) ICD-10-CM: M86.9 ICD-9-CM: 730.24  2/5/2019 - Present Yes Chronic diastolic congestive heart failure (HCC) ICD-10-CM: I50.32 
ICD-9-CM: 428.32, 428.0  1/16/2019 - Present Yes Acquired hypothyroidism ICD-10-CM: E03.9 ICD-9-CM: 244.9  1/16/2019 - Present Yes GERD (gastroesophageal reflux disease) ICD-10-CM: K21.9 ICD-9-CM: 530.81  1/16/2019 - Present Yes DM2 (diabetes mellitus, type 2) ([facility]) ICD-10-CM: E11.9 ICD-9-CM: 250.00  7/27/2018 - Present Yes Plan: · Left 2nd digit osteomyelitis · continue cefepime, Vanc · Wound culture growing heavy staph, GNR ·  appreciate ID input - anticipating 4-6 week IV abx · PICC order placed · DM2 
·  on SSI and premeal insulin · Constipation · miralax and colace · HTN 
· continue norvasc, coreg · Chronic pain · on methadone prior to admit · RAYMON · Resolved with IVFs. · Chronic steroid use DC planning/Dispo:  Back to STR once cultures result Diet:  DIET DIABETIC CONSISTENT CARB 
DVT ppx:  heparin Signed: 
Gala Arenas MD

## 2019-02-08 NOTE — PROGRESS NOTES
Problem: Pressure Injury - Risk of 
Goal: *Prevention of pressure injury Document Dimitry Scale and appropriate interventions in the flowsheet. Outcome: Progressing Towards Goal 
Pressure Injury Interventions: 
Sensory Interventions: Assess changes in LOC, Assess need for specialty bed, Avoid rigorous massage over bony prominences, Check visual cues for pain, Float heels, Keep linens dry and wrinkle-free, Minimize linen layers Moisture Interventions: Absorbent underpads, Limit adult briefs Activity Interventions: Increase time out of bed, Pressure redistribution bed/mattress(bed type), PT/OT evaluation Mobility Interventions: Float heels, HOB 30 degrees or less, Pressure redistribution bed/mattress (bed type), PT/OT evaluation Nutrition Interventions: Document food/fluid/supplement intake Friction and Shear Interventions: Foam dressings/transparent film/skin sealants Problem: Falls - Risk of 
Goal: *Absence of Falls Document Guerrero Gomez Fall Risk and appropriate interventions in the flowsheet. Outcome: Progressing Towards Goal 
Fall Risk Interventions: 
Mobility Interventions: Bed/chair exit alarm, Patient to call before getting OOB, PT Consult for mobility concerns, PT Consult for assist device competence, OT consult for ADLs Medication Interventions: Evaluate medications/consider consulting pharmacy, Patient to call before getting OOB Elimination Interventions: Call light in reach, Patient to call for help with toileting needs History of Falls Interventions: Consult care management for discharge planning, Evaluate medications/consider consulting pharmacy

## 2019-02-08 NOTE — PROGRESS NOTES
Pharmacokinetic Consult to Pharmacist 
 
Celeste Harrison is a 80 y.o. female being treated for osteomyelitis with vancomycin. Height: 5' 4\" (162.6 cm)  Weight: 83.1 kg (183 lb 3.2 oz) Lab Results Component Value Date/Time BUN 40 (H) 02/06/2019 05:49 AM  
 Creatinine 1.09 (H) 02/06/2019 05:49 AM  
 WBC 9.3 02/06/2019 05:49 AM  
 Procalcitonin 0.3 02/04/2019 10:13 PM  
 Lactic Acid (POC) 2.75 (H) 02/04/2019 10:13 PM  
  
Estimated Creatinine Clearance: 39.4 mL/min (A) (based on SCr of 1.09 mg/dL (H)). Day 4 of vancomycin. Goal trough is 15-20. RAYMON on admission seems to have improved. No current BMP to assess updated renal function. Trough drawn appropriately at 23 hours after last given dose (Q24 regimen) and resulted within therapeutic levels. Will continue current regimen. Pharmacy will continue to follow. Please call with any questions. Thank you, Francoise Arredondo, Pharmacy Student

## 2019-02-08 NOTE — PROGRESS NOTES
Problem: Mobility Impaired (Adult and Pediatric) Goal: *Acute Goals and Plan of Care (Insert Text) Goals: 
(1.)Ms. Angela Reynolds will move from supine to sit and sit to supine , scoot up and down and roll side to side with CONTACT GUARD ASSIST within 5 treatment day(s). (2.)Ms. Angela Reynolds will transfer from bed to chair and chair to bed with MINIMAL ASSIST using the least restrictive device within 5 treatment day(s 
(3.)Ms. Angela Reynolds will transfer sit to stand with minimal assist with the least restrictive device within 5 day(s) (4.)Ms. Angela Reynolds will ambulate with MINIMAL ASSIST for 25-50 feet with the least restrictive device within 5 treatment day(s). PHYSICAL THERAPY: Daily Note and PM 2/8/2019INPATIENT:   
[place] / [place] / Product Type: Medicare /   
  
NAME/AGE/GENDER: Melvina Loja is a 80 y.o. female PRIMARY DIAGNOSIS: Finger infection [L08.9] Finger infection Finger infection ICD-10: Treatment Diagnosis:  
 · Generalized Muscle Weakness (M62.81) · Difficulty in walking, Not elsewhere classified (R26.2) · Other abnormalities of gait and mobility (R26.89) · History of falling (Z91.81) Precaution/Allergies: 
Contrast agent [iodine]; Actifed [triprolidine-pseudoephedrine]; Allopurinol; Decongestant [pseudoephedrine hcl]; Lexapro [escitalopram oxalate]; Lyrica [pregabalin]; Minizide [prazosin-polythiazide]; Mobic [meloxicam]; Omnipaque [iohexol]; Sertraline; Spironolactone; and Sulfamethazine ASSESSMENT:  
Ms. Angela Reynolds is an 80year old WF with an admitting diagnosis of LUE finger infection. Her PMH includes RAYMON, CHF, DM2, HTN, Neuorpathy, Chronic pain and a recent fall with right rib fractures. She presents supine in the bed agreeable to have therapy after encouragement this PM. Patient recently had PICC placement earlier today and she states she is in pain and extremely fatigued.  She reports she doesn't have the energy to sit up today but is willing to work on bed mobility to facilitate in a brief change. She performed rolling to R and L sides with maxA x 2. She required multiple verbal, visual, and manual cues for proper hand placement and facilitation of hip postioning. Once in side-lying, she assisted holding on to bed rail with modA. After brief change, she reports extreme fatigue and states she is unable to do anything else today. She was positioned for comfort in the bed with RN at the bedside. No progress made towards goals. Ms. Pool Fritz would benefit from continued skilled PT to maximize her functional abilities while in the hospital.   
 
This section established at most recent assessment PROBLEM LIST (Impairments causing functional limitations): 1. Decreased Strength 2. Decreased ADL/Functional Activities 3. Decreased Transfer Abilities 4. Decreased Ambulation Ability/Technique 5. Decreased Balance 6. Decreased Activity Tolerance 7. Increased Fatigue 8. Edema/Girth INTERVENTIONS PLANNED: (Benefits and precautions of physical therapy have been discussed with the patient.) 1. Bed Mobility 2. Gait Training 3. Therapeutic Activites 4. Therapeutic Exercise/Strengthening 5. Transfer Training TREATMENT PLAN: Frequency/Duration: 3 times a week for duration of hospital stay Rehabilitation Potential For Stated Goals: Good RECOMMENDED REHABILITATION/EQUIPMENT: (at time of discharge pending progress): Due to the probability of continued deficits (see above) this patient will likely need continued skilled physical therapy after discharge. Equipment:  
? None at this time HISTORY:  
History of Present Injury/Illness (Reason for Referral): 
Patient is a very pleasant 79 y/o female with hx DM2, HTN, hypothyroidism, neuropathy, chronic pain who was recently hospitalized 1/16-1/22 for acute respiratory failure with hypoxia.  She had had a fall with resultant right rib fracture. She was discharged to rehab facility. When discharged she said she had a blister on the tip of her right forefinger. This later ruptured. At facility they noticed her fingertip draining and the finger red and swollen so sent to ED. She has open raw ulcer. Was cultured in ED. Antibiotics started. Xray is suggestive of osteomyelitis as well. Will admit for further treatment. Past Medical History/Comorbidities: Ms. Kaushik Gonzales  has a past medical history of Arthritis, Chronic diastolic congestive heart failure ([address] (1/16/2019), Chronic pain, Edema, GERD (gastroesophageal reflux disease) (1/16/2019), Gouty arthritis, History of MI (myocardial infarction) (1980's), Hypertension, Hypothyroidism, Mild heartburn, Neuropathy (1980's), Post-operative nausea and vomiting, and Type 2 diabetes mellitus without complication, without long-term current use of insulin ([address] (7/27/2018). She also has no past medical history of Adverse effect of anesthesia, Aneurysm ([address] Arrhythmia, Asthma, Autoimmune disease ([address] CAD (coronary artery disease), Cancer ([address] Chronic kidney disease, Chronic obstructive pulmonary disease ([address] Coagulation disorder ([address] Difficult intubation, Endocarditis, Ill-defined condition, Liver disease, Malignant hyperthermia due to anesthesia, Morbid obesity ([address] Pseudocholinesterase deficiency, Psychiatric disorder, PUD (peptic ulcer disease), Rheumatic fever, Seizures ([address] Sleep apnea, Stroke ([address] or Thromboembolus ([address] Ms. Kaushik Gonzales  has a past surgical history that includes hx partial thyroidectomy (1960); hx orthopaedic (Bilateral, 4541-3940); hx orthopaedic (Left, 1992); hx open cholecystectomy (1970); hx cyst removal (Right, 1950's); hx urological; hx heart catheterization (late 1979); hx hysterectomy (1972); hx gyn (1963); hx dilation and curettage (3961-5276); and hx left salpingo-oophorectomy (1965). Social History/Living Environment: Home Environment: Skilled nursing facility Care Facility Name: [facility] # Steps to Enter: 0 One/Two Story Residence: One story Living Alone: No 
Support Systems: Child(cassidy) Patient Expects to be Discharged to[de-identified] Rehabilitation facility Current DME Used/Available at Home: Walker, rolling, Lift chair, Shower chair, Commode, bedside Prior Level of Function/Work/Activity: 
Patient reports that before she got sick this time, she has been able to functioning independently with her r/walker using her lift chair for sit to stand. Number of Personal Factors/Comorbidities that affect the Plan of Care: 3+: HIGH COMPLEXITY EXAMINATION:  
Most Recent Physical Functioning:  
Gross Assessment: 
AROM: Generally decreased, functional 
Strength: Generally decreased, functional 
Coordination: Grossly decreased, non-functional 
Tone: Normal 
Sensation: Intact Posture: 
  
Balance: 
  Bed Mobility: 
Rolling: Maximum assistance;Assist x2 Scooting: Maximum assistance;Assist x2 Level of Assistance: Maximum assistance Interventions: Verbal cues; Tactile cues; Visual cues;Manual cues Wheelchair Mobility: 
  
Transfers: 
  
Gait: 
 Patient was able to step in place 4-5 steps using the r/walker with CGA/minimal assist at the bedside Body Structures Involved: 1. Metabolic 2. Endocrine 3. Bones 4. Joints 5. Muscles Body Functions Affected: 1. Neuromusculoskeletal 
2. Movement Related 3. Metobolic/Endocrine Activities and Participation Affected: 1. General Tasks and Demands 2. Mobility Number of elements that affect the Plan of Care: 3: MODERATE COMPLEXITY CLINICAL PRESENTATION:  
Presentation: Evolving clinical presentation with changing clinical characteristics: MODERATE COMPLEXITY CLINICAL DECISION MAKING:  
[employer] 6 Clicks Basic Mobility Inpatient Short Form How much difficulty does the patient currently have. .. Unable A Lot A Little None 1.  Turning over in bed (including adjusting bedclothes, sheets and blankets)? [] 1   [] 2   [x] 3   [] 4  
2. Sitting down on and standing up from a chair with arms ( e.g., wheelchair, bedside commode, etc.)   [] 1   [x] 2   [] 3   [] 4  
3. Moving from lying on back to sitting on the side of the bed? [] 1   [x] 2   [] 3   [] 4 How much help from another person does the patient currently need. .. Total A Lot A Little None 4. Moving to and from a bed to a chair (including a wheelchair)? [x] 1   [] 2   [] 3   [] 4  
5. Need to walk in hospital room? [x] 1   [] 2   [] 3   [] 4  
6. Climbing 3-5 steps with a railing? [x] 1   [] 2   [] 3   [] 4  
© 2007, Trustees of [address] [place] [address], under license to [employer]. All rights reserved Score:  Initial: 10 Most Recent: X (Date: -- ) Interpretation of Tool:  Represents activities that are increasingly more difficult (i.e. Bed mobility, Transfers, Gait). Medical Necessity:    
· Patient is expected to demonstrate progress in strength and functional technique to decrease assistance required with bed mobility, SPT and short distance amb. with r/walker. Reason for Services/Other Comments: 
· Patient continues to require skilled intervention due to medical complications. Use of outcome tool(s) and clinical judgement create a POC that gives a: Questionable prediction of patient's progress: MODERATE COMPLEXITY  
  
 
 
 
TREATMENT:  
(In addition to Assessment/Re-Assessment sessions the following treatments were rendered) Pre-treatment Symptoms/Complaints:  Patient complains of rib pain and LUE hand/finger pain 
Pain: Initial:  
   Post Session:  3/10 Therapeutic Activity: (    19 minutes): Therapeutic activities including bed transfers to improve mobility, strength, balance and coordination. Required moderate   to promote coordination of bilateral, upper extremity(s). Braces/Orthotics/Lines/Etc:  
· IV 
· cortes catheter · O2 Device: Nasal cannula Treatment/Session Assessment:   
· Response to Treatment:  Patient unable to participate in anything other than bed mobility today, secondary to \"being exhausted from the PICC line placement and fatigue. \" 
· Interdisciplinary Collaboration:  
o Physical Therapist 
o Registered Nurse 
o Rehabilitation Attendant 
o Certified Nursing Assistant/Patient Care Technician · After treatment position/precautions:  
o Supine in bed 
o Bed in low position 
o Call light within reach 
o Nurse at bedside · Compliance with Program/Exercises: Will assess as treatment progresses · Recommendations/Intent for next treatment session: \"Next visit will focus on advancements to more challenging activities and reduction in assistance provided\". Total Treatment Duration: PT Patient Time In/Time Out Time In: 9635 Time Out: 1515 Anna Raines DPT

## 2019-02-08 NOTE — PROGRESS NOTES
PICC Placement Note PRE-PROCEDURE VERIFICATION Correct Procedure: yes. Time out completed with assistant Jannie Chambers RN and all persons present in agreement with time out. Correct Site:  yes Temperature: Temp: 98.1 °F (36.7 °C), Temperature Source: Temp Source: Oral 
Recent Labs 02/06/19 
2507 BUN 40* CREA 1.09*  WBC 9.3 Allergies: Contrast agent [iodine]; Actifed [triprolidine-pseudoephedrine]; Allopurinol; Decongestant [pseudoephedrine hcl]; Lexapro [escitalopram oxalate]; Lyrica [pregabalin]; Minizide [prazosin-polythiazide]; Mobic [meloxicam]; Omnipaque [iohexol]; Sertraline; Spironolactone; and Sulfamethazine Education materials for [facility] given to patient or family. PROCEDURE DETAIL A double lumen PICC line was started for antibiotic therapy. The following documentation is in addition to the PICC properties in the lines/airways flowsheet : 
Lot #: [id number] 
xylocaine used: yes Mid-Arm Circumference: 34 (cm) Internal Catheter Length: 40 (cm) Internal Catheter Total Length: 40 (cm) Vein Selection for PICC:right basilic Central Line Bundle followed yes Complication Related to Insertion: none Both the insertion guidewire and ECG guidewire were removed intact all ports have positive blood return and were flush well with normal saline. The location of the tip of the PICC is verified using ECG technology. The tip is in the SVC per ECG reading. See image below. Line is okay to use: yes

## 2019-02-08 NOTE — PROGRESS NOTES
PICC line placement still pending and BC results pending. Patient is not medically cleared for transfer to [facility] at this time. Facility notified and CM will continue to follow.

## 2019-02-09 NOTE — PROGRESS NOTES
TRANSFER - OUT REPORT: 
 
Verbal report given to HEATHER Alvarez on Vicente Knox  being transferred to [facility] room 316A for routine progression of care Report consisted of patients Situation, Background, Assessment and  
Recommendations(SBAR). Information from the following report(s) SBAR, Kardex, ED Summary, Procedure Summary, Intake/Output, MAR and Recent Results was reviewed with the receiving nurse. Lines: PICC Double Lumen 87/22/30 Right;Basilic (Active) Central Line Being Utilized Yes 2/9/2019  8:34 AM  
Criteria for Appropriate Use Long term IV/antibiotic administration 2/9/2019  8:34 AM  
Site Assessment Clean, dry, & intact 2/9/2019  8:34 AM  
Phlebitis Assessment 0 2/9/2019  8:34 AM  
Infiltration Assessment 0 2/9/2019  8:34 AM  
Arm Circumference (cm) 34 cm 2/8/2019  3:17 PM  
Date of Last Dressing Change 02/08/19 2/9/2019  8:34 AM  
Dressing Status Clean, dry, & intact 2/9/2019  8:34 AM  
External Catheter Length (cm) 0 centimeters 2/8/2019  3:17 PM  
Dressing Type Disk with Chlorhexadine gluconate (CHG); Transparent 2/9/2019  8:34 AM  
Hub Color/Line Status Capped 2/9/2019  8:34 AM  
Positive Blood Return (Site #1) Yes 2/8/2019  7:35 PM  
Hub Color/Line Status Capped 2/9/2019  8:34 AM  
Positive Blood Return (Site #2) Yes 2/8/2019  7:35 PM  
   
 
Opportunity for questions and clarification was provided. Patient transported with: [facility]

## 2019-02-09 NOTE — PROGRESS NOTES
Made aware of antibiotic not being available at the facility patient is discharging too. Called MD and made aware. Plan is to give patient the 1400 dose of the antibiotic and she can discharge after. MD also made aware that patient might miss 0200 am dose. Still okay to set up transport for after 1400 dose and discharge patient.

## 2019-02-09 NOTE — PROGRESS NOTES
Pt discharged to [facility] today as planned via Leonela Galloway. Therese Masters [facility] updated receiving nurse Antonio that pt would be on IV abx cefepime 2 grams q 12 hours through 3/12/2019. Also has orders for routine PICC care and labs every Monday CBC with diff, serum creatinine, LFT's. Copy of this order included in pt packet with other discharge information. Pt will transport to SNF around 1400 after her next dose of IV abx that cant be given til after 1300.

## 2019-02-09 NOTE — DISCHARGE SUMMARY
Hospitalist Discharge Summary Patient ID: 
Anup Harding 
[id number] 
70 y.o. 
8/23/1933 Admit date: 2/5/2019  1:08 AM 
Discharge date and time: 2/9/2019 Attending: Kilo Rose MD 
PCP:  Josseline Blanc MD 
Treatment Team: Attending Provider: Kilo Rose MD; Consulting Provider: Rozina Crump MD; Utilization Review: Jack Gilliland RN; Consulting Provider: Jelani Patel MD; Care Manager: Kathy Marshall RN 
 
Principal Diagnosis Finger infection Principal Problem: 
  Finger infection (2/5/2019) Active Problems: 
  DM2 (diabetes mellitus, type 2) ([facility] [address] (7/27/2018) Chronic diastolic congestive heart failure ([facility] [facility] (1/16/2019) Acquired hypothyroidism (1/16/2019) GERD (gastroesophageal reflux disease) (1/16/2019) Leukocytosis (2/5/2019) RAYMON (acute kidney injury) ([facility] [address] (2/5/2019) Osteomyelitis of finger ([facility] [address] (2/5/2019) Hospital Course: 
Please refer to the admission H&P for details of presentation. In summary, the patient is a 79 yo female with PMH of chronic pain, HTN, depression/anxiety, Dm2 admitted with left 2nd digit osteomyelitis. She has been seen by ID, on Cefepime, Vanc during hospitalization. · Left 2nd digit osteomyelitis 
? continue cefepime through 3/12 per ID recs ? Wound culture growing MSSA, Ecoli, Morganella morganii 
? PICC placed ? Weekly CBC with diff, Cr, LFTs 
  
· DM2 
? SSI 
  
· Constipation 
? miralax and colace ineffective ? Some relief with enema on 2/8 ? Attempting another enema, mg citrate prior to d/c 
  
· HTN 
? continue norvasc, coreg, lisinopril 
  
· Chronic pain 
? on methadone prior to admit  
  
· RAYMON · Resolved with IVFs.   
· Chronic steroid use Significant Diagnostic Studies:  
XR 2ND FINGER LT MIN 2 V Final Result IMPRESSION: Findings consistent with osteomyelitis in the tuft of the index  
finger distal phalanx. Labs: Results: Chemistry No results for input(s): GLU, NA, K, CL, CO2, BUN, CREA, CA, AGAP, BUCR, TBIL, GPT, AP, TP, ALB, GLOB, AGRAT in the last 72 hours. CBC w/Diff No results for input(s): WBC, RBC, HGB, HCT, PLT, GRANS, LYMPH, EOS, HGBEXT, HCTEXT, PLTEXT in the last 72 hours. Cardiac Enzymes No results for input(s): CPK, CKND1, MARLENY in the last 72 hours. No lab exists for component: Adriana Maritza Coagulation No results for input(s): PTP, INR, APTT in the last 72 hours. No lab exists for component: INREXT Lipid Panel No results found for: CHOL, CHOLPOCT, CHOLX, CHLST, CHOLV, [id number], HDL, LDL, LDLC, DLDLP, [id number], VLDLC, VLDL, TGLX, TRIGL, TRIGP, TGLPOCT, CHHD, CHHDX  
BNP No results for input(s): BNPP in the last 72 hours. Liver Enzymes No results for input(s): TP, ALB, TBIL, AP, SGOT, GPT in the last 72 hours. No lab exists for component: DBIL Thyroid Studies Lab Results Component Value Date/Time T4, Total 8.3 05/18/2016 04:32 PM  
 TSH 2.530 05/18/2016 04:32 PM  
    
 
 
Discharge Exam: 
Visit Vitals /79 (BP 1 Location: Left arm, BP Patient Position: At rest) Pulse 66 Temp 97.6 °F (36.4 °C) Resp 18 Ht 5' 4\" (1.626 m) Wt 76.9 kg (169 lb 8 oz) SpO2 95% BMI 29.09 kg/m² General:          Well nourished. Alert. Elderly, no distress CV:                  RRR. No murmur, rub, or gallop. No edema Lungs:             CTAB. No wheezing, rhonchi, or rales. anterior exam 
Abdomen:        Soft, nontender, nondistended. Extremities:     Left 2nd digit bandaged Neuro:             No gross focal deficits Disposition: SNF Discharge Condition: stable Patient Instructions:  
Current Discharge Medication List  
  
START taking these medications Details  
cefepime 2 gram 2 g, ADDaptor 1 Device IVPB 2 g by IntraVENous route every twelve (12) hours every twelve (12) hours for 31 days. Qty: 5 Dose, Refills: 0 CONTINUE these medications which have CHANGED Details amLODIPine (NORVASC) 2.5 mg tablet Take 2 Tabs by mouth daily. Qty: 30 Tab, Refills: 0  
  
carvedilol (COREG) 3.125 mg tablet Take 2 Tabs by mouth two (2) times daily (with meals). Qty: 60 Tab, Refills: 11  
  
clonazePAM (KLONOPIN) 0.5 mg tablet Take 1 Tab by mouth three (3) times daily. Max Daily Amount: 1.5 mg. 
Qty: 5 Tab, Refills: 0 Associated Diagnoses: Anxiety CONTINUE these medications which have NOT CHANGED Details  
lisinopril (PRINIVIL, ZESTRIL) 20 mg tablet Take 0.5 Tabs by mouth daily for 30 days. Qty: 30 Tab, Refills: 0  
  
insulin lispro (HUMALOG) 100 unit/mL kwikpen 5 Units by SubCUTAneous route Before breakfast, lunch, and dinner for 30 days. Qty: 1 Adjustable Dose Pre-filled Pen Syringe, Refills: 0  
  
sennosides (LAXATIVE, SENNOSIDES,) 25 mg tab Take 25 mg by mouth as needed (constipaton). predniSONE (DELTASONE) 10 mg tablet Take 10 mg by mouth two (2) times a day. 5mg BID  
  
potassium chloride (KLOR-CON) 10 mEq tablet Take 10 mEq by mouth two (2) times a day. ferrous gluconate 324 mg (38 mg iron) tablet Take 324 mg by mouth Daily (before breakfast). levothyroxine (SYNTHROID) 75 mcg tablet Take 75 mcg by mouth Daily (before breakfast). DULoxetine (CYMBALTA) 30 mg capsule Take 60 mg by mouth daily. multivitamin with minerals (HAIR,SKIN AND NAILS PO) Take 1 Tab by mouth daily. furosemide (LASIX) 40 mg tablet Take 40 mg by mouth two (2) times a day. raNITIdine (ZANTAC) 150 mg tablet Take 150 mg by mouth two (2) times a day. CHOLECALCIFEROL, VITAMIN D3, (VITAMIN D3 PO) Take 1,000 mg by mouth daily. CALCIUM CARBONATE/VITAMIN D3 (CALCIUM 600 + D,3, PO) Take 600 mg by mouth daily. KRILL/OM-3/DHA/EPA/PHOSPHO/AST (MEGARED OMEGA-3 KRILL OIL PO) Take 1 Cap by mouth daily. aspirin delayed-release 81 mg tablet Take 81 mg by mouth nightly. Ok to continue per anesthesia guidelines. methadone (DOLOPHINE) 5 mg tablet Take 5 mg by mouth every six (6) hours. Instructed to take DOS per Anesthesia guidelines. Indications: CHRONIC PAIN  
  
acetaminophen (TYLENOL EXTRA STRENGTH) 500 mg tablet Take 500 mg by mouth every six (6) hours as needed for Pain.  
  
hydrOXYzine pamoate (VISTARIL) 50 mg capsule Take 25 mg by mouth two (2) times daily as needed for Itching. loperamide (IMODIUM) 2 mg capsule Take 4 mg by mouth as needed for Diarrhea. Activity: Activity as tolerated Diet: Diabetic Diet Wound Care: As directed Follow-up with staff physician in 1 week, ID on 3/12 · Time spent to discharge patient 36 minutes Signed: 
Zenaida Crisostomo MD 
2/9/2019 
9:52 AM

## 2019-02-11 NOTE — PROGRESS NOTES
This note will not be viewable in [address] Patient discharged to [facility] on 2/9/19. Patient discharged to a [facility] facility. Patient will be included in weekly care coordination calls. Information forwarded to Pavel Sheehan RN, [facility] RN Care Manager.

## 2019-02-11 NOTE — Clinical Note
Pt dc 2.9.19 to [facility] patric, dx: osteomyelitis of ardbet72 yo female with PMH of chronic pain, HTN, depression/anxiety, Dm2 admitted with left 2nd digit osteomyelitis. She has been seen by ID, on Cefepime, Vanc during hospitalization. PICC line placed

## 2019-02-24 PROBLEM — G93.40 ACUTE ENCEPHALOPATHY: Status: ACTIVE | Noted: 2019-01-01

## 2019-02-25 NOTE — PROGRESS NOTES
TRANSFER - IN REPORT: 
 
Verbal report received from [facility] on Joanna Mathews  being received from ED for routine progression of care Report consisted of patients Situation, Background, Assessment and  
Recommendations(SBAR). Information from the following report(s) SBAR, Kardex, ED Summary and MAR was reviewed with the receiving nurse. Opportunity for questions and clarification was provided. Assessment completed upon patients arrival to unit and care assumed.

## 2019-02-25 NOTE — PROGRESS NOTES
Provided calming presence for patient Re-focused her on where she is at She repeated that she was in the hospital 
Offered encouragement Prayer which she particiapted in with closed eyes and lifting of hands Will come back an provided music therapy as patient requested Sofya Donovan, staff [address], [address]  /   [email or web address]

## 2019-02-25 NOTE — PROGRESS NOTES
Spoke with Fauzia Rivera RN from [facility], stated would fax [facility] medlist. 
[phone]

## 2019-02-25 NOTE — PROGRESS NOTES
Problem: Self Care Deficits Care Plan (Adult) Goal: *Acute Goals and Plan of Care (Insert Text) 1. Pt will complete hygiene with set up 2. Pt will complete bed mobility with min assist in prep for ADLs 3. Pt will maintain sitting balance for ADLs with CGA 4. Pt will demonstrate improved cognition to follow 100% basic commands with min or fewer for improved participation in session/ ADL independence 5. Pt will demonstrate improved cognition to complete functional tasks with min or fewer cues 6. Pt will transfer with min assist for ADLs 7. Pt will toilet with mod assist 
 
Timeframe: 7 days OCCUPATIONAL THERAPY: Initial Assessment 2/25/2019INPATIENT:   
[place] / [place] / Product Type: Medicare /  
  
NAME/AGE/GENDER: Philip Hurt is a 80 y.o. female PRIMARY DIAGNOSIS:  RAYMON (acute kidney injury) ([facility] [address] [N17.9] Acute encephalopathy [G93.40] <principal problem not specified> <principal problem not specified> 
 
  
ICD-10: Treatment Diagnosis:  
 · Generalized Muscle Weakness (M62.81) · Dizziness and Giddiness (R42) Precautions/Allergies: 
  falls Contrast agent [iodine]; Actifed [triprolidine-pseudoephedrine]; Allopurinol; Decongestant [pseudoephedrine hcl]; Lexapro [escitalopram oxalate]; Lyrica [pregabalin]; Minizide [prazosin-polythiazide]; Mobic [meloxicam]; Omnipaque [iohexol]; Sertraline; Spironolactone; and Sulfamethazine ASSESSMENT:  
Ms. Breezy Becerra was admitted from SNF with confusion, disorientation, and hallucinations. Pt was recently hospitalized, has not been at rehab long before admission. Pt disoriented, unable to even state her name but did respond to her name, unable to provide home or PLOF information at this time d/t cognitive status. Pt very confused with impaired memory, attention, processing, and problem solving and perseverated during conversation and ADLs, followed < 50% basic commands.  Per chart, son reports that pt is normally cognitively intact and \"sharp. \" Pt required max assistance for transfer to EOB, to scoot to the edge, and for sitting balance. Pt completed simple hygiene with set up with min assistance to end task d/t perseveration, min assistance to manage water cup and drink. Unable to safely attempt standing at this time d/t weakness and limited command following, returned to supine with mod assist. Pt unable to follow cues for formal MMT d/t cognitive status, strength and coordination appear grossly decreased as evidenced during functional tasks. Pt is below her functional baseline and would benefit from skilled OT services to address deficits, will likely require continued rehab post d/c. This section established at most recent assessment PROBLEM LIST (Impairments causing functional limitations): 1. Decreased Strength 2. Decreased ADL/Functional Activities 3. Decreased Transfer Abilities 4. Decreased Balance 5. Decreased Activity Tolerance 6. Decreased Cognition INTERVENTIONS PLANNED: (Benefits and precautions of occupational therapy have been discussed with the patient.) 1. Activities of daily living training 2. Adaptive equipment training 3. Balance training 4. Cognitive training 5. Therapeutic activity 6. Therapeutic exercise TREATMENT PLAN: Frequency/Duration: Follow patient 3 times/ week to address above goals. Rehabilitation Potential For Stated Goals: Fair RECOMMENDED REHABILITATION/EQUIPMENT: (at time of discharge pending progress): Due to the probability of continued deficits (see above) this patient will likely need continued skilled occupational therapy after discharge. Equipment:  
? None at this time OCCUPATIONAL PROFILE AND HISTORY:  
History of Present Injury/Illness (Reason for Referral): 
See H&P Past Medical History/Comorbidities: Ms. Sandrine Saravia  has a past medical history of Arthritis, Chronic diastolic congestive heart failure ([address] (1/16/2019), Chronic pain, Edema, GERD (gastroesophageal reflux disease) (1/16/2019), Gouty arthritis, History of MI (myocardial infarction) (1980's), Hypertension, Hypothyroidism, Mild heartburn, Neuropathy (1980's), Post-operative nausea and vomiting, and Type 2 diabetes mellitus without complication, without long-term current use of insulin ([address] (7/27/2018). She also has no past medical history of Adverse effect of anesthesia, Aneurysm ([address] Arrhythmia, Asthma, Autoimmune disease ([address] CAD (coronary artery disease), Cancer ([address] Chronic kidney disease, Chronic obstructive pulmonary disease ([address] Coagulation disorder ([address] Difficult intubation, Endocarditis, Ill-defined condition, Liver disease, Malignant hyperthermia due to anesthesia, Morbid obesity ([address] Pseudocholinesterase deficiency, Psychiatric disorder, PUD (peptic ulcer disease), Rheumatic fever, Seizures ([address] Sleep apnea, Stroke ([address] or Thromboembolus ([address] Ms. Mary Lui  has a past surgical history that includes hx partial thyroidectomy (1960); hx orthopaedic (Bilateral, 8054-4391); hx orthopaedic (Left, 1992); hx open cholecystectomy (1970); hx cyst removal (Right, 1950's); hx urological; hx heart catheterization (late 1979); hx hysterectomy (1972); hx gyn (1963); hx dilation and curettage (2056-0134); and hx left salpingo-oophorectomy (1965). Social History/Living Environment:  
Home Environment: Skilled nursing facility Care Facility [facility] One/Two Story Residence: One story Living Alone: No 
Support Systems: Child(cassidy) Patient Expects to be Discharged to[de-identified] Skilled nursing facility Current DME Used/Available at Home: None Prior Level of Function/Work/Activity: 
From SNF; pt unable to provide PLOF information d/t cognitive status Number of Personal Factors/Comorbidities that affect the Plan of Care: Expanded review of therapy/medical records (1-2):  MODERATE COMPLEXITY ASSESSMENT OF OCCUPATIONAL PERFORMANCE[de-identified]  
Activities of Daily Living:  
Basic ADLs (From Assessment) Complex ADLs (From Assessment) Feeding: Minimum assistance Oral Facial Hygiene/Grooming: Minimum assistance Bathing: Maximum assistance Upper Body Dressing: Moderate assistance Lower Body Dressing: Maximum assistance Toileting: Maximum assistance Instrumental ADL Meal Preparation: Total assistance Homemaking: Total assistance Medication Management: Total assistance Financial Management: Total assistance Grooming/Bathing/Dressing Activities of Daily Living Bed/Mat Mobility Supine to Sit: Maximum assistance Sit to Supine: Moderate assistance Scooting: Maximum assistance Most Recent Physical Functioning:  
Gross Assessment: 
AROM: Within functional limits PROM: Within functional limits Strength: Generally decreased, functional 
Coordination: Generally decreased, functional 
         
  
Posture: 
  
Balance: 
Sitting: Impaired Sitting - Static: Poor (constant support) Sitting - Dynamic: Poor (constant support) Bed Mobility: 
Supine to Sit: Maximum assistance Sit to Supine: Moderate assistance Scooting: Maximum assistance Wheelchair Mobility: 
  
Transfers: 
   
 
    
 
Patient Vitals for the past 6 hrs: 
 BP SpO2 Pulse 02/25/19 1138 138/78 94 % 82 Mental Status Neurologic State: Confused, Alert Orientation Level: Disoriented X4 Cognition: Decreased attention/concentration, Decreased command following, Impaired decision making, Memory loss Perception: Appears intact Perseveration: Perseverates during conversation, Perseverates during [employer] Physical Skills Involved: 
1. Balance 2. Strength 3. Activity Tolerance Cognitive Skills Affected (resulting in the inability to perform in a timely and safe manner): 1. Executive Function 2. Immediate Memory 3. Short Term Recall 4. Long Term Memory 5. Sustained Attention 6. Divided Attention 7. Comprehension Psychosocial Skills Affected: 1. Habits/Routines 2. Environmental Adaptation Number of elements that affect the Plan of Care: 5+:  HIGH COMPLEXITY CLINICAL DECISION MAKIN[address] [place] [address] AM-PAC 6 Clicks Daily Activity Inpatient Short Form How much help from another person does the patient currently need. .. Total A Lot A Little None 1. Putting on and taking off regular lower body clothing? [x] 1   [] 2   [] 3   [] 4  
2. Bathing (including washing, rinsing, drying)? [x] 1   [] 2   [] 3   [] 4  
3. Toileting, which includes using toilet, bedpan or urinal?   [x] 1   [] 2   [] 3   [] 4  
4. Putting on and taking off regular upper body clothing? [] 1   [x] 2   [] 3   [] 4  
5. Taking care of personal grooming such as brushing teeth? [] 1   [] 2   [x] 3   [] 4  
6. Eating meals? [] 1   [] 2   [x] 3   [] 4  
© , Trustees of [address] [place] [address], under license to [employer]. All rights reserved Score:  Initial: 11 Most Recent: X (Date: -- ) Interpretation of Tool:  Represents activities that are increasingly more difficult (i.e. Bed mobility, Transfers, Gait). Medical Necessity:    
· Patient is expected to demonstrate progress in strength, balance and functional technique to decrease assistance required with ADLs. Reason for Services/Other Comments: 
· Patient continues to require present interventions due to patient's inability to safely and independently complete ADLs. Use of outcome tool(s) and clinical judgement create a POC that gives a: MODERATE COMPLEXITY  
 
 
 
TREATMENT:  
(In addition to Assessment/Re-Assessment sessions the following treatments were rendered) Pre-treatment Symptoms/Complaints:   
Pain: Initial:  
Pain Intensity 1: 0  Post Session:  0 Assessment/Reassessment only, no treatment provided today Braces/Orthotics/Lines/Etc:  
· O2 Device: Room air Treatment/Session Assessment: · Response to Treatment:  No adverse reaction · Interdisciplinary Collaboration:  
o Occupational Therapist 
o Registered Nurse · After treatment position/precautions:  
o Supine in bed 
o Bed/Chair-wheels locked 
o Bed in low position 
o Call light within reach 
o RN notified 
o Side rails x 3  
· Compliance with Program/Exercises: Will assess as treatment progresses. · Recommendations/Intent for next treatment session: \"Next visit will focus on advancements to more challenging activities and reduction in assistance provided\". Total Treatment Duration: OT Patient Time In/Time Out Time In: 6506 Time Out: 1425 Marisol William, OT

## 2019-02-25 NOTE — PROGRESS NOTES
Patient was admitted to hospital from [facility]. Referral sent to [facility] to see if patient can return once medically stable. CM will continue to follow. Care Management Interventions PCP Verified by CM: Yes Mode of Transport at Discharge: BLS Transition of Care Consult (CM Consult): Discharge Planning, SNF Discharge Durable Medical Equipment: No 
Physical Therapy Consult: Yes Occupational Therapy Consult: Yes Current Support Network: Own Home Confirm Follow Up Transport: Family Plan discussed with Pt/Family/Caregiver: Yes Freedom of Choice Offered: Yes Discharge Location Discharge Placement: Skilled nursing facility

## 2019-02-25 NOTE — PROGRESS NOTES
02/25/19 0245 Dual Skin Pressure Injury Assessment Dual Skin Pressure Injury Assessment X Second Care Provider (Based on [facility]) Rajendra Rizo RN  
 
 
Pt arrived to floor via stretcher with son and ED nurse. Pt alert and orient to person only and prompting needed for pt to say own name. Dual skin assessment complete. Scattered scabs noted to BLE. Scab noted to left 3rd and 4th toes. 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 Scabs to right 1st and 2nd toe. Left heel with open pressure area. Cleaned with w/c, foam dressing cut to fit and wrapped with Kerlix. Left and right hand with scattered scabs. Left index finger with small open area wrapped with Mya. Scab to right pinky finger and left middle finger. Family reports pt will pick and open up scabs at times. PICC line noted to right upper arm new dressing placed as date was for 2/18/19 and site had some discoloration. Scattered discoloration noted to ABD and left side. Scratched to ABD mitts requested from materials. Scab to upper lip. Left buttock with small open area and right buttock with non-blanchable redness Allevyn in place. Rash under left breast antifungal cream applied. At [facility] skin prep used on scabs on right great toe, right heel, and left 2nd digit. Left heel and left index finger used Mesalt to wound bed. Calmoseptine ointment used on buttocks. Nystatin used on breast and ABD folds. Hospitalist aware of open areas and wound care consulted.

## 2019-02-25 NOTE — ED PROVIDER NOTES
77-year-old female patient prone to frequent UTIs presents with altered mental status and hallucinations. Patient usually pretty sharp conversational but has been less over the last few days. Last UTI was about a month ago and resulted in admission to the hospital. 
She had been being maintained on prophylactic nitrofurantoin but this was stopped due to the Cipro she is receiving for both a left finger ulcer and a left heel ulcer Patient reports urinary frequency but denies dysuria. Past Medical History:  
Diagnosis Date  Arthritis  Chronic diastolic congestive heart failure ([facility] [address] 1/16/2019  Chronic pain   
 bilat feet & hands  Edema BLE & bilat fingers; pt takes diuretic daily; pt states her swelling is related to the nerve disease she is being treated for at [facility]  GERD (gastroesophageal reflux disease) 1/16/2019  Gouty arthritis  History of MI (myocardial infarction) 1980's \"light heart attack\"; pt states MD at [facility] mentioned it to her; pt stated MD told her \"was on the back part of her heart & would not give her any problems\"; pt takes aspirin 81 mg daily  Hypertension   
 managed w/med  Hypothyroidism   
 s/p partial thyroidectomy 1960; managed w/med  Mild heartburn   
 takes tums prn  Neuropathy 1980's  
 pt states has been treated by [facility] for \"nerve problems in her feet & hands\"  Post-operative nausea and vomiting  Type 2 diabetes mellitus without complication, without long-term current use of insulin ([facility] [address] 7/27/2018 Past Surgical History:  
Procedure Laterality Date  HX CYST REMOVAL Right 1950's  
 upper eyelid  HX DILATION AND CURETTAGE  [id number]  
 ectopic pregnancy; right BSO done  HX HEART CATHETERIZATION  late 1979  
 pt states no stents [facility] [address]  
 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  
 nerve removed out of left leg [facility]  HX UROLOGICAL    
 cystoscopy Family History:  
Problem Relation Age of Onset  Cancer Mother  Heart Disease Father  Stroke Father Social History Socioeconomic History  Marital status:  Spouse name: Not on file  Number of children: Not on file  Years of education: Not on file  Highest education level: Not on file Social Needs  Financial resource strain: Not on file  Food insecurity - worry: Not on file  Food insecurity - inability: Not on file  Transportation needs - medical: Not on file  Transportation needs - non-medical: Not on file Occupational History  Not on file Tobacco Use  Smoking status: Never Smoker  Smokeless tobacco: Never Used Substance and Sexual Activity  Alcohol use: No  
 Drug use: No  
 Sexual activity: Not on file Other Topics Concern  Not on file Social History Narrative  Not on file ALLERGIES: Contrast agent [iodine]; Actifed [triprolidine-pseudoephedrine]; Allopurinol; Decongestant [pseudoephedrine hcl]; Lexapro [escitalopram oxalate]; Lyrica [pregabalin]; Minizide [prazosin-polythiazide]; Mobic [meloxicam]; Omnipaque [iohexol]; Sertraline; Spironolactone; and Sulfamethazine Review of Systems Constitutional: Negative for chills and fever. HENT: Negative for rhinorrhea and sore throat. Eyes: Negative for discharge and redness. Respiratory: Negative for cough and shortness of breath. Cardiovascular: Negative for chest pain and palpitations. Gastrointestinal: Positive for diarrhea and nausea. Negative for abdominal pain and vomiting. Genitourinary: Positive for frequency. Negative for dysuria. Musculoskeletal: Negative for arthralgias and back pain. Skin: Negative for rash. Neurological: Negative for dizziness and headaches. Psychiatric/Behavioral: Positive for confusion and hallucinations. All other systems reviewed and are negative. Vitals:  
 02/24/19 2004 02/24/19 2046 02/24/19 2117 02/24/19 2146 BP: 160/70 145/69 182/79 142/67 Pulse: 88 89 90 86 Resp:  23 Temp:      
SpO2: 95% 95% 95% 94% Weight:      
Height:      
      
 
Physical Exam  
Constitutional: She appears well-developed and well-nourished. No distress. HENT:  
Head: Normocephalic and atraumatic. Eyes: Conjunctivae are normal. Pupils are equal, round, and reactive to light. Right eye exhibits no discharge. Left eye exhibits no discharge. No scleral icterus. Neck: Normal range of motion. Neck supple. Cardiovascular: Normal rate, regular rhythm and normal heart sounds. Exam reveals no gallop. No murmur heard. Pulmonary/Chest: Effort normal and breath sounds normal. No respiratory distress. She has no wheezes. She has no rales. Abdominal: Soft. Bowel sounds are normal. There is no tenderness. There is no guarding. Musculoskeletal: Normal range of motion. She exhibits no edema. Neurological: She is alert. She exhibits normal muscle tone. cni 2-12 grossly Skin: Skin is warm and dry. She is not diaphoretic. Psychiatric: She has a normal mood and affect. Her speech is normal and behavior is normal. Judgment normal. She exhibits abnormal recent memory. Nursing note and vitals reviewed. MDM Number of Diagnoses or Management Options Acute kidney injury [facility]): Altered mental status, unspecified altered mental status type:  
Urinary tract infection without hematuria, site unspecified:  
Diagnosis management comments: Medical decision making note: Altered mental status with hallucinations, patient's frequent UTIs.   Urine dipstick positive for leukocyte esterase, however urine microscopic analysis is negative, this is perhaps clouded by the fact that she is already on one antibiotic for finger and heel ulcerations. Nevertheless patient has acute kidney injury with a creatinine of 3.3 compared to her usual baseline of 1. She'll be admitted to the hospital for hydration reversal of a chaotic and further evaluation as to whether she has a partially treated UTI or not. This concludes the \"medical decision making note\" part of this emergency department visit note. Procedures

## 2019-02-25 NOTE — PROGRESS NOTES
Per ED nurse pt had 2 formed stools in ED. Will wait to d/c precautions until stool sample is send to lab.

## 2019-02-25 NOTE — H&P
[address] [place] [address] 
HISTORY AND PHYSICAL Name:  Oren Handy 
MR#:  [id number] :  1933 ACCOUNT #:  [de-identified] ADMIT DATE:  2019 CHIEF COMPLAINT:  Altered mental status. HISTORY OF PRESENT ILLNESS:  The patient is unable to give reliable history, family is at bedside. She is an 80years old female patient with history of diastolic CHF, diabetes, peripheral neuropathy and most recently had left index finger osteomyelitis, currently on cefepime with a PICC line in place. She was recently discharged from the hospital and was recuperating at a nursing home. She was brought in for worsening mental status with confusion, disorientation, and at times hallucinations. The son states that the patient has no underlying dementia prior to her recent hospitalization. She was very sharp, alert and oriented. No fevers or chills. She has been having diarrhea over the last couple of days, watery stools and also reports abdominal pain. No nausea or vomiting. On presentation to the hospital, the patient had mildly elevated white blood cells at 12.1. Her creatinine was elevated at 3.35 with a BUN of 106, much higher than her baseline. The electrolytes are within normal reference range. Her urinalysis was unremarkable. PAST MEDICAL HISTORY: 
1. Chronic diastolic CHF. 2.  Left index finger osteomyelitis. 3.  Hyperthyroidism, status post thyroidectomy. 4.  Peripheral neuropathy. 5.  Diabetes type 2. 
6.  Coronary artery disease. 7.  GERD. PAST SURGICAL HISTORY: 
1.  Dilation and curettage. 2.  Hysterectomy. 3.  Salpingo-oophorectomy. 4.  Open cholecystectomy. 5.  Partial thyroidectomy. 6.  Cystoscopy. ALLERGIES:  INCLUDE CONTRAST, ALLOPURINOL, SERTRALINE, SPIRONOLACTONE. 
 
HOME MEDICATIONS:  Include, 
1. Cymbalta. 2.  Tylenol. 3.  Amlodipine 2.5 mg p.o. daily. 4.  Aspirin. 5.  Cefepime. 6.  Carvedilol 3.125 mg twice daily. 7.  Klonopin 0.5 mg three times daily. 8.  Lasix 40 mg. 
9.  Levothyroxine. 10.  Imodium p.r.n. 11.  Potassium. 12.  Chronic prednisone. SOCIAL HISTORY:  Unable to obtain. FAMILY HISTORY:  Unable to obtain. REVIEW OF SYSTEMS:  Unable to obtain. PHYSICAL EXAMINATION: 
GENERAL:  The patient is lying on the bed, pleasant, and confused, disoriented. VITAL SIGNS:  Temperature 97.6, blood pressure 142/67, pulse rate is 86, O2 sat 94% on room air. HEENT:  Sclerae is nonicteric. Oropharynx with dry mucous membrane. NECK:  Supple. LUNGS:  Clear to auscultation. HEART:  Normal S1, S2. No murmurs. ABDOMEN:  Soft. Diffuse and mildly tender. No bruits. Mild tenderness. Bowel sounds were active. Abdomen is mildly distended. EXTREMITIES:  No pitting edema. No calf muscle tenderness or swelling. MUSCULOSKELETAL:  She has a dressing on her left index finger, underlying osteomyelitis. NEUROLOGIC:  She is alert, but not oriented. Grossly no focal neurological deficits. Power is 5/5 in all extremities. SKIN:  Poor skin turgor with dry mucous membrane. LABORATORY DATA:  CBC:  White blood cells 12.1, hemoglobin 11.9, creatinine 3.35, BUN of 106, glucose of 297. ASSESSMENT AND PLAN: 
1. Metabolic encephalopathy, in the setting of worsening kidney function with uremia (BUN of more than 106) and diarrhea. I suspect this is the cause of the patient's worsening mental status. Although, her mental status has not gone back to her normal self since her discharge from her recent hospitalization where she had urinary tract infection and left index finger osteomyelitis. Currently, no fever and her white blood cells is essentially normal.  She is on cefepime for her osteomyelitis, which she will continue. Her urine analysis reviewed and showing no pyuria. I will give her intravenous fluids, recheck her BMP in the morning and do stool workup. 2.  Acute kidney injury superimposed on chronic kidney disease stage I-II, current creatine level much higher than her baseline. Likely prerenal, hold lasix and give IVF 3. Diarrhea. We will send stool for C. difficile, stool culture, and stool fecal leukocytes to rule out any infectious diarrhea likely contributing to her dehydration and worsening kidney function. 4.  Diastolic congestive heart failure. Currently with no evidence of exacerbation. We will hold the Lasix in the setting of worsening kidney function and dehydration. She is clinically volume depleted. 5.  Diabetes type 2. We will use insulin. 6.  Left index osteomyelitis. Continue cefepime. She has a PICC line in place. 7.  Deep venous thrombosis prophylaxis. We will use heparin subcutaneously. MD CLAY Oden/JO [email or web address] 
D:  02/24/2019 23:57 
T:  02/25/2019 1:13 
JOB #:  [id number]

## 2019-02-25 NOTE — WOUND CARE
Patient seen for left index finger wound and left heel wound. Heel is clean and pink 2.5x2.5x0.2 cm. Silicone foam in place, just changed today. Re secured. Floated heel off bed. Left index finger is dry red base 1cm at nail base/distal tip of finger. No drainage. Light redness noted to tiara wound skin. Recommend keeping finger clean and dry. May use band-aid to protect from bumping/trauma if needed. Patient awake and cooperative. She did not ask questions. Feel back to sleep frequently during evaluation.

## 2019-02-25 NOTE — PROGRESS NOTES
C-Diff testing has been ordered but the test has not been completed. It did not meet testing criteria because: · The stool was not liquid. Stool was solid.

## 2019-02-25 NOTE — ED TRIAGE NOTES
Per EMS: Felix Sherwood reported that pt was \"not acting herself\" for a few days, hallucinations and confusion that is not patient's baseline, pt has hx of confusion with UTI and they thought it could be related to this. VS stable with [facility]. Afebrile with EMS. No IV established at this time. Pt denies chest pain and shob. States \"I hurt in my stomach and my eyes, and my waist down\".

## 2019-02-25 NOTE — PROGRESS NOTES
Hospitalist contacted. Hospitalist notified of sore in mouth from what appears to be the pt biting tongue no new orders at this time for sore in mouth. Hospitalist aware of pt complains of ABD tightness and no new orders at this time and just follow up on results from stool sample when collected. Request CXR for PICC line verification and hospitalist okayed use of PICC line without CXR as line was in use at nursing facility. Hospitalist aware of wound and order wound care consult. Also, new order for Humalog sliding scale AC and HS and a glucose check now as glucose was high in ED.

## 2019-02-25 NOTE — PROGRESS NOTES
Hourly rounding completed on this shift. No new complaints at this time. All needs met. No stool during this shift for sample. Prevolon Boots placed for wound prevention on BLE and bed alarm in place for safety. Pt is currently resting in bed. Will continue to monitor and give report to oncoming nurse.

## 2019-02-25 NOTE — PROGRESS NOTES
INTERNAL MEDICINE PROGRESS NOTE Subjective:  
Daily Progress Note: 2/25/2019 3:58 PM 
 
 80years old female patient with history of diastolic CHF, diabetes, peripheral neuropathy and most recently had left index finger osteomyelitis, currently on cefepime with a PICC line in place. She was recently discharged from the hospital and was recuperating at a nursing home. She was brought in for worsening mental status with confusion, disorientation, and at times hallucinations. The son states that the patient has no underlying dementia prior to her recent hospitalization. She was very sharp, alert and oriented. No fevers or chills. She has been having diarrhea over the last couple of days, watery stools and also reports abdominal pain. No nausea or vomiting. On presentation to the hospital, the patient had mildly elevated white blood cells at 12.1. Her creatinine was elevated at 3.35 with a BUN of 106, much higher than her baseline. The electrolytes are within normal reference range. Her urinalysis was unremarkable. Today, feels better. No acute events. No diarrhea or vomiting. Has some nausea. Meds: 
Current Facility-Administered Medications Medication Dose Route Frequency  amLODIPine (NORVASC) tablet 5 mg  5 mg Oral DAILY  aspirin delayed-release tablet 81 mg  81 mg Oral QHS  carvedilol (COREG) tablet 6.25 mg  6.25 mg Oral BID WITH MEALS  
 levothyroxine (SYNTHROID) tablet 75 mcg  75 mcg Oral ACB  predniSONE (DELTASONE) tablet 10 mg  10 mg Oral BID  acetaminophen (TYLENOL) tablet 500 mg  500 mg Oral Q6H PRN  
 sodium chloride (NS) flush 5-40 mL  5-40 mL IntraVENous Q8H  
 sodium chloride (NS) flush 5-40 mL  5-40 mL IntraVENous PRN  
 lactated Ringers infusion  50 mL/hr IntraVENous CONTINUOUS  
 alcohol 62% (NOZIN) nasal  1 Ampule  1 Ampule Topical Q12H  cefepime (MAXIPIME) 1 g in 0.9% sodium chloride (MBP/ADV) 50 mL ADV  1 g IntraVENous Q24H  insulin lispro (HUMALOG) injection   SubCUTAneous AC&HS  heparin (porcine) injection 5,000 Units  5,000 Units SubCUTAneous Q12H  
 loperamide (IMODIUM) capsule 4 mg  4 mg Oral BID  tuberculin injection 5 Units  5 Units IntraDERMal ONCE  
 
 
 
Objective:  
Vitals: 
Visit Vitals /69 Pulse 64 Temp 97.9 °F (36.6 °C) Resp 17 Ht 5' 4\" (1.626 m) Wt 74.8 kg (165 lb) SpO2 96% BMI 28.32 kg/m² O2 Device: Room air Temp (24hrs), Av.9 °F (36.6 °C), Min:97.6 °F (36.4 °C), Max:98 °F (36.7 °C) I/O: 
No intake/output data recorded.  1901 -  0700 In: 205 [I.V.:205] Out: - Exam: 
General appearance: awake, moderate distress, appears stated age - Pale, No icterus, Disoriented. Head: Normocephalic, without obvious abnormality, atraumatic Back: symmetric, no curvature. ROM normal. No CVA tenderness. Lungs: clear to auscultation bilaterally - Diminished bs bibasilar. Heart: regular rate and rhythm, S1, S2 normal, no murmur, click, rub or gallop. Abdomen: soft, no tenderness, no distension, normal bowel sound, no masses, no organomegaly Extremities: atraumatic, no cyanosis - Bilateral lower limbs edema none. Skin: No rashes or ulceration. Neurologic: Grossly intact - perrla, moves 4 limbs Data Review (Labs): 
Recent Labs  
  19 WBC 12.1* HGB 11.9 HCT 37.1  Recent Labs  
  19 
0405 19  141  
K 3.7 4.2  104 CO2 25 27 * 297* BUN 94* 106* CREA 3.13* 3.35* Assessment/Plan:  
 
Patient Active Problem List  
 Diagnosis Date Noted  Acute encephalopathy 2019  Finger infection 2019  Leukocytosis 2019  RAYMON (acute kidney injury) ([facility] [address] 2019  Osteomyelitis of finger ([facility] [address] 2019  Closed rib fracture 2019  Acute respiratory failure with hypoxia ([facility] [address] 2019  Acute prerenal azotemia 2019  Chronic diastolic congestive heart failure ([facility] 01/16/2019  Acquired hypothyroidism 01/16/2019  GERD (gastroesophageal reflux disease) 01/16/2019  Mixed hyperlipidemia 07/27/2018  DM2 (diabetes mellitus, type 2) ([facility] 07/27/2018  Aortic valve sclerosis 01/26/2018  Rapid heart rate 01/26/2018  Essential hypertension with goal blood pressure less than 130/85 10/27/2017  Murmur 10/04/2017  Bilateral leg edema 10/04/2017  Swelling 10/04/2017  Abnormal EKG 10/04/2017  Urinary tract infection, site not specified 04/22/2015 1- RAYMON a/w diarrhea, likely prerenal, some improvement today, non oliguric. Recheck AM lab and continue gentle if hydration 2- Ac non specific diarrhea, resolved. Stool studies pending. Pt has recent osteomyelitis and on antibiotics. 3- Acute metabolic encephalopathy, awake. Avoid meds affecting mental status, to follow. 4- DM, controlled Disposition: TBD Signed By: Al Alejandra MD   
 February 25, 2019

## 2019-02-25 NOTE — PROGRESS NOTES
Hourly rounds completed during shift. Pt oriented to self and place only. Stool sample sent to lab. Patrices on d/t scratching/picking skin. All needs met, bed locked in low position and call light within reach. Will give report to oncoming RN.

## 2019-02-25 NOTE — PROGRESS NOTES
Hospitalist aware of glucose 228.  No new order at this time will start sliding scale in before breakfast.

## 2019-02-25 NOTE — ED NOTES
TRANSFER - OUT REPORT: 
 
Verbal report given to Tiffanie(name) on Kvng Lauren  being transferred to [facility](unit) for routine progression of care Report consisted of patients Situation, Background, Assessment and  
Recommendations(SBAR). Information from the following report(s) ED Summary was reviewed with the receiving nurse. Lines: PICC Double Lumen 89/81/40 Right;Basilic (Active) Peripheral IV 02/24/19 Right Antecubital (Active) Site Assessment Clean, dry, & intact 2/24/2019  8:31 PM  
Phlebitis Assessment 0 2/24/2019  8:31 PM  
Infiltration Assessment 0 2/24/2019  8:31 PM  
Dressing Status Clean, dry, & intact 2/24/2019  8:31 PM  
Dressing Type Transparent 2/24/2019  8:31 PM  
Hub Color/Line Status Green;Capped;Flushed;Patent 2/24/2019  8:31 PM  
Action Taken Blood drawn 2/24/2019  8:31 PM  
  
 
Opportunity for questions and clarification was provided. Patient transported with: 
 [employer]

## 2019-02-26 PROBLEM — R19.7 ACUTE DIARRHEA: Status: ACTIVE | Noted: 2019-01-01

## 2019-02-26 PROBLEM — G93.41 ACUTE METABOLIC ENCEPHALOPATHY: Status: ACTIVE | Noted: 2019-01-01

## 2019-02-26 PROBLEM — B37.0 ORAL THRUSH: Status: ACTIVE | Noted: 2019-01-01

## 2019-02-26 NOTE — PROGRESS NOTES
Hospitalist Progress Note Patient: Philip France MRN: [id number]  SSN: [id number] YOB: 1933  Age: 80 y.o. Sex: female Admit Date: 2/24/2019 LOS: 2 days Subjective:  
 
80year old with chronic dCHF, left index finger OM (on IV abx), HTN, hypothyroidism, and chronic pain on Methadone presented to the ER from rehab with 4 days of acute diarrhea, abdominal pain, confusion, and paranoia. Encephalopathy work up has been unremarkable. 2/26 - She remains confused, but calmer per her son. The patient has outbursts of crying worried that \"we are trying to hurt her. \" Denies abdominal pain and diarrhea. Denies CP/SOB. Review of systems negative except stated above. Objective:  
 
Visit Vitals /81 Pulse 78 Temp 98.6 °F (37 °C) Resp 18 Ht 5' 4\" (1.626 m) Wt 67.7 kg (149 lb 4.8 oz) SpO2 98% BMI 25.63 kg/m² Oxygen Therapy O2 Sat (%): 98 % (02/26/19 0831) Pulse via Oximetry: 88 beats per minute (02/24/19 2346) O2 Device: Room air (02/24/19 2346) Intake and Output:  
 
Intake/Output Summary (Last 24 hours) at 2/26/2019 1244 Last data filed at 2/26/2019 0251 Gross per 24 hour Intake  Output 1100 ml Net -1100 ml Physical Exam:  
GENERAL: alert, cooperative, no distress, appears stated age EYE: conjunctivae/corneas clear. PERRL. THROAT & NECK: normal and no erythema or exudates noted. LUNG: clear to auscultation bilaterally HEART: regular rate and rhythm, S1S2, no murmur, no JVD ABDOMEN: soft, non-tender, non-distended. Bowel sounds normal.  
EXTREMITIES: 1+ BLE edema, 2+ pedal/radial pulses bilaterally SKIN: Scratch marks on extremities NEUROLOGIC: A&Ox2 (person, place). Cranial nerves 2-12 grossly intact. Lab/Data Review: 
Recent Results (from the past 24 hour(s)) GLUCOSE, POC Collection Time: 02/25/19  3:36 PM  
Result Value Ref Range Glucose (POC) 185 (H) 65 - 100 mg/dL GLUCOSE, POC  
 Collection Time: 02/25/19  9:27 PM  
Result Value Ref Range Glucose (POC) 285 (H) 65 - 100 mg/dL CBC W/O DIFF Collection Time: 02/26/19  4:52 AM  
Result Value Ref Range WBC 11.1 4.3 - 11.1 K/uL  
 RBC 3.93 (L) 4.05 - 5.2 M/uL  
 HGB 11.2 (L) 11.7 - 15.4 g/dL HCT 35.7 (L) 35.8 - 46.3 % MCV 90.8 79.6 - 97.8 FL  
 MCH 28.5 26.1 - 32.9 PG  
 MCHC 31.4 31.4 - 35.0 g/dL  
 RDW 15.6 (H) 11.9 - 14.6 % PLATELET 770 750 - 547 K/uL MPV 11.2 9.4 - 12.3 FL ABSOLUTE NRBC 0.00 0.0 - 0.2 K/uL METABOLIC PANEL, BASIC Collection Time: 02/26/19  4:52 AM  
Result Value Ref Range Sodium 144 136 - 145 mmol/L Potassium 3.3 (L) 3.5 - 5.1 mmol/L Chloride 108 (H) 98 - 107 mmol/L  
 CO2 28 21 - 32 mmol/L Anion gap 8 7 - 16 mmol/L Glucose 128 (H) 65 - 100 mg/dL BUN 86 (H) 8 - 23 MG/DL Creatinine 2.89 (H) 0.6 - 1.0 MG/DL  
 GFR est AA 20 (L) >60 ml/min/1.73m2 GFR est non-AA 16 (L) >60 ml/min/1.73m2 Calcium 8.7 8.3 - 10.4 MG/DL  
GLUCOSE, POC Collection Time: 02/26/19  8:16 AM  
Result Value Ref Range Glucose (POC) 165 (H) 65 - 100 mg/dL GLUCOSE, POC Collection Time: 02/26/19 11:31 AM  
Result Value Ref Range Glucose (POC) 265 (H) 65 - 100 mg/dL Imaging: No results found. No results found for this visit on 02/24/19. Cultures: All Micro Results Procedure Component Value Units Date/Time [place] Prom [[id number]] Collected:  02/25/19 1050 Order Status:  Completed Specimen:  Stool Updated:  02/26/19 4912 Special Requests: NO SPECIAL REQUESTS Culture result:    
  NO GROWTH OF SALMONELLA OR SHIGELLA IS EVIDENT ON FIRST READING, FINAL REPORT TO FOLLOW AFTER FURTHER INCUBATION OF CULTURE  
     
 CULTURE, URINE [[id number]] Collected:  02/24/19 2059 Order Status:  Completed Specimen:  Urine from Clean catch Updated:  02/26/19 0800 Special Requests: NO SPECIAL REQUESTS Culture result: NO GROWTH 1 DAY C. DIFFICILE AG & TOXIN A/B [[id number]] Order Status:  Canceled Specimen:  Stool CULTURE, URINE [[id number]] Order Status:  Canceled Specimen:  Urine from Clean catch Assessment/Plan:  
 
Principal Problem: 
  RAYMON (acute kidney injury) ([facility] (2/5/2019) - Likely prerenal due to dehydration with diarrhea - Slowly improving 
- Increase LR to 100 ml/hr - Check BMP daily Active Problems: 
  Leukocytosis (2/5/2019) - Resolved - Likely reactive/dehydration 
- Continue Cefepime for finger infection - Check CBC daily Acute metabolic encephalopathy (0/64/3605) - I suspect this is due to opiate withdrawal 
- Restart Methadone - UA unremarkable - CXR not done 
- TSH normal 
- Recommended to family to keep window open and patient awake during the day 
- Continue to monitor Acute diarrhea (2/26/2019) - Slowly improving - Stool WBC & Culture negative - Continue Immodium - Restart Methadone in case it's withdrawal 
 
  Finger infection (2/5/2019) 
- Continue Cefepime Oral Thrush (2/26/2019) - Likely due to long term abx + steroids 
- Start Nystatin S/S 
- Start Probiotics Essential hypertension with goal blood pressure less than 130/85 (10/27/2017) - Stable - Continue Coreg 
- Continue Amlodipine DM2 (diabetes mellitus, type 2) ([facility] (7/27/2018) - Stable - Continue Humalog SSI Chronic diastolic congestive heart failure ([facility] (1/16/2019) - No acute issues - Continue Coreg Acquired hypothyroidism (1/16/2019) 
- TSH normal 
- Continue Levothyroxine GERD (gastroesophageal reflux disease) (1/16/2019) Mixed hyperlipidemia (7/27/2018) Dispo - Restart Methadone. Continue Cefepime. Hopefully back to rehab in 2-3 days. DIET DIABETIC CONSISTENT CARB Regular DVT Prophylaxis: Heparin Signed By: Coni Cortes DO February 26, 2019

## 2019-02-26 NOTE — PROGRESS NOTES
Interdisciplinary Rounds completed 02/26/19. Nursing, Case Management, Physician and PT present. Plan of care reviewed and updated. Per son, the current PICC line was placed at rehab because the one placed during her last admission was pulled out while in rehab. CXR ordered to confirm placement.

## 2019-02-26 NOTE — PROGRESS NOTES
Hourly rounding completed. Patient rested during the night. Patient's son has questions as to why some of patient's home medications have not be restarting, Klonopin and methadone particularly. Patient's son would like to speak with MD regarding patient's treatment plan. Will advise on coming nurse of son's concerns. All needs met at this time.

## 2019-02-26 NOTE — PROGRESS NOTES
Hourly rounds completed during shift. Methadone and Klonopin  added back to Regimen. Pt more alert today. Mittens taken off, pt tolerating well. All needs met, bed locked in low position and call light within reach. Will give report to oncoming RN.

## 2019-02-26 NOTE — PROGRESS NOTES
Problem: Mobility Impaired (Adult and Pediatric) Goal: *Acute Goals and Plan of Care (Insert Text) STG: 
(1.)Ms. Mariel Grullon will supine to sit and sit to supine with CONTACT GUARD ASSIST within 3 treatment day(s). (2.)Ms. Mariel Grullon will transfer from bed to chair and chair to bed with STAND BY ASSIST using the least restrictive device within 3 treatment day(s). (3.)Ms. Mariel Grullon will ambulate with CONTACT GUARD ASSIST for 15 feet with the least restrictive device within 3 treatment day(s). LTG: 
(1.)Ms. Mariel Grullon will move from supine to sit and sit to supine  in bed with STAND BY ASSIST within 7 treatment day(s). (2.)Ms. Mariel Grullon will transfer from bed to chair and chair to bed with SUPERVISION using the least restrictive device within 7 treatment day(s). (3.)Ms. Mariel Grullon will ambulate with STAND BY ASSIST for 100 feet with the least restrictive device within 7 treatment day(s). ________________________________________________________________________________________________ PHYSICAL THERAPY: Initial Assessment and AM 2/26/2019INPATIENT: PT Visit Days : 1 [place] / [place] B / Product Type: Medicare /   
  
NAME/AGE/GENDER: Mac Harrington is a 80 y.o. female PRIMARY DIAGNOSIS: RAYMON (acute kidney injury) ([facility] [N17.9] Acute encephalopathy [G93.40] <principal problem not specified> <principal problem not specified> 
 
  
ICD-10: Treatment Diagnosis:  
 · Generalized Muscle Weakness (M62.81) · Difficulty in walking, Not elsewhere classified (R26.2) · Repeated Falls (R29.6) · History of falling (Z91.81) Precaution/Allergies: 
Contrast agent [iodine]; Actifed [triprolidine-pseudoephedrine]; Allopurinol; Decongestant [pseudoephedrine hcl]; Lexapro [escitalopram oxalate]; Lyrica [pregabalin]; Minizide [prazosin-polythiazide]; Mobic [meloxicam]; Omnipaque [iohexol]; Sertraline; Spironolactone; and Sulfamethazine ASSESSMENT:  
 Ms. Enio Bowman is supine in bed upon contact and agreeable to PT evaluation and treatment this morning. Pt is oriented to person only this session with son at bedside. Pt is from SNF where she has been ambulating with walker and assist. At baseline, pt lives with her daughter who is a nursing here at [facility] on 2nd floor. Pt is ambulatory with use of SPC with reports of 3 falls in past 6 months. Pt does perseverate during session and difficult to keep on task at times. Pt transitioned supine to sit EOB with Chacha and cues for technique. Pt performed STS to RW with CGA-Chacha and able to take small shuffled steps to bedside chair with RW and CGA-Chacha. Pt with some unsteadiness noted with gait. Pt returned to sitting with Chacha for controlled descent to chair. Pt left sitting up with all needs met and within reach with son at bedside. PCT notified. Austin Medrano will benefit from skilled PT (medically necessary) to address decreased strength, decreased balance, decreased functional tolerance, decreased cardiopulmonary endurance affecting participation in basic ADLs and functional tasks. This section established at most recent assessment PROBLEM LIST (Impairments causing functional limitations): 1. Decreased Strength 2. Decreased ADL/Functional Activities 3. Decreased Transfer Abilities 4. Decreased Ambulation Ability/Technique 5. Decreased Balance 6. Decreased Cognition INTERVENTIONS PLANNED: (Benefits and precautions of physical therapy have been discussed with the patient.) 1. Balance Exercise 2. Bed Mobility 3. Family Education 4. Gait Training 5. Home Exercise Program (HEP) 6. Neuromuscular Re-education/Strengthening 7. Range of Motion (ROM) 8. Therapeutic Activites 9. Therapeutic Exercise/Strengthening 10. Transfer Training TREATMENT PLAN: Frequency/Duration: 3 times a week for duration of hospital stay Rehabilitation Potential For Stated Goals: Fair RECOMMENDED REHABILITATION/EQUIPMENT: (at time of discharge pending progress): Due to the probability of continued deficits (see above) this patient will likely need continued skilled physical therapy after discharge. Equipment:  
? None at this time HISTORY:  
History of Present Injury/Illness (Reason for Referral): 
See H&P below The patient is unable to give reliable history, family is at bedside. She is an 80years old female patient with history of diastolic CHF, diabetes, peripheral neuropathy and most recently had left index finger osteomyelitis, currently on cefepime with a PICC line in place. She was recently discharged from the hospital and was recuperating at a nursing home. She was brought in for worsening mental status with confusion, disorientation, and at times hallucinations. The son states that the patient has no underlying dementia prior to her recent hospitalization. She was very sharp, alert and oriented. No fevers or chills. She has been having diarrhea over the last couple of days, watery stools and also reports abdominal pain. No nausea or vomiting. Past Medical History/Comorbidities: Ms. Kaushik Gonzales  has a past medical history of Arthritis, Chronic diastolic congestive heart failure ([address] (1/16/2019), Chronic pain, Edema, GERD (gastroesophageal reflux disease) (1/16/2019), Gouty arthritis, History of MI (myocardial infarction) (1980's), Hypertension, Hypothyroidism, Mild heartburn, Neuropathy (1980's), Post-operative nausea and vomiting, and Type 2 diabetes mellitus without complication, without long-term current use of insulin ([address] (7/27/2018).  She also has no past medical history of Adverse effect of anesthesia, Aneurysm ([address] Arrhythmia, Asthma, Autoimmune disease ([address] CAD (coronary artery disease), Cancer ([address] Chronic kidney disease, Chronic obstructive pulmonary disease ([address] Coagulation disorder ([address] Difficult intubation, Endocarditis, Ill-defined condition, Liver disease, Malignant hyperthermia due to anesthesia, Morbid obesity ([address] Pseudocholinesterase deficiency, Psychiatric disorder, PUD (peptic ulcer disease), Rheumatic fever, Seizures ([address] Sleep apnea, Stroke ([facility] or Thromboembolus ([facility] Ms. Adalgisa Martinez  has a past surgical history that includes hx partial thyroidectomy (1960); hx orthopaedic (Bilateral, 2541-5147); hx orthopaedic (Left, 1992); hx open cholecystectomy (1970); hx cyst removal (Right, 1950's); hx urological; hx heart catheterization (late 1979); hx hysterectomy (1972); hx gyn (1963); hx dilation and curettage (4423-4572); and hx left salpingo-oophorectomy (1965). Social History/Living Environment:  
Home Environment: Skilled nursing facility Care Facility Name: [facility] One/Two Story Residence: One story Living Alone: No 
Support Systems: Skilled nursing facility Patient Expects to be Discharged to[de-identified] Skilled nursing facility Current DME Used/Available at Home: Cane, straight Prior Level of Function/Work/Activity: 
Lives with daughter, use of SPC for gait, 3 falls, use of SPC at baseline Number of Personal Factors/Comorbidities that affect the Plan of Care: 3+: HIGH COMPLEXITY EXAMINATION:  
Most Recent Physical Functioning:  
Gross Assessment: 
Strength: Generally decreased, functional 
Coordination: Generally decreased, functional 
         
  
Posture: 
  
Balance: 
Sitting - Static: Good (unsupported) Standing: Impaired;Pull to stand; With support Standing - Static: Good;Constant support Standing - Dynamic : Fair Bed Mobility: 
Supine to Sit: Minimum assistance Wheelchair Mobility: 
  
Transfers: 
Sit to Stand: Contact guard assistance;Minimum assistance Stand to Sit: Contact guard assistance;Minimum assistance Gait: 
  
Base of Support: Narrowed Speed/Shannon: Shuffled; Slow Step Length: Left shortened;Right shortened Gait Abnormalities: Decreased step clearance;Shuffling gait Distance (ft): 3 Feet (ft) Assistive Device: Walker, rolling Ambulation - Level of Assistance: Contact guard assistance;Minimal assistance Interventions: Safety awareness training; Tactile cues; Verbal cues Body Structures Involved: 1. Nerves 2. Bones 3. Joints 4. Muscles 5. Ligaments Body Functions Affected: 1. Mental 
2. Sensory/Pain 3. Cardio 4. Respiratory 5. Neuromusculoskeletal 
6. Movement Related Activities and Participation Affected: 1. Learning and Applying Knowledge 2. General Tasks and Demands 3. Mobility 4. Self Care 5. Domestic Life 6. Interpersonal Interactions and Relationships 7. Community, Social and [place] Number of elements that affect the Plan of Care: 4+: HIGH COMPLEXITY CLINICAL PRESENTATION:  
Presentation: Evolving clinical presentation with changing clinical characteristics: MODERATE COMPLEXITY CLINICAL DECISION MAKIN[address] [place] [id number] AM-PAC 6 Clicks Basic Mobility Inpatient Short Form How much difficulty does the patient currently have. .. Unable A Lot A Little None 1. Turning over in bed (including adjusting bedclothes, sheets and blankets)? [] 1   [] 2   [x] 3   [] 4  
2. Sitting down on and standing up from a chair with arms ( e.g., wheelchair, bedside commode, etc.)   [] 1   [] 2   [x] 3   [] 4  
3. Moving from lying on back to sitting on the side of the bed? [] 1   [] 2   [x] 3   [] 4 How much help from another person does the patient currently need. .. Total A Lot A Little None 4. Moving to and from a bed to a chair (including a wheelchair)? [] 1   [] 2   [x] 3   [] 4  
5. Need to walk in hospital room? [] 1   [x] 2   [] 3   [] 4  
6. Climbing 3-5 steps with a railing? [x] 1   [] 2   [] 3   [] 4  
© , Trustees of [address] [place] [id number], under license to [employer]. All rights reserved Score:  Initial: 15 Most Recent: X (Date: -- ) Interpretation of Tool:  Represents activities that are increasingly more difficult (i.e. Bed mobility, Transfers, Gait). Medical Necessity:    
· Patient is expected to demonstrate progress in strength, balance, coordination and functional technique to decrease assistance required with gait, transfers, and functional, mobility. Zaki Sastalin Reason for Services/Other Comments: 
· Patient continues to require skilled intervention due to  decreased strength, decreased balance, decreased functional tolerance, decreased cardiopulmonary endurance affecting participation in basic ADLs and functional tasks. Use of outcome tool(s) and clinical judgement create a POC that gives a: Clear prediction of patient's progress: LOW COMPLEXITY  
  
 
 
 
TREATMENT:  
(In addition to Assessment/Re-Assessment sessions the following treatments were rendered) Pre-treatment Symptoms/Complaints:  None, confused Pain: Initial:  
Pain Intensity 1: 0  Post Session:  0/10 In addition to evaluation: 
Therapeutic Activity: (    10 minutes): Therapeutic activities including Bed transfers, Chair transfers, Ambulation on level ground and cues for ease and safety of transfers to improve mobility, strength, balance and coordination. Required moderate Safety awareness training; Tactile cues; Verbal cues to promote static and dynamic balance in standing and promote coordination of bilateral, upper extremity(s), lower extremity(s). Braces/Orthotics/Lines/Etc:  
· IV · purwick · O2 Device: Room air Treatment/Session Assessment:   
· Response to Treatment:  Amb 3 ft with RW and SERGIO-Chacha · Interdisciplinary Collaboration:  
o Physical Therapist 
o Registered Nurse 
o Certified Nursing Assistant/Patient Care Technician · After treatment position/precautions:  
o Up in chair 
o Bed alarm/tab alert on 
o Bed/Chair-wheels locked 
o Bed in low position 
o Call light within reach 
o PCT notified · Compliance with Program/Exercises: Will assess as treatment progresses · Recommendations/Intent for next treatment session: \"Next visit will focus on advancements to more challenging activities and reduction in assistance provided\". Total Treatment Duration: PT Patient Time In/Time Out Time In: 1008 Time Out: 1040 Jocelyn Davis

## 2019-02-26 NOTE — PROGRESS NOTES
Community Care Team documentation for patient in [facility]    The information below provided by: [facility] 2/26/19    PT Update: 10 TO 20FT/RW/CGA - MOD A TERESSA'S - Deena Almaguer [place] [address] 2/24        Nursing Update:Medically stable      Discharge Date:TBD      Assign to [facility] Manager:RANDELL

## 2019-02-27 NOTE — PROGRESS NOTES
Hospitalist Progress Note Patient: Alexa Hayes MRN: [id number]  SSN: [id number] YOB: 1933  Age: 80 y.o. Sex: female Admit Date: 2/24/2019 LOS: 3 days Subjective:  
 
80year old with chronic dCHF, left index finger OM (on IV abx), HTN, hypothyroidism, and chronic pain on Methadone presented to the ER from rehab with 4 days of acute diarrhea, abdominal pain, confusion, and paranoia. Encephalopathy work up has been unremarkable. 2/26 - She remains confused, but calmer per her son. The patient has outbursts of crying worried that \"we are trying to hurt her. \" Denies abdominal pain and diarrhea. Denies CP/SOB. 2/27 - She is more alert and less confused today. No paranoia. Denies pain. Diarrhea improved. Review of systems negative except stated above. Objective:  
 
Visit Vitals /78 Pulse 82 Temp 98.3 °F (36.8 °C) Resp 18 Ht 5' 4\" (1.626 m) Wt 67.1 kg (148 lb) SpO2 97% BMI 25.40 kg/m² Oxygen Therapy O2 Sat (%): 97 % (02/27/19 1157) Pulse via Oximetry: 88 beats per minute (02/24/19 2346) O2 Device: Room air (02/27/19 1116) Intake and Output:  
 
Intake/Output Summary (Last 24 hours) at 2/27/2019 1507 Last data filed at 2/27/2019 3746 Gross per 24 hour Intake  Output 800 ml Net -800 ml Physical Exam:  
GENERAL: alert, cooperative, no distress, appears stated age EYE: conjunctivae/corneas clear. PERRL. THROAT & NECK: normal and no erythema or exudates noted. LUNG: clear to auscultation bilaterally HEART: regular rate and rhythm, S1S2, no murmur, no JVD ABDOMEN: soft, non-tender, non-distended. Bowel sounds normal.  
EXTREMITIES: 1+ BLE edema, 2+ pedal/radial pulses bilaterally SKIN: Scratch marks on extremities NEUROLOGIC: A&Ox3. Cranial nerves 2-12 grossly intact. Lab/Data Review: 
Recent Results (from the past 24 hour(s)) GLUCOSE, POC Collection Time: 02/26/19  5:21 PM  
Result Value Ref Range Glucose (POC) 326 (H) 65 - 100 mg/dL GLUCOSE, POC Collection Time: 02/26/19  9:07 PM  
Result Value Ref Range Glucose (POC) 356 (H) 65 - 100 mg/dL GLUCOSE, POC Collection Time: 02/27/19  8:05 AM  
Result Value Ref Range Glucose (POC) 141 (H) 65 - 100 mg/dL GLUCOSE, POC Collection Time: 02/27/19 10:53 AM  
Result Value Ref Range Glucose (POC) 251 (H) 65 - 100 mg/dL PLEASE READ & DOCUMENT PPD TEST IN 48 HRS Collection Time: 02/27/19 11:48 AM  
Result Value Ref Range PPD neg Negative  
 mm Induration 0 mm Imaging: Xr Chest Sngl V Result Date: 2/26/2019 IMPRESSION: Lungs underinflated, PICC line tip overlies lower superior vena cava. No results found for this visit on 02/24/19. Cultures: All Micro Results Procedure Component Value Units Date/Time [place] [[id number]] Collected:  02/25/19 1050 Order Status:  Completed Specimen:  Stool Updated:  02/27/19 7097 Special Requests: NO SPECIAL REQUESTS Culture result:    
  No Salmonella, Shigella, or Ecoli 0157 isolated. CULTURE, URINE [[id number]] Collected:  02/24/19 2059 Order Status:  Completed Specimen:  Urine from Clean catch Updated:  02/27/19 6048 Special Requests: NO SPECIAL REQUESTS Culture result:    
  <10,000 COLONIES/mL MIXED SKIN PRITI ISOLATED C. DIFFICILE AG & TOXIN A/B [[id number]] Order Status:  Canceled Specimen:  Stool CULTURE, URINE [[id number]] Order Status:  Canceled Specimen:  Urine from Clean catch Assessment/Plan:  
 
Principal Problem: 
  RAYMON (acute kidney injury) ([facility] (2/5/2019) - Likely prerenal due to dehydration with diarrhea - BMP pending - Slowly improving 
- Increase LR to 100 ml/hr - Check BMP daily Active Problems: 
  Leukocytosis (2/5/2019) - Resolved - Likely reactive/dehydration 
- Continue Cefepime for finger infection - Check CBC daily Acute metabolic encephalopathy (5/97/3199) - Resolved - I suspect this was due to opiate withdrawal 
- Restarted Methadone - UA unremarkable - CXR not done 
- TSH normal 
- Recommended to family to keep window open and patient awake during the day 
- Continue to monitor Acute diarrhea (2/26/2019) - Resolved - Stool WBC & Culture negative - Continue Immodium PRN 
- Restarted Methadone Finger infection (2/5/2019) 
- Continue Cefepime Oral Thrush (2/26/2019) - Likely due to long term abx + steroids - Continue Nystatin S/S 
- Continue Probiotics Essential hypertension with goal blood pressure less than 130/85 (10/27/2017) - Stable - Continue Coreg 
- Continue Amlodipine DM2 (diabetes mellitus, type 2) ([facility] (7/27/2018) - Stable - Continue Humalog SSI Chronic diastolic congestive heart failure ([facility] (1/16/2019) - No acute issues - Continue Coreg Acquired hypothyroidism (1/16/2019) 
- TSH normal 
- Continue Levothyroxine GERD (gastroesophageal reflux disease) (1/16/2019) Mixed hyperlipidemia (7/27/2018) Dispo - Continue Methadone. Continue Cefepime. Hopefully back to rehab in 1-2 days. DIET DIABETIC CONSISTENT CARB Regular DVT Prophylaxis: Heparin Signed By: Tanika Carlin, DO February 27, 2019

## 2019-02-27 NOTE — PROGRESS NOTES
Problem: Mobility Impaired (Adult and Pediatric) Goal: *Acute Goals and Plan of Care (Insert Text) STG: 
(1.)Ms. Cathy Manning will supine to sit and sit to supine with CONTACT GUARD ASSIST within 3 treatment day(s). (2.)Ms. Cathy Manning will transfer from bed to chair and chair to bed with STAND BY ASSIST using the least restrictive device within 3 treatment day(s). (3.)Ms. Cathy Manning will ambulate with CONTACT GUARD ASSIST for 15 feet with the least restrictive device within 3 treatment day(s). LTG: 
(1.)Ms. Cathy Manning will move from supine to sit and sit to supine  in bed with STAND BY ASSIST within 7 treatment day(s). (2.)Ms. Cathy Manning will transfer from bed to chair and chair to bed with SUPERVISION using the least restrictive device within 7 treatment day(s). (3.)Ms. Cathy Manning will ambulate with STAND BY ASSIST for 100 feet with the least restrictive device within 7 treatment day(s). ________________________________________________________________________________________________ PHYSICAL THERAPY: Daily Note and AM 2/27/2019INPATIENT: PT Visit Days : 2 [place] / Product Type: Medicare /   
  
NAME/AGE/GENDER: Anup Harding is a 80 y.o. female PRIMARY DIAGNOSIS: RAYMON (acute kidney injury) ([facility] [address] [N17.9] Acute encephalopathy [G93.40] RAYMON (acute kidney injury) ([facility] [address] RAYMON (acute kidney injury) ([facility] [address] ICD-10: Treatment Diagnosis:  
 · Generalized Muscle Weakness (M62.81) · Difficulty in walking, Not elsewhere classified (R26.2) · Repeated Falls (R29.6) · History of falling (Z91.81) Precaution/Allergies: 
Contrast agent [iodine]; Actifed [triprolidine-pseudoephedrine]; Allopurinol; Decongestant [pseudoephedrine hcl]; Lexapro [escitalopram oxalate]; Lyrica [pregabalin]; Minizide [prazosin-polythiazide]; Mobic [meloxicam]; Omnipaque [iohexol]; Sertraline; Spironolactone; and Sulfamethazine ASSESSMENT:  
 Ms. Sandrine Saravia presents sitting up in bed without complaints. She is pleasantly confused and agreeable to therapy, mobility. Transfers to sitting with additional time, cueing, and min assist. Fair seated balance noted at edge of bed. Pt needs min-mod assist for sit-stand transfers, standing balance and pre-gait activities, ambulation on level ground, and chair transfer/repositioning in chair. Demonstrates posterior trunk lean in standing and needs constant support. Decreased safety awareness noted. Pt takes a seated rest break then performs below exercises with good participation. Alfreod Summers appears to be functioning well below baseline with history of falls. Will benefit from return to rehab at discharge. This section established at most recent assessment PROBLEM LIST (Impairments causing functional limitations): 1. Decreased Strength 2. Decreased ADL/Functional Activities 3. Decreased Transfer Abilities 4. Decreased Ambulation Ability/Technique 5. Decreased Balance 6. Decreased Cognition INTERVENTIONS PLANNED: (Benefits and precautions of physical therapy have been discussed with the patient.) 1. Balance Exercise 2. Bed Mobility 3. Family Education 4. Gait Training 5. Home Exercise Program (HEP) 6. Neuromuscular Re-education/Strengthening 7. Range of Motion (ROM) 8. Therapeutic Activites 9. Therapeutic Exercise/Strengthening 10. Transfer Training TREATMENT PLAN: Frequency/Duration: 3 times a week for duration of hospital stay Rehabilitation Potential For Stated Goals: Fair RECOMMENDED REHABILITATION/EQUIPMENT: (at time of discharge pending progress): Due to the probability of continued deficits (see above) this patient will likely need continued skilled physical therapy after discharge. Equipment:  
? None at this time HISTORY:  
History of Present Injury/Illness (Reason for Referral): 
See H&P below The patient is unable to give reliable history, family is at bedside. She is an 80years old female patient with history of diastolic CHF, diabetes, peripheral neuropathy and most recently had left index finger osteomyelitis, currently on cefepime with a PICC line in place. She was recently discharged from the hospital and was recuperating at a nursing home. She was brought in for worsening mental status with confusion, disorientation, and at times hallucinations. The son states that the patient has no underlying dementia prior to her recent hospitalization. She was very sharp, alert and oriented. No fevers or chills. She has been having diarrhea over the last couple of days, watery stools and also reports abdominal pain. No nausea or vomiting. Past Medical History/Comorbidities: Ms. Tabatha Baker  has a past medical history of Arthritis, Chronic diastolic congestive heart failure ([address] (1/16/2019), Chronic pain, Edema, GERD (gastroesophageal reflux disease) (1/16/2019), Gouty arthritis, History of MI (myocardial infarction) (1980's), Hypertension, Hypothyroidism, Mild heartburn, Neuropathy (1980's), Post-operative nausea and vomiting, and Type 2 diabetes mellitus without complication, without long-term current use of insulin ([address] (7/27/2018). She also has no past medical history of Adverse effect of anesthesia, Aneurysm ([address] Arrhythmia, Asthma, Autoimmune disease ([address] CAD (coronary artery disease), Cancer ([address] Chronic kidney disease, Chronic obstructive pulmonary disease ([address] Coagulation disorder ([address] Difficult intubation, Endocarditis, Ill-defined condition, Liver disease, Malignant hyperthermia due to anesthesia, Morbid obesity ([address] Pseudocholinesterase deficiency, Psychiatric disorder, PUD (peptic ulcer disease), Rheumatic fever, Seizures ([address] Sleep apnea, Stroke ([address] or Thromboembolus ([address]   Ms. Tabatha Baker  has a past surgical history that includes hx partial thyroidectomy (1960); hx orthopaedic (Bilateral, 0689-6261); hx orthopaedic (Left, 1992); hx open cholecystectomy (1970); hx cyst removal (Right, 1950's); hx urological; hx heart catheterization (late 1979); hx hysterectomy (1972); hx gyn (1963); hx dilation and curettage (5581-1985); and hx left salpingo-oophorectomy (1965). Social History/Living Environment:  
Home Environment: Skilled nursing facility Care Facility Name: [facility] One/Two Story Residence: One story Living Alone: No 
Support Systems: Skilled nursing facility Patient Expects to be Discharged to[de-identified] Skilled nursing facility Current DME Used/Available at Home: Cane, straight Prior Level of Function/Work/Activity: 
Lives with daughter, use of SPC for gait, 3 falls, use of SPC at baseline Number of Personal Factors/Comorbidities that affect the Plan of Care: 3+: HIGH COMPLEXITY EXAMINATION:  
Most Recent Physical Functioning:  
Gross Assessment: 
  
         
  
Posture: 
  
Balance: 
Sitting: Impaired Sitting - Static: Good (unsupported) Sitting - Dynamic: Fair (occasional) Standing: Impaired Standing - Static: Fair Standing - Dynamic : Fair(-) Bed Mobility: 
Supine to Sit: Minimum assistance Scooting: Minimum assistance Wheelchair Mobility: 
  
Transfers: 
Sit to Stand: Moderate assistance Stand to Sit: Minimum assistance Bed to Chair: Minimum assistance; Moderate assistance Interventions: Verbal cues; Visual cues; Safety awareness training; Tactile cues Duration: 16 Minutes Gait: 
  
Base of Support: Center of gravity altered; Widened Speed/Shannon: Pace decreased (<100 feet/min); Slow Step Length: Left shortened;Right shortened Gait Abnormalities: Trunk sway increased;Decreased step clearance; Path deviations Distance (ft): 5 Feet (ft) Assistive Device: Walker, rolling Ambulation - Level of Assistance: Minimal assistance; Moderate assistance Interventions: Verbal cues; Safety awareness training; Tactile cues Body Structures Involved: 1. Nerves 2. Bones 3. Joints 4. Muscles 5. Ligaments Body Functions Affected: 1. Mental 
2. Sensory/Pain 3. Cardio 4. Respiratory 5. Neuromusculoskeletal 
6. Movement Related Activities and Participation Affected: 1. Learning and Applying Knowledge 2. General Tasks and Demands 3. Mobility 4. Self Care 5. Domestic Life 6. Interpersonal Interactions and Relationships 7. Community, Social and [place] Number of elements that affect the Plan of Care: 4+: HIGH COMPLEXITY CLINICAL PRESENTATION:  
Presentation: Evolving clinical presentation with changing clinical characteristics: MODERATE COMPLEXITY CLINICAL DECISION MAKIN[address] [place] [address] AM-PAC 6 Clicks Basic Mobility Inpatient Short Form How much difficulty does the patient currently have. .. Unable A Lot A Little None 1. Turning over in bed (including adjusting bedclothes, sheets and blankets)? [] 1   [] 2   [x] 3   [] 4  
2. Sitting down on and standing up from a chair with arms ( e.g., wheelchair, bedside commode, etc.)   [] 1   [] 2   [x] 3   [] 4  
3. Moving from lying on back to sitting on the side of the bed? [] 1   [] 2   [x] 3   [] 4 How much help from another person does the patient currently need. .. Total A Lot A Little None 4. Moving to and from a bed to a chair (including a wheelchair)? [] 1   [] 2   [x] 3   [] 4  
5. Need to walk in hospital room? [] 1   [x] 2   [] 3   [] 4  
6. Climbing 3-5 steps with a railing? [x] 1   [] 2   [] 3   [] 4  
© , Trustees of [address] [place] [id number], under license to [employer]. All rights reserved Score:  Initial: 15 Most Recent: X (Date: -- ) Interpretation of Tool:  Represents activities that are increasingly more difficult (i.e. Bed mobility, Transfers, Gait).  
 
Medical Necessity:    
· Patient is expected to demonstrate progress in strength, balance, coordination and functional technique to decrease assistance required with gait, transfers, and functional, mobility. Charlie Buenrostro Reason for Services/Other Comments: 
· Patient continues to require skilled intervention due to  decreased strength, decreased balance, decreased functional tolerance, decreased cardiopulmonary endurance affecting participation in basic ADLs and functional tasks. Use of outcome tool(s) and clinical judgement create a POC that gives a: Clear prediction of patient's progress: LOW COMPLEXITY  
  
 
 
 
TREATMENT:  
(In addition to Assessment/Re-Assessment sessions the following treatments were rendered) Pre-treatment Symptoms/Complaints: \"It will take two people, it did yesterday\" Pain: Initial:  
Pain Intensity 1: 0  Post Session:  0/10 Therapeutic Activity: (  16 Minutes): Therapeutic activities including Bed mobility, sit-stand transfers, standing pre-gait activities and balance activities, Chair transfers, and Ambulation on level ground to improve mobility, strength, balance, coordination and activity tolerance. Required min-mod assist and increased Verbal cues; Safety awareness training; Tactile cues to promote static and dynamic balance in standing and promote coordination of bilateral, upper extremity(s), lower extremity(s). Therapeutic Exercise: (10 Minutes):  Exercises per grid below to improve mobility, strength, balance and coordination. Required minimal visual and verbal cues to promote proper body mechanics and exercise form. Progressed range, repetitions and complexity of movement as indicated. Date: 
2/27/19 Date: 
 Date: Activity/Exercise Parameters Parameters Parameters LAQ 15x AB Seated marching 15x AB Toe taps 15x AB Heel taps 15x AB Seated hip aDd (pillow squeeze) 15x AB Seated hip aBd 15x AB    
UE anterior punches 15x AB Bicep curls 15x AB Braces/Orthotics/Lines/Etc:  
· purwick · O2 Device: Room air Treatment/Session Assessment: · Response to Treatment:  Amb 5 ft with RW and min-mod A 
· Interdisciplinary Collaboration:  
o Physical Therapist 
o Registered Nurse 
o Rehabilitation Attendant · After treatment position/precautions:  
o Up in chair 
o Bed alarm/tab alert on 
o Bed/Chair-wheels locked 
o Bed in low position 
o Call light within reach · Compliance with Program/Exercises: Will assess as treatment progresses · Recommendations/Intent for next treatment session: \"Next visit will focus on advancements to more challenging activities and reduction in assistance provided\". Total Treatment Duration: PT Patient Time In/Time Out Time In: 1116 Time Out: 9744 Alyssa Dexter DPT

## 2019-02-27 NOTE — PROGRESS NOTES
Problem: Self Care Deficits Care Plan (Adult) Goal: *Acute Goals and Plan of Care (Insert Text) 1. Pt will complete hygiene with set up 2. Pt will complete bed mobility with min assist in prep for ADLs 3. Pt will maintain sitting balance for ADLs with CGA 4. Pt will demonstrate improved cognition to follow 100% basic commands with min or fewer for improved participation in session/ ADL independence 5. Pt will demonstrate improved cognition to complete functional tasks with min or fewer cues 6. Pt will transfer with min assist for ADLs 7. Pt will toilet with mod assist 
 
Timeframe: 7 days OCCUPATIONAL THERAPY: Daily Note and PM 2/27/2019INPATIENT: OT Visit Days: 1 [place] / Product Type: Medicare /  
  
NAME/AGE/GENDER: Melvina Loja is a 80 y.o. female PRIMARY DIAGNOSIS:  RAYMON (acute kidney injury) ([facility] [address] [N17.9] Acute encephalopathy [G93.40] RAYMON (acute kidney injury) ([facility] [address] RAYMON (acute kidney injury) ([facility] [address] ICD-10: Treatment Diagnosis:  
 · Generalized Muscle Weakness (M62.81) · Dizziness and Giddiness (R42) Precautions/Allergies: 
  falls Contrast agent [iodine]; Actifed [triprolidine-pseudoephedrine]; Allopurinol; Decongestant [pseudoephedrine hcl]; Lexapro [escitalopram oxalate]; Lyrica [pregabalin]; Minizide [prazosin-polythiazide]; Mobic [meloxicam]; Omnipaque [iohexol]; Sertraline; Spironolactone; and Sulfamethazine ASSESSMENT:  
Ms. Angela Reynolds was admitted from SNF with confusion, disorientation, and hallucinations. Pt was recently hospitalized, has not been at rehab long before admission. 2/27/19: Pt was supine in the bed upon arrival and had just gotten back to bed but was still agreeable to OT treatment. Pt is alert and pleasant this afternoon. Pt is oriented to self and time but is having difficulty remembering that she is in the hospital (states she is in the \"clinic\"). Pt required minimal to moderate assistance for bed mobility. Pt sits edge of bed with CGA at times due to occasional posterior lean. Pt responds well to cues to bend forward to improve balance forward. Pt worked on sitting balance edge of bed and was agreeable to working on standing to walker. Pt stood with minimal to moderate assistance at the edge of the bed with use of a walker. Pt is somewhat shaky in standing and needs cues to widen base of support for her feet. Pt's balance is initially poor leaning posteriorly but pt improved standing to standing with CGA. Pt stood again with minimal assistance and was able to take side steps to the head of the bed with minimal assistance. Pt returned to supine in bed at end of session. Pt's cognition has improved from initial evaluation. Pt to continue per plan of care. This section established at most recent assessment PROBLEM LIST (Impairments causing functional limitations): 1. Decreased Strength 2. Decreased ADL/Functional Activities 3. Decreased Transfer Abilities 4. Decreased Balance 5. Decreased Activity Tolerance 6. Decreased Cognition INTERVENTIONS PLANNED: (Benefits and precautions of occupational therapy have been discussed with the patient.) 1. Activities of daily living training 2. Adaptive equipment training 3. Balance training 4. Cognitive training 5. Therapeutic activity 6. Therapeutic exercise TREATMENT PLAN: Frequency/Duration: Follow patient 3 times/ week to address above goals. Rehabilitation Potential For Stated Goals: Fair RECOMMENDED REHABILITATION/EQUIPMENT: (at time of discharge pending progress): Due to the probability of continued deficits (see above) this patient will likely need continued skilled occupational therapy after discharge. Equipment:  
? None at this time OCCUPATIONAL PROFILE AND HISTORY:  
History of Present Injury/Illness (Reason for Referral): 
See H&P Past Medical History/Comorbidities: Ms. Moris Adamson  has a past medical history of Arthritis, Chronic diastolic congestive heart failure ([address] (1/16/2019), Chronic pain, Edema, GERD (gastroesophageal reflux disease) (1/16/2019), Gouty arthritis, History of MI (myocardial infarction) (1980's), Hypertension, Hypothyroidism, Mild heartburn, Neuropathy (1980's), Post-operative nausea and vomiting, and Type 2 diabetes mellitus without complication, without long-term current use of insulin ([address] (7/27/2018). She also has no past medical history of Adverse effect of anesthesia, Aneurysm ([address] Arrhythmia, Asthma, Autoimmune disease ([address] CAD (coronary artery disease), Cancer ([address] Chronic kidney disease, Chronic obstructive pulmonary disease ([address] Coagulation disorder ([address] Difficult intubation, Endocarditis, Ill-defined condition, Liver disease, Malignant hyperthermia due to anesthesia, Morbid obesity ([address] Pseudocholinesterase deficiency, Psychiatric disorder, PUD (peptic ulcer disease), Rheumatic fever, Seizures ([address] Sleep apnea, Stroke ([address] or Thromboembolus ([address] Ms. Moris Adamson  has a past surgical history that includes hx partial thyroidectomy (1960); hx orthopaedic (Bilateral, 3988-9190); hx orthopaedic (Left, 1992); hx open cholecystectomy (1970); hx cyst removal (Right, 1950's); hx urological; hx heart catheterization (late 1979); hx hysterectomy (1972); hx gyn (1963); hx dilation and curettage (6243-4089); and hx left salpingo-oophorectomy (1965). Social History/Living Environment:  
Home Environment: Skilled nursing facility Care Facility Name: [facility] One/Two Story Residence: One story Living Alone: No 
Support Systems: Skilled nursing facility Patient Expects to be Discharged to[de-identified] Skilled nursing facility Current DME Used/Available at Home: Cane, straight Prior Level of Function/Work/Activity: 
From SNF; pt unable to provide PLOF information d/t cognitive status Number of Personal Factors/Comorbidities that affect the Plan of Care: Expanded review of therapy/medical records (1-2):  MODERATE COMPLEXITY ASSESSMENT OF OCCUPATIONAL PERFORMANCE[de-identified]  
Activities of Daily Living:  
Basic ADLs (From Assessment) Complex ADLs (From Assessment) Feeding: Minimum assistance Oral Facial Hygiene/Grooming: Minimum assistance Bathing: Maximum assistance Upper Body Dressing: Moderate assistance Lower Body Dressing: Maximum assistance Toileting: Maximum assistance Instrumental ADL Meal Preparation: Total assistance Homemaking: Total assistance Medication Management: Total assistance Financial Management: Total assistance Grooming/Bathing/Dressing Activities of Daily Living Cognitive Retraining Safety/Judgement: Fall prevention;Home safety Bed/Mat Mobility Supine to Sit: Minimum assistance; Moderate assistance Sit to Supine: Moderate assistance Sit to Stand: Minimum assistance; Moderate assistance Bed to Chair: Minimum assistance; Moderate assistance Scooting: Minimum assistance Most Recent Physical Functioning:  
Gross Assessment: 
  
         
  
Posture: 
  
Balance: 
Sitting: Impaired Sitting - Static: Fair (occasional); Good (unsupported) Sitting - Dynamic: Fair (occasional) Standing: Impaired Standing - Static: Fair Standing - Dynamic : Fair Bed Mobility: 
Supine to Sit: Minimum assistance; Moderate assistance Sit to Supine: Moderate assistance Scooting: Minimum assistance Wheelchair Mobility: 
  
Transfers: 
Sit to Stand: Minimum assistance; Moderate assistance Stand to Sit: Minimum assistance Bed to Chair: Minimum assistance; Moderate assistance Interventions: Verbal cues; Visual cues; Safety awareness training; Tactile cues Duration: 16 Minutes Patient Vitals for the past 6 hrs: 
 BP SpO2 Pulse 02/27/19 1157 137/78 97 % 82  
02/27/19 1520 110/70 96 % 83 Mental Status Neurologic State: Alert, Confused Orientation Level: Disoriented to time, Oriented to person, Oriented to time Cognition: Follows commands Perception: Cues to maintain midline in sitting, Cues to maintain midline in standing Perseveration: No perseveration noted Safety/Judgement: Fall prevention, Home safety Physical Skills Involved: 
1. Balance 2. Strength 3. Activity Tolerance Cognitive Skills Affected (resulting in the inability to perform in a timely and safe manner): 1. Executive Function 2. Immediate Memory 3. Short Term Recall 4. Long Term Memory 5. Sustained Attention 6. Divided Attention 7. Comprehension Psychosocial Skills Affected: 1. Habits/Routines 2. Environmental Adaptation Number of elements that affect the Plan of Care: 5+:  HIGH COMPLEXITY CLINICAL DECISION MAKING:  
[facility] AM-PAC 6 Clicks Daily Activity Inpatient Short Form How much help from another person does the patient currently need. .. Total A Lot A Little None 1. Putting on and taking off regular lower body clothing? [x] 1   [] 2   [] 3   [] 4  
2. Bathing (including washing, rinsing, drying)? [x] 1   [] 2   [] 3   [] 4  
3. Toileting, which includes using toilet, bedpan or urinal?   [x] 1   [] 2   [] 3   [] 4  
4. Putting on and taking off regular upper body clothing? [] 1   [x] 2   [] 3   [] 4  
5. Taking care of personal grooming such as brushing teeth? [] 1   [] 2   [x] 3   [] 4  
6. Eating meals? [] 1   [] 2   [x] 3   [] 4  
© 2007, Trustees of [facility] [place] [facility], under license to [employer]. All rights reserved Score:  Initial: 11 Most Recent: X (Date: -- ) Interpretation of Tool:  Represents activities that are increasingly more difficult (i.e. Bed mobility, Transfers, Gait). Medical Necessity:    
· Patient is expected to demonstrate progress in strength, balance and functional technique to decrease assistance required with ADLs. Reason for Services/Other Comments: · Patient continues to require present interventions due to patient's inability to safely and independently complete ADLs. Use of outcome tool(s) and clinical judgement create a POC that gives a: MODERATE COMPLEXITY  
 
 
 
TREATMENT:  
(In addition to Assessment/Re-Assessment sessions the following treatments were rendered) Pre-treatment Symptoms/Complaints:   
Pain: Initial:  
Pain Intensity 1: 0  Post Session:  0 Therapeutic Activity: ( 33 minutes): Therapeutic activities including Bed transfers, Ambulation on level ground and sitting/standing balance to improve mobility, strength, balance and coordination. Required minimal Verbal cues; Safety awareness training; Tactile cues to promote static and dynamic balance in sitting and standing.  
 
n/a Braces/Orthotics/Lines/Etc:  
· O2 Device: Room air Treatment/Session Assessment:   
· Response to Treatment:  No adverse reaction · Interdisciplinary Collaboration:  
o Occupational Therapist 
o Registered Nurse · After treatment position/precautions:  
o Supine in bed 
o Bed/Chair-wheels locked 
o Bed in low position 
o Call light within reach 
o RN notified · Compliance with Program/Exercises: Will assess as treatment progresses. · Recommendations/Intent for next treatment session: \"Next visit will focus on advancements to more challenging activities and reduction in assistance provided\". Total Treatment Duration: OT Patient Time In/Time Out Time In: 4593 Time Out: 6199 Polo Duran OT

## 2019-02-27 NOTE — PROGRESS NOTES
Interdisciplinary Rounds completed 02/27/19. Nursing, Case Management, Physician and PT present. Plan of care reviewed and updated.  
 
Probable d/c back to rehab in am.

## 2019-02-28 NOTE — PROGRESS NOTES
Hourly rounds completed this shift. All needs met. Bed low/locked. No c/o pain. Call light in reach. Will continue to monitor pt and give report to oncoming RN. PICC Single Lumen 02/13/19 Right (Active) Central Line Being Utilized Yes 2/28/2019  2:22 AM  
Criteria for Appropriate Use Long term IV/antibiotic administration 2/28/2019  2:22 AM  
Site Assessment Clean, dry, & intact 2/28/2019  2:22 AM  
Phlebitis Assessment 0 2/28/2019  2:22 AM  
Infiltration Assessment 0 2/28/2019  2:22 AM  
Arm Circumference (cm) 12 cm 2/25/2019  2:45 AM  
Date of Last Dressing Change 02/25/19 2/28/2019  2:22 AM  
Dressing Status Clean, dry, & intact 2/28/2019  2:22 AM  
Dressing Type Disk with Chlorhexadine gluconate (CHG); Transparent 2/28/2019  2:22 AM  
Hub Color/Line Status Purple; Infusing;Patent 2/28/2019  2:22 AM  
Action Taken Tubing changed 2/27/2019 11:14 PM  
Positive Blood Return (Site #1) Yes 2/28/2019  2:22 AM  
Alcohol Cap Used No 2/28/2019  2:22 AM

## 2019-02-28 NOTE — PROGRESS NOTES
Interdisciplinary Rounds completed 02/28/19. Nursing, Case Management, Physician and PT present. Plan of care reviewed and updated.

## 2019-02-28 NOTE — PROGRESS NOTES
Hospitalist Progress Note Patient: Phillip Chen MRN: [id number]  SSN: [id number] YOB: 1933  Age: 80 y.o. Sex: female Admit Date: 2/24/2019 LOS: 4 days Subjective:  
 
80year old with chronic dCHF, left index finger OM (on IV abx), HTN, hypothyroidism, and chronic pain on Methadone presented to the ER from rehab with 4 days of acute diarrhea, abdominal pain, confusion, and paranoia. Encephalopathy work up has been unremarkable. 2/27 - She is more alert and less confused today. No paranoia. Denies pain. Diarrhea improved. 2/28 - She is more confused today. No paranoia. No diarrhea. No F/C/N/V. No CP. Review of systems negative except stated above. Objective:  
 
Visit Vitals /73 (BP 1 Location: Left arm, BP Patient Position: Sitting) Pulse 74 Temp 98.7 °F (37.1 °C) Resp 18 Ht 5' 4\" (1.626 m) Wt 67.8 kg (149 lb 8 oz) SpO2 94% BMI 25.66 kg/m² Oxygen Therapy O2 Sat (%): 94 % (02/28/19 1135) Pulse via Oximetry: 88 beats per minute (02/24/19 2346) O2 Device: Room air (02/28/19 1125) Intake and Output:  
 
Intake/Output Summary (Last 24 hours) at 2/28/2019 1446 Last data filed at 2/28/2019 1000 Gross per 24 hour Intake  Output 650 ml Net -650 ml Physical Exam:  
GENERAL: alert, cooperative, no distress, appears stated age EYE: conjunctivae/corneas clear. PERRL. THROAT & NECK: normal and no erythema or exudates noted. LUNG: clear to auscultation bilaterally HEART: regular rate and rhythm, S1S2, no murmur, no JVD ABDOMEN: soft, non-tender, non-distended. Bowel sounds normal.  
EXTREMITIES: 1+ BLE edema, 2+ pedal/radial pulses bilaterally SKIN: Scratch marks on extremities NEUROLOGIC: A&Ox3. Cranial nerves 2-12 grossly intact. Lab/Data Review: 
Recent Results (from the past 24 hour(s)) GLUCOSE, POC Collection Time: 02/27/19  3:19 PM  
Result Value Ref Range Glucose (POC) 362 (H) 65 - 100 mg/dL GLUCOSE, POC Collection Time: 02/27/19  5:26 PM  
Result Value Ref Range Glucose (POC) 325 (H) 65 - 100 mg/dL GLUCOSE, POC Collection Time: 02/27/19  8:59 PM  
Result Value Ref Range Glucose (POC) 225 (H) 65 - 100 mg/dL METABOLIC PANEL, BASIC Collection Time: 02/28/19  4:20 AM  
Result Value Ref Range Sodium 143 136 - 145 mmol/L Potassium 5.1 3.5 - 5.1 mmol/L Chloride 110 (H) 98 - 107 mmol/L  
 CO2 27 21 - 32 mmol/L Anion gap 6 (L) 7 - 16 mmol/L Glucose 148 (H) 65 - 100 mg/dL BUN 70 (H) 8 - 23 MG/DL Creatinine 2.63 (H) 0.6 - 1.0 MG/DL  
 GFR est AA 22 (L) >60 ml/min/1.73m2 GFR est non-AA 18 (L) >60 ml/min/1.73m2 Calcium 8.3 8.3 - 10.4 MG/DL  
CBC W/O DIFF Collection Time: 02/28/19  4:20 AM  
Result Value Ref Range WBC 9.0 4.3 - 11.1 K/uL  
 RBC 3.84 (L) 4.05 - 5.2 M/uL  
 HGB 10.7 (L) 11.7 - 15.4 g/dL HCT 35.1 (L) 35.8 - 46.3 % MCV 91.4 79.6 - 97.8 FL  
 MCH 27.9 26.1 - 32.9 PG  
 MCHC 30.5 (L) 31.4 - 35.0 g/dL  
 RDW 15.6 (H) 11.9 - 14.6 % PLATELET 068 301 - 059 K/uL MPV 11.0 9.4 - 12.3 FL ABSOLUTE NRBC 0.00 0.0 - 0.2 K/uL SED RATE, AUTOMATED Collection Time: 02/28/19  4:20 AM  
Result Value Ref Range Sed rate, automated 67 (H) 0 - 30 mm/hr C REACTIVE PROTEIN, QT Collection Time: 02/28/19  4:20 AM  
Result Value Ref Range C-Reactive protein 0.8 0.0 - 0.9 mg/dL GLUCOSE, POC Collection Time: 02/28/19  7:13 AM  
Result Value Ref Range Glucose (POC) 132 (H) 65 - 100 mg/dL SODIUM, UR, RANDOM Collection Time: 02/28/19 10:24 AM  
Result Value Ref Range Sodium,urine random 78 MMOL/L  
PROTEIN/CREATININE RATIO, URINE Collection Time: 02/28/19 10:24 AM  
Result Value Ref Range Protein, urine random 135 (H) <11.9 mg/dL Creatinine, urine 43.40 mg/dL Protein/Creat. urine Ratio 3.1 URINALYSIS W/ RFLX MICROSCOPIC Collection Time: 02/28/19 10:24 AM  
Result Value Ref Range Color YELLOW  Appearance CLEAR    
 Specific gravity 1.016 1.001 - 1.023    
 pH (UA) 6.0 5.0 - 9.0 Protein 100 (A) NEG mg/dL Glucose 100 mg/dL Ketone NEGATIVE  NEG mg/dL Bilirubin NEGATIVE  NEG Blood SMALL (A) NEG Urobilinogen 0.2 0.2 - 1.0 EU/dL Nitrites NEGATIVE  NEG Leukocyte Esterase MODERATE (A) NEG    
 WBC 10-20 0 /hpf Bacteria TRACE 0 /hpf Yeast MODERATE    
PLEASE READ & DOCUMENT PPD TEST IN 72 HRS Collection Time: 02/28/19 11:37 AM  
Result Value Ref Range PPD neg Negative  
 mm Induration 0 mm GLUCOSE, POC Collection Time: 02/28/19 11:46 AM  
Result Value Ref Range Glucose (POC) 205 (H) 65 - 100 mg/dL Imaging: Xr Chest Sngl V Result Date: 2/26/2019 IMPRESSION: Lungs underinflated, PICC line tip overlies lower superior vena cava. No results found for this visit on 02/24/19. Cultures: All Micro Results Procedure Component Value Units Date/Time Mirella Ca [[id number]] Collected:  02/25/19 1050 Order Status:  Completed Specimen:  Stool Updated:  02/27/19 1548 Special Requests: NO SPECIAL REQUESTS Culture result:    
  No Salmonella, Shigella, or Ecoli 0157 isolated. CULTURE, URINE [[id number]] Collected:  02/24/19 2059 Order Status:  Completed Specimen:  Urine from Clean catch Updated:  02/27/19 3592 Special Requests: NO SPECIAL REQUESTS Culture result:    
  <10,000 COLONIES/mL MIXED SKIN PRITI ISOLATED C. DIFFICILE AG & TOXIN A/B [[id number]] Order Status:  Canceled Specimen:  Stool CULTURE, URINE [[id number]] Order Status:  Canceled Specimen:  Urine from Clean catch Assessment/Plan:  
 
Principal Problem: 
  RAYMON (acute kidney injury) ([facility] [address] (2/5/2019) - Likely prerenal due to dehydration with diarrhea - Creatinine slightly worse today 
- Change LR to NS at 100 ml/hr - Check BMP daily Active Problems: 
  Leukocytosis (2/5/2019) - Resolved - Likely reactive/dehydration - Continue Cefepime for finger infection - Check CBC daily Acute metabolic encephalopathy (1/87/0625) - More confused today - I suspect this was due to opiate withdrawal 
- Restarted Methadone - UA unremarkable - CXR not done 
- TSH normal 
- Recommended to family to keep window open and patient awake during the day 
- Continue to monitor Acute diarrhea (2/26/2019) - Resolved - Stool WBC & Culture negative - Continue Immodium PRN 
- Restarted Methadone Finger infection (2/5/2019) 
- Continue Cefepime Oral Thrush (2/26/2019) - Likely due to long term abx + steroids - Continue Nystatin S/S 
- Continue Probiotics Essential hypertension with goal blood pressure less than 130/85 (10/27/2017) - Stable - Continue Coreg 
- Continue Amlodipine DM2 (diabetes mellitus, type 2) ([facility] [address] (7/27/2018) - Stable - Continue Humalog SSI Chronic diastolic congestive heart failure ([facility] [address] (1/16/2019) - No acute issues - Continue Coreg Acquired hypothyroidism (1/16/2019) 
- TSH normal 
- Continue Levothyroxine GERD (gastroesophageal reflux disease) (1/16/2019) Mixed hyperlipidemia (7/27/2018) Dispo - Continue Methadone. Continue Cefepime. Check urine studies. Monitor confusion. DIET DIABETIC CONSISTENT CARB Regular DVT Prophylaxis: Heparin Signed By: Balta Hurst DO February 28, 2019

## 2019-02-28 NOTE — PROGRESS NOTES
Problem: Falls - Risk of 
Goal: *Absence of Falls Document Kell Hartman Fall Risk and appropriate interventions in the flowsheet. Outcome: Progressing Towards Goal 
Fall Risk Interventions: 
Mobility Interventions: Bed/chair exit alarm, OT consult for ADLs, Patient to call before getting OOB, PT Consult for mobility concerns Mentation Interventions: Bed/chair exit alarm, Door open when patient unattended, Evaluate medications/consider consulting pharmacy, Familiar objects from home, Family/sitter at bedside Medication Interventions: Bed/chair exit alarm, Patient to call before getting OOB, Teach patient to arise slowly Elimination Interventions: Bed/chair exit alarm, Call light in reach, Patient to call for help with toileting needs, Toilet paper/wipes in reach, Toileting schedule/hourly rounds History of Falls Interventions: Bed/chair exit alarm, Consult care management for discharge planning, Door open when patient unattended, Room close to nurse's station Problem: Pressure Injury - Risk of 
Goal: *Prevention of pressure injury Document Dimitry Scale and appropriate interventions in the flowsheet. Outcome: Progressing Towards Goal 
Pressure Injury Interventions: 
  
 
Moisture Interventions: Absorbent underpads, Apply protective barrier, creams and emollients, Assess need for specialty bed, Limit adult briefs, Maintain skin hydration (lotion/cream) Activity Interventions: Increase time out of bed, Pressure redistribution bed/mattress(bed type), PT/OT evaluation Mobility Interventions: Float heels, HOB 30 degrees or less, Pressure redistribution bed/mattress (bed type), PT/OT evaluation Nutrition Interventions: Document food/fluid/supplement intake, Offer support with meals,snacks and hydration Friction and Shear Interventions: Apply protective barrier, creams and emollients, Foam dressings/transparent film/skin sealants, HOB 30 degrees or less, Lift sheet, Lift team/patient mobility team

## 2019-02-28 NOTE — PROGRESS NOTES
Problem: Mobility Impaired (Adult and Pediatric) Goal: *Acute Goals and Plan of Care (Insert Text) STG: 
(1.)Ms. Marguerite Chin will supine to sit and sit to supine with CONTACT GUARD ASSIST within 3 treatment day(s). (2.)Ms. Marguerite Chin will transfer from bed to chair and chair to bed with STAND BY ASSIST using the least restrictive device within 3 treatment day(s). (3.)Ms. Marguerite Chin will ambulate with CONTACT GUARD ASSIST for 15 feet with the least restrictive device within 3 treatment day(s). LTG: 
(1.)Ms. Marguerite Chin will move from supine to sit and sit to supine  in bed with STAND BY ASSIST within 7 treatment day(s). (2.)Ms. Marguerite Chin will transfer from bed to chair and chair to bed with SUPERVISION using the least restrictive device within 7 treatment day(s). (3.)Ms. Marguerite Chin will ambulate with STAND BY ASSIST for 100 feet with the least restrictive device within 7 treatment day(s). ________________________________________________________________________________________________ PHYSICAL THERAPY: Daily Note and AM 2/28/2019INPATIENT: PT Visit Days : 3 [place] / [place] B / Product Type: Medicare /   
  
NAME/AGE/GENDER: Chela Villatoro is a 80 y.o. female PRIMARY DIAGNOSIS: RAYMON (acute kidney injury) ([facility] [address] [N17.9] Acute encephalopathy [G93.40] RAYMON (acute kidney injury) ([facility] [address] RAYMON (acute kidney injury) ([facility] [address] ICD-10: Treatment Diagnosis:  
 · Generalized Muscle Weakness (M62.81) · Difficulty in walking, Not elsewhere classified (R26.2) · Repeated Falls (R29.6) · History of falling (Z91.81) Precaution/Allergies: 
Contrast agent [iodine]; Actifed [triprolidine-pseudoephedrine]; Allopurinol; Decongestant [pseudoephedrine hcl]; Lexapro [escitalopram oxalate]; Lyrica [pregabalin]; Minizide [prazosin-polythiazide]; Mobic [meloxicam]; Omnipaque [iohexol]; Sertraline; Spironolactone; and Sulfamethazine ASSESSMENT:  
 Ms. Angela Reynolds presents sitting up in bed, quite confused today but has no complaints and feels well. Requires slightly less assistance for bed mobility, sit-stand transfers, and taking steps from bed to chair (min A throughout). Worked on standing static and dynamic balance at edge of bed during functional activities. Demonstrates strong posterior trunk lean at times but able to correct with cueing and facilitation. After a short seated rest break, pt performs below LE exercises with good participation. Positioned comfortably in chair with chair alarm on, needs in reach. Melvina Loja is progressing slowly with therapy, limited by weakness, decreased activity tolerance, and increased confusion today. Will benefit from continued therapy during hospital stay and return to rehab at discharge. This section established at most recent assessment PROBLEM LIST (Impairments causing functional limitations): 1. Decreased Strength 2. Decreased ADL/Functional Activities 3. Decreased Transfer Abilities 4. Decreased Ambulation Ability/Technique 5. Decreased Balance 6. Decreased Cognition INTERVENTIONS PLANNED: (Benefits and precautions of physical therapy have been discussed with the patient.) 1. Balance Exercise 2. Bed Mobility 3. Family Education 4. Gait Training 5. Home Exercise Program (HEP) 6. Neuromuscular Re-education/Strengthening 7. Range of Motion (ROM) 8. Therapeutic Activites 9. Therapeutic Exercise/Strengthening 10. Transfer Training TREATMENT PLAN: Frequency/Duration: 3 times a week for duration of hospital stay Rehabilitation Potential For Stated Goals: Fair RECOMMENDED REHABILITATION/EQUIPMENT: (at time of discharge pending progress): Due to the probability of continued deficits (see above) this patient will likely need continued skilled physical therapy after discharge. Equipment:  
? None at this time HISTORY:  
History of Present Injury/Illness (Reason for Referral): 
 See H&P below The patient is unable to give reliable history, family is at bedside. She is an 80years old female patient with history of diastolic CHF, diabetes, peripheral neuropathy and most recently had left index finger osteomyelitis, currently on cefepime with a PICC line in place. She was recently discharged from the hospital and was recuperating at a nursing home. She was brought in for worsening mental status with confusion, disorientation, and at times hallucinations. The son states that the patient has no underlying dementia prior to her recent hospitalization. She was very sharp, alert and oriented. No fevers or chills. She has been having diarrhea over the last couple of days, watery stools and also reports abdominal pain. No nausea or vomiting. Past Medical History/Comorbidities: Ms. Brendan Choudhury  has a past medical history of Arthritis, Chronic diastolic congestive heart failure ([address] (1/16/2019), Chronic pain, Edema, GERD (gastroesophageal reflux disease) (1/16/2019), Gouty arthritis, History of MI (myocardial infarction) (1980's), Hypertension, Hypothyroidism, Mild heartburn, Neuropathy (1980's), Post-operative nausea and vomiting, and Type 2 diabetes mellitus without complication, without long-term current use of insulin ([address] (7/27/2018). She also has no past medical history of Adverse effect of anesthesia, Aneurysm ([address] Arrhythmia, Asthma, Autoimmune disease ([address] CAD (coronary artery disease), Cancer ([address] Chronic kidney disease, Chronic obstructive pulmonary disease ([address] Coagulation disorder ([address] Difficult intubation, Endocarditis, Ill-defined condition, Liver disease, Malignant hyperthermia due to anesthesia, Morbid obesity ([address] Pseudocholinesterase deficiency, Psychiatric disorder, PUD (peptic ulcer disease), Rheumatic fever, Seizures ([address] Sleep apnea, Stroke ([address] or Thromboembolus ([address]   Ms. Brendan Choudhury  has a past surgical history that includes hx partial thyroidectomy (1960); hx orthopaedic (Bilateral, 9406-9715); hx orthopaedic (Left, 1992); hx open cholecystectomy (1970); hx cyst removal (Right, 1950's); hx urological; hx heart catheterization (late 1979); hx hysterectomy (1972); hx gyn (1963); hx dilation and curettage (6647-0387); and hx left salpingo-oophorectomy (1965). Social History/Living Environment:  
Home Environment: Skilled nursing facility Care Facility Name: [facility] One/Two Story Residence: One story Living Alone: No 
Support Systems: Skilled nursing facility Patient Expects to be Discharged to[de-identified] Skilled nursing facility Current DME Used/Available at Home: Cane, straight Prior Level of Function/Work/Activity: 
Lives with daughter, use of SPC for gait, 3 falls, use of SPC at baseline Number of Personal Factors/Comorbidities that affect the Plan of Care: 3+: HIGH COMPLEXITY EXAMINATION:  
Most Recent Physical Functioning:  
Gross Assessment: 
  
         
  
Posture: 
  
Balance: 
Sitting: Impaired Sitting - Static: Good (unsupported) Sitting - Dynamic: Fair (occasional) Standing: Impaired Standing - Static: Fair Standing - Dynamic : Fair Bed Mobility: 
Supine to Sit: Minimum assistance Scooting: Minimum assistance Wheelchair Mobility: 
  
Transfers: 
Sit to Stand: Minimum assistance Stand to Sit: Minimum assistance Bed to Chair: Minimum assistance Interventions: Verbal cues; Safety awareness training; Tactile cues Duration: 15 Minutes Gait: 
  
Base of Support: Center of gravity altered Speed/Shannon: Pace decreased (<100 feet/min) Step Length: Left shortened;Right shortened Gait Abnormalities: Trunk sway increased;Decreased step clearance Distance (ft): 5 Feet (ft) Assistive Device: Walker, rolling Ambulation - Level of Assistance: Minimal assistance Interventions: Verbal cues; Safety awareness training; Tactile cues Body Structures Involved: 1. Nerves 2. Bones 3. Joints 4. Muscles 5. Ligaments Body Functions Affected: 1. Mental 
2. Sensory/Pain 3. Cardio 4. Respiratory 5. Neuromusculoskeletal 
6. Movement Related Activities and Participation Affected: 1. Learning and Applying Knowledge 2. General Tasks and Demands 3. Mobility 4. Self Care 5. Domestic Life 6. Interpersonal Interactions and Relationships 7. Community, Social and [place] Number of elements that affect the Plan of Care: 4+: HIGH COMPLEXITY CLINICAL PRESENTATION:  
Presentation: Evolving clinical presentation with changing clinical characteristics: MODERATE COMPLEXITY CLINICAL DECISION MAKIN[address] [place] [address] AM-PAC 6 Clicks Basic Mobility Inpatient Short Form How much difficulty does the patient currently have. .. Unable A Lot A Little None 1. Turning over in bed (including adjusting bedclothes, sheets and blankets)? [] 1   [] 2   [x] 3   [] 4  
2. Sitting down on and standing up from a chair with arms ( e.g., wheelchair, bedside commode, etc.)   [] 1   [] 2   [x] 3   [] 4  
3. Moving from lying on back to sitting on the side of the bed? [] 1   [] 2   [x] 3   [] 4 How much help from another person does the patient currently need. .. Total A Lot A Little None 4. Moving to and from a bed to a chair (including a wheelchair)? [] 1   [] 2   [x] 3   [] 4  
5. Need to walk in hospital room? [] 1   [x] 2   [] 3   [] 4  
6. Climbing 3-5 steps with a railing? [x] 1   [] 2   [] 3   [] 4  
© , Trustees of [address] [place] [address], under license to [employer]. All rights reserved Score:  Initial: 15 Most Recent: X (Date: -- ) Interpretation of Tool:  Represents activities that are increasingly more difficult (i.e. Bed mobility, Transfers, Gait). Medical Necessity:    
· Patient is expected to demonstrate progress in strength, balance, coordination and functional technique to decrease assistance required with gait, transfers, and functional, mobility. Fran Saleh Reason for Services/Other Comments: · Patient continues to require skilled intervention due to  decreased strength, decreased balance, decreased functional tolerance, decreased cardiopulmonary endurance affecting participation in basic ADLs and functional tasks. Use of outcome tool(s) and clinical judgement create a POC that gives a: Clear prediction of patient's progress: LOW COMPLEXITY  
  
 
 
 
TREATMENT:  
(In addition to Assessment/Re-Assessment sessions the following treatments were rendered) Pre-treatment Symptoms/Complaints:  \"don't let me fall\" Pain: Initial:  
Pain Intensity 1: 0  Post Session:  0/10 Therapeutic Activity: (  15 Minutes): Therapeutic activities including Bed mobility, sit-stand transfers, standing pre-gait activities and balance activities, Chair transfers, and Ambulation on level ground to improve mobility, strength, balance, coordination and activity tolerance. Required min assist and increased Verbal cues; Safety awareness training; Tactile cues to promote static and dynamic balance in standing and promote coordination of bilateral, upper extremity(s), lower extremity(s). Therapeutic Exercise: (10 Minutes):  Exercises per grid below to improve mobility, strength, balance and coordination. Required minimal visual and verbal cues to promote proper body mechanics and exercise form. Progressed range, repetitions and complexity of movement as indicated. Date: 
2/27/19 Date: 
2/28/19 Date: Activity/Exercise Parameters Parameters Parameters LAQ 15x AB 20x AB Seated marching 15x AB 20x AB Toe taps 15x AB 20x AB Heel taps 15x AB 20x AB Seated hip aDd (pillow squeeze) 15x AB Seated hip aBd 15x AB    
UE anterior punches 15x AB 20x AB Bicep curls 15x AB 20x AB Braces/Orthotics/Lines/Etc:  
· IV 
· O2 Device: Room air Treatment/Session Assessment:   
· Response to Treatment:  Amb 5 ft with RW and min A, slightly less assist today · Interdisciplinary Collaboration: o Physical Therapist 
o Registered Nurse 
o Rehabilitation Attendant · After treatment position/precautions:  
o Up in chair 
o Bed alarm/tab alert on 
o Bed/Chair-wheels locked 
o Bed in low position 
o Call light within reach · Compliance with Program/Exercises: Will assess as treatment progresses · Recommendations/Intent for next treatment session: \"Next visit will focus on advancements to more challenging activities and reduction in assistance provided\". Total Treatment Duration: PT Patient Time In/Time Out Time In: 1100 Time Out: 1125 Debra Swift DPT

## 2019-02-28 NOTE — PROGRESS NOTES
Patient identified as HRRA/High Risk for Readmission RRAT- 24:  Admitted 2/24 for Acute Kidney Injury/Altered Mental Status Admission History - 
1/16  Acute resp failure 2/5  Finger infection (ID following) 2/24  RAYMON  (found to have increased confusion at facility) Medical History Includes - 
DM2 
dCHF Metabolic encephalopathy GERD 
CAD 
 
PICC Line - (Infectious Disease following for osteomyelitis/Cefepime)  on admit, trending down; now 70 (2/28) Creatinine 3.35 on admit, tending down; now 2.63 Plan - likely return to [address] [facility] Patient has [place] ([facility]) PCP - Case Management Team will follow at discharge from facility: 
[facility]  [phone]  [email or web address]

## 2019-03-01 NOTE — PROGRESS NOTES
Dressing changed to left hand. Cleansed with wound cleanser, and applied bandage. Applied bandaid to right 5th digit where dark scab had fallen off.

## 2019-03-01 NOTE — PROGRESS NOTES
Problem: Self Care Deficits Care Plan (Adult) Goal: *Acute Goals and Plan of Care (Insert Text) 1. Pt will complete hygiene with set up 2. Pt will complete bed mobility with min assist in prep for ADLs 3. Pt will maintain sitting balance for ADLs with CGA 4. Pt will demonstrate improved cognition to follow 100% basic commands with min or fewer for improved participation in session/ ADL independence 5. Pt will demonstrate improved cognition to complete functional tasks with min or fewer cues 6. Pt will transfer with min assist for ADLs 7. Pt will toilet with mod assist 
 
Timeframe: 7 days OCCUPATIONAL THERAPY: Daily Note and PM 3/1/2019INPATIENT: OT Visit Days: 2 [place] / [place] / Product Type: Medicare /  
  
NAME/AGE/GENDER: Kvng Lauren is a 80 y.o. female PRIMARY DIAGNOSIS:  RAYMON (acute kidney injury) ([facility] [address] [N17.9] Acute encephalopathy [G93.40] RAYMON (acute kidney injury) ([facility] [address] RAYMON (acute kidney injury) ([facility] [address] ICD-10: Treatment Diagnosis:  
 · Generalized Muscle Weakness (M62.81) · Dizziness and Giddiness (R42) Precautions/Allergies: 
  falls Contrast agent [iodine]; Actifed [triprolidine-pseudoephedrine]; Allopurinol; Decongestant [pseudoephedrine hcl]; Lexapro [escitalopram oxalate]; Lyrica [pregabalin]; Minizide [prazosin-polythiazide]; Mobic [meloxicam]; Omnipaque [iohexol]; Sertraline; Spironolactone; and Sulfamethazine ASSESSMENT:  
Ms. Jin Lawson was admitted from SNF with confusion, disorientation, and hallucinations. Pt was recently hospitalized, has not been at rehab long before admission. 3/1/19: Pt was sitting up in the chair upon arrival. Pt is very pleasant and quite talkative. Pt reports difficulty at times finding her words. Not sure if she has hx of memory deficits. Pt reports that her bottom is starting to get sore from sitting up in the chair.  Pt completed scooting to the edge of the chair with additional time. Pt stood with minimal to moderate assistance from low chair. Pt demonstrates some posterior lean initially. Pt completed functional mobility around the bed with additional time. Pt did report some dizziness in standing. Pt returned back to supine with minimal assistance and was positioned for comfort. Pt is demonstrated good progress with goals. Pt to continue per plan of care. This section established at most recent assessment PROBLEM LIST (Impairments causing functional limitations): 1. Decreased Strength 2. Decreased ADL/Functional Activities 3. Decreased Transfer Abilities 4. Decreased Balance 5. Decreased Activity Tolerance 6. Decreased Cognition INTERVENTIONS PLANNED: (Benefits and precautions of occupational therapy have been discussed with the patient.) 1. Activities of daily living training 2. Adaptive equipment training 3. Balance training 4. Cognitive training 5. Therapeutic activity 6. Therapeutic exercise TREATMENT PLAN: Frequency/Duration: Follow patient 3 times/ week to address above goals. Rehabilitation Potential For Stated Goals: Fair RECOMMENDED REHABILITATION/EQUIPMENT: (at time of discharge pending progress): Due to the probability of continued deficits (see above) this patient will likely need continued skilled occupational therapy after discharge. Equipment:  
? None at this time OCCUPATIONAL PROFILE AND HISTORY:  
History of Present Injury/Illness (Reason for Referral): 
See H&P Past Medical History/Comorbidities: Ms. Sweta Russo  has a past medical history of Arthritis, Chronic diastolic congestive heart failure ([facility] [address] (1/16/2019), Chronic pain, Edema, GERD (gastroesophageal reflux disease) (1/16/2019), Gouty arthritis, History of MI (myocardial infarction) (1980's), Hypertension, Hypothyroidism, Mild heartburn, Neuropathy (1980's), Post-operative nausea and vomiting, and Type 2 diabetes mellitus without complication, without long-term current use of insulin ([address] (7/27/2018). She also has no past medical history of Adverse effect of anesthesia, Aneurysm ([address] Arrhythmia, Asthma, Autoimmune disease ([address] CAD (coronary artery disease), Cancer ([address] Chronic kidney disease, Chronic obstructive pulmonary disease ([address] Coagulation disorder ([address] Difficult intubation, Endocarditis, Ill-defined condition, Liver disease, Malignant hyperthermia due to anesthesia, Morbid obesity ([address] Pseudocholinesterase deficiency, Psychiatric disorder, PUD (peptic ulcer disease), Rheumatic fever, Seizures ([address] Sleep apnea, Stroke ([address] or Thromboembolus ([address] Ms. Elva Dickinson  has a past surgical history that includes hx partial thyroidectomy (1960); hx orthopaedic (Bilateral, 8685-4076); hx orthopaedic (Left, 1992); hx open cholecystectomy (1970); hx cyst removal (Right, 1950's); hx urological; hx heart catheterization (late 1979); hx hysterectomy (1972); hx gyn (1963); hx dilation and curettage (0466-6298); and hx left salpingo-oophorectomy (1965). Social History/Living Environment:  
Home Environment: Skilled nursing facility Care Facility Name: [facility] One/Two Story Residence: One story Living Alone: No 
Support Systems: Skilled nursing facility Patient Expects to be Discharged to[de-identified] Skilled nursing facility Current DME Used/Available at Home: Cane, straight Prior Level of Function/Work/Activity: 
From SNF; pt unable to provide PLOF information d/t cognitive status Number of Personal Factors/Comorbidities that affect the Plan of Care: Expanded review of therapy/medical records (1-2):  MODERATE COMPLEXITY ASSESSMENT OF OCCUPATIONAL PERFORMANCE[de-identified]  
Activities of Daily Living:  
Basic ADLs (From Assessment) Complex ADLs (From Assessment) Feeding: Minimum assistance Oral Facial Hygiene/Grooming: Minimum assistance Bathing: Maximum assistance Upper Body Dressing: Moderate assistance Lower Body Dressing: Maximum assistance Toileting: Maximum assistance Instrumental ADL Meal Preparation: Total assistance Homemaking: Total assistance Medication Management: Total assistance Financial Management: Total assistance Grooming/Bathing/Dressing Activities of Daily Living Bed/Mat Mobility Sit to Stand: Minimum assistance Most Recent Physical Functioning:  
Gross Assessment: 
  
         
  
Posture: 
  
Balance: 
Sitting: Impaired Sitting - Static: Good (unsupported) Sitting - Dynamic: Fair (occasional) Standing: Impaired Standing - Static: Fair Standing - Dynamic : Fair Bed Mobility: 
  
Wheelchair Mobility: 
  
Transfers: 
Sit to Stand: Minimum assistance Stand to Sit: Minimum assistance Patient Vitals for the past 6 hrs: 
 BP BP Patient Position SpO2 Pulse 19 1120 141/69 Sitting 98 % 78 Mental Status Neurologic State: Alert, Eyes open spontaneously, Confused Orientation Level: Oriented to person, Oriented to place, Oriented to time, Disoriented to situation Cognition: Follows commands Perception: Cues to maintain midline in sitting, Cues to maintain midline in standing Perseveration: No perseveration noted Safety/Judgement: Fall prevention, Home safety Physical Skills Involved: 
1. Balance 2. Strength 3. Activity Tolerance Cognitive Skills Affected (resulting in the inability to perform in a timely and safe manner): 1. Executive Function 2. Immediate Memory 3. Short Term Recall 4. Long Term Memory 5. Sustained Attention 6. Divided Attention 7. Comprehension Psychosocial Skills Affected: 1. Habits/Routines 2. Environmental Adaptation Number of elements that affect the Plan of Care: 5+:  HIGH COMPLEXITY CLINICAL DECISION MAKIN [facility] [address] [id number] AM-PAC 6 Clicks Daily Activity Inpatient Short Form How much help from another person does the patient currently need. .. Total A Lot A Little None 1. Putting on and taking off regular lower body clothing? [x] 1   [] 2   [] 3   [] 4  
2. Bathing (including washing, rinsing, drying)? [x] 1   [] 2   [] 3   [] 4  
3. Toileting, which includes using toilet, bedpan or urinal?   [x] 1   [] 2   [] 3   [] 4  
4. Putting on and taking off regular upper body clothing? [] 1   [x] 2   [] 3   [] 4  
5. Taking care of personal grooming such as brushing teeth? [] 1   [] 2   [x] 3   [] 4  
6. Eating meals? [] 1   [] 2   [x] 3   [] 4  
© 2007, Trustees of [employer], under license to [employer]. All rights reserved Score:  Initial: 11 Most Recent: X (Date: -- ) Interpretation of Tool:  Represents activities that are increasingly more difficult (i.e. Bed mobility, Transfers, Gait). Medical Necessity:    
· Patient is expected to demonstrate progress in strength, balance and functional technique to decrease assistance required with ADLs. Reason for Services/Other Comments: 
· Patient continues to require present interventions due to patient's inability to safely and independently complete ADLs. Use of outcome tool(s) and clinical judgement create a POC that gives a: MODERATE COMPLEXITY  
 
 
 
TREATMENT:  
(In addition to Assessment/Re-Assessment sessions the following treatments were rendered) Pre-treatment Symptoms/Complaints:   
Pain: Initial:  
Pain Intensity 1: 0  Post Session:  0 Therapeutic Activity: (    23 minutes): Therapeutic activities including Bed transfers, Chair transfers and Ambulation on level ground to improve mobility, strength, balance and coordination. Required minimal Safety awareness training to promote static and dynamic balance in standing.  
 
n/a Braces/Orthotics/Lines/Etc:  
· O2 Device: Room air Treatment/Session Assessment:   
· Response to Treatment:  No adverse reaction · Interdisciplinary Collaboration:  
o Occupational Therapist 
o Registered Nurse · After treatment position/precautions:  
o Supine in bed 
o Bed/Chair-wheels locked 
o Bed in low position 
o Call light within reach 
o RN notified · Compliance with Program/Exercises: Will assess as treatment progresses. · Recommendations/Intent for next treatment session: \"Next visit will focus on advancements to more challenging activities and reduction in assistance provided\". Total Treatment Duration: OT Patient Time In/Time Out Time In: 4203 Time Out: 1520 Deedee Miller OT

## 2019-03-01 NOTE — PROGRESS NOTES
Hospitalist Progress Note Patient: Celeste Harrison MRN: [id number]  SSN: [id number] YOB: 1933  Age: 80 y.o. Sex: female Admit Date: 2/24/2019 LOS: 5 days Subjective:  
 
80year old with chronic dCHF, left index finger OM (on IV abx), HTN, hypothyroidism, and chronic pain on Methadone presented to the ER from rehab with 4 days of acute diarrhea, abdominal pain, confusion, and paranoia. Encephalopathy work up has been unremarkable. 2/28 - She is more confused today. No paranoia. No diarrhea. No F/C/N/V. No CP. 
3/1 - She feels better today. Still with some confusion about place, but otherwise feels good. Denies CP/SOB. Denies F/C. Review of systems negative except stated above. Objective:  
 
Visit Vitals /85 (BP 1 Location: Left arm, BP Patient Position: At rest) Pulse 74 Temp 98 °F (36.7 °C) Resp 18 Ht 5' 4\" (1.626 m) Wt 70.2 kg (154 lb 12.8 oz) SpO2 97% BMI 26.57 kg/m² Oxygen Therapy O2 Sat (%): 97 % (03/01/19 0710) Pulse via Oximetry: 88 beats per minute (02/24/19 2346) O2 Device: Room air (02/28/19 1125) Intake and Output:  
 
Intake/Output Summary (Last 24 hours) at 3/1/2019 1019 Last data filed at 3/1/2019 9108 Gross per 24 hour Intake  Output 900 ml Net -900 ml Physical Exam:  
GENERAL: alert, cooperative, no distress, appears stated age EYE: conjunctivae/corneas clear. PERRL. THROAT & NECK: normal and no erythema or exudates noted. LUNG: clear to auscultation bilaterally HEART: regular rate and rhythm, S1S2, no murmur, no JVD ABDOMEN: soft, non-tender, non-distended. Bowel sounds normal.  
EXTREMITIES: 1+ BLE edema, 2+ pedal/radial pulses bilaterally SKIN: Scratch marks on extremities NEUROLOGIC: A&Ox2 (person, year). Cranial nerves 2-12 grossly intact. Lab/Data Review: 
Recent Results (from the past 24 hour(s)) SODIUM, UR, RANDOM Collection Time: 02/28/19 10:24 AM  
Result Value Ref Range Sodium,urine random 78 MMOL/L  
PROTEIN/CREATININE RATIO, URINE Collection Time: 02/28/19 10:24 AM  
Result Value Ref Range Protein, urine random 135 (H) <11.9 mg/dL Creatinine, urine 43.40 mg/dL Protein/Creat. urine Ratio 3.1 URINALYSIS W/ RFLX MICROSCOPIC Collection Time: 02/28/19 10:24 AM  
Result Value Ref Range Color YELLOW Appearance CLEAR Specific gravity 1.016 1.001 - 1.023    
 pH (UA) 6.0 5.0 - 9.0 Protein 100 (A) NEG mg/dL Glucose 100 mg/dL Ketone NEGATIVE  NEG mg/dL Bilirubin NEGATIVE  NEG Blood SMALL (A) NEG Urobilinogen 0.2 0.2 - 1.0 EU/dL Nitrites NEGATIVE  NEG Leukocyte Esterase MODERATE (A) NEG    
 WBC 10-20 0 /hpf Bacteria TRACE 0 /hpf Yeast MODERATE    
PLEASE READ & DOCUMENT PPD TEST IN 72 HRS Collection Time: 02/28/19 11:37 AM  
Result Value Ref Range PPD neg Negative  
 mm Induration 0 mm GLUCOSE, POC Collection Time: 02/28/19 11:46 AM  
Result Value Ref Range Glucose (POC) 205 (H) 65 - 100 mg/dL GLUCOSE, POC Collection Time: 02/28/19  4:02 PM  
Result Value Ref Range Glucose (POC) 276 (H) 65 - 100 mg/dL GLUCOSE, POC Collection Time: 02/28/19  8:45 PM  
Result Value Ref Range Glucose (POC) 232 (H) 65 - 100 mg/dL METABOLIC PANEL, BASIC Collection Time: 03/01/19  4:20 AM  
Result Value Ref Range Sodium 142 136 - 145 mmol/L Potassium 4.3 3.5 - 5.1 mmol/L Chloride 107 98 - 107 mmol/L  
 CO2 28 21 - 32 mmol/L Anion gap 7 7 - 16 mmol/L Glucose 94 65 - 100 mg/dL BUN 56 (H) 8 - 23 MG/DL Creatinine 2.10 (H) 0.6 - 1.0 MG/DL  
 GFR est AA 29 (L) >60 ml/min/1.73m2 GFR est non-AA 24 (L) >60 ml/min/1.73m2 Calcium 8.1 (L) 8.3 - 10.4 MG/DL  
GLUCOSE, POC Collection Time: 03/01/19  7:39 AM  
Result Value Ref Range Glucose (POC) 89 65 - 100 mg/dL Imaging: Xr Chest Sngl V Result Date: 2/26/2019 IMPRESSION: Lungs underinflated, PICC line tip overlies lower superior vena cava. [address] Result Date: 2/28/2019 IMPRESSION: 1. No hydronephrosis. 2. Mildly increased renal cortical echogenicity bilaterally, compatible with medical renal disease. 3. Small benign right renal cyst.  
 
No results found for this visit on 02/24/19. Cultures: All Micro Results Procedure Component Value Units Date/Time Jason Weston [[id number]] Collected:  02/25/19 1050 Order Status:  Completed Specimen:  Stool Updated:  02/27/19 4541 Special Requests: NO SPECIAL REQUESTS Culture result:    
  No Salmonella, Shigella, or Ecoli 0157 isolated. CULTURE, URINE [[id number]] Collected:  02/24/19 2059 Order Status:  Completed Specimen:  Urine from Clean catch Updated:  02/27/19 2301 Special Requests: NO SPECIAL REQUESTS Culture result:    
  <10,000 COLONIES/mL MIXED SKIN PRITI ISOLATED C. DIFFICILE AG & TOXIN A/B [[id number]] Order Status:  Canceled Specimen:  Stool CULTURE, URINE [[id number]] Order Status:  Canceled Specimen:  Urine from Clean catch Assessment/Plan:  
 
Principal Problem: 
  RAYMON (acute kidney injury) ([facility] [address] (2/5/2019) - Likely prerenal due to dehydration with diarrhea - Creatinine improved today to 2.10 
- Continue LR at 100 ml/hr 
- Urine studies unremarkable - Renal U/S shows CKD, no hydronephrosis - Check BMP daily Active Problems: 
  Leukocytosis (2/5/2019) - Resolved - Likely reactive/dehydration 
- Continue Cefepime for finger infection - Check CBC daily Acute metabolic encephalopathy (5/77/2605) 
- Confusion slightly better - I suspect this was due to opiate withdrawal 
- Restarted Methadone - UA unremarkable - CXR not done 
- TSH normal 
- Recommended to family to keep window open and patient awake during the day 
- Continue to monitor Acute diarrhea (2/26/2019) - Resolved - Stool WBC & Culture negative - Continue Immodium PRN 
- Restarted Methadone Finger infection (2/5/2019) 
- Continue Cefepime Oral Thrush (2/26/2019) - Likely due to long term abx + steroids - Continue Nystatin S/S 
- Continue Probiotics Essential hypertension with goal blood pressure less than 130/85 (10/27/2017) - Stable - Continue Coreg 
- Continue Amlodipine DM2 (diabetes mellitus, type 2) ([facility] [address] (7/27/2018) - Stable - Continue Humalog SSI Chronic diastolic congestive heart failure ([facility] (1/16/2019) - No acute issues - Continue Coreg Acquired hypothyroidism (1/16/2019) 
- TSH normal 
- Continue Levothyroxine GERD (gastroesophageal reflux disease) (1/16/2019) Mixed hyperlipidemia (7/27/2018) Dispo - Continue Methadone. Continue Cefepime. Hopefully discharge back to rehab in next 1-2 days. DIET DIABETIC CONSISTENT CARB Regular DVT Prophylaxis: Heparin Signed By: Paul Lovelace DO March 1, 2019

## 2019-03-01 NOTE — PROGRESS NOTES
Interdisciplinary Rounds completed 03/01/19. Nursing, Case Management, Physician and PT present. Plan of care reviewed and updated.

## 2019-03-01 NOTE — PROGRESS NOTES
Problem: Mobility Impaired (Adult and Pediatric) Goal: *Acute Goals and Plan of Care (Insert Text) STG: 
(1.)Ms. Lily Osborne will supine to sit and sit to supine with CONTACT GUARD ASSIST within 3 treatment day(s). (2.)Ms. Lily Osborne will transfer from bed to chair and chair to bed with STAND BY ASSIST using the least restrictive device within 3 treatment day(s). (3.)Ms. Lily Osborne will ambulate with CONTACT GUARD ASSIST for 15 feet with the least restrictive device within 3 treatment day(s). LTG: 
(1.)Ms. Lily Osborne will move from supine to sit and sit to supine  in bed with STAND BY ASSIST within 7 treatment day(s). (2.)Ms. Lily Osborne will transfer from bed to chair and chair to bed with SUPERVISION using the least restrictive device within 7 treatment day(s). (3.)Ms. Lily Osborne will ambulate with STAND BY ASSIST for 100 feet with the least restrictive device within 7 treatment day(s). ________________________________________________________________________________________________ PHYSICAL THERAPY: Daily Note and AM 3/1/2019INPATIENT: PT Visit Days : 4 [place] B / Product Type: Medicare /   
  
NAME/AGE/GENDER: Ravi Elliott is a 80 y.o. female PRIMARY DIAGNOSIS: RAYMON (acute kidney injury) ([facility] [N17.9] Acute encephalopathy [G93.40] RAYMON (acute kidney injury) ([facility] RAYMON (acute kidney injury) ([facility] ICD-10: Treatment Diagnosis:  
 · Generalized Muscle Weakness (M62.81) · Difficulty in walking, Not elsewhere classified (R26.2) · Repeated Falls (R29.6) · History of falling (Z91.81) Precaution/Allergies: 
Contrast agent [iodine]; Actifed [triprolidine-pseudoephedrine]; Allopurinol; Decongestant [pseudoephedrine hcl]; Lexapro [escitalopram oxalate]; Lyrica [pregabalin]; Minizide [prazosin-polythiazide]; Mobic [meloxicam]; Omnipaque [iohexol]; Sertraline; Spironolactone; and Sulfamethazine ASSESSMENT:  
 Ms. Enio Bowman presents sitting in recliner and agreeable to therapy. Patient states that something is cutting her in half therefore she is ready to get up. The nurse disconnects the perwick. Scoot forward in the chair with min assistance. Sitting balance is good. Sit to stand with min assist.  Standing balance is fair. Gait training with rolling walker and gait belt x 25 feet with min assist.  Patient has some posterior leaning but with cues she is able to correct. Patient is returned to the recliner and positioned comfortably in chair with chair alarm on, needs in reach. MD arrives. Austin Medrano is progressing slowly with therapy, limited by weakness, decreased activity tolerance, and increased confusion today. Will benefit from continued therapy during hospital stay and return to rehab at discharge. Continue PT efforts. This section established at most recent assessment PROBLEM LIST (Impairments causing functional limitations): 1. Decreased Strength 2. Decreased ADL/Functional Activities 3. Decreased Transfer Abilities 4. Decreased Ambulation Ability/Technique 5. Decreased Balance 6. Decreased Cognition INTERVENTIONS PLANNED: (Benefits and precautions of physical therapy have been discussed with the patient.) 1. Balance Exercise 2. Bed Mobility 3. Family Education 4. Gait Training 5. Home Exercise Program (HEP) 6. Neuromuscular Re-education/Strengthening 7. Range of Motion (ROM) 8. Therapeutic Activites 9. Therapeutic Exercise/Strengthening 10. Transfer Training TREATMENT PLAN: Frequency/Duration: 3 times a week for duration of hospital stay Rehabilitation Potential For Stated Goals: Fair RECOMMENDED REHABILITATION/EQUIPMENT: (at time of discharge pending progress): Due to the probability of continued deficits (see above) this patient will likely need continued skilled physical therapy after discharge. Equipment:  
? None at this time HISTORY:  
 History of Present Injury/Illness (Reason for Referral): 
See H&P below The patient is unable to give reliable history, family is at bedside. She is an 80years old female patient with history of diastolic CHF, diabetes, peripheral neuropathy and most recently had left index finger osteomyelitis, currently on cefepime with a PICC line in place. She was recently discharged from the hospital and was recuperating at a nursing home. She was brought in for worsening mental status with confusion, disorientation, and at times hallucinations. The son states that the patient has no underlying dementia prior to her recent hospitalization. She was very sharp, alert and oriented. No fevers or chills. She has been having diarrhea over the last couple of days, watery stools and also reports abdominal pain. No nausea or vomiting. Past Medical History/Comorbidities: Ms. Cathy Manning  has a past medical history of Arthritis, Chronic diastolic congestive heart failure ([address] (1/16/2019), Chronic pain, Edema, GERD (gastroesophageal reflux disease) (1/16/2019), Gouty arthritis, History of MI (myocardial infarction) (1980's), Hypertension, Hypothyroidism, Mild heartburn, Neuropathy (1980's), Post-operative nausea and vomiting, and Type 2 diabetes mellitus without complication, without long-term current use of insulin ([address] (7/27/2018). She also has no past medical history of Adverse effect of anesthesia, Aneurysm ([address] Arrhythmia, Asthma, Autoimmune disease ([address] CAD (coronary artery disease), Cancer ([address] Chronic kidney disease, Chronic obstructive pulmonary disease ([address] Coagulation disorder ([address] Difficult intubation, Endocarditis, Ill-defined condition, Liver disease, Malignant hyperthermia due to anesthesia, Morbid obesity ([address] Pseudocholinesterase deficiency, Psychiatric disorder, PUD (peptic ulcer disease), Rheumatic fever, Seizures ([address] Sleep apnea, Stroke ([address] or Thromboembolus ([address]   Ms. Johnnie Feliz  has a past surgical history that includes hx partial thyroidectomy (1960); hx orthopaedic (Bilateral, [id number]); hx orthopaedic (Left, 1992); hx open cholecystectomy (1970); hx cyst removal (Right, 1950's); hx urological; hx heart catheterization (late 1979); hx hysterectomy (1972); hx gyn (1963); hx dilation and curettage (2114-2810); and hx left salpingo-oophorectomy (1965). Social History/Living Environment:  
Home Environment: Skilled nursing facility Care Facility [facility]: [facility] One/Two Story Residence: One story Living Alone: No 
Support Systems: Skilled nursing facility Patient Expects to be Discharged to[de-identified] Skilled nursing facility Current DME Used/Available at Home: Cane, straight Prior Level of Function/Work/Activity: 
Lives with daughter, use of SPC for gait, 3 falls, use of SPC at baseline Number of Personal Factors/Comorbidities that affect the Plan of Care: 3+: HIGH COMPLEXITY EXAMINATION:  
Most Recent Physical Functioning:  
Gross Assessment: 
  
         
  
Posture: 
  
Balance: 
Sitting: Impaired Sitting - Static: Good (unsupported) Sitting - Dynamic: Fair (occasional) Standing: Impaired Standing - Static: Fair Standing - Dynamic : Fair Bed Mobility: 
  
Wheelchair Mobility: 
  
Transfers: 
Sit to Stand: Minimum assistance Stand to Sit: Minimum assistance Gait: 
  
Base of Support: Center of gravity altered;Narrowed Speed/Shannon: Shuffled; Slow Step Length: Left shortened;Right shortened Gait Abnormalities: Decreased step clearance Distance (ft): 25 Feet (ft) Assistive Device: Gait belt;Walker, rolling Ambulation - Level of Assistance: Minimal assistance Interventions: Safety awareness training Body Structures Involved: 1. Nerves 2. Bones 3. Joints 4. Muscles 5. Ligaments Body Functions Affected: 1. Mental 
2. Sensory/Pain 3. Cardio 4. Respiratory 5. Neuromusculoskeletal 
6. Movement Related Activities and Participation Affected: 1. Learning and Applying Knowledge 2. General Tasks and Demands 3. Mobility 4. Self Care 5. Domestic Life 6. Interpersonal Interactions and Relationships 7. Community, Social and [place] Number of elements that affect the Plan of Care: 4+: HIGH COMPLEXITY CLINICAL PRESENTATION:  
Presentation: Evolving clinical presentation with changing clinical characteristics: MODERATE COMPLEXITY CLINICAL DECISION MAKING:  
[facility] AM-PAC 6 Clicks Basic Mobility Inpatient Short Form How much difficulty does the patient currently have. .. Unable A Lot A Little None 1. Turning over in bed (including adjusting bedclothes, sheets and blankets)? [] 1   [] 2   [x] 3   [] 4  
2. Sitting down on and standing up from a chair with arms ( e.g., wheelchair, bedside commode, etc.)   [] 1   [] 2   [x] 3   [] 4  
3. Moving from lying on back to sitting on the side of the bed? [] 1   [] 2   [x] 3   [] 4 How much help from another person does the patient currently need. .. Total A Lot A Little None 4. Moving to and from a bed to a chair (including a wheelchair)? [] 1   [] 2   [x] 3   [] 4  
5. Need to walk in hospital room? [] 1   [x] 2   [] 3   [] 4  
6. Climbing 3-5 steps with a railing? [x] 1   [] 2   [] 3   [] 4  
© 2007, Trustees of [facility], under license to [employer]. All rights reserved Score:  Initial: 15 Most Recent: X (Date: -- ) Interpretation of Tool:  Represents activities that are increasingly more difficult (i.e. Bed mobility, Transfers, Gait). Medical Necessity:    
· Patient is expected to demonstrate progress in strength, balance, coordination and functional technique to decrease assistance required with gait, transfers, and functional, mobility. [place] Reason for Services/Other Comments: 
· Patient continues to require skilled intervention due to  decreased strength, decreased balance, decreased functional tolerance, decreased cardiopulmonary endurance affecting participation in basic ADLs and functional tasks. Use of outcome tool(s) and clinical judgement create a POC that gives a: Clear prediction of patient's progress: LOW COMPLEXITY  
  
 
 
 
TREATMENT:  
(In addition to Assessment/Re-Assessment sessions the following treatments were rendered) Pre-treatment Symptoms/Complaints: \"That thing is about to cut me in half\"  Not sure what the patient is talking about. Pain: Initial:  
Pain Intensity 1: 0  Post Session:  0/10 Therapeutic Activity: (     24 minutes): Therapeutic activities including  sit-stand transfers, standing balance activities, Chair transfers, and Ambulation on level ground to improve mobility, strength, balance, coordination and activity tolerance. Required min assist and increased Safety awareness training to promote static and dynamic balance in standing and promote coordination of bilateral, upper extremity(s), lower extremity(s). Therapeutic Exercise: ( ):  Exercises per grid below to improve mobility, strength, balance and coordination. Required minimal visual and verbal cues to promote proper body mechanics and exercise form. Progressed range, repetitions and complexity of movement as indicated. Date: 
2/27/19 Date: 
2/28/19 Date: Activity/Exercise Parameters Parameters Parameters LAQ 15x AB 20x AB Seated marching 15x AB 20x AB Toe taps 15x AB 20x AB Heel taps 15x AB 20x AB Seated hip aDd (pillow squeeze) 15x AB Seated hip aBd 15x AB    
UE anterior punches 15x AB 20x AB Bicep curls 15x AB 20x AB Braces/Orthotics/Lines/Etc:  
· IV 
· O2 Device: Room air Treatment/Session Assessment:   
· Response to Treatment:  Less posterior lean noted · Interdisciplinary Collaboration:  
o Physical Therapy Assistant 
o Registered Nurse · After treatment position/precautions:  
o Up in chair 
o Bed alarm/tab alert on 
o Bed/Chair-wheels locked o Bed in low position 
o Call light within reach 
o RN notified 
o MD at bedside · Compliance with Program/Exercises: Will assess as treatment progresses · Recommendations/Intent for next treatment session: \"Next visit will focus on advancements to more challenging activities and reduction in assistance provided\". Total Treatment Duration: PT Patient Time In/Time Out Time In: 1011 Time Out: 1035 Bautista Barber, PTA

## 2019-03-02 NOTE — PROGRESS NOTES
Hospitalist Progress Note Patient: Elaine Aly MRN: [id number]  SSN: [id number] YOB: 1933  Age: 80 y.o. Sex: female Admit Date: 2/24/2019 LOS: 6 days Subjective:  
 
80year old with chronic dCHF, left index finger OM (on IV abx), HTN, hypothyroidism, and chronic pain on Methadone presented to the ER from rehab with 4 days of acute diarrhea, abdominal pain, confusion, and paranoia. Encephalopathy work up has been unremarkable. 3/1 - She feels better today. Still with some confusion about place, but otherwise feels good. Denies CP/SOB. Denies F/C. 
3/2 - She is less confused today. Asking to go home. Denies CP/SOB. Christina diarrhea. Review of systems negative except stated above. Objective:  
 
Visit Vitals /76 (BP 1 Location: Left arm, BP Patient Position: At rest) Pulse 72 Temp 97.5 °F (36.4 °C) Resp 18 Ht 5' 4\" (1.626 m) Wt 68.2 kg (150 lb 6.4 oz) SpO2 92% BMI 25.82 kg/m² Oxygen Therapy O2 Sat (%): 92 % (03/02/19 0713) Pulse via Oximetry: 88 beats per minute (02/24/19 2346) O2 Device: Room air (02/28/19 1125) Intake and Output:  
 
Intake/Output Summary (Last 24 hours) at 3/2/2019 8111 Last data filed at 3/2/2019 9877 Gross per 24 hour Intake 720 ml Output 1600 ml Net -880 ml Physical Exam:  
GENERAL: alert, cooperative, no distress, appears stated age EYE: conjunctivae/corneas clear. PERRL. THROAT & NECK: normal and no erythema or exudates noted. LUNG: clear to auscultation bilaterally HEART: regular rate and rhythm, S1S2, no murmur, no JVD ABDOMEN: soft, non-tender, non-distended. Bowel sounds normal.  
EXTREMITIES: 1+ BLE edema, 2+ pedal/radial pulses bilaterally SKIN: Scratch marks on extremities NEUROLOGIC: A&Ox3. Cranial nerves 2-12 grossly intact. Lab/Data Review: 
Recent Results (from the past 24 hour(s)) GLUCOSE, POC Collection Time: 03/01/19 11:13 AM  
Result Value Ref Range Glucose (POC) 156 (H) 65 - 100 mg/dL GLUCOSE, POC Collection Time: 03/01/19  5:36 PM  
Result Value Ref Range Glucose (POC) 198 (H) 65 - 100 mg/dL GLUCOSE, POC Collection Time: 03/01/19  8:29 PM  
Result Value Ref Range Glucose (POC) 359 (H) 65 - 100 mg/dL METABOLIC PANEL, BASIC Collection Time: 03/02/19  4:12 AM  
Result Value Ref Range Sodium 142 136 - 145 mmol/L Potassium 3.9 3.5 - 5.1 mmol/L Chloride 107 98 - 107 mmol/L  
 CO2 29 21 - 32 mmol/L Anion gap 6 (L) 7 - 16 mmol/L Glucose 82 65 - 100 mg/dL BUN 49 (H) 8 - 23 MG/DL Creatinine 1.94 (H) 0.6 - 1.0 MG/DL  
 GFR est AA 32 (L) >60 ml/min/1.73m2 GFR est non-AA 26 (L) >60 ml/min/1.73m2 Calcium 7.7 (L) 8.3 - 10.4 MG/DL  
GLUCOSE, POC Collection Time: 03/02/19  7:12 AM  
Result Value Ref Range Glucose (POC) 68 65 - 100 mg/dL Imaging: Xr Chest Sngl V Result Date: 2/26/2019 IMPRESSION: Lungs underinflated, PICC line tip overlies lower superior vena cava. Mri Brain Wo Cont Result Date: 3/1/2019 IMPRESSION: 1. No acute infarction. 2. White matter findings compatible with chronic small vessel ischemic disease. 3. Moderate cerebral volume loss. 4. Motion artifact. [address] Result Date: 2/28/2019 IMPRESSION: 1. No hydronephrosis. 2. Mildly increased renal cortical echogenicity bilaterally, compatible with medical renal disease. 3. Small benign right renal cyst.  
 
No results found for this visit on 02/24/19. Cultures: All Micro Results Procedure Component Value Units Date/Time Beryle Livings [[id number]] Collected:  02/25/19 1050 Order Status:  Completed Specimen:  Stool Updated:  02/27/19 5978 Special Requests: NO SPECIAL REQUESTS Culture result:    
  No Salmonella, Shigella, or Ecoli 0157 isolated. CULTURE, URINE [[id number]] Collected:  02/24/19 2059 Order Status:  Completed Specimen:  Urine from Clean catch Updated:  02/27/19 8906 Special Requests: NO SPECIAL REQUESTS Culture result:    
  <10,000 COLONIES/mL MIXED SKIN PRITI ISOLATED C. DIFFICILE AG & TOXIN A/B [[id number]] Order Status:  Canceled Specimen:  Stool CULTURE, URINE [[id number]] Order Status:  Canceled Specimen:  Urine from Clean catch Assessment/Plan:  
 
Principal Problem: 
  RAYMON (acute kidney injury) ([facility] (2/5/2019) - Likely prerenal due to dehydration with diarrhea - Creatinine improved today to 1.94 
- Continue LR at 100 ml/hr 
- Urine studies unremarkable - Renal U/S shows CKD, no hydronephrosis - Check BMP daily Active Problems: 
  Leukocytosis (2/5/2019) - Resolved - Likely reactive/dehydration 
- Continue Cefepime for finger infection - Check CBC daily Acute metabolic encephalopathy (3/19/8615) 
- Confusion slightly better - I suspect this was due to opiate withdrawal 
- Restarted Methadone - MRI Brain on 3/1 showed only chronic changes - UA unremarkable - CXR not done 
- TSH normal 
- Recommended to family to keep window open and patient awake during the day 
- Continue to monitor Acute diarrhea (2/26/2019) - Resolved - Stool WBC & Culture negative - Continue Immodium PRN 
- Restarted Methadone Finger infection (2/5/2019) 
- Continue Cefepime Oral Thrush (2/26/2019) - Likely due to long term abx + steroids - Continue Nystatin S/S 
- Continue Probiotics Essential hypertension with goal blood pressure less than 130/85 (10/27/2017) - Stable - Continue Coreg 
- Continue Amlodipine DM2 (diabetes mellitus, type 2) ([facility] (7/27/2018) - Stable - Continue Humalog SSI Chronic diastolic congestive heart failure ([facility] (1/16/2019) - No acute issues - Continue Coreg Acquired hypothyroidism (1/16/2019) 
- TSH normal 
- Continue Levothyroxine GERD (gastroesophageal reflux disease) (1/16/2019) Mixed hyperlipidemia (7/27/2018) Dispo - Continue Methadone. Continue Cefepime. Hopefully discharge back to rehab in next 1-2 days. DIET DIABETIC CONSISTENT CARB Regular DVT Prophylaxis: Heparin Signed By: Ashkan Mcfadden DO March 2, 2019

## 2019-03-02 NOTE — PROGRESS NOTES
END OF SHIFT NOTE: 
 
INTAKE/OUTPUT 
03/01 0701 - 03/02 0700 In: 720 [P.O.:720] Out: 1200 [Urine:1200] Voiding: NO 
Catheter: YES-PurewickColor: clear Drain: DIET 
DM Flatus: Patient does have flatus present. Stool:  0 occurrences. Characteristics: 
Stool Assessment Stool Appearance: Formed Ambulating No 
Emesis: 0 occurrences. Characteristics: VITAL SIGNS Patient Vitals for the past 12 hrs: 
 Temp Pulse Resp BP SpO2  
03/02/19 1445 97.9 °F (36.6 °C) 70 20 136/70 92 % 03/02/19 1041 97.7 °F (36.5 °C) 71 18 179/74 94 % 03/02/19 0713 97.5 °F (36.4 °C) 72 18 159/76 92 % Pain Assessment Pain Intensity 1: 0 (03/02/19 1500) Pain Intervention(s) 1: Medication (see MAR) Patient Stated Pain Goal: 0 Was OOB to chair part of the afternoon for several hours. Denied pain. Ate from meal trays.  No n/v.  
 
 
 
Mandeep Hannah, RN

## 2019-03-03 NOTE — PROGRESS NOTES
Hospitalist Progress Note Patient: Dena Perry MRN: [id number]  SSN: [id number] YOB: 1933  Age: 80 y.o. Sex: female Admit Date: 2/24/2019 LOS: 7 days Subjective:  
 
80year old with chronic dCHF, left index finger OM (on IV abx), HTN, hypothyroidism, and chronic pain on Methadone presented to the ER from rehab with 4 days of acute diarrhea, abdominal pain, confusion, and paranoia. Encephalopathy work up has been unremarkable. 3/2 - She is less confused today. Asking to go home. Denies CP/SOB. Denies diarrhea. 3/3 - She continues to not know the hospital. Kidney function improving. Denies diarrhea. Review of systems negative except stated above. Objective:  
 
Visit Vitals /72 (BP 1 Location: Left arm, BP Patient Position: Sitting) Pulse 74 Temp 98.7 °F (37.1 °C) Resp 18 Ht 5' 4\" (1.626 m) Wt 69.7 kg (153 lb 9.6 oz) SpO2 95% BMI 26.37 kg/m² Oxygen Therapy O2 Sat (%): 95 % (03/03/19 1101) Pulse via Oximetry: 88 beats per minute (02/24/19 2346) O2 Device: Room air (02/28/19 1125) Intake and Output:  
 
Intake/Output Summary (Last 24 hours) at 3/3/2019 1151 Last data filed at 3/3/2019 4818 Gross per 24 hour Intake 4632 ml Output 2225 ml Net 2407 ml Physical Exam:  
GENERAL: alert, cooperative, no distress, appears stated age EYE: conjunctivae/corneas clear. PERRL. THROAT & NECK: normal and no erythema or exudates noted. LUNG: clear to auscultation bilaterally HEART: regular rate and rhythm, S1S2, no murmur, no JVD ABDOMEN: soft, non-tender, non-distended. Bowel sounds normal.  
EXTREMITIES: 1+ BLE edema, 2+ pedal/radial pulses bilaterally SKIN: Scratch marks on extremities NEUROLOGIC: A&Ox3. Cranial nerves 2-12 grossly intact. Lab/Data Review: 
Recent Results (from the past 24 hour(s)) GLUCOSE, POC Collection Time: 03/02/19  4:18 PM  
Result Value Ref Range Glucose (POC) 240 (H) 65 - 100 mg/dL GLUCOSE, POC Collection Time: 03/02/19  7:53 PM  
Result Value Ref Range Glucose (POC) 363 (H) 65 - 100 mg/dL METABOLIC PANEL, BASIC Collection Time: 03/03/19  4:56 AM  
Result Value Ref Range Sodium 144 136 - 145 mmol/L Potassium 3.6 3.5 - 5.1 mmol/L Chloride 107 98 - 107 mmol/L  
 CO2 30 21 - 32 mmol/L Anion gap 7 7 - 16 mmol/L Glucose 146 (H) 65 - 100 mg/dL BUN 39 (H) 8 - 23 MG/DL Creatinine 1.65 (H) 0.6 - 1.0 MG/DL  
 GFR est AA 38 (L) >60 ml/min/1.73m2 GFR est non-AA 31 (L) >60 ml/min/1.73m2 Calcium 7.5 (L) 8.3 - 10.4 MG/DL  
GLUCOSE, POC Collection Time: 03/03/19  7:19 AM  
Result Value Ref Range Glucose (POC) 121 (H) 65 - 100 mg/dL GLUCOSE, POC Collection Time: 03/03/19 11:22 AM  
Result Value Ref Range Glucose (POC) 126 (H) 65 - 100 mg/dL Imaging: Xr Chest Sngl V Result Date: 2/26/2019 IMPRESSION: Lungs underinflated, PICC line tip overlies lower superior vena cava. Mri Brain Wo Cont Result Date: 3/1/2019 IMPRESSION: 1. No acute infarction. 2. White matter findings compatible with chronic small vessel ischemic disease. 3. Moderate cerebral volume loss. 4. Motion artifact. K[address] 98 Result Date: 2/28/2019 IMPRESSION: 1. No hydronephrosis. 2. Mildly increased renal cortical echogenicity bilaterally, compatible with medical renal disease. 3. Small benign right renal cyst.  
 
No results found for this visit on 02/24/19. Cultures: All Micro Results Procedure Component Value Units Date/Time Shalini Edgar [[id number]] Collected:  02/25/19 1050 Order Status:  Completed Specimen:  Stool Updated:  02/27/19 5446 Special Requests: NO SPECIAL REQUESTS Culture result:    
  No Salmonella, Shigella, or Ecoli 0157 isolated. CULTURE, URINE [[id number]] Collected:  02/24/19 2052 Order Status:  Completed Specimen:  Urine from Clean catch Updated:  02/27/19 6087 Special Requests: NO SPECIAL REQUESTS Culture result:    
  <10,000 COLONIES/mL MIXED SKIN PRITI ISOLATED C. DIFFICILE AG & TOXIN A/B [[id number]] Order Status:  Canceled Specimen:  Stool CULTURE, URINE [[id number]] Order Status:  Canceled Specimen:  Urine from Clean catch Assessment/Plan:  
 
Principal Problem: 
  [facility] (2/5/2019) - Likely prerenal due to dehydration with diarrhea - Creatinine improved today to 1.64 
- Stop LR 
- Urine studies unremarkable - Renal U/S shows CKD, no hydronephrosis - Check BMP daily Active Problems: 
  Leukocytosis (2/5/2019) - Resolved - Likely reactive/dehydration 
- Continue Cefepime for finger infection - Check CBC daily Acute metabolic encephalopathy (7/23/7338) 
- Confusion slightly better - I suspect this was due to opiate withdrawal 
- Restarted Methadone - MRI Brain on 3/1 showed only chronic changes - UA unremarkable - CXR not done 
- TSH normal 
- Recommended to family to keep window open and patient awake during the day 
- Continue to monitor Acute diarrhea (2/26/2019) - Resolved - Stool WBC & Culture negative - Continue Immodium PRN 
- Restarted Methadone Finger infection (2/5/2019) 
- Continue Cefepime Oral Thrush (2/26/2019) - Likely due to long term abx + steroids - Continue Nystatin S/S 
- Continue Probiotics Essential hypertension with goal blood pressure less than 130/85 (10/27/2017) - Stable - Continue Coreg 
- Continue Amlodipine DM2 (diabetes mellitus, type 2) ([facility] (7/27/2018) - Stable - Continue Humalog SSI Chronic diastolic congestive heart failure ([facility] (1/16/2019) - No acute issues - Continue Coreg Acquired hypothyroidism (1/16/2019) 
- TSH normal 
- Continue Levothyroxine GERD (gastroesophageal reflux disease) (1/16/2019) Mixed hyperlipidemia (7/27/2018) Dispo - Continue Methadone. Continue Cefepime. Hopefully discharge home vs rehab in next 1-2 days. DIET DIABETIC CONSISTENT CARB Regular DVT Prophylaxis: Heparin Signed By: Jaguar Mayer DO March 3, 2019

## 2019-03-04 NOTE — PROGRESS NOTES
TRANSFER - OUT REPORT: 
 
Verbal report given to Sasha Whalen on Phillip Cutter  being transferred to [facility] at [phone] Rm [phone] for routine progression of care Report consisted of patients Situation, Background, Assessment and  
Recommendations(SBAR). Information from the following report(s) SBAR was reviewed with the receiving nurse. Opportunity for questions and clarification was provided. Patient transported with: 
 Monitor

## 2019-03-04 NOTE — PROGRESS NOTES
Spoke with patient's daughter and she states discharge plan at this point is for patient to return to [facility] at discharge. MD notified.

## 2019-03-04 NOTE — PROGRESS NOTES
Problem: Nutrition Deficit Goal: *Optimize nutritional status Nutrition Reason for assessment: LOS Day 7. Assessment:  
Diet order(s): CCHOFood/Nutrition Patient History:  The patient is noted to have a h/o HTN, Diabetes, CHF and GERD. The patient is currently admitted with RAYMON. She states that her appetite is not too good. She states that it has been up and down for the past several months. She denies any current issues with nausea or vomiting. She states that she does regularly get constipated and once she takes something for it she has diarrhea. She reports some swallowing difficulties related to previous thyroid surgeries and prefers chopped up meats. Discussed mechanical soft diet which she has previously had in the hospital. She agrees to this diet and would prefer the chopped meats. She states that she drinks about 1 Boost Glucose control at home. Daughter Margarette Phillips works on the 2nd floor here and has been providing her with Boost Glucose control daily while in here. She does not wish to receive Glucerna supplements. Patient questions why she is being sent hashbrown patties and states that she prefers just regular hashbrowns not in a maricruz form. RD is unsure of how hashbrowns are served at this time but will check with  regarding this request. The patient has no further questions or concerns regarding nutrition at this time. Patient states that her UBW is in the 150's. Anthropometrics:Height: 5' 4\" (162.6 cm),  Weight: 72.3 kg (159 lb 6.4 oz), Weight Source: Bed, Body mass index is 27.36 kg/m². BMI class of normal weight for age >71. Macronutrient needs: EER:  3976-2838 kcal /day (20-25 kcal/kg listed BW) EPR:  72-90 grams protein/day (1-1.25 grams/kg usual BW) Intake/Comparative Standards: Average intake for past 8 recorded meal(s): 88%. This potentially meets ~100% of kcal and ~100% of protein needs Nutrition Diagnosis: No acute nutrition related diagnosis at this time. Intervention: 
Meals and snacks: Continue current diet. Nutrition Supplement Therapy: None at this time. Nutrition Discharge Plan: No nutrition needs identified at this time. Ed Riedel Winda Mary, [address], [address] [place] [address],  [phone]

## 2019-03-04 NOTE — DISCHARGE SUMMARY
Hospitalist Discharge Summary Patient ID: 
Shaaron Kayser 
[id number] 
58 y.o. 
8/23/1933 Admit date: 2/24/2019  7:57 PM 
Discharge date and time: 3/4/2019 Attending: Munir Rhodes DO 
PCP:  Siri Baumgarten, MD 
Treatment Team: Attending Provider: Munir Rhodes DO; Utilization Review: Sury Gamez RN; Care Manager: Sebastien Alberts RN 
 
Principal Diagnosis RAYMON (acute kidney injury) [facility] Hospital Problems as of 3/4/2019 Date Reviewed: 5/25/2016 Codes Class Noted - Resolved POA * (Principal) [facility] ICD-10-CM: N17.9 ICD-9-CM: 584.9  2/5/2019 - Present Yes Acute metabolic encephalopathy TDN-41-KT: G93.41 
ICD-9-CM: 348.31  2/26/2019 - Present Yes Acute diarrhea ICD-10-CM: R19.7 ICD-9-CM: 787.91  2/26/2019 - Present Yes Acute encephalopathy ICD-10-CM: G93.40 ICD-9-CM: 348.30  2/24/2019 - Present Yes Leukocytosis ICD-10-CM: Y89.498 ICD-9-CM: 288.60  2/5/2019 - Present Yes Oral thrush ICD-10-CM: B37.0 ICD-9-CM: 112.0  2/26/2019 - Present Clinically Undetermined Finger infection ICD-10-CM: L08.9 ICD-9-CM: 686.9  2/5/2019 - Present Yes Chronic diastolic congestive heart failure (HCC) ICD-10-CM: I50.32 
ICD-9-CM: 428.32, 428.0  1/16/2019 - Present Yes DM2 (diabetes mellitus, type 2) (HCC) ICD-10-CM: E11.9 ICD-9-CM: 250.00  7/27/2018 - Present Yes Essential hypertension with goal blood pressure less than 130/85 ICD-10-CM: I10 
ICD-9-CM: 401.9  10/27/2017 - Present Yes Acquired hypothyroidism ICD-10-CM: E03.9 ICD-9-CM: 244.9  1/16/2019 - Present Yes GERD (gastroesophageal reflux disease) ICD-10-CM: K21.9 ICD-9-CM: 530.81  1/16/2019 - Present Yes Mixed hyperlipidemia ICD-10-CM: E78.2 ICD-9-CM: 272.2  7/27/2018 - Present Yes Hospital Course: 
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 She was found to have acute on chronic kidney failure. Encephalopathy work up was unremarkable. She had not been getting her Methadone and Klonopin so these were restarted as her symptoms were consistent with acute opioid withdrawal. She was improved by the next day. Her abdominal pain and diarrhea resolved. Her confusion eventually resolved. She remained stable and was discharged back to rehab. Significant Diagnostic Studies: 
 
Labs: Results:  
   
Chemistry Recent Labs 03/04/19 
0430 03/03/19 
7392 03/02/19 
9648 * 146* 82 * 144 142  
K 3.5 3.6 3.9  107 107 CO2 31 30 29 BUN 32* 39* 49* CREA 1.47* 1.65* 1.94* CA 7.4* 7.5* 7.7* AGAP 8 7 6* CBC w/Diff No results for input(s): WBC, RBC, HGB, HCT, PLT, GRANS, LYMPH, EOS, HGBEXT, HCTEXT, PLTEXT in the last 72 hours. Cardiac Enzymes No results for input(s): CPK, CKND1, MARLENY in the last 72 hours. No lab exists for component: Gaynel Plater Coagulation No results for input(s): PTP, INR, APTT in the last 72 hours. No lab exists for component: INREXT Lipid Panel No results found for: CHOL, CHOLPOCT, CHOLX, CHLST, CHOLV, 562587, HDL, LDL, LDLC, DLDLP, 091935, VLDLC, VLDL, TGLX, TRIGL, TRIGP, TGLPOCT, CHHD, CHHDX  
BNP No results for input(s): BNPP in the last 72 hours. Liver Enzymes No results for input(s): TP, ALB, TBIL, AP, SGOT, GPT in the last 72 hours. No lab exists for component: DBIL Thyroid Studies Lab Results Component Value Date/Time T4, Total 8.3 05/18/2016 04:32 PM  
 TSH 0.429 02/25/2019 04:05 AM  
    
 
 
Imaging: Xr Chest Sngl V Result Date: 2/26/2019 IMPRESSION: Lungs underinflated, PICC line tip overlies lower superior vena cava. Mri Brain Wo Cont Result Date: 3/1/2019 IMPRESSION: 1. No acute infarction. 2. White matter findings compatible with chronic small vessel ischemic disease. 3. Moderate cerebral volume loss. 4. Motion artifact. [address] 98 Result Date: 2/28/2019 IMPRESSION: 1. No hydronephrosis. 2. Mildly increased renal cortical echogenicity bilaterally, compatible with medical renal disease. 3. Small benign right renal cyst.  
 
 
Microbiology/Cultures: All Micro Results Procedure Component Value Units Date/Time Nati Patel [[id number]] Collected:  02/25/19 1050 Order Status:  Completed Specimen:  Stool Updated:  02/27/19 4907 Special Requests: NO SPECIAL REQUESTS Culture result:    
  No Salmonella, Shigella, or Ecoli 0157 isolated. CULTURE, URINE [[id number]] Collected:  02/24/19 2059 Order Status:  Completed Specimen:  Urine from Clean catch Updated:  02/27/19 7029 Special Requests: NO SPECIAL REQUESTS Culture result:    
  <10,000 COLONIES/mL MIXED SKIN PRITI ISOLATED C. DIFFICILE AG & TOXIN A/B [[id number]] Order Status:  Canceled Specimen:  Stool CULTURE, URINE [[id number]] Order Status:  Canceled Specimen:  Urine from Clean catch Discharge Exam: 
Visit Vitals /81 (BP Patient Position: Sitting) Pulse 75 Temp 98.3 °F (36.8 °C) Resp 18 Ht 5' 4\" (1.626 m) Wt 72.3 kg (159 lb 6.4 oz) SpO2 94% BMI 27.36 kg/m² General appearance: alert, cooperative, no distress, appears stated age Lungs: clear to auscultation bilaterally Heart: regular rate and rhythm, S1, S2 normal, no murmur, click, rub or gallop Abdomen: soft, non-tender. Bowel sounds normal. No masses,  no organomegaly Extremities: no cyanosis or edema Neurologic: Grossly normal 
 
Disposition: Rehab Discharge Condition: stable Patient Instructions:  
Current Discharge Medication List  
  
CONTINUE these medications which have CHANGED Details clonazePAM (KLONOPIN) 0.5 mg tablet Take 1 Tab by mouth three (3) times daily. Max Daily Amount: 1.5 mg. 
Qty: 10 Tab, Refills: 0 Associated Diagnoses: Anxiety  
  
methadone (DOLOPHINE) 5 mg tablet Take 1 Tab by mouth every six (6) hours. Max Daily Amount: 20 mg. Instructed to take DOS per Anesthesia guidelines. Qty: 20 Tab, Refills: 0 Associated Diagnoses: Osteomyelitis of finger ([address] predniSONE (DELTASONE) 5 mg tablet Take 1 Tab by mouth two (2) times a day. 5mg BID Qty: 1 Tab, Refills: 0  
  
docusate sodium (COLACE) 100 mg capsule Take 1 Cap by mouth nightly. Qty: 1 Cap, Refills: 0 CONTINUE these medications which have NOT CHANGED Details  
!! OTHER,NON-FORMULARY, Take 30 mL by mouth three (3) times daily. Merline Mor Lactobacillus acidophilus (ACIDOPHILUS) cap Take 1 Cap by mouth daily. polyethylene glycol (MIRALAX) 17 gram packet Take 17 g by mouth daily as needed. !! insulin lispro (HUMALOG) 100 unit/mL kwikpen 5 Units by SubCUTAneous route Before breakfast, lunch, and dinner. !! insulin lispro (HUMALOG) 100 unit/mL kwikpen by SubCUTAneous route Before breakfast, lunch, dinner and at bedtime. Sliding scale  
  
insulin detemir U-100 (LEVEMIR FLEXTOUCH) 100 unit/mL (3 mL) inpn 5 Units by SubCUTAneous route every morning. mirtazapine (REMERON) 7.5 mg tablet Take 7.5 mg by mouth nightly. !! OTHER,NON-FORMULARY, Insert  into rectum as needed. Preparation H max stregth  
  
nystatin (MYCOSTATIN) powder Apply  to affected area three (3) times daily. Under breasts and ABD folds  
  
!! OTHER,NON-FORMULARY,   
  
amLODIPine (NORVASC) 2.5 mg tablet Take 2 Tabs by mouth daily. Qty: 30 Tab, Refills: 0  
  
carvedilol (COREG) 3.125 mg tablet Take 2 Tabs by mouth two (2) times daily (with meals). Qty: 60 Tab, Refills: 11 cefepime 2 gram 2 g, ADDaptor 1 Device IVPB 2 g by IntraVENous route every twelve (12) hours every twelve (12) hours for 31 days. Qty: 5 Dose, Refills: 0  
  
potassium chloride (KLOR-CON) 10 mEq tablet Take 10 mEq by mouth two (2) times a day. acetaminophen (TYLENOL EXTRA STRENGTH) 500 mg tablet Take 500 mg by mouth every six (6) hours as needed for Pain. ferrous gluconate 324 mg (38 mg iron) tablet Take 324 mg by mouth Daily (before breakfast). levothyroxine (SYNTHROID) 75 mcg tablet Take 75 mcg by mouth Daily (before breakfast). DULoxetine (CYMBALTA) 30 mg capsule Take 60 mg by mouth daily. hydrOXYzine pamoate (VISTARIL) 50 mg capsule Take 25 mg by mouth two (2) times daily as needed for Itching. multivitamin with minerals (HAIR,SKIN AND NAILS PO) Take 1 Tab by mouth daily. loperamide (IMODIUM) 2 mg capsule Take 4 mg by mouth as needed for Diarrhea. raNITIdine (ZANTAC) 150 mg tablet Take 150 mg by mouth two (2) times a day. CHOLECALCIFEROL, VITAMIN D3, (VITAMIN D3 PO) Take 1,000 mg by mouth daily. CALCIUM CARBONATE/VITAMIN D3 (CALCIUM 600 + D,3, PO) Take 600 mg by mouth daily. KRILL/OM-3/DHA/EPA/PHOSPHO/AST (MEGARED OMEGA-3 KRILL OIL PO) Take 1 Cap by mouth daily. aspirin delayed-release 81 mg tablet Take 81 mg by mouth nightly. Ok to continue per anesthesia guidelines. !! - Potential duplicate medications found. Please discuss with provider. STOP taking these medications  
  
 heparin sodium,porcine/PF (HEPARIN LOCKFLUSH,PORCINE,,PF, IV) Comments:  
Reason for Stopping:   
   
 lisinopril (PRINIVIL, ZESTRIL) 10 mg tablet Comments:  
Reason for Stopping:   
   
 sennosides (LAXATIVE, SENNOSIDES,) 25 mg tab Comments:  
Reason for Stopping:   
   
 furosemide (LASIX) 40 mg tablet Comments:  
Reason for Stopping:   
   
  
 
 
Activity: Activity as tolerated Diet: Diabetic Diet Wound Care: As directed Follow-up ·   Dr. Lorena Bansal in one week Time spent to discharge patient 35 minutes Signed: 
Kendell Mckeon DO 
3/4/2019 
1:42 PM

## 2019-03-04 NOTE — PROGRESS NOTES
Problem: Mobility Impaired (Adult and Pediatric) Goal: *Acute Goals and Plan of Care (Insert Text) STG: 
(1.)Ms. Marguerite Chin will supine to sit and sit to supine with CONTACT GUARD ASSIST within 3 treatment day(s). (2.)Ms. Marguerite Chin will transfer from bed to chair and chair to bed with STAND BY ASSIST using the least restrictive device within 3 treatment day(s). (3.)Ms. Marguerite Chin will ambulate with CONTACT GUARD ASSIST for 15 feet with the least restrictive device within 3 treatment day(s). LTG: 
(1.)Ms. Marguerite Chin will move from supine to sit and sit to supine  in bed with STAND BY ASSIST within 7 treatment day(s). (2.)Ms. Marguerite Chin will transfer from bed to chair and chair to bed with SUPERVISION using the least restrictive device within 7 treatment day(s). (3.)Ms. Marguerite Chin will ambulate with STAND BY ASSIST for 100 feet with the least restrictive device within 7 treatment day(s). ________________________________________________________________________________________________ PHYSICAL THERAPY: Daily Note and AM 3/4/2019INPATIENT: PT Visit Days : 5 [place] B / Product Type: Medicare /   
  
NAME/AGE/GENDER: Chela Villatoro is a 80 y.o. female PRIMARY DIAGNOSIS: RAYMON (acute kidney injury) ([facility] [address] [N17.9] Acute encephalopathy [G93.40] RAYMON (acute kidney injury) ([facility] [address] RAYMON (acute kidney injury) ([facility] [address] ICD-10: Treatment Diagnosis:  
 · Generalized Muscle Weakness (M62.81) · Difficulty in walking, Not elsewhere classified (R26.2) · Repeated Falls (R29.6) · History of falling (Z91.81) Precaution/Allergies: 
Contrast agent [iodine]; Actifed [triprolidine-pseudoephedrine]; Allopurinol; Decongestant [pseudoephedrine hcl]; Lexapro [escitalopram oxalate]; Lyrica [pregabalin]; Minizide [prazosin-polythiazide]; Mobic [meloxicam]; Omnipaque [iohexol]; Sertraline; Spironolactone; and Sulfamethazine ASSESSMENT:  
 Ms. Jannie Fontana was supine upon contact and agreeable to PT. Patient able to perform supine to sit and transfer to standing with mod assist, additional time, and cues for improved/proper technique. Once standing patient states the doctor does not want her to put weight on her left foot/heel. No weight bearing restrictions noted in chart and encouraged patient to attempt ambulation but patient declined. Confusion continues. Patient ambulated 4' to recliner chair with min-mod assist with positive LOB x 2 episodes requiring PT intervention to correct. Patient then participates in therapeutic strengthening exercises to improve functional strength for transfers, gait and overall mobility. Patient requires cues and assistance to perform exercises correctly. Overall slow progress towards physical therapy goals. Patient's goals listed above are still appropriate. Will continue skilled PT to address remaining deficits. This section established at most recent assessment PROBLEM LIST (Impairments causing functional limitations): 1. Decreased Strength 2. Decreased ADL/Functional Activities 3. Decreased Transfer Abilities 4. Decreased Ambulation Ability/Technique 5. Decreased Balance 6. Decreased Cognition INTERVENTIONS PLANNED: (Benefits and precautions of physical therapy have been discussed with the patient.) 1. Balance Exercise 2. Bed Mobility 3. Family Education 4. Gait Training 5. Home Exercise Program (HEP) 6. Neuromuscular Re-education/Strengthening 7. Range of Motion (ROM) 8. Therapeutic Activites 9. Therapeutic Exercise/Strengthening 10. Transfer Training TREATMENT PLAN: Frequency/Duration: 3 times a week for duration of hospital stay Rehabilitation Potential For Stated Goals: Fair RECOMMENDED REHABILITATION/EQUIPMENT: (at time of discharge pending progress): Due to the probability of continued deficits (see above) this patient will likely need continued skilled physical therapy after discharge. Equipment:  
? None at this time HISTORY:  
History of Present Injury/Illness (Reason for Referral): 
See H&P below The patient is unable to give reliable history, family is at bedside. She is an 80years old female patient with history of diastolic CHF, diabetes, peripheral neuropathy and most recently had left index finger osteomyelitis, currently on cefepime with a PICC line in place. She was recently discharged from the hospital and was recuperating at a nursing home. She was brought in for worsening mental status with confusion, disorientation, and at times hallucinations. The son states that the patient has no underlying dementia prior to her recent hospitalization. She was very sharp, alert and oriented. No fevers or chills. She has been having diarrhea over the last couple of days, watery stools and also reports abdominal pain. No nausea or vomiting. Past Medical History/Comorbidities: Ms. Breezy Becerra  has a past medical history of Arthritis, Chronic diastolic congestive heart failure ([address] (1/16/2019), Chronic pain, Edema, GERD (gastroesophageal reflux disease) (1/16/2019), Gouty arthritis, History of MI (myocardial infarction) (1980's), Hypertension, Hypothyroidism, Mild heartburn, Neuropathy (1980's), Post-operative nausea and vomiting, and Type 2 diabetes mellitus without complication, without long-term current use of insulin ([address] (7/27/2018).  She also has no past medical history of Adverse effect of anesthesia, Aneurysm ([address] Arrhythmia, Asthma, Autoimmune disease ([address] CAD (coronary artery disease), Cancer ([address] Chronic kidney disease, Chronic obstructive pulmonary disease ([address] Coagulation disorder ([address] Difficult intubation, Endocarditis, Ill-defined condition, Liver disease, Malignant hyperthermia due to anesthesia, Morbid obesity ([address] Pseudocholinesterase deficiency, Psychiatric disorder, PUD (peptic ulcer disease), Rheumatic fever, Seizures ([facility] [address] Sleep apnea, Stroke ([facility] [address] or Thromboembolus ([facility] [address] Ms. Pete Montero  has a past surgical history that includes hx partial thyroidectomy (1960); hx orthopaedic (Bilateral, 4345-4134); hx orthopaedic (Left, 1992); hx open cholecystectomy (1970); hx cyst removal (Right, 1950's); hx urological; hx heart catheterization (late 1979); hx hysterectomy (1972); hx gyn (1963); hx dilation and curettage (3251-6163); and hx left salpingo-oophorectomy (1965). Social History/Living Environment:  
Home Environment: Skilled nursing facility Care Facility Name: [facility] One/Two Story Residence: One story Living Alone: No 
Support Systems: Skilled nursing facility Patient Expects to be Discharged to[de-identified] Skilled nursing facility Current DME Used/Available at Home: Cane, straight Prior Level of Function/Work/Activity: 
Lives with daughter, use of SPC for gait, 3 falls, use of SPC at baseline Number of Personal Factors/Comorbidities that affect the Plan of Care: 3+: HIGH COMPLEXITY EXAMINATION:  
Most Recent Physical Functioning:  
Gross Assessment: 
  
         
  
Posture: 
  
Balance: 
Sitting: Impaired Sitting - Static: Good (unsupported) Sitting - Dynamic: Fair (occasional) Standing: Impaired Standing - Static: Fair Standing - Dynamic : Fair Bed Mobility: 
Supine to Sit: Moderate assistance Wheelchair Mobility: 
  
Transfers: 
Sit to Stand: Moderate assistance Stand to Sit: Moderate assistance Gait: 
  
Base of Support: Center of gravity altered;Narrowed Speed/Shannon: Slow;Shuffled Step Length: Right shortened;Left shortened Gait Abnormalities: Decreased step clearance;Shuffling gait Distance (ft): 4 Feet (ft) Assistive Device: Walker, rolling;Gait belt Ambulation - Level of Assistance: Moderate assistance Interventions: Safety awareness training; Tactile cues; Verbal cues Body Structures Involved: 1. Nerves 2. Bones 3. Joints 4. Muscles 5. Ligaments Body Functions Affected: 1. Mental 
2. Sensory/Pain 3. Cardio 4. Respiratory 5. Neuromusculoskeletal 
6. Movement Related Activities and Participation Affected: 1. Learning and Applying Knowledge 2. General Tasks and Demands 3. Mobility 4. Self Care 5. Domestic Life 6. Interpersonal Interactions and Relationships 7. Community, Social and [employer] Number of elements that affect the Plan of Care: 4+: HIGH COMPLEXITY CLINICAL PRESENTATION:  
Presentation: Evolving clinical presentation with changing clinical characteristics: MODERATE COMPLEXITY CLINICAL DECISION [address] [place] [address] AM-PAC 6 Clicks Basic Mobility Inpatient Short Form How much difficulty does the patient currently have. .. Unable A Lot A Little None 1. Turning over in bed (including adjusting bedclothes, sheets and blankets)? [] 1   [] 2   [x] 3   [] 4  
2. Sitting down on and standing up from a chair with arms ( e.g., wheelchair, bedside commode, etc.)   [] 1   [] 2   [x] 3   [] 4  
3. Moving from lying on back to sitting on the side of the bed? [] 1   [] 2   [x] 3   [] 4 How much help from another person does the patient currently need. .. Total A Lot A Little None 4. Moving to and from a bed to a chair (including a wheelchair)? [] 1   [] 2   [x] 3   [] 4  
5. Need to walk in hospital room? [] 1   [x] 2   [] 3   [] 4  
6. Climbing 3-5 steps with a railing? [x] 1   [] 2   [] 3   [] 4  
© , Trustees of [address] [place] [address], under license to [employer]. All rights reserved Score:  Initial: 15 Most Recent: X (Date: -- ) Interpretation of Tool:  Represents activities that are increasingly more difficult (i.e. Bed mobility, Transfers, Gait). Medical Necessity:    
· Patient is expected to demonstrate progress in strength, balance, coordination and functional technique to decrease assistance required with gait, transfers, and functional, mobility. Zaki Morales Reason for Services/Other Comments: · Patient continues to require skilled intervention due to  decreased strength, decreased balance, decreased functional tolerance, decreased cardiopulmonary endurance affecting participation in basic ADLs and functional tasks. Use of outcome tool(s) and clinical judgement create a POC that gives a: Clear prediction of patient's progress: LOW COMPLEXITY  
  
 
 
 
TREATMENT:  
(In addition to Assessment/Re-Assessment sessions the following treatments were rendered) Pre-treatment Symptoms/Complaints:  None Pain: Initial:  
Pain Intensity 1: 0  Post Session:  0/10 Therapeutic Activity: (     10 minutes): Therapeutic activities including bed mobility training, transfer training, ambulation on level ground, static/dynamic standing balance training, scooting, posture training, and patient education to improve mobility, strength, balance, coordination and activity tolerance. Required moderate Safety awareness training; Tactile cues; Verbal cues to promote static and dynamic balance in standing and promote coordination of bilateral, upper extremity(s), lower extremity(s). Therapeutic Exercise: ( 15 Minutes):  Exercises per grid below to improve mobility, strength, balance and coordination. Required minimal visual and verbal cues to promote proper body mechanics and exercise form. Progressed range, repetitions and complexity of movement as indicated. Date: 
2/27/19 Date: 
2/28/19 Date: 
3/4/19 Activity/Exercise Parameters Parameters Parameters LAQ 15x AB 20x AB 2x15B A Seated marching 15x AB 20x AB 2x15B A Toe taps 15x AB 20x AB 2x15B A Heel taps 15x AB 20x AB 2x15B A Seated hip aDd (pillow squeeze) 15x AB Seated hip aBd 15x AB  2x15B A  
UE anterior punches 15x AB 20x AB Bicep curls 15x AB 20x AB Braces/Orthotics/Lines/Etc:  
· IV 
· O2 Device: Room air Treatment/Session Assessment:   
· Response to Treatment:  See above · Interdisciplinary Collaboration: o Physical Therapy Assistant 
o Registered Nurse · After treatment position/precautions:  
o Up in chair 
o Bed alarm/tab alert on 
o Bed/Chair-wheels locked 
o Call light within reach 
o RN notified · Compliance with Program/Exercises: Will assess as treatment progresses · Recommendations/Intent for next treatment session: \"Next visit will focus on advancements to more challenging activities and reduction in assistance provided\". Total Treatment Duration: PT Patient Time In/Time Out Time In: 5050 Time Out: 1005 Nita Escobar PTA

## 2019-03-04 NOTE — PROGRESS NOTES
Patient to discharge to [facility] to room 305. Report line, [phone] given to SUSAN Armstrong transportation setup for 3:30pm pickup. Family notified.

## 2019-03-04 NOTE — PROGRESS NOTES
Rounded every hour and prn. OOB to chair. Dressings on hands changed with pt reporting new skin tear on LLE/shin. She reports she bumped the bedside table. Dried and bandaged. Report called. Pt with RUE picc in place. Pt taken with maninder.

## 2019-03-04 NOTE — PROGRESS NOTES
Pt left VM with pt's son Adilia Martinez to discuss DC plans for pt. Pt came From [facility] but is saying that she would like to return home. However, confusion still noted in chart. CM will follow therapy for recommendations.

## 2019-03-05 NOTE — PROGRESS NOTES
This note will not be viewable in [address] Patient discharged to [facility] on 3/4/19. Patient discharged to a [facility] facility. Patient will be included in weekly care coordination calls. Information forwarded to Lupe Oliveros RN, [facility] RN Care Manager.

## 2019-03-25 PROBLEM — A41.9 SEPSIS (HCC): Status: ACTIVE | Noted: 2019-01-01

## 2019-03-25 NOTE — PROGRESS NOTES
C-diff specimen ordered by MD, Daughter states that pt has not had any diarrhea. States that she changed her last brief and that it was formed stool. MD notified and orders to cancel order.

## 2019-03-25 NOTE — PROGRESS NOTES
TRANSFER - IN REPORT: 
 
Verbal report received from sharon RN(name) on Yasir Foy  being received from ED(unit) for routine progression of care Report consisted of patients Situation, Background, Assessment and  
Recommendations(SBAR). Information from the following report(s) SBAR, Kardex, ED Summary, Intake/Output, MAR, Recent Results and Med Rec Status was reviewed with the receiving nurse. Opportunity for questions and clarification was provided. Assessment completed upon patients arrival to unit and care assumed.

## 2019-03-25 NOTE — ED NOTES
TRANSFER - OUT REPORT: 
 
Verbal report given to HEATHER morrow(name) on Verle Gaucher  being transferred to 635(unit) for routine progression of care Report consisted of patients Situation, Background, Assessment and  
Recommendations(SBAR). Information from the following report(s) SBAR, ED Summary and Recent Results was reviewed with the receiving nurse. Lines: PICC Single Lumen 02/13/19 Right (Active) Peripheral IV 03/25/19 Antecubital (Active) Site Assessment Clean, dry, & intact 3/25/2019  9:38 AM  
Phlebitis Assessment 0 3/25/2019  9:38 AM  
Infiltration Assessment 0 3/25/2019  9:38 AM  
Dressing Status Clean, dry, & intact 3/25/2019  9:38 AM  
   
Peripheral IV 03/25/19 Right Arm (Active) Site Assessment Clean, dry, & intact 3/25/2019 10:03 AM  
Phlebitis Assessment 0 3/25/2019 10:03 AM  
Infiltration Assessment 0 3/25/2019 10:03 AM  
Dressing Status Clean, dry, & intact 3/25/2019 10:03 AM  
Dressing Type 4 X 4 3/25/2019 10:03 AM  
  
 
Opportunity for questions and clarification was provided. Patient transported with: 
 O2 @ 2 liters

## 2019-03-25 NOTE — ED TRIAGE NOTES
Per ems report patient had fever of 103 at the facility this morning with hr in the 118 range and lethargy. Ems placed 18 G in pt lft ac but no further interventions. Their temp was 100.1 orally. Pt was brought in from [facility].

## 2019-03-25 NOTE — PROGRESS NOTES
Patient admitted to hospital from [facility]. Patient can return to [facility] once medically stable. CM will continue to follow. Care Management Interventions PCP Verified by CM: Yes Mode of Transport at Discharge: BLS Transition of Care Consult (CM Consult): Discharge Planning, SNF Discharge Durable Medical Equipment: No 
Physical Therapy Consult: Yes Occupational Therapy Consult: Yes Current Support Network: Own Home Confirm Follow Up Transport: Family Plan discussed with Pt/Family/Caregiver: Yes Freedom of Choice Offered: Yes Discharge Location Discharge Placement: Skilled nursing facility

## 2019-03-25 NOTE — ED PROVIDER NOTES
[facility] Emergency Department Arrival Date/Time: 3/25/2019  9:38 AM   
 
Li Nichole  MRN: [id number] YOB: 1933   80 y.o. female [facility] EMERGENCY DEPT ER06/06  Seen on 3/25/2019 @ 10:14 AM   
Chief Complaint Patient presents with  Fever HPI: 49-year-old female from [address] [place] [address]. presents to the emergency department with a fever and some confusion and shortness of breath She states she's been in the nursing home for a few months. She is currently on 2 antibiotics she is not sure of the indication. Possibly leg cellulitis. No nausea or vomiting. No diarrhea HPI Review of Systems: Review of Systems Constitutional: Positive for activity change, appetite change, chills and fever. HENT: Negative. Eyes: Negative. Respiratory: Positive for shortness of breath. Negative for cough and wheezing. Cardiovascular: Negative. Gastrointestinal: Negative for abdominal pain, nausea and vomiting. Genitourinary: Negative. Musculoskeletal: Negative. Skin: Negative. Neurological: Negative. Psychiatric/Behavioral: Negative. Past Medical History: Primary Care Doctor: Shaheed French MD  
 
Past Medical History:  
Diagnosis Date  Arthritis  Chronic diastolic congestive heart failure ([address] 1/16/2019  Chronic pain   
 bilat feet & hands  Edema BLE & bilat fingers; pt takes diuretic daily; pt states her swelling is related to the nerve disease she is being treated for at [facility]  GERD (gastroesophageal reflux disease) 1/16/2019  Gouty arthritis  History of MI (myocardial infarction) 1980's \"light heart attack\"; pt states MD at [facility] mentioned it to her; pt stated MD told her \"was on the back part of her heart & would not give her any problems\"; pt takes aspirin 81 mg daily  Hypertension   
 managed w/med  Hypothyroidism   
 s/p partial thyroidectomy 1960; managed w/med  Mild heartburn takes tums prn  Neuropathy 1980's  
 pt states has been treated by [facility] for \"nerve problems in her feet & hands\"  Post-operative nausea and vomiting  Type 2 diabetes mellitus without complication, without long-term current use of insulin ([facility] [address] 7/27/2018 Past Surgical History:  
Procedure Laterality Date  HX CYST REMOVAL Right 1950's  
 upper eyelid  HX DILATION AND CURETTAGE  [id number]  
 ectopic pregnancy; right BSO done  HX HEART CATHETERIZATION  late 1979  
 pt states no stents [address] [facility] [address] [facility]  
 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  
 nerve removed out of left leg [facility]  HX UROLOGICAL    
 cystoscopy Social History Socioeconomic History  Marital status:  Spouse name: Not on file  Number of children: Not on file  Years of education: Not on file  Highest education level: Not on file Tobacco Use  Smoking status: Never Smoker  Smokeless tobacco: Never Used Substance and Sexual Activity  Alcohol use: No  
 Drug use: No  
 
Prior to Admission Medications Prescriptions Last Dose Informant Patient Reported? Taking? CALCIUM CARBONATE/VITAMIN D3 (CALCIUM 600 + D,3, PO)   Yes No  
Sig: Take 600 mg by mouth daily. CHOLECALCIFEROL, VITAMIN D3, (VITAMIN D3 PO)   Yes No  
Sig: Take 1,000 mg by mouth daily. DULoxetine (CYMBALTA) 30 mg capsule   Yes No  
Sig: Take 60 mg by mouth daily. KRILL/OM-3/DHA/EPA/PHOSPHO/AST (MEGARED OMEGA-3 KRILL OIL PO)   Yes No  
Sig: Take 1 Cap by mouth daily. Lactobacillus acidophilus (ACIDOPHILUS) cap   Yes No  
Sig: Take 1 Cap by mouth daily.   
OTHER,NON-FORMULARY,   Yes No  
 Sig: Take 30 mL by mouth three (3) times daily. PROHEAL  
OTHER,NON-FORMULARY,   Yes No  
Sig: Insert  into rectum as needed. Preparation H max stregth OTHER,NON-FORMULARY,   Yes No  
acetaminophen (TYLENOL EXTRA STRENGTH) 500 mg tablet   Yes No  
Sig: Take 500 mg by mouth every six (6) hours as needed for Pain. amLODIPine (NORVASC) 2.5 mg tablet   No No  
Sig: Take 2 Tabs by mouth daily. aspirin delayed-release 81 mg tablet   Yes No  
Sig: Take 81 mg by mouth nightly. Ok to continue per anesthesia guidelines. carvedilol (COREG) 3.125 mg tablet   No No  
Sig: Take 2 Tabs by mouth two (2) times daily (with meals). clonazePAM (KLONOPIN) 0.5 mg tablet   No No  
Sig: Take 1 Tab by mouth three (3) times daily. Max Daily Amount: 1.5 mg.  
docusate sodium (COLACE) 100 mg capsule   No No  
Sig: Take 1 Cap by mouth nightly. ferrous gluconate 324 mg (38 mg iron) tablet   Yes No  
Sig: Take 324 mg by mouth Daily (before breakfast). hydrOXYzine pamoate (VISTARIL) 50 mg capsule   Yes No  
Sig: Take 25 mg by mouth two (2) times daily as needed for Itching. insulin detemir U-100 (LEVEMIR FLEXTOUCH) 100 unit/mL (3 mL) inpn   Yes No  
Si Units by SubCUTAneous route every morning. insulin lispro (HUMALOG) 100 unit/mL kwikpen   Yes No  
Si Units by SubCUTAneous route Before breakfast, lunch, and dinner. insulin lispro (HUMALOG) 100 unit/mL kwikpen   Yes No  
Sig: by SubCUTAneous route Before breakfast, lunch, dinner and at bedtime. Sliding scale  
levothyroxine (SYNTHROID) 75 mcg tablet   Yes No  
Sig: Take 75 mcg by mouth Daily (before breakfast). loperamide (IMODIUM) 2 mg capsule   Yes No  
Sig: Take 4 mg by mouth as needed for Diarrhea. methadone (DOLOPHINE) 5 mg tablet   No No  
Sig: Take 1 Tab by mouth every six (6) hours. Max Daily Amount: 20 mg. Instructed to take DOS per Anesthesia guidelines. mirtazapine (REMERON) 7.5 mg tablet   Yes No  
Sig: Take 7.5 mg by mouth nightly. multivitamin with minerals (HAIR,SKIN AND NAILS PO)   Yes No  
Sig: Take 1 Tab by mouth daily. nystatin (MYCOSTATIN) powder   Yes No  
Sig: Apply  to affected area three (3) times daily. Under breasts and ABD folds  
polyethylene glycol (MIRALAX) 17 gram packet   Yes No  
Sig: Take 17 g by mouth daily as needed. potassium chloride (KLOR-CON) 10 mEq tablet   Yes No  
Sig: Take 10 mEq by mouth two (2) times a day. predniSONE (DELTASONE) 5 mg tablet   No No  
Sig: Take 1 Tab by mouth two (2) times a day. 5mg BID  
raNITIdine (ZANTAC) 150 mg tablet   Yes No  
Sig: Take 150 mg by mouth two (2) times a day. Facility-Administered Medications: None Allergies Allergen Reactions  Contrast Agent [Iodine] Anaphylaxis  Actifed [Triprolidine-Pseudoephedrine] Nausea Only  Allopurinol Other (comments) Elevated blood pressure & pt states could not walk or see until medication was out of her system.  Decongestant [Pseudoephedrine Hcl] Rash and Itching  Lexapro [Escitalopram Oxalate] Other (comments) Lips/Mouth swelling  Lyrica [Pregabalin] Unknown (comments)  Minizide [Prazosin-Polythiazide] Unknown (comments)  Mobic [Meloxicam] Other (comments) Facial swelling  Omnipaque [Iohexol] Hives, Swelling and Other (comments) Wheezing and/or shortness of breath  Sertraline Other (comments) Mouth, lips swell  Spironolactone Itching Pt complains that she breaks out in clammy sweat with itching and stinging in her upper arms, dry mouth, funny feeling tongue, SOB, and increased anxiety.  Sulfamethazine Other (comments) Mouth/lips swelling Physical Exam:  Nursing documentation reviewed. Vitals:  
 03/25/19 1002 03/25/19 1031 03/25/19 1102 03/25/19 1154 BP:  146/65 159/72 Pulse: (!) 110 (!) 112 (!) 106 Resp: (!) 33 (!) 34  20 Temp:    99.5 °F (37.5 °C) SpO2: 93% 94% 97% Vital signs were reviewed. Physical Exam  
Constitutional: Elderly ill-appearing but nontoxic 77-year-old female HENT:  
Head: Normocephalic and atraumatic. Eyes: Pupils are equal, round, and reactive to light. Neck: Normal range of motion. Cardiovascular: Normal rate and regular rhythm. Pulmonary/Chest: No stridor. No respiratory distress. She has no wheezes. Abdominal: She exhibits no distension. There is no tenderness. Musculoskeletal:  
Bilateral lower extremities are slightly edematous. The right one is erythematous. She has some weeping wounds that are wrapped. Neurological: She is alert. Skin: Skin is warm and dry. Capillary refill takes 2 to 3 seconds. Psychiatric: She has a normal mood and affect. Her behavior is normal.  
Nursing note and vitals reviewed. MEDICAL DECISION MAKING: 
MDM Number of Diagnoses or Management Options Diagnosis management comments: 77-year-old female with fever. Chills. Some shortness of breath We will obtain labs. Blood cultures. Urine. Urine culture. Start antibiotics and IV fluids. Reassess Amount and/or Complexity of Data Reviewed Clinical lab tests: ordered Tests in the radiology section of CPT®: ordered Risk of Complications, Morbidity, and/or Mortality Presenting problems: moderate Diagnostic procedures: minimal 
Management options: moderate ED Evaluation Labs:   
Recent Results (from the past 24 hour(s)) EKG, 12 LEAD, INITIAL Collection Time: 03/25/19  9:43 AM  
Result Value Ref Range Ventricular Rate 115 BPM  
 Atrial Rate 115 BPM  
 P-R Interval 156 ms QRS Duration 112 ms  
 Q-T Interval 336 ms  
 QTC Calculation (Bezet) 464 ms Calculated P Axis 50 degrees Calculated R Axis -38 degrees Calculated T Axis 20 degrees Diagnosis    
  !! AGE AND GENDER SPECIFIC ECG ANALYSIS !! Sinus tachycardia Left axis deviation Right bundle branch block Abnormal ECG When compared with ECG of 24-FEB-2019 20:13, 
QRS duration has decreased Confirmed by Jaquelin Rondon MD (), DEANGELONICKMARTHA NAVAS ([id number]) on 3/25/2019 11:35:51 AM 
  
PROCALCITONIN Collection Time: 03/25/19  9:59 AM  
Result Value Ref Range Procalcitonin 0.4 ng/mL CBC WITH AUTOMATED DIFF Collection Time: 03/25/19 10:02 AM  
Result Value Ref Range WBC 17.7 (H) 4.3 - 11.1 K/uL  
 RBC 3.73 (L) 4.05 - 5.2 M/uL  
 HGB 10.4 (L) 11.7 - 15.4 g/dL HCT 33.7 (L) 35.8 - 46.3 % MCV 90.3 79.6 - 97.8 FL  
 MCH 27.9 26.1 - 32.9 PG  
 MCHC 30.9 (L) 31.4 - 35.0 g/dL  
 RDW 15.7 (H) 11.9 - 14.6 % PLATELET 332 991 - 476 K/uL MPV 9.8 9.4 - 12.3 FL ABSOLUTE NRBC 0.02 0.0 - 0.2 K/uL  
 DF AUTOMATED NEUTROPHILS 66 43 - 78 % LYMPHOCYTES 21 13 - 44 % MONOCYTES 9 4.0 - 12.0 % EOSINOPHILS 1 0.5 - 7.8 % BASOPHILS 1 0.0 - 2.0 % IMMATURE GRANULOCYTES 3 0.0 - 5.0 %  
 ABS. NEUTROPHILS 11.8 (H) 1.7 - 8.2 K/UL  
 ABS. LYMPHOCYTES 3.7 0.5 - 4.6 K/UL  
 ABS. MONOCYTES 1.6 (H) 0.1 - 1.3 K/UL  
 ABS. EOSINOPHILS 0.1 0.0 - 0.8 K/UL  
 ABS. BASOPHILS 0.1 0.0 - 0.2 K/UL  
 ABS. IMM. GRANS. 0.5 0.0 - 0.5 K/UL METABOLIC PANEL, COMPREHENSIVE Collection Time: 03/25/19 10:02 AM  
Result Value Ref Range Sodium 139 136 - 145 mmol/L Potassium 3.9 3.5 - 5.1 mmol/L Chloride 102 98 - 107 mmol/L  
 CO2 29 21 - 32 mmol/L Anion gap 8 7 - 16 mmol/L Glucose 160 (H) 65 - 100 mg/dL BUN 21 8 - 23 MG/DL Creatinine 1.21 (H) 0.6 - 1.0 MG/DL  
 GFR est AA 54 (L) >60 ml/min/1.73m2 GFR est non-AA 45 (L) >60 ml/min/1.73m2 Calcium 8.3 8.3 - 10.4 MG/DL Bilirubin, total 0.4 0.2 - 1.1 MG/DL  
 ALT (SGPT) 25 12 - 65 U/L  
 AST (SGOT) 30 15 - 37 U/L Alk. phosphatase 182 (H) 50 - 136 U/L Protein, total 7.0 6.3 - 8.2 g/dL Albumin 2.3 (L) 3.2 - 4.6 g/dL Globulin 4.7 (H) 2.3 - 3.5 g/dL A-G Ratio 0.5 (L) 1.2 - 3.5 POC LACTIC ACID Collection Time: 03/25/19 10:05 AM  
Result Value Ref Range  Lactic Acid (POC) 2.05 (H) 0.5 - 1.9 mmol/L  
URINALYSIS W/ RFLX MICROSCOPIC  
 Collection Time: 03/25/19 10:50 AM  
Result Value Ref Range Color YELLOW Appearance CLEAR Specific gravity 1.013 1.001 - 1.023    
 pH (UA) 7.0 5.0 - 9.0 Protein 100 (A) NEG mg/dL Glucose NEGATIVE  mg/dL Ketone NEGATIVE  NEG mg/dL Bilirubin NEGATIVE  NEG Blood NEGATIVE  NEG Urobilinogen 0.2 0.2 - 1.0 EU/dL Nitrites NEGATIVE  NEG Leukocyte Esterase NEGATIVE  NEG    
 WBC 5-10 0 /hpf  
 RBC 0 0 /hpf Epithelial cells 0-3 0 /hpf Bacteria 2+ (H) 0 /hpf Casts 0 0 /lpf POC LACTIC ACID Collection Time: 03/25/19  1:04 PM  
Result Value Ref Range Lactic Acid (POC) 1.04 0.5 - 1.9 mmol/L White count elevated at 17.7. Lactic acid elevated at 2.05 Adal Reasoner Repeat lactate was performed and is normal 
 
Radiology studies performed: XR CHEST PORT Final Result Impression: No acute abnormality  
  
  
  
 reviewed by me Orders Placed This Encounter  SEPSIS ORDERS INITIATED IN TRIAGE(DO NOT DESELECT)  SEPSIS BUNDLE INITIATED IN ED (REQUIRED)  CULTURE, BLOOD  CULTURE, BLOOD  CULTURE, URINE  XR CHEST PORT  CBC WITH AUTOMATED DIFF  
 METABOLIC PANEL, COMPREHENSIVE  
 URINALYSIS W/ RFLX MICROSCOPIC  
 PROCALCITONIN  
 POC LACTIC ACID  STRAIGHT CATHETER (NURSING) ONE TIME STAT  
 POC LACTIC ACID  
 POC LACTIC ACID  
 POC LACTIC ACID  EKG 12 LEAD INITIAL  lactated ringers bolus infusion 500 mL  cefTRIAXone (ROCEPHIN) 1 g in 0.9% sodium chloride (MBP/ADV) 50 mL  0.9% sodium chloride infusion  lactated Ringers infusion 500 mL  INITIAL PHYSICIAN ORDER: INPATIENT Medical; 3. Patient receiving treatment that can only be provided in an inpatient setting (further clarification in H&P documentation)  
 
________________________________________________________ Procedure Documentation: Procedures 
________________________________________________________ Patient meets Sirs criteria with fever 101.2. Pulse rate 110. Respiratory rate of 33. White count of 17,000. Blood and urine cultures are been obtained. Antibiotics are being given. Her lactate is slightly elevated at 2. She is not hypotensive. She does not meets septic shock criteria ASSESSMENT: 42-year-old female with severe sepsis. She is already on antibiotics. She has several potential sources including urine, right lower extremity cellulitis, left index finger osteomyelitis. Will admit to the hospital for IV antibiotics and monitoring for improvement Dispo/Plan:    home Condition:  fair Diagnosis: 1. Acute febrile illness 2. Severe sepsis ([address] 3. Bacteriuria with pyuria Frankie Vogel MD; 3/25/2019 @10:14 AM ================================ 
 
ED Course:  
 
ED Course as of Mar 25 1341 Mon Mar 25, 2019  
1116 WBC(!): 17.7 [GH] 1116 HGB(!): 10.4 [GH] 1116 Creatinine(!): 1.21 [GH] 1116 Bacteria(!): 2+ [GH] 1116 Lactic Acid (POC)(!): 2.05 [GH] 1117 Lactate elevated at 2.05. Patient meets Sirs criteria plus likely infection Start antibiotics. Hydrate. XR CHEST PORT [GH] ED Course User Index Jn Colindres MD

## 2019-03-25 NOTE — H&P
HOSPITALIST H&P/CONSULTNAME:  Farrah Juarez Age:  80 y.o. 
:   1933 MRN:   [id number] PCP: Obi Beltran MD 
Consulting MD: Treatment Team: Attending Provider: Rachel Gillespie DO; Care Manager: Hanh Robles LMSW HPI:  
79 yo CF with past history of CAD, GERD, chronic diastolic heart failure, HTN, hypothyroidism, DM type II complicated by lower extremity neuropathy, recent osteomyelitis of the distal left first digit (followed by ID as outpatient and currently on antibiotics) presents from [facility] due to concerns for sepsis. Per chart review, patient with observed shortness of breath and fever. At bedside, patient is more bothered by a wound on her left heel. She denies shortness of breath and cough but does endorse fever and chills. No chest pain or palpitations. No abdominal pain, nausea/vomiting, or dysuria. She has been having diarrhea the past few weeks while on antibiotics but it has been improving overall. Patient states that she has not noticed any changes in her left index finger and says she has not noticed any further pain or redness/swelling. ED Course: EKG showing sinus tachycardia with no ST-T wave abnormalities. CXR unremarkable. X-ray of left 2nd finger showing continued destructive changes. Urine cultures and blood cultures collected and pending. Observed fever of 101. 2F with sinus tachycardia with HRs in low 100s. WBC count of 17. UA showing 2+ bacteruria. Given ceftriaxone empirically. Complete ROS done and is as stated in HPI or otherwise negative Past Medical History:  
Diagnosis Date  Arthritis  Chronic diastolic congestive heart failure ([address] 2019  Chronic pain   
 bilat feet & hands  Edema BLE & bilat fingers; pt takes diuretic daily; pt states her swelling is related to the nerve disease she is being treated for at [facility]  GERD (gastroesophageal reflux disease) 2019  Gouty arthritis  History of MI (myocardial infarction) 1980's \"light heart attack\"; pt states MD at [address] mentioned it to her; pt stated MD told her \"was on the back part of her heart & would not give her any problems\"; pt takes aspirin 81 mg daily  Hypertension   
 managed w/med  Hypothyroidism   
 s/p partial thyroidectomy 1960; managed w/med  Mild heartburn   
 takes tums prn  Neuropathy 1980's  
 pt states has been treated by [facility] for \"nerve problems in her feet & hands\"  Post-operative nausea and vomiting  Type 2 diabetes mellitus without complication, without long-term current use of insulin ([facility] [address] 7/27/2018 Past Surgical History:  
Procedure Laterality Date  HX CYST REMOVAL Right 1950's  
 upper eyelid  HX DILATION AND CURETTAGE  [id number]  
 ectopic pregnancy; right BSO done  HX HEART CATHETERIZATION  late 1979  
 pt states no stents [address] [facility] [address]  
 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  
 nerve removed out of left leg [address] [facility]  HX UROLOGICAL    
 cystoscopy Prior to Admission Medications Prescriptions Last Dose Informant Patient Reported? Taking? CALCIUM CARBONATE/VITAMIN D3 (CALCIUM 600 + D,3, PO) 3/24/2019 at Unknown time  Yes Yes Sig: Take 600 mg by mouth daily. CHOLECALCIFEROL, VITAMIN D3, (VITAMIN D3 PO) 3/24/2019 at Unknown time  Yes Yes Sig: Take 1,000 mg by mouth daily. DULoxetine (CYMBALTA) 30 mg capsule 3/24/2019 at Unknown time  Yes Yes Sig: Take 60 mg by mouth daily. KRILL/OM-3/DHA/EPA/PHOSPHO/AST (MEGARED OMEGA-3 KRILL OIL PO)   Yes No  
Sig: Take 1 Cap by mouth daily. Lactobacillus acidophilus (ACIDOPHILUS) cap   Yes No  
Sig: Take 1 Cap by mouth daily. OTHER,NON-FORMULARY,   Yes No  
Sig: Take 30 mL by mouth three (3) times daily. PROHEAL  
OTHER,NON-FORMULARY,   Yes No  
Sig: Insert  into rectum as needed. Preparation H max stregth OTHER,NON-FORMULARY,   Yes No  
acetaminophen (TYLENOL EXTRA STRENGTH) 500 mg tablet Unknown at Unknown time  Yes No  
Sig: Take 500 mg by mouth every six (6) hours as needed for Pain. amLODIPine (NORVASC) 10 mg tablet   Yes Yes Sig: Take 10 mg by mouth daily. aspirin delayed-release 81 mg tablet 3/24/2019 at Unknown time  Yes Yes Sig: Take 81 mg by mouth nightly. Ok to continue per anesthesia guidelines. carvedilol (COREG) 3.125 mg tablet 3/24/2019 at Unknown time  No Yes Sig: Take 2 Tabs by mouth two (2) times daily (with meals). cefadroxil (DURICEF) 500 mg capsule   Yes Yes Sig: Take 500 mg by mouth two (2) times a day. ciprofloxacin HCl (CIPRO) 250 mg tablet   Yes Yes Sig: Take 250 mg by mouth two (2) times a day. clonazePAM (KLONOPIN) 0.5 mg tablet 3/24/2019 at Unknown time  No Yes Sig: Take 1 Tab by mouth three (3) times daily. Max Daily Amount: 1.5 mg.  
docusate sodium (COLACE) 100 mg capsule   No No  
Sig: Take 1 Cap by mouth nightly. ferrous gluconate 324 mg (38 mg iron) tablet 3/24/2019 at Unknown time  Yes Yes Sig: Take 324 mg by mouth Daily (before breakfast). hydrOXYzine pamoate (VISTARIL) 50 mg capsule   Yes No  
Sig: Take 25 mg by mouth two (2) times daily as needed for Itching. insulin detemir U-100 (LEVEMIR FLEXTOUCH) 100 unit/mL (3 mL) inpn 3/24/2019 at Unknown time  Yes Yes Si Units by SubCUTAneous route every morning. insulin lispro (HUMALOG) 100 unit/mL kwikpen 3/24/2019 at Unknown time  Yes Yes Si Units by SubCUTAneous route Before breakfast, lunch, and dinner. levothyroxine (SYNTHROID) 75 mcg tablet 3/24/2019 at Unknown time  Yes Yes Sig: Take 75 mcg by mouth Daily (before breakfast).   
loperamide (IMODIUM) 2 mg capsule   Yes No  
 Sig: Take 4 mg by mouth as needed for Diarrhea. methadone (DOLOPHINE) 5 mg tablet 3/25/2019 at Unknown time  No Yes Sig: Take 1 Tab by mouth every six (6) hours. Max Daily Amount: 20 mg. Instructed to take DOS per Anesthesia guidelines. mirtazapine (REMERON) 7.5 mg tablet 3/24/2019 at Unknown time  Yes Yes Sig: Take 7.5 mg by mouth nightly. multivitamin with minerals (HAIR,SKIN AND NAILS PO)   Yes No  
Sig: Take 1 Tab by mouth daily. nystatin (MYCOSTATIN) powder   Yes No  
Sig: Apply  to affected area three (3) times daily. Under breasts and ABD folds  
polyethylene glycol (MIRALAX) 17 gram packet ex-lax  Yes No  
Sig: Take 17 g by mouth daily as needed. potassium chloride (KLOR-CON) 10 mEq tablet 3/24/2019 at Unknown time  Yes Yes Sig: Take 10 mEq by mouth two (2) times a day. predniSONE (DELTASONE) 5 mg tablet 3/24/2019 at Unknown time  No Yes Sig: Take 1 Tab by mouth two (2) times a day. 5mg BID  
raNITIdine (ZANTAC) 150 mg tablet   Yes No  
Sig: Take 150 mg by mouth two (2) times a day. Facility-Administered Medications: None Allergies Allergen Reactions  Contrast Agent [Iodine] Anaphylaxis  Actifed [Triprolidine-Pseudoephedrine] Nausea Only  Allopurinol Other (comments) Elevated blood pressure & pt states could not walk or see until medication was out of her system.  Decongestant [Pseudoephedrine Hcl] Rash and Itching  Lexapro [Escitalopram Oxalate] Other (comments) Lips/Mouth swelling  Lyrica [Pregabalin] Unknown (comments)  Minizide [Prazosin-Polythiazide] Unknown (comments)  Mobic [Meloxicam] Other (comments) Facial swelling  Omnipaque [Iohexol] Hives, Swelling and Other (comments) Wheezing and/or shortness of breath  Sertraline Other (comments) Mouth, lips swell  Spironolactone Itching   Pt complains that she breaks out in clammy sweat with itching and stinging in her upper arms, dry mouth, funny feeling tongue, SOB, and increased anxiety.  Sulfamethazine Other (comments) Mouth/lips swelling Social History Tobacco Use  Smoking status: Never Smoker  Smokeless tobacco: Never Used Substance Use Topics  Alcohol use: No  
  
Family History Problem Relation Age of Onset  Cancer Mother  Heart Disease Father  Stroke Father Objective:  
 
Visit Vitals /73 Pulse (!) 101 Temp 98.6 °F (37 °C) Resp 16 Ht 5' 4\" (1.626 m) Wt 74.4 kg (164 lb) SpO2 97% BMI 28.15 kg/m² Temp (24hrs), Av.8 °F (37.7 °C), Min:98.6 °F (37 °C), Max:101.2 °F (38.4 °C) Oxygen Therapy O2 Sat (%): 97 % (19 1642) Pulse via Oximetry: 102 beats per minute (19 1331) O2 Device: Nasal cannula (19 0948) O2 Flow Rate (L/min): 2 l/min (19 0948) Physical Exam: 
General:    Alert, cooperative, no distress, appears stated age. Head:   Normocephalic, without obvious abnormality, atraumatic. Nose:  Nares normal. No drainage or sinus tenderness. Lungs:   Clear to auscultation bilaterally. No Wheezing or Rhonchi. No rales. Heart:   Tachycardia rate, regular rhythm,  no murmur, rub or gallop. Abdomen:   Soft, non-tender. Not distended. Bowel sounds normal.  
Extremities: No cyanosis. No edema. No clubbing Skin:     Texture, turgor normal. No rashes   Not Jaundiced. Quarter-sized Stage I ulcer on left heel with visible granulation tissue Neurologic: Alert and oriented x 3, no focal deficits Data Review:  
Recent Results (from the past 24 hour(s)) EKG, 12 LEAD, INITIAL Collection Time: 19  9:43 AM  
Result Value Ref Range Ventricular Rate 115 BPM  
 Atrial Rate 115 BPM  
 P-R Interval 156 ms QRS Duration 112 ms  
 Q-T Interval 336 ms  
 QTC Calculation (Bezet) 464 ms Calculated P Axis 50 degrees Calculated R Axis -38 degrees Calculated T Axis 20 degrees Diagnosis    
  !! AGE AND GENDER SPECIFIC ECG ANALYSIS !! Sinus tachycardia Left axis deviation Right bundle branch block Abnormal ECG When compared with ECG of 24-FEB-2019 20:13, 
QRS duration has decreased Confirmed by Jitendra Henderson MD (), LUANA NAVAS ([id number]) on 3/25/2019 11:35:51 AM 
  
PROCALCITONIN Collection Time: 03/25/19  9:59 AM  
Result Value Ref Range Procalcitonin 0.4 ng/mL CBC WITH AUTOMATED DIFF Collection Time: 03/25/19 10:02 AM  
Result Value Ref Range WBC 17.7 (H) 4.3 - 11.1 K/uL  
 RBC 3.73 (L) 4.05 - 5.2 M/uL  
 HGB 10.4 (L) 11.7 - 15.4 g/dL HCT 33.7 (L) 35.8 - 46.3 % MCV 90.3 79.6 - 97.8 FL  
 MCH 27.9 26.1 - 32.9 PG  
 MCHC 30.9 (L) 31.4 - 35.0 g/dL  
 RDW 15.7 (H) 11.9 - 14.6 % PLATELET 180 434 - 238 K/uL MPV 9.8 9.4 - 12.3 FL ABSOLUTE NRBC 0.02 0.0 - 0.2 K/uL  
 DF AUTOMATED NEUTROPHILS 66 43 - 78 % LYMPHOCYTES 21 13 - 44 % MONOCYTES 9 4.0 - 12.0 % EOSINOPHILS 1 0.5 - 7.8 % BASOPHILS 1 0.0 - 2.0 % IMMATURE GRANULOCYTES 3 0.0 - 5.0 %  
 ABS. NEUTROPHILS 11.8 (H) 1.7 - 8.2 K/UL  
 ABS. LYMPHOCYTES 3.7 0.5 - 4.6 K/UL  
 ABS. MONOCYTES 1.6 (H) 0.1 - 1.3 K/UL  
 ABS. EOSINOPHILS 0.1 0.0 - 0.8 K/UL  
 ABS. BASOPHILS 0.1 0.0 - 0.2 K/UL  
 ABS. IMM. GRANS. 0.5 0.0 - 0.5 K/UL METABOLIC PANEL, COMPREHENSIVE Collection Time: 03/25/19 10:02 AM  
Result Value Ref Range Sodium 139 136 - 145 mmol/L Potassium 3.9 3.5 - 5.1 mmol/L Chloride 102 98 - 107 mmol/L  
 CO2 29 21 - 32 mmol/L Anion gap 8 7 - 16 mmol/L Glucose 160 (H) 65 - 100 mg/dL BUN 21 8 - 23 MG/DL Creatinine 1.21 (H) 0.6 - 1.0 MG/DL  
 GFR est AA 54 (L) >60 ml/min/1.73m2 GFR est non-AA 45 (L) >60 ml/min/1.73m2 Calcium 8.3 8.3 - 10.4 MG/DL Bilirubin, total 0.4 0.2 - 1.1 MG/DL  
 ALT (SGPT) 25 12 - 65 U/L  
 AST (SGOT) 30 15 - 37 U/L Alk. phosphatase 182 (H) 50 - 136 U/L Protein, total 7.0 6.3 - 8.2 g/dL Albumin 2.3 (L) 3.2 - 4.6 g/dL Globulin 4.7 (H) 2.3 - 3.5 g/dL A-G Ratio 0.5 (L) 1.2 - 3.5 POC LACTIC ACID  
 Collection Time: 03/25/19 10:05 AM  
Result Value Ref Range Lactic Acid (POC) 2.05 (H) 0.5 - 1.9 mmol/L  
URINALYSIS W/ RFLX MICROSCOPIC Collection Time: 03/25/19 10:50 AM  
Result Value Ref Range Color YELLOW Appearance CLEAR Specific gravity 1.013 1.001 - 1.023    
 pH (UA) 7.0 5.0 - 9.0 Protein 100 (A) NEG mg/dL Glucose NEGATIVE  mg/dL Ketone NEGATIVE  NEG mg/dL Bilirubin NEGATIVE  NEG Blood NEGATIVE  NEG Urobilinogen 0.2 0.2 - 1.0 EU/dL Nitrites NEGATIVE  NEG Leukocyte Esterase NEGATIVE  NEG    
 WBC 5-10 0 /hpf  
 RBC 0 0 /hpf Epithelial cells 0-3 0 /hpf Bacteria 2+ (H) 0 /hpf Casts 0 0 /lpf POC LACTIC ACID Collection Time: 03/25/19  1:04 PM  
Result Value Ref Range Lactic Acid (POC) 1.04 0.5 - 1.9 mmol/L  
LACTIC ACID Collection Time: 03/25/19  3:10 PM  
Result Value Ref Range Lactic acid 1.2 0.4 - 2.0 MMOL/L  
GLUCOSE, POC Collection Time: 03/25/19  4:24 PM  
Result Value Ref Range Glucose (POC) 217 (H) 65 - 100 mg/dL Imaging Cece Reardon Assessment and Plan: Active Hospital Problems Diagnosis Date Noted  Sepsis ([facility] [address] 03/25/2019  Finger infection 02/05/2019  Acquired hypothyroidism 01/16/2019  Chronic diastolic congestive heart failure ([facility] [address] 01/16/2019  DM2 (diabetes mellitus, type 2) ([address] 07/27/2018  Bilateral leg edema 10/04/2017 PLAN:  
 
# Sepsis - blood and urine cultures pending 
- given recent hospitalization and several antibiotic regimens, will switch empirically to vancomycin and Zosyn 
- maintenance fluids # Recent history of osteomyelitis of left index finger - patient was on extended course of cefepime as well as ciprofloxacin and cephalosporin per ID recs and was near the completion of these antibiotics - repeat x-ray showing expected destructive changes - symptomatically improved per patient - would continue to observe for now, may need to image again with MRI if no obvious, overt source of patient's sepsis # Stage I pressure ulcer of left heel 
- off load pressure - wound management consult # Lower extremity swelling and redness 
- likely representative of chronic venous changes 
- no obvious ulcerations or cuts/scrapes (besides ulcer as above) - patient does not have sensation below both of her ankles # Hypothyroidism 
- continue with home meds # Chronic diastolic heart failure - BP control and diuresis as needed - currently compensated # History of chronic pain 
- continue methadone # DM type II 
- continue with home regimen, Lantus 5 units qdaily and Humalog 5 units TIDAC 
- goal CBG between 140-180 with Humalog SSI and serial CBGs while inpatient # HTN 
- continue home meds # History of MDD 
- continue home meds F/E/N: maintenance fluids, replete electrolytes as needed, diabetic diet Ppx: SCDs for VTE Code Status: DNR/DNI (discussed with patient at bedside) Disposition: pending clinical improvement with workup as above. Patient will return to [facility] following hospital discharge Signed By: Ludin Pressley DO March 25, 2019

## 2019-03-25 NOTE — PROGRESS NOTES
Pharmacokinetic Consult to Pharmacist 
 
Ruben Bumpers is a 80 y.o. female being treated for sepsis of unknown etiology with vancomycin. Height: 5' 4\" (162.6 cm)  Weight: 74.4 kg (164 lb) Lab Results Component Value Date/Time BUN 21 03/25/2019 10:02 AM  
 Creatinine 1.21 (H) 03/25/2019 10:02 AM  
 WBC 17.7 (H) 03/25/2019 10:02 AM  
 Procalcitonin 0.4 03/25/2019 09:59 AM  
 Lactic Acid (POC) 1.04 03/25/2019 01:04 PM  
  
Estimated Creatinine Clearance: 33.6 mL/min (A) (based on SCr of 1.21 mg/dL (H)). CULTURES: 
All Micro Results Procedure Component Value Units Date/Time C. DIFFICILE AG & TOXIN A/B [[id number]] Order Status:  Sent Specimen:  Stool CULTURE, URINE [[id number]] Order Status:  Sent Specimen:  Urine from Angelo Specimen CULTURE, URINE [[id number]] Order Status:  Canceled Specimen:  Urine from Clean catch CULTURE, BLOOD [[id number]] Collected:  03/25/19 1002 Order Status:  Completed Specimen:  Blood Updated:  03/25/19 1050 CULTURE, BLOOD [[id number]] Collected:  03/25/19 1002 Order Status:  Completed Specimen:  Blood Updated:  03/25/19 1050 Lab Results Component Value Date/Time Vancomycin, random 15.7 02/08/2019 07:50 AM  
 
 
Day 1 of vancomycin. Goal trough is 15-20. Patient received 2000mg loading dose. Plan to continue with 1000mg IVPB Q24H maintenance dosing to target trough 15-20. Will continue to follow patient. Thank you, 
See Fry PharmD 
[phone]

## 2019-03-25 NOTE — ED NOTES
Vancomycin started on pt. Pharmacy called and confirmed vanc and LR are compatible at y site. Spoke with Dondra Harada.

## 2019-03-26 NOTE — PROGRESS NOTES
Hourly rounding completed on this shift. No new complaints at this time. All needs met. Pt is currently resting in bed. Will continue to monitor and give report to oncoming nurse.

## 2019-03-26 NOTE — PROGRESS NOTES
Hospitalist Progress Note Admit Date:  3/25/2019  9:38 AM  
Name:  Li Nichole Age:  80 y.o. 
:  1933 MRN:  [id number] PCP:  Shaheed French MD 
Treatment Team: Attending Provider: Truong Mejia DO; Care Manager: Kati Monae LMSW; Utilization Review: Julio Bonilla RN; Consulting Provider: Elan Blake MD 
 
Subjective:  
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 Pt was at [facility] for [address] and was brought for concerns of sepsis. Pt denies SOB but endorses the fever. No other related sx. In the ED her EKG has sinus tachycardia. Urine positive 2+ bacteria and pt started on vanc and zosyn given recent healthcare exposure. Today pt reports severe pain, burning and sharp of both hands and feet. Multiple medication allergies, will try Toradol and restart her colchicine per daughter's request.   
 
 
Objective:  
 
Patient Vitals for the past 24 hrs: 
 Temp Pulse Resp BP SpO2  
19 1652 98.7 °F (37.1 °C) 94 18 146/64 95 % 19 1207 100.1 °F (37.8 °C) 96 18 147/68 92 % 19 0809 98.7 °F (37.1 °C) 97 18 155/71 94 % 19 0441 99.1 °F (37.3 °C) 95 18 164/77 91 %  
19 2325 98.7 °F (37.1 °C) 96 18 130/69 92 % 19 1909 98.8 °F (37.1 °C) 90 16 149/68 93 % Oxygen Therapy O2 Sat (%): 95 % (19 1652) Pulse via Oximetry: 102 beats per minute (19 1331) O2 Device: Nasal cannula (19 0948) O2 Flow Rate (L/min): 2 l/min (19 0948) Intake/Output Summary (Last 24 hours) at 3/26/2019 1851 Last data filed at 3/26/2019 1747 Gross per 24 hour Intake 3141 ml Output 700 ml Net 2441 ml Physical Examination: 
General:    Well nourished. Awake and alert. Head:  Normocephalic, atraumatic Eyes:  Extraocular movements intact, normal sclera CV:   RRR. No  Murmurs, clicks, or gallops Lungs:   Unlabored, no cyanosis Abdomen:   Soft, nondistended, nontender. Extremities: Warm and dry. No cyanosis or edema. Left index finger with eschar on tip of finger Skin:     No rashes or jaundice. Neuro:  No gross focal deficits Psych:  Mood and affect appropriate Data Review: 
I have reviewed all labs, meds, telemetry events, and studies from the last 24 hours. Recent Results (from the past 24 hour(s)) GLUCOSE, POC Collection Time: 03/25/19  8:06 PM  
Result Value Ref Range Glucose (POC) 194 (H) 65 - 100 mg/dL LACTIC ACID Collection Time: 03/25/19 10:44 PM  
Result Value Ref Range Lactic acid 1.8 0.4 - 2.0 MMOL/L  
METABOLIC PANEL, BASIC Collection Time: 03/26/19  5:03 AM  
Result Value Ref Range Sodium 141 136 - 145 mmol/L Potassium 4.2 3.5 - 5.1 mmol/L Chloride 106 98 - 107 mmol/L  
 CO2 30 21 - 32 mmol/L Anion gap 5 (L) 7 - 16 mmol/L Glucose 128 (H) 65 - 100 mg/dL BUN 19 8 - 23 MG/DL Creatinine 1.06 (H) 0.6 - 1.0 MG/DL  
 GFR est AA >60 >60 ml/min/1.73m2 GFR est non-AA 52 (L) >60 ml/min/1.73m2 Calcium 7.4 (L) 8.3 - 10.4 MG/DL  
CBC WITH AUTOMATED DIFF Collection Time: 03/26/19  5:03 AM  
Result Value Ref Range WBC 12.6 (H) 4.3 - 11.1 K/uL  
 RBC 3.19 (L) 4.05 - 5.2 M/uL HGB 8.8 (L) 11.7 - 15.4 g/dL HCT 28.9 (L) 35.8 - 46.3 % MCV 90.6 79.6 - 97.8 FL  
 MCH 27.6 26.1 - 32.9 PG  
 MCHC 30.4 (L) 31.4 - 35.0 g/dL  
 RDW 15.8 (H) 11.9 - 14.6 % PLATELET 556 936 - 017 K/uL MPV 9.9 9.4 - 12.3 FL ABSOLUTE NRBC 0.00 0.0 - 0.2 K/uL  
 DF AUTOMATED NEUTROPHILS 63 43 - 78 % LYMPHOCYTES 26 13 - 44 % MONOCYTES 9 4.0 - 12.0 % EOSINOPHILS 0 (L) 0.5 - 7.8 % BASOPHILS 0 0.0 - 2.0 % IMMATURE GRANULOCYTES 2 0.0 - 5.0 %  
 ABS. NEUTROPHILS 7.9 1.7 - 8.2 K/UL  
 ABS. LYMPHOCYTES 3.3 0.5 - 4.6 K/UL ABS. MONOCYTES 1.1 0.1 - 1.3 K/UL  
 ABS. EOSINOPHILS 0.1 0.0 - 0.8 K/UL  
 ABS. BASOPHILS 0.0 0.0 - 0.2 K/UL  
 ABS. IMM. GRANS. 0.2 0.0 - 0.5 K/UL  
LACTIC ACID Collection Time: 03/26/19  6:52 AM  
Result Value Ref Range Lactic acid 0.9 0.4 - 2.0 MMOL/L  
GLUCOSE, POC Collection Time: 03/26/19  7:18 AM  
Result Value Ref Range Glucose (POC) 117 (H) 65 - 100 mg/dL METABOLIC PANEL, BASIC Collection Time: 03/26/19 11:31 AM  
Result Value Ref Range Sodium 140 136 - 145 mmol/L Potassium 4.2 3.5 - 5.1 mmol/L Chloride 103 98 - 107 mmol/L  
 CO2 30 21 - 32 mmol/L Anion gap 7 7 - 16 mmol/L Glucose 143 (H) 65 - 100 mg/dL BUN 21 8 - 23 MG/DL Creatinine 1.20 (H) 0.6 - 1.0 MG/DL  
 GFR est AA 55 (L) >60 ml/min/1.73m2 GFR est non-AA 45 (L) >60 ml/min/1.73m2 Calcium 7.7 (L) 8.3 - 10.4 MG/DL  
GLUCOSE, POC Collection Time: 03/26/19 11:34 AM  
Result Value Ref Range Glucose (POC) 158 (H) 65 - 100 mg/dL PLEASE READ & DOCUMENT PPD TEST IN 24 HRS Collection Time: 03/26/19  2:10 PM  
Result Value Ref Range PPD  Negative  
 mm Induration  0 - 5 mm GLUCOSE, POC Collection Time: 03/26/19  4:00 PM  
Result Value Ref Range Glucose (POC) 98 65 - 100 mg/dL All Micro Results Procedure Component Value Units Date/Time CULTURE, URINE [[id number]] Collected:  03/25/19 1050 Order Status:  Completed Specimen:  Urine from [place] Specimen Updated:  03/26/19 6973 Special Requests: NO SPECIAL REQUESTS Culture result:    
  NO GROWTH AFTER SHORT PERIOD OF INCUBATION. FURTHER RESULTS TO FOLLOW AFTER OVERNIGHT INCUBATION. CULTURE, BLOOD [[id number]] Collected:  03/25/19 1002 Order Status:  Completed Specimen:  Blood Updated:  03/26/19 0818 Special Requests: --     
  LEFT 
HAND Culture result: NO GROWTH AFTER 21 HOURS     
 CULTURE, BLOOD [[id number]] Collected:  03/25/19 1002 Order Status:  Completed Specimen:  Blood Updated:  03/26/19 0818 Special Requests: --     
  RIGHT 
ARM Culture result: NO GROWTH AFTER 21 HOURS C. DIFFICILE AG & TOXIN A/B [[id number]] Order Status:  Canceled Specimen:  Stool CULTURE, URINE [[id number]] Order Status:  Canceled Specimen:  Urine from Clean catch Current Meds: 
Current Facility-Administered Medications Medication Dose Route Frequency  polyethylene glycol (MIRALAX) packet 17 g  17 g Oral DAILY  tamsulosin (FLOMAX) capsule 0.4 mg  0.4 mg Oral DAILY  ketorolac (TORADOL) injection 15 mg  15 mg IntraVENous Q8H PRN  
 colchicine tablet 0.6 mg  0.6 mg Oral TID  calcium-vitamin D (OS-JENN) 500 mg-200 unit tablet  1 Tab Oral DAILY  carvedilol (COREG) tablet 6.25 mg  6.25 mg Oral BID WITH MEALS  cholecalciferol (VITAMIN D3) tablet 1,000 Units  1,000 Units Oral DAILY  clonazePAM (KlonoPIN) tablet 0.5 mg  0.5 mg Oral TID  DULoxetine (CYMBALTA) capsule 60 mg  60 mg Oral DAILY  ferrous gluconate 324 mg (38 mg iron) tablet 1 Tab  1 Tab Oral ACB  hydrOXYzine pamoate (VISTARIL) capsule 25 mg  25 mg Oral BID PRN  
 insulin glargine (LANTUS) injection 5 Units  5 Units SubCUTAneous DAILY  insulin lispro (HUMALOG) injection 5 Units  5 Units SubCUTAneous TIDAC  levothyroxine (SYNTHROID) tablet 75 mcg  75 mcg Oral ACB  methadone (DOLOPHINE) tablet 5 mg  5 mg Oral Q6H  
 mirtazapine (REMERON) tablet 7.5 mg  7.5 mg Oral QHS  potassium chloride (KLOR-CON) tablet 10 mEq  10 mEq Oral BID  predniSONE (DELTASONE) tablet 5 mg  5 mg Oral BID  sodium chloride (NS) flush 5-40 mL  5-40 mL IntraVENous Q8H  
 sodium chloride (NS) flush 5-40 mL  5-40 mL IntraVENous PRN  
 ondansetron (ZOFRAN) injection 4 mg  4 mg IntraVENous Q4H PRN  piperacillin-tazobactam (ZOSYN) 4.5 g in 0.9% sodium chloride (MBP/ADV) 100 mL  4.5 g IntraVENous Q8H  
  vancomycin (VANCOCIN) 1,000 mg in 0.9% sodium chloride (MBP/ADV) 250 mL  1 g IntraVENous Q24H  
 alcohol 62% (NOZIN) nasal  1 Ampule  1 Ampule Topical Q12H  
 amLODIPine (NORVASC) tablet 10 mg  10 mg Oral DAILY  insulin lispro (HUMALOG) injection   SubCUTAneous AC&HS Diet: DIET DIABETIC CONSISTENT CARB Other Studies (last 24 hours): No results found. Assessment and Plan:  
 
Hospital Problems as of 3/26/2019 Date Reviewed: 5/25/2016 Codes Class Noted - Resolved POA * (Principal) [facility]epsis [facility] ICD-10-CM: A41.9 ICD-9-CM: 038.9, 995.91  3/25/2019 - Present Unknown Finger infection ICD-10-CM: L08.9 ICD-9-CM: 686.9  2/5/2019 - Present Yes Chronic diastolic congestive heart failure (HCC) ICD-10-CM: I50.32 
ICD-9-CM: 428.32, 428.0  1/16/2019 - Present Yes Acquired hypothyroidism ICD-10-CM: E03.9 ICD-9-CM: 244.9  1/16/2019 - Present Yes DM2 (diabetes mellitus, type 2) (HCC) ICD-10-CM: E11.9 ICD-9-CM: 250.00  7/27/2018 - Present Yes Bilateral leg edema ICD-10-CM: R60.0 ICD-9-CM: 782.3  10/4/2017 - Present Yes A/P:   
# Sepsis - blood and urine cultures pending 
- given recent hospitalization and several antibiotic regimens, will switch empirically to vancomycin and Zosyn 
- maintenance fluids 
  
# Recent history of osteomyelitis of left index finger - patient was on extended course of cefepime as well as ciprofloxacin and cephalosporin per ID recs and was near the completion of these antibiotics - repeat x-ray showing expected destructive changes - symptomatically improved per patient 
- would continue to observe for now, may need to image again with MRI if no obvious, overt source of patient's sepsis  
  
# Stage I pressure ulcer of left heel 
- off load pressure - wound management consult 
  
# Lower extremity swelling and redness 
- likely representative of chronic venous changes - no obvious ulcerations or cuts/scrapes (besides ulcer as above) - patient does not have sensation below both of her ankles 
  
# Hypothyroidism 
- continue with home meds 
  
# Chronic diastolic heart failure - BP control and diuresis as needed - currently compensated 
  
# History of chronic pain 
- continue methadone 
  
# DM type II 
- continue with home regimen, Lantus 5 units qdaily and Humalog 5 units TIDAC 
- goal CBG between 140-180 with Humalog SSI and serial CBGs while inpatient 
  
# HTN 
- continue home meds 
  
# History of MDD 
- continue home meds 
  
Dispo: return to [facility]  
Ppx: SCDs for VTE 
  
Code Status: DNR/DNI (discussed with patient at bedside) Medical decision maker: daughter Nallely Leroy Case reviewed with supervising physician - CHRISTI Wise MD 
 
Signed: 
ARIELLA CarpioC

## 2019-03-26 NOTE — PROGRESS NOTES
Problem: Mobility Impaired (Adult and Pediatric) Goal: *Acute Goals and Plan of Care (Insert Text) Description STG: 
(1.)Ms. Lizett Kim will move from supine to sit and sit to supine  with MODERATE ASSIST within 5 treatment day(s). (2.)Ms. Lizett Kim will transfer from bed to chair and chair to bed with MODERATE ASSIST using the least restrictive device within 5 treatment day(s). (3.)Ms. Lizett Kim will ambulate with MODERATE ASSIST for 5 feet with the least restrictive device within 5 treatment day(s). LTG: 
(1.)Ms. Lizett Kim will move from supine to sit and sit to supine  in bed with MINIMAL ASSIST within 10 treatment day(s). (2.)Ms. Lizett Kim will transfer from bed to chair and chair to bed with MINIMAL ASSIST using the least restrictive device within 10 treatment day(s). (3.)Ms. Lizett Kim will ambulate with MINIMAL ASSIST for 50 feet with the least restrictive device within 10 treatment day(s). ________________________________________________________________________________________________  
3/26/2019 1334 by Manoj Thurman PT Note: PHYSICAL THERAPY: Initial Assessment 3/26/2019 INPATIENT: PT Visit Days : 1 [place] / [place] / Product Type: Medicare /   
  
NAME/AGE/GENDER: Criss Pastor is a 80 y.o. female PRIMARY DIAGNOSIS: Sepsis ([facility] [A41.9] Sepsis ([facility] Sepsis ([facility] ICD-10: Treatment Diagnosis:  
 Generalized Muscle Weakness (M62.81) Difficulty in walking, Not elsewhere classified (R26.2) Precaution/Allergies: 
Contrast agent [iodine]; Actifed [triprolidine-pseudoephedrine]; Allopurinol; Decongestant [pseudoephedrine hcl]; Lexapro [escitalopram oxalate]; Lyrica [pregabalin]; Minizide [prazosin-polythiazide]; Mobic [meloxicam]; Omnipaque [iohexol]; Sertraline; Spironolactone; and Sulfamethazine ASSESSMENT:  
 
Ms. Lizett Kim is a 79 y/o female adm.  W/ sepsis, now consulted to PT.  Pt. Is currently mod-max A for mobility secondary to decreased strength, endurance, balance, coordination, as well as increased pain. RN currently contacting MD to obtain additional pain medication. Therefore, Pt. Unable to progress to standing or OOB to chair secondary to increased essential tremors (which has been an ongoing issue) as well as increased pain. Pt. Is mobilizing below her baseline & benefits from cont. PT services to address. This section established at most recent assessment PROBLEM LIST (Impairments causing functional limitations): 
Decreased Strength Decreased ADL/Functional Activities Decreased Transfer Abilities Decreased Ambulation Ability/Technique Decreased Balance Increased Pain Decreased Activity Tolerance Increased Fatigue INTERVENTIONS PLANNED: (Benefits and precautions of physical therapy have been discussed with the patient.) Balance Exercise Bed Mobility Gait Training Neuromuscular Re-education/Strengthening Therapeutic Activites Therapeutic Exercise/Strengthening Transfer Training TREATMENT PLAN: Frequency/Duration: 3 times a week for duration of hospital stay Rehabilitation Potential For Stated Goals: Good RECOMMENDED REHABILITATION/EQUIPMENT: (at time of discharge pending progress): Due to the probability of continued deficits (see above) this patient will likely need continued skilled physical therapy after discharge. Equipment: TBD HISTORY:  
History of Present Injury/Illness (Reason for Referral): 
Pt. Adm. From [address] w/ SOB & fever. Work-up revealed sepsis. Past Medical History/Comorbidities: Ms. Mahsa Villa  has a past medical history of Arthritis, Chronic diastolic congestive heart failure ([address] (1/16/2019), Chronic pain, Edema, GERD (gastroesophageal reflux disease) (1/16/2019), Gouty arthritis, History of MI (myocardial infarction) (1980's), Hypertension, Hypothyroidism, Mild heartburn, Neuropathy (1980's), Post-operative nausea and vomiting, and Type 2 diabetes mellitus without complication, without long-term current use of insulin ([facility] (7/27/2018). She also has no past medical history of Adverse effect of anesthesia, Aneurysm ([address] Arrhythmia, Asthma, Autoimmune disease ([address] CAD (coronary artery disease), Cancer ([address] Chronic kidney disease, Chronic obstructive pulmonary disease ([address] Coagulation disorder ([address] Difficult intubation, Endocarditis, Ill-defined condition, Liver disease, Malignant hyperthermia due to anesthesia, Morbid obesity ([address] Pseudocholinesterase deficiency, Psychiatric disorder, PUD (peptic ulcer disease), Rheumatic fever, Seizures ([address] Sleep apnea, Stroke ([address] or Thromboembolus ([address] Ms. Baljit Sr  has a past surgical history that includes hx partial thyroidectomy (1960); hx orthopaedic (Bilateral, 5689-5154); hx orthopaedic (Left, 1992); hx open cholecystectomy (1970); hx cyst removal (Right, 1950's); hx urological; hx heart catheterization (late 1979); hx hysterectomy (1972); hx gyn (1963); hx dilation and curettage (5345-2224); and hx left salpingo-oophorectomy (1965). Social History/Living Environment:  
Home Environment: Skilled nursing facility One/Two Story Residence: One story Living Alone: No 
Support Systems: Family member(s) Patient Expects to be Discharged to[de-identified] Skilled nursing facility Current DME Used/Available at Home: [employer] Prior Level of Function/Work/Activity: 
Pt. Reports she was ambulating w/ PT using RW @ SNF. States she was scheduled for d/c in just a few days before she got sick. Number of Personal Factors/Comorbidities that affect the Plan of Care: 3+: HIGH COMPLEXITY EXAMINATION:  
Most Recent Physical Functioning:  
Gross Assessment: 
AROM: Grossly decreased, non-functional 
PROM: Generally decreased, functional 
Strength: Grossly decreased, non-functional 
Coordination: Grossly decreased, non-functional 
 Tone: Normal 
Sensation: Impaired Posture: 
Posture (WDL): Exceptions to [facility] Posture Assessment: Forward head, Rounded shoulders, Trunk flexion Balance: 
Sitting: Impaired Sitting - Static: Good (unsupported) Sitting - Dynamic: Fair (occasional) Bed Mobility: 
Rolling: Moderate assistance Supine to Sit: Maximum assistance Sit to Supine: Maximum assistance Scooting: Total assistance Wheelchair Mobility: 
  
Transfers: 
Sit to Stand: Other (comment)(pt. unable ) Gait: 
  
   
  
Body Structures Involved: 
Nerves Metabolic Muscles Body Functions Affected: 
Sensory/Pain Neuromusculoskeletal 
Movement Related Metobolic/Endocrine Activities and Participation Affected: 
General Tasks and Demands Communication Mobility Self Care Domestic Life Community, Social and [place] Number of elements that affect the Plan of Care: 4+: HIGH COMPLEXITY CLINICAL PRESENTATION:  
Presentation: Evolving clinical presentation with changing clinical characteristics: MODERATE COMPLEXITY CLINICAL DECISION MAKING:  
[employer] AM-PAC? ?6 Clicks? Basic Mobility Inpatient Short Form How much difficulty does the patient currently have. .. Unable A Lot A Little None 1. Turning over in bed (including adjusting bedclothes, sheets and blankets)? ? 1   ? 2   ? 3   ? 4  
2. Sitting down on and standing up from a chair with arms ( e.g., wheelchair, bedside commode, etc.)   ? 1   ? 2   ? 3   ? 4  
3. Moving from lying on back to sitting on the side of the bed?   ? 1   ? 2   ? 3   ? 4 How much help from another person does the patient currently need. .. Total A Lot A Little None 4. Moving to and from a bed to a chair (including a wheelchair)? ? 1   ? 2   ? 3   ? 4  
5. Need to walk in hospital room? ? 1   ? 2   ? 3   ? 4  
6. Climbing 3-5 steps with a railing? ? 1   ? 2   ? 3   ? 4  
© 2007, Trustees of [facility] [employer], under license to [employer]. All rights reserved Score:  Initial: 8 Most Recent: X (Date: -- ) Interpretation of Tool:  Represents activities that are increasingly more difficult (i.e. Bed mobility, Transfers, Gait). Medical Necessity:    
Patient demonstrates good 
 rehab potential due to higher previous functional level. Reason for Services/Other Comments: 
Patient continues to demonstrate capacity to improve strength, balance, & endurance which will increase independence, decrease amount of assistance required from caregiver and increase safety Lili Po Use of outcome tool(s) and clinical judgement create a POC that gives a: Questionable prediction of patient's progress: MODERATE COMPLEXITY  
  
 
 
 
TREATMENT:  
(In addition to Assessment/Re-Assessment sessions the following treatments were rendered) Pre-treatment Symptoms/Complaints:  \"I'll try, but my hands are really hurting right now. \" 
Pain: Initial:  
Pain Intensity 1: 6  Post Session:  6/10 RN contacting MD regarding pain medication Therapeutic Activity: (  10 minutes): Therapeutic activities including Bed transfers and LE there ex (x10 each ankle pumps, SAQ, hip flexion)  to improve mobility and balance. Required min A to promote static and dynamic balance in sitting. Braces/Orthotics/Lines/Etc:  
IV 
O2 Device: Nasal cannula Treatment/Session Assessment:   
Response to Treatment:  Pt. W/ increased essential tremors & c/o increased pain w/ increased sitting time Interdisciplinary Collaboration:  
Physical Therapist 
Registered Nurse After treatment position/precautions:  
Supine in bed Bed/Chair-wheels locked Bed in low position Call light within reach RN notified Compliance with Program/Exercises: Will assess as treatment progresses Recommendations/Intent for next treatment session: \"Next visit will focus on advancements to more challenging activities and reduction in assistance provided\". Total Treatment Duration: PT Patient Time In/Time Out Time In: 8985 Time Out: 3894 Hunter Garcia, PT

## 2019-03-26 NOTE — CONSULTS
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  
· Subjective:  
Date of Consultation:  March 26, 2019 Referring Physician: Missy Juarez NP-hospitalist 
 
Patient is a 80 y.o. female who is well known to ID for recent treatment of presumed Left hand index finger polymicrobial osteomyelitis, for which she completed 6 weeks of Cefepime and was see for EOT visit in the office on 3/12/19 and transitioned to PO Duricef and Cipro for 2 more weeks. She was brought in from [facility] yesterday with 2-3 days of fever, chills, nausea and cough. She reported worsened pain in her arms/legs, and onset of redness to her LE at the onset of symptoms. BC pending, UCx pending, UA negative, CXR negative, PCT 0.4, lactate 1.2. Diarrhea is improved, no cough/SOB/LANDA. Left hand wound is better/stable, oozing scant clear drainage however no changes. Currently on Vanc and Zosyn, ID consulted to recommendations. Pt denies dysuria, pelvic pressure, abdominal pain. Patient Active Problem List  
Diagnosis Code  Urinary tract infection, site not specified N39.0  Murmur R01.1  Bilateral leg edema R60.0  Swelling R60.9  Abnormal EKG R94.31  
 Essential hypertension with goal blood pressure less than 130/85 I10  Aortic valve sclerosis I35.8  Rapid heart rate R00.0  Mixed hyperlipidemia E78.2  DM2 (diabetes mellitus, type 2) ([facility]) E11.9  Closed rib fracture S22.39XA  Acute respiratory failure with hypoxia ([facility]) J96.01  
 Acute prerenal azotemia R79.89  Chronic diastolic congestive heart failure ([facility]) I50.32  
 Acquired hypothyroidism E03.9  GERD (gastroesophageal reflux disease) K21.9  Finger infection L08.9  Leukocytosis D72.829  
 RAYMON (acute kidney injury) ([facility] [address] N17.9  Osteomyelitis of finger ([facility]) M86.9  Acute encephalopathy G93.40  Acute metabolic encephalopathy T60.66  
 Acute diarrhea R19.7  Oral thrush B37.0  Sepsis ([address] A41.9 Past Medical History:  
Diagnosis Date  Arthritis  Chronic diastolic congestive heart failure ([facility] [address] 1/16/2019  Chronic pain   
 bilat feet & hands  Edema BLE & bilat fingers; pt takes diuretic daily; pt states her swelling is related to the nerve disease she is being treated for at [facility]  GERD (gastroesophageal reflux disease) 1/16/2019  Gouty arthritis  History of MI (myocardial infarction) 1980's \"light heart attack\"; pt states MD at [facility] mentioned it to her; pt stated MD told her \"was on the back part of her heart & would not give her any problems\"; pt takes aspirin 81 mg daily  Hypertension   
 managed w/med  Hypothyroidism   
 s/p partial thyroidectomy 1960; managed w/med  Mild heartburn   
 takes tums prn  Neuropathy 1980's  
 pt states has been treated by [facility] for \"nerve problems in her feet & hands\"  Post-operative nausea and vomiting  Type 2 diabetes mellitus without complication, without long-term current use of insulin ([facility] [address] 7/27/2018 Family History Problem Relation Age of Onset  Cancer Mother  Heart Disease Father  Stroke Father Social History Tobacco Use  Smoking status: Never Smoker  Smokeless tobacco: Never Used Substance Use Topics  Alcohol use: No  
 
Past Surgical History:  
Procedure Laterality Date  HX CYST REMOVAL Right 1950's  
 upper eyelid  HX DILATION AND CURETTAGE  [id number]  
 ectopic pregnancy; right BSO done  HX HEART CATHETERIZATION  late 1979  
 pt states no stents [id number] [facility]  
 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  
 nerve removed out of left leg [address] [facility]  HX UROLOGICAL    
 cystoscopy Prior to Admission medications Medication Sig Start Date End Date Taking? Authorizing Provider  
cefadroxil (DURICEF) 500 mg capsule Take 500 mg by mouth two (2) times a day. 3/12/19 3/26/19 Yes Provider, Historical  
ciprofloxacin HCl (CIPRO) 250 mg tablet Take 250 mg by mouth two (2) times a day. 3/12/19 3/26/19 Yes Provider, Historical  
amLODIPine (NORVASC) 10 mg tablet Take 10 mg by mouth daily. Yes Provider, Historical  
clonazePAM (KLONOPIN) 0.5 mg tablet Take 1 Tab by mouth three (3) times daily. Max Daily Amount: 1.5 mg. 3/4/19  Yes Wil Cook DO  
methadone (DOLOPHINE) 5 mg tablet Take 1 Tab by mouth every six (6) hours. Max Daily Amount: 20 mg. Instructed to take DOS per Anesthesia guidelines. 3/4/19  Yes Wil Cook DO  
predniSONE (DELTASONE) 5 mg tablet Take 1 Tab by mouth two (2) times a day. 5mg BID 3/4/19  Yes Wil Cook DO  
insulin lispro (HUMALOG) 100 unit/mL kwikpen 5 Units by SubCUTAneous route Before breakfast, lunch, and dinner. Yes Provider, Historical  
insulin detemir U-100 (LEVEMIR FLEXTOUCH) 100 unit/mL (3 mL) inpn 5 Units by SubCUTAneous route every morning. Yes Provider, Historical  
mirtazapine (REMERON) 7.5 mg tablet Take 7.5 mg by mouth nightly.    Yes Provider, Historical  
 carvedilol (COREG) 3.125 mg tablet Take 2 Tabs by mouth two (2) times daily (with meals). 2/9/19  Yes Tari Gong MD  
potassium chloride (KLOR-CON) 10 mEq tablet Take 10 mEq by mouth two (2) times a day. Yes Provider, Historical  
ferrous gluconate 324 mg (38 mg iron) tablet Take 324 mg by mouth Daily (before breakfast). Yes Provider, Historical  
levothyroxine (SYNTHROID) 75 mcg tablet Take 75 mcg by mouth Daily (before breakfast). Yes Provider, Historical  
DULoxetine (CYMBALTA) 30 mg capsule Take 60 mg by mouth daily. Yes Provider, Historical  
CHOLECALCIFEROL, VITAMIN D3, (VITAMIN D3 PO) Take 1,000 mg by mouth daily. Yes Provider, Historical  
CALCIUM CARBONATE/VITAMIN D3 (CALCIUM 600 + D,3, PO) Take 600 mg by mouth daily. Yes Provider, Historical  
aspirin delayed-release 81 mg tablet Take 81 mg by mouth nightly. Ok to continue per anesthesia guidelines. Yes Provider, Historical  
docusate sodium (COLACE) 100 mg capsule Take 1 Cap by mouth nightly. 3/4/19   Wil Cook, DO  
OTHER,NON-FORMULARY, Take 30 mL by mouth three (3) times daily. PROHEAL    Provider, Historical  
Lactobacillus acidophilus (ACIDOPHILUS) cap Take 1 Cap by mouth daily. Provider, Historical  
polyethylene glycol (MIRALAX) 17 gram packet Take 17 g by mouth daily as needed. Provider, Historical  
OTHER,NON-FORMULARY, Insert  into rectum as needed. Preparation H max stregth    Provider, Historical  
nystatin (MYCOSTATIN) powder Apply  to affected area three (3) times daily. Under breasts and ABD folds    Provider, Historical  
OTHER,NON-FORMULARY,     Provider, Historical  
acetaminophen (TYLENOL EXTRA STRENGTH) 500 mg tablet Take 500 mg by mouth every six (6) hours as needed for Pain. Provider, Historical  
hydrOXYzine pamoate (VISTARIL) 50 mg capsule Take 25 mg by mouth two (2) times daily as needed for Itching.     Provider, Historical  
 multivitamin with minerals (HAIR,SKIN AND NAILS PO) Take 1 Tab by mouth daily. Provider, Historical  
loperamide (IMODIUM) 2 mg capsule Take 4 mg by mouth as needed for Diarrhea. 76   Saige Cooper MD  
raNITIdine (ZANTAC) 150 mg tablet Take 150 mg by mouth two (2) times a day. Provider, Historical  
KRILL/OM-3/DHA/EPA/PHOSPHO/AST (MEGARED OMEGA-3 KRILL OIL PO) Take 1 Cap by mouth daily. Provider, Historical  
 
 
Allergies Allergen Reactions  Contrast Agent [Iodine] Anaphylaxis  Actifed [Triprolidine-Pseudoephedrine] Nausea Only  Allopurinol Other (comments) Elevated blood pressure & pt states could not walk or see until medication was out of her system.  Decongestant [Pseudoephedrine Hcl] Rash and Itching  Lexapro [Escitalopram Oxalate] Other (comments) Lips/Mouth swelling  Lyrica [Pregabalin] Unknown (comments)  Minizide [Prazosin-Polythiazide] Unknown (comments)  Mobic [Meloxicam] Other (comments) Facial swelling  Omnipaque [Iohexol] Hives, Swelling and Other (comments) Wheezing and/or shortness of breath  Sertraline Other (comments) Mouth, lips swell  Spironolactone Itching Pt complains that she breaks out in clammy sweat with itching and stinging in her upper arms, dry mouth, funny feeling tongue, SOB, and increased anxiety.  Sulfamethazine Other (comments) Mouth/lips swelling Review of Systems:  A comprehensive review of systems was negative except for that written in the History of Present Illness. Objective:  
 
Visit Vitals /71 Pulse 97 Temp 98.7 °F (37.1 °C) Resp 18 Ht 5' 4\" (1.626 m) Wt 74.4 kg (164 lb) SpO2 94% BMI 28.15 kg/m² Temp (24hrs), Av.9 °F (37.2 °C), Min:98.6 °F (37 °C), Max:99.5 °F (37.5 °C) Lines:  Peripheral IV:   R arm intact Physical Exam:   
General:  Alert, cooperative, well noursished, well developed, appears stated age Eyes:  Sclera anicteric. Pupils equally round and reactive to light. Mouth/Throat: Mucous membranes normal, oral pharynx clear Neck: Supple Lungs:   Clear to auscultation bilaterally, good effort, 1LNC  
CV:  Regular rate and rhythm,no murmur, click, rub or gallop Abdomen:   Soft, non-tender. bowel sounds normal. non-distended Skin: Skin color, texture, turgor normal. no acute rash or lesions Lymph nodes: Cervical and supraclavicular normal  
Musculoskeletal: Joints to hands/fingers edematous and warm to touch, left hand index finger tip: small wound, oozing clear fluid, no erythema. Left heel with stage 2 ulcer: beefy center, no cellulitis. Dylan LE, anterior portion with erythematous changes, warmth concerning for cellulitis Lines/Devices:  Intact, no erythema, drainage or tenderness Psych: Alert and oriented, normal mood affect given the setting Data Review: CBC: 
Recent Labs  
  03/26/19 
0503 03/25/19 
1002 WBC 12.6* 17.7* GRANS 63 66 MONOS 9 9 EOS 0* 1 ANEU 7.9 11.8* ABL 3.3 3.7 HGB 8.8* 10.4* HCT 28.9* 33.7*  
 371 BMP: 
Recent Labs  
  03/26/19 
0503 03/25/19 
1002 CREA 1.06* 1.21* BUN 19 21  139  
K 4.2 3.9  102 CO2 30 29 AGAP 5* 8 * 160* LFTS: 
Recent Labs  
  03/25/19 
1002 TBILI 0.4 ALT 25 SGOT 30 * TP 7.0 ALB 2.3* Microbiology:  
 
All Micro Results Procedure Component Value Units Date/Time CULTURE, URINE [[id number]] Collected:  03/25/19 1050 Order Status:  Completed Specimen:  Urine from [place] Specimen Updated:  03/26/19 2394 Special Requests: NO SPECIAL REQUESTS Culture result:    
  NO GROWTH AFTER SHORT PERIOD OF INCUBATION. FURTHER RESULTS TO FOLLOW AFTER OVERNIGHT INCUBATION. CULTURE, BLOOD [[id number]] Collected:  03/25/19 1002 Order Status:  Completed Specimen:  Blood Updated:  03/26/19 7307 Special Requests: --     
  LEFT 
HAND Culture result: NO GROWTH AFTER 21 HOURS     
 CULTURE, BLOOD [[id number]] Collected:  03/25/19 1002 Order Status:  Completed Specimen:  Blood Updated:  03/26/19 0818 Special Requests: --     
  RIGHT 
ARM Culture result: NO GROWTH AFTER 21 HOURS C. DIFFICILE AG & TOXIN A/B [[id number]] Order Status:  Canceled Specimen:  Stool CULTURE, URINE [[id number]] Order Status:  Canceled Specimen:  Urine from Clean catch Imaging: CXR 3/25 Impression: No acute abnormality 
  
3/25 Xray Left hand IMPRESSION: Destructive changes of the tuft of the distal phalanx of the left 
second finger. MRI may be useful in further evaluation if indicated. Signed By: Sage Cruz NP March 26, 2019

## 2019-03-26 NOTE — PROGRESS NOTES
Interdisciplinary Rounds completed 03/26/19. Nursing, Case Management, Physician and PT present. Plan of care reviewed and updated. Daughter questioning rather pt needs to be on gout medication. Discussed with Kelly CARRANZA. ID following

## 2019-03-26 NOTE — PROGRESS NOTES
Problem: Pressure Injury - Risk of 
Goal: *Prevention of pressure injury Description Document Dimitry Scale and appropriate interventions in the flowsheet. Outcome: Progressing Towards Goal 
  
Problem: Patient Education: Go to Patient Education Activity Goal: Patient/Family Education Outcome: Progressing Towards Goal 
  
Problem: Patient Education: Go to Patient Education Activity Goal: Patient/Family Education Outcome: Progressing Towards Goal 
  
Problem: Sepsis: Day of Diagnosis Goal: Off Pathway (Use only if patient is Off Pathway) Outcome: Progressing Towards Goal 
Goal: *Fluid resuscitation Outcome: Progressing Towards Goal 
Goal: *Paired blood cultures prior to first dose of antibiotic Outcome: Progressing Towards Goal 
Goal: *First dose of  appropriate antibiotic within 3 hours of arrival to ED, within 1 hour of arrival to ICU Outcome: Progressing Towards Goal 
Goal: *Lactic acid with first set of blood cultures Outcome: Progressing Towards Goal 
Goal: Activity/Safety Outcome: Progressing Towards Goal 
Goal: Diagnostic Test/Procedures Outcome: Progressing Towards Goal 
Goal: Nutrition/Diet Outcome: Progressing Towards Goal 
Goal: Discharge Planning Outcome: Progressing Towards Goal 
Goal: Respiratory Outcome: Progressing Towards Goal 
Goal: Treatments/Interventions/Procedures Outcome: Progressing Towards Goal 
Goal: Psychosocial 
Outcome: Progressing Towards Goal 
  
Problem: Falls - Risk of 
Goal: *Absence of Falls Description Document Catherene Bunting Fall Risk and appropriate interventions in the flowsheet. Outcome: Progressing Towards Goal 
  
Problem: Patient Education: Go to Patient Education Activity Goal: Patient/Family Education Outcome: Progressing Towards Goal 
  
Problem: Pain Goal: *Control of Pain Outcome: Progressing Towards Goal 
Goal: *PALLIATIVE CARE:  Alleviation of Pain Outcome: Progressing Towards Goal

## 2019-03-26 NOTE — PROGRESS NOTES
Problem: Pressure Injury - Risk of 
Goal: *Prevention of pressure injury Description Document Dimitry Scale and appropriate interventions in the flowsheet. Outcome: Progressing Towards Goal 
  
Problem: Patient Education: Go to Patient Education Activity Goal: Patient/Family Education Outcome: Progressing Towards Goal 
  
Problem: Patient Education: Go to Patient Education Activity Goal: Patient/Family Education Outcome: Progressing Towards Goal 
  
Problem: Sepsis: Day of Diagnosis Goal: Off Pathway (Use only if patient is Off Pathway) Outcome: Progressing Towards Goal 
Goal: *Fluid resuscitation Outcome: Progressing Towards Goal 
Goal: *Paired blood cultures prior to first dose of antibiotic Outcome: Progressing Towards Goal 
Goal: *First dose of  appropriate antibiotic within 3 hours of arrival to ED, within 1 hour of arrival to ICU Outcome: Progressing Towards Goal 
Goal: *Lactic acid with first set of blood cultures Outcome: Progressing Towards Goal 
Goal: Activity/Safety Outcome: Progressing Towards Goal 
Goal: Diagnostic Test/Procedures Outcome: Progressing Towards Goal 
Goal: Nutrition/Diet Outcome: Progressing Towards Goal 
Goal: Discharge Planning Outcome: Progressing Towards Goal 
Goal: Respiratory Outcome: Progressing Towards Goal 
Goal: Treatments/Interventions/Procedures Outcome: Progressing Towards Goal 
Goal: Psychosocial 
Outcome: Progressing Towards Goal

## 2019-03-27 NOTE — PROGRESS NOTES
03/27/19 7077 Oxygen Therapy O2 Sat (%) 95 % Pulse via Oximetry 79 beats per minute O2 Device Nasal cannula O2 Flow Rate (L/min) 1 l/min 
(decreased from 1)

## 2019-03-27 NOTE — PROGRESS NOTES
Problem: Pressure Injury - Risk of 
Goal: *Prevention of pressure injury Description Document Dimitry Scale and appropriate interventions in the flowsheet. Outcome: Progressing Towards Goal 
  
Problem: Patient Education: Go to Patient Education Activity Goal: Patient/Family Education Outcome: Progressing Towards Goal 
  
Problem: Patient Education: Go to Patient Education Activity Goal: Patient/Family Education Outcome: Progressing Towards Goal 
  
Problem: Sepsis: Day of Diagnosis Goal: Off Pathway (Use only if patient is Off Pathway) Outcome: Progressing Towards Goal 
Goal: *Fluid resuscitation Outcome: Progressing Towards Goal 
Goal: *Paired blood cultures prior to first dose of antibiotic Outcome: Progressing Towards Goal 
Goal: *First dose of  appropriate antibiotic within 3 hours of arrival to ED, within 1 hour of arrival to ICU Outcome: Progressing Towards Goal 
Goal: *Lactic acid with first set of blood cultures Outcome: Progressing Towards Goal 
Goal: Activity/Safety Outcome: Progressing Towards Goal 
Goal: Diagnostic Test/Procedures Outcome: Progressing Towards Goal 
Goal: Nutrition/Diet Outcome: Progressing Towards Goal 
Goal: Discharge Planning Outcome: Progressing Towards Goal 
Goal: Respiratory Outcome: Progressing Towards Goal 
Goal: Treatments/Interventions/Procedures Outcome: Progressing Towards Goal 
Goal: Psychosocial 
Outcome: Progressing Towards Goal 
  
Problem: Falls - Risk of 
Goal: *Absence of Falls Description Document Ricardo Alken Fall Risk and appropriate interventions in the flowsheet. Outcome: Progressing Towards Goal 
  
Problem: Patient Education: Go to Patient Education Activity Goal: Patient/Family Education Outcome: Progressing Towards Goal 
  
Problem: Pain Goal: *Control of Pain Outcome: Progressing Towards Goal 
Goal: *PALLIATIVE CARE:  Alleviation of Pain Outcome: Progressing Towards Goal

## 2019-03-27 NOTE — PROGRESS NOTES
Problem: Self Care Deficits Care Plan (Adult) Goal: *Acute Goals and Plan of Care (Insert Text) Description 1. Patient will perform bathing with minimal assistance within 7 days with equipment as needed. 2.  Patient will perform upper body dressing with supervision within 7 days with equipment as needed. 3.  Patient will perform toileting with minimal assistance within 7 days with equipment as needed. 4.   Patient will perform toilet transfer with supervision within 7 days with equipment as needed. 5.   Pt will participate in B UE therapeutic exercises for 8 minutes with 3 rest breaks within 7 days. 6.  Pt and or caregiver to demonstrate and verbalize good understanding of recommendations for increasing safety with functional tasks within 7 days. Outcome: Progressing Towards Goal 
  
OCCUPATIONAL THERAPY: Initial Assessment, Daily Note and AM 3/27/2019 INPATIENT: OT Visit Days: 1 [place] / [place] B / Product Type: Medicare /  
  
NAME/AGE/GENDER: Saniya Jeff is a 80 y.o. female PRIMARY DIAGNOSIS:  Sepsis ([facility] [address] [A41.9] Sepsis ([facility] [address] Sepsis ([facility] [address] ICD-10: Treatment Diagnosis:  
 Stiffness of Right Shoulder, Not elsewhere classified (M25.611) Generalized Muscle Weakness (M62.81) Other lack of cordination (R27.8) Repeated Falls (R29.6) History of falling (Z91.81) Precautions/Allergies: 
   Contrast agent [iodine]; Actifed [triprolidine-pseudoephedrine]; Allopurinol; Decongestant [pseudoephedrine hcl]; Lexapro [escitalopram oxalate]; Lyrica [pregabalin]; Minizide [prazosin-polythiazide]; Mobic [meloxicam]; Omnipaque [iohexol]; Sertraline; Spironolactone; and Sulfamethazine ASSESSMENT:  
Ms. Yordy Gonzalez admitted from [facility] due to sepsis infection. Patient sitting in chair upon entrance. Patient stated that she has been living there for 6 - 7 months. Patient A & O x 4.   Patient stated that she was able to dress herself at [facility], though she required assist for bath. Patient stated that she ambulated with RW with PT at [facility]. Patient required Max Assist overall for dressing, though she was able to don/doff R sock with minimal assist and max assist to don/doff L sock. Patient stood with moderate assist from chair with RW, and she ambulated 5 feet in her room using RW with minimal assistance. Patient then sat in her chair with minimal assist.  Patient functioning below baseline and patient to benefit from Occupational Therapy to maximize ADL performance. Cont OT per tx plan. This section established at most recent assessment PROBLEM LIST (Impairments causing functional limitations): 
Decreased Strength Decreased ADL/Functional Activities Decreased Transfer Abilities Decreased Ambulation Ability/Technique Decreased Balance Decreased Activity Tolerance Decreased Work Simplification/Energy Conservation Techniques Increased Fatigue Decreased Flexibility/Joint Mobility Edema/Girth Decreased Cognition INTERVENTIONS PLANNED: (Benefits and precautions of occupational therapy have been discussed with the patient.) Activities of daily living training Adaptive equipment training Balance training Clothing management Cognitive training Donning&doffing training Group therapy Hygiene training Medication management training Neuromuscular re-eduation Re-evaluation Sensory reintegration training Therapeutic activity Therapeutic exercise TREATMENT PLAN: Frequency/Duration: Follow patient 3x's/wk to address above goals. Rehabilitation Potential For Stated Goals: Good RECOMMENDED REHABILITATION/EQUIPMENT: (at time of discharge pending progress): Due to the probability of continued deficits (see above) this patient will likely need continued skilled occupational therapy after discharge. Equipment:  
None at this time OCCUPATIONAL PROFILE AND HISTORY:  
 History of Present Injury/Illness (Reason for Referral): 
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 Per chart review, patient with observed shortness of breath and fever. At bedside, patient is more bothered by a wound on her left heel. She denies shortness of breath and cough but does endorse fever and chills. No chest pain or palpitations. No abdominal pain, nausea/vomiting, or dysuria. She has been having diarrhea the past few weeks while on antibiotics but it has been improving overall. Patient states that she has not noticed any changes in her left index finger and says she has not noticed any further pain or redness/swelling. ED Course: EKG showing sinus tachycardia with no ST-T wave abnormalities. CXR unremarkable. X-ray of left 2nd finger showing continued destructive changes. Urine cultures and blood cultures collected and pending. Observed fever of 101. 2F with sinus tachycardia with HRs in low 100s. WBC count of 17. UA showing 2+ bacteruria. Given ceftriaxone empirically. Complete ROS done and is as stated in HPI or otherwise negative Past Medical History/Comorbidities: Ms. Khanh Dao  has a past medical history of Arthritis, Chronic diastolic congestive heart failure ([address] (1/16/2019), Chronic pain, Edema, GERD (gastroesophageal reflux disease) (1/16/2019), Gouty arthritis, History of MI (myocardial infarction) (1980's), Hypertension, Hypothyroidism, Mild heartburn, Neuropathy (1980's), Post-operative nausea and vomiting, and Type 2 diabetes mellitus without complication, without long-term current use of insulin ([address] (7/27/2018).  She also has no past medical history of Adverse effect of anesthesia, Aneurysm ([address] Arrhythmia, Asthma, Autoimmune disease ([address] CAD (coronary artery disease), Cancer ([address] Chronic kidney disease, Chronic obstructive pulmonary disease ([address] Coagulation disorder ([address] Difficult intubation, Endocarditis, Ill-defined condition, Liver disease, Malignant hyperthermia due to anesthesia, Morbid obesity ([address] Pseudocholinesterase deficiency, Psychiatric disorder, PUD (peptic ulcer disease), Rheumatic fever, Seizures ([address] Sleep apnea, Stroke ([address] or Thromboembolus ([address] Ms. Debbie Butler  has a past surgical history that includes hx partial thyroidectomy (1960); hx orthopaedic (Bilateral, 8844-0828); hx orthopaedic (Left, 1992); hx open cholecystectomy (1970); hx cyst removal (Right, 1950's); hx urological; hx heart catheterization (late 1979); hx hysterectomy (1972); hx gyn (1963); hx dilation and curettage (0156-4456); and hx left salpingo-oophorectomy (1965). Social History/Living Environment:  
Home Environment: Skilled nursing facility One/Two Story Residence: One story Living Alone: No 
Support Systems: Family member(s) Patient Expects to be Discharged to[de-identified] Skilled nursing facility Current DME Used/Available at Home: Hari Morocho Prior Level of Function/Work/Activity: 
Patient stated that she was able to dress herself at [facility], though she required assist for bath. Patient stated that she ambulated with RW with PT at [facility]. Number of Personal Factors/Comorbidities that affect the Plan of Care: Expanded review of therapy/medical records (1-2):  MODERATE COMPLEXITY ASSESSMENT OF OCCUPATIONAL PERFORMANCE[de-identified]  
Activities of Daily Living:  
Basic ADLs (From Assessment) Complex ADLs (From Assessment) Feeding: Setup Oral Facial Hygiene/Grooming: Minimum assistance Bathing: Moderate assistance Upper Body Dressing: Contact guard assistance Lower Body Dressing: Maximum assistance Toileting: Maximum assistance Instrumental ADL 
 Meal Preparation: Maximum assistance Homemaking: Maximum assistance Medication Management: Maximum assistance Financial Management: Total assistance Grooming/Bathing/Dressing Activities of Daily Living Cognitive Retraining Safety/Judgement: Awareness of environment; Fall prevention Bed/Mat Mobility Sit to Stand: Moderate assistance Stand to Sit: Minimum assistance Most Recent Physical Functioning:  
Gross Assessment: 
  
         
  
Posture: 
Posture (WDL): Exceptions to [facility] Posture Assessment: Forward head, Rounded shoulders, Trunk flexion Balance: 
  Bed Mobility: 
  
Wheelchair Mobility: 
  
Transfers: 
Sit to Stand: Moderate assistance Stand to Sit: Minimum assistance Patient Vitals for the past 6 hrs: 
 BP BP Patient Position SpO2 O2 Flow Rate (L/min) Pulse 03/27/19 0722 128/52  96 %  74  
03/27/19 0836   95 % 1 l/min   
03/27/19 1043 155/74 Sitting 97 %  87  
03/27/19 1108   93 % Mental Status Neurologic State: Alert Orientation Level: Oriented X4 Cognition: Appropriate for age attention/concentration, Follows commands Perception: Appears intact Safety/Judgement: Awareness of environment, Fall prevention Physical Skills Involved: 
Range of Motion Balance Strength Activity Tolerance Fine Motor Control Gross Motor Control Pain (Chronic) Cognitive Skills Affected (resulting in the inability to perform in a timely and safe manner): Executive Function Sustained Attention Divided Attention Psychosocial Skills Affected: 
Habits/Routines Social Interaction Number of elements that affect the Plan of Care: 5+:  HIGH COMPLEXITY CLINICAL DECISION MAKING:  
[employer] AM-PAC? ?6 Clicks? Daily Activity Inpatient Short Form How much help from another person does the patient currently need. .. Total A Lot A Little None 1. Putting on and taking off regular lower body clothing?    ? 1   ? 2   ? 3   ? 4  
 2.  Bathing (including washing, rinsing, drying)? ? 1   ? 2   ? 3   ? 4  
3. Toileting, which includes using toilet, bedpan or urinal?   ? 1   ? 2   ? 3   ? 4  
4. Putting on and taking off regular upper body clothing? ? 1   ? 2   ? 3   ? 4  
5. Taking care of personal grooming such as brushing teeth? ? 1   ? 2   ? 3   ? 4  
6. Eating meals? ? 1   ? 2   ? 3   ? 4  
© 2007, Trustees of [facility] [employer], under license to [employer]. All rights reserved Score:  Initial: 115 Most Recent: X (Date: -- ) Interpretation of Tool:  Represents activities that are increasingly more difficult (i.e. Bed mobility, Transfers, Gait). Medical Necessity:    
Patient is expected to demonstrate progress in strength, balance and functional technique 
 to decrease assistance required with ADLs. . 
Reason for Services/Other Comments: 
Patient continues to require skilled intervention due to patient's inability to take care of self. .  
Use of outcome tool(s) and clinical judgement create a POC that gives a: MODERATE COMPLEXITY  
 
 
 
TREATMENT:  
(In addition to Assessment/Re-Assessment sessions the following treatments were rendered) Pre-treatment Symptoms/Complaints:   
Pain: Initial:  
Pain Intensity 1: 3 3 Post Session:  3 Therapeutic Activity: (    10 minutes): Therapeutic activities including Chair transfers and Ambulation on level ground to improve mobility, strength and balance. Required moderate   to promote static and dynamic balance in standing. Braces/Orthotics/Lines/Etc:  
IV 
O2 Device: Room air Treatment/Session Assessment:   
Response to Treatment:  positive Interdisciplinary Collaboration:  
Physical Therapist 
Registered Nurse After treatment position/precautions:  
Up in chair Bed/Chair-wheels locked Bed in low position Call light within reach RN notified Compliance with Program/Exercises: Compliant all of the time, Will assess as treatment progresses. Recommendations/Intent for next treatment session: \"Next visit will focus on advancements to more challenging activities and reduction in assistance provided\". Total Treatment Duration: OT Patient Time In/Time Out Time In: 5342 Time Out: 8424 Lucero Corrigan, OT

## 2019-03-27 NOTE — PROGRESS NOTES
Interdisciplinary Rounds completed 03/27/19. Nursing, Case Management, Physician and PT present. Plan of care reviewed and updated. Probable d/c in am.  Pt more alert.

## 2019-03-27 NOTE — PROGRESS NOTES
Problem: Pressure Injury - Risk of 
Goal: *Prevention of pressure injury Description Document Dimitry Scale and appropriate interventions in the flowsheet. Outcome: Progressing Towards Goal 
  
Problem: Patient Education: Go to Patient Education Activity Goal: Patient/Family Education Outcome: Progressing Towards Goal 
  
Problem: Patient Education: Go to Patient Education Activity Goal: Patient/Family Education Outcome: Progressing Towards Goal 
  
Problem: Sepsis: Day of Diagnosis Goal: Off Pathway (Use only if patient is Off Pathway) Outcome: Progressing Towards Goal 
Goal: *Fluid resuscitation Outcome: Progressing Towards Goal 
Goal: *Paired blood cultures prior to first dose of antibiotic Outcome: Progressing Towards Goal 
Goal: *First dose of  appropriate antibiotic within 3 hours of arrival to ED, within 1 hour of arrival to ICU Outcome: Progressing Towards Goal 
Goal: *Lactic acid with first set of blood cultures Outcome: Progressing Towards Goal 
Goal: Activity/Safety Outcome: Progressing Towards Goal 
Goal: Diagnostic Test/Procedures Outcome: Progressing Towards Goal 
Goal: Nutrition/Diet Outcome: Progressing Towards Goal 
Goal: Discharge Planning Outcome: Progressing Towards Goal 
Goal: Respiratory Outcome: Progressing Towards Goal 
Goal: Treatments/Interventions/Procedures Outcome: Progressing Towards Goal 
Goal: Psychosocial 
Outcome: Progressing Towards Goal 
  
Problem: Falls - Risk of 
Goal: *Absence of Falls Description Document [place] Hai Fall Risk and appropriate interventions in the flowsheet. Outcome: Progressing Towards Goal 
  
Problem: Patient Education: Go to Patient Education Activity Goal: Patient/Family Education Outcome: Progressing Towards Goal 
  
Problem: Pain Goal: *Control of Pain Outcome: Progressing Towards Goal 
Goal: *PALLIATIVE CARE:  Alleviation of Pain Outcome: Progressing Towards Goal

## 2019-03-27 NOTE — PROGRESS NOTES
Hospitalist Progress Note Admit Date:  3/25/2019  9:38 AM  
Name:  Binh Buckley Age:  80 y.o. 
:  1933 MRN:  [id number] PCP:  Tao Nuno MD 
Treatment Team: Attending Provider: Vane Sherwood DO; Care Manager: Abby Hidalgo LMSW; Utilization Review: Suad Concepcion RN; Consulting Provider: Julieth Moran MD; Occupational Therapist: Sena Richard OT Subjective:  
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 Pt was at [facility] for [address] [place] and was brought for concerns of sepsis. Pt denies SOB but endorses the fever. No other related sx. In the ED her EKG has sinus tachycardia. Urine positive 2+ bacteria and pt started on vanc and zosyn given recent healthcare exposure. Today pt looks remarkably better then yesterday. She reports that he pain is less and she feels more like herself. Appetite good, able to transfer to a chair. Urine positive for presumptive enterococcus species, awaiting final ID and sensitivities. Objective:  
 
Patient Vitals for the past 24 hrs: 
 Temp Pulse Resp BP SpO2  
19 1640 98 °F (36.7 °C) 84 18 144/63 95 % 19 1108     93 % 19 1043 97.6 °F (36.4 °C) 87 18 155/74 97 % 19 0836     95 % 19 0722 98.5 °F (36.9 °C) 74 18 128/52 96 % 19 0431 98.2 °F (36.8 °C) 76 18 132/59 90 % 19 2257 98.1 °F (36.7 °C) 77 18 103/55 100 % 19 1902 98.4 °F (36.9 °C) 84 18 119/51 97 % Oxygen Therapy O2 Sat (%): 95 % (19 1640) Pulse via Oximetry: 89 beats per minute (19 1108) O2 Device: Room air (19 1108) O2 Flow Rate (L/min): 1 l/min(decreased from 1) (19 0836) Intake/Output Summary (Last 24 hours) at 3/27/2019 1815 Last data filed at 3/27/2019 1043 Gross per 24 hour Intake 240 ml Output  Net 240 ml Physical Examination: 
General:    Well nourished. Awake and alert. Head:  Normocephalic, atraumatic Eyes:  Extraocular movements intact, normal sclera CV:   RRR. No  Murmurs, clicks, or gallops Lungs:   Unlabored, no cyanosis Abdomen:   Soft, nondistended, nontender. Extremities: Warm and dry. No cyanosis or edema. Left index finger with eschar on tip of finger Skin:     No rashes or jaundice. Neuro:  No gross focal deficits Psych:  Mood and affect appropriate Data Review: 
I have reviewed all labs, meds, telemetry events, and studies from the last 24 hours. Recent Results (from the past 24 hour(s)) GLUCOSE, POC Collection Time: 03/26/19  8:08 PM  
Result Value Ref Range Glucose (POC) 313 (H) 65 - 100 mg/dL GLUCOSE, POC Collection Time: 03/27/19  7:21 AM  
Result Value Ref Range Glucose (POC) 182 (H) 65 - 100 mg/dL GLUCOSE, POC Collection Time: 03/27/19 11:19 AM  
Result Value Ref Range Glucose (POC) 205 (H) 65 - 100 mg/dL PLEASE READ & DOCUMENT PPD TEST IN 48 HRS Collection Time: 03/27/19  2:23 PM  
Result Value Ref Range PPD  Negative  
 mm Induration  0 - 5 mm GLUCOSE, POC Collection Time: 03/27/19  4:32 PM  
Result Value Ref Range Glucose (POC) 243 (H) 65 - 100 mg/dL All Micro Results Procedure Component Value Units Date/Time CULTURE, BLOOD [[id number]] Collected:  03/25/19 1002 Order Status:  Completed Specimen:  Blood Updated:  03/27/19 2801 Special Requests: --     
  LEFT 
HAND Culture result: NO GROWTH 2 DAYS     
 CULTURE, BLOOD [[id number]] Collected:  03/25/19 1002 Order Status:  Completed Specimen:  Blood Updated:  03/27/19 1288 Special Requests: --     
  RIGHT 
ARM Culture result: NO GROWTH 2 DAYS     
 CULTURE, URINE [[id number]]  (Abnormal) Collected:  03/25/19 1050 Order Status:  Completed Specimen:  Urine from [place] Specimen Updated:  03/27/19 4103 Special Requests: NO SPECIAL REQUESTS Culture result:    
  >100,000 COLONIES/mL PRESUMPTIVE ENTEROCOCCUS SPECIES IDENTIFICATION AND SUSCEPTIBILITY TO FOLLOW C. DIFFICILE AG & TOXIN A/B [[id number]] Order Status:  Canceled Specimen:  Stool CULTURE, URINE [[id number]] Order Status:  Canceled Specimen:  Urine from Clean catch Current Meds: 
Current Facility-Administered Medications Medication Dose Route Frequency  polyethylene glycol (MIRALAX) packet 17 g  17 g Oral DAILY  tamsulosin (FLOMAX) capsule 0.4 mg  0.4 mg Oral DAILY  ketorolac (TORADOL) injection 15 mg  15 mg IntraVENous Q8H PRN  
 calcium-vitamin D (OS-JENN) 500 mg-200 unit tablet  1 Tab Oral DAILY  carvedilol (COREG) tablet 6.25 mg  6.25 mg Oral BID WITH MEALS  cholecalciferol (VITAMIN D3) tablet 1,000 Units  1,000 Units Oral DAILY  clonazePAM (KlonoPIN) tablet 0.5 mg  0.5 mg Oral TID  DULoxetine (CYMBALTA) capsule 60 mg  60 mg Oral DAILY  ferrous gluconate 324 mg (38 mg iron) tablet 1 Tab  1 Tab Oral ACB  hydrOXYzine pamoate (VISTARIL) capsule 25 mg  25 mg Oral BID PRN  
 insulin glargine (LANTUS) injection 5 Units  5 Units SubCUTAneous DAILY  insulin lispro (HUMALOG) injection 5 Units  5 Units SubCUTAneous TIDAC  levothyroxine (SYNTHROID) tablet 75 mcg  75 mcg Oral ACB  methadone (DOLOPHINE) tablet 5 mg  5 mg Oral Q6H  
 mirtazapine (REMERON) tablet 7.5 mg  7.5 mg Oral QHS  potassium chloride (KLOR-CON) tablet 10 mEq  10 mEq Oral BID  predniSONE (DELTASONE) tablet 5 mg  5 mg Oral BID  sodium chloride (NS) flush 5-40 mL  5-40 mL IntraVENous Q8H  
 sodium chloride (NS) flush 5-40 mL  5-40 mL IntraVENous PRN  
 ondansetron (ZOFRAN) injection 4 mg  4 mg IntraVENous Q4H PRN  
 vancomycin (VANCOCIN) 1,000 mg in 0.9% sodium chloride (MBP/ADV) 250 mL  1 g IntraVENous Q24H  
 alcohol 62% (NOZIN) nasal  1 Ampule  1 Ampule Topical Q12H  amLODIPine (NORVASC) tablet 10 mg  10 mg Oral DAILY  insulin lispro (HUMALOG) injection   SubCUTAneous AC&HS Diet: DIET DIABETIC CONSISTENT CARB Other Studies (last 24 hours): No results found. Assessment and Plan:  
 
Hospital Problems as of 3/27/2019 Date Reviewed: 5/25/2016 Codes Class Noted - Resolved POA * (Principal) Sepsis ([facility] ICD-10-CM: A41.9 ICD-9-CM: 038.9, 995.91  3/25/2019 - Present Unknown Finger infection ICD-10-CM: L08.9 ICD-9-CM: 686.9  2/5/2019 - Present Yes Chronic diastolic congestive heart failure (HCC) ICD-10-CM: I50.32 
ICD-9-CM: 428.32, 428.0  1/16/2019 - Present Yes Acquired hypothyroidism ICD-10-CM: E03.9 ICD-9-CM: 244.9  1/16/2019 - Present Yes DM2 (diabetes mellitus, type 2) ([facility]) ICD-10-CM: E11.9 ICD-9-CM: 250.00  7/27/2018 - Present Yes Bilateral leg edema ICD-10-CM: R60.0 ICD-9-CM: 782.3  10/4/2017 - Present Yes A/P:   
Sepsis - blood and urine cultures pending 
- given recent hospitalization and several antibiotic regimens, will switch empirically to   vancomycin and Zosyn 
- maintenance fluids 
  
Recent history of osteomyelitis of left index finger - patient was on extended course of cefepime as well as ciprofloxacin and cephalosporin per ID recs and was near the completion of these antibiotics - repeat x-ray showing expected destructive changes - symptomatically improved per patient 
- would continue to observe for now, may need to image again with MRI if no obvious, overt source of patient's sepsis  
  
Stage I pressure ulcer of left heel 
- off load pressure - wound management consult 
  
 Lower extremity swelling and redness 
- likely representative of chronic venous changes 
- no obvious ulcerations or cuts/scrapes (besides ulcer as above) - patient does not have sensation below both of her ankles 
  
Hypothyroidism 
- continue with home meds 
  
Chronic diastolic heart failure - BP control and diuresis as needed - currently compensated 
  
History of chronic pain 
- continue methadone 
  
DM type II 
- continue with home regimen, Lantus 5 units qdaily and Humalog 5 units TIDAC 
- goal CBG between 140-180 with Humalog SSI and serial CBGs while inpatient 
  
HTN 
- continue home meds 
  
History of MDD 
- continue home meds 
  
Dispo: return to [facility]  
Ppx: SCDs for VTE 
  
Code Status: DNR/DNI (discussed with patient at bedside) Medical decision maker: daughter Bear Saenz Case reviewed with supervising physician - CHRISTI Marvin MD 
 
Signed: 
Alina Acuna, NP-C

## 2019-03-27 NOTE — PROGRESS NOTES
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  
· Continue Vancomycin, monitor LE erythematous changes and pending cultures. If she truly has a UTI, then Vanc should cover · At this point left hand index finger has been adequately treated · Anticipate transition to PO antbx soon Anti-infectives: · Vanc 3/25- 
· Zosyn 3/25-3/26 Subjective:  
Continues to decline dysuria, pelvic pressure. Pain in arms and legs much better. Low grade fever yesterday, no chills. Allergies Allergen Reactions  Contrast Agent [Iodine] Anaphylaxis  Actifed [Triprolidine-Pseudoephedrine] Nausea Only  Allopurinol Other (comments) Elevated blood pressure & pt states could not walk or see until medication was out of her system.  Decongestant [Pseudoephedrine Hcl] Rash and Itching  Lexapro [Escitalopram Oxalate] Other (comments) Lips/Mouth swelling  Lyrica [Pregabalin] Unknown (comments)  Minizide [Prazosin-Polythiazide] Unknown (comments)  Mobic [Meloxicam] Other (comments) Facial swelling  Omnipaque [Iohexol] Hives, Swelling and Other (comments) Wheezing and/or shortness of breath  Sertraline Other (comments) Mouth, lips swell  Spironolactone Itching Pt complains that she breaks out in clammy sweat with itching and stinging in her upper arms, dry mouth, funny feeling tongue, SOB, and increased anxiety.  Sulfamethazine Other (comments) Mouth/lips swelling Review of Systems:  A comprehensive review of systems was negative except for that written in the History of Present Illness. Objective:  
 
Visit Vitals /52 Pulse 74 Temp 98.5 °F (36.9 °C) Resp 18 Ht 5' 4\" (1.626 m) Wt 74.4 kg (164 lb) SpO2 96% BMI 28.15 kg/m² Temp (24hrs), Av.7 °F (37.1 °C), Min:98.1 °F (36.7 °C), Max:100.1 °F (37.8 °C) Lines:  Peripheral IV:   R arm intact Physical Exam:   
General:  Alert, cooperative, well noursished, well developed, appears stated age Eyes:  Sclera anicteric. Pupils equally round and reactive to light. Mouth/Throat: Mucous membranes normal, oral pharynx clear Neck: Supple Lungs:   Clear to auscultation bilaterally, good effort, 1LNC  
CV:  Regular rate and rhythm,no murmur, click, rub or gallop Abdomen:   Soft, non-tender. bowel sounds normal. non-distended Skin: Skin color, texture, turgor normal. no acute rash or lesions Musculoskeletal: Joints to hands/fingers edema/warmth much improved today, left hand index finger tip: small wound, no drainage today, no erythema. Left heel with stage 2 ulcer: beefy center, no cellulitis. Dylan LE, anterior portion with erythematous changes, warmth concerning for cellulitis: improved today Lines/Devices:  Intact, no erythema, drainage or tenderness Psych: Alert and oriented, normal mood affect given the setting Data Review: CBC: 
Recent Labs  
  19 
0503 19 
1002 WBC 12.6* 17.7* GRANS 63 66 MONOS 9 9 EOS 0* 1 ANEU 7.9 11.8* ABL 3.3 3.7 HGB 8.8* 10.4* HCT 28.9* 33.7*  
 371 BMP: 
Recent Labs  
  19 
1131 19 
0503 19 
1002 CREA 1.20* 1.06* 1.21* BUN 21 19 21  141 139  
K 4.2 4.2 3.9  106 102 CO2 30 30 29 AGAP 7 5* 8 * 128* 160* LFTS: 
Recent Labs  
  19 
1002 TBILI 0.4 ALT 25 SGOT 30 * TP 7.0 ALB 2.3* Microbiology:  
 
All Micro Results Procedure Component Value Units Date/Time CULTURE, URINE [[id number]]  (Abnormal) Collected:  03/25/19 1050 Order Status:  Completed Specimen:  Urine from [facility] Specimen Updated:  03/27/19 0798 Special Requests: NO SPECIAL REQUESTS Culture result:    
  >100,000 COLONIES/mL PRESUMPTIVE ENTEROCOCCUS SPECIES IDENTIFICATION AND SUSCEPTIBILITY TO FOLLOW CULTURE, BLOOD [[id number]] Collected:  03/25/19 1002 Order Status:  Completed Specimen:  Blood Updated:  03/26/19 0818 Special Requests: --     
  LEFT 
HAND Culture result: NO GROWTH AFTER 21 HOURS     
 CULTURE, BLOOD [[id number]] Collected:  03/25/19 1002 Order Status:  Completed Specimen:  Blood Updated:  03/26/19 0818 Special Requests: --     
  RIGHT 
ARM Culture result: NO GROWTH AFTER 21 HOURS C. DIFFICILE AG & TOXIN A/B [[id number]] Order Status:  Canceled Specimen:  Stool CULTURE, URINE [[id number]] Order Status:  Canceled Specimen:  Urine from Clean catch Imaging: CXR 3/25 Impression: No acute abnormality 
  
3/25 Xray Left hand IMPRESSION: Destructive changes of the tuft of the distal phalanx of the left 
second finger. MRI may be useful in further evaluation if indicated. Signed By: Marina Lau NP March 27, 2019

## 2019-03-28 NOTE — PROGRESS NOTES
Problem: Pressure Injury - Risk of 
Goal: *Prevention of pressure injury Description Document Dimitry Scale and appropriate interventions in the flowsheet. Outcome: Progressing Towards Goal 
  
Problem: Pain Goal: *Control of Pain Outcome: Progressing Towards Goal

## 2019-03-28 NOTE — PROGRESS NOTES
Hospitalist Progress Note Admit Date:  3/25/2019  9:38 AM  
Name:  Leisa Hong Age:  80 y.o. 
:  1933 MRN:  [id number] PCP:  Steffi Ortiz MD 
Treatment Team: Attending Provider: Anneliese Cordova DO; Care Manager: China Galarza LMSW; Utilization Review: Siva Fletcher RN Subjective:  
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 Pt was at [facility] for [address] and was brought for concerns of sepsis. Pt denies SOB but endorses the fever. No other related sx. In the ED her EKG has sinus tachycardia. Urine positive 2+ bacteria and pt started on vanc and zosyn given recent healthcare exposure. Today pt looks remarkably better then yesterday. She reports that he pain is less and she feels more like herself. Appetite good, able to transfer to a chair. Urine positive for presumptive enterococcus species, evaluated by Infectious Disease - pt colonized and VRE does not require treatment. .  Recommend that pt receive minocycline plus keflex through 3/31. Minocycline needs to be dosed around calcium and iron supplements. Anticipate that pt will go back to her STR in am.  Discussed with daughter Lauro Miles and she is in agreement with that plan. Objective:  
 
Patient Vitals for the past 24 hrs: 
 Temp Pulse Resp BP SpO2  
19 1620 97.6 °F (36.4 °C) 90 18 128/70 91 %  
19 1134 97.9 °F (36.6 °C) 90 20 153/76 91 %  
19 0750     96 % 19 0738 98.2 °F (36.8 °C) 94 20 139/69 91 %  
19 0344 98.1 °F (36.7 °C) 88 18 142/69 93 % 19 2315 98.2 °F (36.8 °C) 89 18 130/71 92 % 19 1917 98.1 °F (36.7 °C) 80 18 138/74 94 % Oxygen Therapy O2 Sat (%): 91 % (19 1620) Pulse via Oximetry: 89 beats per minute (19 1108) O2 Device: Nasal cannula (03/28/19 0750) O2 Flow Rate (L/min): 1 l/min(Placed on RA) (03/28/19 0750) Intake/Output Summary (Last 24 hours) at 3/28/2019 1727 Last data filed at 3/28/2019 1336 Gross per 24 hour Intake  Output 600 ml Net -600 ml Physical Examination: 
General:    Well nourished. Awake and alert. Head:  Normocephalic, atraumatic Eyes:  Extraocular movements intact, normal sclera CV:   RRR. No  Murmurs, clicks, or gallops Lungs:   Unlabored, no cyanosis Abdomen:   Soft, nondistended, nontender. Extremities: Warm and dry. No cyanosis or edema. Left index finger with eschar on tip of finger Skin:     No rashes or jaundice. Neuro:  No gross focal deficits Psych:  Mood and affect appropriate Data Review: 
I have reviewed all labs, meds, telemetry events, and studies from the last 24 hours. Recent Results (from the past 24 hour(s)) GLUCOSE, POC Collection Time: 03/27/19  8:51 PM  
Result Value Ref Range Glucose (POC) 210 (H) 65 - 100 mg/dL GLUCOSE, POC Collection Time: 03/28/19  7:23 AM  
Result Value Ref Range Glucose (POC) 194 (H) 65 - 100 mg/dL GLUCOSE, POC Collection Time: 03/28/19 11:26 AM  
Result Value Ref Range Glucose (POC) 123 (H) 65 - 100 mg/dL METABOLIC PANEL, BASIC Collection Time: 03/28/19 11:52 AM  
Result Value Ref Range Sodium 142 136 - 145 mmol/L Potassium 4.4 3.5 - 5.1 mmol/L Chloride 108 (H) 98 - 107 mmol/L  
 CO2 29 21 - 32 mmol/L Anion gap 5 (L) 7 - 16 mmol/L Glucose 113 (H) 65 - 100 mg/dL BUN 30 (H) 8 - 23 MG/DL Creatinine 1.41 (H) 0.6 - 1.0 MG/DL  
 GFR est AA 46 (L) >60 ml/min/1.73m2 GFR est non-AA 38 (L) >60 ml/min/1.73m2 Calcium 8.1 (L) 8.3 - 10.4 MG/DL  
GLUCOSE, POC Collection Time: 03/28/19  4:25 PM  
Result Value Ref Range Glucose (POC) 206 (H) 65 - 100 mg/dL All Micro Results Procedure Component Value Units Date/Time CULTURE, BLOOD [[id number]] Collected:  03/25/19 1002 Order Status:  Completed Specimen:  Blood Updated:  03/28/19 0747 Special Requests: --     
  LEFT 
HAND Culture result: NO GROWTH 3 DAYS     
 CULTURE, BLOOD [[id number]] Collected:  03/25/19 1002 Order Status:  Completed Specimen:  Blood Updated:  03/28/19 0747 Special Requests: --     
  RIGHT 
ARM Culture result: NO GROWTH 3 DAYS     
 CULTURE, URINE [[id number]]  (Abnormal)  (Susceptibility) Collected:  03/25/19 1050 Order Status:  Completed Specimen:  Urine from Angelo Specimen Updated:  03/28/19 6681 Special Requests: NO SPECIAL REQUESTS Culture result:    
  **VANCOMYCIN RESISTANT ENTEROCOCCUS FAECIUM** >100,000 COLONIES/mL RESULTS VERIFIED, PHONED TO AND READ BACK BY 
ABIMBOLA Vazquez ON 3/28/19 @0735 TA 
  
 C. DIFFICILE AG & TOXIN A/B [[id number]] Order Status:  Canceled Specimen:  Stool CULTURE, URINE [[id number]] Order Status:  Canceled Specimen:  Urine from Clean catch Current Meds: 
Current Facility-Administered Medications Medication Dose Route Frequency  cephALEXin (KEFLEX) capsule 500 mg  500 mg Oral Q8H  
 minocycline (MINOCIN, DYNACIN) capsule 100 mg  100 mg Oral Q12H  
 [START ON 3/29/2019] ferrous gluconate 324 mg (38 mg iron) tablet 1 Tab  1 Tab Oral ACL  polyethylene glycol (MIRALAX) packet 17 g  17 g Oral DAILY  tamsulosin (FLOMAX) capsule 0.4 mg  0.4 mg Oral DAILY  ketorolac (TORADOL) injection 15 mg  15 mg IntraVENous Q8H PRN  
 calcium-vitamin D (OS-JENN) 500 mg-200 unit tablet  1 Tab Oral DAILY  carvedilol (COREG) tablet 6.25 mg  6.25 mg Oral BID WITH MEALS  cholecalciferol (VITAMIN D3) tablet 1,000 Units  1,000 Units Oral DAILY  clonazePAM (KlonoPIN) tablet 0.5 mg  0.5 mg Oral TID  DULoxetine (CYMBALTA) capsule 60 mg  60 mg Oral DAILY  hydrOXYzine pamoate (VISTARIL) capsule 25 mg  25 mg Oral BID PRN  
  insulin glargine (LANTUS) injection 5 Units  5 Units SubCUTAneous DAILY  insulin lispro (HUMALOG) injection 5 Units  5 Units SubCUTAneous TIDAC  levothyroxine (SYNTHROID) tablet 75 mcg  75 mcg Oral ACB  methadone (DOLOPHINE) tablet 5 mg  5 mg Oral Q6H  
 mirtazapine (REMERON) tablet 7.5 mg  7.5 mg Oral QHS  potassium chloride (KLOR-CON) tablet 10 mEq  10 mEq Oral BID  predniSONE (DELTASONE) tablet 5 mg  5 mg Oral BID  sodium chloride (NS) flush 5-40 mL  5-40 mL IntraVENous Q8H  
 sodium chloride (NS) flush 5-40 mL  5-40 mL IntraVENous PRN  
 ondansetron (ZOFRAN) injection 4 mg  4 mg IntraVENous Q4H PRN  
 alcohol 62% (NOZIN) nasal  1 Ampule  1 Ampule Topical Q12H  
 amLODIPine (NORVASC) tablet 10 mg  10 mg Oral DAILY  insulin lispro (HUMALOG) injection   SubCUTAneous AC&HS Diet: DIET DIABETIC CONSISTENT CARB Other Studies (last 24 hours): No results found. Assessment and Plan:  
 
Hospital Problems as of 3/28/2019 Date Reviewed: 5/25/2016 Codes Class Noted - Resolved POA * (Principal) Sepsis ([facility] ICD-10-CM: A41.9 ICD-9-CM: 038.9, 995.91  3/25/2019 - Present Unknown Finger infection ICD-10-CM: L08.9 ICD-9-CM: 686.9  2/5/2019 - Present Yes Chronic diastolic congestive heart failure (HCC) ICD-10-CM: I50.32 
ICD-9-CM: 428.32, 428.0  1/16/2019 - Present Yes Acquired hypothyroidism ICD-10-CM: E03.9 ICD-9-CM: 244.9  1/16/2019 - Present Yes DM2 (diabetes mellitus, type 2) (HCC) ICD-10-CM: E11.9 ICD-9-CM: 250.00  7/27/2018 - Present Yes Bilateral leg edema ICD-10-CM: R60.0 ICD-9-CM: 782.3  10/4/2017 - Present Yes A/P:   
Sepsis 
-resolved 
-Urine VRE does not need treatment per ID 
-To be on minocycline and keflex through 3/31/19 
  
Recent history of osteomyelitis of left index finger 
-Per infectious disease this has been adequately treated 
  
Stage I pressure ulcer of left heel 
- off load pressure - wound management consult 
  
 Lower extremity swelling and redness 
- likely representative of chronic venous changes 
- no obvious ulcerations or cuts/scrapes (besides ulcer as above) - patient does not have sensation below both of her ankles 
  
Hypothyroidism 
- continue with home meds 
  
Chronic diastolic heart failure - BP control and diuresis as needed - currently compensated 
  
History of chronic pain 
- continue methadone 
  
DM type II 
- continue with home regimen, Lantus 5 units qdaily and Humalog 5 units TIDAC 
- goal CBG between 140-180 with Humalog SSI and serial CBGs while inpatient 
  
HTN 
- continue home meds 
  
History of MDD 
- continue home meds 
  
Dispo: return to [facility]- anticipate transfer 3/29 Ppx: SCDs for VTE 
  
Code Status: DNR/DNI (discussed with patient at bedside) Medical decision maker: daughter Jung Ryan Case reviewed with supervising physician - CHRISTI Duncan MD 
 
Signed: 
Kiara Lamb NP-C

## 2019-03-28 NOTE — PROGRESS NOTES
Problem: Mobility Impaired (Adult and Pediatric) Goal: *Acute Goals and Plan of Care (Insert Text) Description STG: 
(1.)Ms. Baljit Sr will move from supine to sit and sit to supine  with MODERATE ASSIST within 5 treatment day(s). (2.)Ms. Baljit Sr will transfer from bed to chair and chair to bed with MODERATE ASSIST using the least restrictive device within 5 treatment day(s). (3.)Ms. Baljit Sr will ambulate with MODERATE ASSIST for 5 feet with the least restrictive device within 5 treatment day(s). LTG: 
(1.)Ms. Baljit Sr will move from supine to sit and sit to supine  in bed with MINIMAL ASSIST within 10 treatment day(s). (2.)Ms. Baljit Sr will transfer from bed to chair and chair to bed with MINIMAL ASSIST using the least restrictive device within 10 treatment day(s). (3.)Ms. Baljit Sr will ambulate with MINIMAL ASSIST for 50 feet with the least restrictive device within 10 treatment day(s). ________________________________________________________________________________________________  
3/26/2019 1334 by Dariusz Morocho, PT Note: PHYSICAL THERAPY: Daily Note 3/28/2019 INPATIENT: PT Visit Days : 2 [place] / [place] / Product Type: Medicare /   
  
NAME/AGE/GENDER: Smith Bumpers is a 80 y.o. female PRIMARY DIAGNOSIS: Sepsis ([address] [A41.9] Sepsis ([address] Sepsis ([address] ICD-10: Treatment Diagnosis:  
 · Generalized Muscle Weakness (M62.81) · Difficulty in walking, Not elsewhere classified (R26.2) Precaution/Allergies: 
Contrast agent [iodine]; Actifed [triprolidine-pseudoephedrine]; Allopurinol; Decongestant [pseudoephedrine hcl]; Lexapro [escitalopram oxalate]; Lyrica [pregabalin]; Minizide [prazosin-polythiazide]; Mobic [meloxicam]; Omnipaque [iohexol]; Sertraline; Spironolactone; and Sulfamethazine ASSESSMENT:  
Ms. Baljit Sr seen this PM for transfer training & therapeutic exercise.   Pt. Up in recliner upon arrival, tolerating well. Pain much better controlled this session. Pt. Able to progress to transfer training, but demonstrates significant proximal weakness, requiring mod-max A for sit to/fom stand & difficulty maintaining standing w/ RW.  Pt. Declined to work on gait training, stating \"the floor is too slippery\". Pt. Has s significant fear of falling secondary to past falls. Pt. Cont. To mobilize below her baseline 7 benefits form cont. PT services to address. This section established at most recent assessment PROBLEM LIST (Impairments causing functional limitations): 1. Decreased Strength 2. Decreased ADL/Functional Activities 3. Decreased Transfer Abilities 4. Decreased Ambulation Ability/Technique 5. Decreased Balance 6. Increased Pain 7. Decreased Activity Tolerance 8. Increased Fatigue INTERVENTIONS PLANNED: (Benefits and precautions of physical therapy have been discussed with the patient.) 1. Balance Exercise 2. Bed Mobility 3. Gait Training 4. Neuromuscular Re-education/Strengthening 5. Therapeutic Activites 6. Therapeutic Exercise/Strengthening 7. Transfer Training TREATMENT PLAN: Frequency/Duration: 3 times a week for duration of hospital stay Rehabilitation Potential For Stated Goals: Good RECOMMENDED REHABILITATION/EQUIPMENT: (at time of discharge pending progress): Due to the probability of continued deficits (see above) this patient will likely need continued skilled physical therapy after discharge. Equipment: ? TBD HISTORY:  
History of Present Injury/Illness (Reason for Referral): 
Pt. Adm. From [address] w/ SOB & fever. Work-up revealed sepsis. Past Medical History/Comorbidities: Ms. Aravind Fraga  has a past medical history of Arthritis, Chronic diastolic congestive heart failure ([facility] (1/16/2019), Chronic pain, Edema, GERD (gastroesophageal reflux disease) (1/16/2019), Gouty arthritis, History of MI (myocardial infarction) (1980's), Hypertension, Hypothyroidism, Mild heartburn, Neuropathy (1980's), Post-operative nausea and vomiting, and Type 2 diabetes mellitus without complication, without long-term current use of insulin ([address] (7/27/2018). She also has no past medical history of Adverse effect of anesthesia, Aneurysm ([address] Arrhythmia, Asthma, Autoimmune disease ([address] CAD (coronary artery disease), Cancer ([address] Chronic kidney disease, Chronic obstructive pulmonary disease ([address] Coagulation disorder ([address] Difficult intubation, Endocarditis, Ill-defined condition, Liver disease, Malignant hyperthermia due to anesthesia, Morbid obesity ([address] Pseudocholinesterase deficiency, Psychiatric disorder, PUD (peptic ulcer disease), Rheumatic fever, Seizures ([address] Sleep apnea, Stroke ([address] or Thromboembolus ([address] Ms. Dominique Zimmer  has a past surgical history that includes hx partial thyroidectomy (1960); hx orthopaedic (Bilateral, 6753-4783); hx orthopaedic (Left, 1992); hx open cholecystectomy (1970); hx cyst removal (Right, 1950's); hx urological; hx heart catheterization (late 1979); hx hysterectomy (1972); hx gyn (1963); hx dilation and curettage (1649-9325); and hx left salpingo-oophorectomy (1965). Social History/Living Environment:  
Home Environment: Skilled nursing facility One/Two Story Residence: One story Living Alone: No 
Support Systems: Family member(s) Patient Expects to be Discharged to[de-identified] Skilled nursing facility Current DME Used/Available at Home: [facility] Prior Level of Function/Work/Activity: 
Pt. Reports she was ambulating w/ PT using RW @ SNF. States she was scheduled for d/c in just a few days before she got sick. Number of Personal Factors/Comorbidities that affect the Plan of Care: 3+: HIGH COMPLEXITY EXAMINATION:  
Most Recent Physical Functioning:  
Gross Assessment: 
AROM: Grossly decreased, non-functional 
PROM: Generally decreased, functional 
 Strength: Grossly decreased, non-functional 
Coordination: Grossly decreased, non-functional 
Tone: Normal 
Sensation: Impaired Posture: 
Posture (WDL): Exceptions to [facility] Posture Assessment: Forward head, Rounded shoulders, Trunk flexion Balance: 
Sitting: Impaired Sitting - Static: Fair (occasional) Sitting - Dynamic: Fair (occasional) Standing: Impaired Standing - Static: Poor Standing - Dynamic : Poor Bed Mobility: 
  
Wheelchair Mobility: 
  
Transfers: 
Sit to Stand: Moderate assistance;Maximum assistance Stand to Sit: Moderate assistance;Maximum assistance Gait: 
  
   
  
Body Structures Involved: 1. Nerves 2. Metabolic 3. Muscles Body Functions Affected: 1. Sensory/Pain 2. Neuromusculoskeletal 
3. Movement Related 4. Metobolic/Endocrine Activities and Participation Affected: 1. General Tasks and Demands 2. Communication 3. Mobility 4. Self Care 5. Domestic Life 6. Community, Social and [place] Number of elements that affect the Plan of Care: 4+: HIGH COMPLEXITY CLINICAL PRESENTATION:  
Presentation: Evolving clinical presentation with changing clinical characteristics: MODERATE COMPLEXITY CLINICAL DECISION MAKING:  
[employer] 6 Clicks Basic Mobility Inpatient Short Form How much difficulty does the patient currently have. .. Unable A Lot A Little None 1. Turning over in bed (including adjusting bedclothes, sheets and blankets)? ? 1   ? 2   ? 3   ? 4  
2. Sitting down on and standing up from a chair with arms ( e.g., wheelchair, bedside commode, etc.)   ? 1   ? 2   ? 3   ? 4  
3. Moving from lying on back to sitting on the side of the bed?   ? 1   ? 2   ? 3   ? 4 How much help from another person does the patient currently need. .. Total A Lot A Little None 4. Moving to and from a bed to a chair (including a wheelchair)? ? 1   ? 2   ? 3   ? 4  
5. Need to walk in hospital room?    ? 1   ? 2   ? 3   ? 4  
 6.  Climbing 3-5 steps with a railing? ? 1   ? 2   ? 3   ? 4  
© 2007, Trustees of [facility] [employer], under license to [employer]. All rights reserved Score:  Initial: 8 Most Recent: X (Date: -- ) Interpretation of Tool:  Represents activities that are increasingly more difficult (i.e. Bed mobility, Transfers, Gait). Medical Necessity:    
· Patient demonstrates good ·  rehab potential due to higher previous functional level. Reason for Services/Other Comments: 
· Patient continues to demonstrate capacity to improve strength, balance, & endurance which will increase independence, decrease amount of assistance required from caregiver and increase safety · . Use of outcome tool(s) and clinical judgement create a POC that gives a: Questionable prediction of patient's progress: MODERATE COMPLEXITY  
  
 
 
 
TREATMENT:  
(In addition to Assessment/Re-Assessment sessions the following treatments were rendered) Pre-treatment Symptoms/Complaints:  \"I don;t want to walk on these floors. They are too slippery. \" 
Pain: Initial:  
Pain Intensity 1: 0  Post Session: pt. Denies pain Therapeutic Activity: (20 minutes): Therapeutic activities including chair transfers to improve mobility and balance. Required min A to promote static and dynamic balance in sitting. Pt. Performed repeated sit to/from stand w/ RW, working on sustaining static standing w/ RW.  Pt. Able to maintain 15 seconds before needing to sit secondary to fatigue. Pt. Requires min-mod A to maintain secondary to heavy posterior lean. Therapeutic Exercise: ( 13 minutes):  Exercises per grid below to improve mobility and strength. Required minimal visual and verbal cues to promote proper body mechanics. Performed B LEs sitting on edge of chair to promote trunk control SEATED EXERCISES Sets Reps Comments Ankle Pumps 1 10 Hip Flexion 1 10 [address] 1 10 Hip Adduction/Ball Squeeze 1 10   
glut sets 1 10 Mini-stands 2 5 Braces/Orthotics/Lines/Etc:  
· IV · pur-wick Treatment/Session Assessment:   
· Response to Treatment:  Pt. Fatigued w/ repeated stand attempts · Interdisciplinary Collaboration:  
o Physical Therapist 
o Registered Nurse · After treatment position/precautions:  
o Up in chair 
o Bed/Chair-wheels locked 
o Bed in low position 
o Call light within reach · Compliance with Program/Exercises: Will assess as treatment progresses · Recommendations/Intent for next treatment session: \"Next visit will focus on advancements to more challenging activities and reduction in assistance provided\". Total Treatment Duration: PT Patient Time In/Time Out Time In: 2008 Time Out: 3651 Maykel Smyth, PT

## 2019-03-28 NOTE — PROGRESS NOTES
Interdisciplinary Rounds completed 03/28/19. Nursing, Case Management, Physician and PT present. Plan of care reviewed and updated.  
 
Will d/c in am

## 2019-03-28 NOTE — PROGRESS NOTES
Pt states she hfelt short of breath O2 90-91%, placed on 1L NC sats now 95%. Will continue to monitor care.

## 2019-03-28 NOTE — PROGRESS NOTES
Short of Breath Upon rounding on patient, patient said she was \"short of Breath\". Patient was pulled up in bed. Vital Signs taken - O2 was 91. Contacted primary nurse Dior. .Nurse asked us to apply 1 liter O2 Via nasal cannula. Retook O2 and oxygen level was 95. Patient stated she \"Felt that she was breathing better\". Will continue to monitor. Bed Low and locked and call light within reach.

## 2019-03-28 NOTE — PROGRESS NOTES
Pt has brusing on bilateral arms, redness on BLEs with scars, and a small wound on LLE, left heel has a wound with a mepilex. Will continue to monitor care.

## 2019-03-28 NOTE — PROGRESS NOTES
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 The minocycline needs to be dosed around the calcium and iron supplements that she is on. 
· I will sign off. Anti-infectives: · Vanc 3/25- 
· Zosyn 3/25-3/26 Subjective:  
Continues to deny dysuria, pelvic pressure. Pain in arms and legs much better. No fever. Allergies Allergen Reactions  Contrast Agent [Iodine] Anaphylaxis  Actifed [Triprolidine-Pseudoephedrine] Nausea Only  Allopurinol Other (comments) Elevated blood pressure & pt states could not walk or see until medication was out of her system.  Decongestant [Pseudoephedrine Hcl] Rash and Itching  Lexapro [Escitalopram Oxalate] Other (comments) Lips/Mouth swelling  Lyrica [Pregabalin] Unknown (comments)  Minizide [Prazosin-Polythiazide] Unknown (comments)  Mobic [Meloxicam] Other (comments) Facial swelling  Omnipaque [Iohexol] Hives, Swelling and Other (comments) Wheezing and/or shortness of breath  Sertraline Other (comments) Mouth, lips swell  Spironolactone Itching Pt complains that she breaks out in clammy sweat with itching and stinging in her upper arms, dry mouth, funny feeling tongue, SOB, and increased anxiety.  Sulfamethazine Other (comments) Mouth/lips swelling Review of Systems:  A comprehensive review of systems was negative except for that written in the History of Present Illness. Objective:  
 
Visit Vitals /69 (BP 1 Location: Left arm, BP Patient Position: Sitting) Pulse 94 Temp 98.2 °F (36.8 °C) Resp 20 Ht 5' 4\" (1.626 m) Wt 74.4 kg (164 lb) SpO2 96% BMI 28.15 kg/m² Temp (24hrs), Av.1 °F (36.7 °C), Min:98 °F (36.7 °C), Max:98.2 °F (36.8 °C) Lines:  Peripheral IV:   R arm intact Physical Exam:   
General:  Alert, cooperative, well nourished, well developed, appears stated age Eyes:  Sclera anicteric. Pupils equally round and reactive to light. Mouth/Throat: Mucous membranes normal, oral pharynx clear Neck: Supple Lungs:   Clear to auscultation bilaterally, good effort, 1LNC  
CV:  Regular rate and rhythm, no murmur, click, rub or gallop Abdomen:   Soft, non-tender. bowel sounds normal. non-distended Skin: Skin color, texture, turgor normal. eythema in warmth in anterior lower legs is much improved. Musculoskeletal: Joints to hands/fingers edema/warmth much improved today, left hand index finger tip: small wound, no drainage today, no erythema. Left heel with stage 2 ulcer: beefy center, no cellulitis. Dylan LE, anterior portion with erythematous changes, warmth concerning for cellulitis: improved today Lines/Devices:  Intact, no erythema, drainage or tenderness Psych: Alert and oriented, normal mood affect given the setting Data Review: CBC: 
Recent Labs  
  19 
0503 WBC 12.6* GRANS 63 MONOS 9  
EOS 0* ANEU 7.9 ABL 3.3 HGB 8.8* HCT 28.9*  
 BMP: 
Recent Labs  
  19 
1131 19 
0503 CREA 1.20* 1.06* BUN 21 19  141  
K 4.2 4.2  106 CO2 30 30 AGAP 7 5* * 128* LFTS: 
No results for input(s): TBILI, ALT, SGOT, AP, TP, ALB in the last 72 hours. Microbiology:  
 
All Micro Results Procedure Component Value Units Date/Time CULTURE, BLOOD [[id number]] Collected:  03/25/19 1002 Order Status:  Completed Specimen:  Blood Updated:  03/28/19 0747 Special Requests: --     
  LEFT 
HAND Culture result: NO GROWTH 3 DAYS     
 CULTURE, BLOOD [[id number]] Collected:  03/25/19 1002 Order Status:  Completed Specimen:  Blood Updated:  03/28/19 0747 Special Requests: --     
  RIGHT 
ARM Culture result: NO GROWTH 3 DAYS     
 CULTURE, URINE [[id number]]  (Abnormal)  (Susceptibility) Collected:  03/25/19 1050 Order Status:  Completed Specimen:  Urine from Angelo Specimen Updated:  03/28/19 2906 Special Requests: NO SPECIAL REQUESTS Culture result:    
  **VANCOMYCIN RESISTANT ENTEROCOCCUS FAECIUM** >100,000 COLONIES/mL RESULTS VERIFIED, PHONED TO AND READ BACK BY 
ABIMBOLA Vazquez ON 3/28/19 @0735 TA 
  
 C. DIFFICILE AG & TOXIN A/B [[id number]] Order Status:  Canceled Specimen:  Stool CULTURE, URINE [[id number]] Order Status:  Canceled Specimen:  Urine from Clean catch Imaging: CXR 3/25 Impression: No acute abnormality 
  
3/25 Xray Left hand IMPRESSION: Destructive changes of the tuft of the distal phalanx of the left 
second finger. MRI may be useful in further evaluation if indicated. Signed By: Melania Cuba MD   
 March 28, 2019

## 2019-03-29 NOTE — DISCHARGE SUMMARY
Hospitalist Discharge Summary Admit Date:  3/25/2019  9:38 AM  
Name:  Saniya Jeff Age:  80 y.o. 
:  1933 MRN:  [id number] PCP:  Aki Mora MD 
Treatment Team: Attending Provider: Lory Biwsas DO; Utilization Review: Paz Mazariegos RN; Occupational Therapy Assistant: Bravo Jama Problem List for this Hospitalization: 
Hospital Problems as of 3/29/2019 Date Reviewed: 2016 Codes Class Noted - Resolved POA * (Principal) Sepsis ([facility] [address] ICD-10-CM: A41.9 ICD-9-CM: 038.9, 995.91  3/25/2019 - Present Unknown Finger infection ICD-10-CM: L08.9 ICD-9-CM: 686.9  2019 - Present Yes Chronic diastolic congestive heart failure (HCC) ICD-10-CM: I50.32 
ICD-9-CM: 428.32, 428.0  2019 - Present Yes Acquired hypothyroidism ICD-10-CM: E03.9 ICD-9-CM: 244.9  2019 - Present Yes DM2 (diabetes mellitus, type 2) ([facility]) ICD-10-CM: E11.9 ICD-9-CM: 250.00  2018 - Present Yes Bilateral leg edema ICD-10-CM: R60.0 ICD-9-CM: 782.3  10/4/2017 - Present Yes Admission HPI from 3/25/2019:   
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 Per chart review, patient with observed shortness of breath and fever. At bedside, patient is more bothered by a wound on her left heel. She denies shortness of breath and cough but does endorse fever and chills. No chest pain or palpitations. No abdominal pain, nausea/vomiting, or dysuria. She has been having diarrhea the past few weeks while on antibiotics but it has been improving overall.  Patient states that she has not noticed any changes in her left index finger and says she has not noticed any further pain or redness/swelling.  
  
ED Course: EKG showing sinus tachycardia with no ST-T wave abnormalities. CXR unremarkable. X-ray of left 2nd finger showing continued destructive changes. Urine cultures and blood cultures collected and pending. Observed fever of 101. 2F with sinus tachycardia with HRs in low 100s. WBC count of 17. UA showing 2+ bacteruria. Given ceftriaxone empirically. \" Hospital Course: 
CC: SOB and fever 
  
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 Pt was at [facility] for [employer] and was brought for concerns of sepsis. Pt denied SOB but endorsed the fever. No other related sx. In the ED her EKG showed sinus tachycardia. Urine positive 2+ bacteria and pt started on vanc and zosyn given recent healthcare exposure. Pt began to look much better the next day. She reports that he pain is less and she feels more like herself. Appetite good, able to transfer to a chair. Urine positive for presumptive enterococcus species, evaluated by Infectious Disease - pt colonized and VRE does not require treatment. Pt felt that she had a gout flare so she was given a short course of colchicine with resolution of sx. Pt has an intolerance to allupurinol. Recommend that pt receive minocycline plus keflex through 3/31. Minocycline needs to be dosed around calcium and iron supplements. Pt plans [facility]. Discussed with daughter Carlos Marvin and she is in agreement with that plan. Follow up instructions and discharge meds at bottom of this note. Plan was discussed with patient, daughter, care team.  All questions answered. Patient was stable at time of discharge. 10 systems reviewed and negative except as noted in HPI. Diagnostic Imaging/Tests: Xr 2nd [address] Result Date: 3/25/2019 Left second finger 3 views dated 3/25/2019 Comparison x-rays dated 5/20/2019 CLINICAL INFORMATION: Osteomyelitis There has been further erosive change at the distal phalanx of the second finger with the tuft now destroyed. Tiny calcification remains. Osteoarthritic changes are noted proximal and distal interphalangeal joint. IMPRESSION: Destructive changes of the tuft of the distal phalanx of the left second finger. MRI may be useful in further evaluation if indicated. Xr Chest [facility] Result Date: 3/25/2019 Portable AP upright view  Date:  3/25/2019  at 1043 hours comparison chest x-ray : 2/26/2019 Clinical Information: Fever, shortness of breath Findings: Lungs are poorly expanded. Heart upper limits of normal in size and mediastinum unremarkable. Pulmonary vascularity is normal and lungs clear. No pleural effusion. Old right rib fractures. Impression: No acute abnormality Echocardiogram results: No results found for this visit on 03/25/19. All Micro Results Procedure Component Value Units Date/Time CULTURE, BLOOD [[id number]] Collected:  03/25/19 1002 Order Status:  Completed Specimen:  Blood Updated:  03/29/19 8739 Special Requests: --     
  LEFT 
HAND Culture result: NO GROWTH 4 DAYS     
 CULTURE, BLOOD [[id number]] Collected:  03/25/19 1002 Order Status:  Completed Specimen:  Blood Updated:  03/29/19 5736 Special Requests: --     
  RIGHT 
ARM Culture result: NO GROWTH 4 DAYS     
 CULTURE, URINE [[id number]]  (Abnormal)  (Susceptibility) Collected:  03/25/19 1050 Order Status:  Completed Specimen:  Urine from Angelo Specimen Updated:  03/28/19 0750 Special Requests: NO SPECIAL REQUESTS Culture result:    
  **VANCOMYCIN RESISTANT ENTEROCOCCUS FAECIUM** >100,000 COLONIES/mL   RESULTS VERIFIED, PHONED TO AND READ BACK BY 
ABIMBOLA COLMENARES ON 3/28/19 @6249 TA 
  
 C. DIFFICILE AG & TOXIN A/B [[id number]] Order Status:  Canceled Specimen:  Stool CULTURE, URINE [[id number]] Order Status:  Canceled Specimen:  Urine from Clean catch Labs: Results:  
   
BMP, Mg, Phos Recent Labs  
  03/29/19 
0443 03/28/19 
1152 03/26/19 
1131  142 140  
K 4.8 4.4 4.2 * 108* 103 CO2 28 29 30 AGAP 6* 5* 7  
BUN 29* 30* 21  
CREA 1.12* 1.41* 1.20* CA 7.9* 8.1* 7.7*  
* 113* 143* CBC No results for input(s): WBC, RBC, HGB, HCT, PLT, GRANS, LYMPH, EOS, MONOS, BASOS, IG, ANEU, ABL, BOZENA, ABM, ABB, AIG, HGBEXT, HCTEXT, PLTEXT in the last 72 hours. LFT No results for input(s): SGOT, ALT, TBIL, AP, TP, ALB, GLOB, AGRAT, GPT in the last 72 hours. Cardiac Testing Lab Results Component Value Date/Time BNP 63 (H) 01/21/2019 03:36 AM  
 B-type Natriuretic Peptide 67.8 10/04/2017 11:45 AM  
 CK 81 01/16/2019 03:35 PM  
  
Coagulation Tests Lab Results Component Value Date/Time Prothrombin time 10.5 05/18/2016 04:32 PM  
 INR 1.0 05/18/2016 04:32 PM  
 aPTT 30.1 05/18/2016 04:32 PM  
  
A1c Lab Results Component Value Date/Time Hemoglobin A1c 10.2 (H) 01/17/2019 10:03 AM  
  
Lipid Panel No results found for: CHOL, CHOLPOCT, CHOLX, CHLST, CHOLV, 371841, HDL, LDL, LDLC, DLDLP, 335784, VLDLC, VLDL, TGLX, TRIGL, TRIGP, TGLPOCT, CHHD, CHHDX Thyroid Panel Lab Results Component Value Date/Time TSH 0.429 02/25/2019 04:05 AM  
 TSH 2.530 05/18/2016 04:32 PM  
 T4, Total 8.3 05/18/2016 04:32 PM  
    
Most Recent UA Lab Results Component Value Date/Time  Color YELLOW 03/25/2019 10:50 AM  
 Appearance CLEAR 03/25/2019 10:50 AM  
 Specific gravity 1.013 03/25/2019 10:50 AM  
 pH (UA) 7.0 03/25/2019 10:50 AM  
 Protein 100 (A) 03/25/2019 10:50 AM  
 Glucose NEGATIVE  03/25/2019 10:50 AM  
 Ketone NEGATIVE  03/25/2019 10:50 AM  
 Bilirubin NEGATIVE  03/25/2019 10:50 AM  
 Blood NEGATIVE  03/25/2019 10:50 AM  
 Urobilinogen 0.2 03/25/2019 10:50 AM  
 Nitrites NEGATIVE  03/25/2019 10:50 AM  
 Leukocyte Esterase NEGATIVE  03/25/2019 10:50 AM  
  
 
Allergies Allergen Reactions  Contrast Agent [Iodine] Anaphylaxis  Actifed [Triprolidine-Pseudoephedrine] Nausea Only  Allopurinol Other (comments) Elevated blood pressure & pt states could not walk or see until medication was out of her system.  Decongestant [Pseudoephedrine Hcl] Rash and Itching  Lexapro [Escitalopram Oxalate] Other (comments) Lips/Mouth swelling  Lyrica [Pregabalin] Unknown (comments)  Minizide [Prazosin-Polythiazide] Unknown (comments)  Mobic [Meloxicam] Other (comments) Facial swelling  Omnipaque [Iohexol] Hives, Swelling and Other (comments) Wheezing and/or shortness of breath  Sertraline Other (comments) Mouth, lips swell  Spironolactone Itching Pt complains that she breaks out in clammy sweat with itching and stinging in her upper arms, dry mouth, funny feeling tongue, SOB, and increased anxiety.  Sulfamethazine Other (comments) Mouth/lips swelling Immunization History Administered Date(s) Administered  Influenza High Dose Vaccine PF 10/14/2014, 10/05/2017, 10/25/2018  Influenza Vaccine 09/16/2009, 09/09/2013, 10/12/2015, 09/15/2016  Influenza Vaccine (Whole Virus) 10/01/2011, 08/27/2012  Pneumococcal Conjugate (PCV-13) 03/18/2015  Pneumococcal Polysaccharide (PPSV-23) 10/01/2000  TB Skin Test (PPD) Intradermal 01/18/2019, 02/25/2019, 03/25/2019  Zoster Vaccine, Live 06/03/2013 All Labs from Last 24 Hrs: 
Recent Results (from the past 24 hour(s)) GLUCOSE, POC Collection Time: 03/28/19 11:26 AM  
Result Value Ref Range Glucose (POC) 123 (H) 65 - 100 mg/dL METABOLIC PANEL, BASIC Collection Time: 03/28/19 11:52 AM  
Result Value Ref Range Sodium 142 136 - 145 mmol/L Potassium 4.4 3.5 - 5.1 mmol/L  Chloride 108 (H) 98 - 107 mmol/L  
 CO2 29 21 - 32 mmol/L Anion gap 5 (L) 7 - 16 mmol/L Glucose 113 (H) 65 - 100 mg/dL BUN 30 (H) 8 - 23 MG/DL Creatinine 1.41 (H) 0.6 - 1.0 MG/DL  
 GFR est AA 46 (L) >60 ml/min/1.73m2 GFR est non-AA 38 (L) >60 ml/min/1.73m2 Calcium 8.1 (L) 8.3 - 10.4 MG/DL  
GLUCOSE, POC Collection Time: 03/28/19  4:25 PM  
Result Value Ref Range Glucose (POC) 206 (H) 65 - 100 mg/dL GLUCOSE, POC Collection Time: 03/28/19  8:33 PM  
Result Value Ref Range Glucose (POC) 246 (H) 65 - 100 mg/dL METABOLIC PANEL, BASIC Collection Time: 03/29/19  4:43 AM  
Result Value Ref Range Sodium 142 136 - 145 mmol/L Potassium 4.8 3.5 - 5.1 mmol/L Chloride 108 (H) 98 - 107 mmol/L  
 CO2 28 21 - 32 mmol/L Anion gap 6 (L) 7 - 16 mmol/L Glucose 142 (H) 65 - 100 mg/dL BUN 29 (H) 8 - 23 MG/DL Creatinine 1.12 (H) 0.6 - 1.0 MG/DL  
 GFR est AA 59 (L) >60 ml/min/1.73m2 GFR est non-AA 49 (L) >60 ml/min/1.73m2 Calcium 7.9 (L) 8.3 - 10.4 MG/DL  
GLUCOSE, POC Collection Time: 03/29/19  7:25 AM  
Result Value Ref Range Glucose (POC) 122 (H) 65 - 100 mg/dL Discharge Exam: 
Patient Vitals for the past 24 hrs: 
 Temp Pulse Resp BP SpO2  
03/29/19 0735 97.8 °F (36.6 °C) 88 18 147/83 92 % 03/29/19 0355 97.6 °F (36.4 °C) 84 18 158/80 94 % 03/28/19 2339 98 °F (36.7 °C) 90 18 153/69 91 %  
03/28/19 2036  90 18 136/69 96 % 03/28/19 1620 97.6 °F (36.4 °C) 90 18 128/70 91 %  
03/28/19 1134 97.9 °F (36.6 °C) 90 20 153/76 91 % Oxygen Therapy O2 Sat (%): 92 % (03/29/19 0735) Pulse via Oximetry: 89 beats per minute (03/27/19 1108) O2 Device: Nasal cannula (03/28/19 0750) O2 Flow Rate (L/min): 1 l/min(Placed on RA) (03/28/19 0750) Intake/Output Summary (Last 24 hours) at 3/29/2019 1050 Last data filed at 3/29/2019 1046 Gross per 24 hour Intake  Output 1250 ml Net -1250 ml Physical exam: 
General:    Well nourished. Alert. No distress. Eyes:   Normal sclera. Extraocular movements intact. ENT:  Normocephalic, atraumatic. Moist mucous membranes CV:   Regular rate and rhythm. No murmur, rub, or gallop. Lungs:  Clear to auscultation bilaterally. No wheezing, rhonchi, or rales. Abdomen: Soft, nontender, nondistended. Bowel sounds normal.  
Extremities: Warm and dry. No cyanosis or edema. Neurologic: No focal deficits Skin:     No rashes or jaundice. Psych:  Normal mood and affect. Discharge Info:  
Current Discharge Medication List  
  
START taking these medications Details  
minocycline (MINOCIN, DYNACIN) 100 mg capsule Take 1 Cap by mouth every twelve (12) hours for 2 days. Qty: 4 Cap, Refills: 0  
  
cephALEXin (KEFLEX) 500 mg capsule Take 1 Cap by mouth every eight (8) hours for 2 days. Qty: 6 Cap, Refills: 0  
  
tamsulosin (FLOMAX) 0.4 mg capsule Take 1 Cap by mouth daily. Qty: 30 Cap, Refills: 0 CONTINUE these medications which have CHANGED Details  
methadone (DOLOPHINE) 5 mg tablet Take 1 Tab by mouth every six (6) hours. Max Daily Amount: 20 mg. Pt has been stable on this dose for many years, please do not taper or stop. Instructed to take DOS per Anesthesia guidelines. Indications: chronic pain, narcotic addiction 
Qty: 20 Tab, Refills: 0 Comments: Pt has been stable on this dose for many years, please do not taper or stop. Associated Diagnoses: Osteomyelitis of finger ([address] DULoxetine (CYMBALTA) 60 mg capsule Take 1 Cap by mouth daily. Qty: 10 Cap, Refills: 0 Associated Diagnoses: Type 2 diabetes mellitus with complication, with long-term current use of insulin ([address] clonazePAM (KLONOPIN) 0.5 mg tablet Take 1 Tab by mouth three (3) times daily. Max Daily Amount: 1.5 mg. Indications: anxiety Qty: 10 Tab, Refills: 0 Associated Diagnoses: Acute metabolic encephalopathy  
  
carvedilol (COREG) 6.25 mg tablet Take 1 Tab by mouth two (2) times daily (with meals). Qty: 10 Tab, Refills: 0  
  
amLODIPine (NORVASC) 10 mg tablet Take 1 Tab by mouth daily. Qty: 10 Tab, Refills: 0  
  
predniSONE (DELTASONE) 5 mg tablet Take 1 Tab by mouth two (2) times a day. 5mg BID Qty: 1 Tab, Refills: 0 CONTINUE these medications which have NOT CHANGED Details  
insulin lispro (HUMALOG) 100 unit/mL kwikpen 5 Units by SubCUTAneous route Before breakfast, lunch, and dinner. insulin detemir U-100 (LEVEMIR FLEXTOUCH) 100 unit/mL (3 mL) inpn 5 Units by SubCUTAneous route every morning. mirtazapine (REMERON) 7.5 mg tablet Take 7.5 mg by mouth nightly. potassium chloride (KLOR-CON) 10 mEq tablet Take 10 mEq by mouth two (2) times a day. ferrous gluconate 324 mg (38 mg iron) tablet Take 324 mg by mouth Daily (before breakfast). levothyroxine (SYNTHROID) 75 mcg tablet Take 75 mcg by mouth Daily (before breakfast). CHOLECALCIFEROL, VITAMIN D3, (VITAMIN D3 PO) Take 1,000 mg by mouth daily. CALCIUM CARBONATE/VITAMIN D3 (CALCIUM 600 + D,3, PO) Take 600 mg by mouth daily. aspirin delayed-release 81 mg tablet Take 81 mg by mouth nightly. Ok to continue per anesthesia guidelines. docusate sodium (COLACE) 100 mg capsule Take 1 Cap by mouth nightly. Qty: 1 Cap, Refills: 0  
  
!! OTHER,NON-FORMULARY, Take 30 mL by mouth three (3) times daily. Puja Fellers Lactobacillus acidophilus (ACIDOPHILUS) cap Take 1 Cap by mouth daily. polyethylene glycol (MIRALAX) 17 gram packet Take 17 g by mouth daily as needed. !! OTHER,NON-FORMULARY, Insert  into rectum as needed. Preparation H max stregth  
  
nystatin (MYCOSTATIN) powder Apply  to affected area three (3) times daily. Under breasts and ABD folds  
  
acetaminophen (TYLENOL EXTRA STRENGTH) 500 mg tablet Take 500 mg by mouth every six (6) hours as needed for Pain.  
  
hydrOXYzine pamoate (VISTARIL) 50 mg capsule Take 25 mg by mouth two (2) times daily as needed for Itching. multivitamin with minerals (HAIR,SKIN AND NAILS PO) Take 1 Tab by mouth daily. loperamide (IMODIUM) 2 mg capsule Take 4 mg by mouth as needed for Diarrhea. raNITIdine (ZANTAC) 150 mg tablet Take 150 mg by mouth two (2) times a day. KRILL/OM-3/DHA/EPA/PHOSPHO/AST (MEGARED OMEGA-3 KRILL OIL PO) Take 1 Cap by mouth daily. !! - Potential duplicate medications found. Please discuss with provider. STOP taking these medications  
  
 cefadroxil (DURICEF) 500 mg capsule Comments:  
Reason for Stopping:   
   
 ciprofloxacin HCl (CIPRO) 250 mg tablet Comments:  
Reason for Stopping:   
   
  
 
 
 
Disposition: SNF Activity: PT/OT Eval and Treat Diet: DIET DIABETIC CONSISTENT CARB Regular Follow-up Appointments Procedures  FOLLOW UP VISIT Appointment in: Other (Specify) Standing Status:   Standing Number of Occurrences:   1 Order Specific Question:   Appointment in Answer: Other (Specify) Follow-up Information Follow up With Specialties Details Why Contact Info [address] [address] [id number] [phone] Franklyn Bryant MD [facility]   [address] [facility] [address] 
[phone] Case reviewed with supervising physician - CHRISTI Brasher MD 
 
Time spent in patient discharge planning and coordination 35 minutes.  
 
Signed: 
ADAIR Cha

## 2019-03-29 NOTE — PROGRESS NOTES
Patient to discharge to [facility] room 330B. Report line, [phone], given to SUSAN Johnson transportation setup for 1:30pm. Daughter notified and in agreement.

## 2019-04-01 NOTE — PROGRESS NOTES
This note will not be viewable in [address] Patient discharged to [facility] on 3/29/19. [facility] outreach postponed for 21 days due to discharge to non-preferred network SNF.

## 2019-04-10 NOTE — PROGRESS NOTES
Community Care Team documentation for patient in [facility] The information below provided by:[facility] PT Update: MOD A ADL'S - MIN A BED MOBILITY - 25FT/MIN A/RW/25% TACTILE AND VERBAL CUES/INSTRUCTION - MIN A TRANSFERS AND WC MOBILITY Nursing Update:left foot 2nd and 3rd digits toenails off, daily dressing changes Discharge Date:TBD Assign to [facility]

## 2019-05-17 PROBLEM — S91.302A NON HEALING LEFT HEEL WOUND: Status: ACTIVE | Noted: 2019-01-01

## 2019-05-18 NOTE — PROGRESS NOTES
Called floor and spoke with [place] and requested RN completion of MRI consent form in [facility] so that ordered MRI may be done.

## 2019-05-18 NOTE — PROGRESS NOTES
END OF SHIFT NOTE: 
 
INTAKE/OUTPUT No intake/output data recorded. Voiding: YES Catheter: external catheter Drain:  
External Female Catheter 05/18/19 (Active) Site Assessment Clean, dry, & intact 5/18/2019  1:30 AM  
Repositioned No 5/18/2019  1:30 AM  
Perineal Care Yes 5/18/2019  1:30 AM  
Wick Changed No 5/18/2019  1:30 AM  
Suction Canister/Tubing Changed No 5/18/2019  1:30 AM  
 
 
 
 
 
 
Flatus: Patient does have flatus present. Stool:  0 occurrences. Characteristics: 
  
Emesis: 0 occurrences. Characteristics: VITAL SIGNS Patient Vitals for the past 12 hrs: 
 Temp Pulse Resp BP SpO2  
05/18/19 0352 98.1 °F (36.7 °C) 81 17 152/67 99 % 05/18/19 0118 98.1 °F (36.7 °C) 89 18 149/68 98 % 05/18/19 0030  88  157/67 99 % 05/18/19 0009  90  135/61 93 % 05/18/19 0008     93 % 05/18/19 0002 98 °F (36.7 °C) 84 18 140/63 (!) 88 % 05/17/19 2343  86  165/70 92 % 05/17/19 2320  85  161/67 93 % 05/17/19 2301  78  145/67 (!) 89 % 05/17/19 2241  80  145/65 92 % 05/17/19 2229  83  134/62 91 % 05/17/19 2201  79  152/70 95 % 05/17/19 2141  76  162/72 90 % 05/17/19 2124     95 % 05/17/19 2121  76  (!) 146/92 90 % 05/17/19 2024 98.2 °F (36.8 °C) 78 18 131/67 90 % Pain Assessment Pain Intensity 1: 0 (05/18/19 0300) Pain Location 1: Hand Pain Intervention(s) 1: Medication (see MAR) Patient Stated Pain Goal: 0 Ambulating No 
 
Shift report given to oncoming nurse at the bedside.  
 
Carolin Wagner RN

## 2019-05-18 NOTE — CONSULTS
Infectious Disease Consult Today's Date: 5/18/2019 Admit Date: 5/17/2019 Impression: · Left heel wound · Leukocytosis · CKD · S/p recent treatment for L 1st finger osteomyelitis · DM 
· Gout · Steroid dependent inflammatory arthritis Plan: · I agree with current antibiotics. I think she could be having some ongoing lower extremity ischemia and it would be helpful to have arterial studies done. Consider vascular surgery consult. · Await MRI. Anti-infectives: · IV vanc/zosyn Subjective:  
Date of Consultation:  May 18, 2019 Referring Physician: Dr. Lindsey Greg Patient is a 80 y.o. female with steroid-dependent RA who presented to [facility] on 5/17/19 with leukocytosis. She is known to ID for left first finger osteomyelitis, which was treated with cefepime in Feb-March. Swab cultures from the wound grew MSSA and Morganella. She was transitioned to cefadroxil and cipro after completion of cefepime. In late March she was [facility] for fever and chills, and she was thought to have bilateral cellulitis. She was treated and ultimately antibiotics were continued until 3/31. She had VRE in the urine which was not treated since she was asymptomatic at that time. She was discharged to a SNF and has since then returned home. She had labs done by her PCP on 5/16 which showed elevated WBC, ESR, and CRP. She was sent to the ER for further evaluation. She had WBC of 14.6 on presentation in the ER. She was started on vanc/zosyn and admitted to the hospitalist service. Patient Active Problem List  
Diagnosis Code  Urinary tract infection, site not specified N39.0  Murmur R01.1  Bilateral leg edema R60.0  Swelling R60.9  Abnormal EKG R94.31  
 Essential hypertension with goal blood pressure less than 130/85 I10  Aortic valve sclerosis I35.8  Rapid heart rate R00.0  Mixed hyperlipidemia E78.2  DM2 (diabetes mellitus, type 2) ([facility]) E11.9  Closed rib fracture S22.39XA  Acute respiratory failure with hypoxia (HCC) J96.01  
 Acute prerenal azotemia R79.89  Chronic diastolic congestive heart failure (HCC) I50.32  
 Acquired hypothyroidism E03.9  GERD (gastroesophageal reflux disease) K21.9  Finger infection L08.9  Leukocytosis D72.829  
 RAYMON (acute kidney injury) ([facility] N17.9  Osteomyelitis of finger (HCC) M86.9  Acute encephalopathy G93.40  Acute metabolic encephalopathy J67.20  
 Acute diarrhea R19.7  Oral thrush B37.0  Sepsis ([facility] [address] A41.9  Non healing left heel wound S91.302A Past Medical History:  
Diagnosis Date  Arthritis  Chronic diastolic congestive heart failure ([facility] [address] 1/16/2019  Chronic pain   
 bilat feet & hands  Edema BLE & bilat fingers; pt takes diuretic daily; pt states her swelling is related to the nerve disease she is being treated for at [facility]  GERD (gastroesophageal reflux disease) 1/16/2019  Gouty arthritis  History of MI (myocardial infarction) 1980's \"light heart attack\"; pt states MD at [facility] mentioned it to her; pt stated MD told her \"was on the back part of her heart & would not give her any problems\"; pt takes aspirin 81 mg daily  Hypertension   
 managed w/med  Hypothyroidism   
 s/p partial thyroidectomy 1960; managed w/med  Mild heartburn   
 takes tums prn  Neuropathy 1980's  
 pt states has been treated by [facility] for \"nerve problems in her feet & hands\"  Post-operative nausea and vomiting  Type 2 diabetes mellitus without complication, without long-term current use of insulin ([facility] [address] 7/27/2018 Family History Problem Relation Age of Onset  Cancer Mother  Heart Disease Father  Stroke Father Social History Tobacco Use  Smoking status: Never Smoker  Smokeless tobacco: Never Used Substance Use Topics  Alcohol use: No  
 
Past Surgical History:  
Procedure Laterality Date  HX CYST REMOVAL Right 1950's upper eyelid  HX DILATION AND CURETTAGE  [id number] [address]  
 ectopic pregnancy; right BSO done  HX HEART CATHETERIZATION  late 1979  
 pt states no stents [address] [facility]  
 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  
 nerve removed out of left leg [address] [facility]  HX UROLOGICAL    
 cystoscopy Prior to Admission medications Medication Sig Start Date End Date Taking? Authorizing Provider  
colchicine 0.6 mg tablet Take 0.6 mg by mouth two (2) times a day. Indications: for 3 days, end 5/20   Yes Provider, Historical  
oxybutynin chloride XL (DITROPAN XL) 5 mg CR tablet Take 5 mg by mouth. Yes Provider, Historical  
torsemide (DEMADEX) 10 mg tablet Take 10 mg by mouth two (2) times a day. Yes Provider, Historical  
tamsulosin (FLOMAX) 0.4 mg capsule Take 1 Cap by mouth daily. 3/30/19   Kelly Solis NP  
methadone (DOLOPHINE) 5 mg tablet Take 1 Tab by mouth every six (6) hours. Max Daily Amount: 20 mg. Pt has been stable on this dose for many years, please do not taper or stop. Instructed to take DOS per Anesthesia guidelines. Indications: chronic pain, narcotic addiction 3/29/19   Kelly Solis NP  
DULoxetine (CYMBALTA) 60 mg capsule Take 1 Cap by mouth daily. 3/30/19   Kelly Solis NP  
clonazePAM (KLONOPIN) 0.5 mg tablet Take 1 Tab by mouth three (3) times daily. Max Daily Amount: 1.5 mg. Indications: anxiety 3/29/19   Kelly Solis NP  
carvedilol (COREG) 6.25 mg tablet Take 1 Tab by mouth two (2) times daily (with meals). 3/29/19   Kelly Solis NP  
amLODIPine (NORVASC) 10 mg tablet Take 1 Tab by mouth daily. Patient taking differently: Take 5 mg by mouth daily.  3/29/19   Irma DILLON Mendoza  
predniSONE (DELTASONE) 5 mg tablet Take 1 Tab by mouth two (2) times a day. 5mg BID Patient taking differently: Take 10 mg by mouth two (2) times a day. 5mg BID 3/29/19   Kelly Solis NP  
docusate sodium (COLACE) 100 mg capsule Take 1 Cap by mouth nightly. 3/4/19   Wil Cook, DO  
OTHER,NON-FORMULARY, Take 30 mL by mouth three (3) times daily. PROHEAL    Provider, Historical  
Lactobacillus acidophilus (ACIDOPHILUS) cap Take 1 Cap by mouth daily. Provider, Historical  
polyethylene glycol (MIRALAX) 17 gram packet Take 17 g by mouth daily as needed. Provider, Historical  
insulin lispro (HUMALOG) 100 unit/mL kwikpen 5 Units by SubCUTAneous route Before breakfast, lunch, and dinner. Indications: 150-200= 2 units; 201-250= 4 units; 251-300= 6 units; >300 8 units    Provider, Historical  
insulin detemir U-100 (LEVEMIR FLEXTOUCH) 100 unit/mL (3 mL) inpn 20 Units by SubCUTAneous route every morning. Indications: type 2 diabetes mellitus    Provider, Historical  
mirtazapine (REMERON) 7.5 mg tablet Take 7.5 mg by mouth nightly. Provider, Historical  
OTHER,NON-FORMULARY, Insert  into rectum as needed. Preparation H max stregth    Provider, Historical  
nystatin (MYCOSTATIN) powder Apply  to affected area three (3) times daily. Under breasts and ABD folds    Provider, Historical  
potassium chloride (KLOR-CON) 10 mEq tablet Take 10 mEq by mouth two (2) times a day. Provider, Historical  
acetaminophen (TYLENOL EXTRA STRENGTH) 500 mg tablet Take 500 mg by mouth every six (6) hours as needed for Pain. Provider, Historical  
ferrous gluconate 324 mg (38 mg iron) tablet Take 324 mg by mouth Daily (before breakfast). Provider, Historical  
levothyroxine (SYNTHROID) 75 mcg tablet Take 75 mcg by mouth Daily (before breakfast).     Provider, Historical  
hydrOXYzine pamoate (VISTARIL) 50 mg capsule Take 25 mg by mouth two (2) times daily as needed for Itching. Provider, Historical  
multivitamin with minerals (HAIR,SKIN AND NAILS PO) Take 1 Tab by mouth daily. Provider, Historical  
loperamide (IMODIUM) 2 mg capsule Take 4 mg by mouth as needed for Diarrhea. 0/75/49   Sandrine Bynum MD  
raNITIdine (ZANTAC) 150 mg tablet Take 150 mg by mouth two (2) times a day. Provider, Historical  
CHOLECALCIFEROL, VITAMIN D3, (VITAMIN D3 PO) Take 1,000 mg by mouth daily. Provider, Historical  
CALCIUM CARBONATE/VITAMIN D3 (CALCIUM 600 + D,3, PO) Take 600 mg by mouth daily. Provider, Historical  
KRILL/OM-3/DHA/EPA/PHOSPHO/AST (MEGARED OMEGA-3 KRILL OIL PO) Take 1 Cap by mouth daily. Provider, Historical  
aspirin delayed-release 81 mg tablet Take 81 mg by mouth nightly. Ok to continue per anesthesia guidelines. Provider, Historical  
 
 
Allergies Allergen Reactions  Contrast Agent [Iodine] Anaphylaxis  Actifed [Triprolidine-Pseudoephedrine] Nausea Only  Allopurinol Other (comments) Elevated blood pressure & pt states could not walk or see until medication was out of her system.  Decongestant [Pseudoephedrine Hcl] Rash and Itching  Lexapro [Escitalopram Oxalate] Other (comments) Lips/Mouth swelling  Lyrica [Pregabalin] Unknown (comments)  Minizide [Prazosin-Polythiazide] Unknown (comments)  Mobic [Meloxicam] Other (comments) Facial swelling  Omnipaque [Iohexol] Hives, Swelling and Other (comments) Wheezing and/or shortness of breath  Sertraline Other (comments) Mouth, lips swell  Spironolactone Itching Pt complains that she breaks out in clammy sweat with itching and stinging in her upper arms, dry mouth, funny feeling tongue, SOB, and increased anxiety.  Sulfamethazine Other (comments) Mouth/lips swelling Review of Systems:  A comprehensive review of systems was negative except for that written in the History of Present Illness. Objective: Visit Vitals /71 Pulse 76 Temp 98.9 °F (37.2 °C) Resp 17 Ht 5' 4\" (1.626 m) Wt (!) 167.4 kg (369 lb 1.6 oz) SpO2 96% BMI 63.36 kg/m² Temp (24hrs), Av.3 °F (36.8 °C), Min:98 °F (36.7 °C), Max:98.9 °F (37.2 °C) Lines:  Peripheral IV:    
 
Physical Exam:   
General:  Alert, cooperative, well noursished, appears stated age Eyes:  Sclera anicteric. Pupils equally round and reactive to light. Mouth/Throat: Mucous membranes normal, oral pharynx clear Neck: Supple Lungs:   Clear to auscultation bilaterally, good effort CV:  Regular rate and rhythm,no murmur, click, rub or gallop Abdomen:   Soft, non-tender. bowel sounds normal. non-distended Extremities: No cyanosis; moderate bilateral LE edema;  
Skin: Bilateral LE erythema noted; L heel wound has eschar and serous drainage Lymph nodes: Cervical and supraclavicular normal  
Musculoskeletal: Changes consistent with rheumatoid arthritis bilaterally Lines/Devices:  Intact, no erythema, drainage or tenderness Psych: Alert and oriented, normal mood affect given the setting Data Review: CBC: 
Recent Labs 19 WBC 14.6* GRANS 76 MONOS 6  
EOS 1 ANEU 11.0* ABL 2.4 HGB 9.3* HCT 31.3*  
 BMP: 
Recent Labs 19 CREA 1.14* BUN 35*   
K 4.3 CL 99  
CO2 32 AGAP 8  
* LFTS: 
Recent Labs 19 TBILI 0.2 ALT 22 SGOT 13* * TP 7.8 ALB 2.7* Microbiology:  
 
All Micro Results Procedure Component Value Units Date/Time CULTURE, BLOOD [[id number]] Collected:  19 Order Status:  Completed Specimen:  Blood Updated:  19 6811 Special Requests: --     
  LEFT Antecubital 
  
  Culture result: NO GROWTH AFTER 9 HOURS     
 CULTURE, BLOOD [[id number]] Collected:  19 Order Status:  Completed Specimen:  Blood Updated:  19 [id number] Special Requests: --     
  RIGHT FOREARM Culture result: NO GROWTH AFTER 9 HOURS Imagin19 L foot MRI: pending 19 CXR: IMPRESSION IMPRESSION: Prominent interstitial lung markings may relate to early CHF/fluid 
overload or atypical pneumonia. 19 L foot x-ray: IMPRESSION IMPRESSION: No acute process. Signed By: Angelica Ceballos MD   
 May 18, 2019

## 2019-05-18 NOTE — H&P
History and Physical 
 
Patient: Silvana uHynh MRN: [id number]  SSN: [id number] YOB: 1933  Age: 80 y.o. Sex: female Subjective:  
  
Silvana uHynh is a 80 y.o. female who came to ER due to his doctor found her testing result to show more inflammatory reaction and concerned with more infection that will requires work-ups and treatment in hospital.  
 
Patient was just recently discharged from a nursing home. She was sent there after hospital stay due to left 2nd finger osteomyelitis and finished IV Rocephin. She has had cellulitis left shin and has been on Keflex PO for the past month. Lab works show persistent and more elevated WBC. Her left heel wound is not healing and patient has more shortness of breath, so her doctor advised patient to come to ER. Information is obtained via patient's daughter who is a nurse. Hospitalist service is requested to admit the patient. Past Medical History:  
Diagnosis Date  Arthritis  Chronic diastolic congestive heart failure ([facility] [address] 1/16/2019  Chronic pain   
 bilat feet & hands  Edema BLE & bilat fingers; pt takes diuretic daily; pt states her swelling is related to the nerve disease she is being treated for at [facility]  GERD (gastroesophageal reflux disease) 1/16/2019  Gouty arthritis  History of MI (myocardial infarction) 1980's \"light heart attack\"; pt states MD at [facility] mentioned it to her; pt stated MD told her \"was on the back part of her heart & would not give her any problems\"; pt takes aspirin 81 mg daily  Hypertension   
 managed w/med  Hypothyroidism   
 s/p partial thyroidectomy 1960; managed w/med  Mild heartburn   
 takes tums prn  Neuropathy 1980's  
 pt states has been treated by [facility] for \"nerve problems in her feet & hands\"  Post-operative nausea and vomiting  Type 2 diabetes mellitus without complication, without long-term current use of insulin ([address] 7/27/2018 Past Surgical History:  
Procedure Laterality Date  HX CYST REMOVAL Right 1950's  
 upper eyelid  HX DILATION AND CURETTAGE  [id number] [address]  
 ectopic pregnancy; right BSO done  HX HEART CATHETERIZATION  late 1979  
 pt states no stents [address] [facility]  
 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  
 nerve removed out of left leg [facility]  HX UROLOGICAL    
 cystoscopy Family History Problem Relation Age of Onset  Cancer Mother  Heart Disease Father  Stroke Father Social History Tobacco Use  Smoking status: Never Smoker  Smokeless tobacco: Never Used Substance Use Topics  Alcohol use: No  
  
Prior to Admission medications Medication Sig Start Date End Date Taking? Authorizing Provider  
tamsulosin (FLOMAX) 0.4 mg capsule Take 1 Cap by mouth daily. 3/30/19   Kelly Solis NP  
methadone (DOLOPHINE) 5 mg tablet Take 1 Tab by mouth every six (6) hours. Max Daily Amount: 20 mg. Pt has been stable on this dose for many years, please do not taper or stop. Instructed to take DOS per Anesthesia guidelines. Indications: chronic pain, narcotic addiction 3/29/19   Kelly Solis NP  
DULoxetine (CYMBALTA) 60 mg capsule Take 1 Cap by mouth daily. 3/30/19   Kelly Solis NP  
clonazePAM (KLONOPIN) 0.5 mg tablet Take 1 Tab by mouth three (3) times daily. Max Daily Amount: 1.5 mg. Indications: anxiety 3/29/19   Kelly Solis NP  
carvedilol (COREG) 6.25 mg tablet Take 1 Tab by mouth two (2) times daily (with meals). 3/29/19   Kelly Solis NP  
amLODIPine (NORVASC) 10 mg tablet Take 1 Tab by mouth daily.  3/29/19   Tiffanie Alexandre, DILLON  
 predniSONE (DELTASONE) 5 mg tablet Take 1 Tab by mouth two (2) times a day. 5mg BID 3/29/19   Kelly Solis NP  
docusate sodium (COLACE) 100 mg capsule Take 1 Cap by mouth nightly. 3/4/19   Wil Cook, DO  
OTHER,NON-FORMULARY, Take 30 mL by mouth three (3) times daily. PROHEAL    Provider, Historical  
Lactobacillus acidophilus (ACIDOPHILUS) cap Take 1 Cap by mouth daily. Provider, Historical  
polyethylene glycol (MIRALAX) 17 gram packet Take 17 g by mouth daily as needed. Provider, Historical  
insulin lispro (HUMALOG) 100 unit/mL kwikpen 5 Units by SubCUTAneous route Before breakfast, lunch, and dinner. Provider, Historical  
insulin detemir U-100 (LEVEMIR FLEXTOUCH) 100 unit/mL (3 mL) inpn 5 Units by SubCUTAneous route every morning. Provider, Historical  
mirtazapine (REMERON) 7.5 mg tablet Take 7.5 mg by mouth nightly. Provider, Historical  
OTHER,NON-FORMULARY, Insert  into rectum as needed. Preparation H max stregth    Provider, Historical  
nystatin (MYCOSTATIN) powder Apply  to affected area three (3) times daily. Under breasts and ABD folds    Provider, Historical  
potassium chloride (KLOR-CON) 10 mEq tablet Take 10 mEq by mouth two (2) times a day. Provider, Historical  
acetaminophen (TYLENOL EXTRA STRENGTH) 500 mg tablet Take 500 mg by mouth every six (6) hours as needed for Pain. Provider, Historical  
ferrous gluconate 324 mg (38 mg iron) tablet Take 324 mg by mouth Daily (before breakfast). Provider, Historical  
levothyroxine (SYNTHROID) 75 mcg tablet Take 75 mcg by mouth Daily (before breakfast). Provider, Historical  
hydrOXYzine pamoate (VISTARIL) 50 mg capsule Take 25 mg by mouth two (2) times daily as needed for Itching. Provider, Historical  
multivitamin with minerals (HAIR,SKIN AND NAILS PO) Take 1 Tab by mouth daily.     Provider, Historical  
loperamide (IMODIUM) 2 mg capsule Take 4 mg by mouth as needed for Diarrhea. 8/39/93   Cierra Allison MD  
raNITIdine (ZANTAC) 150 mg tablet Take 150 mg by mouth two (2) times a day. Provider, Historical  
CHOLECALCIFEROL, VITAMIN D3, (VITAMIN D3 PO) Take 1,000 mg by mouth daily. Provider, Historical  
CALCIUM CARBONATE/VITAMIN D3 (CALCIUM 600 + D,3, PO) Take 600 mg by mouth daily. Provider, Historical  
KRILL/OM-3/DHA/EPA/PHOSPHO/AST (MEGARED OMEGA-3 KRILL OIL PO) Take 1 Cap by mouth daily. Provider, Historical  
aspirin delayed-release 81 mg tablet Take 81 mg by mouth nightly. Ok to continue per anesthesia guidelines. Provider, Historical  
  
 
Allergies Allergen Reactions  Contrast Agent [Iodine] Anaphylaxis  Actifed [Triprolidine-Pseudoephedrine] Nausea Only  Allopurinol Other (comments) Elevated blood pressure & pt states could not walk or see until medication was out of her system.  Decongestant [Pseudoephedrine Hcl] Rash and Itching  Lexapro [Escitalopram Oxalate] Other (comments) Lips/Mouth swelling  Lyrica [Pregabalin] Unknown (comments)  Minizide [Prazosin-Polythiazide] Unknown (comments)  Mobic [Meloxicam] Other (comments) Facial swelling  Omnipaque [Iohexol] Hives, Swelling and Other (comments) Wheezing and/or shortness of breath  Sertraline Other (comments) Mouth, lips swell  Spironolactone Itching Pt complains that she breaks out in clammy sweat with itching and stinging in her upper arms, dry mouth, funny feeling tongue, SOB, and increased anxiety.  Sulfamethazine Other (comments) Mouth/lips swelling Review of Systems: 
 
Constitutional: Negative for chills and fever. HENT: Negative for rhinorrhea and sore throat. Eyes: Negative for pain, redness and visual disturbance. Respiratory: Negative for chest tightness, + shortness of breath and no wheezing. Cardiovascular: Negative for chest pain and leg swelling. Gastrointestinal: Negative for abdominal pain, bowel incontinence, diarrhea, nausea and vomiting. Genitourinary: Negative for bladder incontinence, dysuria and hematuria. Musculoskeletal: Negative for back pain, gait problem, neck pain and neck stiffness. Skin: Negative for color change and rash. Neurological: negative for speech difficulty. Negative for focal weakness, weakness, numbness, headaches and loss of balance. Psychiatric/Behavioral: Negative for agitation, confusion and memory loss. Objective:  
 
Vitals:  
 05/17/19 2229 05/17/19 2241 05/17/19 2301 05/17/19 2320 BP: 134/62 145/65 145/67 161/67 Pulse: 83 80 78 85 Resp:      
Temp:      
SpO2: 91% 92% (!) 89% 93% Weight:      
Height:      
  
 
Physical Exam: 
 
General:                    The patient is a pleasant elderly female in no acute distress. Head:                                   Normocephalic/atraumatic. Eyes:                                   palpebral pallor, no scleral icterus. ENT:                                    External auricular and nasal exam within normal limits. Mucous membranes are moist. 
Neck:                                   Supple, non-tender, no JVD. Lungs:                       Clear to auscultation bilaterally without wheezes or crackles. No respiratory distress or accessory muscle use. Heart:                                  Regular rate and rhythm, without murmurs, rubs, or gallops. Abdomen:                  Soft, non-tender, non-distended with normoactive bowel sounds. Genitourinary:           No tenderness over the bladder or bilateral CVAs. Extremities:              loss of great toe and second toe of the left foot. Left heel with chronic looking wound with some oozing. Skin:                                   left shin is slightly red and tender. Pulses:                      Radial and dorsalis pedis pulses present 2+ bilaterally. Capillary refill <2s. Neurologic:                CN II-XII grossly intact and symmetrical.  
                                            Moving all four extremities well with no focal deficits. Psychiatric:                Pleasant demeanor, appropriate affect. Alert and oriented x 3 Lab and data Recent Results (from the past 24 hour(s)) POC LACTIC ACID Collection Time: 05/17/19  8:31 PM  
Result Value Ref Range Lactic Acid (POC) 3.03 (H) 0.5 - 1.9 mmol/L  
GLUCOSE, POC Collection Time: 05/17/19  8:34 PM  
Result Value Ref Range Glucose (POC) 283 (H) 65 - 100 mg/dL CBC WITH AUTOMATED DIFF Collection Time: 05/17/19  8:36 PM  
Result Value Ref Range WBC 14.6 (H) 4.3 - 11.1 K/uL  
 RBC 3.72 (L) 4.05 - 5.2 M/uL HGB 9.3 (L) 11.7 - 15.4 g/dL HCT 31.3 (L) 35.8 - 46.3 % MCV 84.1 79.6 - 97.8 FL  
 MCH 25.0 (L) 26.1 - 32.9 PG  
 MCHC 29.7 (L) 31.4 - 35.0 g/dL  
 RDW 16.0 (H) 11.9 - 14.6 % PLATELET 882 834 - 575 K/uL MPV 9.7 9.4 - 12.3 FL ABSOLUTE NRBC 0.00 0.0 - 0.2 K/uL  
 DF AUTOMATED NEUTROPHILS 76 43 - 78 % LYMPHOCYTES 16 13 - 44 % MONOCYTES 6 4.0 - 12.0 % EOSINOPHILS 1 0.5 - 7.8 % BASOPHILS 0 0.0 - 2.0 % IMMATURE GRANULOCYTES 1 0.0 - 5.0 %  
 ABS. NEUTROPHILS 11.0 (H) 1.7 - 8.2 K/UL  
 ABS. LYMPHOCYTES 2.4 0.5 - 4.6 K/UL  
 ABS. MONOCYTES 0.9 0.1 - 1.3 K/UL  
 ABS. EOSINOPHILS 0.1 0.0 - 0.8 K/UL  
 ABS. BASOPHILS 0.1 0.0 - 0.2 K/UL  
 ABS. IMM. GRANS. 0.2 0.0 - 0.5 K/UL METABOLIC PANEL, COMPREHENSIVE Collection Time: 05/17/19  8:36 PM  
Result Value Ref Range Sodium 139 136 - 145 mmol/L Potassium 4.3 3.5 - 5.1 mmol/L Chloride 99 98 - 107 mmol/L  
 CO2 32 21 - 32 mmol/L Anion gap 8 7 - 16 mmol/L Glucose 269 (H) 65 - 100 mg/dL BUN 35 (H) 8 - 23 MG/DL  Creatinine 1.14 (H) 0.6 - 1.0 MG/DL  
 GFR est AA 58 (L) >60 ml/min/1.73m2 GFR est non-AA 48 (L) >60 ml/min/1.73m2 Calcium 9.1 8.3 - 10.4 MG/DL Bilirubin, total 0.2 0.2 - 1.1 MG/DL  
 ALT (SGPT) 22 12 - 65 U/L  
 AST (SGOT) 13 (L) 15 - 37 U/L Alk. phosphatase 142 (H) 50 - 136 U/L Protein, total 7.8 6.3 - 8.2 g/dL Albumin 2.7 (L) 3.2 - 4.6 g/dL Globulin 5.1 (H) 2.3 - 3.5 g/dL A-G Ratio 0.5 (L) 1.2 - 3.5 PROCALCITONIN Collection Time: 05/17/19  8:36 PM  
Result Value Ref Range Procalcitonin 0.2 ng/mL C REACTIVE PROTEIN, QT Collection Time: 05/17/19  8:36 PM  
Result Value Ref Range C-Reactive protein 7.6 (H) 0.0 - 0.9 mg/dL SED RATE, AUTOMATED Collection Time: 05/17/19  8:36 PM  
Result Value Ref Range Sed rate, automated 112 (H) 0 - 30 mm/hr URINALYSIS W/ RFLX MICROSCOPIC Collection Time: 05/17/19 10:29 PM  
Result Value Ref Range Color YELLOW Appearance CLOUDY Specific gravity 1.013 1.001 - 1.023    
 pH (UA) 5.5 5.0 - 9.0 Protein 100 (A) NEG mg/dL Glucose NEGATIVE  mg/dL Ketone NEGATIVE  NEG mg/dL Bilirubin NEGATIVE  NEG Blood NEGATIVE  NEG Urobilinogen 0.2 0.2 - 1.0 EU/dL Nitrites NEGATIVE  NEG Leukocyte Esterase NEGATIVE  NEG    
 WBC 5-10 0 /hpf  
 RBC 0 0 /hpf Epithelial cells 0-3 0 /hpf Bacteria 4+ (H) 0 /hpf Casts 0 0 /lpf  
BNP Collection Time: 05/17/19 10:33 PM  
Result Value Ref Range BNP 99 (H) 0 pg/mL XR chest  
5- IMPRESSION: Prominent interstitial lung markings may relate to early CHF/fluid 
overload or atypical pneumonia. XR foot 5- IMPRESSION: No acute process. Assessment:  
 
Hospital Problems  Date Reviewed: 5/25/2016 Codes Class Noted POA * (Principal) Non healing left heel wound ICD-10-CM: L78.911N ICD-9-CM: 892.1  5/17/2019 Unknown Acute respiratory failure with hypoxia ([facility] ICD-10-CM: J96.01 
ICD-9-CM: 518.81  1/16/2019 Yes Mixed hyperlipidemia ICD-10-CM: E78.2 ICD-9-CM: 272.2  7/27/2018 Yes DM2 (diabetes mellitus, type 2) (HCC) ICD-10-CM: E11.9 ICD-9-CM: 250.00  7/27/2018 Yes Plan:  
 
Left heel non-healing wound Leukocytosis  
shortness of breath and pulmonary congestion. Acute respiratory failure with hypoxia Admit to medical floor. IV access. IV diuretic. Monitor. Septic work up and empiric IV antibiotics. Ask ID to help with plan since patient has been on long-term antibiotics. Need work-ups regarding cardiac function due to possible pulmonary edema. Check echo. Give Lasix 40 mg IV once tonight. Check CXR in AM.  
 
Diabetes mellitus type 2 Monitor blood sugar. Cover with insulin sliding scale accordingly. Hyperlipidemia Continue home medications. Other medical issues. Continue home medications as appropriate. I have discussed with patient regarding advance directive. Patient would like to have a DNR status. I have discussed the plan of care with patient and family members at bedside. DVT prophylaxis : heparin SC Patient has pain and will be treated. Further treatments will depend on initial responses and findings. Signed By: Laurin Duverney, MD   
 May 17, 2019

## 2019-05-18 NOTE — PROGRESS NOTES
05/18/19 0129 Dual Skin Pressure Injury Assessment Dual Skin Pressure Injury Assessment X Second Care Provider (Based on [address] [place] [address]) Boyd Hoffman, RN Sacrum  Right Side Foot Left  
 
R gluteal wound with open area, Allevyn placed; L heel wound with moderate drainage, abd bruising

## 2019-05-18 NOTE — ED NOTES
TRANSFER - OUT REPORT: 
 
Verbal report given to Viraj(name) on Smith Bumpers  being transferred to [facility](unit) for routine progression of care Report consisted of patients Situation, Background, Assessment and  
Recommendations(SBAR). Information from the following report(s) SBAR was reviewed with the receiving nurse. Lines:  
Peripheral IV 05/17/19 Left Antecubital (Active) Site Assessment Clean, dry, & intact 5/17/2019  9:26 PM  
Phlebitis Assessment 0 5/17/2019  9:26 PM  
Infiltration Assessment 0 5/17/2019  9:26 PM  
Dressing Status Clean, dry, & intact 5/17/2019  9:26 PM  
   
Peripheral IV 05/17/19 Right Forearm (Active) Site Assessment Clean, dry, & intact 5/17/2019  9:34 PM  
Phlebitis Assessment 0 5/17/2019  9:34 PM  
Infiltration Assessment 0 5/17/2019  9:34 PM  
Dressing Status Clean, dry, & intact 5/17/2019  9:34 PM  
  
 
Opportunity for questions and clarification was provided. Patient transported with: 
 [phone]

## 2019-05-18 NOTE — PROGRESS NOTES
TRANSFER - IN REPORT: 
 
Verbal report received from Edilberto RN(name) on Elva Alexis  being received from ED(unit) for routine progression of care Report consisted of patients Situation, Background, Assessment and  
Recommendations(SBAR). Information from the following report(s) SBAR, ED Summary, [facility] and Recent Results was reviewed with the receiving nurse. Opportunity for questions and clarification was provided. Assessment to be completed upon patients arrival to unit and care assumed.

## 2019-05-18 NOTE — PROGRESS NOTES
Pharmacokinetic Consult to Pharmacist 
 
Verle Gaucher is a 80 y.o. female being treated for skin and soft tissue infection with vancomycin. Height: 5' 4\" (162.6 cm)  Weight: (!) 170.5 kg (375 lb 14.2 oz) Lab Results Component Value Date/Time BUN 35 (H) 05/17/2019 08:36 PM  
 Creatinine 1.14 (H) 05/17/2019 08:36 PM  
 WBC 14.6 (H) 05/17/2019 08:36 PM  
 Procalcitonin 0.2 05/17/2019 08:36 PM  
 Lactic acid 0.9 03/26/2019 06:52 AM  
 Lactic Acid (POC) 3.03 (H) 05/17/2019 08:31 PM  
  
Estimated Creatinine Clearance: 57.5 mL/min (A) (based on SCr of 1.14 mg/dL (H)). CULTURES: 
All Micro Results Procedure Component Value Units Date/Time CULTURE, BLOOD [[id number]] Collected:  05/17/19 2133 Order Status:  Completed Specimen:  Blood Updated:  05/17/19 2158 CULTURE, BLOOD [[id number]] Collected:  05/17/19 2035 Order Status:  Completed Specimen:  Blood Updated:  05/17/19 2140 Day 1 of vancomycin. Goal trough is 10-20. Vancomycin dose initiated at 2,500 mg load, followed by 1,500 mg q24h. Will continue to follow patient. Thank you, Priscila Rivera, PharmD

## 2019-05-18 NOTE — ED PROVIDER NOTES
80-year-old female presents with abnormal labs per her primary care doctor. She was discharged from the rehab facility 2 weeks ago. She was initially admitted for rib fracture and subsequently developed osteomyelitis of her left second finger and diabetic foot ulcer with cellulitis of her left leg. She finished IV antibiotics 1 month ago for the osteomyelitis. She has then been on Keflex for the past 4 weeks for cellulitis. Erythema has improved. She has been treated with bilateral Unna boots. She had left foot wound debrided today. Daughter has noticed some recent shortness of breath. She denies cough or fever. She followed up with her primary care physician yesterday and labs were drawn. She had significant elevation of her inflammatory markers and white blood cell counts was advised to come to emergency department for possible osteomyelitis of her foot. Patient was also found to have oxygen saturations of 90% on arrival.  She is not on home oxygen. Abnormal Lab Results Associated symptoms include shortness of breath. Pertinent negatives include no chest pain, no abdominal pain and no headaches. Past Medical History:  
Diagnosis Date  Arthritis  Chronic diastolic congestive heart failure ([address] 1/16/2019  Chronic pain   
 bilat feet & hands  Edema BLE & bilat fingers; pt takes diuretic daily; pt states her swelling is related to the nerve disease she is being treated for at [facility]  GERD (gastroesophageal reflux disease) 1/16/2019  Gouty arthritis  History of MI (myocardial infarction) 1980's \"light heart attack\"; pt states MD at [facility] mentioned it to her; pt stated MD told her \"was on the back part of her heart & would not give her any problems\"; pt takes aspirin 81 mg daily  Hypertension   
 managed w/med  Hypothyroidism   
 s/p partial thyroidectomy 1960; managed w/med  Mild heartburn   
 takes tums prn  Neuropathy 1980's pt states has been treated by [facility] for \"nerve problems in her feet & hands\"  Post-operative nausea and vomiting  Type 2 diabetes mellitus without complication, without long-term current use of insulin ([address] 7/27/2018 Past Surgical History:  
Procedure Laterality Date  HX CYST REMOVAL Right 1950's  
 upper eyelid  HX DILATION AND CURETTAGE  [id number]  
 ectopic pregnancy; right BSO done  HX HEART CATHETERIZATION  late 1979  
 pt states no stents [id number] [facility]  
 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  
 nerve removed out of left leg [address] [facility]  HX UROLOGICAL    
 cystoscopy Family History:  
Problem Relation Age of Onset  Cancer Mother  Heart Disease Father  Stroke Father Social History Socioeconomic History  Marital status:  Spouse name: Not on file  Number of children: Not on file  Years of education: Not on file  Highest education level: Not on file Occupational History  Not on file Social Needs  Financial resource strain: Not on file  Food insecurity:  
  Worry: Not on file Inability: Not on file  Transportation needs:  
  Medical: Not on file Non-medical: Not on file Tobacco Use  Smoking status: Never Smoker  Smokeless tobacco: Never Used Substance and Sexual Activity  Alcohol use: No  
 Drug use: No  
 Sexual activity: Not on file Lifestyle  Physical activity:  
  Days per week: Not on file Minutes per session: Not on file  Stress: Not on file Relationships  Social connections:  
  Talks on phone: Not on file Gets together: Not on file Attends Orthodox service: Not on file Active member of club or organization: Not on file Attends meetings of clubs or organizations: Not on file Relationship status: Not on file  Intimate partner violence:  
  Fear of current or ex partner: Not on file Emotionally abused: Not on file Physically abused: Not on file Forced sexual activity: Not on file Other Topics Concern  Not on file Social History Narrative  Not on file ALLERGIES: Contrast agent [iodine]; Actifed [triprolidine-pseudoephedrine]; Allopurinol; Decongestant [pseudoephedrine hcl]; Lexapro [escitalopram oxalate]; Lyrica [pregabalin]; Minizide [prazosin-polythiazide]; Mobic [meloxicam]; Omnipaque [iohexol]; Sertraline; Spironolactone; and Sulfamethazine Review of Systems Constitutional: Positive for fatigue. Negative for chills and fever. HENT: Negative for hearing loss. Eyes: Negative for visual disturbance. Respiratory: Positive for shortness of breath. Negative for cough. Cardiovascular: Negative for chest pain and palpitations. Gastrointestinal: Negative for abdominal pain, diarrhea, nausea and vomiting. Genitourinary: Negative for dysuria. Musculoskeletal: Positive for arthralgias and myalgias. Negative for back pain. Skin: Positive for wound. Negative for rash. Neurological: Negative for weakness and headaches. Psychiatric/Behavioral: Negative for confusion. Vitals:  
 05/17/19 2024 05/17/19 2124 BP: 131/67 Pulse: 78 Resp: 18 Temp: 98.2 °F (36.8 °C) SpO2: 90% 95% Weight: (!) 170.5 kg (375 lb 14.2 oz) Height: 5' 4\" (1.626 m) Physical Exam  
Constitutional: She appears well-developed and well-nourished. HENT:  
Head: Normocephalic and atraumatic. Eyes: Pupils are equal, round, and reactive to light. EOM are normal.  
Neck: Normal range of motion. Neck supple. Cardiovascular: Normal heart sounds. An irregular rhythm present. Pulmonary/Chest: Effort normal and breath sounds normal.  
Abdominal: Soft. There is no tenderness. Musculoskeletal: She exhibits no deformity. Left hand: She exhibits decreased range of motion. She exhibits no tenderness and no swelling. Hands: 
     Feet: 
 
Neurological: She is alert. Skin: Skin is warm and dry. There is erythema. Chronic venous stasis changes Psychiatric: Her behavior is normal.  
Nursing note and vitals reviewed. MDM Number of Diagnoses or Management Options Diabetic ulcer of left foot associated with other specified diabetes mellitus, unspecified part of foot, unspecified ulcer stage ([address]): Hypoxia:  
Sepsis, due to unspecified organism [facility]):  
Diagnosis management comments: Parts of this document were created using dragon voice recognition software. The chart has been reviewed but errors may still be present. Increasing inflammatory markers with uncontrolled blood sugar and possible interstitial pneumonia versus fluid overload on chest x-ray with hypoxia. Discussed with hospitalist for admission. Given Zosyn and vancomycin. Amount and/or Complexity of Data Reviewed Clinical lab tests: ordered and reviewed (Results for orders placed or performed during the hospital encounter of 05/17/19 
-CBC WITH AUTOMATED DIFF Result                      Value             Ref Range WBC                         14.6 (H)          4.3 - 11.1 K* 
     RBC                         3.72 (L)          4.05 - 5.2 M* HGB                         9.3 (L)           11.7 - 15.4 * HCT                         31.3 (L)          35.8 - 46.3 % MCV                         84.1              79.6 - 97.8 * MCH                         25.0 (L)          26.1 - 32.9 * MCHC                        29.7 (L)          31.4 - 35.0 * RDW                         16.0 (H)          11.9 - 14.6 % PLATELET                    449               150 - 450 K/* MPV                         9.7               9.4 - 12.3 FL ABSOLUTE NRBC               0.00              0.0 - 0.2 K/* DF                          AUTOMATED NEUTROPHILS                 76                43 - 78 % LYMPHOCYTES                 16                13 - 44 % MONOCYTES                   6                 4.0 - 12.0 % EOSINOPHILS                 1                 0.5 - 7.8 % BASOPHILS                   0                 0.0 - 2.0 % IMMATURE GRANULOCYTES       1                 0.0 - 5.0 %   
     ABS. NEUTROPHILS            11.0 (H)          1.7 - 8.2 K/* ABS. LYMPHOCYTES            2.4               0.5 - 4.6 K/* ABS. MONOCYTES              0.9               0.1 - 1.3 K/* ABS. EOSINOPHILS            0.1               0.0 - 0.8 K/* ABS. BASOPHILS              0.1               0.0 - 0.2 K/* ABS. IMM. GRANS.            0.2               0.0 - 0.5 K/* 
-METABOLIC PANEL, COMPREHENSIVE Result                      Value             Ref Range Sodium                      139               136 - 145 mm* Potassium                   4.3               3.5 - 5.1 mm* Chloride                    99                98 - 107 mmo* CO2                         32                21 - 32 mmol* Anion gap                   8                 7 - 16 mmol/L Glucose                     269 (H)           65 - 100 mg/* BUN                         35 (H)            8 - 23 MG/DL Creatinine                  1.14 (H)          0.6 - 1.0 MG* 
     GFR est AA                  58 (L)            >60 ml/min/1* GFR est non-AA              48 (L)            >60 ml/min/1* Calcium                     9.1               8.3 - 10.4 M*      Bilirubin, total            0.2               0.2 - 1.1 MG* 
 ALT (SGPT)                  22                12 - 65 U/L   
     AST (SGOT)                  13 (L)            15 - 37 U/L Alk. phosphatase            142 (H)           50 - 136 U/L Protein, total              7.8               6.3 - 8.2 g/* Albumin                     2.7 (L)           3.2 - 4.6 g/* Globulin                    5.1 (H)           2.3 - 3.5 g/* A-G Ratio                   0.5 (L)           1.2 - 3.5     
-PROCALCITONIN Result                      Value             Ref Range Procalcitonin               0.2               ng/mL         
-POC LACTIC ACID Result                      Value             Ref Range Lactic Acid (POC)           3.03 (H)          0.5 - 1.9 mm* 
-GLUCOSE, POC Result                      Value             Ref Range Glucose (POC)               283 (H)           65 - 100 mg/* 
) Tests in the radiology section of CPT®: ordered and reviewed (Xr Chest Pa Lat Result Date: 5/17/2019 EXAM: Chest x-ray. INDICATION: Hypoxia. COMPARISON: March 25, 2019. TECHNIQUE: Frontal and lateral views. FINDINGS: There are new prominent interstitial lung markings bilaterally, which may relate to edema or atypical pneumonia. The heart is enlarged. No pneumothorax or pleural effusion is seen. IMPRESSION: Prominent interstitial lung markings may relate to early CHF/fluid overload or atypical pneumonia. Xr Foot Lt Min 3 V Result Date: 5/17/2019 EXAM: Left foot x-rays. INDICATION: Wound. COMPARISON: None. TECHNIQUE: 3 views. FINDINGS: There have been prior first and second digit amputations. No acute fracture or radiographic findings of osteomyelitis are identified. Also noted is an old third metatarsal fracture and moderate degenerative changes in the midfoot. IMPRESSION: No acute process. ) Tests in the medicine section of CPT®: reviewed and ordered Procedures

## 2019-05-18 NOTE — ED TRIAGE NOTES
Pt arrives in care of family, sent to ED by pmd for abnormal lab results. Family reports pt discharged from [facility] approx 2 weeks pta after admission for fall, wound to left heel and toes. Family reports WBC 10 at time of discharge, last thurs 16.0 and now 5/16 20. Elevated CRP, WBC, SED and uric acid. Hx gout, started colchicine today. sats 90% ra on arrival, family states normal since discharge from [facility]. Denies hx COPD. bgl 283, reports chicken pot pie approx 1830

## 2019-05-18 NOTE — PROGRESS NOTES
Hospitalist Progress Note 2019 Admit Date: 2019  8:51 PM  
NAME: Rosita Brownlee :  1933 MRN:  [id number] Attending: Kevin Zee MD 
PCP:  Marck Salmon MD 
 
SUBJECTIVE:  
Rosita Brownlee is a 80 y.o. female who came to ER due to his doctor found her testing result to show more inflammatory reaction and concerned with more infection that will requires work-ups and treatment in hospital.  
  
Patient was just recently discharged from a nursing home. She was sent there after hospital stay due to left 2nd finger osteomyelitis and finished IV Rocephin. She has had cellulitis left shin and has been on Keflex PO for the past month.  
  
Lab works show persistent and more elevated WBC. Her left heel wound is not healing and patient has more shortness of breath, so her doctor advised patient to come to ER.  
  
Information is obtained via patient's daughter who is a nurse.  
  
Hospitalist service is requested to admit the patient. Interval history (): patient examined at bedside. No acute events overnight. Patient's daughter at bedside, relates that left foot ulceration is draining and has been getting worse. She was recently in rehab related to decompensation from left 2nd digit osteomyelitis of the left hand which has been improving overall. However, because of rising WBC count/ESR/CRP and non-healing ulceration of the left foot, patient was referred to ED by her PCP. Patient has no sensation below her mid lower leg due to diabetic neuropathy. No current fevers/chills, chest pain, shortness of breath, abdominal pain, nausea/vomiting, or diarrhea. Patient with \"old\" osteomyelitis of 2nd left digit that is healing, no erythema or redness and no current concerns from patient currently. Review of Systems negative with exception of pertinent positives noted above PHYSICAL EXAM  
 
Visit Vitals /57 Pulse 78 Temp 98.8 °F (37.1 °C) Resp 17 Ht 5' 4\" (1.626 m) Wt (!) 167.4 kg (369 lb 1.6 oz) SpO2 97% BMI 63.36 kg/m² Temp (24hrs), Av.4 °F (36.9 °C), Min:98 °F (36.7 °C), Max:98.9 °F (37.2 °C) Oxygen Therapy O2 Sat (%): 97 % (19 1546) Pulse via Oximetry: 88 beats per minute (19 0030) O2 Device: Nasal cannula (19 0750) O2 Flow Rate (L/min): 2 l/min (19 0750) No intake or output data in the 24 hours ending 19 1621 General: No acute distress   
Lungs:  CTA Bilaterally. Heart:  Regular rate and rhythm,  No murmur, rub, or gallop Abdomen: Soft, Non distended, Non tender, Positive bowel sounds Extremities: Stage III/IV ulceration of left heel with visible, foul-smelling drainage. Healing ulceration of left distal 2nd digit with no drainage, redness, or swelling observed Neurologic:  No sensation to light touch from mid shins and distally b/l ASSESSMENT Active Hospital Problems Diagnosis Date Noted  Non healing left heel wound 2019  Acute respiratory failure with hypoxia ([facility] 2019  Mixed hyperlipidemia 2018  DM2 (diabetes mellitus, type 2) ([facility] 2018 Plan: # Non-healing diabetic ulceration of the left heel - MRI of LLE ordered 
- if suggestive of osteomyelitis, will consult vascular surgery to evaluate for debridement - patient has been on Keflex x1 month for left heel, recently finished ceftriaxone for finger osteomyelitis 
- continue empiric vancomycin/Zosyn - ID consulted for antibiotic recommendations 
- TTE ordered to evaluate for endocarditis - blood cultures x2 collected # Acute respiratory failure with hypoxia - currently on supplemental oxygen (not on at home) 
- repeat TTE as above 
- diuresis as tolerated 
- wean supplemental oxygen as tolerated # DM type II complicated by diabetic neuropathy 
- start Lantus 10 units - Humalog SSI and serial CBGs 
- goal CBGs between 140 and 180 # History of gout of bilateral fingers - on chronic prednisone, will continue 
- has had intolerance to allopurinol F/E/N: no fluids, replete electrolytes as needed, diabetic diet Ppx: heparin SQ for VTE Code Status: DNR Disposition: pending clinical workup as above. PT/OT consults and PPD ordered. All questions answered. Signed By: Jaye Cruz DO May 18, 2019

## 2019-05-19 NOTE — PROGRESS NOTES
END OF SHIFT NOTE: 
 
INTAKE/OUTPUT 
05/18 0701 - 05/19 0700 In: 725 [P.O.:100; I.V.:625] Out: 725 [Urine:725] Voiding: NO 
Catheter: YES(external) Drain:  
External Female Catheter 05/18/19 (Active) Site Assessment Clean, dry, & intact 5/18/2019  4:09 PM  
Repositioned Yes 5/18/2019  7:50 AM  
Perineal Care Yes 5/18/2019  7:50 AM  
Wick Changed Yes 5/18/2019  1:02 PM  
Suction Canister/Tubing Changed No 5/18/2019  4:09 PM  
Urine Output (mL) 75 ml 5/19/2019 12:40 AM  
 
 
 
 
 
 
Flatus: Patient does have flatus present. Stool:  0 occurrences. Characteristics: 
Stool Assessment Stool Color: [place] Stool Appearance: Soft Stool Amount: Large Stool Source/Status: Rectum Emesis: 0 occurrences. Characteristics: VITAL SIGNS Patient Vitals for the past 12 hrs: 
 Temp Pulse Resp BP SpO2  
05/19/19 1533 99.3 °F (37.4 °C) 89 18 132/72 93 % 05/19/19 1209 98.9 °F (37.2 °C) 89 18 136/76 94 % 05/19/19 0827 98.8 °F (37.1 °C) 97 18 164/70 90 % Pain Assessment Pain Intensity 1: 0 (05/19/19 0750) Pain Location 1: Hand, Wrist 
Pain Intervention(s) 1: Medication (see MAR) Patient Stated Pain Goal: 0 Ambulating No 
 
Shift report given to oncoming nurse at the bedside.  
 
Vita Nunn RN

## 2019-05-19 NOTE — PROGRESS NOTES
Hospitalist Progress Note 2019 Admit Date: 2019  8:51 PM  
NAME: Silvana Huynh :  1933 MRN:  [id number] Attending: Jefe Majano DO 
PCP:  Tenzin Sterling MD 
 
SUBJECTIVE:  
Silvana Huynh is a 80 y.o. female who came to ER due to his doctor found her testing result to show more inflammatory reaction and concerned with more infection that will requires work-ups and treatment in hospital.  
  
Patient was just recently discharged from a nursing home. She was sent there after hospital stay due to left 2nd finger osteomyelitis and finished IV Rocephin. She has had cellulitis left shin and has been on Keflex PO for the past month.  
  
Lab works show persistent and more elevated WBC. Her left heel wound is not healing and patient has more shortness of breath, so her doctor advised patient to come to ER.  
  
Information is obtained via patient's daughter who is a nurse.  
  
Hospitalist service is requested to admit the patient. Interval history (): patient examined at bedside. No acute events overnight. No family at bedside. No concerns at this time, she is eating breakfast. No fevers/chills, chest pain, shortness of breath, abdominal pain, nausea/vomiting, changes in LLE. Review of Systems negative with exception of pertinent positives noted above PHYSICAL EXAM  
 
Visit Vitals /76 Pulse 89 Temp 98.9 °F (37.2 °C) Resp 18 Ht 5' 4\" (1.626 m) Wt 76.6 kg (168 lb 12.8 oz) SpO2 94% BMI 28.97 kg/m² Temp (24hrs), Av.8 °F (37.1 °C), Min:98 °F (36.7 °C), Max:99.1 °F (37.3 °C) Oxygen Therapy O2 Sat (%): 94 % (19 1209) Pulse via Oximetry: 88 beats per minute (19 0030) O2 Device: Nasal cannula (19 1609) O2 Flow Rate (L/min): 2 l/min (19 1609) Intake/Output Summary (Last 24 hours) at 2019 1433 Last data filed at 2019 0456 Gross per 24 hour Intake 725 ml Output 725 ml Net 0 ml General: No acute distress   
Lungs:  CTA Bilaterally. Heart:  Regular rate and rhythm,  No murmur, rub, or gallop Abdomen: Soft, Non distended, Non tender, Positive bowel sounds Extremities: Stage III/IV ulceration of left heel with visible, foul-smelling drainage. Healing ulceration of left distal 2nd digit with no drainage, redness, or swelling observed Neurologic:  No sensation to light touch from mid shins and distally b/l ASSESSMENT Active Hospital Problems Diagnosis Date Noted  Non healing left heel wound 05/17/2019  Acute respiratory failure with hypoxia ([facility] [address] 01/16/2019  Mixed hyperlipidemia 07/27/2018  DM2 (diabetes mellitus, type 2) ([facility] [address] 07/27/2018 Plan: # Non-healing diabetic ulceration of the left heel - MRI of LLE ordered and read currently pending 
- vascular surgery consulted for further recommendations regarding debridement/arterial studies - patient has been on Keflex x1 month for left heel, recently finished ceftriaxone for finger osteomyelitis 
- continue empiric vancomycin/Zosyn - ID consulted for antibiotic recommendations 
- TTE without obvious vegetations - blood cultures x2 collected # Acute respiratory failure with hypoxia - currently on supplemental oxygen (not on anything at home) 
- repeat TTE as above 
- diuresis as tolerated, will place on Lasix 20 mg IV BID 
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 
- start Lantus 10 units - Humalog SSI and serial CBGs 
- goal CBGs between 140 and 180 # History of gout of bilateral fingers 
- on chronic prednisone, will continue 
- has had intolerance to allopurinol F/E/N: no fluids, replete electrolytes as needed, diabetic diet Ppx: heparin SQ for VTE Code Status: DNR Disposition: pending clinical workup as above. PT/OT consults and PPD ordered. All questions answered. Signed By: Leandro Tan DO May 19, 2019

## 2019-05-19 NOTE — CONSULTS
[facility] [address] [place], [address] [address] Phone: [phone] Fax: [phone] Subjective:  
  
Svitlana Cruz is a 80 y.o. female who presents for evaluation of a left heel ulcer. She was recently discharged from [address] [place] [address],[address] after 3-month admission following a fall. She says she has not been able to walk since the fall. She developed an ulcer on the left heel and was brought to ED for evaluation. Patient denies pain, fever. Vascular studies performed. Limited study since no pressures were recorded. Waveforms and velocities show a left popliteal artery stenosis, but there is a normal biphasic waveform at the left AT artery. Past Medical History:  
Diagnosis Date  Arthritis  Chronic diastolic congestive heart failure ([facility] [address] 1/16/2019  Chronic pain   
 bilat feet & hands  Edema BLE & bilat fingers; pt takes diuretic daily; pt states her swelling is related to the nerve disease she is being treated for at [facility]  GERD (gastroesophageal reflux disease) 1/16/2019  Gouty arthritis  History of MI (myocardial infarction) 1980's \"light heart attack\"; pt states MD at [facility] mentioned it to her; pt stated MD told her \"was on the back part of her heart & would not give her any problems\"; pt takes aspirin 81 mg daily  Hypertension   
 managed w/med  Hypothyroidism   
 s/p partial thyroidectomy 1960; managed w/med  Mild heartburn   
 takes tums prn  Neuropathy 1980's  
 pt states has been treated by [facility] for \"nerve problems in her feet & hands\"  Post-operative nausea and vomiting  Type 2 diabetes mellitus without complication, without long-term current use of insulin ([facility] [address] 7/27/2018 Past Surgical History:  
Procedure Laterality Date  HX CYST REMOVAL Right 1950's  
 upper eyelid  HX DILATION AND CURETTAGE  [id number]  
 ectopic pregnancy; right BSO done  HX HEART CATHETERIZATION  late 1979 pt states no stents [address] [facility] [address]  [place]  
 pt states was exploratory abdominal surgery too  HX ORTHOPAEDIC Bilateral [id number] Foot; bone taken out of 5th toe on right foot; left foot great toe & 1st toe amputation  HX ORTHOPAEDIC Left 1992  
 nerve removed out of left leg [address] [facility]  HX UROLOGICAL    
 cystoscopy Family History Problem Relation Age of Onset  Cancer Mother  Heart Disease Father  Stroke Father Social History Socioeconomic History  Marital status:  Spouse name: Not on file  Number of children: Not on file  Years of education: Not on file  Highest education level: Not on file Tobacco Use  Smoking status: Never Smoker  Smokeless tobacco: Never Used Substance and Sexual Activity  Alcohol use: No  
 Drug use: No  
  
Current Facility-Administered Medications Medication Dose Route Frequency  Vancomycin Trough Level Reminder   Other ONCE  
 senna-docusate (PERICOLACE) 8.6-50 mg per tablet 1 Tab  1 Tab Oral DAILY  insulin glargine (LANTUS) injection 10 Units  10 Units SubCUTAneous QHS  predniSONE (DELTASONE) tablet 10 mg  10 mg Oral DAILY WITH BREAKFAST  sodium chloride (NS) flush 5-40 mL  5-40 mL IntraVENous Q8H  
 sodium chloride (NS) flush 5-40 mL  5-40 mL IntraVENous PRN  
 acetaminophen (TYLENOL) tablet 650 mg  650 mg Oral Q6H PRN  
 ondansetron (ZOFRAN) injection 4 mg  4 mg IntraVENous Q6H PRN  piperacillin-tazobactam (ZOSYN) 3.375 g in 0.9% sodium chloride (MBP/ADV) 100 mL  3.375 g IntraVENous Q8H  
 insulin lispro (HUMALOG) injection   SubCUTAneous AC&HS  amLODIPine (NORVASC) tablet 10 mg  10 mg Oral DAILY  aspirin delayed-release tablet 81 mg  81 mg Oral QHS  carvedilol (COREG) tablet 6.25 mg  6.25 mg Oral BID WITH MEALS  
  cholecalciferol (VITAMIN D3) tablet 1,000 Units  1,000 Units Oral DAILY  clonazePAM (KlonoPIN) tablet 0.5 mg  0.5 mg Oral TID  DULoxetine (CYMBALTA) capsule 60 mg  60 mg Oral DAILY  ferrous gluconate 324 mg (38 mg iron) tablet 1 Tab  1 Tab Oral ACB  levothyroxine (SYNTHROID) tablet 75 mcg  75 mcg Oral ACB  methadone (DOLOPHINE) tablet 5 mg  5 mg Oral Q6H  
 famotidine (PEPCID) tablet 20 mg  20 mg Oral BID  tamsulosin (FLOMAX) capsule 0.4 mg  0.4 mg Oral DAILY  vancomycin (VANCOCIN) 1500 mg in  ml infusion  1,500 mg IntraVENous Q24H  
 heparin (porcine) injection 5,000 Units  5,000 Units SubCUTAneous Q8H Allergies Allergen Reactions  Contrast Agent [Iodine] Anaphylaxis  Actifed [Triprolidine-Pseudoephedrine] Nausea Only  Allopurinol Other (comments) Elevated blood pressure & pt states could not walk or see until medication was out of her system.  Decongestant [Pseudoephedrine Hcl] Rash and Itching  Lexapro [Escitalopram Oxalate] Other (comments) Lips/Mouth swelling  Lyrica [Pregabalin] Unknown (comments)  Minizide [Prazosin-Polythiazide] Unknown (comments)  Mobic [Meloxicam] Other (comments) Facial swelling  Omnipaque [Iohexol] Hives, Swelling and Other (comments) Wheezing and/or shortness of breath  Sertraline Other (comments) Mouth, lips swell  Spironolactone Itching Pt complains that she breaks out in clammy sweat with itching and stinging in her upper arms, dry mouth, funny feeling tongue, SOB, and increased anxiety.  Sulfamethazine Other (comments) Mouth/lips swelling Review of Systems: A comprehensive review of systems was negative except for that written in the History of Present Illness. Objective:  
 
Patient Vitals for the past 8 hrs: 
 BP Temp Pulse Resp SpO2  
05/19/19 1209 136/76 98.9 °F (37.2 °C) 89 18 94 % 05/19/19 0827 164/70 98.8 °F (37.1 °C) 97 18 90 % Temp (24hrs), Av.8 °F (37.1 °C), Min:98 °F (36.7 °C), Max:99.1 °F (37.3 °C) Physical Exam: 
Visit Vitals /76 Pulse 89 Temp 98.9 °F (37.2 °C) Resp 18 Ht 5' 4\" (1.626 m) Wt 76.6 kg (168 lb 12.8 oz) SpO2 94% BMI 28.97 kg/m² General appearance: alert, cooperative, no distress, appears stated age Head: Normocephalic, without obvious abnormality, atraumatic Extremities: extremities normal, atraumatic, no cyanosis or edema - except left foot. 1,2 toes amputated many years ago. There is a left heel ulcer, approx 4-5 cm diameter with eschar. Scant drainage on dressing, no cellulitis, crepitus, or fluctuance. Neurologic: Grossly normal 
L PT pulse is not palpable, but left DP pulse is palpable. Assessment:  
 
L heel ulcer - likely a decubitous (pressure) ulcer from prolonged non-ambulatory status. L foot is not ischemic, but vascular intervention might be considered if improved blood flow to heel (PT artery) is needed. Plan: Wound care Off-load feet Definitive plans per Dr. Tomer Mills tomorrow. Signed By: Alisa Larson MD   
 May 19, 2019 Elements of this note have been dictated using speech recognition software. As a result, errors of speech recognition may have occurred.

## 2019-05-19 NOTE — PROGRESS NOTES
END OF SHIFT NOTE: 
 
INTAKE/OUTPUT 
05/18 0701 - 05/19 0700 In: 725 [P.O.:100; I.V.:625] Out: 425 [Urine:425] Voiding: YES Catheter: NO 
Drain:  
External Female Catheter 05/18/19 (Active) Site Assessment Clean, dry, & intact 5/18/2019  4:09 PM  
Repositioned Yes 5/18/2019  7:50 AM  
Perineal Care Yes 5/18/2019  7:50 AM  
Wick Changed Yes 5/18/2019  1:02 PM  
Suction Canister/Tubing Changed No 5/18/2019  4:09 PM  
Urine Output (mL) 75 ml 5/19/2019 12:40 AM  
 
 
 
 
 
 
Flatus: Patient does have flatus present. Stool:  1 occurrences. Characteristics: 
Stool Assessment Stool Color: [place] Stool Appearance: Soft Stool Amount: Large Stool Source/Status: Rectum Emesis: 0 occurrences. Characteristics: VITAL SIGNS Patient Vitals for the past 12 hrs: 
 Temp Pulse Resp BP SpO2  
05/19/19 0344 99.1 °F (37.3 °C) 89 17 153/77 94 % 05/19/19 0040 98.9 °F (37.2 °C) 80 17 (!) 142/93 97 % 05/18/19 1941 98 °F (36.7 °C) 81 17 93/70 91 % Pain Assessment Pain Intensity 1: 6 (05/18/19 1609) Pain Location 1: Hand, Wrist 
Pain Intervention(s) 1: Medication (see MAR) Patient Stated Pain Goal: 0 Ambulating No 
Max assist of two to help her get to/from bedside commode; took a couple of steps in room. Shift report given to oncoming nurse at the bedside.  
 
Richie Albarran RN

## 2019-05-19 NOTE — PROGRESS NOTES
Up to bedside commode with max assist of 2.voided and had large soft brown BM;no obvious blood seen. Has external hemorrhoids which were able to  Be pushed back in. Left heel draining tan fluid in small amts; cleaned with wound cleanser and NS wet-to- dry applied  Due to eschar to left heel. .Tolerated well.

## 2019-05-20 NOTE — WOUND CARE
Patient has history of DM and PAD, seen by vascular, JOCELYNE studies ordered. Noted callous on tips of toes, recommend to leave open to air. Wound on left heel is 5x5x0. 3+cm with eschar slough and pink muscle exposed. Beside debridement of eschar and non adherent slough wound is now [id number] with scant slough and exposed muscle/ tendon. Recommend Iodosorb gel for 7 days, then discontinue and use wound gel. Cover with ABD, Mya wrap, change daily decreasing to 3 times per week once Iodosorb gel is discontinued. Will need wound clinic follow up. Recommend PAYAL hose for light compression, Dr. Kaushik Zavala notified, order received consider more agressive compression after JOCELYNE studies. Will monitor.

## 2019-05-20 NOTE — PROGRESS NOTES
Infectious Disease Progress Note Today's Date: 5/20/2019 Admit Date: 5/17/2019 Impression: · Left heel wound : Vascular studies do demonstrate high grade stenosis of L popliteal artery. MRI and Plain films not suggesting osteomyelitis. · Leukocytosis: improved. Ua negative. Blood cx negative. CXR without focal infiltrate to suggest pneumonia. Suspect due soft tissue infection left heal and LE. · CKD: CRT stable to below baseline. GFR improved to 56 cc/min. · S/p recent treatment for L 1st finger osteomyelitis in 2/2019. · DM: HgbA1c 10.2 in January 2019. · Gout: on chronic prednisone 10 mg daily. · Steroid dependent inflammatory arthritis Plan:  
· Change Zosyn to Cefepime/flagyl and follow WBC. Anti-infectives:  
Vancomycin Zosyn Subjective:  
Doing hair when I came in. No complaints. No diarrhea. No vaginal itching or d/c.  
 
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 She is known to ID for left first finger osteomyelitis, which was treated with cefepime in Feb-March 2019. Swab cultures from the wound grew MSSA and Morganella. She was transitioned to cefadroxil and cipro after completion of cefepime. In late March she was brought to [facility] for fever and chills, and she was thought to have bilateral cellulitis. She was treated and ultimately antibiotics were continued until 3/31. She had VRE in the urine which was not treated since she was asymptomatic at that time. She was discharged to a SNF and has since then returned home. She had labs done by her PCP on 5/16 which showed elevated WBC, ESR, and CRP. She was sent to the ER for further evaluation. She had WBC of 14.6 on presentation in the ER. She was started on vanc/zosyn and admitted to the hospitalist service. Review of Systems:  A comprehensive review of systems was negative except for that written in the History of Present Illness. Objective: Visit Vitals /76 Pulse 76 Temp 98.2 °F (36.8 °C) Resp 17 Ht 5' 4\" (1.626 m) Wt 71.2 kg (156 lb 14.4 oz) SpO2 96% BMI 26.93 kg/m² Temp (24hrs), Av °F (37.2 °C), Min:98.2 °F (36.8 °C), Max:99.4 °F (37.4 °C) Lines:  none Physical Exam: Exam:   
 General: NC/AT, alert and cooperative, looks stated age, in NAD HEENT: PERRL, non-icteric sclera Neck: supple, symmetric, no masses or lymphadenopathy Cardiac:Nl S1/S2, no murmurs/rubs/gallops/clicks, trace LE edema Pulmonary:clear bilaterally no rales/wheezes, good air movement Abdomen: soft, NT, non-distended, BS normal, no CVA tenderness Gentital:external genitalia normal Angelo : none Skin:no rashes, wound on back left heal with 45% slough; base beefy;  no odor or drainage. Redness above on mid to upper calf without  focal induration. MSK:no enlarged or swollen joints in limbs or sternoclavicular area Extremities: no cords/clots/cyanosis, pulses palpable and symmetric Psychiatric: mood and affect appropriate to situation Data Review: CBC:  
Recent Labs  
  19 
0425 19 [phone] 19 WBC 11.4* 14.7* 14.6*  
RBC 3.50* 3.78* 3.72* HGB 8.6* 9.3* 9.3* HCT 28.9* 31.4* 31.3*  
 446 449 GRANS 60 74 76 LYMPH 27 15 16 EOS 2 2 1 Microbiology:  
 
All Micro Results Procedure Component Value Units Date/Time CULTURE, BLOOD [[id number]] Collected:  19 Order Status:  Completed Specimen:  Blood Updated:  19 0979 Special Requests: --     
  LEFT Antecubital 
  
  Culture result: NO GROWTH 3 DAYS     
 CULTURE, BLOOD [[id number]] Collected:  19 Order Status:  Completed Specimen:  Blood Updated:  19 0512 Special Requests: --     
  RIGHT 
FOREARM Culture result: NO GROWTH 3 DAYS Imaging:  
 
Result Information Status: Final result (Exam End: 2019 08:12) Provider Status: Open Signed by  
 
 Signed Date/Time  Phone Pager Chaz Kinney 5/19/2019 11:49 [phone] Exam Information Status Exam Begun  Exam Ended Final [99] 5/19/2019 07:45 5/19/2019 08:12 Study Result TITLE: Lower extremity arterial ultrasound examination. 
  
INDICATION: Left heel ulcer. Diabetes mellitus, dyslipidemia, hypertension. 
  
TECHNIQUE: Grayscale, color, and Doppler interrogation performed. 
  
COMPARISON: None. 
  
RIGHT LOWER EXTREMITY:  Peak systolic velocities-- CFA = 119, PFA = 125, SFA = 
142, popliteal = 97, peroneal = 68, PTA = 89, SEBASTIAN = 133 cm/sec. Biphasic/triphasic waveform throughout. 
  
LEFT LOWER EXTREMITY:  Peak systolic velocities-- CFA = 150, PFA = 105, SFA = 
241, popliteal = 425, peroneal = 104, PTA = 69, SEBASTIAN = 112 cm/sec. Monophasic 
waveform in the popliteal artery and below. 
  
ABDOMEN:  Aorta = 1.9 cm diameter, 70 cm/s. 
  
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 Results MRI ANKLE LT W WO CONT (Accession [id number]) (Order [id number]) Allergies     
High: Contrast Agent [Iodine] Not Specified: Actifed [Triprolidine-pseudoephedrine]; Allopurinol;  Decongestant [Pseudoephedrine Hcl]; Lexapro [Escitalopram Oxalate]; Lyrica [Pregabalin]; Minizide [Prazosin-polythiazide]; Mobic [Meloxicam]; Omnipaque [Iohexol]; Sertraline;  Spironolactone;  Sulfamethazine Result Information Status: Final result (Exam End: 5/18/2019 18:09) Provider Status: Open Signed by Signed Date/Time  Phone Pager Krzysztof Burger 5/19/2019 15:04 [phone] Exam Information Status Exam Begun  Exam Ended Final [99] 5/18/2019 17:28 5/18/2019 18:09 Study Result EXAM: Left ankle MRI with and without contrast. 
ADDITIONAL CLINICAL INFORMATION: Concern for osteomyelitis with ulceration of 
the heel. COMPARISON: Left foot films dated May 17, 2019. 
  
Findings:  
There is a large multiloculated cyst of the calcaneus versus chronic vascular 
remnant. Multifocal osseous erosions of the midfoot. There is an ulceration 
along the dorsal aspect of the heel without evidence of underlying 
osteomyelitis. Achilles insertional and plantar calcaneal enthesophytes. Talar 
dome is intact. No evidence of acute fracture. Tendons are intact. Diffuse 
muscular atrophy 
  
IMPRESSION Impression: 1. Dorsal heel ulceration without evidence of osteomyelitis or drainable fluid 
collection. 2. Multifocal osseous erosions of the midfoot, correlate for possible rheumatoid 
arthritis. 3. Large multilobulated cystic lesion of the calcaneus with peripheral 
enhancement favored a vascular remnant versus multiloculated cyst, no internal 
enhancement. Consider follow-up radiographs in 3-6 months to ensure stability. Signed By: Anjel Timmons MD   
 May 20, 2019

## 2019-05-20 NOTE — PROGRESS NOTES
Hospitalist Progress Note 2019 Admit Date: 2019  8:51 PM  
NAME: Abigail Goyal :  1933 MRN:  [id number] Attending: Kirill Patricia DO 
PCP:  Veronica Whitney MD 
 
SUBJECTIVE:  
Abigail Goyal is a 80 y.o. female who came to ER due to his doctor found her testing result to show more inflammatory reaction and concerned with more infection that will requires work-ups and treatment in hospital.  
  
Patient was just recently discharged from a nursing home. She was sent there after hospital stay due to left 2nd finger osteomyelitis and finished IV Rocephin. She has had cellulitis left shin and has been on Keflex PO for the past month.  
  
Lab works show persistent and more elevated WBC. Her left heel wound is not healing and patient has more shortness of breath, so her doctor advised patient to come to ER.  
  
Information is obtained via patient's daughter who is a nurse.  
  
Hospitalist service is requested to admit the patient. Interval history (): patient examined at bedside. No acute events overnight. Eating breakfast without issue this morning. . No fevers/chills, chest pain, shortness of breath, abdominal pain, nausea/vomiting, changes in LLE. Review of Systems negative with exception of pertinent positives noted above PHYSICAL EXAM  
 
Visit Vitals /76 Comment: rn notified Pulse 76 Temp 98.2 °F (36.8 °C) Resp 17 Ht 5' 4\" (1.626 m) Wt 71.2 kg (156 lb 14.4 oz) SpO2 96% BMI 26.93 kg/m² Temp (24hrs), Av °F (37.2 °C), Min:98.2 °F (36.8 °C), Max:99.4 °F (37.4 °C) Oxygen Therapy O2 Sat (%): 96 % (19 0755) Pulse via Oximetry: 88 beats per minute (19 0030) O2 Device: Nasal cannula (19) O2 Flow Rate (L/min): 2 l/min (19) Intake/Output Summary (Last 24 hours) at 2019 1141 Last data filed at 2019 3578 Gross per 24 hour Intake  Output 1400 ml Net -1400 ml General: No acute distress   
 Lungs:  CTA Bilaterally. Heart:  Regular rate and rhythm,  No murmur, rub, or gallop Abdomen: Soft, Non distended, Non tender, Positive bowel sounds Extremities: Stage III/IV ulceration of left heel with visible, foul-smelling drainage. Healing ulceration of left distal 2nd digit with no drainage, redness, or swelling observed Neurologic:  No sensation to light touch from mid shins and distally b/l ASSESSMENT Active Hospital Problems Diagnosis Date Noted  Non healing left heel wound 05/17/2019  Acute respiratory failure with hypoxia ([address] 01/16/2019  Mixed hyperlipidemia 07/27/2018  DM2 (diabetes mellitus, type 2) ([facility] [address] 07/27/2018 Plan: # Non-healing diabetic ulceration of the left heel - MRI of LLE negative for osteomyelitis 
- vascular surgery consulted for further recommendations regarding debridement/arterial studies, she has a high-grade stenosis of L popliteal artery - patient has been on Keflex x1 month for left heel, recently finished ceftriaxone for finger osteomyelitis 
- continue empiric vancomycin - switch from Zosyn to cefepime/Flagyl per ID recs - ID consulted for antibiotic recommendations 
- TTE without obvious vegetations - blood cultures x2 collected with NGTD # Acute respiratory failure with hypoxia - currently on supplemental oxygen (not on anything at home) 
- repeat TTE as above 
- diuresis as tolerated, will place on Lasix 20 mg IV BID 
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 
- start Lantus 10 units - Humalog SSI and serial CBGs 
- goal CBGs between 140 and 180 # History of gout of bilateral fingers 
- on chronic prednisone, will continue 
- has had intolerance to allopurinol F/E/N: no fluids, replete electrolytes as needed, diabetic diet Ppx: heparin SQ for VTE Code Status: DNR Disposition: pending clinical workup as above.  PT/OT consults and PPD ordered. All questions answered. Signed By: Janet Yanes,  May 20, 2019

## 2019-05-20 NOTE — PROGRESS NOTES
END OF SHIFT NOTE: 
 
INTAKE/OUTPUT 
05/19 0701 - 05/20 0700 In: -  
Out: 1000 [Urine:1000] Voiding: YES Catheter: no;  external pure wick cath iin use Drain:  
External Female Catheter 05/18/19 (Active) Site Assessment Clean, dry, & intact 5/18/2019  4:09 PM  
Repositioned Yes 5/18/2019  7:50 AM  
Perineal Care Yes 5/18/2019  7:50 AM  
Wick Changed Yes 5/18/2019  1:02 PM  
Suction Canister/Tubing Changed No 5/18/2019  4:09 PM  
Urine Output (mL) 75 ml 5/19/2019 12:40 AM  
 
 
 
 
 
 
Flatus: Patient does have flatus present. Stool:  0 occurrences. Characteristics: 
Stool Assessment Stool Color: [place] Stool Appearance: Soft Stool Amount: Large Stool Source/Status: Rectum Emesis: 0 occurrences. Characteristics: VITAL SIGNS Patient Vitals for the past 12 hrs: 
 Temp Pulse Resp BP SpO2  
05/20/19 0227 98.9 °F (37.2 °C) 75 16 133/74 98 % 05/20/19 0215     (!) 88 % 05/19/19 2334 99.3 °F (37.4 °C) 90 17 131/74 95 % 05/19/19 2026 99.4 °F (37.4 °C) 82 20 119/56 99 % Pain Assessment Pain Intensity 1: 0 (05/19/19 0750) Pain Location 1: Hand, Wrist 
Pain Intervention(s) 1: Medication (see MAR) Patient Stated Pain Goal: 0 Ambulating No 
Slept long periods tonight Shift report given to oncoming nurse at the bedside.  
 
Cece Monroe RN

## 2019-05-20 NOTE — PROGRESS NOTES
Progress Note Patient: Verle Gaucher MRN: [id number]  SSN: [id number] YOB: 1933  Age: 80 y.o. Sex: female Admission Date: 5/17/2019 LOS: 1 day Subjective:  
 
Patient seen in conjunction with Dr. Shanice Michael for left heel ulceration. Patient reports long-standing loss of sensation in feet and minimal ambulation since last autumn. Vascular studies noted left popliteal artery stenosis with normal biphasic waveform at the left anterior tibial artery. (No ABIs or toe pressures measured.) Objective:  
 
Vitals:  
 05/20/19 1337 05/20/19 9143 05/20/19 0755 05/20/19 1205 BP: 133/74  145/76 122/72 Pulse: 75  76 77 Resp: 16  17 18 Temp: 98.9 °F (37.2 °C)  98.2 °F (36.8 °C) 98.3 °F (36.8 °C) SpO2: 98%  96% 97% Weight:  156 lb 14.4 oz (71.2 kg) Height:      
  
 
Physical Exam:  
GENERAL: alert, cooperative, no distress LUNG: normal respiratory effort, no audible wheezes HEART: regular rate and rhythm EXTREMITIES: left foot with surgically absent great and 2nd toes; left heel with shallow, wide ulceration, minimal eschar, mild drainage Lab/Data Review: 
BMP:  
Lab Results Component Value Date/Time  05/20/2019 04:25 AM  
 K 3.3 (L) 05/20/2019 04:25 AM  
  05/20/2019 04:25 AM  
 CO2 30 05/20/2019 04:25 AM  
 AGAP 9 05/20/2019 04:25 AM  
 GLU 81 05/20/2019 04:25 AM  
 BUN 22 05/20/2019 04:25 AM  
 CREA 1.00 05/20/2019 04:25 AM  
 GFRAA >60 05/20/2019 04:25 AM  
 GFRNA 56 (L) 05/20/2019 04:25 AM  
 
CBC:  
Lab Results Component Value Date/Time WBC 11.4 (H) 05/20/2019 04:25 AM  
 HGB 8.6 (L) 05/20/2019 04:25 AM  
 HCT 28.9 (L) 05/20/2019 04:25 AM  
  05/20/2019 04:25 AM  
  
 
Imaging: 
INDICATION: Left heel ulcer. Diabetes mellitus, dyslipidemia, hypertension. TECHNIQUE: Grayscale, color, and Doppler interrogation performed. COMPARISON: None.  
RIGHT LOWER EXTREMITY:  Peak systolic velocities-- CFA = 119, PFA = 125, SFA = 
 142, popliteal = 97, peroneal = 68, PTA = 89, SEBASTIAN = 133 cm/sec. Biphasic/triphasic waveform throughout. LEFT LOWER EXTREMITY:  Peak systolic velocities-- CFA = 150, PFA = 105, SFA = 
241, popliteal = 425, peroneal = 104, PTA = 69, SEBASTIAN = 112 cm/sec. Monophasic 
waveform in the popliteal artery and below. ABDOMEN:  Aorta = 1.9 cm diameter, 70 cm/s. 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 Assessment / Plan / Recommendations / Medical Decision Making:  
 
Principal Problem: 
  Non healing left heel wound (5/17/2019) Active Problems: 
  Mixed hyperlipidemia (7/27/2018) DM2 (diabetes mellitus, type 2) ([facility] [address] (7/27/2018) Acute respiratory failure with hypoxia ([email or web address] (1/16/2019) Continue care: - IV ABX 
- wound care NEVA GAONA [facility] RN consulted) 
- heel suspension - pain control Dr. Ata Pelletier discussed options for left foot care; he will review imaging and develop further plan Signed By: Raine Pineda PA-C May 20, 2019 Physician Assistant with [facility] Vascular Surgery Delores Bolaños MD / Kristie Esposito MD

## 2019-05-21 NOTE — PROGRESS NOTES
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 MRI and Plain films not suggesting osteomyelitis. JOCELYNE done last night. Vascular following. · Leukocytosis: resolved. Ua negative. Blood cx negative. CXR without focal infiltrate to suggest pneumonia. Suspect due soft tissue infection left heal and LE. Soft tissue left leg looks improved. ABX narrowed somewhat yesterday. · CKD: CRT stable to below baseline. GFR has normalized . Vanc level 5/19 slightly elevated. · S/p recent treatment for L 1st finger osteomyelitis in 2/2019. · DM: HgbA1c 10.2 in January 2019. · Gout: on chronic prednisone 10 mg daily. · Steroid dependent inflammatory arthritis Plan:  
· Change Vanc to doxy today and follow appearance LLE and WBC. · Continue Cefepime/Flagyl. Anti-infectives:  
Vancomycin 5/17- Zosyn 5/17-5/20 Cefepime/flagyl 5/20- Subjective:  
Getting ready eat breakfast.  No complaints. No diarrhea. Leg feels better. Wearing support hose 
 
80 y.o. female with steroid-dependent RA who presented to [facility] on 5/17/19 with leukocytosis. She is known to ID for left first finger osteomyelitis, which was treated with cefepime in Feb-March 2019. Swab cultures from the wound grew MSSA and Morganella. She was transitioned to cefadroxil and cipro after completion of cefepime. In late March she was brought to [facility] for fever and chills, and she was thought to have bilateral cellulitis. She was treated and ultimately antibiotics were continued until 3/31. She had VRE in the urine which was not treated since she was asymptomatic at that time. She was discharged to a SNF and has since then returned home. She had labs done by her PCP on 5/16 which showed elevated WBC, ESR, and CRP. She was sent to the ER for further evaluation. She had WBC of 14.6 on presentation in the ER. She was started on vanc/zosyn and admitted to the hospitalist service. Review of Systems:  A comprehensive review of systems was negative except for that written in the History of Present Illness. Objective:  
 
Visit Vitals /68 Pulse 88 Temp 98.3 °F (36.8 °C) Resp 17 Ht 5' 4\" (1.626 m) Wt 70.8 kg (156 lb 2.4 oz) SpO2 93% BMI 26.80 kg/m² Temp (24hrs), Av.6 °F (37 °C), Min:98.3 °F (36.8 °C), Max:99 °F (37.2 °C) Lines:  none Physical Exam: Exam:   
 General: NC/AT, alert and cooperative, looks stated age, in NAD HEENT: PERRL, non-icteric sclera Neck: supple, symmetric, no masses or lymphadenopathy Cardiac:Nl S1/S2, no murmurs/rubs/gallops/clicks, trace LE edema Pulmonary:clear bilaterally no rales/wheezes, good air movement Abdomen: soft, NT, non-distended, BS normal, no CVA tenderness Gentital:external genitalia normal Angelo : none Skin:no rashes, wound on back left heal with 45% slough; base beefy;  no odor or drainage. Left leg less swollen and warm on upper  calf/knee area MSK:no enlarged or swollen joints in limbs or sternoclavicular area Extremities: no cords/clots/cyanosis, pulses palpable and symmetric Psychiatric: mood and affect appropriate to situation Data Review: CBC:  
Recent Labs  
  19 
0544 19 
0425 19 Zacharykrista WBC 11.1 11.4* 14.7*  
RBC 3.41* 3.50* 3.78* HGB 8.3* 8.6* 9.3* HCT 28.5* 28.9* 31.4*  
 429 446 GRANS 58 60 74 LYMPH 29 27 15 EOS 2 2 2 Microbiology:  
 
All Micro Results Procedure Component Value Units Date/Time CULTURE, BLOOD [[id number]] Collected:  19 Order Status:  Completed Specimen:  Blood Updated:  19 4658 Special Requests: --     
  LEFT Antecubital 
  
  Culture result: NO GROWTH 3 DAYS     
 CULTURE, BLOOD [[id number]] Collected:  19 8346 Order Status:  Completed Specimen:  Blood Updated:  05/20/19 6525 Special Requests: --     
  RIGHT 
FOREARM Culture result: NO GROWTH 3 DAYS Imaging:  
 
Result Information Status: Final result (Exam End: 5/19/2019 08:12) Provider Status: Open Signed by Signed Date/Time  Phone Pager Ander Jackson 5/19/2019 11:49 [phone] Exam Information Status Exam Begun  Exam Ended Final [99] 5/19/2019 07:45 5/19/2019 08:12 Study Result TITLE: Lower extremity arterial ultrasound examination. 
  
INDICATION: Left heel ulcer. Diabetes mellitus, dyslipidemia, hypertension. 
  
TECHNIQUE: Grayscale, color, and Doppler interrogation performed. 
  
COMPARISON: None. 
  
RIGHT LOWER EXTREMITY:  Peak systolic velocities-- CFA = 119, PFA = 125, SFA = 
142, popliteal = 97, peroneal = 68, PTA = 89, SEBASTIAN = 133 cm/sec. Biphasic/triphasic waveform throughout. 
  
LEFT LOWER EXTREMITY:  Peak systolic velocities-- CFA = 150, PFA = 105, SFA = 
241, popliteal = 425, peroneal = 104, PTA = 69, SEBASTIAN = 112 cm/sec. Monophasic 
waveform in the popliteal artery and below. 
  
ABDOMEN:  Aorta = 1.9 cm diameter, 70 cm/s. 
  
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 Results MRI ANKLE LT W WO CONT (Accession [id number]) (Order [id number]) Allergies     
High: Contrast Agent [Iodine] Not Specified: Actifed [Triprolidine-pseudoephedrine]; Allopurinol;  Decongestant [Pseudoephedrine Hcl]; Lexapro [Escitalopram Oxalate]; Lyrica [Pregabalin]; Minizide [Prazosin-polythiazide]; Mobic [Meloxicam]; Omnipaque [Iohexol]; Sertraline;  Spironolactone;  Sulfamethazine Result Information Status: Final result (Exam End: 5/18/2019 18:09) Provider Status: Open Signed by Signed Date/Time  Phone Pager Glo Agarwal 5/19/2019 15:04 [phone] Exam Information Status Exam Begun  Exam Ended Final [99] 5/18/2019 17:28 5/18/2019 18:09  
 Study Result EXAM: Left ankle MRI with and without contrast. 
ADDITIONAL CLINICAL INFORMATION: Concern for osteomyelitis with ulceration of 
the heel. COMPARISON: Left foot films dated May 17, 2019. 
  
Findings: There is a large multiloculated cyst of the calcaneus versus chronic vascular 
remnant. Multifocal osseous erosions of the midfoot. There is an ulceration 
along the dorsal aspect of the heel without evidence of underlying 
osteomyelitis. Achilles insertional and plantar calcaneal enthesophytes. Talar 
dome is intact. No evidence of acute fracture. Tendons are intact. Diffuse 
muscular atrophy 
  
IMPRESSION Impression: 1. Dorsal heel ulceration without evidence of osteomyelitis or drainable fluid 
collection. 2. Multifocal osseous erosions of the midfoot, correlate for possible rheumatoid 
arthritis. 3. Large multilobulated cystic lesion of the calcaneus with peripheral 
enhancement favored a vascular remnant versus multiloculated cyst, no internal 
enhancement. Consider follow-up radiographs in 3-6 months to ensure stability. Signed By: Sobia Morrell MD   
 May 21, 2019

## 2019-05-21 NOTE — PROGRESS NOTES
Spoke to Ms. Dominique Zimmer of room 214 and her daughter Jacky Bowles about discharge planning. Ms. Dominiuqe Zimmer is adamant that she does not want to return to any SNF for STR. The discharge plan is to return to daughter's home, where sufficient DME is already set up. DME includes lift chair, [employer] [place] Stand Assist, manual wheelchair, electric wheelchair, and wheelchair ramp. There is also a full-time caregiver for about 14 hours per day when daughter Jacky Bowles is not at home. In addition, [facility] RN/OT/PT (phone [phone]; fax [phone]) was seeing Ms. Dominique Zimmer. Will send resumption of care [facility]IA [facility] [place]) orders to [facility] at discharge. Care Management Interventions Transition of Care Consult (CM Consult): Home Health [address]: No 
Reason [facility]: Patient already serviced by other home care/hospice agency Physical Therapy Consult: Yes Occupational Therapy Consult: Yes Current Support Network: Lives with Caregiver, [place] Confirm Follow Up Transport: Family Plan discussed with Pt/Family/Caregiver:  Yes

## 2019-05-21 NOTE — PROGRESS NOTES
END OF SHIFT NOTE: 
 
INTAKE/OUTPUT 
05/20 0701 - 05/21 0700 In: -  
Out: 0136 [[id number]] Voiding: YES Catheter: NO 
Drain:  
External Female Catheter 05/18/19 (Active) Site Assessment Clean, dry, & intact 5/21/2019  3:57 AM  
Repositioned Yes 5/21/2019  3:57 AM  
Perineal Care Yes 5/21/2019  3:57 AM  
Wick Changed No 5/21/2019  3:57 AM  
Suction Canister/Tubing Changed No 5/21/2019  3:57 AM  
Urine Output (mL) 350 ml 5/20/2019  7:30 PM  
 
 
 
 
 
 
Flatus: Patient does have flatus present. Stool:  0 occurrences. Characteristics: 
Stool Assessment Stool Color: Willye [place] Stool Appearance: Soft Stool Amount: Large Stool Source/Status: Rectum Emesis: 0 occurrences. Characteristics: VITAL SIGNS Patient Vitals for the past 12 hrs: 
 Temp Pulse Resp BP SpO2  
05/21/19 0357 98.3 °F (36.8 °C) 84 17 123/71 93 % 05/20/19 2256 99 °F (37.2 °C) 78 18 124/71 94 % 05/20/19 1930 98.7 °F (37.1 °C) 80 17 125/70 93 % Pain Assessment Pain Intensity 1: 0 (05/21/19 0357) Pain Location 1: Hand, Wrist 
Pain Intervention(s) 1: Medication (see MAR) Patient Stated Pain Goal: 0 Ambulating No 
 
Shift report given to oncoming nurse at the bedside.  
 
Michelle Gonzalez, RN

## 2019-05-21 NOTE — PROGRESS NOTES
Progress Note Patient: Elva Alexis MRN: [id number]  SSN: [id number] YOB: 1933  Age: 80 y.o. Sex: female Admission Date: 5/17/2019 LOS: 2 days Subjective:  
 
Patient has no complaints. All imaging reviewed by Dr. Christelle Mae, who spoke with patient and her daughter (staff RN) at length re: findings. Left heel wound post-debridement photo on daughter's phone viewed. Dr. Christelle Mae recommended left lower extremity arteriogram with possible intervention, and after discussing need for surgery, risks, alternatives, potential complications and anticipated outcomes, the patient elected to proceed. Objective:  
 
Vitals:  
 05/21/19 8687 05/21/19 6705 05/21/19 0749 05/21/19 1201 BP:  160/68 160/68 139/81 Pulse:  84 88 76 Resp:  18  18 Temp:  98.2 °F (36.8 °C)  98.2 °F (36.8 °C) SpO2:  90%  95% Weight: 156 lb 2.4 oz (70.8 kg) Height:      
  
 
Physical Exam:  
GENERAL: alert, cooperative, no distress EYE: EOM intact, no nystagmus LUNG: clear to auscultation bilaterally, normal respiratory effort HEART: regular rate and rhythm ABDOMEN: soft, nontender, nondistended, bowel sounds normoactive EXTREMITIES: feet warm in heel suspension boots Lab/Data Review: 
BMP:  
Lab Results Component Value Date/Time  05/21/2019 05:44 AM  
 K 3.1 (L) 05/21/2019 05:44 AM  
  05/21/2019 05:44 AM  
 CO2 30 05/21/2019 05:44 AM  
 AGAP 8 05/21/2019 05:44 AM  
 GLU 77 05/21/2019 05:44 AM  
 BUN 21 05/21/2019 05:44 AM  
 CREA 0.91 05/21/2019 05:44 AM  
 GFRAA >60 05/21/2019 05:44 AM  
 GFRNA >60 05/21/2019 05:44 AM  
 
CBC:  
Lab Results Component Value Date/Time WBC 11.1 05/21/2019 05:44 AM  
 HGB 8.3 (L) 05/21/2019 05:44 AM  
 HCT 28.5 (L) 05/21/2019 05:44 AM  
  05/21/2019 05:44 AM  
  
 
Imaging: 
TITLE: Lower extremity arterial ultrasound examination, 5/16/2019 INDICATION: Left heel ulcer. Diabetes mellitus, dyslipidemia, hypertension. TECHNIQUE: Grayscale, color, and Doppler interrogation performed. COMPARISON: None. RIGHT LOWER EXTREMITY:  Peak systolic velocities-- CFA = 119, PFA = 125, SFA = 
142, popliteal = 97, peroneal = 68, PTA = 89, SEBASTIAN = 133 cm/sec. Biphasic/triphasic waveform throughout. LEFT LOWER EXTREMITY:  Peak systolic velocities-- CFA = 150, PFA = 105, SFA = 
241, popliteal = 425, peroneal = 104, PTA = 69, SEBASTIAN = 112 cm/sec. Monophasic 
waveform in the popliteal artery and below. ABDOMEN:  Aorta = 1.9 cm diameter, 70 cm/s. 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 Left heel ulcer. Diabetes mellitus, dyslipidemia, hypertension. TECHNIQUE: Blood Pressure was measured in the upper and lower extremities. Brachial artery, Posterior Tibial artery, Dorsalis Pedis artery, and Toe Pressures were obtained. Ankle/Brachial Indices were calculated. COMPARISON: Arterial ultrasound examination 5/19/2019. FINDINGS: 
SEGMENTAL BP:  Segmental blood pressures are symmetric at the ankle. Left great 
toe not evaluated. Right: 
     JOCELYNE: 1.04. Toe pressure: 70 mmHg. Left: 
     JOCELYNE: 0.98. Toe pressure: Not measured mmHg. Slightly blunted waveform tracings in the left 3rd and 4th digits when compared 
with the right. IMPRESSION:  Both right and left JOCELYNE are within the acceptable range. Blunted 
waveform tracing in the left toes consistent with proximal stenosis as was 
identified on the earlier arterial ultrasound examination. Assessment / Plan / Recommendations / Medical Decision Making:  
 
Principal Problem: 
  Non healing left heel wound (5/17/2019) Active Problems: 
  Mixed hyperlipidemia (7/27/2018) DM2 (diabetes mellitus, type 2) ([facility] [address] (7/27/2018) Acute respiratory failure with hypoxia ([address] (1/16/2019) Continue off-loading To OR tomorrow for left lower extremity arteriogram with possible intervention by José Antonio Lake MD 
- NPO tonight after MN 
- consent - IV ABX on-call to OR 
- Hibiclens tonight 
 
** Patient will require IV contrast allergy protocol: Solu-Cortef 100mg IV, Benadryl 50mg IV, Pepcid 20mg IV to be given not more than 60min prior to procedure (meds will be on-call to OR, pre-op staff aware) ** Signed By: Martha Willard PA-C May 21, 2019 Physician Assistant with [facility] Ravinder Grover.  Rom Torres MD / Kasandra Bullard MD

## 2019-05-21 NOTE — CDMP QUERY
Patient admitted with hypoxia and a diabetic ulcer of the left foot. . Noted documentation of \"acute respiratory failure\" in  on . Please document in progress notes clinical indicators (such as the ones below) to support this diagnosis. - Respirations <12 or >25 - Air hunger/gasping - Use of accessory muscles of respiration/increased work of breathing - Sternal or intercostal retractions - Stridor - Inability to speak in full sentences - Cyanosis - Pulse ox <90% RA or <95% on O2  
- pH <7.35 or >7.45  
- pO2 < 60 mm Hg (or 10mm below COPD patient's baseline) - pCO2 >50mm Hg (or 10mm above COPD patient's baseline) The medical record reflects the following: 
 
   Risk Factors: chronic dchf, htn, Hx of MI. Clinical Indicators: 88% on ra while sleeping. 90% ra on arrival. No exertion or struggling. Resps 17, Treatment: 02 2lnc. Diuresis. Thank you, Angel [address] 
[phone]

## 2019-05-21 NOTE — PROGRESS NOTES
Hospitalist Progress Note 2019 Admit Date: 2019  8:51 PM  
NAME: Niko España :  1933 MRN:  [id number] Attending: Urmila Lobo DO 
PCP:  Shruthi Gonzalez MD 
 
SUBJECTIVE:  
Niko España is a 80 y.o. female who came to ER due to his doctor found her testing result to show more inflammatory reaction and concerned with more infection that will requires work-ups and treatment in hospital.  
  
Patient was just recently discharged from a nursing home. She was sent there after hospital stay due to left 2nd finger osteomyelitis and finished IV Rocephin. She has had cellulitis left shin and has been on Keflex PO for the past month.  
  
Lab works show persistent and more elevated WBC. Her left heel wound is not healing and patient has more shortness of breath, so her doctor advised patient to come to ER.  
  
Information is obtained via patient's daughter who is a nurse.  
  
Hospitalist service is requested to admit the patient. Interval history (): patient examined at bedside. No acute events overnight. Eating breakfast without issue this morning. . No fevers/chills, chest pain, shortness of breath, abdominal pain, nausea/vomiting, changes in LLE. Review of Systems negative with exception of pertinent positives noted above PHYSICAL EXAM  
 
Visit Vitals /81 Pulse 76 Temp 98.2 °F (36.8 °C) Resp 18 Ht 5' 4\" (1.626 m) Wt 70.8 kg (156 lb 2.4 oz) SpO2 95% BMI 26.80 kg/m² Temp (24hrs), Av.5 °F (36.9 °C), Min:98.2 °F (36.8 °C), Max:99 °F (37.2 °C) Oxygen Therapy O2 Sat (%): 95 % (19 1201) Pulse via Oximetry: 88 beats per minute (19 0030) O2 Device: Nasal cannula (19) O2 Flow Rate (L/min): 2 l/min (19) Intake/Output Summary (Last 24 hours) at 2019 1246 Last data filed at 2019 0072 Gross per 24 hour Intake  Output 350 ml Net -350 ml General: No acute distress   
 Lungs:  CTA Bilaterally. Heart:  Regular rate and rhythm,  No murmur, rub, or gallop Abdomen: Soft, Non distended, Non tender, Positive bowel sounds Extremities: Stage III/IV ulceration of left heel with visible, foul-smelling drainage. Healing ulceration of left distal 2nd digit with no drainage, redness, or swelling observed Neurologic:  No sensation to light touch from mid shins and distally b/l ASSESSMENT Active Hospital Problems Diagnosis Date Noted  Non healing left heel wound 05/17/2019  Acute respiratory failure with hypoxia ([address] 01/16/2019  Mixed hyperlipidemia 07/27/2018  DM2 (diabetes mellitus, type 2) ([facility] [address] 07/27/2018 Plan: # Non-healing diabetic ulceration of the left heel - MRI of LLE negative for osteomyelitis 
- vascular surgery consulted for further recommendations regarding debridement/arterial studies, she has a high-grade stenosis of L popliteal artery - patient has been on Keflex x1 month for left heel, recently finished ceftriaxone for finger osteomyelitis 
- empiric doxycycline/cefepime/Flagyl per ID recs 
- TTE without obvious vegetations - blood cultures x2 collected with NGTD # Acute respiratory failure with hypoxia - currently on supplemental oxygen (not on anything at home) 
- repeat TTE as above 
- diuresis as tolerated, will place on Lasix 20 mg IV BID 
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 
- start Lantus 10 units - Humalog SSI and serial CBGs 
- goal CBGs between 140 and 180 # History of gout of bilateral fingers 
- on chronic prednisone, will continue 
- has had intolerance to allopurinol F/E/N: no fluids, replete electrolytes as needed, diabetic diet Ppx: heparin SQ for VTE Code Status: DNR Disposition: pending clinical workup as above. PT/OT consults and PPD ordered. All questions answered. Signed By: Ludwin Benoit DO May 21, 2019

## 2019-05-22 NOTE — PROGRESS NOTES
Progress Note Patient: Alicja Teresa MRN: [id number]  SSN: [id number] YOB: 1933  Age: 80 y.o. Sex: female Admission Date: 5/17/2019 LOS: 3 days Subjective:  
 
Patient reports mild nausea this AM, otherwise has no complaints. Reviewed L LE arteriogram plans, patient has no further questions at this time re: today's procedure. Objective:  
 
Vitals:  
 05/21/19 2322 05/22/19 0400 05/22/19 0415 05/22/19 0715 BP: 130/72 125/78  150/77 Pulse: 81 85  81 Resp: 18 18  18 Temp: 98.6 °F (37 °C) 98.8 °F (37.1 °C)  98 °F (36.7 °C) SpO2: 91% 94%  93% Weight:   160 lb (72.6 kg) Height:      
  
 
Physical Exam:  
GENERAL: alert, cooperative, no distress EYE: EOM intact, no nystagmus LUNG: clear to auscultation bilaterally, normal respiratory effort HEART: regular rate and rhythm ABDOMEN: soft, nontender, nondistended, bowel sounds normoactive EXTREMITIES: feet warm in heel suspension boots 
  
Lab/Data Review: CBC:  
Lab Results Component Value Date/Time WBC 10.7 05/22/2019 04:42 AM  
 HGB 9.1 (L) 05/22/2019 04:42 AM  
 HCT 31.1 (L) 05/22/2019 04:42 AM  
  05/22/2019 04:42 AM  
 
 
Assessment / Plan / Recommendations / Medical Decision Making:  
 
Principal Problem: 
  Non healing left heel wound (5/17/2019) Active Problems: 
  Mixed hyperlipidemia (7/27/2018) DM2 (diabetes mellitus, type 2) ([facility] [address] (7/27/2018) Acute respiratory failure with hypoxia ([facility] [address] (1/16/2019) To OR today for left lower extremity arteriogram with possible intervention by Cynthia Gaitan MD 
- remains NPO 
- consent - IV ABX on-call to OR 
  
** Patient requires IV contrast allergy protocol: Solu-Cortef 100mg IV, Benadryl 50mg IV, Pepcid 20mg IV to be given not more than 60min prior to procedure (meds are on-call to OR, pre-op staff aware) ** Signed By: Adarsh Ventura PA-C May 22, 2019 Physician Assistant with [facility] Adria Christie.  Heidi Horvath MD / Nat Mon MD

## 2019-05-22 NOTE — PERIOP NOTES
Dr. Amira Rodriguez notified of patient's K+ 3.1 yesterday- MD states we do not need to recheck K+ in pre-op today.

## 2019-05-22 NOTE — PROGRESS NOTES
Hospitalist Progress Note 2019 Admit Date: 2019  8:51 PM  
NAME: Cheyenne Adams :  1933 MRN:  [id number] Attending: Jeni Hong DO 
PCP:  Mary Lou Marie MD 
 
SUBJECTIVE:  
Cheyenne Adams is a 80 y.o. female who came to ER due to his doctor found her testing result to show more inflammatory reaction and concerned with more infection that will requires work-ups and treatment in hospital.  
  
Patient was just recently discharged from a nursing home. She was sent there after hospital stay due to left 2nd finger osteomyelitis and finished IV Rocephin. She has had cellulitis left shin and has been on Keflex PO for the past month.  
  
Lab works show persistent and more elevated WBC. Her left heel wound is not healing and patient has more shortness of breath, so her doctor advised patient to come to ER.  
  
Information is obtained via patient's daughter who is a nurse.  
  
Hospitalist service is requested to admit the patient. Interval history (): patient examined at bedside. No acute events overnight. Scheduled for arteriogram and wondering when she can eat something afterwards. No fevers/chills, chest pain, shortness of breath, abdominal pain, nausea/vomiting, changes in LLE. Review of Systems negative with exception of pertinent positives noted above PHYSICAL EXAM  
 
Visit Vitals /78 Pulse 78 Temp 98.1 °F (36.7 °C) Resp 18 Ht 5' 4\" (1.626 m) Wt 72.6 kg (160 lb) SpO2 95% BMI 27.46 kg/m² Temp (24hrs), Av.5 °F (36.9 °C), Min:98 °F (36.7 °C), Max:98.9 °F (37.2 °C) Oxygen Therapy O2 Sat (%): 95 % (19 1211) Pulse via Oximetry: 88 beats per minute (19 0030) O2 Device: Nasal cannula (19) O2 Flow Rate (L/min): 2 l/min (19) Intake/Output Summary (Last 24 hours) at 2019 1333 Last data filed at 2019 1211 Gross per 24 hour Intake 120 ml Output 1350 ml Net -1230 ml  
  
 General: No acute distress   
Lungs:  CTA Bilaterally. Heart:  Regular rate and rhythm,  No murmur, rub, or gallop Abdomen: Soft, Non distended, Non tender, Positive bowel sounds Extremities: Stage III/IV ulceration of left heel with interval improvement in surrounding erythema. Healing ulceration of left distal 2nd digit with no drainage, redness, or swelling observed Neurologic:  No sensation to light touch from mid shins and distally b/l ASSESSMENT Active Hospital Problems Diagnosis Date Noted  Non healing left heel wound 05/17/2019  Acute respiratory failure with hypoxia ([facility] [address] 01/16/2019  Mixed hyperlipidemia 07/27/2018  DM2 (diabetes mellitus, type 2) ([facility] [address] 07/27/2018 Plan: # Non-healing diabetic ulceration of the left heel - MRI of LLE negative for osteomyelitis  
- scheduled for arteriogram (with possible intervention) with vascular surgery today 
- patient has been on Keflex x1 month for left heel, recently finished ceftriaxone for finger osteomyelitis 
- empiric doxycycline/cefepime/Flagyl per ID recs 
- TTE without obvious vegetations - blood cultures x2 collected with NGTD # Acute respiratory failure with hypoxia - currently on supplemental oxygen (not on anything at home) 
- repeat TTE as above 
- diuresis as tolerated, convert to oral Lasix 
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 
- continue Lantus 10 units - Humalog SSI and serial CBGs 
- goal CBGs between 140 and 180 # History of gout of bilateral fingers 
- on chronic prednisone, will continue 
- has had intolerance to allopurinol F/E/N: no fluids, replete electrolytes as needed, diabetic diet Ppx: heparin SQ for VTE Code Status: DNR Disposition: pending clinical workup as above with anticipated discharge home in 1-2 days. PT/OT consults and PPD ordered. All questions answered.    
 
Signed By: Laurin Duverney, DO   
 May 22, 2019

## 2019-05-22 NOTE — PROGRESS NOTES
Infectious Disease Progress Note Today's Date: 5/22/2019 Admit Date: 5/17/2019 Impression: · Left heel wound : Vascular performing angio and possible intervention today. MRI and Plain films not suggesting osteomyelitis. · Leukocytosis: resolved. Ua negative. Blood cx negative. CXR without focal infiltrate to suggest pneumonia. Suspect due soft tissue infection left heal and LE. Soft tissue left leg looks improved. ABX narrowed somewhat yesterday. · CKD: CRT stable to below baseline. GFR has normalized . Vanc level 5/19 slightly elevated. · S/p recent treatment for L 1st finger osteomyelitis in 2/2019. · DM: HgbA1c 10.2 in January 2019. · Gout: on chronic prednisone 10 mg daily. · Steroid dependent inflammatory arthritis Plan:  
· Changed  Vanc to doxy yesterday. LLE redness has resovled. · No change to coverage today; anticipate change to all oral coverage tomorrow or day after. Total duration for soft tissue infection; 22-93 days given complicating factor PVD. · Angiogram today Anti-infectives:  
Vancomycin 5/17- Zosyn 5/17-5/20 Cefepime/flagyl 5/20- Subjective:  
Doing  Hair; just had bath. A little anxious about angio today but no complaints. Leg feels much better. No diarrhea. 80 y.o. female with steroid-dependent RA who presented to [facility] on 5/17/19 with leukocytosis. She is known to ID for left first finger osteomyelitis, which was treated with cefepime in Feb-March 2019. Swab cultures from the wound grew MSSA and Morganella. She was transitioned to cefadroxil and cipro after completion of cefepime. In late March she was brought to [facility] for fever and chills, and she was thought to have bilateral cellulitis. She was treated and ultimately antibiotics were continued until 3/31. She had VRE in the urine which was not treated since she was asymptomatic at that time. She was discharged to a SNF and has since then returned home.   She had labs done by her PCP on  which showed elevated WBC, ESR, and CRP. She was sent to the ER for further evaluation. She had WBC of 14.6 on presentation in the ER. She was started on vanc/zosyn and admitted to the hospitalist service. Review of Systems:  A comprehensive review of systems was negative except for that written in the History of Present Illness. Objective:  
 
Visit Vitals /77 Pulse 81 Temp 98 °F (36.7 °C) Resp 18 Ht 5' 4\" (1.626 m) Wt 72.6 kg (160 lb) SpO2 93% BMI 27.46 kg/m² Temp (24hrs), Av.5 °F (36.9 °C), Min:98 °F (36.7 °C), Max:98.9 °F (37.2 °C) Lines:  none Physical Exam: Exam:   
 General: NC/AT, alert and cooperative, looks stated age, in NAD HEENT: PERRL, non-icteric sclera Neck: supple, symmetric, no masses or lymphadenopathy Cardiac:Nl S1/S2, no murmurs/rubs/gallops/clicks, trace LE edema Pulmonary:clear bilaterally no rales/wheezes, good air movement Abdomen: soft, NT, non-distended, BS normal, no CVA tenderness Gentital:external genitalia normal Angelo : none Skin:no rashes, wound on back left heal with 45% slough; base beefy;  no odor or drainage. Left leg less swollen and warm on upper  calf/knee area MSK:no enlarged or swollen joints in limbs or sternoclavicular area Extremities: no cords/clots/cyanosis, pulses palpable and symmetric Psychiatric: mood and affect appropriate to situation Data Review: CBC:  
Recent Labs  
  19 
0442 19 
0544 19 
0425 WBC 10.7 11.1 11.4*  
RBC 3.75* 3.41* 3.50* HGB 9.1* 8.3* 8.6* HCT 31.1* 28.5* 28.9*  
 393 429 GRANS 59 58 60 LYMPH 28 29 27 EOS 3 2 2 Microbiology:  
 
All Micro Results Procedure Component Value Units Date/Time CULTURE, BLOOD [[id number]] Collected:  19 Order Status:  Completed Specimen:  Blood Updated:  19 0940 Special Requests: --     
  LEFT Antecubital 
  
 Culture result: NO GROWTH 4 DAYS     
 CULTURE, BLOOD [[id number]] Collected:  05/17/19 2138 Order Status:  Completed Specimen:  Blood Updated:  05/21/19 0919 Special Requests: --     
  RIGHT 
FOREARM Culture result: NO GROWTH 4 DAYS Imaging:  
 
Result Information Status: Final result (Exam End: 5/19/2019 08:12) Provider Status: Open Signed by Signed Date/Time  Phone Pager oJllywilberto LeonBlevins 5/19/2019 11:49 [phone] Exam Information Status Exam Begun  Exam Ended Final [99] 5/19/2019 07:45 5/19/2019 08:12 Study Result TITLE: Lower extremity arterial ultrasound examination. 
  
INDICATION: Left heel ulcer. Diabetes mellitus, dyslipidemia, hypertension. 
  
TECHNIQUE: Grayscale, color, and Doppler interrogation performed. 
  
COMPARISON: None. 
  
RIGHT LOWER EXTREMITY:  Peak systolic velocities-- CFA = 119, PFA = 125, SFA = 
142, popliteal = 97, peroneal = 68, PTA = 89, SEBASTIAN = 133 cm/sec. Biphasic/triphasic waveform throughout. 
  
LEFT LOWER EXTREMITY:  Peak systolic velocities-- CFA = 150, PFA = 105, SFA = 
241, popliteal = 425, peroneal = 104, PTA = 69, SEBASTIAN = 112 cm/sec. Monophasic 
waveform in the popliteal artery and below. 
  
ABDOMEN:  Aorta = 1.9 cm diameter, 70 cm/s. 
  
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 Results MRI ANKLE LT W WO CONT (Accession [id number]) (Order [id number]) Allergies     
High: Contrast Agent [Iodine] Not Specified: Actifed [Triprolidine-pseudoephedrine]; Allopurinol;  Decongestant [Pseudoephedrine Hcl]; Lexapro [Escitalopram Oxalate]; Lyrica [Pregabalin]; Minizide [Prazosin-polythiazide]; Mobic [Meloxicam]; Omnipaque [Iohexol]; Sertraline;  Spironolactone;  Sulfamethazine Result Information Status: Final result (Exam End: 5/18/2019 18:09) Provider Status: Open Signed by Signed Date/Time  Phone Pager Soraida Bowmangibran 5/19/2019 15:04 [phone] Exam Information Status Exam Begun  Exam Ended Final [99] 5/18/2019 17:28 5/18/2019 18:09 Study Result EXAM: Left ankle MRI with and without contrast. 
ADDITIONAL CLINICAL INFORMATION: Concern for osteomyelitis with ulceration of 
the heel. COMPARISON: Left foot films dated May 17, 2019. 
  
Findings: There is a large multiloculated cyst of the calcaneus versus chronic vascular 
remnant. Multifocal osseous erosions of the midfoot. There is an ulceration 
along the dorsal aspect of the heel without evidence of underlying 
osteomyelitis. Achilles insertional and plantar calcaneal enthesophytes. Talar 
dome is intact. No evidence of acute fracture. Tendons are intact. Diffuse 
muscular atrophy 
  
IMPRESSION Impression: 1. Dorsal heel ulceration without evidence of osteomyelitis or drainable fluid 
collection. 2. Multifocal osseous erosions of the midfoot, correlate for possible rheumatoid 
arthritis. 3. Large multilobulated cystic lesion of the calcaneus with peripheral 
enhancement favored a vascular remnant versus multiloculated cyst, no internal 
enhancement. Consider follow-up radiographs in 3-6 months to ensure stability. Signed By: Christopher Shetty MD   
 May 22, 2019

## 2019-05-22 NOTE — ANESTHESIA POSTPROCEDURE EVALUATION
Procedure(s): ULTRASOUND GUIDED ACCESS AOROGRAM  LEFT LOWER EXTREMITY ARTRIOGRAM LEFT SFA PTA. 
 
total IV anesthesia Anesthesia Post Evaluation Patient location during evaluation: PACU Patient participation: complete - patient participated Level of consciousness: awake and alert Pain management: adequate Airway patency: patent Anesthetic complications: no 
Cardiovascular status: acceptable Respiratory status: acceptable Hydration status: acceptable Post anesthesia nausea and vomiting:  none Vitals Value Taken Time /61 5/22/2019  5:53 PM  
Temp 36.6 °C (97.8 °F) 5/22/2019  5:47 PM  
Pulse 77 5/22/2019  5:55 PM  
Resp 14 5/22/2019  5:55 PM  
SpO2 94 % 5/22/2019  5:55 PM  
Vitals shown include unvalidated device data.

## 2019-05-22 NOTE — PERIOP NOTES
TRANSFER - IN REPORT: 
 
Verbal report received from Renae RN (name) on Baron Acevedos  being received from 2nd floor (unit) for ordered procedure Report consisted of patients Situation, Background, Assessment and  
Recommendations(SBAR). Information from the following report(s) SBAR and MAR was reviewed with the receiving nurse. Opportunity for questions and clarification was provided. Assessment completed upon patients arrival to unit and care assumed.

## 2019-05-22 NOTE — ANESTHESIA PREPROCEDURE EVALUATION
Relevant Problems No relevant active problems Anesthetic History PONV Review of Systems / Medical History Patient summary reviewed, nursing notes reviewed and pertinent labs reviewed Pulmonary Within defined limits Neuro/Psych Comments: Peripheral neuropathy Cardiovascular Hypertension: well controlled Valvular problems/murmurs: mitral insufficiency and aortic stenosis Exercise tolerance: <4 METS Comments: TTE may 2019- EF 50-55%, mild AS, Mild MR  
GI/Hepatic/Renal 
  
GERD: well controlled Endo/Other Diabetes: type 2 Hypothyroidism Arthritis and anemia Other Findings Comments: Chronic pain on methadone Non-healing wound to left heel Physical Exam 
 
Airway Mallampati: II 
 
Neck ROM: normal range of motion Mouth opening: Normal 
 
 Cardiovascular Rhythm: irregular Murmur: Grade 2, Aortic area Dental 
 
Dentition: Full upper dentures and Lower dentition intact Pulmonary Breath sounds clear to auscultation Abdominal 
 
 
 
 Other Findings Anesthetic Plan ASA: 3 Anesthesia type: total IV anesthesia Induction: Intravenous Anesthetic plan and risks discussed with: Patient Allergy to contrast dye (wheezing/ SOB)- per vascular note, patient has solu-cortef 100mg, benadryl, 50 mg, and pepcid 20mg ordered on call to OR

## 2019-05-22 NOTE — PROGRESS NOTES
TRANSFER - IN REPORT: 
 
Verbal report received from [facility] on Juan Daniel Vianey  being received from [place] for routine post - op Report consisted of patients Situation, Background, Assessment and  
Recommendations(SBAR). Information from the following report(s) SBAR, Kardex, OR Summary, Intake/Output, MAR and Recent Results was reviewed with the receiving nurse. Opportunity for questions and clarification was provided. Assessment completed upon patients arrival to unit and care assumed.

## 2019-05-22 NOTE — ANESTHESIA POSTPROCEDURE EVALUATION
Procedure(s): ARTERIOGRAM LEFT LOWER EXTREMITY POSS INTERVENTION. No value filed. Anesthesia Post Evaluation Patient location during evaluation: PACU Patient participation: complete - patient participated Level of consciousness: awake and alert Pain management: adequate Airway patency: patent Anesthetic complications: no 
Cardiovascular status: acceptable Respiratory status: acceptable Hydration status: acceptable Post anesthesia nausea and vomiting:  none No vitals data found for the desired time range.

## 2019-05-22 NOTE — BRIEF OP NOTE
BRIEF OPERATIVE NOTE Date of Procedure: 5/22/2019 Preoperative Diagnosis: non healing left heel wound Postoperative Diagnosis: non healing left heel wound Procedure(s): ULTRASOUND GUIDED ACCESS AOROGRAM  LEFT LOWER EXTREMITY ARTRIOGRAM LEFT SFA PTA Surgeon(s) and Role: Alina Pollard MD - Primary Surgical Assistant:  
 
Surgical Staff: 
Circ-1: Ania Dickens, RN Radiology Technician: Marina Land, RT, R, CT; Pushpa Talley; Oanh Jackson, RT, R, CT Event Time In Time Out Incision Start 05/22/2019 1624 Incision Close 05/22/2019 1658 Anesthesia: General  
Estimated Blood Loss: 20cc Specimens: * No specimens in log * Findings: L Popliteal Stenosis Complications: None Implants: * No implants in log *

## 2019-05-22 NOTE — PROGRESS NOTES
Pt arrived on floor. Sleeping but will open eyes when name called. Pt must remain flat until 1900. R groin dressing c/d/i with no shadowing 1800 medications are scheduled but pt too sleepy to swallow medications. Will alert oncoming shift

## 2019-05-23 NOTE — PROGRESS NOTES
Hospitalist Progress Note 2019 Admit Date: 2019  8:51 PM  
NAME: Criss Pastor :  1933 MRN:  [id number] Attending: Gogo Silva MD 
PCP:  Celi Perkins MD 
 
SUBJECTIVE:  
Criss Pastor is a 80 y.o. female who came to ER due to his doctor found her testing result to show more inflammatory reaction and concerned with more infection that will requires work-ups and treatment in hospital.  
  
Patient was just recently discharged from a nursing home. She was sent there after hospital stay due to left 2nd finger osteomyelitis and finished IV Rocephin. She has had cellulitis left shin and has been on Keflex PO for the past month.  
  
Lab works show persistent and more elevated WBC. Her left heel wound is not healing and patient has more shortness of breath, so her doctor advised patient to come to ER.  
  
: arteriogram yesterday, popliteal stenosis, full report pending. No acute events overnight. No fevers/chills, chest pain, shortness of breath, abdominal pain, nausea/vomiting, changes in LLE. Review of Systems negative with exception of pertinent positives noted above PHYSICAL EXAM  
 
Visit Vitals /54 Pulse 77 Temp 97.9 °F (36.6 °C) Resp 18 Ht 5' 4\" (1.626 m) Wt 73.5 kg (162 lb) SpO2 95% BMI 27.81 kg/m² Temp (24hrs), Av °F (36.7 °C), Min:97.7 °F (36.5 °C), Max:98.4 °F (36.9 °C) Oxygen Therapy O2 Sat (%): 95 % (19 0706) Pulse via Oximetry: 74 beats per minute (19 1808) O2 Device: Nasal cannula (19) O2 Flow Rate (L/min): 3 l/min (19) Intake/Output Summary (Last 24 hours) at 2019 8482 Last data filed at 2019 2358 Gross per 24 hour Intake 350 ml Output 1505 ml Net -1155 ml General: No acute distress   
Lungs:  CTA Bilaterally. Heart:  Regular rate and rhythm,  No murmur, rub, or gallop Abdomen: Soft, Non distended, Non tender, Positive bowel sounds Extremities: Heel dressing c/d/i Neurologic:  Decreased sensation mid shins and distally b/l ASSESSMENT Active Hospital Problems Diagnosis Date Noted  Non healing left heel wound 05/17/2019  Acute respiratory failure with hypoxia ([facility] 01/16/2019  Mixed hyperlipidemia 07/27/2018  DM2 (diabetes mellitus, type 2) ([facility] [address] 07/27/2018 Plan: # Non-healing diabetic ulceration of the left heel - MRI of LLE negative for osteomyelitis - s/p arteriogram (with popliteal stenosis) - patient has been on Keflex x1 month for left heel, recently finished ceftriaxone for finger osteomyelitis 
- empiric doxycycline/cefepime/Flagyl per ID recs - expect change to PO doxy for 10-14d 
- TTE without obvious vegetations - blood cultures x2 collected with NGTD # Acute respiratory failure with hypoxia - currently on supplemental oxygen (not on anything at home) 
- repeat TTE as above 
- diuresis as tolerated, convert to oral Lasix 
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 
- continue Lantus 10 units - Humalog SSI and serial CBGs 
- goal CBGs between 140 and 180 # History of gout of bilateral fingers 
- on chronic prednisone, will continue 
- has had intolerance to allopurinol F/E/N: no fluids, replete electrolytes as needed, diabetic diet Ppx: heparin SQ for VTE Code Status: DNR Disposition: pending clinical workup as above with anticipated discharge home tomorrow. PT/OT consults and PPD ordered. All questions answered. Signed By: Eric Hsu MD   
 May 23, 2019

## 2019-05-23 NOTE — PROGRESS NOTES
Progress Note Patient: Juan Daniel Winters MRN: [id number]  SSN: [id number] YOB: 1933  Age: 80 y.o. Sex: female Admission Date: 5/17/2019 LOS: 4 days 1 Day Post-Op PROCEDURE(S): 
Aortogram 
Left lower extremity arteriogram, left popliteal PTA Ultrasound-guided access Subjective:  
 
Patient reports mild right groin pain, medial left leg pain. Now s/p left popliteal PTA with inline flow to foot. Discussed results with patient in room then with family via phone. Objective:  
 
Vitals:  
 05/22/19 2314 05/23/19 0330 05/23/19 3326 05/23/19 7651 BP: 103/51 115/69  130/54 Pulse: 83 85  77 Resp: 18 18  18 Temp: 98 °F (36.7 °C) 98.4 °F (36.9 °C)  97.9 °F (36.6 °C) SpO2: 95% 95%  95% Weight:   162 lb (73.5 kg) Height:      
  
 
Physical Exam:  
GENERAL: alert, cooperative, no distress LUNG: normal respiratory effort, no audible wheezes HEART: regular rate and rhythm ABDOMEN: soft, nontender, protuberant but not distended EXTREMITIES: right groin puncture site intact with scant dried drainage on dressing, site soft and without ecchymosis / induration / erythema / drainage; feet warm in [place] boots, compression stockings and left heel dressing in place PULSES: bounding left DP pulse Lab/Data Review: 
BMP:  
Lab Results Component Value Date/Time  05/23/2019 04:56 AM  
 K 3.1 (L) 05/23/2019 04:56 AM  
  05/23/2019 04:56 AM  
 CO2 29 05/23/2019 04:56 AM  
 AGAP 10 05/23/2019 04:56 AM  
  (H) 05/23/2019 04:56 AM  
 BUN 21 05/23/2019 04:56 AM  
 CREA 1.00 05/23/2019 04:56 AM  
 GFRAA >60 05/23/2019 04:56 AM  
 GFRNA 56 (L) 05/23/2019 04:56 AM  
 
CBC:  
Lab Results Component Value Date/Time WBC 11.1 05/23/2019 04:56 AM  
 HGB 9.2 (L) 05/23/2019 04:56 AM  
 HCT 31.7 (L) 05/23/2019 04:56 AM  
  (H) 05/23/2019 04:56 AM  
 
 
Assessment / Plan / Recommendations / Medical Decision Making:  
 
Principal Problem: Non healing left heel wound (5/17/2019) Active Problems: 
  Mixed hyperlipidemia (7/27/2018) DM2 (diabetes mellitus, type 2) ([facility] [address] (7/27/2018) Acute respiratory failure with hypoxia ([facility] [address] (1/16/2019) Doing well s/p L LE arteriogram, popliteal artery PTA, has inline flow to foot 
- continue wound care to left heel - we will consult Ilan Santana MD 
- elevate L LE 
- off-load left heel Signed By: Kathy Tariq PA-C May 23, 2019 Physician Assistant with [address] [place] [address] Vascular Surgery Giovanni Harjit.  Astrid Ramirez MD / Debbie Hadley MD

## 2019-05-23 NOTE — PROGRESS NOTES
Infectious Disease Progress Note Today's Date: 5/23/2019 Admit Date: 5/17/2019 Impression: · Left heel wound : MRI and Plain films not suggesting osteomyelitis. · LLE cellulitis : improved · PVD: angio yesterday with left popliteal PTA · Leukocytosis: resolved. tissue left leg looks improved. · CKD: CRT stable to below baseline. GFR has normalized · S/p recent treatment for L 1st finger osteomyelitis in 2/2019. · DM: HgbA1c 10.2 in January 2019. · Gout: on chronic prednisone 10 mg daily. · Steroid dependent inflammatory arthritis Plan: · Will change to all oral ABX with anticipation discharge tomorrow. Recommend 14 day course from angio yesterday given open wound and chronic steroid use. LLE redness has resovled. She will leave on doxycycline/flagyl and Vantin. Stop date 6/5/19. Wound remains open and will continue to require wound care. · Follow up with ID on 6/12/19 at 1:30PM. Anti-infectives:  
Vancomycin 5/17-5/22 Doxycyline 5/22- Zosyn 5/17-5/20 Cefepime/flagyl 5/20- Subjective: Angio yesterday 80 y.o. female with steroid-dependent RA who presented to [facility] on 5/17/19 with leukocytosis. She is known to ID for left first finger osteomyelitis, which was treated with cefepime in Feb-March 2019. Swab cultures from the wound grew MSSA and Morganella. She was transitioned to cefadroxil and cipro after completion of cefepime. In late March she was brought to [facility] for fever and chills, and she was thought to have bilateral cellulitis. She was treated and ultimately antibiotics were continued until 3/31. She had VRE in the urine which was not treated since she was asymptomatic at that time. She was discharged to a SNF and has since then returned home. She had labs done by her PCP on 5/16 which showed elevated WBC, ESR, and CRP. She was sent to the ER for further evaluation. She had WBC of 14.6 on presentation in the ER.   She was started on bailey/zosyn and admitted to the hospitalist service. Review of Systems:  A comprehensive review of systems was negative except for that written in the History of Present Illness. Objective:  
 
Visit Vitals /69 Pulse 85 Temp 98.4 °F (36.9 °C) Resp 18 Ht 5' 4\" (1.626 m) Wt 73.5 kg (162 lb) SpO2 95% BMI 27.81 kg/m² Temp (24hrs), Av °F (36.7 °C), Min:97.7 °F (36.5 °C), Max:98.4 °F (36.9 °C) Lines:  none Physical Exam: Exam:   
 General: NC/AT, alert and cooperative, looks stated age, in NAD HEENT: PERRL, non-icteric sclera Neck: supple, symmetric, no masses or lymphadenopathy Cardiac:Nl S1/S2, no murmurs/rubs/gallops/clicks, trace LE edema Pulmonary:clear bilaterally no rales/wheezes, good air movement Abdomen: soft, NT, non-distended, BS normal, no CVA tenderness Gentital:external genitalia normal Angelo : none Skin:no rashes, wound on back left heal dressed; base beefy;  no  odor Left leg swelling seems resolved along with redness and pain MSK:no enlarged or swollen joints in limbs or sternoclavicular area Extremities: no cords/clots/cyanosis, Psychiatric: mood and affect appropriate to situation Data Review: CBC:  
Recent Labs  
  19 
0456 19 
0442 19 
0544 WBC 11.1 10.7 11.1 RBC 3.86* 3.75* 3.41* HGB 9.2* 9.1* 8.3* HCT 31.7* 31.1* 28.5* * 390 393 GRANS 69 59 58 LYMPH 21 28 29 EOS 1 3 2 Microbiology:  
 
All Micro Results Procedure Component Value Units Date/Time CULTURE, BLOOD [[id number]] Collected:  19 Order Status:  Completed Specimen:  Blood Updated:  19 1027 Special Requests: --     
  LEFT Antecubital 
  
  Culture result: NO GROWTH 5 DAYS     
 CULTURE, BLOOD [[id number]] Collected:  19 Order Status:  Completed Specimen:  Blood Updated:  19 1027 Special Requests: --     
  RIGHT 
FOREARM Culture result: NO GROWTH 5 DAYS Imaging:  
 
Result Information Status: Final result (Exam End: 5/19/2019 08:12) Provider Status: Open Signed by Signed Date/Time  Phone Pager Nigel Marte 5/19/2019 11:49 [phone] Exam Information Status Exam Begun  Exam Ended Final [99] 5/19/2019 07:45 5/19/2019 08:12 Study Result TITLE: Lower extremity arterial ultrasound examination. 
  
INDICATION: Left heel ulcer. Diabetes mellitus, dyslipidemia, hypertension. 
  
TECHNIQUE: Grayscale, color, and Doppler interrogation performed. 
  
COMPARISON: None. 
  
RIGHT LOWER EXTREMITY:  Peak systolic velocities-- CFA = 119, PFA = 125, SFA = 
142, popliteal = 97, peroneal = 68, PTA = 89, SEBASTIAN = 133 cm/sec. Biphasic/triphasic waveform throughout. 
  
LEFT LOWER EXTREMITY:  Peak systolic velocities-- CFA = 150, PFA = 105, SFA = 
241, popliteal = 425, peroneal = 104, PTA = 69, SEBASTIAN = 112 cm/sec. Monophasic 
waveform in the popliteal artery and below. 
  
ABDOMEN:  Aorta = 1.9 cm diameter, 70 cm/s. 
  
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 Results MRI ANKLE LT W WO CONT (Accession [id number]) (Order [id number]) Allergies     
High: Contrast Agent [Iodine] Not Specified: Actifed [Triprolidine-pseudoephedrine]; Allopurinol;  Decongestant [Pseudoephedrine Hcl]; Lexapro [Escitalopram Oxalate]; Lyrica [Pregabalin]; Minizide [Prazosin-polythiazide]; Mobic [Meloxicam]; Omnipaque [Iohexol]; Sertraline;  Spironolactone;  Sulfamethazine Result Information Status: Final result (Exam End: 5/18/2019 18:09) Provider Status: Open Signed by Signed Date/Time  Phone Pager Ozzie Preciado 5/19/2019 15:04 [phone] Exam Information Status Exam Begun  Exam Ended Final [99] 5/18/2019 17:28 5/18/2019 18:09 Study Result EXAM: Left ankle MRI with and without contrast. 
ADDITIONAL CLINICAL INFORMATION: Concern for osteomyelitis with ulceration of 
the heel. COMPARISON: Left foot films dated May 17, 2019. 
  
Findings: There is a large multiloculated cyst of the calcaneus versus chronic vascular 
remnant. Multifocal osseous erosions of the midfoot. There is an ulceration 
along the dorsal aspect of the heel without evidence of underlying 
osteomyelitis. Achilles insertional and plantar calcaneal enthesophytes. Talar 
dome is intact. No evidence of acute fracture. Tendons are intact. Diffuse 
muscular atrophy 
  
IMPRESSION Impression: 1. Dorsal heel ulceration without evidence of osteomyelitis or drainable fluid 
collection. 2. Multifocal osseous erosions of the midfoot, correlate for possible rheumatoid 
arthritis. 3. Large multilobulated cystic lesion of the calcaneus with peripheral 
enhancement favored a vascular remnant versus multiloculated cyst, no internal 
enhancement. Consider follow-up radiographs in 3-6 months to ensure stability. Signed By: Jimbo Cardenas MD   
 May 23, 2019

## 2019-05-23 NOTE — PROGRESS NOTES
END OF SHIFT NOTE: 
 
INTAKE/OUTPUT 
05/22 0701 - 05/23 0700 In: 350 [P.O.:50; I.V.:300] Out: 8898 [[id number]] Voiding: YES Catheter: NO 
Drain:  
External Female Catheter 05/18/19 (Active) Site Assessment Clean; Intact 5/23/2019  3:09 AM  
Repositioned No 5/23/2019  3:09 AM  
Perineal Care No 5/23/2019  3:09 AM  
Wick Changed No 5/23/2019  3:09 AM  
Suction Canister/Tubing Changed No 5/22/2019  8:00 PM  
Urine Output (mL) 400 ml 5/22/2019 11:58 PM  
 
 
 
 
 
 
Flatus: Patient does have flatus present. Stool:  1 occurrences. Characteristics: 
Stool Assessment Stool Color: Meredith Civil Stool Appearance: Soft Stool Amount: Large Stool Source/Status: Rectum Emesis: 0 occurrences. Characteristics: VITAL SIGNS Patient Vitals for the past 12 hrs: 
 Temp Pulse Resp BP SpO2  
05/23/19 0330 98.4 °F (36.9 °C) 85 18 115/69 95 % 05/22/19 2314 98 °F (36.7 °C) 83 18 103/51 95 % Pain Assessment Pain Intensity 1: 0 (05/23/19 0309) Pain Location 1: Hand, Foot Pain Intervention(s) 1: Emotional support, Rest 
Patient Stated Pain Goal: 0 Ambulating No 
 
Shift report given to oncoming nurse at the bedside.  
 
Jonni Kocher, RN

## 2019-05-24 NOTE — CONSULTS
Plastic Surgery Consult Note Patient: Domenic Campbell MRN: [id number]  SSN: [id number] YOB: 1933  Age: 80 y.o. Sex: female Subjective: Chief Complaint: 
Left heel wound History of Present Illness:    
 
Wound Caused By: diabetic  Neuropathy, arterial insufficiency Associated Signs and Symptoms: drainage, eschar Timing: constant Quality: wound Severity: full thickness. S/p revascularization with Dr Nigel Schmidt. Chronic prednisone. Past Medical History:  
Diagnosis Date  Arthritis  Chronic diastolic congestive heart failure ([facility] [address] 1/16/2019  Chronic pain   
 bilat feet & hands  Edema BLE & bilat fingers; pt takes diuretic daily; pt states her swelling is related to the nerve disease she is being treated for at [facility]  GERD (gastroesophageal reflux disease) 1/16/2019  Gouty arthritis  History of MI (myocardial infarction) 1980's \"light heart attack\"; pt states MD at [facility] mentioned it to her; pt stated MD told her \"was on the back part of her heart & would not give her any problems\"; pt takes aspirin 81 mg daily  Hypertension   
 managed w/med  Hypothyroidism   
 s/p partial thyroidectomy 1960; managed w/med  Mild heartburn   
 takes tums prn  Neuropathy 1980's  
 pt states has been treated by [facility] for \"nerve problems in her feet & hands\"  Post-operative nausea and vomiting  Type 2 diabetes mellitus without complication, without long-term current use of insulin ([facility] [address] 7/27/2018 Past Surgical History:  
Procedure Laterality Date  HX CYST REMOVAL Right 1950's  
 upper eyelid  HX DILATION AND CURETTAGE  [id number]  
 ectopic pregnancy; right BSO done  HX HEART CATHETERIZATION  late 1979  
 pt states no stents [address] [place] [facility]  
 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  
 nerve removed out of left leg [address]  HX UROLOGICAL    
 cystoscopy Family History Problem Relation Age of Onset  Cancer Mother  Heart Disease Father  Stroke Father Social History Tobacco Use  Smoking status: Never Smoker  Smokeless tobacco: Never Used Substance Use Topics  Alcohol use: No  
   
Prior to Admission medications Medication Sig Start Date End Date Taking? Authorizing Provider  
cefpodoxime (VANTIN) 200 mg tablet Take 2 Tabs by mouth every twelve (12) hours for 12 days. 5/24/19 6/5/19 Yes Ata Coreas MD  
doxycycline (VIBRAMYCIN) 100 mg capsule Take 1 Cap by mouth every twelve (12) hours for 12 days. 5/24/19 6/5/19 Yes Ata Coreas MD  
metroNIDAZOLE (FLAGYL) 500 mg tablet Take 1 Tab by mouth every twelve (12) hours for 12 days. 5/24/19 6/5/19 Yes Ata Coreas MD  
colchicine 0.6 mg tablet Take 0.6 mg by mouth two (2) times a day. Indications: for 3 days, end 5/20   Yes Provider, Historical  
oxybutynin chloride XL (DITROPAN XL) 5 mg CR tablet Take 5 mg by mouth. Yes Provider, Historical  
torsemide (DEMADEX) 10 mg tablet Take 10 mg by mouth two (2) times a day. Yes Provider, Historical  
tamsulosin (FLOMAX) 0.4 mg capsule Take 1 Cap by mouth daily. 3/30/19   Kelly Solis NP  
methadone (DOLOPHINE) 5 mg tablet Take 1 Tab by mouth every six (6) hours. Max Daily Amount: 20 mg. Pt has been stable on this dose for many years, please do not taper or stop. Instructed to take DOS per Anesthesia guidelines. Indications: chronic pain, narcotic addiction 3/29/19   Kelly Solis NP  
DULoxetine (CYMBALTA) 60 mg capsule Take 1 Cap by mouth daily.  3/30/19   Kelly Solis NP  
clonazePAM (KLONOPIN) 0.5 mg tablet Take 1 Tab by mouth three (3) times daily. Max Daily Amount: 1.5 mg. Indications: anxiety 3/29/19   Kelly Solis NP  
carvedilol (COREG) 6.25 mg tablet Take 1 Tab by mouth two (2) times daily (with meals). 3/29/19   Kelly Solis NP  
amLODIPine (NORVASC) 10 mg tablet Take 1 Tab by mouth daily. Patient taking differently: Take 5 mg by mouth daily. 3/29/19   Kelly Solis NP  
predniSONE (DELTASONE) 5 mg tablet Take 1 Tab by mouth two (2) times a day. 5mg BID Patient taking differently: Take 10 mg by mouth two (2) times a day. 5mg BID 3/29/19   Kelly Solis NP  
docusate sodium (COLACE) 100 mg capsule Take 1 Cap by mouth nightly. 3/4/19   Wil Cook, DO  
OTHER,NON-FORMULARY, Take 30 mL by mouth three (3) times daily. PROHEAL    Provider, Historical  
Lactobacillus acidophilus (ACIDOPHILUS) cap Take 1 Cap by mouth daily. Provider, Historical  
polyethylene glycol (MIRALAX) 17 gram packet Take 17 g by mouth daily as needed. Provider, Historical  
insulin lispro (HUMALOG) 100 unit/mL kwikpen 5 Units by SubCUTAneous route Before breakfast, lunch, and dinner. Indications: 150-200= 2 units; 201-250= 4 units; 251-300= 6 units; >300 8 units    Provider, Historical  
insulin detemir U-100 (LEVEMIR FLEXTOUCH) 100 unit/mL (3 mL) inpn 20 Units by SubCUTAneous route every morning. Indications: type 2 diabetes mellitus    Provider, Historical  
mirtazapine (REMERON) 7.5 mg tablet Take 7.5 mg by mouth nightly. Provider, Historical  
OTHER,NON-FORMULARY, Insert  into rectum as needed. Preparation H max stregth    Provider, Historical  
nystatin (MYCOSTATIN) powder Apply  to affected area three (3) times daily. Under breasts and ABD folds    Provider, Historical  
potassium chloride (KLOR-CON) 10 mEq tablet Take 10 mEq by mouth two (2) times a day. Provider, Historical  
acetaminophen (TYLENOL EXTRA STRENGTH) 500 mg tablet Take 500 mg by mouth every six (6) hours as needed for Pain.     Provider, Historical  
 ferrous gluconate 324 mg (38 mg iron) tablet Take 324 mg by mouth Daily (before breakfast). Provider, Historical  
levothyroxine (SYNTHROID) 75 mcg tablet Take 75 mcg by mouth Daily (before breakfast). Provider, Historical  
hydrOXYzine pamoate (VISTARIL) 50 mg capsule Take 25 mg by mouth two (2) times daily as needed for Itching. Provider, Historical  
multivitamin with minerals (HAIR,SKIN AND NAILS PO) Take 1 Tab by mouth daily. Provider, Historical  
loperamide (IMODIUM) 2 mg capsule Take 4 mg by mouth as needed for Diarrhea. 7/43/77   Stanley Heard MD  
raNITIdine (ZANTAC) 150 mg tablet Take 150 mg by mouth two (2) times a day. Provider, Historical  
CHOLECALCIFEROL, VITAMIN D3, (VITAMIN D3 PO) Take 1,000 mg by mouth daily. Provider, Historical  
CALCIUM CARBONATE/VITAMIN D3 (CALCIUM 600 + D,3, PO) Take 600 mg by mouth daily. Provider, Historical  
KRILL/OM-3/DHA/EPA/PHOSPHO/AST (MEGARED OMEGA-3 KRILL OIL PO) Take 1 Cap by mouth daily. Provider, Historical  
aspirin delayed-release 81 mg tablet Take 81 mg by mouth nightly. Ok to continue per anesthesia guidelines. Provider, Historical  
 
Allergies Allergen Reactions  Contrast Agent [Iodine] Anaphylaxis  Actifed [Triprolidine-Pseudoephedrine] Nausea Only  Allopurinol Other (comments) Elevated blood pressure & pt states could not walk or see until medication was out of her system.  Decongestant [Pseudoephedrine Hcl] Rash and Itching  Lexapro [Escitalopram Oxalate] Other (comments) Lips/Mouth swelling  Lyrica [Pregabalin] Unknown (comments)  Minizide [Prazosin-Polythiazide] Unknown (comments)  Mobic [Meloxicam] Other (comments) Facial swelling  Omnipaque [Iohexol] Hives, Swelling and Other (comments) Wheezing and/or shortness of breath  Sertraline Other (comments) Mouth, lips swell  Spironolactone Itching   Pt complains that she breaks out in clammy sweat with itching and stinging in her upper arms, dry mouth, funny feeling tongue, SOB, and increased anxiety.  Sulfamethazine Other (comments) Mouth/lips swelling Review of Systems: 
CONSTITUTIONAL: No fever, chills HEAD: No headache EYES: No visual loss ENT: No hearing loss SKIN: No rash CARDIOVASCULAR: No chest pain RESPIRATORY: No shortness of breath GASTROINTESTINAL: No nausea, vomiting GENITOURINARY: No excessive urination NEUROLOGICAL: + weakness MUSCULOSKELETAL: No muscle pain. No neck pain HEMATOLOGIC: + easy bleeding LYMPHATICS: +edema. PSYCHIATRIC: No current depression ENDOCRINOLOGIC: + high sugars ALLERGIES: No history of asthma, hives, eczema or rhinitis. Lab Results Component Value Date/Time Hemoglobin A1c 10.2 (H) 01/17/2019 10:03 AM  
  
 
Immunization History Administered Date(s) Administered  Influenza High Dose Vaccine PF 10/14/2014, 10/05/2017, 10/25/2018  Influenza Vaccine 09/16/2009, 09/09/2013, 10/12/2015, 09/15/2016  Influenza Vaccine (Whole Virus) 10/01/2011, 08/27/2012  Pneumococcal Conjugate (PCV-13) 03/18/2015  Pneumococcal Polysaccharide (PPSV-23) 10/01/2000  TB Skin Test (PPD) Intradermal 01/18/2019, 02/25/2019, 03/25/2019  Zoster Vaccine, Live 06/03/2013 Body mass index is 28.12 kg/m². Current medications: 
Current Facility-Administered Medications Medication Dose Route Frequency Provider Last Rate Last Dose  cefpodoxime (VANTIN) tablet 400 mg  400 mg Oral Q12H Maggie Hardwick MD   400 mg at 05/24/19 0362  furosemide (LASIX) tablet 40 mg  40 mg Oral BID C[employer]coRex, DO   40 mg at 05/24/19 0815  
 oxyCODONE-acetaminophen (PERCOCET) 5-325 mg per tablet 1 Tab  1 Tab Oral Q4H PRN Katerine Bonner MD      
 morphine 10 mg/ml injection 1 mg  1 mg IntraVENous Q1H PRN Katerine Bonner MD      
 ondansetron [facility]) injection 4 mg  4 mg IntraVENous Q6H PRN Katerine Bonner MD      
  diphenhydrAMINE (BENADRYL) injection 12.5 mg  12.5 mg IntraVENous Q4H PRN Vi Elizondo MD      
 doxycycline (VIBRAMYCIN) capsule 100 mg  100 mg Oral Q12H Kelly Green MD   100 mg at 05/24/19 0815  
 insulin glargine (LANTUS) injection 5 Units  5 Units SubCUTAneous QHS Rex Peña, DO   5 Units at 05/23/19 2337  
 metroNIDAZOLE (FLAGYL) tablet 500 mg  500 mg Oral Q12H Kelly Green MD   500 mg at 05/24/19 4733  senna-docusate (PERICOLACE) 8.6-50 mg per tablet 1 Tab  1 Tab Oral DAILY Rex Peña DO   1 Tab at 05/24/19 8966  predniSONE (DELTASONE) tablet 10 mg  10 mg Oral DAILY WITH BREAKFAST Rex Peña DO   10 mg at 05/24/19 8744  sodium chloride (NS) flush 5-40 mL  5-40 mL IntraVENous Q8H Toni Oseguera MD   10 mL at 05/24/19 9168  sodium chloride (NS) flush 5-40 mL  5-40 mL IntraVENous PRN Alisa George MD      
 acetaminophen (TYLENOL) tablet 650 mg  650 mg Oral Q6H PRN Alisa George MD   650 mg at 05/18/19 1610  
 ondansetron (ZOFRAN) injection 4 mg  4 mg IntraVENous Q6H PRN Alisa George MD      
 insulin lispro (HUMALOG) injection   SubCUTAneous AC&HS Toni Oseguera MD   Stopped at 05/23/19 2339  
 amLODIPine (NORVASC) tablet 10 mg  10 mg Oral DAILY Alisa George MD   10 mg at 05/24/19 1151  aspirin delayed-release tablet 81 mg  81 mg Oral QHS Alisa George MD   81 mg at 05/23/19 2107  carvedilol (COREG) tablet 6.25 mg  6.25 mg Oral BID WITH MEALS Alisa George MD   6.25 mg at 05/24/19 1559  cholecalciferol (VITAMIN D3) tablet 1,000 Units  1,000 Units Oral DAILY Toni Oseguera MD   1,000 Units at 05/24/19 7662  clonazePAM (KlonoPIN) tablet 0.5 mg  0.5 mg Oral TID Toni Oseguera MD   0.5 mg at 05/24/19 9212  DULoxetine (CYMBALTA) capsule 60 mg  60 mg Oral DAILY Alisa George MD   60 mg at 05/24/19 0566  ferrous gluconate 324 mg (38 mg iron) tablet 1 Tab  1 Tab Oral ACB Telma Roche MD   1 Tab at 05/24/19 7991  levothyroxine (SYNTHROID) tablet 75 mcg  75 mcg Oral ACB Alisa George MD   75 mcg at 05/24/19 4766  methadone (DOLOPHINE) tablet 5 mg  5 mg Oral Q6H Alisa George MD   5 mg at 05/24/19 3599  famotidine (PEPCID) tablet 20 mg  20 mg Oral BID Telma Roche MD   20 mg at 05/24/19 3644  tamsulosin (FLOMAX) capsule 0.4 mg  0.4 mg Oral DAILY Alisa George MD   0.4 mg at 05/24/19 4987  heparin (porcine) injection 5,000 Units  5,000 Units SubCUTAneous Q8H Alisa George MD   5,000 Units at 05/24/19 5121 Objective:  
 
Physical Exam:  
 
Visit Vitals /64 Pulse 82 Temp 98 °F (36.7 °C) Resp 18 Ht 5' 4\" (1.626 m) Wt 74.3 kg (163 lb 12.8 oz) SpO2 92% BMI 28.12 kg/m² General: well developed, well nourished Psych: cooperative. Pleasant Neuro: alert and oriented to person/place/situation. Otherwise nonfocal. 
Derm: Normal turgor for age, dry skin HEENT: Normocephalic, atraumatic. EOMI. Neck: Normal range of motion. Chest: Respirations nonlabored Cardio: RRR Abdomen: Soft, nondistended Lower extremities: LLE 1+ DP 0 PT 3 s cap refill heel ulcer with clean base, some eschar present. No cellulitic changes or malodor. Insensate throughout foot. Previous toe amputation well healed. Ulcer Description: Wound Heel Left (Active) Dressing Status  Clean, dry, and intact 5/23/2019  8:08 PM  
Number of days: 108 Wound Finger (specify in comments) Anterior; Left (Active) Number of days: 107 Wound Buttocks Posterior;Right (Active) Dressing Status Intact 5/23/2019  8:08 PM  
Dressing Type ABD pad;Gauze wrap (ajay) 5/23/2019  2:00 PM  
Number of days: 6 Wound Heel Left (Active) Dressing Status Clean, dry, and intact 5/23/2019  8:08 PM  
Dressing Type ABD pad;Gauze wrap (ajay); Other (Comment) 5/21/2019  2:45 PM  
Drainage Amount None 5/21/2019  2:45 PM  
 Wound Odor None 5/21/2019  2:45 PM  
Number of days: 6 Wound Groin Right (Active) Dressing Status Clean, dry, and intact 5/23/2019  3:09 AM  
Dressing Type 4 x 4;Special tape (comment); Transparent film 5/22/2019  4:33 PM  
Number of days: 2 [REMOVED] Wound Neck (Removed) Number of days: 145 [REMOVED] Wound Leg Lower (Removed) Number of days: 35  
   
[REMOVED] Wound Leg Lower (Removed) Number of days: 35  
   
[REMOVED] Wound Leg Lower Upper (Removed) Number of days: 21  
   
[REMOVED] Wound Leg Lower Lower (Removed) Number of days: 21  
   
[REMOVED] Wound Leg Lower Mid (Removed) Number of days: 21  
   
[REMOVED] Wound (Removed) Number of days: 66  
   
[REMOVED] Wound (Removed) Number of days: 53  
   
[REMOVED] Wound Leg Upper Left (Removed) Number of days: 4 [REMOVED] Wound Heel Left (Removed) Number of days: 21  
   
[REMOVED] Wound Leg Lower Left;Right (Removed) Number of days: 43  
   
 
 
Problem List  Date Reviewed: 5/25/2016 Codes Class Noted * (Principal) Non healing left heel wound ICD-10-CM: G39.208V ICD-9-CM: 892.1  5/17/2019 Sepsis ([facility] ICD-10-CM: A41.9 ICD-9-CM: 038.9, 995.91  3/25/2019 Acute metabolic encephalopathy KPB-13-CW: G93.41 
ICD-9-CM: 348.31  2/26/2019 Acute diarrhea ICD-10-CM: R19.7 ICD-9-CM: 787.91  2/26/2019 Oral thrush ICD-10-CM: B37.0 ICD-9-CM: 112.0  2/26/2019 Acute encephalopathy ICD-10-CM: G93.40 ICD-9-CM: 348.30  2/24/2019 Finger infection ICD-10-CM: L08.9 ICD-9-CM: 686.9  2/5/2019 Leukocytosis ICD-10-CM: E14.593 ICD-9-CM: 288.60  2/5/2019 RAYMON (acute kidney injury) ([facility] ICD-10-CM: N17.9 ICD-9-CM: 584.9  2/5/2019 Osteomyelitis of finger (HCC) ICD-10-CM: M86.9 ICD-9-CM: 730.24  2/5/2019 Closed rib fracture ICD-10-CM: S22.39XA ICD-9-CM: 807.00  1/16/2019  Acute respiratory failure with hypoxia (HCC) ICD-10-CM: J96.01 
 ICD-9-CM: 518.81  1/16/2019 Acute prerenal azotemia ICD-10-CM: R79.89 ICD-9-CM: 790.6  1/16/2019 Chronic diastolic congestive heart failure (HCC) ICD-10-CM: I50.32 
ICD-9-CM: 428.32, 428.0  1/16/2019 Acquired hypothyroidism ICD-10-CM: E03.9 ICD-9-CM: 244.9  1/16/2019 GERD (gastroesophageal reflux disease) ICD-10-CM: K21.9 ICD-9-CM: 530.81  1/16/2019 Mixed hyperlipidemia ICD-10-CM: E78.2 ICD-9-CM: 272.2  7/27/2018 DM2 (diabetes mellitus, type 2) (HCC) ICD-10-CM: E11.9 ICD-9-CM: 250.00  7/27/2018 Aortic valve sclerosis ICD-10-CM: I35.8 ICD-9-CM: 424.1  1/26/2018 Rapid heart rate ICD-10-CM: R00.0 ICD-9-CM: 785.0  1/26/2018 Essential hypertension with goal blood pressure less than 130/85 ICD-10-CM: I10 
ICD-9-CM: 401.9  10/27/2017 Murmur ICD-10-CM: R01.1 ICD-9-CM: 785.2  10/4/2017 Bilateral leg edema ICD-10-CM: R60.0 ICD-9-CM: 782.3  10/4/2017 Swelling ICD-10-CM: R60.9 ICD-9-CM: 782.3  10/4/2017 Abnormal EKG ICD-10-CM: R94.31 
ICD-9-CM: 794.31  10/4/2017 Urinary tract infection, site not specified ICD-10-CM: N39.0 ICD-9-CM: 599.0  4/22/2015 Assessment/Plan:  
 
Left heel full thickness grade 3 DFU. Continue daily gauze dressing changes. Use rooke boot and float heel whenever possible. Follow up at the [facility] wound center 5/31. Discussed with daughter (nurse on floor). To be discharged this afternoon.   
 
Signed By: Mario Clemons MD

## 2019-05-24 NOTE — DISCHARGE SUMMARY
Hospitalist Discharge Summary Patient ID: 
Cheyenne Adams 
[id number] 
83 y.o. 
8/23/1933 Admit date: 5/17/2019  8:51 PM 
Discharge date and time: 5/24/2019 Attending: Les Rosario MD 
PCP:  Mary Lou Marie MD 
Treatment Team: Attending Provider: Les Rosario MD; Consulting Provider: Joe Champagne MD; Utilization Review: Joseph Villeda RN; Care Manager: Yonas Tolbert RN; Consulting Provider: Anuja Dinero MD 
 
Principal Diagnosis Non healing left heel wound Principal Problem: 
  Non healing left heel wound (5/17/2019) Active Problems: 
  Mixed hyperlipidemia (7/27/2018) DM2 (diabetes mellitus, type 2) ([facility] [address] (7/27/2018) Acute respiratory failure with hypoxia ([facility] [address] (1/16/2019) Hospital Course: 
Please refer to the admission H&P for details of presentation. In summary, the patient is Estefania Woodarda a 80 y. o. female who came to ER for concern of infection. Has recent treatment for osteomyelitis in L first finger. She was just recently discharged from a nursing home.   
Lab works show persistent and more elevated WBC. Her left heel wound is not healing. Admitted for IV abx for cellulitis, ID consulted and radiology/exam not suggesting osteomyelitis. Vascular consulted and arteriogram performed 5/22 with popliteal stenosis. Vascular consulted Dr. Smitha Zabala and would ideally be seen prior to discharge, but not essential. Can refer for office follow-up if unable to be seen. Will discharge on vantin, doxy, and flagyl for 14d total treatment from aortogram (EOT 6/6), will f/u in their office 6/12 Significant Diagnostic Studies:  
IR FLUOROSCOPY < 1 HOUR Final Result Fluoroscopy for greater than 60 minutes was provided in the  
operating room. ANKLE BRACHIAL INDEX Final Result IMPRESSION:  Both right and left JOCELYNE are within the acceptable range. Blunted  
waveform tracing in the left toes consistent with proximal stenosis as was identified on the earlier arterial ultrasound examination. 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 MRI ANKLE LT W WO CONT Preliminary Result Impression: 1. Dorsal heel ulceration without evidence of osteomyelitis or drainable fluid  
collection. 2. Multifocal osseous erosions of the midfoot, correlate for possible rheumatoid  
arthritis. 3. Large multilobulated cystic lesion of the calcaneus with peripheral  
enhancement favored a vascular remnant versus multiloculated cyst, no internal  
enhancement. Consider follow-up radiographs in 3-6 months to ensure stability. XR CHEST SNGL V Final Result IMPRESSION: Unchanged interstitial lung edema or pneumonitis. XR CHEST PA LAT Final Result IMPRESSION: Prominent interstitial lung markings may relate to early CHF/fluid  
overload or atypical pneumonia. XR FOOT LT MIN 3 V Final Result IMPRESSION: No acute process. Labs: Results:  
   
Chemistry Recent Labs  
  05/24/19 
0401 05/23/19 
5793  195*  143  
K 3.4* 3.1*  
 104 CO2 30 29 BUN 27* 21  
CREA 1.03* 1.00  
CA 8.7 8.6 AGAP 9 10 CBC w/Diff Recent Labs  
  05/24/19 
0401 05/23/19 
0456 05/22/19 
6168 WBC 12.4* 11.1 10.7 RBC 3.85* 3.86* 3.75* HGB 9.2* 9.2* 9.1*  
HCT 31.8* 31.7* 31.1*  
 452* 390 GRANS  --  69 59 LYMPH  --  21 28 EOS  --  1 3 Cardiac Enzymes No results for input(s): CPK, CKND1, MARLENY in the last 72 hours. No lab exists for component: Dena Buckner Coagulation No results for input(s): PTP, INR, APTT in the last 72 hours. No lab exists for component: INREXT, INREXT Lipid Panel No results found for: CHOL, CHOLPOCT, CHOLX, CHLST, CHOLV, 693296, HDL, LDL, LDLC, DLDLP, 485447, VLDLC, VLDL, TGLX, TRIGL, TRIGP, TGLPOCT, CHHD, CHHDX  
BNP No results for input(s): BNPP in the last 72 hours. Liver Enzymes No results for input(s): TP, ALB, TBIL, AP, SGOT, GPT in the last 72 hours. No lab exists for component: DBIL Thyroid Studies Lab Results Component Value Date/Time T4, Total 8.3 05/18/2016 04:32 PM  
 TSH 0.429 02/25/2019 04:05 AM  
    
 
 
Discharge Exam: 
Visit Vitals /64 Pulse 83 Temp 97.9 °F (36.6 °C) Resp 18 Ht 5' 4\" (1.626 m) Wt 74.3 kg (163 lb 12.8 oz) SpO2 94% BMI 28.12 kg/m² General:          No acute distress   
Lungs:             CTA Bilaterally. Heart:              Regular rate and rhythm,  No murmur, rub, or gallop Abdomen:        Soft, Non distended, Non tender, Positive bowel sounds Extremities:     Heel dressing c/d/i Neurologic:      Decreased sensation mid shins and distally b/l Disposition: home with [facility] Discharge Condition: stable Patient Instructions:  
Current Discharge Medication List  
  
START taking these medications Details  
cefpodoxime (VANTIN) 200 mg tablet Take 2 Tabs by mouth every twelve (12) hours for 12 days. Qty: 48 Tab, Refills: 0  
  
doxycycline (VIBRAMYCIN) 100 mg capsule Take 1 Cap by mouth every twelve (12) hours for 12 days. Qty: 24 Cap, Refills: 0  
  
metroNIDAZOLE (FLAGYL) 500 mg tablet Take 1 Tab by mouth every twelve (12) hours for 12 days. Qty: 24 Tab, Refills: 0 CONTINUE these medications which have NOT CHANGED Details  
colchicine 0.6 mg tablet Take 0.6 mg by mouth two (2) times a day. Indications: for 3 days, end 5/20 oxybutynin chloride XL (DITROPAN XL) 5 mg CR tablet Take 5 mg by mouth. torsemide (DEMADEX) 10 mg tablet Take 10 mg by mouth two (2) times a day. tamsulosin (FLOMAX) 0.4 mg capsule Take 1 Cap by mouth daily. Qty: 30 Cap, Refills: 0  
  
methadone (DOLOPHINE) 5 mg tablet Take 1 Tab by mouth every six (6) hours. Max Daily Amount: 20 mg. Pt has been stable on this dose for many years, please do not taper or stop. Instructed to take DOS per Anesthesia guidelines. Indications: chronic pain, narcotic addiction 
Qty: 20 Tab, Refills: 0 Comments: Pt has been stable on this dose for many years, please do not taper or stop. Associated Diagnoses: Osteomyelitis of finger ([address] DULoxetine (CYMBALTA) 60 mg capsule Take 1 Cap by mouth daily. Qty: 10 Cap, Refills: 0 Associated Diagnoses: Type 2 diabetes mellitus with complication, with long-term current use of insulin ([address] clonazePAM (KLONOPIN) 0.5 mg tablet Take 1 Tab by mouth three (3) times daily. Max Daily Amount: 1.5 mg. Indications: anxiety Qty: 10 Tab, Refills: 0 Associated Diagnoses: Acute metabolic encephalopathy  
  
carvedilol (COREG) 6.25 mg tablet Take 1 Tab by mouth two (2) times daily (with meals). Qty: 10 Tab, Refills: 0  
  
amLODIPine (NORVASC) 10 mg tablet Take 1 Tab by mouth daily. Qty: 10 Tab, Refills: 0  
  
predniSONE (DELTASONE) 5 mg tablet Take 1 Tab by mouth two (2) times a day. 5mg BID Qty: 1 Tab, Refills: 0  
  
docusate sodium (COLACE) 100 mg capsule Take 1 Cap by mouth nightly. Qty: 1 Cap, Refills: 0  
  
!! OTHER,NON-FORMULARY, Take 30 mL by mouth three (3) times daily. Roxana Jackson Lactobacillus acidophilus (ACIDOPHILUS) cap Take 1 Cap by mouth daily. polyethylene glycol (MIRALAX) 17 gram packet Take 17 g by mouth daily as needed. insulin lispro (HUMALOG) 100 unit/mL kwikpen 5 Units by SubCUTAneous route Before breakfast, lunch, and dinner. Indications: 150-200= 2 units; 201-250= 4 units; 251-300= 6 units; >300 8 units  
  
insulin detemir U-100 (LEVEMIR FLEXTOUCH) 100 unit/mL (3 mL) inpn 20 Units by SubCUTAneous route every morning. Indications: type 2 diabetes mellitus  
  
mirtazapine (REMERON) 7.5 mg tablet Take 7.5 mg by mouth nightly. !! OTHER,NON-FORMULARY, Insert  into rectum as needed.  Preparation H max stregth  
  
nystatin (MYCOSTATIN) powder Apply  to affected area three (3) times daily. Under breasts and ABD folds  
  
potassium chloride (KLOR-CON) 10 mEq tablet Take 10 mEq by mouth two (2) times a day. acetaminophen (TYLENOL EXTRA STRENGTH) 500 mg tablet Take 500 mg by mouth every six (6) hours as needed for Pain. ferrous gluconate 324 mg (38 mg iron) tablet Take 324 mg by mouth Daily (before breakfast). levothyroxine (SYNTHROID) 75 mcg tablet Take 75 mcg by mouth Daily (before breakfast). hydrOXYzine pamoate (VISTARIL) 50 mg capsule Take 25 mg by mouth two (2) times daily as needed for Itching. multivitamin with minerals (HAIR,SKIN AND NAILS PO) Take 1 Tab by mouth daily. loperamide (IMODIUM) 2 mg capsule Take 4 mg by mouth as needed for Diarrhea. raNITIdine (ZANTAC) 150 mg tablet Take 150 mg by mouth two (2) times a day. CHOLECALCIFEROL, VITAMIN D3, (VITAMIN D3 PO) Take 1,000 mg by mouth daily. CALCIUM CARBONATE/VITAMIN D3 (CALCIUM 600 + D,3, PO) Take 600 mg by mouth daily. KRILL/OM-3/DHA/EPA/PHOSPHO/AST (MEGARED OMEGA-3 KRILL OIL PO) Take 1 Cap by mouth daily. aspirin delayed-release 81 mg tablet Take 81 mg by mouth nightly. Ok to continue per anesthesia guidelines. !! - Potential duplicate medications found. Please discuss with provider. Activity: PT/OT per Home Health Diet: Resume previous diet Follow-up: 
  
Follow-up Appointments Procedures  FOLLOW UP VISIT Appointment in: Two Weeks Wound clinic Wound clinic Standing Status:   Standing Number of Occurrences:   1 Order Specific Question:   Appointment in Answer: Two Weeks  FOLLOW UP VISIT Appointment in: One Week PCP  
  PCP Standing Status:   Standing Number of Occurrences:   1 Standing Expiration Date:   5/25/2019 Order Specific Question:   Appointment in Answer: One Week  FOLLOW UP VISIT Appointment in: Other (Specify) ID as scheduled 6/12 ID as scheduled 6/12 Standing Status:   Standing Number of Occurrences:   1 Standing Expiration Date:   5/25/2019 Order Specific Question:   Appointment in Answer: Other (Specify)  FOLLOW UP VISIT Appointment in: Two Weeks [facility]ascular [facility] Standing Status:   Standing Number of Occurrences:   1 Standing Expiration Date:   5/25/2019 Order Specific Question:   Appointment in Answer: Two Weeks  FOLLOW UP VISIT Appointment in: [facility] Dr. Michelle [facility] Standing Status:   Standing Number of Occurrences:   1 Standing Expiration Date:   5/25/2019 Order Specific Question:   Appointment in Answer: Two Weeks Time spent to discharge patient 35 minutes Signed: 
Sheldon Burks MD 
5/24/2019 
11:38 AM

## 2019-05-24 NOTE — PROGRESS NOTES
Infectious Disease Progress Note Today's Date: 5/24/2019 Admit Date: 5/17/2019 Impression: · Left heel wound : MRI and Plain films not suggesting osteomyelitis but dos show SSTI. · LLE cellulitis : improved · PVD: angio with left popliteal PTA 5/22/19 · Leukocytosis: resolved. tissue left leg looks improved. · CKD: CRT stable to below baseline. GFR has normalized · S/p recent treatment for L 1st finger osteomyelitis in 2/2019. · DM: HgbA1c 10.2 in January 2019. · Gout: on chronic prednisone 10 mg daily. · Steroid dependent inflammatory arthritis Plan: · Will change to all oral ABX with anticipation discharge tomorrow. Recommend 14 day course from angio yesterday given open wound and chronic steroid use. LLE redness has resovled. She will leave on doxycycline/flagyl and Vantin. Stop date 6/5/19. Wound remains open and will continue to require wound care. · Follow up with ID on 6/12/19 at 1:30PM. Daughter works here and will try to shift work schedule to come with her. Anti-infectives:  
Vancomycin 5/17-5/22 Doxycyline 5/22- Zosyn 5/17-5/20 Cefepime 5/20-5/23 Vantin 5/23- 
flagyl 5/20- Subjective: No complaints; states going home today. No diarrhea. 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 She is known to ID for left first finger osteomyelitis, which was treated with cefepime in Feb-March 2019. Swab cultures from the wound grew MSSA and Morganella. She was transitioned to cefadroxil and cipro after completion of cefepime. In late March she was brought to [facility] for fever and chills, and she was thought to have bilateral cellulitis. She was treated and ultimately antibiotics were continued until 3/31. She had VRE in the urine which was not treated since she was asymptomatic at that time. She was discharged to a SNF and has since then returned home.   She had labs done by her PCP on  which showed elevated WBC, ESR, and CRP. She was sent to the ER for further evaluation. She had WBC of 14.6 on presentation in the ER. She was started on vanc/zosyn and admitted to the hospitalist service. Review of Systems:  A comprehensive review of systems was negative except for that written in the History of Present Illness. Objective:  
 
Visit Vitals /64 Pulse 83 Temp 97.9 °F (36.6 °C) Resp 18 Ht 5' 4\" (1.626 m) Wt 74.3 kg (163 lb 12.8 oz) SpO2 94% BMI 28.12 kg/m² Temp (24hrs), Av.4 °F (36.9 °C), Min:97.9 °F (36.6 °C), Max:98.8 °F (37.1 °C) Lines:  none Physical Exam: Exam:   
 General: NC/AT, alert and cooperative, looks stated age, in NAD HEENT: PERRL, non-icteric sclera Neck: supple, symmetric, no masses or lymphadenopathy Cardiac:Nl S1/S2, no murmurs/rubs/gallops/clicks, trace LE edema Pulmonary:clear bilaterally no rales/wheezes, good air movement Abdomen: soft, NT, non-distended, BS normal, no CVA tenderness Gentital:external genitalia normal Angelo : none Skin:no rashes, wound on back left heal dressed; base beefy;  no  odor Left leg swelling seems resolved along with redness and pain MSK:no enlarged or swollen joints in limbs or sternoclavicular area Extremities: no cords/clots/cyanosis, Psychiatric: mood and affect appropriate to situation Data Review: CBC:  
Recent Labs  
  19 
0401 19 
0456 19 
9829 WBC 12.4* 11.1 10.7 RBC 3.85* 3.86* 3.75* HGB 9.2* 9.2* 9.1*  
HCT 31.8* 31.7* 31.1*  
 452* 390 GRANS  --  69 59 LYMPH  --  21 28 EOS  --  1 3 Microbiology:  
 
All Micro Results Procedure Component Value Units Date/Time CULTURE, BLOOD [[id number]] Collected:  19 Order Status:  Completed Specimen:  Blood Updated:  19 1027 Special Requests: --     
  LEFT Antecubital 
  
  Culture result: NO GROWTH 5 DAYS CULTURE, BLOOD [[id number]] Collected:  05/17/19 2133 Order Status:  Completed Specimen:  Blood Updated:  05/22/19 1027 Special Requests: --     
  RIGHT 
FOREARM Culture result: NO GROWTH 5 DAYS Imaging:  
 
Result Information Status: Final result (Exam End: 5/19/2019 08:12) Provider Status: Open Signed by Signed Date/Time  Phone Pager Dru Stringer 5/19/2019 11:49 [phone] Exam Information Status Exam Begun  Exam Ended Final [99] 5/19/2019 07:45 5/19/2019 08:12 Study Result TITLE: Lower extremity arterial ultrasound examination. 
  
INDICATION: Left heel ulcer. Diabetes mellitus, dyslipidemia, hypertension. 
  
TECHNIQUE: Grayscale, color, and Doppler interrogation performed. 
  
COMPARISON: None. 
  
RIGHT LOWER EXTREMITY:  Peak systolic velocities-- CFA = 119, PFA = 125, SFA = 
142, popliteal = 97, peroneal = 68, PTA = 89, SEBASTIAN = 133 cm/sec. Biphasic/triphasic waveform throughout. 
  
LEFT LOWER EXTREMITY:  Peak systolic velocities-- CFA = 150, PFA = 105, SFA = 
241, popliteal = 425, peroneal = 104, PTA = 69, SEBASTIAN = 112 cm/sec. Monophasic 
waveform in the popliteal artery and below. 
  
ABDOMEN:  Aorta = 1.9 cm diameter, 70 cm/s. 
  
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 Results MRI ANKLE LT W WO CONT (Accession [id number]) (Order [id number]) Allergies     
High: Contrast Agent [Iodine] Not Specified: Actifed [Triprolidine-pseudoephedrine]; Allopurinol;  Decongestant [Pseudoephedrine Hcl]; Lexapro [Escitalopram Oxalate]; Lyrica [Pregabalin]; Minizide [Prazosin-polythiazide]; Mobic [Meloxicam]; Omnipaque [Iohexol]; Sertraline;  Spironolactone;  Sulfamethazine Result Information Status: Final result (Exam End: 5/18/2019 18:09) Provider Status: Open Signed by Signed Date/Time  Phone Pager Khoa Berman 5/19/2019 15:04 [phone] Exam Information Status Exam Begun  Exam Ended Final [99] 5/18/2019 17:28 5/18/2019 18:09 Study Result EXAM: Left ankle MRI with and without contrast. 
ADDITIONAL CLINICAL INFORMATION: Concern for osteomyelitis with ulceration of 
the heel. COMPARISON: Left foot films dated May 17, 2019. 
  
Findings: There is a large multiloculated cyst of the calcaneus versus chronic vascular 
remnant. Multifocal osseous erosions of the midfoot. There is an ulceration 
along the dorsal aspect of the heel without evidence of underlying 
osteomyelitis. Achilles insertional and plantar calcaneal enthesophytes. Talar 
dome is intact. No evidence of acute fracture. Tendons are intact. Diffuse 
muscular atrophy 
  
IMPRESSION Impression: 1. Dorsal heel ulceration without evidence of osteomyelitis or drainable fluid 
collection. 2. Multifocal osseous erosions of the midfoot, correlate for possible rheumatoid 
arthritis. 3. Large multilobulated cystic lesion of the calcaneus with peripheral 
enhancement favored a vascular remnant versus multiloculated cyst, no internal 
enhancement. Consider follow-up radiographs in 3-6 months to ensure stability. Signed By: Manisha Card MD   
 May 24, 2019

## 2019-05-24 NOTE — PROGRESS NOTES
[facility] arranged, per daughter's request, for transport home, at 2 pm.  Faxed home health resumption of care orders to [facility] for RN/OT/PT (phone [phone]; fax [phone]). This plan is ok with Ms. Deja Tong in room 214 and daughter [place].

## 2019-05-24 NOTE — PROGRESS NOTES
Progress Note Patient: Niko España MRN: [id number]  SSN: [id number] YOB: 1933  Age: 80 y.o. Sex: female Admission Date: 5/17/2019 LOS: 5 days 2 Days Post-Op PROCEDURE(S): 
Aortogram 
Left lower extremity arteriogram, left popliteal PTA Ultrasound-guided access Subjective:  
 
Patient has no complaints, reports right groin soreness has resolved. Objective:  
 
Vitals:  
 05/24/19 0345 05/24/19 0501 05/24/19 8993 05/24/19 9362 BP: 135/81  118/64 118/64 Pulse: 85  83 83 Resp: 18  18 Temp: 98.4 °F (36.9 °C)  97.9 °F (36.6 °C) SpO2: 98%  94% Weight:  163 lb 12.8 oz (74.3 kg) Height:      
  
 
Physical Exam:  
GENERAL: alert, cooperative, no distress LUNG: normal respiratory effort HEART: regular rate and rhythm ABDOMEN: soft, nontender, very protuberant but not distended EXTREMITIES: right groin puncture site intact with dry dressing in place, site soft and without ecchymosis / induration / erythema; feet warm in Rooke boots, compression stockings and left heel dressing in place PULSES: bounding left DP pulse Lab/Data Review: 
BMP:  
Lab Results Component Value Date/Time  05/24/2019 04:01 AM  
 K 3.4 (L) 05/24/2019 04:01 AM  
  05/24/2019 04:01 AM  
 CO2 30 05/24/2019 04:01 AM  
 AGAP 9 05/24/2019 04:01 AM  
  05/24/2019 04:01 AM  
 BUN 27 (H) 05/24/2019 04:01 AM  
 CREA 1.03 (H) 05/24/2019 04:01 AM  
 GFRAA >60 05/24/2019 04:01 AM  
 GFRNA 54 (L) 05/24/2019 04:01 AM  
 
CBC:  
Lab Results Component Value Date/Time WBC 12.4 (H) 05/24/2019 04:01 AM  
 HGB 9.2 (L) 05/24/2019 04:01 AM  
 HCT 31.8 (L) 05/24/2019 04:01 AM  
  05/24/2019 04:01 AM  
  
 
Assessment / Plan / Recommendations / Medical Decision Making:  
 
Principal Problem: 
  Non healing left heel wound (5/17/2019) Active Problems: 
  Mixed hyperlipidemia (7/27/2018) DM2 (diabetes mellitus, type 2) ([facility] (7/27/2018) Acute respiratory failure with hypoxia ([facility] [address] (1/16/2019) Ideally patient to be seen by Dr. Whitney Patiño prior to D/C Continue wound care, elevation, off-loading, heel float, etc 
She may follow up with us PRN We will gladly be available if necessary but will sign off. Signed By: Christen Cowart PA-C May 24, 2019 Physician Assistant with [facility] Vascular Surgery Neida Boyd.  Graciela Posadas MD / Michela Bedoya MD

## 2019-05-24 NOTE — DISCHARGE INSTRUCTIONS
Wound Care: Daily dressing changes to the left ankle with saline moistened gauze. Keep [place] boots in place. Float heel whenever possible. DISCHARGE SUMMARY from Nurse    PATIENT INSTRUCTIONS:    Report the following to your surgeon:  · Excessive pain, swelling, redness or odor of or around the surgical area  · Temperature over 100.5  · Nausea and vomiting lasting longer than 4 hours or if unable to take medications  · Any signs of decreased circulation or nerve impairment to extremity: change in color, persistent  numbness, tingling, coldness or increase pain  · Any questions    What to do at Home:  Recommended activity: Activity per PT/OT    *  Please give a list of your current medications to your Primary Care Provider. *  Please update this list whenever your medications are discontinued, doses are      changed, or new medications (including over-the-counter products) are added. *  Please carry medication information at all times in case of emergency situations. These are general instructions for a healthy lifestyle:    No smoking/ No tobacco products/ Avoid exposure to second hand smoke  Surgeon General's Warning:  Quitting smoking now greatly reduces serious risk to your health. Obesity, smoking, and sedentary lifestyle greatly increases your risk for illness    A healthy diet, regular physical exercise & weight monitoring are important for maintaining a healthy lifestyle    You may be retaining fluid if you have a history of heart failure or if you experience any of the following symptoms:  Weight gain of 3 pounds or more overnight or 5 pounds in a week, increased swelling in our hands or feet or shortness of breath while lying flat in bed. Please call your doctor as soon as you notice any of these symptoms; do not wait until your next office visit.     Recognize signs and symptoms of STROKE:    F-face looks uneven    A-arms unable to move or move unevenly    S-speech slurred or non-existent    T-time-call 911 as soon as signs and symptoms begin-DO NOT go       Back to bed or wait to see if you get better-TIME IS BRAIN. Warning Signs of HEART ATTACK     Call 911 if you have these symptoms:   Chest discomfort. Most heart attacks involve discomfort in the center of the chest that lasts more than a few minutes, or that goes away and comes back. It can feel like uncomfortable pressure, squeezing, fullness, or pain.  Discomfort in other areas of the upper body. Symptoms can include pain or discomfort in one or both arms, the back, neck, jaw, or stomach.  Shortness of breath with or without chest discomfort.  Other signs may include breaking out in a cold sweat, nausea, or lightheadedness. Don't wait more than five minutes to call 911 - MINUTES MATTER! Fast action can save your life. Calling 911 is almost always the fastest way to get lifesaving treatment. Emergency Medical Services staff can begin treatment when they arrive -- up to an hour sooner than if someone gets to the hospital by car. The discharge information has been reviewed with the patient. The patient verbalized understanding. Discharge medications reviewed with the patient and appropriate educational materials and side effects teaching were provided. ___________________________________________________________________________________________________________________________________    Patient Education        Angiogram: What to Expect at Home  Your Recovery  An angiogram is an X-ray test that uses dye and a camera to take pictures of the blood flow in an artery or a vein. The doctor inserted a thin, flexible tube (catheter) into a blood vessel in your groin. In some cases, the catheter is placed in a blood vessel in the arm. An angiogram is done for many reasons. For example, you may have an angiogram to find the source of bleeding, such as an ulcer.  Or it may be done to look for blocked blood vessels in your lungs.  After an angiogram, your groin or arm may have a bruise and feel sore for a day or two. You can do light activities around the house but nothing strenuous for several days. Your doctor may give you specific instructions on when you can do your normal activities again, such as driving and going back to work. This care sheet gives you a general idea about how long it will take for you to recover. But each person recovers at a different pace. Follow the steps below to feel better as quickly as possible. How can you care for yourself at home? Activity    · Do not do strenuous exercise and do not lift, pull, or push anything heavy until your doctor says it is okay. This may be for a day or two. You can walk around the house and do light activity, such as cooking.     · You may shower 24 to 48 hours after the procedure, if your doctor okays it. Pat the incision dry. Do not take a bath for 1 week, or until your doctor tells you it is okay.     · If the catheter was placed in your groin, try not to walk up stairs for the first couple of days.     · If the catheter was placed in your arm near your wrist, do not bend your wrist deeply for the first couple of days. Be careful using your hand to get into and out of a chair or bed.     · If your doctor recommends it, get more exercise. Walking is a good choice. Bit by bit, increase the amount you walk every day. Try for at least 30 minutes on most days of the week. Diet    · Drink plenty of fluids to help your body flush out the dye. If you have kidney, heart, or liver disease and have to limit fluids, talk with your doctor before you increase the amount of fluids you drink.     · You can eat your normal diet. If your stomach is upset, try bland, low-fat foods like plain rice, broiled chicken, toast, and yogurt. Medicines    · Be safe with medicines. Read and follow all instructions on the label.   ? If the doctor gave you a prescription medicine for pain, take it as prescribed. ? If you are not taking a prescription pain medicine, ask your doctor if you can take an over-the-counter medicine.     · If you take blood thinners, such as warfarin (Coumadin), clopidogrel (Plavix), or aspirin, be sure to talk to your doctor. He or she will tell you if and when to start taking those medicines again. Make sure that you understand exactly what your doctor wants you to do.     · Your doctor will tell you if and when you can restart your medicines. He or she will also give you instructions about taking any new medicines.    Care of the catheter site    · You will have a dressing over the cut (incision). A dressing helps the incision heal and protects it. Your doctor will tell you how to take care of this.     · Put ice or a cold pack on the area for 10 to 20 minutes at a time to help with soreness or swelling. Put a thin cloth between the ice and your skin. Follow-up care is a key part of your treatment and safety. Be sure to make and go to all appointments, and call your doctor if you are having problems. It's also a good idea to know your test results and keep a list of the medicines you take. When should you call for help? Call 911 anytime you think you may need emergency care. For example, call if:    · You passed out (lost consciousness).     · You have severe trouble breathing.     · You have sudden chest pain and shortness of breath, or you cough up blood.    Call your doctor now or seek immediate medical care if:    · You are bleeding from the area where the catheter was put in your artery.     · You have a fast-growing, painful lump at the catheter site.     · You have signs of infection, such as:  ? Increased pain, swelling, warmth, or redness. ? Red streaks leading from the incision. ? Pus draining from the incision. ? A fever.    Watch closely for any changes in your health, and be sure to contact your doctor if:    · You don't get better as expected.    Where can you learn more? Go to http://ana maría-farrah.info/. Enter L281 in the search box to learn more about \"Angiogram: What to Expect at Home. \"  Current as of: June 25, 2018  Content Version: 11.9  © 2131-3314 [employer]. Care instructions adapted under license by [employer] (which disclaims liability or warranty for this information). If you have questions about a medical condition or this instruction, always ask your healthcare professional. [place] [address] any warranty or liability for your use of this information. Patient Education        Cellulitis: Care Instructions  Your Care Instructions    Cellulitis is a skin infection caused by bacteria, most often strep or staph. It often occurs after a break in the skin from a scrape, cut, bite, or puncture, or after a rash. Cellulitis may be treated without doing tests to find out what caused it. But your doctor may do tests, if needed, to look for a specific bacteria, like methicillin-resistant Staphylococcus aureus (MRSA). The doctor has checked you carefully, but problems can develop later. If you notice any problems or new symptoms, get medical treatment right away. Follow-up care is a key part of your treatment and safety. Be sure to make and go to all appointments, and call your doctor if you are having problems. It's also a good idea to know your test results and keep a list of the medicines you take. How can you care for yourself at home? · Take your antibiotics as directed. Do not stop taking them just because you feel better. You need to take the full course of antibiotics. · Prop up the infected area on pillows to reduce pain and swelling. Try to keep the area above the level of your heart as often as you can. · If your doctor told you how to care for your wound, follow your doctor's instructions. If you did not get instructions, follow this general advice:  ?  Wash the wound with clean water 2 times a day. Don't use hydrogen peroxide or alcohol, which can slow healing. ? You may cover the wound with a thin layer of petroleum jelly, such as Vaseline, and a nonstick bandage. ? Apply more petroleum jelly and replace the bandage as needed. · Be safe with medicines. Take pain medicines exactly as directed. ? If the doctor gave you a prescription medicine for pain, take it as prescribed. ? If you are not taking a prescription pain medicine, ask your doctor if you can take an over-the-counter medicine. To prevent cellulitis in the future  · Try to prevent cuts, scrapes, or other injuries to your skin. Cellulitis most often occurs where there is a break in the skin. · If you get a scrape, cut, mild burn, or bite, wash the wound with clean water as soon as you can to help avoid infection. Don't use hydrogen peroxide or alcohol, which can slow healing. · If you have swelling in your legs (edema), support stockings and good skin care may help prevent leg sores and cellulitis. · Take care of your feet, especially if you have diabetes or other conditions that increase the risk of infection. Wear shoes and socks. Do not go barefoot. If you have athlete's foot or other skin problems on your feet, talk to your doctor about how to treat them. When should you call for help? Call your doctor now or seek immediate medical care if:    · You have signs that your infection is getting worse, such as:  ? Increased pain, swelling, warmth, or redness. ? Red streaks leading from the area. ? Pus draining from the area. ? A fever.     · You get a rash.    Watch closely for changes in your health, and be sure to contact your doctor if:    · You do not get better as expected. Where can you learn more? Go to http://ana maría-farrah.info/. Alphonso Gomez in the search box to learn more about \"Cellulitis: Care Instructions. \"  Current as of: April 17, 2018  Content Version: 11.9  © 8336-4532 [employer]. Care instructions adapted under license by [employer] (which disclaims liability or warranty for this information). If you have questions about a medical condition or this instruction, always ask your healthcare professional. [address] any warranty or liability for your use of this information.

## 2019-05-24 NOTE — PROGRESS NOTES
Shift assessment complete via doc flow sheet. All needs me at this time. Staff will monitor with hourly rounds. 05/23/19 2008 Psychosocial  
Psychosocial (WDL) WDL Purposeful Interaction Yes Pt Identified Daily Priority Clinical issues (comment) [employer] Process Attend basic human needs Caring Interventions Reassure Reassure Caring rounds

## 2019-05-25 NOTE — PROCEDURES
[facility] 
PROCEDURE NOTE Name:  Dio Danielson 
MR#:  [id number] :  1933 ACCOUNT #:  [de-identified] DATE OF SERVICE:  2019 PREOPERATIVE DIAGNOSIS:  Left heel wound, left lower extremity ischemia. POSTOPERATIVE DIAGNOSIS:  Left heel wound, left lower extremity ischemia. PROCEDURES PERFORMED: 
1. Abdominal aortogram. 
2.  Left lower extremity arteriogram. 
3.  Left popliteal PTA. 4.  Ultrasound-guided vascular access. SURGEON:  Alexys Molina MD 
 
ASSISTANT:   
 
ANESTHESIA:  General endotracheal. 
 
ESTIMATED BLOOD LOSS:  Minimal. 
 
SPECIMENS REMOVED:  None. COMPLICATIONS:  . IMPLANTS:  None. TOTAL CONTRAST:  97 mL. TOTAL FLUORO TIME:  7 minutes. DRAINS:  None. DESCRIPTION OF PROCEDURE:  The patient was brought to the angio suite and placed on the angio table in supine position. Following adequate IV sedation and time-out procedure, the groins were draped and prepped in a sterile fashion. The right common femoral artery was then percutaneously punctured under direct vision using ultrasound. A 5-Bulgarian sheath was placed over a guidewire. Next, a 0.035 guidewire was advanced in the aorta followed by a 5-Bulgarian Omni Flush catheter. An abdominal aortogram was performed. The catheter was then pulled down into the aorta and bilateral pelvic oblique imaging was performed. Catheter was then used to direct the guidewire over to the left side. A 6-Bulgarian crossover sheath was advanced over the guidewire and position in the proximal SFA. Serial images of the left lower extremity were performed. Area of stenosis in the popliteal artery was identified  was seen here. This area was crossed with an 0.035 guidewire. Next, a 6 x 40 balloon was used to dilate this area. Completion imaging demonstrated excellent result with minimal residual stenosis and brisk flow across the previously stenotic area.   At this point, guidewire and catheters were removed. Then, the puncture site was closed with a Mynx closure device. Sterile dry dressings were applied. The patient was awakened from anesthesia and transported to the recovery room in stable condition. The patient tolerated the procedure well. No complications. ANGIOGRAPHIC FINDINGS:  The abdominal aorta is of normal caliber. There does appear to be a moderate stenosis in the distal abdominal aorta just above the bifurcation. The common iliac arteries are patent and normal appearing bilaterally. Hypogastric artery and external iliac arteries are patent on the left. On the left, the common femoral, profunda, and SFA are widely patent. There is an area of stenosis in the popliteal artery. Beyond that, there is two-vessel runoff to the foot, which I believe are the anterior tibial and peroneal arteries. IMPRESSION:  Satisfactory percutaneous transluminal angioplasty of left popliteal artery stenosis. Zehra Martinez MD 
 
 
[id number] 
D:  05/24/2019 11:01 
T:  05/24/2019 18:56 JOB #:  [id number]

## 2019-06-14 NOTE — WOUND CARE
[address]  [address], [address]  [place]   Phone: [phone]  Fax: [phone]    Patient: Yasir Foy MRN: [id number]  SSN: [id number]    YOB: 1933  Age: 80 y.o. Sex: female       Return Appointment: 2 weeks with Maria M Trinh MD    Instructions:   Left Heel Wound:  -Cleanse wound with normal saline or wound cleanser. DO NOT GET WET IN SHOWER AND NO SOAKING WOUND!  -Apply saline moisten gauze to left heel wound, cover with ABD pad and rolled gauze.   -Change dressing daily, [place] to change 3 times a week and daughter to change the other days. Home health to provide needed supplies for wound care. -Elevate feet while sitting or laying and wear heel lift boots while in bed. Should you experience increased redness, swelling, pain, foul odor, size of wound(s), or have a temperature over 101 degrees please contact the [address] [place] [address] Road at [phone] or if after hours contact your primary care physician or go to the hospital emergency department.     Signed By: Jv Isabel RN     June 14, 2019

## 2019-06-14 NOTE — WOUND CARE
06/14/19 1437   Wound Heel Left   Date First Assessed/Time First Assessed: 06/14/19 1437   Present on Hospital Admission: Yes  Primary Wound Type: Diabetic Ulcer  Location: Heel  Wound Location Orientation: Left   Dressing Status Old drainage   Dressing Type   (wet to dry, rolled gauze)   Non-staged Wound Description Full thickness   Wound Length (cm) 4 cm   Wound Width (cm) 5 cm   Wound Depth (cm) 0.1 cm   Wound Volume (cm^3) 2 cm^3   Condition of Base Granulation;Eschar;Slough   Assessment Black; Red   Tissue Type Percent Black 5 %   Tissue Type Percent Red 80   Tissue Type Percent Yellow 15   Drainage Amount Small   Drainage Color Serosanguinous   Wound Odor None   Kaley-wound Assessment Intact   Cleansing and Cleansing Agents  Normal saline   Dressing Changed Changed/New   Wound Toe (Comment  which one) Left 4th Toe   Date First Assessed/Time First Assessed: 06/14/19 1439   Present on Hospital Admission: Yes  Primary Wound Type: Diabetic Ulcer  Location: Toe (Comment  which one)  Wound Location Orientation: Left  Wound Description: 4th Toe   Dressing Status Dry   Dressing Type   (no dressing)   Wound Length (cm) 1.2 cm   Wound Width (cm) 1 cm   Wound Depth (cm) 0.1 cm   Wound Volume (cm^3) 0.12 cm^3   Condition of Base Eschar   Tissue Type Percent Black 100 %   Drainage Amount None   Wound Odor None   Cleansing and Cleansing Agents  Normal saline   Dressing Changed Changed/New           Patient is taking Aspirin 81mg daily.

## 2019-06-14 NOTE — DISCHARGE INSTRUCTIONS
Left Heel Wound:  -Cleanse wound with normal saline or wound cleanser. DO NOT GET WET IN SHOWER AND NO SOAKING WOUND!  -Apply saline moisten gauze to left heel wound, cover with ABD pad and rolled gauze.   -Change dressing daily, [place] to change 3 times a week and daughter to change the other days. Home health to provide needed supplies for wound care. -Elevate feet while sitting or laying and wear heel lift boots while in bed.

## 2019-06-28 NOTE — PROGRESS NOTES
06/28/19 1405   Wound Heel Left   Date First Assessed/Time First Assessed: 06/14/19 1437   Present on Hospital Admission: Yes  Primary Wound Type: Diabetic Ulcer  Location: Heel  Wound Location Orientation: Left   Dressing Status Old drainage   Dressing Type   (saline gauze, ABD, rolled gauze)   Non-staged Wound Description Full thickness   Wound Length (cm) 4 cm   Wound Width (cm) 4 cm   Wound Depth (cm) 0.1 cm   Wound Volume (cm^3) 1.6 cm^3   Condition of Base Granulation;Slough   Tissue Type Percent Red 90   Tissue Type Percent Yellow 10   Drainage Amount Moderate   Drainage Color Serosanguinous; Yellow   Wound Odor None   Kaley-wound Assessment Intact   Cleansing and Cleansing Agents  Normal saline       Patient is currently taking Aspirin 81 mg.

## 2019-07-03 PROBLEM — J96.90 RESPIRATORY FAILURE (HCC): Status: ACTIVE | Noted: 2019-01-01

## 2019-07-03 NOTE — ED TRIAGE NOTES
EMS called to home by home health nurse for low O2 sat in low 80's and fever. Son reports lethargy and frequent UTIs. Pt is alert oriented in triage

## 2019-07-03 NOTE — H&P
Hospitalist H&P Note Admit Date:  7/3/2019  1:37 PM  
Name:  Caden Abrams Age:  80 y.o. 
:  1933 MRN:  [id number] PCP:  Willard Ortiz MD 
Treatment Team: Attending Provider: Ayana Matthews MD; Primary Nurse: Eula Pena 
 
CC: foul smelling urine ; burning and pain with urination. Abdominal pain HPI:  
80yr old female with pmhx for CDHF, chronic pain, gout, hypothyroidism recurrent utis- prior vre in urine but not treated as she was asymptomatic. Last admitted here 2019 - 2019 for non- heel;ing left heel wound. Discharged on vantin, doxy, and flagyl for 14d total treatment. Seen by ID and Dr. Nadine Teran on that admit. Pt has been feeling poorly for about 3 days with the above symptoms. .. Seen by visiting nurse today and advised to come to the hospital. In the er she was noted to be septic. 10 systems reviewed and negative except as noted in HPI. Past Medical History:  
Diagnosis Date  Arthritis  Chronic diastolic congestive heart failure ([facility] [address] 2019  Chronic pain   
 bilat feet & hands  Edema BLE & bilat fingers; pt takes diuretic daily; pt states her swelling is related to the nerve disease she is being treated for at [facility]  GERD (gastroesophageal reflux disease) 2019  Gouty arthritis  History of MI (myocardial infarction)  \"light heart attack\"; pt states MD at [facility] mentioned it to her; pt stated MD told her \"was on the back part of her heart & would not give her any problems\"; pt takes aspirin 81 mg daily  Hypertension   
 managed w/med  Hypothyroidism   
 s/p partial thyroidectomy ; managed w/med  Mild heartburn   
 takes tums prn  Neuropathy   
 pt states has been treated by [facility] for \"nerve problems in her feet & hands\"  Post-operative nausea and vomiting  Type 2 diabetes mellitus without complication, without long-term current use of insulin ([facility] [address] 2018 Past Surgical History:  
Procedure Laterality Date  HX CYST REMOVAL Right 1950's  
 upper eyelid  HX DILATION AND CURETTAGE  [id number] [address]  
 ectopic pregnancy; right BSO done  HX HEART CATHETERIZATION  late 1979  
 pt states no stents [address] [place] [address]  
 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  
 nerve removed out of left leg [address] [facility]  HX UROLOGICAL    
 cystoscopy Allergies Allergen Reactions  Contrast Agent [Iodine] Anaphylaxis  Actifed [Triprolidine-Pseudoephedrine] Nausea Only  Allopurinol Other (comments) Elevated blood pressure & pt states could not walk or see until medication was out of her system.  Decongestant [Pseudoephedrine Hcl] Rash and Itching  Lexapro [Escitalopram Oxalate] Other (comments) Lips/Mouth swelling  Lyrica [Pregabalin] Unknown (comments)  Minizide [Prazosin-Polythiazide] Unknown (comments)  Mobic [Meloxicam] Other (comments) Facial swelling  Omnipaque [Iohexol] Hives, Swelling and Other (comments) Wheezing and/or shortness of breath  Sertraline Other (comments) Mouth, lips swell  Spironolactone Itching Pt complains that she breaks out in clammy sweat with itching and stinging in her upper arms, dry mouth, funny feeling tongue, SOB, and increased anxiety.  Sulfamethazine Other (comments) Mouth/lips swelling Social History Tobacco Use  Smoking status: Never Smoker  Smokeless tobacco: Never Used Substance Use Topics  Alcohol use: No  
  
Family History Problem Relation Age of Onset  Cancer Mother  Heart Disease Father  Stroke Father Immunization History Administered Date(s) Administered  Influenza High Dose Vaccine PF 10/14/2014, 10/05/2017, 10/25/2018  Influenza Vaccine 09/16/2009, 09/09/2013, 10/12/2015, 09/15/2016  Influenza Vaccine (Whole Virus) 10/01/2011, 08/27/2012  Pneumococcal Conjugate (PCV-13) 03/18/2015  Pneumococcal Polysaccharide (PPSV-23) 10/01/2000  TB Skin Test (PPD) Intradermal 01/18/2019, 02/25/2019, 03/25/2019  Zoster Vaccine, Live 06/03/2013 PTA Medications: 
Prior to Admission Medications Prescriptions Last Dose Informant Patient Reported? Taking? CALCIUM CARBONATE/VITAMIN D3 (CALCIUM 600 + D,3, PO)   Yes No  
Sig: Take 600 mg by mouth daily. CHOLECALCIFEROL, VITAMIN D3, (VITAMIN D3 PO)   Yes No  
Sig: Take 1,000 mg by mouth daily. DULoxetine (CYMBALTA) 60 mg capsule   No No  
Sig: Take 1 Cap by mouth daily. KRILL/OM-3/DHA/EPA/PHOSPHO/AST (MEGARED OMEGA-3 KRILL OIL PO)   Yes No  
Sig: Take 1 Cap by mouth daily. Lactobacillus acidophilus (ACIDOPHILUS) cap   Yes No  
Sig: Take 1 Cap by mouth daily. OTHER,NON-FORMULARY,   Yes No  
Sig: Take 30 mL by mouth three (3) times daily. PROHEAL  
OTHER,NON-FORMULARY,   Yes No  
Sig: Insert  into rectum as needed. Preparation H max stregth  
acetaminophen (TYLENOL EXTRA STRENGTH) 500 mg tablet   Yes No  
Sig: Take 500 mg by mouth every six (6) hours as needed for Pain. amLODIPine (NORVASC) 10 mg tablet   No No  
Sig: Take 1 Tab by mouth daily. Patient taking differently: Take 5 mg by mouth daily. aspirin delayed-release 81 mg tablet   Yes No  
Sig: Take 81 mg by mouth nightly. Ok to continue per anesthesia guidelines. carvedilol (COREG) 6.25 mg tablet   No No  
Sig: Take 1 Tab by mouth two (2) times daily (with meals). clonazePAM (KLONOPIN) 0.5 mg tablet   No No  
Sig: Take 1 Tab by mouth three (3) times daily. Max Daily Amount: 1.5 mg.  Indications: anxiety  
colchicine 0.6 mg tablet   Yes No  
 Sig: Take 0.6 mg by mouth two (2) times a day. Indications: for 3 days, end   
docusate sodium (COLACE) 100 mg capsule   No No  
Sig: Take 1 Cap by mouth nightly. ferrous gluconate 324 mg (38 mg iron) tablet   Yes No  
Sig: Take 324 mg by mouth Daily (before breakfast). hydrOXYzine pamoate (VISTARIL) 50 mg capsule   Yes No  
Sig: Take 25 mg by mouth two (2) times daily as needed for Itching. insulin detemir U-100 (LEVEMIR FLEXTOUCH) 100 unit/mL (3 mL) inpn   Yes No  
Si Units by SubCUTAneous route every morning. Indications: type 2 diabetes mellitus  
insulin lispro (HUMALOG) 100 unit/mL kwikpen   Yes No  
Si Units by SubCUTAneous route Before breakfast, lunch, and dinner. Indications: 150-200= 2 units; 201-250= 4 units; 251-300= 6 units; >300 8 units  
levothyroxine (SYNTHROID) 75 mcg tablet   Yes No  
Sig: Take 75 mcg by mouth Daily (before breakfast). loperamide (IMODIUM) 2 mg capsule   Yes No  
Sig: Take 4 mg by mouth as needed for Diarrhea.  
magnesium oxide (MAG-OX) 400 mg tablet   Yes No  
Sig: Take 200 mg by mouth daily. methadone (DOLOPHINE) 5 mg tablet   No No  
Sig: Take 1 Tab by mouth every six (6) hours. Max Daily Amount: 20 mg. Pt has been stable on this dose for many years, please do not taper or stop. Instructed to take DOS per Anesthesia guidelines. Indications: chronic pain, narcotic addiction  
mirtazapine (REMERON) 7.5 mg tablet   Yes No  
Sig: Take 7.5 mg by mouth nightly. multivitamin with minerals (HAIR,SKIN AND NAILS PO)   Yes No  
Sig: Take 1 Tab by mouth daily. nystatin (MYCOSTATIN) powder   Yes No  
Sig: Apply  to affected area three (3) times daily. Under breasts and ABD folds  
oxybutynin chloride XL (DITROPAN XL) 5 mg CR tablet   Yes No  
Sig: Take 5 mg by mouth.  
polyethylene glycol (MIRALAX) 17 gram packet   Yes No  
Sig: Take 17 g by mouth daily as needed.   
potassium chloride (KLOR-CON) 10 mEq tablet   Yes No  
 Sig: Take 10 mEq by mouth two (2) times a day. predniSONE (DELTASONE) 5 mg tablet   No No  
Sig: Take 1 Tab by mouth two (2) times a day. 5mg BID Patient taking differently: Take 10 mg by mouth two (2) times a day. 5mg BID  
raNITIdine (ZANTAC) 150 mg tablet   Yes No  
Sig: Take 150 mg by mouth two (2) times a day. tamsulosin (FLOMAX) 0.4 mg capsule   No No  
Sig: Take 1 Cap by mouth daily. torsemide (DEMADEX) 10 mg tablet   Yes No  
Sig: Take 10 mg by mouth two (2) times a day. Facility-Administered Medications: None Objective:  
 
Patient Vitals for the past 24 hrs: 
 Temp Pulse Resp BP SpO2  
07/03/19 1719  84 23 137/65 100 % 07/03/19 1700  82 20 128/59 100 % 07/03/19 1640  84 26 143/66 97 % 07/03/19 1619  81 14 126/58 100 % 07/03/19 1600  81 15 129/58 100 % 07/03/19 1539  85 18 131/62 100 % 07/03/19 1405     95 % 07/03/19 1349     (!) 89 % 07/03/19 1345 99.6 °F (37.6 °C) 96 16 128/62 100 % Oxygen Therapy O2 Sat (%): 100 % (07/03/19 1719) Pulse via Oximetry: 86 beats per minute (07/03/19 1719) O2 Device: Nasal cannula (07/03/19 1725) O2 Flow Rate (L/min): 4 l/min (07/03/19 1725) No intake or output data in the 24 hours ending 07/03/19 1736 Physical Exam: 
General:    Well nourished. Alert. Proptosis, cushing's appearance Eyes:   Normal sclera. Extraocular movements intact. ENT:  Normocephalic, atraumatic. Moist mucous membranes CV:   RRR. No m/r/g. Peripheral pulses present. Capillary refill normal 
Lungs:  CTAB. No wheezing, rhonchi, or rales. Abdomen: Soft, nontender, nondistended. Bowel sounds normal.  
Extremities: Warm and dry. No cyanosis. Mild pedal edema. Neurologic: CN II-XII grossly intact. Sensation intact. Skin:     No rashes or jaundice. Normal coloration- pt notes lesion to rt buttock - not infected - will defer to rn skin assessment Psych:  Normal mood and affect. I reviewed the labs, imaging, EKGs, telemetry, and other studies done this admission. Data Review:  
Recent Results (from the past 24 hour(s)) CBC WITH AUTOMATED DIFF Collection Time: 07/03/19  1:59 PM  
Result Value Ref Range WBC 21.3 (H) 4.3 - 11.1 K/uL  
 RBC 4.39 4.05 - 5.2 M/uL  
 HGB 10.2 (L) 11.7 - 15.4 g/dL HCT 34.4 (L) 35.8 - 46.3 % MCV 78.4 (L) 79.6 - 97.8 FL  
 MCH 23.2 (L) 26.1 - 32.9 PG  
 MCHC 29.7 (L) 31.4 - 35.0 g/dL  
 RDW 18.5 (H) 11.9 - 14.6 % PLATELET 603 278 - 133 K/uL MPV 9.5 9.4 - 12.3 FL ABSOLUTE NRBC 0.00 0.0 - 0.2 K/uL  
 DF AUTOMATED NEUTROPHILS 82 (H) 43 - 78 % LYMPHOCYTES 10 (L) 13 - 44 % MONOCYTES 6 4.0 - 12.0 % EOSINOPHILS 0 (L) 0.5 - 7.8 % BASOPHILS 0 0.0 - 2.0 % IMMATURE GRANULOCYTES 2 0.0 - 5.0 %  
 ABS. NEUTROPHILS 17.5 (H) 1.7 - 8.2 K/UL  
 ABS. LYMPHOCYTES 2.1 0.5 - 4.6 K/UL  
 ABS. MONOCYTES 1.3 0.1 - 1.3 K/UL  
 ABS. EOSINOPHILS 0.0 0.0 - 0.8 K/UL  
 ABS. BASOPHILS 0.1 0.0 - 0.2 K/UL  
 ABS. IMM. GRANS. 0.3 0.0 - 0.5 K/UL METABOLIC PANEL, COMPREHENSIVE Collection Time: 07/03/19  1:59 PM  
Result Value Ref Range Sodium 141 136 - 145 mmol/L Potassium 4.3 3.5 - 5.1 mmol/L Chloride 103 98 - 107 mmol/L  
 CO2 29 21 - 32 mmol/L Anion gap 9 7 - 16 mmol/L Glucose 194 (H) 65 - 100 mg/dL BUN 35 (H) 8 - 23 MG/DL Creatinine 1.15 (H) 0.6 - 1.0 MG/DL  
 GFR est AA 58 (L) >60 ml/min/1.73m2 GFR est non-AA 48 (L) >60 ml/min/1.73m2 Calcium 8.2 (L) 8.3 - 10.4 MG/DL Bilirubin, total 0.3 0.2 - 1.1 MG/DL  
 ALT (SGPT) 23 12 - 65 U/L  
 AST (SGOT) 17 15 - 37 U/L Alk. phosphatase 87 50 - 136 U/L Protein, total 6.8 6.3 - 8.2 g/dL Albumin 2.7 (L) 3.2 - 4.6 g/dL Globulin 4.1 (H) 2.3 - 3.5 g/dL A-G Ratio 0.7 (L) 1.2 - 3.5 PROCALCITONIN Collection Time: 07/03/19  1:59 PM  
Result Value Ref Range Procalcitonin 1.6 ng/mL POC LACTIC ACID Collection Time: 07/03/19  2:07 PM  
Result Value Ref Range Lactic Acid (POC) 3.52 (H) 0.5 - 1.9 mmol/L  
URINE MICROSCOPIC Collection Time: 07/03/19  3:27 PM  
Result Value Ref Range WBC 5-10 0 /hpf  
 RBC 0 0 /hpf Epithelial cells 0 0 /hpf Bacteria TRACE 0 /hpf Casts 0 0 /lpf Crystals, urine 0 0 /LPF Mucus 0 0 /lpf All Micro Results Procedure Component Value Units Date/Time CULTURE, URINE [[id number]] Order Status:  Sent Specimen:  Cath Urine CULTURE, BLOOD [[id number]] Collected:  07/03/19 1359 Order Status:  Completed Specimen:  Blood Updated:  07/03/19 1505 CULTURE, BLOOD [[id number]] Collected:  07/03/19 1359 Order Status:  Completed Specimen:  Blood Updated:  07/03/19 1505 Other Studies: Xr Chest Pa Lat Result Date: 7/3/2019 Clinical History: The patient is a 80years year old Female presenting with symptoms of hypoxia. Comparison:  Chest x-ray 5/18/2019 Findings:  Frontal and lateral views of the chest were obtained. There is persistent mild pulmonary congestion superimposed on chronic lung disease. No pleural effusions are seen. The cardiomediastinal silhouette remains enlarged however is accentuated by shallow inspiration. There are remote right posterior lateral rib fractures. Impression:  Mild pulmonary vascular congestion superimposed on chronic lung disease with stable mild cardiomegaly. CPT code(s) 19944 Assessment and Plan:  
 
Hospital Problems as of 7/3/2019 Date Reviewed: 5/25/2016 Codes Class Noted - Resolved POA Respiratory failure ([facility] ICD-10-CM: J96.90 ICD-9-CM: 518.81  7/3/2019 - Present Unknown Sepsis ([address] [facility] ICD-10-CM: A41.9 ICD-9-CM: 038.9, 995.91  3/25/2019 - Present Unknown Chronic diastolic congestive heart failure (HCC) ICD-10-CM: I50.32 
ICD-9-CM: 428.32, 428.0  1/16/2019 - Present Yes Acquired hypothyroidism ICD-10-CM: E03.9 ICD-9-CM: 244.9  1/16/2019 - Present Yes DM2 (diabetes mellitus, type 2) (HCC) ICD-10-CM: E11.9 ICD-9-CM: 250.00  7/27/2018 - Present Yes PLAN: 
· Sepsis unclear source- hypoxia, elevated lactic acid, elevated wbc, raymon, suspect urine-  supportive care and treat per sepsis protocol · HX of vre in urine so will cover with linezolid- until some cultures are back-  
· Respiratory failure with hypoxia- likely part of sepsis syndrome but some concern for some mild pulmonary vascular congestion on cxr- appears volume depleted- given raymon- will monitor- low threshold for lasix if needed · Chronic steroid therapy since the 80's for neuropathy and gout- will give stress dose steroids · RAYMON- gentle ivfls · Rt buttock skin lesion- will follow up skin eval on the floor · Continue appropriate home meds · Monitor bg and cover with insulin · Further w/up and mgt based on her clinical course DVT ppx:  lovenox Anticipated DC needs:   
Code status:  Full Estimated LOS:  Greater than 2 midnights Risk:  high Signed: 
Jesse Cobb MD

## 2019-07-03 NOTE — PROGRESS NOTES
Pt arrived to room 816 alert and oriented x4, NAD, VSS, breathing regular and unlabored on 2L NC, pt reports she cannot walk and requires a lift at home to get to bathroom. 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 Pt's own compression stockings in place BLE on arrival. Will order wound care consult. Pt denies pain at this time. Orders and plan of care reviewed with pt. PT oriented to room, staff and calling for assistance. Safety measures in place. Pt denies further needs at this time.

## 2019-07-03 NOTE — PROGRESS NOTES
TRANSFER - IN REPORT: 
 
Verbal report received from HEATHER Forde on Thuy Duyen  being received from ED for routine progression of care Report consisted of patients Situation, Background, Assessment and  
Recommendations(SBAR). Information from the following report(s) SBAR, Kardex and MAR was reviewed with the receiving nurse. Opportunity for questions and clarification was provided. Pending pt's arrival to floor.

## 2019-07-03 NOTE — ED NOTES
TRANSFER - OUT REPORT: 
 
Verbal report given to Susanna Morales RN  (name) on Willy Finley  being transferred to [address] [place] [address] (unit) for routine progression of care Report consisted of patients Situation, Background, Assessment and  
Recommendations(SBAR). Information from the following report(s) SBAR, ED Summary, Intake/Output, MAR and Recent Results was reviewed with the receiving nurse. Lines:    
 
Opportunity for questions and clarification was provided. Patient transported with: 
 [employer]

## 2019-07-03 NOTE — ED PROVIDER NOTES
57-year-old white female with history of recurrent urinary tract infections brought in secondary to lethargy and confusion which she awoke with this morning. Family reports that her urine is cloudy and malodorous. Patient also noted to have O2 saturation in the low 80s. Family denies underlying lung disease. She has complained of nausea today but has had no vomiting. No definite fever. Patient denies abdominal pain, chest pain and shortness of breath. The history is provided by the patient and a relative. Lethargy Pertinent negatives include no chest pain, no abdominal pain, no headaches and no shortness of breath. Past Medical History:  
Diagnosis Date  Arthritis  Chronic diastolic congestive heart failure ([facility] [address] 1/16/2019  Chronic pain   
 bilat feet & hands  Edema BLE & bilat fingers; pt takes diuretic daily; pt states her swelling is related to the nerve disease she is being treated for at [facility]  GERD (gastroesophageal reflux disease) 1/16/2019  Gouty arthritis  History of MI (myocardial infarction) 1980's \"light heart attack\"; pt states MD at [facility] mentioned it to her; pt stated MD told her \"was on the back part of her heart & would not give her any problems\"; pt takes aspirin 81 mg daily  Hypertension   
 managed w/med  Hypothyroidism   
 s/p partial thyroidectomy 1960; managed w/med  Mild heartburn   
 takes tums prn  Neuropathy 1980's  
 pt states has been treated by [facility] for \"nerve problems in her feet & hands\"  Post-operative nausea and vomiting  Type 2 diabetes mellitus without complication, without long-term current use of insulin ([facility] [address] 7/27/2018 Past Surgical History:  
Procedure Laterality Date  HX CYST REMOVAL Right 1950's  
 upper eyelid  HX DILATION AND CURETTAGE  [id number] [address]  
 ectopic pregnancy; right BSO done  HX HEART CATHETERIZATION  late 1979  
 pt states no stents [address] [facility] [address]  
 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  
 nerve removed out of left leg [address]  HX UROLOGICAL    
 cystoscopy Family History:  
Problem Relation Age of Onset  Cancer Mother  Heart Disease Father  Stroke Father Social History Socioeconomic History  Marital status:  Spouse name: Not on file  Number of children: Not on file  Years of education: Not on file  Highest education level: Not on file Occupational History  Not on file Social Needs  Financial resource strain: Not on file  Food insecurity:  
  Worry: Not on file Inability: Not on file  Transportation needs:  
  Medical: Not on file Non-medical: Not on file Tobacco Use  Smoking status: Never Smoker  Smokeless tobacco: Never Used Substance and Sexual Activity  Alcohol use: No  
 Drug use: No  
 Sexual activity: Not on file Lifestyle  Physical activity:  
  Days per week: Not on file Minutes per session: Not on file  Stress: Not on file Relationships  Social connections:  
  Talks on phone: Not on file Gets together: Not on file Attends Alevism service: Not on file Active member of club or organization: Not on file Attends meetings of clubs or organizations: Not on file Relationship status: Not on file  Intimate partner violence:  
  Fear of current or ex partner: Not on file Emotionally abused: Not on file Physically abused: Not on file Forced sexual activity: Not on file Other Topics Concern  Not on file Social History Narrative  Not on file ALLERGIES: Contrast agent [iodine];  Actifed [triprolidine-pseudoephedrine]; Allopurinol; Decongestant [pseudoephedrine hcl]; Lexapro [escitalopram oxalate]; Lyrica [pregabalin]; Minizide [prazosin-polythiazide]; Mobic [meloxicam]; Omnipaque [iohexol]; Sertraline; Spironolactone; and Sulfamethazine Review of Systems Constitutional: Positive for fatigue. Negative for fever. HENT: Negative for congestion. Respiratory: Negative for cough and shortness of breath. Cardiovascular: Negative for chest pain. Gastrointestinal: Positive for nausea. Negative for abdominal pain and vomiting. Genitourinary: Negative for dysuria. Musculoskeletal: Negative for back pain and neck pain. Skin: Negative for rash. Neurological: Positive for weakness. Negative for headaches. Psychiatric/Behavioral: Positive for confusion. Vitals:  
 07/03/19 1345 07/03/19 1349 07/03/19 1405 BP: 128/62 Pulse: 96    
Resp: 16 Temp: 99.6 °F (37.6 °C) SpO2: 100% (!) 89% 95% Physical Exam  
Constitutional: She appears well-developed and well-nourished. No distress. HENT:  
Head: Normocephalic and atraumatic. Mouth/Throat: Oropharynx is clear and moist.  
Eyes: Pupils are equal, round, and reactive to light. Conjunctivae are normal.  
Neck: Normal range of motion. Neck supple. Cardiovascular: Normal rate and regular rhythm. No murmur heard. Pulmonary/Chest: Effort normal and breath sounds normal. She has no wheezes. Abdominal: Soft. She exhibits no distension and no mass. There is no tenderness. There is no guarding. Musculoskeletal: Normal range of motion. Neurological: She is alert. Globally weak but no focal deficit Skin: Skin is warm and dry. Psychiatric: She has a normal mood and affect. Her behavior is normal.  
Nursing note and vitals reviewed. MDM Number of Diagnoses or Management Options Diagnosis management comments: Lab work reveals elevated lactic acid 3.5 and white blood count 21.3 with left shift. Patient is being treated with IV fluids and Zosyn for possible sepsis. Urinalysis unremarkable. Chest x-ray no pneumonia. Discussed with hospitalist for admission for possible sepsis Amount and/or Complexity of Data Reviewed Clinical lab tests: ordered and reviewed Tests in the radiology section of CPT®: ordered and reviewed Tests in the medicine section of CPT®: ordered and reviewed Independent visualization of images, tracings, or specimens: yes Risk of Complications, Morbidity, and/or Mortality Presenting problems: high Diagnostic procedures: high Management options: high Procedures

## 2019-07-04 PROBLEM — A41.9 SEPSIS SECONDARY TO UTI (HCC): Status: ACTIVE | Noted: 2019-01-01

## 2019-07-04 PROBLEM — N39.0 SEPSIS SECONDARY TO UTI (HCC): Status: ACTIVE | Noted: 2019-01-01

## 2019-07-04 NOTE — PROGRESS NOTES
Patient admitted overnight. She was last admitted in May, 2019, and is current with Jennifer for RN, PT, and OT. Patient lives with her daughter, Luis Enrique Tristan. She has both a manual and an electric wheelchair and has a ramp to enter the home. Patient also has a hired caregiver who is there with her when Luis Enrique Tristan is not home. Case Management will follow for discharge planning needs. Care Management Interventions PCP Verified by CM: Yes Transition of Care Consult (CM Consult): Discharge Planning Discharge Durable Medical Equipment: No 
Physical Therapy Consult: Yes Occupational Therapy Consult: Yes Speech Therapy Consult: No 
Current Support Network: Relative's Home(Lives with her daughter, Luis Enrique Tristan) Confirm Follow Up Transport: Family Plan discussed with Pt/Family/Caregiver: Yes Freedom of Choice Offered: Yes Discharge Location Discharge Placement: Home with home health

## 2019-07-04 NOTE — WOUND CARE
Right great toe with atrophic thick nail, patient states has recently fallen off, no bleeding areas, ok to remain open to air. Left heel with chronic wound, mild odor, slough base in center, pink granular tissue surrounding slough, serosanguinous drainage, using Endoform, hydrafera blue and ajay at home every other day per wound clinic orders, will substitute Biostep, Aquacel AG, foam and ajay every other day while in acute care, float heels. Heel is not likely source of infection, however has had bone exposed in past possible occult osteomyelitis, has had negative MRI in May 2019. Right buttock with 5x4cm area of chapped peeling/ friction and MASD with partial thickness skin loss, has been open longer than 2 months per daughters report. Left buttock with 3x3cm area of chapped peeling/ friction and MASD with partial thickness skin loss, has been open a couple of weeks per daughters report. Patient has hospital bed and lift at home, no longer walks so spends a great deal of time on bottom. Recommend foam dressing, ok to use 1 large or 2 smaller foam dressing, change every other day and as needed for soiling. Will monitor.

## 2019-07-04 NOTE — PROGRESS NOTES
Pt unintentionally pulled out peripheral IV in right antecube. Cath tip intact. Still has peripheral IV in left antecube. Pt is stable.

## 2019-07-04 NOTE — PROGRESS NOTES
Patient adamantly refusing TB skin test at this time stating she is absolutely going home after discharge.

## 2019-07-04 NOTE — PROGRESS NOTES
After pressure placed over bladder during bladder scan, pt was able to urinate 110cc. Post void bladder scan shows 2ml. Will continue to monitor.

## 2019-07-04 NOTE — PROGRESS NOTES
Spoke with Maria Luz in lab regarding pending 0900 STAT lactic acid. Per Osvaldo Llanos has claimed it and they'll be up as soon as they can. \"

## 2019-07-04 NOTE — PROGRESS NOTES
Dr Estrella Osborn notified of critical lactic acid = 2.8 and minimal UOP this morning. Orders to decrease maintenance NS to 75ml/hr, recheck lactic acid in 4 hours, and bladder scan with cortes placement if retention.

## 2019-07-04 NOTE — PROGRESS NOTES
SBAR report received from [facility]. Pt is stable. Resting in bed. Alert and oriented x4. Calm and cooperative. Family at bedside. Respirations even and unlabored. Denies pain and does not appear to be in distress. Redness on buttocks with two small open areas, also has ulcer on left heel. Has bilateral compression stockings. Safety measures in place, bed low and locked, call light in reach. Encouraged to call with any needs.

## 2019-07-04 NOTE — PROGRESS NOTES
Problem: Self Care Deficits Care Plan (Adult) Goal: *Acute Goals and Plan of Care (Insert Text) Description 1. Pt will toilet with CGA 2. Pt will complete bed mobility with SBA in prep for ADLs 3. Pt will complete upper body bathing and dressing with min A 4. Pt will complete grooming and hygiene with set up 5. Pt will demonstrate independence with HEP to promote increased BUE strength, ROM, and functional use for ADLs 6. Pt will maintain sitting balance for ADLs with SBA Timeframe: 7 days Outcome: Progressing Towards Goal 
  
OCCUPATIONAL THERAPY: Initial Assessment and Daily Note 7/4/2019 INPATIENT: OT Visit Days: 1 [place] / [place] B / Product Type: Medicare /  
  
NAME/AGE/GENDER: Patricia Al is a 80 y.o. female PRIMARY DIAGNOSIS:  Sepsis ([facility] [A41.9] Respiratory failure ([facility] [J96.90] Sepsis secondary to UTI ([facility] [A41.9, N39.0] Sepsis ([facility] Sepsis ([facility] ICD-10: Treatment Diagnosis:  
 Generalized Muscle Weakness (M62.81) Precautions/Allergies: 
  falls Contrast agent [iodine]; Actifed [triprolidine-pseudoephedrine]; Allopurinol; Decongestant [pseudoephedrine hcl]; Lexapro [escitalopram oxalate]; Lyrica [pregabalin]; Minizide [prazosin-polythiazide]; Mobic [meloxicam]; Omnipaque [iohexol]; Sertraline; Spironolactone; and Sulfamethazine ASSESSMENT:  
Ms. Dank Adkins was admitted with UTI, sepsis. Pt lives at home with her daughter Yusuf Valdez who is a RN on the 2nd floor, has a caregiver while Yusuf Valdez is at work, has 24 hr support. Pt is able to feed herself and complete hygiene with set up and built up handles, requires assistance with bathing, LB dressing, and toileting, uses a power WC and has a standing lift for sit to stand, bathroom mobility, and WC transfers. Pt has a hospital bed, is able to complete bed mobility w/o assistance.  This session, pt presented with deficits in strength, endurance, mobility, coordination, and balance impacting ADLs. Pt A&O X4, very pleasant and agreeable to OT session. Pt has gouty arthritis and neuropathy resulting in moderately impaired , ROM, and coordination B hands. R shoulder flexion and abduction limited to ~65* d/t history of shoulder injury. LUE shoulder and elbow strength and ROM is slightly decreased but WFL. Pt required min A for transfer from supine to sitting and to scoot to the edge with cues for technique. CGA- Chacha for sitting balance d/t posterior lean and LOB, required cues to recognize and address LOB and for posture and hand placement. Pt completed hygiene with assistance to open toothbrush and to rinse dentures, was able to brush with towel wrapped around toothbrush to build up handle to assist w/ . CGA for sitting balance and extra time to wash face and brush hair. Pt returned to bed with CGA, max A for supine scoot. Pt would benefit from skilled OT services to address deficits, recommend d/c home with home health services; pt has necessary support and DME. This section established at most recent assessment PROBLEM LIST (Impairments causing functional limitations): 
Decreased Strength Decreased ADL/Functional Activities Decreased Transfer Abilities Decreased Balance Increased Fatigue INTERVENTIONS PLANNED: (Benefits and precautions of occupational therapy have been discussed with the patient.) Activities of daily living training Adaptive equipment training Balance training Therapeutic activity Therapeutic exercise TREATMENT PLAN: Frequency/Duration: Follow patient 3 times/ week to address above goals. Rehabilitation Potential For Stated Goals: Good REHAB RECOMMENDATIONS (at time of discharge pending progress):   
Placement: It is my opinion, based on this patient's performance to date, that Ms. Rich Gabriel may benefit from [address] after discharge due to the functional deficits listed above that are likely to improve with skilled rehabilitation because he/she has multiple medical issues that affect his/her functional mobility in the community. Equipment:  
None at this time OCCUPATIONAL PROFILE AND HISTORY:  
History of Present Injury/Illness (Reason for Referral): 
See H&P Past Medical History/Comorbidities: Ms. Mallika Charles  has a past medical history of Arthritis, Chronic diastolic congestive heart failure ([address] (1/16/2019), Chronic pain, Edema, GERD (gastroesophageal reflux disease) (1/16/2019), Gouty arthritis, History of MI (myocardial infarction) (1980's), Hypertension, Hypothyroidism, Mild heartburn, Neuropathy (1980's), Post-operative nausea and vomiting, and Type 2 diabetes mellitus without complication, without long-term current use of insulin ([address] (7/27/2018). She also has no past medical history of Adverse effect of anesthesia, Aneurysm ([address] Arrhythmia, Asthma, Autoimmune disease ([address] CAD (coronary artery disease), Cancer ([address] Chronic kidney disease, Chronic obstructive pulmonary disease ([address] Coagulation disorder ([address] Difficult intubation, Endocarditis, Ill-defined condition, Liver disease, Malignant hyperthermia due to anesthesia, Morbid obesity ([address] Pseudocholinesterase deficiency, Psychiatric disorder, PUD (peptic ulcer disease), Rheumatic fever, Seizures ([address] Sleep apnea, Stroke ([address] or Thromboembolus ([address] Ms. Mallika Charles  has a past surgical history that includes hx partial thyroidectomy (1960); hx orthopaedic (Bilateral, 7471-2351); hx orthopaedic (Left, 1992); hx open cholecystectomy (1970); hx cyst removal (Right, 1950's); hx urological; hx heart catheterization (late 1979); hx hysterectomy (1972); hx gyn (1963); hx dilation and curettage (4701-6768); and hx left salpingo-oophorectomy (1965). Social History/Living Environment:  
Home Environment: Private residence # Steps to Enter: 0 One/Two Story Residence: One story Living Alone: No 
Support Systems: Child(cassidy), Home care staff Patient Expects to be Discharged to[de-identified] Private residence Current DME Used/Available at Home: Grab bars, Hospital bed, Lift chair, Raised toilet seat, Wheelchair, power, Other (comment)(standing lift) Tub or Shower Type: Tub/Shower combination Prior Level of Function/Work/Activity: 
Lives with daughter, has caregiver, assisted with bathing, dressing, toileting, and transfers Number of Personal Factors/Comorbidities that affect the Plan of Care: Expanded review of therapy/medical records (1-2):  MODERATE COMPLEXITY ASSESSMENT OF OCCUPATIONAL PERFORMANCE[de-identified]  
Activities of Daily Living:  
Basic ADLs (From Assessment) Complex ADLs (From Assessment) Feeding: Setup Oral Facial Hygiene/Grooming: Contact guard assistance Bathing: Moderate assistance Upper Body Dressing: Minimum assistance Lower Body Dressing: Maximum assistance Toileting: Minimum assistance, Moderate assistance Instrumental ADL Meal Preparation: Total assistance Homemaking: Total assistance Medication Management: Maximum assistance Grooming/Bathing/Dressing Activities of Daily Living Grooming Washing Face: Contact guard assistance Washing Hands: Minimum assistance Brushing Teeth: Minimum assistance Brushing/Combing Hair: Contact guard assistance Cognitive Retraining Safety/Judgement: Awareness of environment; Fall prevention Bed/Mat Mobility Supine to Sit: Minimum assistance Sit to Supine: Contact guard assistance Scooting: Minimum assistance Most Recent Physical Functioning:  
Gross Assessment: 
AROM: Generally decreased, functional 
Strength: Generally decreased, functional 
Coordination: Generally decreased, functional 
Tone: Normal 
Sensation: Impaired(neuropathy B hands and feet, poor sensation in hands) Posture: 
  
Balance: 
Sitting: Impaired Sitting - Static: Fair (occasional) Sitting - Dynamic: Fair (occasional) Bed Mobility: 
Supine to Sit: Minimum assistance Sit to Supine: Contact guard assistance Scooting: Minimum assistance Wheelchair Mobility: 
  
Transfers: 
   
 
    
 
Patient Vitals for the past 6 hrs: 
 BP SpO2 Pulse 19 1151 117/69 93 % 84  
19 1523  95 % Mental Status Neurologic State: Alert Orientation Level: Oriented X4 Cognition: Follows commands, Appropriate decision making, Appropriate for age attention/concentration Perception: Appears intact Perseveration: No perseveration noted Safety/Judgement: Awareness of environment, Fall prevention Physical Skills Involved: 
Range of Motion Balance Strength Activity Tolerance Fine Motor Control Cognitive Skills Affected (resulting in the inability to perform in a timely and safe manner): 
none  Psychosocial Skills Affected: 
none Number of elements that affect the Plan of Care: 3-5:  MODERATE COMPLEXITY CLINICAL DECISION MAKIN[address] [place] [address] AM-PAC? ?6 Clicks? Daily Activity Inpatient Short Form How much help from another person does the patient currently need. .. Total A Lot A Little None 1. Putting on and taking off regular lower body clothing? ? 1   ? 2   ? 3   ? 4  
2. Bathing (including washing, rinsing, drying)? ? 1   ? 2   ? 3   ? 4  
3. Toileting, which includes using toilet, bedpan or urinal?   ? 1   ? 2   ? 3   ? 4  
4. Putting on and taking off regular upper body clothing? ? 1   ? 2   ? 3   ? 4  
5. Taking care of personal grooming such as brushing teeth? ? 1   ? 2   ? 3   ? 4  
6. Eating meals? ? 1   ? 2   ? 3   ? 4  
© , Trustees of [address] [place] [address], under license to [employer]. All rights reserved Score:  Initial: 14 Most Recent: X (Date: -- ) Interpretation of Tool:  Represents activities that are increasingly more difficult (i.e. Bed mobility, Transfers, Gait). Medical Necessity: Patient is expected to demonstrate progress in strength, range of motion, balance, coordination and functional technique 
 to increase independence with ADLs Silver Forester Reason for Services/Other Comments: 
Patient continues to require skilled intervention due to decreased ADLs and functional performance from baseline Silver Forester Use of outcome tool(s) and clinical judgement create a POC that gives a: MODERATE COMPLEXITY  
 
 
 
TREATMENT:  
(In addition to Assessment/Re-Assessment sessions the following treatments were rendered) Pre-treatment Symptoms/Complaints:   
Pain: Initial:  
Pain Intensity 1: 0  Post Session:  0 Self Care: (15 min): Procedure(s) (per grid) utilized to improve and/or restore self-care/home management as related to dressing and grooming. Required minimal verbal cueing to facilitate activities of daily living skills and compensatory activities. Therapeutic Activity: (15 min): Therapeutic activities including Bed transfers and sitting balance  to improve mobility, balance and endurance for ADLs . Braces/Orthotics/Lines/Etc:  
O2 Device: Nasal cannula Treatment/Session Assessment:   
Response to Treatment:  no adverse reaction Interdisciplinary Collaboration: Occupational Therapist 
Registered Nurse After treatment position/precautions:  
Supine in bed Bed/Chair-wheels locked Bed in low position Call light within reach RN notified Family at bedside Compliance with Program/Exercises: Compliant all of the time. Recommendations/Intent for next treatment session: \"Next visit will focus on advancements to more challenging activities and reduction in assistance provided\". Total Treatment Duration: OT Patient Time In/Time Out Time In: 1696 Time Out: 1502 Shannon Fuller OT

## 2019-07-04 NOTE — PROGRESS NOTES
Dr Sundeep García verbally notified of critical lactic acid = 4.1. Orders to increase MIVF back to 100ml/hr and redraw lactic acid in 4 hours.

## 2019-07-04 NOTE — PROGRESS NOTES
Pt is stable, resting quietly in bed. Denies pain. Slept restfully throughout the night. Does not appear to be in distress. 0.9% NaCL @ 50 ml/hr continuous. Safety measures in place. SBAR report given to oncoming nurse.

## 2019-07-04 NOTE — PROGRESS NOTES
Pt continues resting in bed quietly. MIVF continued per order. Lung sounds CTA. Next lactic acid ordered for 2000. Pt voiding without difficulty now, total of ~400cc over shift. Pt denies needs at this time. BSR to be given to [employer].

## 2019-07-04 NOTE — PROGRESS NOTES
Hospitalist Progress Note 2019 Admit Date: 7/3/2019  1:37 PM  
NAME: Johnathan Reese :  1933 MRN:  [id number] Attending: Selina Zhu MD 
PCP:  Alvarado Duran MD 
 
SUBJECTIVE:  
HPI: 80yr old female with pmhx for CdHF, chronic pain, gout, hypothyroidism, recurrent uti - prior vre in urine but not treated as likely colonized per ID was admitted with SOB, Confusion and low O2 sats with dysuria and foul smelling dark urine, concern for sepsis with LA 3.4, wbc 21.3. Daughter Susannah Salguero is a RN here at [address]. 
 
: Awake and alert, feels better, AAO X 3, daughter at bedside and concerned about swelling in her left hand and possible gout flare. Heel wound looks pink and healing but has a strong odor, also decreased UOP. She can be forgetful per daughter. Switch to inpatient. ROS and [address] reviewed and there have been no changes, Please refer to admission H&P in EMR dated 7/3/19. Nursing notes and chart reviewed. Review of Systems negative with exception of pertinent positives noted above. PHYSICAL EXAM  
 
Visit Vitals /79 Pulse 70 Temp 97.6 °F (36.4 °C) Resp 18 Wt 91 kg (200 lb 11.2 oz) SpO2 100% Breastfeeding? No  
BMI 34.45 kg/m² Temp (24hrs), Av.3 °F (36.8 °C), Min:97.6 °F (36.4 °C), Max:99.6 °F (37.6 °C) Oxygen Therapy O2 Sat (%): 100 % (19 0746) Pulse via Oximetry: 86 beats per minute (19 1719) O2 Device: Nasal cannula (19 1725) O2 Flow Rate (L/min): 4 l/min (19 1725) Intake/Output Summary (Last 24 hours) at 2019 9054 Last data filed at 2019 8425 Gross per 24 hour Intake  Output 500 ml Net -500 ml General: No acute distress. Alert.   
Head:  AT/NC Lungs:  CTABL. Heart:  RRR, no murmur, rub, or gallop Abdomen: Soft, non-distended, non-tender, +bs Extremities: No cyanosis or clubbing. Neurologic:  No focal deficits. Moves all extremities. Skin:  No Obvious Rash. Left heel has chronic wound, pink granulation with foul odor. Psych:  Normal affect. LABS AND STUDIES: 
Personally reviewed all labs, meds, and studies for past 24hrs. ASSESSMENT Active Hospital Problems Diagnosis Date Noted  Respiratory failure ([facility] 07/03/2019  Sepsis ([facility] 03/25/2019  Chronic diastolic congestive heart failure ([facility] 01/16/2019  Acquired hypothyroidism 01/16/2019  DM2 (diabetes mellitus, type 2) ([facility] 07/27/2018 PLAN: 
 
· Sepsis likely due to UTI vs other: will c/w Linezolid and zosyn for now. Follow cultures. · Lactic acidosis: Trending down slowly, repeat was 2.8, c/w IVF and repeat LA q4h. · Gout: Increased Prednisone to 20mg bid, monitor for worsening of swelling. May need colchicine. · DM2: on LANTUS and iss. Follow sugars. · Hypothyroidism: on Synthroid. · HTN: on coreg. · Chronic pain: on home dose of methadone. Dispo: pending improvement. High risk pt. DVT ppx:  SCds Discussed plan with pt who is in agreement. All questions answered. Signed By: Timothy Roldan MD   
 July 4, 2019

## 2019-07-05 NOTE — PROGRESS NOTES
Patient resting in bed, alert and oriented, cooperative with care, patient resting in bed, no visual S/S of pain or distress, left arm swelling 1+  safety measures in place.

## 2019-07-05 NOTE — PROGRESS NOTES
Dorena Halsted visited with patient. Larisa Valencia,  Staff  C: [phone]  /  [email or web address]

## 2019-07-05 NOTE — PROGRESS NOTES
SBAR report received from Royal Paiz, [facility]. Pt is stable, alert and oriented x4. Calm and cooperative. All four extremities have 1+ edema. Has compression stockings on mery legs. Skin is dry and flaky. Has purewick, patent draining and connected to suction. Respirations even and unlabored, breath sounds coarse and diminished. Safety measures in place, bed low and locked, call light in reach. Encouraged to call with any needs.

## 2019-07-05 NOTE — PROGRESS NOTES
Discharge plan discussed during IDT rounds. Patient will likely discharge home tomorrow according to attending MD. We will plan for resumption of home health through [facility] at that time. Case Management will continue to follow. Care Management Interventions PCP Verified by CM: Yes Transition of Care Consult (CM Consult): Discharge Planning Discharge Durable Medical Equipment: No 
Physical Therapy Consult: Yes Occupational Therapy Consult: Yes Speech Therapy Consult: No 
Current Support Network: Relative's Home(Lives with her daughter, Yusuf Valdez) Confirm Follow Up Transport: Family Plan discussed with Pt/Family/Caregiver: Yes Freedom of Choice Offered: Yes Discharge Location Discharge Placement: Home with home health

## 2019-07-05 NOTE — PROGRESS NOTES
Called X-ray to notify them that pt has chest x-ray ordered. Francis Tran they will be up on floor in a few minutes.

## 2019-07-05 NOTE — PROGRESS NOTES
Pt given IV Lasix 60 mg at 0505 per MD order. Pt has had 900 ml urine output since. Urine is clear and yellow. Bladder scanned pt after first 250 ml was voided, pt had 0 ml residual urine in bladder. Says she is starting to feel less pressure in her abdomen. Pt is stable, resting quietly in bed. SBAR report to be given to oncoming nurse.

## 2019-07-05 NOTE — PROGRESS NOTES
Wound Center Progress Note    Patient: Baldomero Das MRN: [id number]  SSN: [id number]    YOB: 1933  Age: 80 y.o. Sex: female      Subjective:     Chief Complaint:  L heel DFU    History of Present Illness:       Wound Caused By: DM2, neuropathy  Associated Signs and Symptoms: drainage  Timing: constant  Quality: wound  Severity: full thickness  Modifying Factors: poorly controlled dm2.       Past Medical History:   Diagnosis Date    Arthritis     Chronic diastolic congestive heart failure (HCC) 1/16/2019    Chronic pain     bilat feet & hands    Edema     BLE & bilat fingers; pt takes diuretic daily; pt states her swelling is related to the nerve disease she is being treated for at [facility] GERD (gastroesophageal reflux disease) 1/16/2019    Gouty arthritis     History of MI (myocardial infarction) 1980's    \"light heart attack\"; pt states MD at [address] mentioned it to her; pt stated MD told her \"was on the back part of her heart & would not give her any problems\"; pt takes aspirin 81 mg daily    Hypertension     managed w/med    Hypothyroidism     s/p partial thyroidectomy 1960; managed w/med    Mild heartburn     takes tums prn    Neuropathy 1980's    pt states has been treated by [facility] for \"nerve problems in her feet & hands\"    Post-operative nausea and vomiting     Type 2 diabetes mellitus without complication, without long-term current use of insulin ([facility] 7/27/2018      Past Surgical History:   Procedure Laterality Date    HX CYST REMOVAL Right 1950's    upper eyelid    HX DILATION AND CURETTAGE  [id number]    HX GYN  1963    ectopic pregnancy; right BSO done    HX HEART CATHETERIZATION  late 1979    pt states no stents    HX HYSTERECTOMY  1972    HX [address]    pt states was exploratory abdominal surgery too    HX ORTHOPAEDIC Bilateral [id number]    Foot; bone taken out of 5th toe on right foot; 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 Authorizing Provider   colchicine 0.6 mg tablet Take 0.6 mg by mouth two (2) times a day. Indications: for 3 days, end 5/20    Provider, Historical   oxybutynin chloride XL (DITROPAN XL) 5 mg CR tablet Take 5 mg by mouth daily. Provider, Historical   torsemide (DEMADEX) 10 mg tablet Take 10 mg by mouth two (2) times a day. Provider, Historical   methadone (DOLOPHINE) 5 mg tablet Take 1 Tab by mouth every six (6) hours. Max Daily Amount: 20 mg. Pt has been stable on this dose for many years, please do not taper or stop. Instructed to take DOS per Anesthesia guidelines. Indications: chronic pain, narcotic addiction 3/29/19   Kelly Solis NP   DULoxetine (CYMBALTA) 60 mg capsule Take 1 Cap by mouth daily. 3/30/19   Kelly Solis NP   clonazePAM (KLONOPIN) 0.5 mg tablet Take 1 Tab by mouth three (3) times daily. Max Daily Amount: 1.5 mg. Indications: anxiety 3/29/19   Kelly Solis NP   carvedilol (COREG) 6.25 mg tablet Take 1 Tab by mouth two (2) times daily (with meals). 3/29/19   Kelly Solis NP   amLODIPine (NORVASC) 10 mg tablet Take 1 Tab by mouth daily. Patient taking differently: Take 5 mg by mouth daily. 3/29/19   Kelly Solis NP   predniSONE (DELTASONE) 5 mg tablet Take 1 Tab by mouth two (2) times a day. 5mg BID  Patient taking differently: Take 10 mg by mouth two (2) times a day.  5mg BID 3/29/19   Kelly Solis NP   docusate sodium (COLACE) 100 mg capsule Take 1 Cap by mouth nightly. 3/4/19   Wil Cook,    Lactobacillus acidophilus (ACIDOPHILUS) cap Take 1 Cap by mouth daily. Provider, Historical   insulin lispro (HUMALOG) 100 unit/mL kwikpen 5 Units by SubCUTAneous route Before breakfast, lunch, and dinner. Indications: 150-200= 2 units; 201-250= 4 units; 251-300= 6 units; >300 8 units    Provider, Historical   insulin detemir U-100 (LEVEMIR FLEXTOUCH) 100 unit/mL (3 mL) inpn 20 Units by SubCUTAneous route every morning. Indications: type 2 diabetes mellitus    Provider, Historical   mirtazapine (REMERON) 7.5 mg tablet Take 7.5 mg by mouth nightly. Provider, Historical   nystatin (MYCOSTATIN) powder Apply  to affected area three (3) times daily. Under breasts and ABD folds    Provider, Historical   potassium chloride (KLOR-CON) 10 mEq tablet Take 10 mEq by mouth two (2) times a day. Provider, Historical   acetaminophen (TYLENOL EXTRA STRENGTH) 500 mg tablet Take 500 mg by mouth every six (6) hours as needed for Pain. Provider, Historical   levothyroxine (SYNTHROID) 75 mcg tablet Take 75 mcg by mouth Daily (before breakfast). Provider, Historical   multivitamin with minerals (HAIR,SKIN AND NAILS PO) Take 1 Tab by mouth daily. Provider, Historical   CHOLECALCIFEROL, VITAMIN D3, (VITAMIN D3 PO) Take 1,000 mg by mouth daily. Provider, Historical   CALCIUM CARBONATE/VITAMIN D3 (CALCIUM 600 + D,3, PO) Take 600 mg by mouth daily. Provider, Historical   KRILL/OM-3/DHA/EPA/PHOSPHO/AST (MEGARED OMEGA-3 KRILL OIL PO) Take 1 Cap by mouth daily. Provider, Historical   aspirin delayed-release 81 mg tablet Take 81 mg by mouth nightly. Ok to continue per anesthesia guidelines. Provider, Historical     Allergies   Allergen Reactions    Contrast Agent [Iodine] Anaphylaxis    Actifed [Triprolidine-Pseudoephedrine] Nausea Only    Allopurinol Other (comments)     Elevated blood pressure & pt states could not walk or see until medication was out of her system.  Decongestant [Pseudoephedrine Hcl] Rash and Itching    Lexapro [Escitalopram Oxalate] Other (comments)     Lips/Mouth swelling    Lyrica [Pregabalin] Unknown (comments)    Minizide [Prazosin-Polythiazide] Unknown (comments)    Mobic [Meloxicam] Other (comments)     Facial swelling    Omnipaque [Iohexol] Hives, Swelling and Other (comments)     Wheezing and/or shortness of breath    Sertraline Other (comments)     Mouth, lips swell    Spironolactone Itching     Pt complains that she breaks out in clammy sweat with itching and stinging in her upper arms, dry mouth, funny feeling tongue, SOB, and increased anxiety.  Sulfamethazine Other (comments)     Mouth/lips swelling         Review of Systems:  CONSTITUTIONAL: No fever, chills  HEAD: No headache  EYES: No visual loss  ENT: No hearing loss  SKIN: No rash  CARDIOVASCULAR: No chest pain  RESPIRATORY: No shortness of breath  GASTROINTESTINAL: No nausea, vomiting  GENITOURINARY: No excessive urination  NEUROLOGICAL: No weakness  MUSCULOSKELETAL: + joint pain  HEMATOLOGIC: No easy bleeding  LYMPHATICS: No lymphedema. PSYCHIATRIC: No current depression  ENDOCRINOLOGIC: + high sugars  ALLERGIES: No history of asthma, hives, eczema or rhinitis. Lab Results   Component Value Date/Time    Hemoglobin A1c 10.2 (H) 01/17/2019 10:03 AM        Immunization History   Administered Date(s) Administered    Influenza High Dose Vaccine PF 10/14/2014, 10/05/2017, 10/25/2018    Influenza Vaccine 09/16/2009, 09/09/2013, 10/12/2015, 09/15/2016    Influenza Vaccine (Whole Virus) 10/01/2011, 08/27/2012    Pneumococcal Conjugate (PCV-13) 03/18/2015    Pneumococcal Polysaccharide (PPSV-23) 10/01/2000    TB Skin Test (PPD) Intradermal 01/18/2019, 02/25/2019, 03/25/2019    Zoster Vaccine, Live 06/03/2013       There is no height or weight on file to calculate BMI.       Current medications:  Facility-Administered Medications Ordered in Other Encounters   Medication Dose Route Frequency Provider Last Rate Last Dose    pantoprazole (PROTONIX) tablet 40 mg  40 mg Oral ACB Arleth Perez MD   40 mg at 07/05/19 0922    [START ON 7/6/2019] torsemide (DEMADEX) tablet 10 mg  10 mg Oral DAILY Arleth Perez MD        insulin regular (Celeste Eric R, HUMULIN R) injection   SubCUTAneous AC&HS Jorge Willard MD        tuberculin injection 5 Units  5 Units IntraDERMal ONCE Shalom Goddard MD        enoxaparin (LOVENOX) injection 40 mg  40 mg SubCUTAneous Q24H Arleth Perez MD   40 mg at 07/04/19 1419    amLODIPine (NORVASC) tablet 5 mg  5 mg Oral DAILY Wil Cook DO   5 mg at 07/05/19 0826    insulin glargine (LANTUS) injection 15 Units  15 Units SubCUTAneous QHS Arleth Perez MD   15 Units at 07/04/19 2136    sodium chloride (NS) flush 5-40 mL  5-40 mL IntraVENous Q8H Magdiel Harris MD   5 mL at 07/05/19 0620    sodium chloride (NS) flush 5-40 mL  5-40 mL IntraVENous PRN Magdiel Harris MD        sodium chloride (NS) flush 5-10 mL  5-10 mL IntraVENous PRN Jose D Philippe MD        aspirin delayed-release tablet 81 mg  81 mg Oral QHS Jose D Philippe MD   81 mg at 07/04/19 2137    carvedilol (COREG) tablet 6.25 mg  6.25 mg Oral BID WITH MEALS Jose D Philippe MD   6.25 mg at 07/05/19 0827    clonazePAM (KlonoPIN) tablet 0.5 mg  0.5 mg Oral TID Jose D Philippe MD   0.5 mg at 07/05/19 6308    DULoxetine (CYMBALTA) capsule 60 mg  60 mg Oral DAILY Jose D Philippe MD   60 mg at 07/05/19 0825    levothyroxine (SYNTHROID) tablet 75 mcg  75 mcg Oral ACB Jose D Philippe MD   75 mcg at 07/05/19 0538    methadone (DOLOPHINE) tablet 5 mg  5 mg Oral Q6H Jose D Philippe MD   5 mg at 07/05/19 0408    oxybutynin chloride XL (DITROPAN XL) tablet 5 mg  5 mg Oral DAILY Jose D Philippe MD   5 mg at 07/05/19 0840    tamsulosin (FLOMAX) capsule 0.4 mg  0.4 mg Oral QHS Jose D Philippe MD   0.4 mg at 07/04/19 6106    predniSONE (DELTASONE) tablet 20 mg  20 mg Oral BID Jesús Lundberg MD   20 mg at 07/05/19 0826    piperacillin-tazobactam (ZOSYN) 3.375 g in 0.9% sodium chloride (MBP/ADV) 100 mL  3.375 g IntraVENous Q8H Jesús Lundberg MD 25 mL/hr at 07/05/19 0123 3.375 g at 07/05/19 0123    alcohol 62% (NOZIN) nasal  1 Ampule  1 Ampule Topical Q12H Jesús Lundberg MD   1 Ampule at 07/05/19 0825         Objective:     Physical Exam:     Visit Vitals  /75 (BP 1 Location: Left arm, BP Patient Position: At rest;Sitting)   Pulse 81   Temp 98 °F (36.7 °C)   Resp 18   SpO2 95%       General: well developed, well nourished  Psych: cooperative. Pleasant  Neuro: alert and oriented to person/place/situation. Otherwise nonfocal.  Derm: Normal turgor for age, dry skin  HEENT: Normocephalic, atraumatic. EOMI. Neck: Normal range of motion. Chest: Respirations nonlabored  Cardio: RRR  Abdomen: Soft, nondistended  Lower extremities:   No hemosiderrosis  No significant varicosities  Capillary refill <3 sec    Right  2+ DP/PT    Left  2+ DP/PT                Ulcer Description:   Wound Heel Left (Active)   Number of days: 150       Wound Finger (specify in comments) Anterior; Left (Active)   Number of days: 149       Wound Buttocks Posterior;Right (Active)   Number of days: 48       Wound Heel Left (Active)   Number of days: 48       Wound Groin Right (Active)   Number of days: 44       Wound Heel Left (Active)   Dressing Status Old drainage 6/28/2019  2:05 PM   Non-staged Wound Description Full thickness 6/28/2019  2:05 PM   Wound Length (cm) 4 cm 6/28/2019  2:05 PM   Wound Width (cm) 4 cm 6/28/2019  2:05 PM   Wound Depth (cm) 0.1 cm 6/28/2019  2:05 PM   Wound Volume (cm^3) 1.6 cm^3 6/28/2019  2:05 PM   Condition of Base Granulation;Slough 6/28/2019  2:05 PM   Assessment Black; Red 6/14/2019  2:37 PM   Tissue Type Percent Black 5 % 6/14/2019  2:37 PM   Tissue Type Percent Red 90 6/28/2019  2:05 PM   Tissue Type Percent Yellow 10 6/28/2019  2:05 PM   Drainage Amount Moderate 6/28/2019  2:05 PM   Drainage Color Serosanguinous; Yellow 6/28/2019  2:05 PM   Wound Odor None 6/28/2019  2:05 PM   Kaley-wound Assessment Intact 6/28/2019  2:05 PM   Cleansing and Cleansing Agents  Normal saline 6/28/2019  2:05 PM   Dressing Changed Changed/New 6/14/2019  2:37 PM   Number of days: 21       Wound Buttocks Left partial thickness skin loss, friction MASD (Active)   Number of days: 1       [REMOVED] Wound Toe (Comment  which one) Left 4th Toe (Removed)   Dressing Status Dry 6/14/2019  2:37 PM   Wound Length (cm) 1.2 cm 6/14/2019  2:37 PM   Wound Width (cm) 1 cm 6/14/2019  2:37 PM   Wound Depth (cm) 0.1 cm 6/14/2019  2:37 PM   Wound Volume (cm^3) 0.12 cm^3 6/14/2019  2:37 PM   Condition of Base Eschar 6/14/2019  2:37 PM   Tissue Type Percent Black 100 % 6/14/2019  2:37 PM   Drainage Amount None 6/14/2019  2:37 PM   Wound Odor None 6/14/2019  2:37 PM   Cleansing and Cleansing Agents  Normal saline 6/14/2019  2:37 PM   Dressing Changed Changed/New 6/14/2019  2:37 PM   Number of days: 0           Problem List  Date Reviewed: 5/25/2016          Codes Class Noted    Sepsis secondary to UTI [facility]) ICD-10-CM: A41.9, N39.0  ICD-9-CM: 038.9, 995.91, 599.0  7/4/2019        Respiratory failure ([facility] ICD-10-CM: J96.90  ICD-9-CM: 518.81  7/3/2019        Non healing left heel wound ICD-10-CM: S91.302A  ICD-9-CM: 892.1  5/17/2019        Sepsis ([facility] ICD-10-CM: A41.9  ICD-9-CM: 038.9, 995.91  3/25/2019        Acute metabolic encephalopathy TWX-22-FT: G93.41  ICD-9-CM: 348.31  2/26/2019        Acute diarrhea ICD-10-CM: R19.7  ICD-9-CM: 787.91  2/26/2019        Oral thrush ICD-10-CM: B37.0  ICD-9-CM: 112.0  2/26/2019        Acute encephalopathy ICD-10-CM: G93.40  ICD-9-CM: 348.30  2/24/2019        Finger infection ICD-10-CM: L08.9  ICD-9-CM: 686.9  2/5/2019        Leukocytosis ICD-10-CM: I34.202  ICD-9-CM: 288.60  2/5/2019        RAYMON (acute kidney injury) [facility]) ICD-10-CM: N17.9  ICD-9-CM: 584.9  2/5/2019        Osteomyelitis of finger ([facility] ICD-10-CM: M86.9  ICD-9-CM: 730.24  2/5/2019        Closed rib fracture ICD-10-CM: S22.39XA  ICD-9-CM: 807.00  1/16/2019        Acute respiratory failure with hypoxia (HCC) ICD-10-CM: J96.01  ICD-9-CM: 518.81  1/16/2019        Acute prerenal azotemia ICD-10-CM: R79.89  ICD-9-CM: 790.6  1/16/2019        Chronic diastolic congestive heart failure (HCC) ICD-10-CM: I50.32  ICD-9-CM: 428.32, 428.0  1/16/2019        Acquired hypothyroidism ICD-10-CM: E03.9  ICD-9-CM: 244.9  1/16/2019        GERD (gastroesophageal reflux disease) ICD-10-CM: K21.9  ICD-9-CM: 530.81  1/16/2019        Mixed hyperlipidemia ICD-10-CM: E78.2  ICD-9-CM: 272.2  7/27/2018        DM2 (diabetes mellitus, type 2) ([facility] ICD-10-CM: E11.9  ICD-9-CM: 250.00  7/27/2018        Aortic valve sclerosis ICD-10-CM: I35.8  ICD-9-CM: 424.1  1/26/2018        Rapid heart rate ICD-10-CM: R00.0  ICD-9-CM: 785.0  1/26/2018        Essential hypertension with goal blood pressure less than 130/85 ICD-10-CM: I10  ICD-9-CM: 401.9  10/27/2017        Murmur ICD-10-CM: R01.1  ICD-9-CM: 785.2  10/4/2017        Bilateral leg edema ICD-10-CM: R60.0  ICD-9-CM: 782.3  10/4/2017        Swelling ICD-10-CM: R60.9  ICD-9-CM: 782.3  10/4/2017        Abnormal EKG ICD-10-CM: R94.31  ICD-9-CM: 794.31  10/4/2017        Urinary tract infection, site not specified ICD-10-CM: N39.0  ICD-9-CM: 599.0  4/22/2015                    Assessment/Plan:     Start dressings. Avoid pressure to heel. Discussed diabetic control.        Signed By: Licha Turner MD

## 2019-07-05 NOTE — PROGRESS NOTES
Hospitalist Progress Note 2019 Admit Date: 7/3/2019  1:37 PM  
NAME: Santosh Reilly :  1933 MRN:  [id number] Attending: Ramez Lal MD 
PCP:  Brooklynn Yuan MD 
 
SUBJECTIVE:  
HPI: 80yr old female with pmhx for CdHF, chronic pain, gout, hypothyroidism, recurrent uti - prior vre in urine but not treated as likely colonized per ID was admitted with SOB, Confusion and low O2 sats with dysuria and foul smelling dark urine, concern for sepsis with LA 3.4, wbc 21.3. Daughter Latesha Natarajan is a RN here at [address]. 
 
: Awake and alert, feels better, AAO X 3, daughter at bedside. She can be forgetful per daughter. Overnight had hypoxia and SOB and cxr showed congestion, was given lasix with good UOP. Swelling has decreased. Nursing notes and chart reviewed. Review of Systems negative with exception of pertinent positives noted above. PHYSICAL EXAM  
 
Visit Vitals /82 (BP 1 Location: Right arm, BP Patient Position: At rest) Pulse 94 Temp 97.9 °F (36.6 °C) Resp 18 Ht 5' 4\" (1.626 m) Wt 79.8 kg (175 lb 14.4 oz) SpO2 97% Breastfeeding? No  
BMI 30.19 kg/m² Temp (24hrs), Av.1 °F (36.7 °C), Min:97.6 °F (36.4 °C), Max:98.4 °F (36.9 °C) Oxygen Therapy O2 Sat (%): 97 % (19 0739) Pulse via Oximetry: 86 beats per minute (19 1719) O2 Device: Nasal cannula (19) O2 Flow Rate (L/min): 4 l/min (19 172) Intake/Output Summary (Last 24 hours) at 2019 0050 Last data filed at 2019 0416 Gross per 24 hour Intake 840 ml Output 1310 ml Net -470 ml General: No acute distress. Alert.   
Head:  AT/NC Lungs:  CTABL. Heart:  RRR, no murmur, rub, or gallop Abdomen: Soft, non-distended, non-tender, +bs Extremities: No cyanosis or clubbing. Neurologic:  No focal deficits. Moves all extremities. Skin:  No Obvious Rash. Left heel has chronic wound, pink granulation with foul   odor. Psych:  Normal affect. LABS AND STUDIES: 
Personally reviewed all labs, meds, and studies for past 24hrs. ASSESSMENT Active Hospital Problems Diagnosis Date Noted  Sepsis secondary to UTI ([facility] 07/04/2019  Respiratory failure ([facility] 07/03/2019  Sepsis ([facility] 03/25/2019  Chronic diastolic congestive heart failure ([facility] 01/16/2019  Acquired hypothyroidism 01/16/2019  DM2 (diabetes mellitus, type 2) ([facility] 07/27/2018 PLAN: 
 
· Sepsis ruled out. DC ABx. Symptoms were likely due to volume overload. Appreciate ID. · Resp failure: due to fluid overload, improved with lasix, resume torsemide. · Lactic acidosis: Resolved. DC IVF. · Gout: Not in flare, resume home dose of Prednisone 10mg bid, monitor for worsening of swelling. May need colchicine. · DM2: on LANTUS and iss. Follow sugars. · Hypothyroidism: on Synthroid. · HTN: on coreg. · Chronic pain: on home dose of methadone. Dispo: to home in am. 
 
DVT ppx:  SCds Discussed plan with pt who is in agreement. All questions answered. Signed By: Pennie Encarnacion MD   
 July 5, 2019

## 2019-07-05 NOTE — PROGRESS NOTES
PT note: 
 
Chart reviewed and attempted PT evaluation. Pt currently with MD. Will check back later time/date as schedule allows. Thanks, TALITA PalT

## 2019-07-05 NOTE — PROGRESS NOTES
Daughter asked if pt could have Rooke boots due to pt having pressure injury on back of heel. Notified Dr. Melinda Brady MD ordered Rooke boots for patient. Pt is stable.

## 2019-07-05 NOTE — PROGRESS NOTES
Full bed bath given to patient, lotion applied. Swelling of left arm has gone down from 2 + edema to 1+ edema after turning off continuous fluids per MD order. Pt has some redness under abdominal fold, barrier cream applied. Pt is stable resting quietly in bed.

## 2019-07-05 NOTE — PROGRESS NOTES
Wound Center Progress Note    Patient: Reyna Vasquez MRN: [id number]  SSN: [id number]    YOB: 1933  Age: 80 y.o. Sex: female      Subjective:     Chief Complaint:  L heel DFU    History of Present Illness:       Wound Caused By: DM2, neuropathy  Associated Signs and Symptoms: drainage  Timing: constant  Quality: wound  Severity: full thickness  Modifying Factors: poorly controlled dm2. Tolerating dressings. No new issues.      Past Medical History:   Diagnosis Date    Arthritis     Chronic diastolic congestive heart failure (HCC) 1/16/2019    Chronic pain     bilat feet & hands    Edema     BLE & bilat fingers; pt takes diuretic daily; pt states her swelling is related to the nerve disease she is being treated for at [facility] GERD (gastroesophageal reflux disease) 1/16/2019    Gouty arthritis     History of MI (myocardial infarction) 1980's    \"light heart attack\"; pt states MD at [facility] mentioned it to her; pt stated MD told her \"was on the back part of her heart & would not give her any problems\"; pt takes aspirin 81 mg daily    Hypertension     managed w/med    Hypothyroidism     s/p partial thyroidectomy 1960; managed w/med    Mild heartburn     takes tums prn    Neuropathy 1980's    pt states has been treated by [facility] for \"nerve problems in her feet & hands\"    Post-operative nausea and vomiting     Type 2 diabetes mellitus without complication, without long-term current use of insulin ([facility] [address] 7/27/2018      Past Surgical History:   Procedure Laterality Date    HX CYST REMOVAL Right 1950's    upper eyelid    HX DILATION AND CURETTAGE  [id number]    HX GYN  1963    ectopic pregnancy; right BSO done    HX HEART CATHETERIZATION  late 1979    pt states no stents    HX HYSTERECTOMY  1972    HX [address]    pt states was exploratory abdominal surgery too    HX ORTHOPAEDIC Bilateral [id number]    Foot; bone taken out of 5th toe on right foot; 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 Authorizing Provider   colchicine 0.6 mg tablet Take 0.6 mg by mouth two (2) times a day. Indications: for 3 days, end 5/20    Provider, Historical   oxybutynin chloride XL (DITROPAN XL) 5 mg CR tablet Take 5 mg by mouth daily. Provider, Historical   torsemide (DEMADEX) 10 mg tablet Take 10 mg by mouth two (2) times a day. Provider, Historical   methadone (DOLOPHINE) 5 mg tablet Take 1 Tab by mouth every six (6) hours. Max Daily Amount: 20 mg. Pt has been stable on this dose for many years, please do not taper or stop. Instructed to take DOS per Anesthesia guidelines. Indications: chronic pain, narcotic addiction 3/29/19   Kelly Solis NP   DULoxetine (CYMBALTA) 60 mg capsule Take 1 Cap by mouth daily. 3/30/19   Kelly Solis NP   clonazePAM (KLONOPIN) 0.5 mg tablet Take 1 Tab by mouth three (3) times daily. Max Daily Amount: 1.5 mg. Indications: anxiety 3/29/19   Kelly Solis NP   carvedilol (COREG) 6.25 mg tablet Take 1 Tab by mouth two (2) times daily (with meals). 3/29/19   Kelly Solis NP   amLODIPine (NORVASC) 10 mg tablet Take 1 Tab by mouth daily. Patient taking differently: Take 5 mg by mouth daily. 3/29/19   Kelly Solis NP   predniSONE (DELTASONE) 5 mg tablet Take 1 Tab by mouth two (2) times a day. 5mg BID  Patient taking differently: Take 10 mg by mouth two (2) times a day.  5mg BID 3/29/19   Kelly Solis NP   docusate sodium (COLACE) 100 mg capsule Take 1 Cap by mouth nightly. 3/4/19   Wil Cook,    Lactobacillus acidophilus (ACIDOPHILUS) cap Take 1 Cap by mouth daily. Provider, Historical   insulin lispro (HUMALOG) 100 unit/mL kwikpen 5 Units by SubCUTAneous route Before breakfast, lunch, and dinner. Indications: 150-200= 2 units; 201-250= 4 units; 251-300= 6 units; >300 8 units    Provider, Historical   insulin detemir U-100 (LEVEMIR FLEXTOUCH) 100 unit/mL (3 mL) inpn 20 Units by SubCUTAneous route every morning. Indications: type 2 diabetes mellitus    Provider, Historical   mirtazapine (REMERON) 7.5 mg tablet Take 7.5 mg by mouth nightly. Provider, Historical   nystatin (MYCOSTATIN) powder Apply  to affected area three (3) times daily. Under breasts and ABD folds    Provider, Historical   potassium chloride (KLOR-CON) 10 mEq tablet Take 10 mEq by mouth two (2) times a day. Provider, Historical   acetaminophen (TYLENOL EXTRA STRENGTH) 500 mg tablet Take 500 mg by mouth every six (6) hours as needed for Pain. Provider, Historical   levothyroxine (SYNTHROID) 75 mcg tablet Take 75 mcg by mouth Daily (before breakfast). Provider, Historical   multivitamin with minerals (HAIR,SKIN AND NAILS PO) Take 1 Tab by mouth daily. Provider, Historical   CHOLECALCIFEROL, VITAMIN D3, (VITAMIN D3 PO) Take 1,000 mg by mouth daily. Provider, Historical   CALCIUM CARBONATE/VITAMIN D3 (CALCIUM 600 + D,3, PO) Take 600 mg by mouth daily. Provider, Historical   KRILL/OM-3/DHA/EPA/PHOSPHO/AST (MEGARED OMEGA-3 KRILL OIL PO) Take 1 Cap by mouth daily. Provider, Historical   aspirin delayed-release 81 mg tablet Take 81 mg by mouth nightly. Ok to continue per anesthesia guidelines.     Provider, Historical     Allergies   Allergen Reactions    Contrast Agent [Iodine] Anaphylaxis    Actifed [Triprolidine-Pseudoephedrine] Nausea Only    Allopurinol Other (comments)     Elevated blood pressure & pt states could not walk or see until medication was out of her system.  Decongestant [Pseudoephedrine Hcl] Rash and Itching    Lexapro [Escitalopram Oxalate] Other (comments)     Lips/Mouth swelling    Lyrica [Pregabalin] Unknown (comments)    Minizide [Prazosin-Polythiazide] Unknown (comments)    Mobic [Meloxicam] Other (comments)     Facial swelling    Omnipaque [Iohexol] Hives, Swelling and Other (comments)     Wheezing and/or shortness of breath    Sertraline Other (comments)     Mouth, lips swell    Spironolactone Itching     Pt complains that she breaks out in clammy sweat with itching and stinging in her upper arms, dry mouth, funny feeling tongue, SOB, and increased anxiety.  Sulfamethazine Other (comments)     Mouth/lips swelling         Review of Systems:  CONSTITUTIONAL: No fever, chills  HEAD: No headache  EYES: No visual loss  ENT: No hearing loss  SKIN: No rash  CARDIOVASCULAR: No chest pain  RESPIRATORY: No shortness of breath  GASTROINTESTINAL: No nausea, vomiting  GENITOURINARY: No excessive urination  NEUROLOGICAL: No weakness  MUSCULOSKELETAL: + joint pain  HEMATOLOGIC: No easy bleeding  LYMPHATICS: No lymphedema. PSYCHIATRIC: No current depression  ENDOCRINOLOGIC: + high sugars  ALLERGIES: No history of asthma, hives, eczema or rhinitis. Lab Results   Component Value Date/Time    Hemoglobin A1c 10.2 (H) 01/17/2019 10:03 AM        Immunization History   Administered Date(s) Administered    Influenza High Dose Vaccine PF 10/14/2014, 10/05/2017, 10/25/2018    Influenza Vaccine 09/16/2009, 09/09/2013, 10/12/2015, 09/15/2016    Influenza Vaccine (Whole Virus) 10/01/2011, 08/27/2012    Pneumococcal Conjugate (PCV-13) 03/18/2015    Pneumococcal Polysaccharide (PPSV-23) 10/01/2000    TB Skin Test (PPD) Intradermal 01/18/2019, 02/25/2019, 03/25/2019    Zoster Vaccine, Live 06/03/2013       There is no height or weight on file to calculate BMI.       Current medications:  Facility-Administered Medications Ordered in Other Encounters   Medication Dose Route Frequency Provider Last Rate Last Dose    pantoprazole (PROTONIX) tablet 40 mg  40 mg Oral ACB Arleth Perez MD   40 mg at 07/05/19 0922    [START ON 7/6/2019] torsemide (DEMADEX) tablet 10 mg  10 mg Oral DAILY Arleth Perez MD        insulin regular (Yayo Bras R, HUMULIN R) injection   SubCUTAneous AC&HS Nilda Burnham MD        tuberculin injection 5 Units  5 Units IntraDERMal ONCE Juan M Goddard MD        enoxaparin (LOVENOX) injection 40 mg  40 mg SubCUTAneous Q24H Arleth Perez MD   40 mg at 07/04/19 1419    amLODIPine (NORVASC) tablet 5 mg  5 mg Oral DAILY Wil Cook DO   5 mg at 07/05/19 0826    insulin glargine (LANTUS) injection 15 Units  15 Units SubCUTAneous QHS Arleth Perez MD   15 Units at 07/04/19 2136    sodium chloride (NS) flush 5-40 mL  5-40 mL IntraVENous Q8H Abran Atkins MD   5 mL at 07/05/19 0620    sodium chloride (NS) flush 5-40 mL  5-40 mL IntraVENous PRN Abran Atkins MD        sodium chloride (NS) flush 5-10 mL  5-10 mL IntraVENous PRN Arin Hou MD        aspirin delayed-release tablet 81 mg  81 mg Oral QHS Arin Hou MD   81 mg at 07/04/19 2137    carvedilol (COREG) tablet 6.25 mg  6.25 mg Oral BID WITH MEALS Arin Hou MD   6.25 mg at 07/05/19 0827    clonazePAM (KlonoPIN) tablet 0.5 mg  0.5 mg Oral TID Arin Hou MD   0.5 mg at 07/05/19 9262    DULoxetine (CYMBALTA) capsule 60 mg  60 mg Oral DAILY Arin Hou MD   60 mg at 07/05/19 0825    levothyroxine (SYNTHROID) tablet 75 mcg  75 mcg Oral ACB Arin Hou MD   75 mcg at 07/05/19 0538    methadone (DOLOPHINE) tablet 5 mg  5 mg Oral Q6H Arin Hou MD   5 mg at 07/05/19 4630    oxybutynin chloride XL (DITROPAN XL) tablet 5 mg  5 mg Oral DAILY Arin Hou MD   5 mg at 07/05/19 0840    tamsulosin (FLOMAX) capsule 0.4 mg  0.4 mg Oral QHS Arin Hou MD 0.4 mg at 07/04/19 2137    predniSONE (DELTASONE) tablet 20 mg  20 mg Oral BID Amy Villeda MD   20 mg at 07/05/19 6099    alcohol 62% (NOZIN) nasal  1 Ampule  1 Ampule Topical Q12H Amy Villeda MD   1 Ampule at 07/05/19 0884         Objective:     Physical Exam:     Visit Vitals  /81 (BP 1 Location: Right arm, BP Patient Position: Sitting)   Pulse 82   Temp 98.9 °F (37.2 °C)   Resp 18   SpO2 95%       General: well developed, well nourished  Psych: cooperative. Pleasant  Neuro: alert and oriented to person/place/situation. Otherwise nonfocal.  Derm: Normal turgor for age, dry skin  HEENT: Normocephalic, atraumatic. EOMI. Neck: Normal range of motion. Chest: Respirations nonlabored  Cardio: RRR  Abdomen: Soft, nondistended  Lower extremities:   No hemosiderrosis  No significant varicosities  Capillary refill <3 sec    Right  2+ DP/PT    Left  2+ DP/PT                Ulcer Description:   Wound Heel Left (Active)   Number of days: 150       Wound Finger (specify in comments) Anterior; Left (Active)   Number of days: 149       Wound Buttocks Posterior;Right (Active)   Number of days: 48       Wound Heel Left (Active)   Number of days: 48       Wound Groin Right (Active)   Number of days: 44       Wound Heel Left (Active)   Dressing Status Old drainage 6/28/2019  2:05 PM   Non-staged Wound Description Full thickness 6/28/2019  2:05 PM   Wound Length (cm) 4 cm 6/28/2019  2:05 PM   Wound Width (cm) 4 cm 6/28/2019  2:05 PM   Wound Depth (cm) 0.1 cm 6/28/2019  2:05 PM   Wound Volume (cm^3) 1.6 cm^3 6/28/2019  2:05 PM   Condition of Base Granulation;Slough 6/28/2019  2:05 PM   Assessment Black; Red 6/14/2019  2:37 PM   Tissue Type Percent Black 5 % 6/14/2019  2:37 PM   Tissue Type Percent Red 90 6/28/2019  2:05 PM   Tissue Type Percent Yellow 10 6/28/2019  2:05 PM   Drainage Amount Moderate 6/28/2019  2:05 PM   Drainage Color Serosanguinous; Yellow 6/28/2019  2:05 PM   Wound Odor None 6/28/2019  2:05 PM   Kaley-wound Assessment Intact 6/28/2019  2:05 PM   Cleansing and Cleansing Agents  Normal saline 6/28/2019  2:05 PM   Dressing Changed Changed/New 6/14/2019  2:37 PM   Number of days: 21       Wound Buttocks Left partial thickness skin loss, friction MASD (Active)   Number of days: 1       [REMOVED] Wound Toe (Comment  which one) Left 4th Toe (Removed)   Dressing Status Dry 6/14/2019  2:37 PM   Wound Length (cm) 1.2 cm 6/14/2019  2:37 PM   Wound Width (cm) 1 cm 6/14/2019  2:37 PM   Wound Depth (cm) 0.1 cm 6/14/2019  2:37 PM   Wound Volume (cm^3) 0.12 cm^3 6/14/2019  2:37 PM   Condition of Base Eschar 6/14/2019  2:37 PM   Tissue Type Percent Black 100 % 6/14/2019  2:37 PM   Drainage Amount None 6/14/2019  2:37 PM   Wound Odor None 6/14/2019  2:37 PM   Cleansing and Cleansing Agents  Normal saline 6/14/2019  2:37 PM   Dressing Changed Changed/New 6/14/2019  2:37 PM   Number of days: 0           Problem List  Date Reviewed: 5/25/2016          Codes Class Noted    Sepsis secondary to UTI [facility]) ICD-10-CM: A41.9, N39.0  ICD-9-CM: 038.9, 995.91, 599.0  7/4/2019        Respiratory failure ([facility] ICD-10-CM: J96.90  ICD-9-CM: 518.81  7/3/2019        Non healing left heel wound ICD-10-CM: S91.302A  ICD-9-CM: 892.1  5/17/2019        Sepsis ([facility] [address] ICD-10-CM: A41.9  ICD-9-CM: 038.9, 995.91  3/25/2019        Acute metabolic encephalopathy PSD-65-TR: G93.41  ICD-9-CM: 348.31  2/26/2019        Acute diarrhea ICD-10-CM: R19.7  ICD-9-CM: 787.91  2/26/2019        Oral thrush ICD-10-CM: B37.0  ICD-9-CM: 112.0  2/26/2019        Acute encephalopathy ICD-10-CM: G93.40  ICD-9-CM: 348.30  2/24/2019        Finger infection ICD-10-CM: L08.9  ICD-9-CM: 686.9  2/5/2019        Leukocytosis ICD-10-CM: J36.281  ICD-9-CM: 288.60  2/5/2019        RAYMON (acute kidney injury) ([facility] ICD-10-CM: N17.9  ICD-9-CM: 584.9  2/5/2019        Osteomyelitis of finger ([facility] ICD-10-CM: M86.9  ICD-9-CM: 730.24  2/5/2019        Closed rib fracture ICD-10-CM: S22.39XA  ICD-9-CM: 807.00  1/16/2019        Acute respiratory failure with hypoxia (HCC) ICD-10-CM: J96.01  ICD-9-CM: 518.81  1/16/2019        Acute prerenal azotemia ICD-10-CM: R79.89  ICD-9-CM: 790.6  1/16/2019        Chronic diastolic congestive heart failure (HCC) ICD-10-CM: I50.32  ICD-9-CM: 428.32, 428.0  1/16/2019        Acquired hypothyroidism ICD-10-CM: E03.9  ICD-9-CM: 244.9  1/16/2019        GERD (gastroesophageal reflux disease) ICD-10-CM: K21.9  ICD-9-CM: 530.81  1/16/2019        Mixed hyperlipidemia ICD-10-CM: E78.2  ICD-9-CM: 272.2  7/27/2018        DM2 (diabetes mellitus, type 2) ([facility] ICD-10-CM: E11.9  ICD-9-CM: 250.00  7/27/2018        Aortic valve sclerosis ICD-10-CM: I35.8  ICD-9-CM: 424.1  1/26/2018        Rapid heart rate ICD-10-CM: R00.0  ICD-9-CM: 785.0  1/26/2018        Essential hypertension with goal blood pressure less than 130/85 ICD-10-CM: I10  ICD-9-CM: 401.9  10/27/2017        Murmur ICD-10-CM: R01.1  ICD-9-CM: 785.2  10/4/2017        Bilateral leg edema ICD-10-CM: R60.0  ICD-9-CM: 782.3  10/4/2017        Swelling ICD-10-CM: R60.9  ICD-9-CM: 782.3  10/4/2017        Abnormal EKG ICD-10-CM: R94.31  ICD-9-CM: 794.31  10/4/2017        Urinary tract infection, site not specified ICD-10-CM: N39.0  ICD-9-CM: 599.0  4/22/2015                    Assessment/Plan:     No slough in wound. Will change to endoform.      Signed By: David Cox MD

## 2019-07-05 NOTE — PROGRESS NOTES
Paged MD to notify that chest X-ray resulted. Notified Dr. Cheli Wagner that pt receives 10 mg Torsemide at home twice a day. MD ordered Lasix for pt.

## 2019-07-05 NOTE — CONSULTS
Infectious Disease Consult Today's Date: 7/5/2019 Admit Date: 7/3/2019 Impression: · Volume overload · PVD: angio with left popliteal PTA 5/22/19 · CKD: CRT stable to below baseline. GFR has normalized · S/p recent treatment for L 1st finger osteomyelitis in 2/2019. · DM: HgbA1c 10.2 in January 2019. · Gout: on chronic prednisone 10 mg daily. · Steroid dependent inflammatory arthritis Plan:  
·  Stop all abx as we do not see infectious etiology. Symptoms likely correlated with volume overload. · **Will sign off at this time. Please call with questions or concerns. Anti-infectives:  
Vancomycin 5/17-5/22 Doxycyline 5/22-6/5 Zosyn 5/17-5/20, 7/4-7/5 Cefepime 5/20-5/23 Vantin 5/23-6/5 
flagyl 5/20-6/5 Zyvox (7/4-7/5) Subjective:  
Date of Consultation:  July 5, 2019 Referring Physician: Sharon Mortimer Patient is a 80 y.o. female with steroid-dependent RA who presented to [facility] on 7/4 for malaise. She is known to ID for left first finger osteomyelitis, which was treated with cefepime in Feb-March 2019.  Swab cultures from the wound grew MSSA and Morganella.  She was transitioned to cefadroxil and cipro after completion of cefepime. Again in May 2019 for LLE cellulitis, treated with 14 days Doxycycline/Flagyl/Vantin.  Sesar Barber had VRE in the urine which was not treated since she was asymptomatic at that time.  
-While in ED, LA 4.1 but VSS. Tm 99.6F with resolved leukocytosis. BCx2 NGTD after 48h, UC 5k GNR. UA benign, CXR with vascular congestion. PCT 1.6. Started on Zyvox and Zosyn. Patient states symptoms for admission were \"SOB\" and denies any fevers or chills, diarrhea, abdominal pain, LE pain,. Patient Active Problem List  
Diagnosis Code  Urinary tract infection, site not specified N39.0  Murmur R01.1  Bilateral leg edema R60.0  Swelling R60.9  Abnormal EKG R94.31  
 Essential hypertension with goal blood pressure less than 130/85 I10  
  Aortic valve sclerosis I35.8  Rapid heart rate R00.0  Mixed hyperlipidemia E78.2  DM2 (diabetes mellitus, type 2) ([facility]) E11.9  Closed rib fracture S22.39XA  Acute respiratory failure with hypoxia ([facility]) J96.01  
 Acute prerenal azotemia R79.89  Chronic diastolic congestive heart failure ([facility]) I50.32  
 Acquired hypothyroidism E03.9  GERD (gastroesophageal reflux disease) K21.9  Finger infection L08.9  Leukocytosis D72.829  
 RAYMON (acute kidney injury) ([address] N17.9  Osteomyelitis of finger ([facility]) M86.9  Acute encephalopathy G93.40  Acute metabolic encephalopathy V96.27  
 Acute diarrhea R19.7  Oral thrush B37.0  Sepsis ([facility] A41.9  Non healing left heel wound S91.302A  Respiratory failure ([address] J96.90  
 Sepsis secondary to UTI ([address] A41.9, N39.0 Past Medical History:  
Diagnosis Date  Arthritis  Chronic diastolic congestive heart failure ([address] 1/16/2019  Chronic pain   
 bilat feet & hands  Edema BLE & bilat fingers; pt takes diuretic daily; pt states her swelling is related to the nerve disease she is being treated for at [facility]  GERD (gastroesophageal reflux disease) 1/16/2019  Gouty arthritis  History of MI (myocardial infarction) 1980's \"light heart attack\"; pt states MD at [facility] mentioned it to her; pt stated MD told her \"was on the back part of her heart & would not give her any problems\"; pt takes aspirin 81 mg daily  Hypertension   
 managed w/med  Hypothyroidism   
 s/p partial thyroidectomy 1960; managed w/med  Mild heartburn   
 takes tums prn  Neuropathy 1980's  
 pt states has been treated by [facility] for \"nerve problems in her feet & hands\"  Post-operative nausea and vomiting  Type 2 diabetes mellitus without complication, without long-term current use of insulin ([address] 7/27/2018 Family History Problem Relation Age of Onset  Cancer Mother  Heart Disease Father  Stroke Father Social History Tobacco Use  Smoking status: Never Smoker  Smokeless tobacco: Never Used Substance Use Topics  Alcohol use: No  
 
Past Surgical History:  
Procedure Laterality Date  HX CYST REMOVAL Right 1950's  
 upper eyelid  HX DILATION AND CURETTAGE  [id number] [address]  
 ectopic pregnancy; right BSO done  HX HEART CATHETERIZATION  late 1979  
 pt states no stents [address] [facility]  
 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  
 nerve removed out of left leg [address] [facility]  HX UROLOGICAL    
 cystoscopy Prior to Admission medications Medication Sig Start Date End Date Taking? Authorizing Provider  
colchicine 0.6 mg tablet Take 0.6 mg by mouth two (2) times a day. Indications: for 3 days, end 5/20    Provider, Historical  
oxybutynin chloride XL (DITROPAN XL) 5 mg CR tablet Take 5 mg by mouth daily. Provider, Historical  
torsemide (DEMADEX) 10 mg tablet Take 10 mg by mouth two (2) times a day. Provider, Historical  
methadone (DOLOPHINE) 5 mg tablet Take 1 Tab by mouth every six (6) hours. Max Daily Amount: 20 mg. Pt has been stable on this dose for many years, please do not taper or stop. Instructed to take DOS per Anesthesia guidelines. Indications: chronic pain, narcotic addiction 3/29/19   Kelly Solis NP  
DULoxetine (CYMBALTA) 60 mg capsule Take 1 Cap by mouth daily. 3/30/19   Kelly Solis NP  
clonazePAM (KLONOPIN) 0.5 mg tablet Take 1 Tab by mouth three (3) times daily. Max Daily Amount: 1.5 mg. Indications: anxiety 3/29/19   Kelly Solis NP  
carvedilol (COREG) 6.25 mg tablet Take 1 Tab by mouth two (2) times daily (with meals).  3/29/19   Bobby Gomez NP  
 amLODIPine (NORVASC) 10 mg tablet Take 1 Tab by mouth daily. Patient taking differently: Take 5 mg by mouth daily. 3/29/19   Kelly Solis NP  
predniSONE (DELTASONE) 5 mg tablet Take 1 Tab by mouth two (2) times a day. 5mg BID Patient taking differently: Take 10 mg by mouth two (2) times a day. 5mg BID 3/29/19   Kelly Solis NP  
docusate sodium (COLACE) 100 mg capsule Take 1 Cap by mouth nightly. 3/4/19   Nilda Cook DO Lactobacillus acidophilus (ACIDOPHILUS) cap Take 1 Cap by mouth daily. Provider, Historical  
insulin lispro (HUMALOG) 100 unit/mL kwikpen 5 Units by SubCUTAneous route Before breakfast, lunch, and dinner. Indications: 150-200= 2 units; 201-250= 4 units; 251-300= 6 units; >300 8 units    Provider, Historical  
insulin detemir U-100 (LEVEMIR FLEXTOUCH) 100 unit/mL (3 mL) inpn 20 Units by SubCUTAneous route every morning. Indications: type 2 diabetes mellitus    Provider, Historical  
mirtazapine (REMERON) 7.5 mg tablet Take 7.5 mg by mouth nightly. Provider, Historical  
nystatin (MYCOSTATIN) powder Apply  to affected area three (3) times daily. Under breasts and ABD folds    Provider, Historical  
potassium chloride (KLOR-CON) 10 mEq tablet Take 10 mEq by mouth two (2) times a day. Provider, Historical  
acetaminophen (TYLENOL EXTRA STRENGTH) 500 mg tablet Take 500 mg by mouth every six (6) hours as needed for Pain. Provider, Historical  
levothyroxine (SYNTHROID) 75 mcg tablet Take 75 mcg by mouth Daily (before breakfast). Provider, Historical  
multivitamin with minerals (HAIR,SKIN AND NAILS PO) Take 1 Tab by mouth daily. Provider, Historical  
CHOLECALCIFEROL, VITAMIN D3, (VITAMIN D3 PO) Take 1,000 mg by mouth daily. Provider, Historical  
CALCIUM CARBONATE/VITAMIN D3 (CALCIUM 600 + D,3, PO) Take 600 mg by mouth daily.     Provider, Historical  
KRILL/OM-3/DHA/EPA/PHOSPHO/AST (MEGARED OMEGA-3 KRILL OIL PO) Take 1 Cap by mouth daily. Provider, Historical  
aspirin delayed-release 81 mg tablet Take 81 mg by mouth nightly. Ok to continue per anesthesia guidelines. Provider, Historical  
 
 
Allergies Allergen Reactions  Contrast Agent [Iodine] Anaphylaxis  Actifed [Triprolidine-Pseudoephedrine] Nausea Only  Allopurinol Other (comments) Elevated blood pressure & pt states could not walk or see until medication was out of her system.  Decongestant [Pseudoephedrine Hcl] Rash and Itching  Lexapro [Escitalopram Oxalate] Other (comments) Lips/Mouth swelling  Lyrica [Pregabalin] Unknown (comments)  Minizide [Prazosin-Polythiazide] Unknown (comments)  Mobic [Meloxicam] Other (comments) Facial swelling  Omnipaque [Iohexol] Hives, Swelling and Other (comments) Wheezing and/or shortness of breath  Sertraline Other (comments) Mouth, lips swell  Spironolactone Itching Pt complains that she breaks out in clammy sweat with itching and stinging in her upper arms, dry mouth, funny feeling tongue, SOB, and increased anxiety.  Sulfamethazine Other (comments) Mouth/lips swelling Review of Systems:  A comprehensive review of systems was negative except for that written in the History of Present Illness. Objective:  
 
Visit Vitals /82 (BP 1 Location: Right arm, BP Patient Position: At rest) Pulse 94 Temp 97.9 °F (36.6 °C) Resp 18 Ht 5' 4\" (1.626 m) Wt 79.8 kg (175 lb 14.4 oz) SpO2 97% Breastfeeding? No  
BMI 30.19 kg/m² Temp (24hrs), Av.1 °F (36.7 °C), Min:97.6 °F (36.4 °C), Max:98.4 °F (36.9 °C) Lines:  Peripheral IV:    
 
Physical Exam:   
General:  Alert, cooperative,  appears stated age Eyes:  Sclera anicteric. Pupils equally round and reactive to light. Mouth/Throat: Mucous membranes normal, oral pharynx clear Neck: Supple Lungs:   Clear to auscultation bilaterally, good effort CV:  Regular rate and rhythm,no murmur, click, rub or gallop Abdomen:   Soft, non-tender. bowel sounds normal. non-distended Extremities: No cyanosis or edema Skin: Skin color, texture, turgor normal. no acute rash or lesions. LEs without cellulitis. LUE with edema consistent with filtrated IV Lymph nodes: Cervical and supraclavicular normal  
Musculoskeletal: No swelling. RA deformities Lines/Devices:  Intact, no erythema, drainage or tenderness Psych: Alert and oriented, normal mood affect given the setting Data Review: CBC: 
Recent Labs  
  07/04/19 
0603 07/03/19 
1359 WBC 11.8* 21.3*  
GRANS 82* 82* MONOS 4 6 EOS 0* 0* ANEU 9.7* 17.5* ABL 1.5 2.1 HGB 9.7* 10.2* HCT 33.5* 34.4*  
 349 BMP: 
Recent Labs  
  07/04/19 
0603 07/03/19 
1359 CREA 0.93 1.15* BUN 25* 35*  141  
K 3.7 4.3  103 CO2 29 29 AGAP 8 9 * 194* LFTS: 
Recent Labs  
  07/04/19 
0603 07/03/19 
1359 TBILI 0.4 0.3 ALT 25 23 SGOT 19 17 AP 81 87  
TP 6.7 6.8 ALB 2.5* 2.7* Microbiology:  
 
All Micro Results Procedure Component Value Units Date/Time CULTURE, URINE [[id number]]  (Abnormal) Collected:  07/03/19 1527 Order Status:  Completed Specimen:  Cath Urine Updated:  07/05/19 2028 Special Requests: NO SPECIAL REQUESTS Culture result:    
  5000 COLONIES/mL GRAM NEGATIVE RODS IDENTIFICATION AND SUSCEPTIBILITY TO FOLLOW CULTURE, BLOOD [[id number]] Collected:  07/03/19 1359 Order Status:  Completed Specimen:  Blood Updated:  07/05/19 9404 Special Requests: --     
  LEFT Antecubital 
  
  Culture result: NO GROWTH 2 DAYS     
 CULTURE, BLOOD [[id number]] Collected:  07/03/19 1359 Order Status:  Completed Specimen:  Blood Updated:  07/05/19 7223 Special Requests: --     
  RIGHT Antecubital 
  
  Culture result: NO GROWTH 2 DAYS Imaging:  
See [facility]/EPIC Signed By: Robson Downey NP   
 July 5, 2019

## 2019-07-05 NOTE — PROGRESS NOTES
Pt /99. Stated she had pain in abdomen. Abdomen is firm, breath sounds wet when auscultated. MD called. Dr Jan Aguillon ordered X-ray and 1 mg morphine IV.

## 2019-07-05 NOTE — PROGRESS NOTES
Dr. Prince Gallegos notified of lactic acid result decreasing from 4.1 at 1608 to 2.0 at 2014. Left arm where IV access is located is visibly swelling with 2+ edema. Notified MD of swelling in arm and breath sounds becoming increasingly coarse. MD ordered to discontinue NS @ 100 ml/hr. Arm propped up on pillows and ice applied to arm. Pt is stable, resting quietly in bed.

## 2019-07-05 NOTE — PROGRESS NOTES
The patient was reported to me with wet rales on auscultation. CXR revealed pulmonary congestion. IVFs was stopped last night. Plan for iv lasix. Check pro-bnp. The patient had a recent echo on 5/19 with an EF 50-55%.

## 2019-07-05 NOTE — PROGRESS NOTES
Problem: Mobility Impaired (Adult and Pediatric) Goal: *Acute Goals and Plan of Care (Insert Text) Description STG: 
(1.)Ms. Mitch Reyes will move from supine to sit and sit to supine  with MINIMAL ASSIST within 3 treatment day(s). (2.)Ms. Mitch Reyes will demonstrate fair static sitting balance with CGA for 15 minutes within 3 treatment days LTG: 
(1.)Ms. Mitch Reyes will move from supine to sit and sit to supine  in bed with CONTACT GUARD ASSIST within 7 treatment day(s). (2.)Ms. Mitch Reyes will demonstrate good static sitting balance with SBA for 25 minutes within 7 treatment days  
________________________________________________________________________________________________ Outcome: Progressing Towards Goal 
  
 
PHYSICAL THERAPY: Initial Assessment and PM 7/5/2019 INPATIENT: PT Visit Days : 1 [place] / [place] B / Product Type: Medicare /   
  
NAME/AGE/GENDER: Krystal Cleveland is a 80 y.o. female PRIMARY DIAGNOSIS: Sepsis ([facility] [A41.9] Respiratory failure ([facility] [J96.90] Sepsis secondary to UTI ([facility] [A41.9, N39.0] Sepsis ([facility] Sepsis ([facility] ICD-10: Treatment Diagnosis:  
 Generalized Muscle Weakness (M62.81) Difficulty in walking, Not elsewhere classified (R26.2) History of falling (Z91.81) Precaution/Allergies: 
Contrast agent [iodine]; Actifed [triprolidine-pseudoephedrine]; Allopurinol; Decongestant [pseudoephedrine hcl]; Lexapro [escitalopram oxalate]; Lyrica [pregabalin]; Minizide [prazosin-polythiazide]; Mobic [meloxicam]; Omnipaque [iohexol]; Sertraline; Spironolactone; and Sulfamethazine ASSESSMENT:  
 
Ms. Mitch Reyes is supine in bed upon contact and agreeable to PT evaluation and treatment this afternoon. Pt is A&O X 4 with reports of 5/10 LE pain. Pt lives with her daughter in 1 story home with 0 steps to enter. Pt daughter is RN here on 2nd floor.  Pt states she has 24/7 care and sitting when her daughter is at work. Pt has power w/c and uses standing lift for all transfers. Pt reports she is working with HHPT on bed mobility and sitting balance as well as initiating standing. Pt currently unable to WB through L LE due to wound. Pt requesting to be assisted with getting cleaned up. Pt performed rolling R and L in bed to assist with getting washed up. PT provided VC and TC with pt requiring modA for rolling. Reviewed exercises pt has been performing at home. Pt requires assist with UE management with washing and set up. Pt declined attempting to sit EOB this session due to L LE pain. Pt left supine in bed with all needs met and within reach. Keegan Sweeney will benefit from skilled PT (medically necessary) to address decreased strength, decreased balance, decreased functional tolerance, decreased cardiopulmonary endurance affecting participation in basic ADLs and functional tasks. This section established at most recent assessment PROBLEM LIST (Impairments causing functional limitations): 
Decreased Strength Decreased ADL/Functional Activities Decreased Transfer Abilities Decreased Ambulation Ability/Technique Decreased Balance Increased Pain Decreased Activity Tolerance Increased Fatigue Decreased Flexibility/Joint Mobility Decreased [place] with Home Exercise Program 
 INTERVENTIONS PLANNED: (Benefits and precautions of physical therapy have been discussed with the patient.) Balance Exercise Bed Mobility Family Education Home Exercise Program (HEP) Neuromuscular Re-education/Strengthening Range of Motion (ROM) Therapeutic Activites Therapeutic Exercise/Strengthening Transfer Training TREATMENT PLAN: Frequency/Duration: 3 times a week for duration of hospital stay Rehabilitation Potential For Stated Goals: Fair REHAB RECOMMENDATIONS (at time of discharge pending progress):   
Placement:  
It is my opinion, based on this patient's performance to date, that Ms. Minnie Magallon may benefit from [address] after discharge due to the functional deficits listed above that are likely to improve with skilled rehabilitation because he/she has multiple medical issues that affect his/her functional mobility in the community. Equipment:  
None at this time HISTORY:  
History of Present Injury/Illness (Reason for Referral): 
See H&P below 80yr old female with pmhx for CDHF, chronic pain, gout, hypothyroidism recurrent utis- prior vre in urine but not treated as she was asymptomatic. Last admitted here 05/17/2019 - 05/24/2019 for non- heel;ing left heel wound. Discharged on vantin, doxy, and flagyl for 14d total treatment. Seen by ID and Dr. Siddhartha Biswas on that admit. Pt has been feeling poorly for about 3 days with the above symptoms. .. Seen by visiting nurse today and advised to come to the hospital. In the er she was noted to be septic. Past Medical History/Comorbidities: Ms. Minnie Magallon  has a past medical history of Arthritis, Chronic diastolic congestive heart failure ([address] (1/16/2019), Chronic pain, Edema, GERD (gastroesophageal reflux disease) (1/16/2019), Gouty arthritis, History of MI (myocardial infarction) (1980's), Hypertension, Hypothyroidism, Mild heartburn, Neuropathy (1980's), Post-operative nausea and vomiting, and Type 2 diabetes mellitus without complication, without long-term current use of insulin ([address] (7/27/2018).  She also has no past medical history of Adverse effect of anesthesia, Aneurysm ([address] Arrhythmia, Asthma, Autoimmune disease ([address] CAD (coronary artery disease), Cancer ([address] Chronic kidney disease, Chronic obstructive pulmonary disease ([address] Coagulation disorder ([address] Difficult intubation, Endocarditis, Ill-defined condition, Liver disease, Malignant hyperthermia due to anesthesia, Morbid obesity ([address] Pseudocholinesterase deficiency, Psychiatric disorder, PUD (peptic ulcer disease), Rheumatic fever, Seizures ([facility] Sleep apnea, Stroke ([facility] or Thromboembolus ([facility] Ms. Rosa Calderon  has a past surgical history that includes hx partial thyroidectomy (1960); hx orthopaedic (Bilateral, 3473-2785); hx orthopaedic (Left, 1992); hx open cholecystectomy (1970); hx cyst removal (Right, 1950's); hx urological; hx heart catheterization (late 1979); hx hysterectomy (1972); hx gyn (1963); hx dilation and curettage (8531-3231); and hx left salpingo-oophorectomy (1965). Social History/Living Environment:  
Home Environment: Private residence # Steps to Enter: 0 One/Two Story Residence: One story Living Alone: No 
Support Systems: Home care staff, Child(cassidy) Patient Expects to be Discharged to[de-identified] Private residence Current DME Used/Available at Home: Wheelchair, power, Lift chair, Hospital bed Tub or Shower Type: Tub/Shower combination Prior Level of Function/Work/Activity: 
Uses electric w/c, stand lift for transfers, working with HHPT on bed mobility and sitting balance Number of Personal Factors/Comorbidities that affect the Plan of Care: 3+: HIGH COMPLEXITY EXAMINATION:  
Most Recent Physical Functioning:  
Gross Assessment: 
AROM: Grossly decreased, non-functional 
Strength: Grossly decreased, non-functional 
Coordination: Grossly decreased, non-functional 
         
  
Posture: 
  
Balance: 
  Bed Mobility: 
Rolling: Moderate assistance Wheelchair Mobility: 
  
Transfers: 
  
Gait: 
  
   
  
Body Structures Involved: 
Nerves Bones Joints Muscles Ligaments Body Functions Affected: 
Sensory/Pain Cardio Respiratory Neuromusculoskeletal 
Movement Related Activities and Participation Affected: 
General Tasks and Demands Mobility Self Care Domestic Life Interpersonal Interactions and Relationships Community, Social and Quincy Darling Number of elements that affect the Plan of Care: 4+: HIGH COMPLEXITY CLINICAL PRESENTATION:  
Presentation: Evolving clinical presentation with changing clinical characteristics: MODERATE COMPLEXITY CLINICAL DECISION MAKIN[address] [place] [address] AM-PAC? ?6 Clicks? Basic Mobility Inpatient Short Form How much difficulty does the patient currently have. .. Unable A Lot A Little None 1. Turning over in bed (including adjusting bedclothes, sheets and blankets)? ? 1   ? 2   ? 3   ? 4  
2. Sitting down on and standing up from a chair with arms ( e.g., wheelchair, bedside commode, etc.)   ? 1   ? 2   ? 3   ? 4  
3. Moving from lying on back to sitting on the side of the bed?   ? 1   ? 2   ? 3   ? 4 How much help from another person does the patient currently need. .. Total A Lot A Little None 4. Moving to and from a bed to a chair (including a wheelchair)? ? 1   ? 2   ? 3   ? 4  
5. Need to walk in hospital room? ? 1   ? 2   ? 3   ? 4  
6. Climbing 3-5 steps with a railing? ? 1   ? 2   ? 3   ? 4  
© , Trustees of [address] [place] [address], under license to [employer]. All rights reserved Score:  Initial: 8 Most Recent: X (Date: -- ) Interpretation of Tool:  Represents activities that are increasingly more difficult (i.e. Bed mobility, Transfers, Gait). Medical Necessity:    
Patient is expected to demonstrate progress in strength, range of motion, balance, coordination and functional technique 
 to decrease assistance required with transfers and functional mobility  Reason for Services/Other Comments: 
Patient continues to require skilled intervention due to decreased strength, decreased balance, decreased functional tolerance, decreased cardiopulmonary endurance affecting participation in basic ADLs and functional tasks. .  
Use of outcome tool(s) and clinical judgement create a POC that gives a: Questionable prediction of patient's progress: MODERATE COMPLEXITY  
  
 
 
 
TREATMENT:  
(In addition to Assessment/Re-Assessment sessions the following treatments were rendered) Pre-treatment Symptoms/Complaints:  weakness Pain: Initial: Pain Intensity 1: 0  Post Session:  increased with mobility 5/10 Therapeutic Activity: (    10 minutes): Therapeutic activities including Bed transfers and rolling and cues for technique  to improve mobility, strength, balance and coordination. Required moderate   to promote coordination of bilateral, upper extremity(s), lower extremity(s). Braces/Orthotics/Lines/Etc:  
jeff Treatment/Session Assessment:   
Response to Treatment: Mod A for rolling Interdisciplinary Collaboration:  
Physical Therapist 
Registered Nurse Certified Nursing Assistant/Patient Care Technician After treatment position/precautions:  
Supine in bed Bed/Chair-wheels locked Bed in low position Call light within reach Compliance with Program/Exercises: Will assess as treatment progresses Recommendations/Intent for next treatment session: \"Next visit will focus on advancements to more challenging activities and reduction in assistance provided\". Total Treatment Duration: PT Patient Time In/Time Out Time In: 2516 Time Out: 1015 Jocelyn Davis

## 2019-07-06 NOTE — DISCHARGE INSTRUCTIONS
DISCHARGE SUMMARY from Nurse    PATIENT INSTRUCTIONS:    After general anesthesia or intravenous sedation, for 24 hours or while taking prescription Narcotics:  · Limit your activities  · Do not drive and operate hazardous machinery  · Do not make important personal or business decisions  · Do  not drink alcoholic beverages  · If you have not urinated within 8 hours after discharge, please contact your surgeon on call. Report the following to your surgeon:  · Excessive pain, swelling, redness or odor of or around the surgical area  · Temperature over 100.5  · Nausea and vomiting lasting longer than 4 hours or if unable to take medications  · Any signs of decreased circulation or nerve impairment to extremity: change in color, persistent  numbness, tingling, coldness or increase pain  · Any questions    What to do at Home:  Recommended activity: Activity as tolerated,     If you experience any of the following symptoms fever greater then 100.5, pain unrelieved by medication, increase in shortness of breath, please follow up with primary care doctor. *  Please give a list of your current medications to your Primary Care Provider. *  Please update this list whenever your medications are discontinued, doses are      changed, or new medications (including over-the-counter products) are added. *  Please carry medication information at all times in case of emergency situations. These are general instructions for a healthy lifestyle:    No smoking/ No tobacco products/ Avoid exposure to second hand smoke  Surgeon General's Warning:  Quitting smoking now greatly reduces serious risk to your health.     Obesity, smoking, and sedentary lifestyle greatly increases your risk for illness    A healthy diet, regular physical exercise & weight monitoring are important for maintaining a healthy lifestyle    You may be retaining fluid if you have a history of heart failure or if you experience any of the following symptoms:  Weight gain of 3 pounds or more overnight or 5 pounds in a week, increased swelling in our hands or feet or shortness of breath while lying flat in bed. Please call your doctor as soon as you notice any of these symptoms; do not wait until your next office visit. The discharge information has been reviewed with the patient. The patient verbalized understanding. Discharge medications reviewed with the patient and appropriate educational materials and side effects teaching were provided. ___________________________________________________________________________________________________________________________________     Sepsis: Care Instructions  Your Care Instructions    Sepsis is an intense reaction to an infection. It can cause deadly damage to the body and lead to a dangerously low blood pressure. You may have inflammation across large areas of your body. It can damage tissue and even go deep into your organs. Infections that can lead to sepsis include:  · A skin infection such as from a cut. · A lung infection like pneumonia. · A kidney infection. · A gut infection such as E. coli. It's important to care for yourself and try to avoid infections so that you don't get sepsis again. Follow-up care is a key part of your treatment and safety. Be sure to make and go to all appointments, and call your doctor if you are having problems. It's also a good idea to know your test results and keep a list of the medicines you take. How can you care for yourself at home? · If your doctor prescribed antibiotics, take them as directed. Do not stop taking them just because you feel better. You need to take the full course of antibiotics. · Help prevent infections that could lead to sepsis:  ? Try to avoid colds and flu. If you must be around people who have a cold or the flu, wash your hands often. And get a flu vaccine every year.   ? Get a pneumococcal vaccine shot (to prevent pneumonia, meningitis, and other infections). If you have had one before, ask your doctor if you need another dose. ? Clean any wounds or scrapes. · Do not smoke or use other tobacco products. When you quit smoking, you are less likely to get a cold, the flu, bronchitis, and pneumonia. If you need help quitting, talk to your doctor about stop-smoking programs and medicines. These can increase your chances of quitting for good. · To prevent dehydration, drink plenty of fluids. Choose water and other caffeine-free clear liquids until you feel better. If you have kidney, heart, or liver disease and have to limit fluids, talk with your doctor before you increase the amount of fluids you drink. · Eat a healthy diet. Include fruits, vegetables, and whole grains in your diet every day. · If your doctor recommends it, try doing some physical activity. Walking is a good choice. Bit by bit, increase the amount you walk every day. When should you call for help? [id number] anytime you think you may need emergency care. For example, call if:    · You passed out (lost consciousness).    Call your doctor now or seek immediate medical care if:    · You have symptoms of sepsis. These may include:  ? Shortness of breath. ? A fast heart rate. ? Cool, pale, or clammy skin. ? Feeling confused.     · You are dizzy or lightheaded, or you feel like you may faint.     · You have a fever or chills.    Watch closely for changes in your health, and be sure to contact your doctor if:    · You do not get better as expected. Where can you learn more? Go to http://ana maría-farrah.info/. Enter G891 in the search box to learn more about \"Sepsis: Care Instructions. \"  Current as of: September 23, 2018  Content Version: 11.9  © 0652-9448 [employer]. Care instructions adapted under license by [employer] (which disclaims liability or warranty for this information).  If you have questions about a medical condition or this instruction, always ask your healthcare professional. [place] [address] any warranty or liability for your use of this information.

## 2019-07-06 NOTE — DISCHARGE SUMMARY
Hospitalist Discharge Summary Patient ID: 
Shelley Samson 
[id number] 
71 y.o. 
8/23/1933 Admit date: 7/3/2019  1:37 PM 
Discharge date and time: 7/16/2019 Attending: No att. providers found PCP:  Malachi Herrera MD 
Treatment Team: Utilization Review: Crista Vee, RN; Care Manager: Nat Thakur, RN; Physician: Quentin Gottlieb MD; Tech: Jarett Veronica Principal Diagnosis Sepsis ([address] Principal Problem: 
  Sepsis ([address] (3/25/2019) Active Problems: 
  DM2 (diabetes mellitus, type 2) ([address] (7/27/2018) Chronic diastolic congestive heart failure ([address] (1/16/2019) Acquired hypothyroidism (1/16/2019) Respiratory failure ([address] (7/3/2019) Sepsis secondary to UTI ([address] (7/4/2019) Acute pulmonary edema ([address] (7/16/2019) Addendum: Acute pulmonary edema diagnosis added to this note on 7/16/19. Pt was treated with IV lasix for SOB, hypoxia and volume overload. Hospital Course: 
 
80yr old female with pmhx for CdHF, chronic pain, gout, hypothyroidism, recurrent uti - prior vre in urine but not treated as likely colonized per ID was admitted with SOB, Confusion and low O2 sats with dysuria and foul smelling dark urine, concern for sepsis with LA 3.4, wbc 21.3. Daughter Dl Betancur is a RN here at [address]. Pt was started on IV fluids as LA was elevated >4 and that normalized with ivf. She was also started on ABx for possible uti and ID consulted who stopped ABx. Pt was started back on her diuretics and she felt better. Her symptoms were likely due to volume overload. She was cleared for discharge by ID. Seen by wound care for left foot chronic wound. Today she is medically ready for discharge. She will follow up with her pcp and Dr Arian Gonzales. Plan discussed with pt/family and all are in agreement with the plan. All questions answered. Pt was stable at time of discharge. Follow up as per below. Significant Diagnostic Imaging: CXR: 
 FINDINGS: Cardiomegaly and pulmonary vascular congestion are unchanged. There 
may be a tiny left pleural effusion as well. No pneumothorax is identified. Again noted are old right rib fractures and elevation of the right diaphragm. 
  
 
Labs: Results:  
   
Chemistry No results for input(s): GLU, NA, K, CL, CO2, BUN, CREA, CA, AGAP, BUCR, TBIL, GPT, AP, TP, ALB, GLOB, AGRAT in the last 72 hours. CBC w/Diff No results for input(s): WBC, RBC, HGB, HCT, PLT, GRANS, LYMPH, EOS, HGBEXT, HCTEXT, PLTEXT, HGBEXT, HCTEXT, PLTEXT in the last 72 hours. Cardiac Enzymes No results for input(s): CPK, CKND1, MARLENY in the last 72 hours. No lab exists for component: Stella Bruins Coagulation No results for input(s): PTP, INR, APTT in the last 72 hours. No lab exists for component: INREXT, INREXT Last A1c Lab Results Component Value Date/Time Hemoglobin A1c 10.2 (H) 01/17/2019 10:03 AM  
  
Lipid Panel No results found for: CHOL, CHOLPOCT, CHOLX, CHLST, CHOLV, 721659, HDL, LDL, LDLC, DLDLP, 133960, VLDLC, VLDL, TGLX, TRIGL, TRIGP, TGLPOCT, CHHD, CHHDX  
BNP No results for input(s): BNPP in the last 72 hours. Liver Enzymes No results for input(s): TP, ALB, TBIL, AP, SGOT, GPT in the last 72 hours. No lab exists for component: DBIL Thyroid Studies Lab Results Component Value Date/Time T4, Total 8.3 05/18/2016 04:32 PM  
 TSH 0.429 02/25/2019 04:05 AM  
    
 
 
Discharge Exam: 
No data found. Visit Vitals /75 (BP 1 Location: Right arm, BP Patient Position: At rest) Pulse 76 Temp 98 °F (36.7 °C) Resp 20 Ht 5' 4\" (1.626 m) Wt 85.6 kg (188 lb 12.8 oz) SpO2 94% Breastfeeding? No  
BMI 32.41 kg/m² General appearance: alert, cooperative, no distress Lungs: CTABL Heart: RRR, no m/r/g Abdomen: soft, non-tender. Bowel sounds normal.  
Extremities: no cyanosis. Neurologic: Grossly normal 
 
Disposition: home with . Discharge Condition: stable Patient Instructions: activity as tolerated Cardiac diet Discharge Medication List as of 7/6/2019 12:06 PM  
  
CONTINUE these medications which have NOT CHANGED Details  
colchicine 0.6 mg tablet Take 0.6 mg by mouth two (2) times a day. Indications: for 3 days, end 5/20, Historical Med  
  
oxybutynin chloride XL (DITROPAN XL) 5 mg CR tablet Take 5 mg by mouth daily. , Historical Med  
  
torsemide (DEMADEX) 10 mg tablet Take 10 mg by mouth two (2) times a day., Historical Med  
  
methadone (DOLOPHINE) 5 mg tablet Take 1 Tab by mouth every six (6) hours. Max Daily Amount: 20 mg. Pt has been stable on this dose for many years, please do not taper or stop. Instructed to take DOS per Anesthesia guidelines. Indications: chronic pain, narcotic addiction, PrintPt h as been stable on this dose for many years, please do not taper or stop. Disp-20 Tab, R-0  
  
DULoxetine (CYMBALTA) 60 mg capsule Take 1 Cap by mouth daily. , Print, Disp-10 Cap, R-0  
  
clonazePAM (KLONOPIN) 0.5 mg tablet Take 1 Tab by mouth three (3) times daily. Max Daily Amount: 1.5 mg. Indications: anxiety, Print, Disp-10 Tab, R-0  
  
carvedilol (COREG) 6.25 mg tablet Take 1 Tab by mouth two (2) times daily (with meals). , No Print, Disp-10 Tab, R-0  
  
amLODIPine (NORVASC) 10 mg tablet Take 1 Tab by mouth daily. , No Print, Disp-10 Tab, R-0  
  
predniSONE (DELTASONE) 5 mg tablet Take 1 Tab by mouth two (2) times a day. 5mg BID, No Print, Disp-1 Tab, R-0  
  
docusate sodium (COLACE) 100 mg capsule Take 1 Cap by mouth nightly., No Print, Disp-1 Cap, R-0 Lactobacillus acidophilus (ACIDOPHILUS) cap Take 1 Cap by mouth daily. , Historical Med  
  
insulin lispro (HUMALOG) 100 unit/mL kwikpen 5 Units by SubCUTAneous route Before breakfast, lunch, and dinner.  Indications: 150-200= 2 units; 201-250= 4 units; 251-300= 6 units; >300 8 units, Historical Med  
  
insulin detemir U-100 (LEVEMIR FLEXTOUCH) 100 unit/mL (3 mL) inpn 20 Units by SubCUTAneous route every morning. Indications: type 2 diabetes mellitus, Historical Med  
  
mirtazapine (REMERON) 7.5 mg tablet Take 7.5 mg by mouth nightly., Historical Med  
  
nystatin (MYCOSTATIN) powder Apply  to affected area three (3) times daily. Under breasts and ABD folds, Historical Med  
  
potassium chloride (KLOR-CON) 10 mEq tablet Take 10 mEq by mouth two (2) times a day., Historical Med  
  
acetaminophen (TYLENOL EXTRA STRENGTH) 500 mg tablet Take 500 mg by mouth every six (6) hours as needed for Pain., Historical Med  
  
levothyroxine (SYNTHROID) 75 mcg tablet Take 75 mcg by mouth Daily (before breakfast). , Historical Med  
  
multivitamin with minerals (HAIR,SKIN AND NAILS PO) Take 1 Tab by mouth daily. , Historical Med CHOLECALCIFEROL, VITAMIN D3, (VITAMIN D3 PO) Take 1,000 mg by mouth daily. , Historical Med CALCIUM CARBONATE/VITAMIN D3 (CALCIUM 600 + D,3, PO) Take 600 mg by mouth daily. , Historical Med KRILL/OM-3/DHA/EPA/PHOSPHO/AST (MEGARED OMEGA-3 KRILL OIL PO) Take 1 Cap by mouth daily. , Historical Med  
  
aspirin delayed-release 81 mg tablet Take 81 mg by mouth nightly. Ok to continue per anesthesia guidelines. , Historical Med  
  
  
STOP taking these medications  
  
 magnesium oxide (MAG-OX) 400 mg tablet Comments:  
Reason for Stopping:   
   
 tamsulosin (FLOMAX) 0.4 mg capsule Comments:  
Reason for Stopping: OTHER,NON-FORMULARY, Comments:  
Reason for Stopping:   
   
 polyethylene glycol (MIRALAX) 17 gram packet Comments:  
Reason for Stopping: OTHER,NON-FORMULARY, Comments:  
Reason for Stopping:   
   
 ferrous gluconate 324 mg (38 mg iron) tablet Comments:  
Reason for Stopping:   
   
 hydrOXYzine pamoate (VISTARIL) 50 mg capsule Comments:  
Reason for Stopping:   
   
 loperamide (IMODIUM) 2 mg capsule Comments:  
Reason for Stopping:   
   
 raNITIdine (ZANTAC) 150 mg tablet Comments:  
Reason for Stopping: Vaccinations:   Pt screened by nursing for pneumococcal and influenza vaccination needs at discharge, will be ordered if needed and pt consented. Immunization History Administered Date(s) Administered  Influenza High Dose Vaccine PF 10/14/2014, 10/05/2017, 10/25/2018  Influenza Vaccine 09/16/2009, 09/09/2013, 10/12/2015, 09/15/2016  Influenza Vaccine (Whole Virus) 10/01/2011, 08/27/2012  Pneumococcal Conjugate (PCV-13) 03/18/2015  Pneumococcal Polysaccharide (PPSV-23) 10/01/2000  TB Skin Test (PPD) Intradermal 01/18/2019, 02/25/2019, 03/25/2019  Zoster Vaccine, Live 06/03/2013 Follow-up Information Follow up With Specialties Details Why Contact Flores James MD Family Practice  follow up with your primary care doctor in 3 to 5 days  [address] [facility] [address] 
[phone] Loraine Paiz MD Plastic Surgery  floow up as schedule [address] [place] [id number] [phone] Time spent in the discharge process was 35 minutes. Signed By: Cate Vasquez MD   
 July 16, 2019

## 2019-07-06 NOTE — PROGRESS NOTES
Patient will be d/c home today with [facility]. Patient / family requesting to be transported home by [employer] [facility]. Patient has met all treatment goals / milestones. CM will continue to monitor. Care Management Interventions PCP Verified by CM: Yes Mode of Transport at Discharge: BLS([facility] ) Transition of Care Consult (CM Consult): Discharge Planning, Home Health(Patient active with [address]) [address] No 
Reason Outside [place]: Patient already serviced by other home care/hospice agency(Patient services already in place) Discharge Durable Medical Equipment: No 
Physical Therapy Consult: Yes Occupational Therapy Consult: Yes Speech Therapy Consult: No 
Current Support Network: Relative's Home(Lives with her daughter, Laina Scruggs) Confirm Follow Up Transport: [facility] ) Plan discussed with Pt/Family/Caregiver: Yes Freedom of Choice Offered: Yes [employer] Information Provided?: No 
Discharge Location Discharge Placement: Home with home health(Patient active with [address])

## 2019-07-08 NOTE — PROGRESS NOTES
This note will not be viewable in [address] Date/Time of Call: 07/08/19 1149am   
What was the patient hospitalized for? Sepsis Consent for [facility] Call Does the patient understand his/her diagnosis and/or treatment and what happened during the hospitalization? Called and spoke with patient and her daughter Tahir Ansari, she agrees to call, and states that she is doing better. Yes Did the patient receive discharge instructions? Yes  
CM Assessed Risk for Readmission:  
 
 
Patient stated Risk for Readmission:  Patient is a moderate to high risk for readmission No concerns are voiced at this time Review any discharge instructions (see discharge instructions/AVS in [facility]). Ask patient if they understand these. Do they have any questions? reviewed DC instructions Were home services ordered (nursing, PT, OT, ST, etc.)? Yes If so, has the first visit occurred? If not, why? (Assist with coordination of services if necessary.)  Resumption of care orders to be sent to [facility]. Per [facility] has not received orders. Emailed and called and left  for TRISTAN Knowles CM requesting that orders be refaxed. Was any DME ordered? No   
If so, has it been received? If not, why?  (Assist patient in obtaining DME orders &/or equipment if necessary. ) NA Complete a review of all medications (new, continued and discontinued meds per the D/C instructions and medication tab in [facility]). Medications reviewed No medication changes Were all new prescriptions filled? If not, why?  (Assist patient in obtaining medications if necessary  escalate for CCM &/or SW if ongoing issues are verbalized by pt or anticipated) NA Does the patient understand the purpose and dosing instructions for all medications? (If patient has questions, provide explanation and education.) Patient verbalizes understanding of medications Does the patient have any problems in performing ADLs? (If patient is unable to perform ADLs  what is the limiting factor(s)? Do they have a support system that can assist? If no support system is present, discuss possible assistance that they may be able to obtain. Escalate for CCM/SW if ongoing issues are verbalized by pt or anticipated) Daughter assists with ADLs Does the patient have all follow-up appointments scheduled? 7 day f/up with PCP?  
(f/up with PCP may be w/in 14 days if patient has a f/up with their specialist w/in 7 days) 7-14 day f/up with specialist?  
(or per discharge instructions) If f/up has not been made  what actions has the care coordinator made to accomplish this? Has transportation been arranged? yes Nancy Chow states that they will call and schedule [facility] 07/12/19 at 145pm will see Dr. Arin Casarez, Plastic Surgeon there Reviewed appointment information with Nancy Chow Yes, no concerns Any other questions or concerns expressed by the patient? No questions or concerns are voiced at this time. Care Coordinator contact information provided should any needs arise Schedule next appointment with [facility] or refer to RN Case Manager/ per the workflow guidelines. When is care coordinators next follow-up call scheduled? If referred for CCM  what RN care manager was the referral assigned? Referral to CCM RN for 6 ED/ 6 Admissions NA  
 
 
email to [place] CCM Manager for assignment [facility] Call Completed By: Raine Cheek, [address] [place] [address] Care Coordinator

## 2019-07-08 NOTE — PROGRESS NOTES
Patient discharged home on Saturday, 7/6, but resumption of care orders for home health had not been forwarded to [facility]. Resumption of Care orders obtained and forwarded to [facility]. Case Management will remain available to assist as needed.

## 2019-07-08 NOTE — PROGRESS NOTES
Referral from Care Coordination/Transitions of Care Team s/p most recent admission Previously identified as high risk for readmission · Hospitalized 7/3-7/6 for Sepsis · Hospitalized  5/17-5/27 for non-healing wound of left heel · Hospitalized 3/25-3/29 for Sepsis · Hospitalized 2/24-3/4 for RAYMON · Hospitalized 2/5-2/9 for finger infection (osteomyelitis) · Hospitalized 1/16-1/22 for Acute respiratory failure w/ hypoxia PMH includes DM2 Chronic dCHF Hypothyroidism Plan - follow for complex case management - approx. 30 days · Insure provider follow up · Insure engagement with [facility] (resuming orders for [address] [place] [address] are faxed per IP Case Management). · Assess for ongoing needs.

## 2019-07-09 NOTE — CDMP QUERY
Patient admitted with volume overload. If possible, please document in progress notes and d/c summary if you are evaluating and /or treating any of the following: 
  
----  CHF acuity (chronic or acute) 
----  CHF specificity (diastolic, systolic or combined)  
----  Pulmonary edema acuity (chronic or acute) 
----  Pulmonary edema specificity ( cardiogenic or noncardiogenic) 
----  Other  
----  Unknown The medical record reflects the following: 
 
  Risk Factors: Hx of diastolic CHF, HTN, DM, 79 yo, 1 L NS bolus Clinical Indicators: CXR with cardiomegaly and pulmonary vascular congestion. O2 sats 89% on RA, SOB Treatment: IV Lasix 60 mg x 1 and resumed torsemide, Oxygen therapy 4L, lab monitoring. Thank you, Donavan Peralta RN, BSN, CDS Clinical Documentation Improvement [address] [place] [address],[address] [place] [address] 
[phone] 
[email or web address]

## 2019-07-09 NOTE — CDMP QUERY
Patient admitted with volume overload. Noted documentation of \"respiratory failure\" on discharge summary on  on 7/6. Please document in progress notes clinical indicators (such as the ones below) to support this diagnosis. - Respirations <12 or >25 - Air hunger/gasping - Use of accessory muscles of respiration/increased work of breathing - Sternal or intercostal retractions - Stridor - Inability to speak in full sentences - Cyanosis - Pulse ox <90% RA or <95% on O2  
- pH <7.35 or >7.45  
- pO2 < 60 mm Hg (or 10mm below COPD patient's baseline) - pCO2 >50mm Hg (or 10mm above COPD patient's baseline) The medical record reflects the following: 
   Risk Factors: CHF, Volume overload Clinical Indicators: Only one low sat documented (89% on RA), No documentation of \"work of breathing/inability to speak in full sentences\", No ABG's noted, RR 14-23 Treatment: 4L O2, 60 mg IV lasix x 1 Thanks, Matt Fink, RN, BSN, CDS Clinical Documentation Improvement 
[phone]

## 2019-07-09 NOTE — CDMP QUERY
Patient admitted with volume overload and has Respiratory Failure documented. Please further specify type and acuity of Respiratory Failure in the medical record. ? Acute respiratory failure 
o With hypoxia 
o With hypercapnia ? Chronic respiratory failure 
o With hypoxia 
o With hypercapnia ? Acute on chronic respiratory failure 
o With hypoxia 
o With hypercapnia 
? Other, please specify ? Clinically unable to determine The medical record reflects the following: 
   Risk Factors: Diastolic CHF, Volume overload Clinical Indicators: O2 89% on RA (low 80's per ED MD), SOB, CXR with pulmonary vascular congestion, wet rales on auscultation per MD 
   Treatment: IVF stopped, IV Lasix 60 mg x 1, O2 4L Thanks, Bob John, RN, BSN, CDS Clinical Documentation Improvement 
[phone]

## 2019-07-12 NOTE — WOUND CARE
[address] [place] [address] [address] Phone: [phone] Fax: [phone] Patient: Emma Blue MRN: [id number]  SSN: [id number] YOB: 1933  Age: 80 y.o. Sex: female Return Appointment: 2 weeks with Deanne Concepcion MD 
 
Instructions: Left Heel Wound: 
-Cleanse wound with normal saline or wound cleanser. DO NOT GET WET IN SHOWER AND NO SOAKING WOUND! -Apply Endoform to wound base and cover with Hydrofera Ready and ABD pad; wrap with rolled gauze; apply tubigrip (size F). -[place] to change dressing 3 times a week  
-Elevate feet while sitting or laying and wear heel lift boots while in bed. Should you experience increased redness, swelling, pain, foul odor, size of wound(s), or have a temperature over 101 degrees please contact the [address] [place] [address] Road at [phone] or if after hours contact your primary care physician or go to the hospital emergency department. Signed By: Tessa Shoemaker PT, [facility] July 12, 2019

## 2019-07-12 NOTE — PROGRESS NOTES
07/12/19 1414 Wound Heel Left Date First Assessed/Time First Assessed: 07/12/19 1353   Present on Hospital Admission: Yes  Primary Wound Type: Diabetic Ulcer  Location: Heel  Wound Location Orientation: Left Dressing Status Clean, dry, and intact Dressing Type  
(Endoform, Hydrofera blue, ABD, rolled gauze) Non-staged Wound Description Full thickness Wound Length (cm) 3 cm Wound Width (cm) 4 cm Wound Depth (cm) 0.1 cm Wound Volume (cm^3) 1.2 cm^3 Condition of Base Granulation Tissue Type Percent Red 100 Drainage Amount Small Drainage Color Serosanguinous Wound Odor None Cleansing and Cleansing Agents  Normal saline Patient is currently taking Aspirin 81 mg

## 2019-07-15 NOTE — PROGRESS NOTES
Transitions of Care outreach No answer Email sent to [facility] Case Management team -perhaps they will have better luck. PCP is with a [place] provider Plan - follow up with patient pending reply from [facility]. Reminder set for 3 days out

## 2019-07-16 PROBLEM — J81.0 ACUTE PULMONARY EDEMA (HCC): Status: ACTIVE | Noted: 2019-01-01

## 2019-07-16 NOTE — PROGRESS NOTES
Outreach to patient - no answer at either patient number or daughter. Response from [facility] Case Management Team - Balta Metcalf RN: 
 
\"We received a referral from the PCP and reached out to this member but she and her daughter declined our services. The patient and daughter declined both our RN and our SW.\" 
 
No further outreaches planned at this time.

## 2019-07-16 NOTE — PROGRESS NOTES
Wound Center Progress Note Patient: Donald Zayas MRN: [id number]  SSN: [id number] YOB: 1933  Age: 80 y.o. Sex: female Subjective: Chief Complaint: 
L heel DFU History of Present Illness:    
 
Wound Caused By: DM2, neuropathy Associated Signs and Symptoms: drainage Timing: constant Quality: wound Severity: full thickness Modifying Factors: poorly controlled dm2. Admitted for sepsis last week. Likely urinary tract infection. Has chronic e coli colonization. Still on steroid. Tolerating dressings. No new issues. Daughter (inpatient nurse) performing dressings. Past Medical History:  
Diagnosis Date  Arthritis  Chronic diastolic congestive heart failure ([facility] 1/16/2019  Chronic pain   
 bilat feet & hands  Edema BLE & bilat fingers; pt takes diuretic daily; pt states her swelling is related to the nerve disease she is being treated for at [facility]  GERD (gastroesophageal reflux disease) 1/16/2019  Gouty arthritis  History of MI (myocardial infarction) 1980's \"light heart attack\"; pt states MD at [facility] mentioned it to her; pt stated MD told her \"was on the back part of her heart & would not give her any problems\"; pt takes aspirin 81 mg daily  Hypertension   
 managed w/med  Hypothyroidism   
 s/p partial thyroidectomy 1960; managed w/med  Mild heartburn   
 takes tums prn  Neuropathy 1980's  
 pt states has been treated by [facility] for \"nerve problems in her feet & hands\"  Post-operative nausea and vomiting  Type 2 diabetes mellitus without complication, without long-term current use of insulin ([facility] [address] 7/27/2018 Past Surgical History:  
Procedure Laterality Date  HX CYST REMOVAL Right 1950's  
 upper eyelid  HX DILATION AND CURETTAGE  [id number]  
 ectopic pregnancy; right BSO done  HX HEART CATHETERIZATION  late 1979  
 pt states no stents [id number]  [facility] [address] [facility]  
 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  
 nerve removed out of left leg [facility]  HX UROLOGICAL    
 cystoscopy Family History Problem Relation Age of Onset  Cancer Mother  Heart Disease Father  Stroke Father Social History Tobacco Use  Smoking status: Never Smoker  Smokeless tobacco: Never Used Substance Use Topics  Alcohol use: No  
   
Prior to Admission medications Medication Sig Start Date End Date Taking? Authorizing Provider  
colchicine 0.6 mg tablet Take 0.6 mg by mouth two (2) times a day. Indications: for 3 days, end 5/20    Provider, Historical  
oxybutynin chloride XL (DITROPAN XL) 5 mg CR tablet Take 5 mg by mouth daily. Provider, Historical  
torsemide (DEMADEX) 10 mg tablet Take 10 mg by mouth two (2) times a day. Provider, Historical  
methadone (DOLOPHINE) 5 mg tablet Take 1 Tab by mouth every six (6) hours. Max Daily Amount: 20 mg. Pt has been stable on this dose for many years, please do not taper or stop. Instructed to take DOS per Anesthesia guidelines. Indications: chronic pain, narcotic addiction 3/29/19   Kelly Solis NP  
DULoxetine (CYMBALTA) 60 mg capsule Take 1 Cap by mouth daily. 3/30/19   Kelly Solis NP  
clonazePAM (KLONOPIN) 0.5 mg tablet Take 1 Tab by mouth three (3) times daily. Max Daily Amount: 1.5 mg. Indications: anxiety 3/29/19   Kelly Solis NP  
carvedilol (COREG) 6.25 mg tablet Take 1 Tab by mouth two (2) times daily (with meals). 3/29/19   Kelly Solis NP  
amLODIPine (NORVASC) 10 mg tablet Take 1 Tab by mouth daily. Patient taking differently: Take 5 mg by mouth daily.  3/29/19   Herminio Barbosa NP  
 predniSONE (DELTASONE) 5 mg tablet Take 1 Tab by mouth two (2) times a day. 5mg BID Patient taking differently: Take 10 mg by mouth two (2) times a day. 5mg BID 3/29/19   Kelly Solis NP  
docusate sodium (COLACE) 100 mg capsule Take 1 Cap by mouth nightly. 3/4/19   Crenshaw, Dannielle Babinski, DO Lactobacillus acidophilus (ACIDOPHILUS) cap Take 1 Cap by mouth daily. Provider, Historical  
insulin lispro (HUMALOG) 100 unit/mL kwikpen 5 Units by SubCUTAneous route Before breakfast, lunch, and dinner. Indications: 150-200= 2 units; 201-250= 4 units; 251-300= 6 units; >300 8 units    Provider, Historical  
insulin detemir U-100 (LEVEMIR FLEXTOUCH) 100 unit/mL (3 mL) inpn 20 Units by SubCUTAneous route every morning. Indications: type 2 diabetes mellitus    Provider, Historical  
mirtazapine (REMERON) 7.5 mg tablet Take 7.5 mg by mouth nightly. Provider, Historical  
nystatin (MYCOSTATIN) powder Apply  to affected area three (3) times daily. Under breasts and ABD folds    Provider, Historical  
potassium chloride (KLOR-CON) 10 mEq tablet Take 10 mEq by mouth two (2) times a day. Provider, Historical  
acetaminophen (TYLENOL EXTRA STRENGTH) 500 mg tablet Take 500 mg by mouth every six (6) hours as needed for Pain. Provider, Historical  
levothyroxine (SYNTHROID) 75 mcg tablet Take 75 mcg by mouth Daily (before breakfast). Provider, Historical  
multivitamin with minerals (HAIR,SKIN AND NAILS PO) Take 1 Tab by mouth daily. Provider, Historical  
CHOLECALCIFEROL, VITAMIN D3, (VITAMIN D3 PO) Take 1,000 mg by mouth daily. Provider, Historical  
CALCIUM CARBONATE/VITAMIN D3 (CALCIUM 600 + D,3, PO) Take 600 mg by mouth daily. Provider, Historical  
KRILL/OM-3/DHA/EPA/PHOSPHO/AST (MEGARED OMEGA-3 KRILL OIL PO) Take 1 Cap by mouth daily. Provider, Historical  
aspirin delayed-release 81 mg tablet Take 81 mg by mouth nightly. Ok to continue per anesthesia guidelines.     Provider, Historical  
 
 Allergies Allergen Reactions  Contrast Agent [Iodine] Anaphylaxis  Actifed [Triprolidine-Pseudoephedrine] Nausea Only  Allopurinol Other (comments) Elevated blood pressure & pt states could not walk or see until medication was out of her system.  Decongestant [Pseudoephedrine Hcl] Rash and Itching  Lexapro [Escitalopram Oxalate] Other (comments) Lips/Mouth swelling  Lyrica [Pregabalin] Unknown (comments)  Minizide [Prazosin-Polythiazide] Unknown (comments)  Mobic [Meloxicam] Other (comments) Facial swelling  Omnipaque [Iohexol] Hives, Swelling and Other (comments) Wheezing and/or shortness of breath  Sertraline Other (comments) Mouth, lips swell  Spironolactone Itching Pt complains that she breaks out in clammy sweat with itching and stinging in her upper arms, dry mouth, funny feeling tongue, SOB, and increased anxiety.  Sulfamethazine Other (comments) Mouth/lips swelling Review of Systems: 
CONSTITUTIONAL: No fever, chills HEAD: No headache EYES: No visual loss ENT: No hearing loss SKIN: No rash CARDIOVASCULAR: No chest pain RESPIRATORY: No shortness of breath GASTROINTESTINAL: No nausea, vomiting GENITOURINARY: No excessive urination NEUROLOGICAL: No weakness MUSCULOSKELETAL: + joint pain HEMATOLOGIC: No easy bleeding LYMPHATICS: No lymphedema. PSYCHIATRIC: No current depression ENDOCRINOLOGIC: + high sugars ALLERGIES: No history of asthma, hives, eczema or rhinitis. Lab Results Component Value Date/Time Hemoglobin A1c 10.2 (H) 01/17/2019 10:03 AM  
  
 
Immunization History Administered Date(s) Administered  Influenza High Dose Vaccine PF 10/14/2014, 10/05/2017, 10/25/2018  Influenza Vaccine 09/16/2009, 09/09/2013, 10/12/2015, 09/15/2016  Influenza Vaccine (Whole Virus) 10/01/2011, 08/27/2012  Pneumococcal Conjugate (PCV-13) 03/18/2015  Pneumococcal Polysaccharide (PPSV-23) 10/01/2000  TB Skin Test (PPD) Intradermal 01/18/2019, 02/25/2019, 03/25/2019  Zoster Vaccine, Live 06/03/2013 Body mass index is 27.36 kg/m². Current medications: 
Current Outpatient Medications Medication Sig Dispense Refill  colchicine 0.6 mg tablet Take 0.6 mg by mouth two (2) times a day. Indications: for 3 days, end 5/20    
 oxybutynin chloride XL (DITROPAN XL) 5 mg CR tablet Take 5 mg by mouth daily.  torsemide (DEMADEX) 10 mg tablet Take 10 mg by mouth two (2) times a day.  methadone (DOLOPHINE) 5 mg tablet Take 1 Tab by mouth every six (6) hours. Max Daily Amount: 20 mg. Pt has been stable on this dose for many years, please do not taper or stop. Instructed to take DOS per Anesthesia guidelines. Indications: chronic pain, narcotic addiction 20 Tab 0  
 DULoxetine (CYMBALTA) 60 mg capsule Take 1 Cap by mouth daily. 10 Cap 0  clonazePAM (KLONOPIN) 0.5 mg tablet Take 1 Tab by mouth three (3) times daily. Max Daily Amount: 1.5 mg. Indications: anxiety 10 Tab 0  carvedilol (COREG) 6.25 mg tablet Take 1 Tab by mouth two (2) times daily (with meals). 10 Tab 0  
 amLODIPine (NORVASC) 10 mg tablet Take 1 Tab by mouth daily. (Patient taking differently: Take 5 mg by mouth daily. ) 10 Tab 0  predniSONE (DELTASONE) 5 mg tablet Take 1 Tab by mouth two (2) times a day. 5mg BID (Patient taking differently: Take 10 mg by mouth two (2) times a day. 5mg BID) 1 Tab 0  
 docusate sodium (COLACE) 100 mg capsule Take 1 Cap by mouth nightly. 1 Cap 0  
 Lactobacillus acidophilus (ACIDOPHILUS) cap Take 1 Cap by mouth daily.  insulin lispro (HUMALOG) 100 unit/mL kwikpen 5 Units by SubCUTAneous route Before breakfast, lunch, and dinner. Indications: 150-200= 2 units; 201-250= 4 units; 251-300= 6 units; >300 8 units  insulin detemir U-100 (LEVEMIR FLEXTOUCH) 100 unit/mL (3 mL) inpn 20 Units by SubCUTAneous route every morning. Indications: type 2 diabetes mellitus  mirtazapine (REMERON) 7.5 mg tablet Take 7.5 mg by mouth nightly.  nystatin (MYCOSTATIN) powder Apply  to affected area three (3) times daily. Under breasts and ABD folds  potassium chloride (KLOR-CON) 10 mEq tablet Take 10 mEq by mouth two (2) times a day.  acetaminophen (TYLENOL EXTRA STRENGTH) 500 mg tablet Take 500 mg by mouth every six (6) hours as needed for Pain.  levothyroxine (SYNTHROID) 75 mcg tablet Take 75 mcg by mouth Daily (before breakfast).  multivitamin with minerals (HAIR,SKIN AND NAILS PO) Take 1 Tab by mouth daily.  CHOLECALCIFEROL, VITAMIN D3, (VITAMIN D3 PO) Take 1,000 mg by mouth daily.  CALCIUM CARBONATE/VITAMIN D3 (CALCIUM 600 + D,3, PO) Take 600 mg by mouth daily.  KRILL/OM-3/DHA/EPA/PHOSPHO/AST (MEGARED OMEGA-3 KRILL OIL PO) Take 1 Cap by mouth daily.  aspirin delayed-release 81 mg tablet Take 81 mg by mouth nightly. Ok to continue per anesthesia guidelines. Objective:  
 
Physical Exam:  
 
Visit Vitals /80 (BP 1 Location: Left arm, BP Patient Position: Sitting) Pulse 93 Temp 98.4 °F (36.9 °C) Resp 18 Ht 5' 4\" (1.626 m) Wt 72.3 kg (159 lb 6.4 oz) SpO2 92% BMI 27.36 kg/m² General: well developed, well nourished Psych: cooperative. Pleasant Neuro: alert and oriented to person/place/situation. Otherwise nonfocal. 
Derm: Normal turgor for age, dry skin HEENT: Normocephalic, atraumatic. EOMI. Neck: Normal range of motion. Chest: Respirations nonlabored Cardio: RRR Abdomen: Soft, nondistended Lower extremities: No hemosiderrosis No significant varicosities Capillary refill <3 sec Right 2+ DP/PT Left 2+ DP/PT Ulcer Description: Wound Heel Left (Active) Dressing Status Clean, dry, and intact 7/12/2019  2:14 PM  
Non-staged Wound Description Full thickness 7/12/2019  2:14 PM  
Wound Length (cm) 3 cm 7/12/2019  2:14 PM  
Wound Width (cm) 4 cm 7/12/2019  2:14 PM  
 Wound Depth (cm) 0.1 cm 7/12/2019  2:14 PM  
Wound Volume (cm^3) 1.2 cm^3 7/12/2019  2:14 PM  
Condition of Base Granulation 7/12/2019  2:14 PM  
Tissue Type Percent Red 100 7/12/2019  2:14 PM  
Drainage Amount Small 7/12/2019  2:14 PM  
Drainage Color Serosanguinous 7/12/2019  2:14 PM  
Wound Odor None 7/12/2019  2:14 PM  
Cleansing and Cleansing Agents  Normal saline 7/12/2019  2:14 PM  
Procedure Tolerated Well 7/12/2019  2:14 PM  
Number of days: 4 [REMOVED] Wound Heel Left (Removed) Number of days: 151 [REMOVED] Wound Finger (specify in comments) Anterior; Left (Removed) Number of days: 150 [REMOVED] Wound Buttocks Posterior;Right (Removed) Number of days: 49  
   
[REMOVED] Wound Heel Left (Removed) Number of days: 49  
   
[REMOVED] Wound Groin Right (Removed) Number of days: 45  
   
[REMOVED] Wound Heel Left (Removed) Dressing Status Old drainage 6/28/2019  2:05 PM  
Non-staged Wound Description Full thickness 6/28/2019  2:05 PM  
Wound Length (cm) 4 cm 6/28/2019  2:05 PM  
Wound Width (cm) 4 cm 6/28/2019  2:05 PM  
Wound Depth (cm) 0.1 cm 6/28/2019  2:05 PM  
Wound Volume (cm^3) 1.6 cm^3 6/28/2019  2:05 PM  
Condition of Base Granulation;Slough 6/28/2019  2:05 PM  
Assessment Black; Red 6/14/2019  2:37 PM  
Tissue Type Percent Black 5 % 6/14/2019  2:37 PM  
Tissue Type Percent Red 90 6/28/2019  2:05 PM  
Tissue Type Percent Yellow 10 6/28/2019  2:05 PM  
Drainage Amount Moderate 6/28/2019  2:05 PM  
Drainage Color Serosanguinous; Yellow 6/28/2019  2:05 PM  
Wound Odor None 6/28/2019  2:05 PM  
[place]-wound Assessment Intact 6/28/2019  2:05 PM  
Cleansing and Cleansing Agents  Normal saline 6/28/2019  2:05 PM  
Dressing Changed Changed/New 6/14/2019  2:37 PM  
Number of days: 22  
   
[REMOVED] Wound Toe (Comment  which one) Left 4th Toe (Removed) Dressing Status Dry 6/14/2019  2:37 PM  
Wound Length (cm) 1.2 cm 6/14/2019  2:37 PM  
Wound Width (cm) 1 cm 6/14/2019  2:37 PM  
 Wound Depth (cm) 0.1 cm 6/14/2019  2:37 PM  
Wound Volume (cm^3) 0.12 cm^3 6/14/2019  2:37 PM  
Condition of Base Eschar 6/14/2019  2:37 PM  
Tissue Type Percent Black 100 % 6/14/2019  2:37 PM  
Drainage Amount None 6/14/2019  2:37 PM  
Wound Odor None 6/14/2019  2:37 PM  
Cleansing and Cleansing Agents  Normal saline 6/14/2019  2:37 PM  
Dressing Changed Changed/New 6/14/2019  2:37 PM  
Number of days: 0 [REMOVED] Wound Buttocks Left partial thickness skin loss, friction MASD (Removed) Number of days: 2 Problem List  Date Reviewed: 5/25/2016 Codes Class Noted Sepsis secondary to UTI [facility]) ICD-10-CM: A41.9, N39.0 ICD-9-CM: 038.9, 995.91, 599.0  7/4/2019 Respiratory failure ([facility] ICD-10-CM: J96.90 ICD-9-CM: 518.81  7/3/2019 Non healing left heel wound ICD-10-CM: K10.882H ICD-9-CM: 892.1  5/17/2019 Sepsis ([facility] ICD-10-CM: A41.9 ICD-9-CM: 038.9, 995.91  3/25/2019 Acute metabolic encephalopathy NLH-02-KL: G93.41 
ICD-9-CM: 348.31  2/26/2019 Acute diarrhea ICD-10-CM: R19.7 ICD-9-CM: 787.91  2/26/2019 Oral thrush ICD-10-CM: B37.0 ICD-9-CM: 112.0  2/26/2019 Acute encephalopathy ICD-10-CM: G93.40 ICD-9-CM: 348.30  2/24/2019 Finger infection ICD-10-CM: L08.9 ICD-9-CM: 686.9  2/5/2019 Leukocytosis ICD-10-CM: C49.982 ICD-9-CM: 288.60  2/5/2019 RAYMON (acute kidney injury) ([facility] ICD-10-CM: N17.9 ICD-9-CM: 584.9  2/5/2019 Osteomyelitis of finger (HCC) ICD-10-CM: M86.9 ICD-9-CM: 730.24  2/5/2019 Closed rib fracture ICD-10-CM: S22.39XA ICD-9-CM: 807.00  1/16/2019 Acute respiratory failure with hypoxia [facility]) ICD-10-CM: J96.01 
ICD-9-CM: 518.81  1/16/2019 Acute prerenal azotemia ICD-10-CM: R79.89 ICD-9-CM: 790.6  1/16/2019 Chronic diastolic congestive heart failure (HCC) ICD-10-CM: I50.32 
ICD-9-CM: 428.32, 428.0  1/16/2019 Acquired hypothyroidism ICD-10-CM: E03.9 ICD-9-CM: 244.9  1/16/2019 GERD (gastroesophageal reflux disease) ICD-10-CM: K21.9 ICD-9-CM: 530.81  1/16/2019 Mixed hyperlipidemia ICD-10-CM: E78.2 ICD-9-CM: 272.2  7/27/2018 DM2 (diabetes mellitus, type 2) (HCC) ICD-10-CM: E11.9 ICD-9-CM: 250.00  7/27/2018 Aortic valve sclerosis ICD-10-CM: I35.8 ICD-9-CM: 424.1  1/26/2018 Rapid heart rate ICD-10-CM: R00.0 ICD-9-CM: 785.0  1/26/2018 Essential hypertension with goal blood pressure less than 130/85 ICD-10-CM: I10 
ICD-9-CM: 401.9  10/27/2017 Murmur ICD-10-CM: R01.1 ICD-9-CM: 785.2  10/4/2017 Bilateral leg edema ICD-10-CM: R60.0 ICD-9-CM: 782.3  10/4/2017 Swelling ICD-10-CM: R60.9 ICD-9-CM: 782.3  10/4/2017 Abnormal EKG ICD-10-CM: R94.31 
ICD-9-CM: 794.31  10/4/2017 Urinary tract infection, site not specified ICD-10-CM: N39.0 ICD-9-CM: 599.0  4/22/2015 Assessment/Plan: No slough in wound. Will change to endoform. No further signs of sepsis. Will add home health to assist daughter with dressing changes.   
 
Signed By: Lloyd Mchugh MD

## 2019-07-26 NOTE — PROGRESS NOTES
Wound Center Progress Note    Patient: Jess Ferrari MRN: [id number]  SSN: [id number]    YOB: 1933  Age: 80 y.o. Sex: female      Subjective:     Chief Complaint:  L heel DFU    History of Present Illness:       Wound Caused By: DM2, neuropathy  Associated Signs and Symptoms: drainage  Timing: constant  Quality: wound  Severity: full thickness  Modifying Factors: poorly controlled dm2. Admitted for sepsis last week. Likely urinary tract infection. Has chronic e coli colonization. Still on steroid. Tolerating dressings. No new issues. Daughter (inpatient nurse) performing dressings. No deterioration with decreased dressing frequency. No recent hospitalization or other issues. Minimal pain.      Past Medical History:   Diagnosis Date    Arthritis     Chronic diastolic congestive heart failure (HCC) 1/16/2019    Chronic pain     bilat feet & hands    Edema     BLE & bilat fingers; pt takes diuretic daily; pt states her swelling is related to the nerve disease she is being treated for at [facility] GERD (gastroesophageal reflux disease) 1/16/2019    Gouty arthritis     History of MI (myocardial infarction) 1980's    \"light heart attack\"; pt states MD at [facility] mentioned it to her; pt stated MD told her \"was on the back part of her heart & would not give her any problems\"; pt takes aspirin 81 mg daily    Hypertension     managed w/med    Hypothyroidism     s/p partial thyroidectomy 1960; managed w/med    Mild heartburn     takes tums prn    Neuropathy 1980's    pt states has been treated by [facility] for \"nerve problems in her feet & hands\"    Post-operative nausea and vomiting     Type 2 diabetes mellitus without complication, without long-term current use of insulin ([facility] [address] 7/27/2018      Past Surgical History:   Procedure Laterality Date    HX CYST REMOVAL Right 1950's    upper eyelid    HX DILATION AND CURETTAGE  [id number]    HX GYN  1963    ectopic pregnancy; right BSO done  HX HEART CATHETERIZATION  late 1979    pt states no stents    HX HYSTERECTOMY  1972    HX [address]    HX OPEN CHOLECYSTECTOMY  1970    pt states was exploratory abdominal surgery too    HX ORTHOPAEDIC Bilateral [id number]    Foot; bone taken out of 5th toe on right foot; 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 Authorizing Provider   colchicine 0.6 mg tablet Take 0.6 mg by mouth two (2) times a day. Indications: for 3 days, end 5/20    Provider, Historical   oxybutynin chloride XL (DITROPAN XL) 5 mg CR tablet Take 5 mg by mouth daily. Provider, Historical   torsemide (DEMADEX) 10 mg tablet Take 10 mg by mouth two (2) times a day. Provider, Historical   methadone (DOLOPHINE) 5 mg tablet Take 1 Tab by mouth every six (6) hours. Max Daily Amount: 20 mg. Pt has been stable on this dose for many years, please do not taper or stop. Instructed to take DOS per Anesthesia guidelines. Indications: chronic pain, narcotic addiction 3/29/19   Kelly Solis NP   DULoxetine (CYMBALTA) 60 mg capsule Take 1 Cap by mouth daily. 3/30/19   Kelly Solis NP   clonazePAM (KLONOPIN) 0.5 mg tablet Take 1 Tab by mouth three (3) times daily. Max Daily Amount: 1.5 mg. Indications: anxiety 3/29/19   Kelly Solis NP   carvedilol (COREG) 6.25 mg tablet Take 1 Tab by mouth two (2) times daily (with meals). 3/29/19   Kelly Solis NP   amLODIPine (NORVASC) 10 mg tablet Take 1 Tab by mouth daily.   Patient taking differently: Take 5 mg by mouth daily. 3/29/19   Kelly Solis, NP   predniSONE (DELTASONE) 5 mg tablet Take 1 Tab by mouth two (2) times a day. 5mg BID  Patient taking differently: Take 10 mg by mouth two (2) times a day. 5mg BID 3/29/19   Kelly Solis, NP   docusate sodium (COLACE) 100 mg capsule Take 1 Cap by mouth nightly. 3/4/19   Wil Cook DO   Lactobacillus acidophilus (ACIDOPHILUS) cap Take 1 Cap by mouth daily. Provider, Historical   insulin lispro (HUMALOG) 100 unit/mL kwikpen 5 Units by SubCUTAneous route Before breakfast, lunch, and dinner. Indications: 150-200= 2 units; 201-250= 4 units; 251-300= 6 units; >300 8 units    Provider, Historical   insulin detemir U-100 (LEVEMIR FLEXTOUCH) 100 unit/mL (3 mL) inpn 20 Units by SubCUTAneous route every morning. Indications: type 2 diabetes mellitus    Provider, Historical   mirtazapine (REMERON) 7.5 mg tablet Take 7.5 mg by mouth nightly. Provider, Historical   nystatin (MYCOSTATIN) powder Apply  to affected area three (3) times daily. Under breasts and ABD folds    Provider, Historical   potassium chloride (KLOR-CON) 10 mEq tablet Take 10 mEq by mouth two (2) times a day. Provider, Historical   acetaminophen (TYLENOL EXTRA STRENGTH) 500 mg tablet Take 500 mg by mouth every six (6) hours as needed for Pain. Provider, Historical   levothyroxine (SYNTHROID) 75 mcg tablet Take 75 mcg by mouth Daily (before breakfast). Provider, Historical   multivitamin with minerals (HAIR,SKIN AND NAILS PO) Take 1 Tab by mouth daily. Provider, Historical   CHOLECALCIFEROL, VITAMIN D3, (VITAMIN D3 PO) Take 1,000 mg by mouth daily. Provider, Historical   CALCIUM CARBONATE/VITAMIN D3 (CALCIUM 600 + D,3, PO) Take 600 mg by mouth daily. Provider, Historical   KRILL/OM-3/DHA/EPA/PHOSPHO/AST (MEGARED OMEGA-3 KRILL OIL PO) Take 1 Cap by mouth daily. Provider, Historical   aspirin delayed-release 81 mg tablet Take 81 mg by mouth nightly.  Ok to continue per anesthesia guidelines. Provider, Historical     Allergies   Allergen Reactions    Contrast Agent [Iodine] Anaphylaxis    Actifed [Triprolidine-Pseudoephedrine] Nausea Only    Allopurinol Other (comments)     Elevated blood pressure & pt states could not walk or see until medication was out of her system.  Decongestant [Pseudoephedrine Hcl] Rash and Itching    Lexapro [Escitalopram Oxalate] Other (comments)     Lips/Mouth swelling    Lyrica [Pregabalin] Unknown (comments)    Minizide [Prazosin-Polythiazide] Unknown (comments)    Mobic [Meloxicam] Other (comments)     Facial swelling    Omnipaque [Iohexol] Hives, Swelling and Other (comments)     Wheezing and/or shortness of breath    Sertraline Other (comments)     Mouth, lips swell    Spironolactone Itching     Pt complains that she breaks out in clammy sweat with itching and stinging in her upper arms, dry mouth, funny feeling tongue, SOB, and increased anxiety.  Sulfamethazine Other (comments)     Mouth/lips swelling         Review of Systems:  CONSTITUTIONAL: No fever, chills  HEAD: No headache  EYES: No visual loss  ENT: No hearing loss  SKIN: No rash  CARDIOVASCULAR: No chest pain  RESPIRATORY: No shortness of breath  GASTROINTESTINAL: No nausea, vomiting  GENITOURINARY: No excessive urination  NEUROLOGICAL: No weakness  MUSCULOSKELETAL: + joint pain  HEMATOLOGIC: No easy bleeding  LYMPHATICS: No lymphedema. PSYCHIATRIC: No current depression  ENDOCRINOLOGIC: + high sugars  ALLERGIES: No history of asthma, hives, eczema or rhinitis.     Lab Results   Component Value Date/Time    Hemoglobin A1c 10.2 (H) 01/17/2019 10:03 AM        Immunization History   Administered Date(s) Administered    Influenza High Dose Vaccine PF 10/14/2014, 10/05/2017, 10/25/2018    Influenza Vaccine 09/16/2009, 09/09/2013, 10/12/2015, 09/15/2016    Influenza Vaccine (Whole Virus) 10/01/2011, 08/27/2012    Pneumococcal Conjugate (PCV-13) 03/18/2015    Pneumococcal Polysaccharide (PPSV-23) 10/01/2000    TB Skin Test (PPD) Intradermal 01/18/2019, 02/25/2019, 03/25/2019    Zoster Vaccine, Live 06/03/2013       Body mass index is 27.94 kg/m². Current medications:  Current Outpatient Medications   Medication Sig Dispense Refill    colchicine 0.6 mg tablet Take 0.6 mg by mouth two (2) times a day. Indications: for 3 days, end 5/20      oxybutynin chloride XL (DITROPAN XL) 5 mg CR tablet Take 5 mg by mouth daily.  torsemide (DEMADEX) 10 mg tablet Take 10 mg by mouth two (2) times a day.  methadone (DOLOPHINE) 5 mg tablet Take 1 Tab by mouth every six (6) hours. Max Daily Amount: 20 mg. Pt has been stable on this dose for many years, please do not taper or stop. Instructed to take DOS per Anesthesia guidelines. Indications: chronic pain, narcotic addiction 20 Tab 0    DULoxetine (CYMBALTA) 60 mg capsule Take 1 Cap by mouth daily. 10 Cap 0    clonazePAM (KLONOPIN) 0.5 mg tablet Take 1 Tab by mouth three (3) times daily. Max Daily Amount: 1.5 mg. Indications: anxiety 10 Tab 0    carvedilol (COREG) 6.25 mg tablet Take 1 Tab by mouth two (2) times daily (with meals). 10 Tab 0    amLODIPine (NORVASC) 10 mg tablet Take 1 Tab by mouth daily. (Patient taking differently: Take 5 mg by mouth daily. ) 10 Tab 0    predniSONE (DELTASONE) 5 mg tablet Take 1 Tab by mouth two (2) times a day. 5mg BID (Patient taking differently: Take 10 mg by mouth two (2) times a day. 5mg BID) 1 Tab 0    docusate sodium (COLACE) 100 mg capsule Take 1 Cap by mouth nightly. 1 Cap 0    Lactobacillus acidophilus (ACIDOPHILUS) cap Take 1 Cap by mouth daily.  insulin lispro (HUMALOG) 100 unit/mL kwikpen 5 Units by SubCUTAneous route Before breakfast, lunch, and dinner. Indications: 150-200= 2 units; 201-250= 4 units; 251-300= 6 units; >300 8 units      insulin detemir U-100 (LEVEMIR FLEXTOUCH) 100 unit/mL (3 mL) inpn 20 Units by SubCUTAneous route every morning. Indications: type 2 diabetes mellitus      mirtazapine (REMERON) 7.5 mg tablet Take 7.5 mg by mouth nightly.  nystatin (MYCOSTATIN) powder Apply  to affected area three (3) times daily. Under breasts and ABD folds      potassium chloride (KLOR-CON) 10 mEq tablet Take 10 mEq by mouth two (2) times a day.  acetaminophen (TYLENOL EXTRA STRENGTH) 500 mg tablet Take 500 mg by mouth every six (6) hours as needed for Pain.  levothyroxine (SYNTHROID) 75 mcg tablet Take 75 mcg by mouth Daily (before breakfast).  multivitamin with minerals (HAIR,SKIN AND NAILS PO) Take 1 Tab by mouth daily.  CHOLECALCIFEROL, VITAMIN D3, (VITAMIN D3 PO) Take 1,000 mg by mouth daily.  CALCIUM CARBONATE/VITAMIN D3 (CALCIUM 600 + D,3, PO) Take 600 mg by mouth daily.  KRILL/OM-3/DHA/EPA/PHOSPHO/AST (MEGARED OMEGA-3 KRILL OIL PO) Take 1 Cap by mouth daily.  aspirin delayed-release 81 mg tablet Take 81 mg by mouth nightly. Ok to continue per anesthesia guidelines. Objective:     Physical Exam:     Visit Vitals  /74 (BP 1 Location: Left leg, BP Patient Position: Sitting)   Pulse 91   Temp 98.8 °F (37.1 °C)   Resp 16   Ht 5' 4\" (1.626 m)   Wt 73.8 kg (162 lb 12.8 oz)   SpO2 95%   BMI 27.94 kg/m²       General: well developed, well nourished  Psych: cooperative. Pleasant  Neuro: alert and oriented to person/place/situation. Otherwise nonfocal.  Derm: Normal turgor for age, dry skin  HEENT: Normocephalic, atraumatic. EOMI. Neck: Normal range of motion.   Chest: Respirations nonlabored  Cardio: RRR  Abdomen: Soft, nondistended  Lower extremities:   No hemosiderrosis  No significant varicosities  Capillary refill <3 sec    Right  1+ DP/PT    Left  1+ DP/PT                Ulcer Description:   Wound Heel Left (Active)   Dressing Status Clean, dry, and intact 7/26/2019  8:21 AM   Non-staged Wound Description Full thickness 7/26/2019  8:21 AM   Wound Length (cm) 3 cm 7/26/2019  8:21 AM   Wound Width (cm) 3.5 cm 7/26/2019  8:21 AM   Wound Depth (cm) 0.1 cm 7/26/2019  8:21 AM   Wound Volume (cm^3) 1.05 cm^3 7/26/2019  8:21 AM   Condition of Base Granulation 7/26/2019  8:21 AM   Tissue Type Percent Red 100 7/26/2019  8:21 AM   Drainage Amount Moderate 7/26/2019  8:21 AM   Drainage Color Serosanguinous 7/26/2019  8:21 AM   Wound Odor None 7/26/2019  8:21 AM   Cleansing and Cleansing Agents  Soap and water 7/26/2019  8:21 AM   Dressing Changed Changed/New 7/26/2019  8:21 AM   Procedure Tolerated Well 7/26/2019  8:21 AM   Number of days: 14       [REMOVED] Wound Neck (Removed)   Number of days: 378       [REMOVED] Wound Leg Lower (Removed)   Number of days: 35       [REMOVED] Wound Leg Lower (Removed)   Number of days: 35       [REMOVED] Wound Leg Lower Upper (Removed)   Number of days: 21       [REMOVED] Wound Leg Lower Lower (Removed)   Number of days: 21       [REMOVED] Wound Leg Lower Mid (Removed)   Number of days: 21       [REMOVED] Wound (Removed)   Number of days: 66       [REMOVED] Wound (Removed)   Number of days: 53       [REMOVED] Wound Leg Upper Left (Removed)   Number of days: 4       [REMOVED] Wound Heel Left (Removed)   Number of days: 21       [REMOVED] Wound Heel Left (Removed)   Number of days: 151       [REMOVED] Wound Finger (specify in comments) Anterior; Left (Removed)   Number of days: 150       [REMOVED] Wound Buttocks Posterior;Right (Removed)   Number of days: 49       [REMOVED] Wound Heel Left (Removed)   Number of days: 52       [REMOVED] Wound Groin Right (Removed)   Number of days: 45       [REMOVED] Wound Heel Left (Removed)   Dressing Status Old drainage 6/28/2019  2:05 PM   Non-staged Wound Description Full thickness 6/28/2019  2:05 PM   Wound Length (cm) 4 cm 6/28/2019  2:05 PM   Wound Width (cm) 4 cm 6/28/2019  2:05 PM   Wound Depth (cm) 0.1 cm 6/28/2019  2:05 PM   Wound Volume (cm^3) 1.6 cm^3 6/28/2019  2:05 PM   Condition of Base Granulation;Slough 6/28/2019  2:05 PM   Assessment Black; Red 6/14/2019  2:37 PM   Tissue Type Percent Black 5 % 6/14/2019  2:37 PM   Tissue Type Percent Red 90 6/28/2019  2:05 PM   Tissue Type Percent Yellow 10 6/28/2019  2:05 PM   Drainage Amount Moderate 6/28/2019  2:05 PM   Drainage Color Serosanguinous; Yellow 6/28/2019  2:05 PM   Wound Odor None 6/28/2019  2:05 PM   Kaley-wound Assessment Intact 6/28/2019  2:05 PM   Cleansing and Cleansing Agents  Normal saline 6/28/2019  2:05 PM   Dressing Changed Changed/New 6/14/2019  2:37 PM   Number of days: 22       [REMOVED] Wound Toe (Comment  which one) Left 4th Toe (Removed)   Dressing Status Dry 6/14/2019  2:37 PM   Wound Length (cm) 1.2 cm 6/14/2019  2:37 PM   Wound Width (cm) 1 cm 6/14/2019  2:37 PM   Wound Depth (cm) 0.1 cm 6/14/2019  2:37 PM   Wound Volume (cm^3) 0.12 cm^3 6/14/2019  2:37 PM   Condition of Base Eschar 6/14/2019  2:37 PM   Tissue Type Percent Black 100 % 6/14/2019  2:37 PM   Drainage Amount None 6/14/2019  2:37 PM   Wound Odor None 6/14/2019  2:37 PM   Cleansing and Cleansing Agents  Normal saline 6/14/2019  2:37 PM   Dressing Changed Changed/New 6/14/2019  2:37 PM   Number of days: 0       [REMOVED] Wound Buttocks Left partial thickness skin loss, friction MASD (Removed)   Number of days: 2       [REMOVED] Wound Leg Lower Left;Right (Removed)   Number of days: 43           Problem List  Date Reviewed: 5/25/2016          Codes Class Noted    Acute pulmonary edema ([facility] ICD-10-CM: J81.0  ICD-9-CM: 518.4  7/16/2019        Sepsis secondary to UTI [facility]) ICD-10-CM: A41.9, N39.0  ICD-9-CM: 038.9, 995.91, 599.0  7/4/2019        Respiratory failure ([facility] [address] ICD-10-CM: J96.90  ICD-9-CM: 518.81  7/3/2019        Non healing left heel wound ICD-10-CM: S91.302A  ICD-9-CM: 892.1  5/17/2019        Sepsis ([facility] [address] ICD-10-CM: A41.9  ICD-9-CM: 038.9, 995.91  3/25/2019        Acute metabolic encephalopathy GFQ-29-TT: G93.41  ICD-9-CM: 348.31  2/26/2019        Acute diarrhea ICD-10-CM: R19.7  ICD-9-CM: 787.91  2/26/2019 Oral thrush ICD-10-CM: B37.0  ICD-9-CM: 112.0  2/26/2019        Acute encephalopathy ICD-10-CM: G93.40  ICD-9-CM: 348.30  2/24/2019        Finger infection ICD-10-CM: L08.9  ICD-9-CM: 686.9  2/5/2019        Leukocytosis ICD-10-CM: D72.829  ICD-9-CM: 288.60  2/5/2019        RAYMON (acute kidney injury) ([facility] ICD-10-CM: N17.9  ICD-9-CM: 584.9  2/5/2019        Osteomyelitis of finger ([facility] ICD-10-CM: M86.9  ICD-9-CM: 730.24  2/5/2019        Closed rib fracture ICD-10-CM: S22.39XA  ICD-9-CM: 807.00  1/16/2019        Acute respiratory failure with hypoxia (HCC) ICD-10-CM: J96.01  ICD-9-CM: 518.81  1/16/2019        Acute prerenal azotemia ICD-10-CM: R79.89  ICD-9-CM: 790.6  1/16/2019        Chronic diastolic congestive heart failure (HCC) ICD-10-CM: I50.32  ICD-9-CM: 428.32, 428.0  1/16/2019        Acquired hypothyroidism ICD-10-CM: E03.9  ICD-9-CM: 244.9  1/16/2019        GERD (gastroesophageal reflux disease) ICD-10-CM: K21.9  ICD-9-CM: 530.81  1/16/2019        Mixed hyperlipidemia ICD-10-CM: E78.2  ICD-9-CM: 272.2  7/27/2018        DM2 (diabetes mellitus, type 2) ([facility] ICD-10-CM: E11.9  ICD-9-CM: 250.00  7/27/2018        Aortic valve sclerosis ICD-10-CM: I35.8  ICD-9-CM: 424.1  1/26/2018        Rapid heart rate ICD-10-CM: R00.0  ICD-9-CM: 785.0  1/26/2018        Essential hypertension with goal blood pressure less than 130/85 ICD-10-CM: I10  ICD-9-CM: 401.9  10/27/2017        Murmur ICD-10-CM: R01.1  ICD-9-CM: 785.2  10/4/2017        Bilateral leg edema ICD-10-CM: R60.0  ICD-9-CM: 782.3  10/4/2017        Swelling ICD-10-CM: R60.9  ICD-9-CM: 782.3  10/4/2017        Abnormal EKG ICD-10-CM: R94.31  ICD-9-CM: 794.31  10/4/2017        Urinary tract infection, site not specified ICD-10-CM: N39.0  ICD-9-CM: 599.0  4/22/2015                Assessment/Plan:     No slough in wound. Continue endoform. Continue home health to assist daughter with dressing changes.    RTC 2 weeks    Signed By: Thang Morocho MD

## 2019-07-26 NOTE — WOUND CARE
07/26/19 3330   Wound Heel Left   Date First Assessed/Time First Assessed: 07/12/19 1353   Present on Hospital Admission: Yes  Primary Wound Type: Diabetic Ulcer  Location: Heel  Wound Location Orientation: Left   Dressing Status Clean, dry, and intact   Dressing Type   (endoform, hydrofera blue, abd, rolled gauze)   Non-staged Wound Description Full thickness   Wound Length (cm) 3 cm   Wound Width (cm) 3.5 cm   Wound Depth (cm) 0.1 cm   Wound Volume (cm^3) 1.05 cm^3   Condition of Base Granulation   Tissue Type Percent Red 100   Drainage Amount Moderate   Drainage Color Serosanguinous   Wound Odor None   Cleansing and Cleansing Agents  Soap and water   Dressing Changed Changed/New       Patient is on an anti-coagulant ASA81.

## 2019-07-26 NOTE — WOUND CARE
[address]  [address], [address]   Phone: [phone]  Fax: [phone]    Patient: Patricia Al MRN: [id number]  SSN: [id number]    YOB: 1933  Age: 80 y.o. Sex: female       Return Appointment: 2 weeks with Josseline Rodriguez MD    Instructions: Left Heel Wound:  -Cleanse wound with normal saline or wound cleanser. DO NOT GET WET IN SHOWER AND NO SOAKING WOUND!  -Apply Endoform to wound base and cover with Hydrofera Ready and ABD pad; wrap with rolled gauze; apply tubigrip (size F). -[place] to change dressing 3 times a week   -Elevate feet while sitting or laying and wear heel lift boots while in bed. Use wedge on inner part of foam boot to prevent foot from turning in.     Should you experience increased redness, swelling, pain, foul odor, size of wound(s), or have a temperature over 101 degrees please contact the [address] [place] [address] Road at [phone] or if after hours contact your primary care physician or go to the hospital emergency department.     Signed By: Radha Chavez     July 26, 2019

## 2019-08-23 NOTE — WOUND CARE
08/23/19 0841   Wound Heel Left   Date First Assessed/Time First Assessed: 07/12/19 1353   Present on Hospital Admission: Yes  Primary Wound Type: Diabetic Ulcer  Location: Heel  Wound Location Orientation: Left   Dressing Status Clean, dry, and intact   Dressing Type   (endoform, hydrofera blue, abd, rolled gauze)   Non-staged Wound Description Full thickness   Wound Length (cm) 1.4 cm   Wound Width (cm) 2 cm   Wound Depth (cm) 0.1 cm   Wound Volume (cm^3) 0.28 cm^3   Condition of Base Granulation   Tissue Type Percent Red 100   Drainage Amount Moderate   Drainage Color Serosanguinous   Wound Odor None   Cleansing and Cleansing Agents  Soap and water   Dressing Changed Changed/New       Patient is on an anti coagulant daily ASA81.

## 2019-08-23 NOTE — PROGRESS NOTES
Wound Center Progress Note    Patient: Jaye Kirby MRN: [id number]  SSN: [id number]    YOB: 1933  Age: 80 y.o. Sex: female      Subjective:     Chief Complaint:  Jaye Kirby is a 80 y.o. WHITE OR  female who presents with R heel wound of 2 months duration. History of Present Illness:     Has had revascularization of the leg and is healing and slowly the wound is clean and will continue to be dressed with Hydrofera Blue blue. Wound Caused By: vascular insufficiency  Associated Signs and Symptoms:9Timing: Intermittent  Quality: Similar to Prior Episodes  Severity: 3/10  Modifying Factors: diabetes.   Vascular insufficiency  Current Wound Care: see nurse's notes    Past Medical History:   Diagnosis Date    Arthritis     Chronic diastolic congestive heart failure (HCC) 1/16/2019    Chronic pain     bilat feet & hands    Edema     BLE & bilat fingers; pt takes diuretic daily; pt states her swelling is related to the nerve disease she is being treated for at [facility] GERD (gastroesophageal reflux disease) 1/16/2019    Gouty arthritis     History of MI (myocardial infarction) 1980's    \"light heart attack\"; pt states MD at [address] mentioned it to her; pt stated MD told her \"was on the back part of her heart & would not give her any problems\"; pt takes aspirin 81 mg daily    Hypertension     managed w/med    Hypothyroidism     s/p partial thyroidectomy 1960; managed w/med    Mild heartburn     takes tums prn    Neuropathy 1980's    pt states has been treated by [facility] for \"nerve problems in her feet & hands\"    Post-operative nausea and vomiting     Type 2 diabetes mellitus without complication, without long-term current use of insulin ([address] 7/27/2018      Past Surgical History:   Procedure Laterality Date    HX CYST REMOVAL Right 1950's    upper eyelid    HX DILATION AND CURETTAGE  0259-1292    HX GYN  1963    ectopic pregnancy; right BSO done    HX HEART CATHETERIZATION  late 1979    pt states no stents    HX HYSTERECTOMY  1972    HX [address]    HX OPEN CHOLECYSTECTOMY  1970    pt states was exploratory abdominal surgery too    HX ORTHOPAEDIC Bilateral [id number]    Foot; bone taken out of 5th toe on right foot; 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 Authorizing Provider   colchicine 0.6 mg tablet Take 0.6 mg by mouth two (2) times a day. Indications: for 3 days, end 5/20    Provider, Historical   oxybutynin chloride XL (DITROPAN XL) 5 mg CR tablet Take 5 mg by mouth daily. Provider, Historical   torsemide (DEMADEX) 10 mg tablet Take 10 mg by mouth two (2) times a day. Provider, Historical   methadone (DOLOPHINE) 5 mg tablet Take 1 Tab by mouth every six (6) hours. Max Daily Amount: 20 mg. Pt has been stable on this dose for many years, please do not taper or stop. Instructed to take DOS per Anesthesia guidelines. Indications: chronic pain, narcotic addiction 3/29/19   Kelly Solis NP   DULoxetine (CYMBALTA) 60 mg capsule Take 1 Cap by mouth daily. 3/30/19   Kelly Solis NP   clonazePAM (KLONOPIN) 0.5 mg tablet Take 1 Tab by mouth three (3) times daily. Max Daily Amount: 1.5 mg. Indications: anxiety 3/29/19   Kelly Solis NP   carvedilol (COREG) 6.25 mg tablet Take 1 Tab by mouth two (2) times daily (with meals). 3/29/19   Kelly Solis NP   amLODIPine (NORVASC) 10 mg tablet Take 1 Tab by mouth daily.   Patient taking differently: Take 5 mg by mouth daily. 3/29/19   Kelly Solis, NP   predniSONE (DELTASONE) 5 mg tablet Take 1 Tab by mouth two (2) times a day. 5mg BID  Patient taking differently: Take 10 mg by mouth two (2) times a day. 5mg BID 3/29/19   Kelly Solis, NP   docusate sodium (COLACE) 100 mg capsule Take 1 Cap by mouth nightly. 3/4/19   Wil Cook DO   Lactobacillus acidophilus (ACIDOPHILUS) cap Take 1 Cap by mouth daily. Provider, Historical   insulin lispro (HUMALOG) 100 unit/mL kwikpen 5 Units by SubCUTAneous route Before breakfast, lunch, and dinner. Indications: 150-200= 2 units; 201-250= 4 units; 251-300= 6 units; >300 8 units    Provider, Historical   insulin detemir U-100 (LEVEMIR FLEXTOUCH) 100 unit/mL (3 mL) inpn 30 Units by SubCUTAneous route every morning. Indications: type 2 diabetes mellitus    Provider, Historical   mirtazapine (REMERON) 7.5 mg tablet Take 7.5 mg by mouth nightly. Provider, Historical   nystatin (MYCOSTATIN) powder Apply  to affected area three (3) times daily. Under breasts and ABD folds    Provider, Historical   potassium chloride (KLOR-CON) 10 mEq tablet Take 10 mEq by mouth two (2) times a day. Provider, Historical   acetaminophen (TYLENOL EXTRA STRENGTH) 500 mg tablet Take 500 mg by mouth every six (6) hours as needed for Pain. Provider, Historical   levothyroxine (SYNTHROID) 75 mcg tablet Take 75 mcg by mouth Daily (before breakfast). Provider, Historical   multivitamin with minerals (HAIR,SKIN AND NAILS PO) Take 1 Tab by mouth daily. Provider, Historical   CHOLECALCIFEROL, VITAMIN D3, (VITAMIN D3 PO) Take 1,000 mg by mouth daily. Provider, Historical   CALCIUM CARBONATE/VITAMIN D3 (CALCIUM 600 + D,3, PO) Take 600 mg by mouth daily. Provider, Historical   KRILL/OM-3/DHA/EPA/PHOSPHO/AST (MEGARED OMEGA-3 KRILL OIL PO) Take 1 Cap by mouth daily. Provider, Historical   aspirin delayed-release 81 mg tablet Take 81 mg by mouth nightly.  Ok to continue per anesthesia guidelines. Provider, Historical     Allergies   Allergen Reactions    Contrast Agent [Iodine] Anaphylaxis    Actifed [Triprolidine-Pseudoephedrine] Nausea Only    Allopurinol Other (comments)     Elevated blood pressure & pt states could not walk or see until medication was out of her system.  Decongestant [Pseudoephedrine Hcl] Rash and Itching    Lexapro [Escitalopram Oxalate] Other (comments)     Lips/Mouth swelling    Lyrica [Pregabalin] Unknown (comments)    Minizide [Prazosin-Polythiazide] Unknown (comments)    Mobic [Meloxicam] Other (comments)     Facial swelling    Omnipaque [Iohexol] Hives, Swelling and Other (comments)     Wheezing and/or shortness of breath    Sertraline Other (comments)     Mouth, lips swell    Spironolactone Itching     Pt complains that she breaks out in clammy sweat with itching and stinging in her upper arms, dry mouth, funny feeling tongue, SOB, and increased anxiety.  Sulfamethazine Other (comments)     Mouth/lips swelling         Review of Systems:  A comprehensive review of systems was negative except for that written in the History of Present Illness. Lab Results   Component Value Date/Time    Hemoglobin A1c 10.2 (H) 01/17/2019 10:03 AM        Immunization History   Administered Date(s) Administered    Influenza High Dose Vaccine PF 10/14/2014, 10/05/2017, 10/25/2018    Influenza Vaccine 09/16/2009, 09/09/2013, 10/12/2015, 09/15/2016    Influenza Vaccine (Whole Virus) 10/01/2011, 08/27/2012    Pneumococcal Conjugate (PCV-13) 03/18/2015    Pneumococcal Polysaccharide (PPSV-23) 10/01/2000    TB Skin Test (PPD) Intradermal 01/18/2019, 02/25/2019, 03/25/2019    Zoster Vaccine, Live 06/03/2013       Body mass index is 29.25 kg/m². Counseling regarding nutrition done: No     Current medications:  Current Outpatient Medications   Medication Sig Dispense Refill    colchicine 0.6 mg tablet Take 0.6 mg by mouth two (2) times a day.  Indications: for 3 days, end 5/20      oxybutynin chloride XL (DITROPAN XL) 5 mg CR tablet Take 5 mg by mouth daily.  torsemide (DEMADEX) 10 mg tablet Take 10 mg by mouth two (2) times a day.  methadone (DOLOPHINE) 5 mg tablet Take 1 Tab by mouth every six (6) hours. Max Daily Amount: 20 mg. Pt has been stable on this dose for many years, please do not taper or stop. Instructed to take DOS per Anesthesia guidelines. Indications: chronic pain, narcotic addiction 20 Tab 0    DULoxetine (CYMBALTA) 60 mg capsule Take 1 Cap by mouth daily. 10 Cap 0    clonazePAM (KLONOPIN) 0.5 mg tablet Take 1 Tab by mouth three (3) times daily. Max Daily Amount: 1.5 mg. Indications: anxiety 10 Tab 0    carvedilol (COREG) 6.25 mg tablet Take 1 Tab by mouth two (2) times daily (with meals). 10 Tab 0    amLODIPine (NORVASC) 10 mg tablet Take 1 Tab by mouth daily. (Patient taking differently: Take 5 mg by mouth daily. ) 10 Tab 0    predniSONE (DELTASONE) 5 mg tablet Take 1 Tab by mouth two (2) times a day. 5mg BID (Patient taking differently: Take 10 mg by mouth two (2) times a day. 5mg BID) 1 Tab 0    docusate sodium (COLACE) 100 mg capsule Take 1 Cap by mouth nightly. 1 Cap 0    Lactobacillus acidophilus (ACIDOPHILUS) cap Take 1 Cap by mouth daily.  insulin lispro (HUMALOG) 100 unit/mL kwikpen 5 Units by SubCUTAneous route Before breakfast, lunch, and dinner. Indications: 150-200= 2 units; 201-250= 4 units; 251-300= 6 units; >300 8 units      insulin detemir U-100 (LEVEMIR FLEXTOUCH) 100 unit/mL (3 mL) inpn 30 Units by SubCUTAneous route every morning. Indications: type 2 diabetes mellitus      mirtazapine (REMERON) 7.5 mg tablet Take 7.5 mg by mouth nightly.  nystatin (MYCOSTATIN) powder Apply  to affected area three (3) times daily. Under breasts and ABD folds      potassium chloride (KLOR-CON) 10 mEq tablet Take 10 mEq by mouth two (2) times a day.       acetaminophen (TYLENOL EXTRA STRENGTH) 500 mg tablet Take 500 mg by mouth every six (6) hours as needed for Pain.  levothyroxine (SYNTHROID) 75 mcg tablet Take 75 mcg by mouth Daily (before breakfast).  multivitamin with minerals (HAIR,SKIN AND NAILS PO) Take 1 Tab by mouth daily.  CHOLECALCIFEROL, VITAMIN D3, (VITAMIN D3 PO) Take 1,000 mg by mouth daily.  CALCIUM CARBONATE/VITAMIN D3 (CALCIUM 600 + D,3, PO) Take 600 mg by mouth daily.  KRILL/OM-3/DHA/EPA/PHOSPHO/AST (MEGARED OMEGA-3 KRILL OIL PO) Take 1 Cap by mouth daily.  aspirin delayed-release 81 mg tablet Take 81 mg by mouth nightly. Ok to continue per anesthesia guidelines. Objective:     Physical Exam:     Visit Vitals  /86 (BP 1 Location: Left leg, BP Patient Position: Sitting)   Pulse 85   Temp 98.3 °F (36.8 °C)   Resp 16   Ht 5' 4\" (1.626 m)   Wt 77.3 kg (170 lb 6.4 oz)   BMI 29.25 kg/m²       General: well developed, well nourished, pleasant , NAD. Hygiene good  Psych: cooperative. Pleasant. No anxiety or depression. Normal mood and affect. Neuro: alert and oriented to person/place/situation. Otherwise nonfocal.  Derm: Normal turgor for age, dry skin  HEENT: Normocephalic, atraumatic. EOMI. Conjunctiva clear. No scleral icterus. Neck: Normal range of motion. No masses. Chest: Good air entry bilaterally. Respirations nonlabored  Cardio: Normal heart sounds,no rubs, murmurs or gallops  Abdomen: Soft, nontender, nondistended, normoactive bowel sounds  Lower extremities: color normal; temperature normal. Hair growth is not present. Calves are supple, nontender, approximately equally sized in comparison.  Capillary refill <3 sec      Diabetic Foot Ulcer/Neuropathic   Is Wound Greater than 1.0 sq cm ? : Yes  Re-Current Wound with Skin Substitue within Last Year : No  X-Ray in Last 3 Months: Yes  JOCELYNE In Last 6 Months : Yes  Smoking Status: Non- Smoker  Wound Free from Infection : No Culture Done  Is Wound Free of Eschar, Slough , and / or Bio-Evening Shade: Yes  Malignant Process in Wound : No Biopsy Done  Hemoglobin A1Cin Last 3 Months: Yes  Hemoglobin A1C Last result : 8  Type of off Loading: Wheelchair if area off surface of wheelchair  Compression Therapy of 20mm or greater ?: No     Ulcer Description:   Wound Heel Left (Active)   Dressing Status Clean, dry, and intact 8/23/2019  8:41 AM   Non-staged Wound Description Full thickness 8/23/2019  8:41 AM   Wound Length (cm) 1.4 cm 8/23/2019  8:41 AM   Wound Width (cm) 2 cm 8/23/2019  8:41 AM   Wound Depth (cm) 0.1 cm 8/23/2019  8:41 AM   Wound Volume (cm^3) 0.28 cm^3 8/23/2019  8:41 AM   Condition of Base Granulation 8/23/2019  8:41 AM   Tissue Type Percent Red 100 8/23/2019  8:41 AM   Drainage Amount Moderate 8/23/2019  8:41 AM   Drainage Color Serosanguinous 8/23/2019  8:41 AM   Wound Odor None 8/23/2019  8:41 AM   Cleansing and Cleansing Agents  Soap and water 8/23/2019  8:41 AM   Dressing Changed Changed/New 8/23/2019  8:41 AM   Procedure Tolerated Well 8/23/2019  8:41 AM   Number of days: 42       [REMOVED] Wound Neck (Removed)   Number of days: 612       [REMOVED] Wound Leg Lower (Removed)   Number of days: 35       [REMOVED] Wound Leg Lower (Removed)   Number of days: 35       [REMOVED] Wound Leg Lower Upper (Removed)   Number of days: 21       [REMOVED] Wound Leg Lower Lower (Removed)   Number of days: 21       [REMOVED] Wound Leg Lower Mid (Removed)   Number of days: 21       [REMOVED] Wound (Removed)   Number of days: 66       [REMOVED] Wound (Removed)   Number of days: 53       [REMOVED] Wound Leg Upper Left (Removed)   Number of days: 4       [REMOVED] Wound Heel Left (Removed)   Number of days: 21       [REMOVED] Wound Heel Left (Removed)   Number of days: 151       [REMOVED] Wound Finger (specify in comments) Anterior; Left (Removed)   Number of days: 150       [REMOVED] Wound Buttocks Posterior;Right (Removed)   Number of days: 49       [REMOVED] Wound Heel Left (Removed)   Number of days: 49       [REMOVED] Wound Groin Right (Removed)   Number of days: 45       [REMOVED] Wound Heel Left (Removed)   Dressing Status Old drainage 6/28/2019  2:05 PM   Non-staged Wound Description Full thickness 6/28/2019  2:05 PM   Wound Length (cm) 4 cm 6/28/2019  2:05 PM   Wound Width (cm) 4 cm 6/28/2019  2:05 PM   Wound Depth (cm) 0.1 cm 6/28/2019  2:05 PM   Wound Volume (cm^3) 1.6 cm^3 6/28/2019  2:05 PM   Condition of Base Granulation;Slough 6/28/2019  2:05 PM   Assessment Black; Red 6/14/2019  2:37 PM   Tissue Type Percent Black 5 % 6/14/2019  2:37 PM   Tissue Type Percent Red 90 6/28/2019  2:05 PM   Tissue Type Percent Yellow 10 6/28/2019  2:05 PM   Drainage Amount Moderate 6/28/2019  2:05 PM   Drainage Color Serosanguinous; Yellow 6/28/2019  2:05 PM   Wound Odor None 6/28/2019  2:05 PM   Kaley-wound Assessment Intact 6/28/2019  2:05 PM   Cleansing and Cleansing Agents  Normal saline 6/28/2019  2:05 PM   Dressing Changed Changed/New 6/14/2019  2:37 PM   Number of days: 22       [REMOVED] Wound Toe (Comment  which one) Left 4th Toe (Removed)   Dressing Status Dry 6/14/2019  2:37 PM   Wound Length (cm) 1.2 cm 6/14/2019  2:37 PM   Wound Width (cm) 1 cm 6/14/2019  2:37 PM   Wound Depth (cm) 0.1 cm 6/14/2019  2:37 PM   Wound Volume (cm^3) 0.12 cm^3 6/14/2019  2:37 PM   Condition of Base Eschar 6/14/2019  2:37 PM   Tissue Type Percent Black 100 % 6/14/2019  2:37 PM   Drainage Amount None 6/14/2019  2:37 PM   Wound Odor None 6/14/2019  2:37 PM   Cleansing and Cleansing Agents  Normal saline 6/14/2019  2:37 PM   Dressing Changed Changed/New 6/14/2019  2:37 PM   Number of days: 0       [REMOVED] Wound Buttocks Left partial thickness skin loss, friction MASD (Removed)   Number of days: 2       [REMOVED] Wound Leg Lower Left;Right (Removed)   Number of days: 43         Data Review:   No results found for this or any previous visit (from the past 24 hour(s)). Assessment:     80 y.o. female with R heel diabetic ulcer.     Problem List  Date Reviewed: 5/25/2016 Codes Class Noted    Acute pulmonary edema ([facility] ICD-10-CM: J81.0  ICD-9-CM: 518.4  7/16/2019        Sepsis secondary to UTI [facility]) ICD-10-CM: A41.9, N39.0  ICD-9-CM: 038.9, 995.91, 599.0  7/4/2019        Respiratory failure ([facility] ICD-10-CM: J96.90  ICD-9-CM: 518.81  7/3/2019        Non healing left heel wound ICD-10-CM: S91.302A  ICD-9-CM: 892.1  5/17/2019        Sepsis ([place] [address].) ICD-10-CM: A41.9  ICD-9-CM: 038.9, 995.91  3/25/2019        Acute metabolic encephalopathy UBN-97-FM: G93.41  ICD-9-CM: 348.31  2/26/2019        Acute diarrhea ICD-10-CM: R19.7  ICD-9-CM: 787.91  2/26/2019        Oral thrush ICD-10-CM: B37.0  ICD-9-CM: 112.0  2/26/2019        Acute encephalopathy ICD-10-CM: G93.40  ICD-9-CM: 348.30  2/24/2019        Finger infection ICD-10-CM: L08.9  ICD-9-CM: 686.9  2/5/2019        Leukocytosis ICD-10-CM: D72.829  ICD-9-CM: 288.60  2/5/2019        RAYMON (acute kidney injury) ([facility] ICD-10-CM: N17.9  ICD-9-CM: 584.9  2/5/2019        Osteomyelitis of finger ([facility] ICD-10-CM: M86.9  ICD-9-CM: 730.24  2/5/2019        Closed rib fracture ICD-10-CM: S22.39XA  ICD-9-CM: 807.00  1/16/2019        Acute respiratory failure with hypoxia (HCC) ICD-10-CM: J96.01  ICD-9-CM: 518.81  1/16/2019        Acute prerenal azotemia ICD-10-CM: R79.89  ICD-9-CM: 790.6  1/16/2019        Chronic diastolic congestive heart failure (HCC) ICD-10-CM: I50.32  ICD-9-CM: 428.32, 428.0  1/16/2019        Acquired hypothyroidism ICD-10-CM: E03.9  ICD-9-CM: 244.9  1/16/2019        GERD (gastroesophageal reflux disease) ICD-10-CM: K21.9  ICD-9-CM: 530.81  1/16/2019        Mixed hyperlipidemia ICD-10-CM: E78.2  ICD-9-CM: 272.2  7/27/2018        DM2 (diabetes mellitus, type 2) ([facility] ICD-10-CM: E11.9  ICD-9-CM: 250.00  7/27/2018        Aortic valve sclerosis ICD-10-CM: I35.8  ICD-9-CM: 424.1  1/26/2018        Rapid heart rate ICD-10-CM: R00.0  ICD-9-CM: 785.0  1/26/2018        Essential hypertension with goal blood pressure less than 130/85 ICD-10-CM: I10  ICD-9-CM: 401.9  10/27/2017        Murmur ICD-10-CM: R01.1  ICD-9-CM: 785.2  10/4/2017        Bilateral leg edema ICD-10-CM: R60.0  ICD-9-CM: 782.3  10/4/2017        Swelling ICD-10-CM: R60.9  ICD-9-CM: 782.3  10/4/2017        Abnormal EKG ICD-10-CM: R94.31  ICD-9-CM: 794.31  10/4/2017        Urinary tract infection, site not specified ICD-10-CM: N39.0  ICD-9-CM: 599.0  4/22/2015               Needs:  Good local wound care  Edema management  Nutrition optimization  Good Diabetic control    Plan:     No orders of the defined types were placed in this encounter. Patient understood and agrees with plan. Questions answered. Follow-up Information    None            Any procedures done today in the [address] [facility] are documented in a separate note in [facility] and made part of this record by reference. Dictated using voice recognition software; proofread, but unrecognized errors may exist.    Signed By: Tesha Jc MD     August 23, 2019                   Wound Center Progress Note    Patient: Arslan Raymond MRN: [id number]  SSN: [id number]    YOB: 1933  Age: 80 y.o. Sex: female      Subjective:     Chief Complaint:  Arslan Raymond is a 80 y.o. WHITE OR  female who presents with R heel wound of lengthy months duration.     History of Present Illness:     Improve with revascularization  Wound Caused By: vascular  Associated Signs and Symptoms: Pain  Timing: Intermittent  Quality: Aching  Severity: 3/10  Modifying Factors: diabetic control and wound care  Current Wound Care: see nurse's notes    Past Medical History:   Diagnosis Date    Arthritis     Chronic diastolic congestive heart failure (HCC) 1/16/2019    Chronic pain     bilat feet & hands    Edema     BLE & bilat fingers; pt takes diuretic daily; pt states her swelling is related to the nerve disease she is being treated [facility] at [facility] GERD (gastroesophageal reflux disease) 1/16/2019    Gouty arthritis  History of MI (myocardial infarction) 18's    \"light heart attack\"; pt states MD at [facility] mentioned it to her; pt stated MD told her \"was on the back part of her heart & would not give her any problems\"; pt takes aspirin 81 mg daily    Hypertension     managed w/med    Hypothyroidism     s/p partial thyroidectomy 1960; managed w/med    Mild heartburn     takes tums prn    Neuropathy 1980's    pt states has been treated by [facility] for \"nerve problems in her feet & hands\"    Post-operative nausea and vomiting     Type 2 diabetes mellitus without complication, without long-term current use of insulin ([facility] 7/27/2018      Past Surgical History:   Procedure Laterality Date    HX CYST REMOVAL Right 1950's    upper eyelid    HX DILATION AND CURETTAGE  0481-6623    HX GYN  1963    ectopic pregnancy; right BSO done    HX HEART CATHETERIZATION  late 1979    pt states no stents    HX HYSTERECTOMY  1972    HX [address]    HX OPEN CHOLECYSTECTOMY  1970    pt states was exploratory abdominal surgery too    HX ORTHOPAEDIC Bilateral [id number]    Foot; bone taken out of 5th toe on right foot; 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 Authorizing Provider   colchicine 0.6 mg tablet Take 0.6 mg by mouth two (2) times a day. Indications: for 3 days, end 5/20    Provider, Historical   oxybutynin chloride XL (DITROPAN XL) 5 mg CR tablet Take 5 mg by mouth daily.     Provider, Historical   torsemide (DEMADEX) 10 mg tablet Take 10 mg by mouth two (2) times a day. Provider, Historical   methadone (DOLOPHINE) 5 mg tablet Take 1 Tab by mouth every six (6) hours. Max Daily Amount: 20 mg. Pt has been stable on this dose for many years, please do not taper or stop. Instructed to take DOS per Anesthesia guidelines. Indications: chronic pain, narcotic addiction 3/29/19   Kelly Solis NP   DULoxetine (CYMBALTA) 60 mg capsule Take 1 Cap by mouth daily. 3/30/19   Kelly Solis NP   clonazePAM (KLONOPIN) 0.5 mg tablet Take 1 Tab by mouth three (3) times daily. Max Daily Amount: 1.5 mg. Indications: anxiety 3/29/19   Kelly Solis NP   carvedilol (COREG) 6.25 mg tablet Take 1 Tab by mouth two (2) times daily (with meals). 3/29/19   Kelly Solis NP   amLODIPine (NORVASC) 10 mg tablet Take 1 Tab by mouth daily. Patient taking differently: Take 5 mg by mouth daily. 3/29/19   Kelly Solis NP   predniSONE (DELTASONE) 5 mg tablet Take 1 Tab by mouth two (2) times a day. 5mg BID  Patient taking differently: Take 10 mg by mouth two (2) times a day. 5mg BID 3/29/19   Kelly Solis NP   docusate sodium (COLACE) 100 mg capsule Take 1 Cap by mouth nightly. 3/4/19   Wil Cook DO   Lactobacillus acidophilus (ACIDOPHILUS) cap Take 1 Cap by mouth daily. Provider, Historical   insulin lispro (HUMALOG) 100 unit/mL kwikpen 5 Units by SubCUTAneous route Before breakfast, lunch, and dinner. Indications: 150-200= 2 units; 201-250= 4 units; 251-300= 6 units; >300 8 units    Provider, Historical   insulin detemir U-100 (LEVEMIR FLEXTOUCH) 100 unit/mL (3 mL) inpn 30 Units by SubCUTAneous route every morning. Indications: type 2 diabetes mellitus    Provider, Historical   mirtazapine (REMERON) 7.5 mg tablet Take 7.5 mg by mouth nightly. Provider, Historical   nystatin (MYCOSTATIN) powder Apply  to affected area three (3) times daily.  Under breasts and ABD folds    Provider, Historical   potassium chloride (KLOR-CON) 10 mEq tablet Take 10 mEq by mouth two (2) times a day. Provider, Historical   acetaminophen (TYLENOL EXTRA STRENGTH) 500 mg tablet Take 500 mg by mouth every six (6) hours as needed for Pain. Provider, Historical   levothyroxine (SYNTHROID) 75 mcg tablet Take 75 mcg by mouth Daily (before breakfast). Provider, Historical   multivitamin with minerals (HAIR,SKIN AND NAILS PO) Take 1 Tab by mouth daily. Provider, Historical   CHOLECALCIFEROL, VITAMIN D3, (VITAMIN D3 PO) Take 1,000 mg by mouth daily. Provider, Historical   CALCIUM CARBONATE/VITAMIN D3 (CALCIUM 600 + D,3, PO) Take 600 mg by mouth daily. Provider, Historical   KRILL/OM-3/DHA/EPA/PHOSPHO/AST (MEGARED OMEGA-3 KRILL OIL PO) Take 1 Cap by mouth daily. Provider, Historical   aspirin delayed-release 81 mg tablet Take 81 mg by mouth nightly. Ok to continue per anesthesia guidelines. Provider, Historical     Allergies   Allergen Reactions    Contrast Agent [Iodine] Anaphylaxis    Actifed [Triprolidine-Pseudoephedrine] Nausea Only    Allopurinol Other (comments)     Elevated blood pressure & pt states could not walk or see until medication was out of her system.  Decongestant [Pseudoephedrine Hcl] Rash and Itching    Lexapro [Escitalopram Oxalate] Other (comments)     Lips/Mouth swelling    Lyrica [Pregabalin] Unknown (comments)    Minizide [Prazosin-Polythiazide] Unknown (comments)    Mobic [Meloxicam] Other (comments)     Facial swelling    Omnipaque [Iohexol] Hives, Swelling and Other (comments)     Wheezing and/or shortness of breath    Sertraline Other (comments)     Mouth, lips swell    Spironolactone Itching     Pt complains that she breaks out in clammy sweat with itching and stinging in her upper arms, dry mouth, funny feeling tongue, SOB, and increased anxiety.      Sulfamethazine Other (comments)     Mouth/lips swelling         Review of Systems:  A comprehensive review of systems was negative except for that written in the History of Present Illness. Lab Results   Component Value Date/Time    Hemoglobin A1c 10.2 (H) 01/17/2019 10:03 AM        Immunization History   Administered Date(s) Administered    Influenza High Dose Vaccine PF 10/14/2014, 10/05/2017, 10/25/2018    Influenza Vaccine 09/16/2009, 09/09/2013, 10/12/2015, 09/15/2016    Influenza Vaccine (Whole Virus) 10/01/2011, 08/27/2012    Pneumococcal Conjugate (PCV-13) 03/18/2015    Pneumococcal Polysaccharide (PPSV-23) 10/01/2000    TB Skin Test (PPD) Intradermal 01/18/2019, 02/25/2019, 03/25/2019    Zoster Vaccine, Live 06/03/2013       Body mass index is 29.25 kg/m². Counseling regarding nutrition done: No     Current medications:  Current Outpatient Medications   Medication Sig Dispense Refill    colchicine 0.6 mg tablet Take 0.6 mg by mouth two (2) times a day. Indications: for 3 days, end 5/20      oxybutynin chloride XL (DITROPAN XL) 5 mg CR tablet Take 5 mg by mouth daily.  torsemide (DEMADEX) 10 mg tablet Take 10 mg by mouth two (2) times a day.  methadone (DOLOPHINE) 5 mg tablet Take 1 Tab by mouth every six (6) hours. Max Daily Amount: 20 mg. Pt has been stable on this dose for many years, please do not taper or stop. Instructed to take DOS per Anesthesia guidelines. Indications: chronic pain, narcotic addiction 20 Tab 0    DULoxetine (CYMBALTA) 60 mg capsule Take 1 Cap by mouth daily. 10 Cap 0    clonazePAM (KLONOPIN) 0.5 mg tablet Take 1 Tab by mouth three (3) times daily. Max Daily Amount: 1.5 mg. Indications: anxiety 10 Tab 0    carvedilol (COREG) 6.25 mg tablet Take 1 Tab by mouth two (2) times daily (with meals). 10 Tab 0    amLODIPine (NORVASC) 10 mg tablet Take 1 Tab by mouth daily. (Patient taking differently: Take 5 mg by mouth daily. ) 10 Tab 0    predniSONE (DELTASONE) 5 mg tablet Take 1 Tab by mouth two (2) times a day. 5mg BID (Patient taking differently: Take 10 mg by mouth two (2) times a day.  5mg BID) 1 Tab 0    docusate sodium (COLACE) 100 mg capsule Take 1 Cap by mouth nightly. 1 Cap 0    Lactobacillus acidophilus (ACIDOPHILUS) cap Take 1 Cap by mouth daily.  insulin lispro (HUMALOG) 100 unit/mL kwikpen 5 Units by SubCUTAneous route Before breakfast, lunch, and dinner. Indications: 150-200= 2 units; 201-250= 4 units; 251-300= 6 units; >300 8 units      insulin detemir U-100 (LEVEMIR FLEXTOUCH) 100 unit/mL (3 mL) inpn 30 Units by SubCUTAneous route every morning. Indications: type 2 diabetes mellitus      mirtazapine (REMERON) 7.5 mg tablet Take 7.5 mg by mouth nightly.  nystatin (MYCOSTATIN) powder Apply  to affected area three (3) times daily. Under breasts and ABD folds      potassium chloride (KLOR-CON) 10 mEq tablet Take 10 mEq by mouth two (2) times a day.  acetaminophen (TYLENOL EXTRA STRENGTH) 500 mg tablet Take 500 mg by mouth every six (6) hours as needed for Pain.  levothyroxine (SYNTHROID) 75 mcg tablet Take 75 mcg by mouth Daily (before breakfast).  multivitamin with minerals (HAIR,SKIN AND NAILS PO) Take 1 Tab by mouth daily.  CHOLECALCIFEROL, VITAMIN D3, (VITAMIN D3 PO) Take 1,000 mg by mouth daily.  CALCIUM CARBONATE/VITAMIN D3 (CALCIUM 600 + D,3, PO) Take 600 mg by mouth daily.  KRILL/OM-3/DHA/EPA/PHOSPHO/AST (MEGARED OMEGA-3 KRILL OIL PO) Take 1 Cap by mouth daily.  aspirin delayed-release 81 mg tablet Take 81 mg by mouth nightly. Ok to continue per anesthesia guidelines. Objective:     Physical Exam:     Visit Vitals  /86 (BP 1 Location: Left leg, BP Patient Position: Sitting)   Pulse 85   Temp 98.3 °F (36.8 °C)   Resp 16   Ht 5' 4\" (1.626 m)   Wt 77.3 kg (170 lb 6.4 oz)   BMI 29.25 kg/m²       General: well developed, well nourished, pleasant , NAD. Hygiene good  Psych: cooperative. Pleasant. No anxiety or depression. Normal mood and affect. Neuro: alert and oriented to person/place/situation.  Otherwise nonfocal.  Derm: Normal turgor for age, dry skin  HEENT: Normocephalic, atraumatic. EOMI. Conjunctiva clear. No scleral icterus. Neck: Normal range of motion. No masses. Chest: Good air entry bilaterally. Respirations nonlabored  Cardio: Normal heart sounds,no rubs, murmurs or gallops  Abdomen: Soft, nontender, nondistended, normoactive bowel sounds  Lower extremities: color normal; temperature normal. Hair growth is not present. Calves are supple, nontender, approximately equally sized in comparison.  Capillary refill <3 sec      Diabetic Foot Ulcer/Neuropathic   Is Wound Greater than 1.0 sq cm ? : Yes  Re-Current Wound with Skin Substitue within Last Year : No  X-Ray in Last 3 Months: Yes  JOCELYNE In Last 6 Months : Yes  Smoking Status: Non- Smoker  Wound Free from Infection : No Culture Done  Is Wound Free of Eschar, Slough , and / or Bio-Okay: Yes  Malignant Process in Wound : No Biopsy Done  Hemoglobin A1Cin Last 3 Months: Yes  Hemoglobin A1C Last result : 8  Type of off Loading: Wheelchair if area off surface of wheelchair  Compression Therapy of 20mm or greater ?: No     Ulcer Description:   Wound Heel Left (Active)   Dressing Status Clean, dry, and intact 8/23/2019  8:41 AM   Non-staged Wound Description Full thickness 8/23/2019  8:41 AM   Wound Length (cm) 1.4 cm 8/23/2019  8:41 AM   Wound Width (cm) 2 cm 8/23/2019  8:41 AM   Wound Depth (cm) 0.1 cm 8/23/2019  8:41 AM   Wound Volume (cm^3) 0.28 cm^3 8/23/2019  8:41 AM   Condition of Base Granulation 8/23/2019  8:41 AM   Tissue Type Percent Red 100 8/23/2019  8:41 AM   Drainage Amount Moderate 8/23/2019  8:41 AM   Drainage Color Serosanguinous 8/23/2019  8:41 AM   Wound Odor None 8/23/2019  8:41 AM   Cleansing and Cleansing Agents  Soap and water 8/23/2019  8:41 AM   Dressing Changed Changed/New 8/23/2019  8:41 AM   Procedure Tolerated Well 8/23/2019  8:41 AM   Number of days: 42       [REMOVED] Wound Neck (Removed)   Number of days: 596       [REMOVED] Wound Leg Lower (Removed)   Number of days: 35 [REMOVED] Wound Leg Lower (Removed)   Number of days: 35       [REMOVED] Wound Leg Lower Upper (Removed)   Number of days: 21       [REMOVED] Wound Leg Lower Lower (Removed)   Number of days: 21       [REMOVED] Wound Leg Lower Mid (Removed)   Number of days: 21       [REMOVED] Wound (Removed)   Number of days: 66       [REMOVED] Wound (Removed)   Number of days: 53       [REMOVED] Wound Leg Upper Left (Removed)   Number of days: 4       [REMOVED] Wound Heel Left (Removed)   Number of days: 21       [REMOVED] Wound Heel Left (Removed)   Number of days: 151       [REMOVED] Wound Finger (specify in comments) Anterior; Left (Removed)   Number of days: 150       [REMOVED] Wound Buttocks Posterior;Right (Removed)   Number of days: 49       [REMOVED] Wound Heel Left (Removed)   Number of days: 52       [REMOVED] Wound Groin Right (Removed)   Number of days: 45       [REMOVED] Wound Heel Left (Removed)   Dressing Status Old drainage 6/28/2019  2:05 PM   Non-staged Wound Description Full thickness 6/28/2019  2:05 PM   Wound Length (cm) 4 cm 6/28/2019  2:05 PM   Wound Width (cm) 4 cm 6/28/2019  2:05 PM   Wound Depth (cm) 0.1 cm 6/28/2019  2:05 PM   Wound Volume (cm^3) 1.6 cm^3 6/28/2019  2:05 PM   Condition of Base Granulation;Slough 6/28/2019  2:05 PM   Assessment Black; Red 6/14/2019  2:37 PM   Tissue Type Percent Black 5 % 6/14/2019  2:37 PM   Tissue Type Percent Red 90 6/28/2019  2:05 PM   Tissue Type Percent Yellow 10 6/28/2019  2:05 PM   Drainage Amount Moderate 6/28/2019  2:05 PM   Drainage Color Serosanguinous; Yellow 6/28/2019  2:05 PM   Wound Odor None 6/28/2019  2:05 PM   Kaley-wound Assessment Intact 6/28/2019  2:05 PM   Cleansing and Cleansing Agents  Normal saline 6/28/2019  2:05 PM   Dressing Changed Changed/New 6/14/2019  2:37 PM   Number of days: 22       [REMOVED] Wound Toe (Comment  which one) Left 4th Toe (Removed)   Dressing Status Dry 6/14/2019  2:37 PM   Wound Length (cm) 1.2 cm 6/14/2019  2:37 PM Wound Width (cm) 1 cm 6/14/2019  2:37 PM   Wound Depth (cm) 0.1 cm 6/14/2019  2:37 PM   Wound Volume (cm^3) 0.12 cm^3 6/14/2019  2:37 PM   Condition of Base Eschar 6/14/2019  2:37 PM   Tissue Type Percent Black 100 % 6/14/2019  2:37 PM   Drainage Amount None 6/14/2019  2:37 PM   Wound Odor None 6/14/2019  2:37 PM   Cleansing and Cleansing Agents  Normal saline 6/14/2019  2:37 PM   Dressing Changed Changed/New 6/14/2019  2:37 PM   Number of days: 0       [REMOVED] Wound Buttocks Left partial thickness skin loss, friction MASD (Removed)   Number of days: 2       [REMOVED] Wound Leg Lower Left;Right (Removed)   Number of days: 43         Data Review:   No results found for this or any previous visit (from the past 24 hour(s)). Assessment:     80 y.o. female with R heel diabetic ulcer.     Problem List  Date Reviewed: 5/25/2016          Codes Class Noted    Acute pulmonary edema ([facility] ICD-10-CM: J81.0  ICD-9-CM: 518.4  7/16/2019        Sepsis secondary to UTI [facility]) ICD-10-CM: A41.9, N39.0  ICD-9-CM: 038.9, 995.91, 599.0  7/4/2019        Respiratory failure ([facility] ICD-10-CM: J96.90  ICD-9-CM: 518.81  7/3/2019        Non healing left heel wound ICD-10-CM: S91.302A  ICD-9-CM: 892.1  5/17/2019        Sepsis ([facility] ICD-10-CM: A41.9  ICD-9-CM: 038.9, 995.91  3/25/2019        Acute metabolic encephalopathy IEV-88-FT: G93.41  ICD-9-CM: 348.31  2/26/2019        Acute diarrhea ICD-10-CM: R19.7  ICD-9-CM: 787.91  2/26/2019        Oral thrush ICD-10-CM: B37.0  ICD-9-CM: 112.0  2/26/2019        Acute encephalopathy ICD-10-CM: G93.40  ICD-9-CM: 348.30  2/24/2019        Finger infection ICD-10-CM: L08.9  ICD-9-CM: 686.9  2/5/2019        Leukocytosis ICD-10-CM: I49.512  ICD-9-CM: 288.60  2/5/2019        RAYMON (acute kidney injury) ([facility] ICD-10-CM: N17.9  ICD-9-CM: 584.9  2/5/2019        Osteomyelitis of finger ([facility] ICD-10-CM: M86.9  ICD-9-CM: 730.24  2/5/2019        Closed rib fracture ICD-10-CM: S22.39XA  ICD-9-CM: 807.00  1/16/2019 Acute respiratory failure with hypoxia (HCC) ICD-10-CM: J96.01  ICD-9-CM: 518.81  1/16/2019        Acute prerenal azotemia ICD-10-CM: R79.89  ICD-9-CM: 790.6  1/16/2019        Chronic diastolic congestive heart failure (HCC) ICD-10-CM: I50.32  ICD-9-CM: 428.32, 428.0  1/16/2019        Acquired hypothyroidism ICD-10-CM: E03.9  ICD-9-CM: 244.9  1/16/2019        GERD (gastroesophageal reflux disease) ICD-10-CM: K21.9  ICD-9-CM: 530.81  1/16/2019        Mixed hyperlipidemia ICD-10-CM: E78.2  ICD-9-CM: 272.2  7/27/2018        DM2 (diabetes mellitus, type 2) ([facility] ICD-10-CM: E11.9  ICD-9-CM: 250.00  7/27/2018        Aortic valve sclerosis ICD-10-CM: I35.8  ICD-9-CM: 424.1  1/26/2018        Rapid heart rate ICD-10-CM: R00.0  ICD-9-CM: 785.0  1/26/2018        Essential hypertension with goal blood pressure less than 130/85 ICD-10-CM: I10  ICD-9-CM: 401.9  10/27/2017        Murmur ICD-10-CM: R01.1  ICD-9-CM: 785.2  10/4/2017        Bilateral leg edema ICD-10-CM: R60.0  ICD-9-CM: 782.3  10/4/2017        Swelling ICD-10-CM: R60.9  ICD-9-CM: 782.3  10/4/2017        Abnormal EKG ICD-10-CM: R94.31  ICD-9-CM: 794.31  10/4/2017        Urinary tract infection, site not specified ICD-10-CM: N39.0  ICD-9-CM: 599.0  4/22/2015               Needs:  Good local wound care  Edema management  Nutrition optimization  Good Diabetic control    Plan:     No orders of the defined types were placed in this encounter. Patient understood and agrees with plan. Questions answered. Follow-up Information    None            Any procedures done today in the [facility] are documented in a separate note in [address] [place] [address] and made part of this record by reference.      Dictated using voice recognition software; proofread, but unrecognized errors may exist.    Signed By: Elia Scanlon MD     August 23, 2019

## 2019-08-23 NOTE — WOUND CARE
[address]  [address], [address]   Phone: [phone]  Fax: [phone]    Patient: Thuy Geller MRN: [id number]  SSN: [id number]    YOB: 1933  Age: 80 y.o. Sex: female       Return Appointment: 2 weeks with Thom Saenz MD    Instructions:  Left Heel Wound:  -Cleanse wound with normal saline or wound cleanser. DO NOT GET WET IN SHOWER AND NO SOAKING WOUND!  -Apply Endoform to wound base and cover with Hydrofera Ready and ABD pad; wrap with rolled gauze; apply tubigrip (size F). -[place] to change dressing 3 times a week   -Elevate feet while sitting or laying and wear heel lift boots while in bed. Use wedge on inner part of foam boot to prevent foot from turning in.     Should you experience increased redness, swelling, pain, foul odor, size of wound(s), or have a temperature over 101 degrees please contact the [address] [place] [address] Road at [phone] or if after hours contact your primary care physician or go to the hospital emergency department.     Signed By: Moose Gross     August 23, 2019

## 2019-08-23 NOTE — WOUND CARE
Clinic Level of Care Assessment    NAME:  Liv Rodriguez OF BIRTH:  8/23/1933 GENDER: female  MEDICAL RECORD NUMBER:  [id number]   DATE:  8/23/2019      Wound Count Document in [address] [place] [address]  Number of Wounds Assessed Points   No Wounds/Ulcers []   0   Less than Three Wounds/Ulcers [x]   1   3-6 Wounds/Ulcers []   2   Greater than 6 Wounds/Ulcers []   3     Ambulation Status Document in Daily Care/Safety tab  Status Definition Points   Independent Independently able to ambulate. Fully able (without any assistance) to get on/off exam table/chair. []   0   Minimal Physical Assistance Requires physical assistance of one person to ambulate and/or position patient to be examined. Includes necessary physical assistance to position lower extremities on/off stool. [x]   1   Moderate Physical Assistance Requires at least one staff member to physically assist patient in ambulating into treatment room, and on/off exam table. []   2   Full Assistance Requires assistance of at least two staff members to transfer patient into treatment room and/or on/off exam table/chair. \"Total Transfer\". []   3     Dressing Complexity Document in LDA and Write Appropriate Order  Complexity Definition Points   No Dressing  []   0   Simple Minimal, simple dressing. i.e. Band-aid, gauze, simple wrap. []   1   Intermediate Moderately complicated requiring licensed personnel to apply i.e. collagen matrix, ointments, gels, alginates. [x]   2   Complex Complicated requiring licensed personnel to apply dressings 6 or more wounds. []   3     Teaching Effort Document in Education Tab   Effort Definition Points   No Teaching  []   0   Simple Reinforce two or less topics. Document in Education navigator. [x]   1   Intermediate Reinforce three to five topics and/or one additional   new topic. Document in Education navigator. []   2   Complex Teach more than one new topic.  New patient information   packet reviewed and/or reinforce more than three topics. Document in Education navigator. HBO initial instruction. []   3       Patient Assessment and Planning  Planning Definition Points   Simple Multiple System Simple: Simple follow-up with routine assessment and planning. If Discharged, instructions and long term/follow-up care given to patient/caregiver. Discharged, instructions and/or After Visit Summary given to patient/caregiver and instructions completed. [x]   1   Intermediate Multiple System Intermediate: Contact with outside resources; i.e. Telephone calls to home health, [facility]. May include filling out forms and writing letters, arranging transportation, communication with insurance , vendors, etc.  Discharged, instructions and/or After Visit Summary given to patient/caregiver and instructions completed. []   2   Complex Multiple System Complex: Full, comprehensive assessment and planning. Follow the entire navigator under Wound Visit charting filling out each tab which includes OP Adm Database Screening, Education and CarePlan  HBO risk assessment completed. Discharged, instructions and/or After Visit Summary given to patient/caregiver and instructions completed.    []   3           Is this the Patient's First Visit to the [address] [place] [address] Road  No      Is this Patient Established @ [facility]  Yes             Clinical Level of Care      Points  0-2  Level 1 []     Points  3-5  Level 2 []     Points  6-9  Level 3 [x]     Points  10-12  Level 4 []     Points  13-15  Level 5 []       Electronically signed by Mihaela Turcios on 8/23/2019 at 8:57 AM

## 2019-09-13 NOTE — WOUND CARE
[address]  [address], [address]   Phone: [phone]  Fax: [phone]    Patient: Sarah Barnard MRN: [id number]  SSN: [id number]    YOB: 1933  Age: 80 y.o. Sex: female       Return Appointment: 2 weeks with Spenser Stevens MD    Instructions: Left Heel Wound:  -Cleanse wound with normal saline or wound cleanser. DO NOT GET WET IN SHOWER AND NO SOAKING WOUND!  -Apply Endoform to wound base and cover with Hydrofera Ready and ABD pad; wrap with rolled gauze; apply tubigrip (size F). Right great toe:  Paint with betadine daily. -[place] to change dressing 3 times a week   -Elevate feet while sitting or laying and wear heel lift boots while in bed.  Use wedge on inner part of foam boot to prevent foot from turning in.     Should you experience increased redness, swelling, pain, foul odor, size of wound(s), or have a temperature over 101 degrees please contact the [address] [place] [address] Road at [phone] or if after hours contact your primary care physician or go to the hospital emergency department.     Signed By: Gisselle Gibbs RN     September 13, 2019

## 2019-09-13 NOTE — WOUND CARE
Clinic Level of Care Assessment    NAME:  Courtney Cade OF BIRTH:  8/23/1933 GENDER: female  MEDICAL RECORD NUMBER:  [id number]   DATE:  9/13/2019      Wound Count Document in [facility]  Number of Wounds Assessed Points   No Wounds/Ulcers []   0   Less than Three Wounds/Ulcers [x]   1   3-6 Wounds/Ulcers []   2   Greater than 6 Wounds/Ulcers []   3     Ambulation Status Document in Coord/SATHISH/Mobility tab  Status Definition Points   Independent Independently able to ambulate. Fully able (without any assistance) to get on/off exam table/chair. []   0   Minimal Physical Assistance Requires physical assistance of one person to ambulate and/or position patient to be examined. Includes necessary physical assistance to position lower extremities on/off stool. []   1   Moderate Physical Assistance Requires at least one staff member to physically assist patient in ambulating into treatment room, and on/off exam table. [x]   2   Full Assistance Requires assistance of at least two staff members to transfer patient into treatment room and/or on/off exam table/chair. \"Total Transfer\". []   3     Dressing Complexity Document in LDA and Write Appropriate Order  Complexity Definition Points   No Dressing  []   0   Simple Minimal, simple dressing. i.e. Band-aid, gauze, simple wrap. []   1   Intermediate Moderately complicated requiring licensed personnel to apply i.e. collagen matrix, ointments, gels, alginates. [x]   2   Complex Complicated requiring licensed personnel to apply dressings 6 or more wounds. []   3     Teaching Effort Document in Education Tab   Effort Definition Points   No Teaching  []   0   Simple Reinforce two or less topics. Document in Education navigator. [x]   1   Intermediate Reinforce three to five topics and/or one additional   new topic. Document in Education navigator. []   2   Complex Teach more than one new topic.  New patient information   packet reviewed and/or reinforce more than three topics. Document in Education navigator. HBO initial instruction. []   3       Patient Assessment and Planning  Planning Definition Points   Simple Multiple System Simple: Simple follow-up with routine assessment and planning. If Discharged, instructions and long term/follow-up care given to patient/caregiver. Discharged, instructions and/or After Visit Summary given to patient/caregiver and instructions completed. [x]   1   Intermediate Multiple System Intermediate: Contact with outside resources; i.e. Telephone calls to home health, [facility] [place] [place]. May include filling out forms and writing letters, arranging transportation, communication with insurance , vendors, etc.  Discharged, instructions and/or After Visit Summary given to patient/caregiver and instructions completed. []   2   Complex Multiple System Complex: Full, comprehensive assessment and planning. Follow the entire navigator under Wound Visit charting filling out each tab which includes OP Adm Database Screening, Education and CarePlan  HBO risk assessment completed. Discharged, instructions and/or After Visit Summary given to patient/caregiver and instructions completed.    []   3           Is this the Patient's First Visit to the [address] [place] [address] Road  No      Is this Patient Established @ [facility]  Yes             Clinical Level of Care      Points  0-2  Level 1 []     Points  3-5  Level 2 []     Points  6-9  Level 3 [x]     Points  10-12  Level 4 []     Points  13-15  Level 5 []       Electronically signed by Ginny Hayes RN on 9/13/2019 at 12:01 PM

## 2019-09-13 NOTE — WOUND CARE
09/13/19 1133   Wound Toe (Comment  which one) Distal;Right   Date First Assessed/Time First Assessed: 09/13/19 1133   Present on Hospital Admission: Yes  Primary Wound Type: Diabetic Ulcer  Location: (c) Toe (Comment  which one)  Wound Location Orientation: Distal;Right   Dressing Status Clean, dry, and intact   Dressing Type   (gauze)   Non-staged Wound Description Full thickness   Wound Length (cm) 0.6 cm   Wound Width (cm) 0.5 cm   Wound Depth (cm) 0.1 cm   Wound Volume (cm^3) 0.03 cm^3   Condition of Base Pink   Tissue Type Percent Pink 100   Drainage Amount Small   Drainage Color Serosanguinous   Wound Odor None   Cleansing and Cleansing Agents  Normal saline   Wound Heel Left   Date First Assessed/Time First Assessed: 07/12/19 1353   Present on Hospital Admission: Yes  Primary Wound Type: Diabetic Ulcer  Location: Heel  Wound Location Orientation: Left   Dressing Status Clean, dry, and intact   Dressing Type   (endoform, abd, rolled gauze)   Wound Length (cm) 0.9 cm   Wound Width (cm) 1.5 cm   Wound Depth (cm) 0.1 cm   Wound Volume (cm^3) 0.14 cm^3   Condition of Base Granulation   Tissue Type Percent Red 100   Drainage Amount Moderate   Drainage Color Serosanguinous   Wound Odor None   Cleansing and Cleansing Agents  Normal saline   Dressing Changed Changed/New

## 2019-09-18 NOTE — PROGRESS NOTES
Wound Center Progress Note    Patient: Anitra Sears MRN: [id number]  SSN: [id number]    YOB: 1933  Age: 80 y.o. Sex: female      Subjective:     Chief Complaint:  L heel DFU    History of Present Illness:       Wound Caused By: DM2, neuropathy  Associated Signs and Symptoms: drainage  Timing: constant  Quality: wound  Severity: full thickness  Modifying Factors: poorly controlled dm2. Admitted for sepsis last week. Likely urinary tract infection. Has chronic e coli colonization. Still on steroid. Tolerating dressings. No new issues. Daughter (inpatient nurse) performing dressings. No deterioration with decreased dressing frequency. No recent hospitalization or other issues. Minimal pain. 9/13  Continues to do well. Daughter (nurse)  Performing dressing changes.  No pressure to the heel    Past Medical History:   Diagnosis Date    Arthritis     Chronic diastolic congestive heart failure (HCC) 1/16/2019    Chronic pain     bilat feet & hands    Edema     BLE & bilat fingers; pt takes diuretic daily; pt states her swelling is related to the nerve disease she is being treated for at [facility] GERD (gastroesophageal reflux disease) 1/16/2019    Gouty arthritis     History of MI (myocardial infarction) 1980's    \"light heart attack\"; pt states MD at [address] mentioned it to her; pt stated MD told her \"was on the back part of her heart & would not give her any problems\"; pt takes aspirin 81 mg daily    Hypertension     managed w/med    Hypothyroidism     s/p partial thyroidectomy 1960; managed w/med    Mild heartburn     takes tums prn    Neuropathy 1980's    pt states has been treated by [facility] for \"nerve problems in her feet & hands\"    Post-operative nausea and vomiting     Type 2 diabetes mellitus without complication, without long-term current use of insulin ([facility] [address] 7/27/2018      Past Surgical History:   Procedure Laterality Date    HX CYST REMOVAL Right 1950's    upper eyelid    HX DILATION AND CURETTAGE  [id number]    HX GYN  1963    ectopic pregnancy; right BSO done    HX HEART CATHETERIZATION  late 1979    pt states no stents    HX HYSTERECTOMY  1972    HX [address]    HX OPEN CHOLECYSTECTOMY  1970    pt states was exploratory abdominal surgery too    HX ORTHOPAEDIC Bilateral 7963-2373    Foot; bone taken out of 5th toe on right foot; 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 Authorizing Provider   colchicine 0.6 mg tablet Take 0.6 mg by mouth two (2) times a day. Indications: for 3 days, end 5/20    Provider, Historical   oxybutynin chloride XL (DITROPAN XL) 5 mg CR tablet Take 5 mg by mouth daily. Provider, Historical   torsemide (DEMADEX) 10 mg tablet Take 10 mg by mouth two (2) times a day. Provider, Historical   methadone (DOLOPHINE) 5 mg tablet Take 1 Tab by mouth every six (6) hours. Max Daily Amount: 20 mg. Pt has been stable on this dose for many years, please do not taper or stop. Instructed to take DOS per Anesthesia guidelines. Indications: chronic pain, narcotic addiction 3/29/19   Kelly Solis NP   DULoxetine (CYMBALTA) 60 mg capsule Take 1 Cap by mouth daily. 3/30/19   Kelly Solis NP   clonazePAM (KLONOPIN) 0.5 mg tablet Take 1 Tab by mouth three (3) times daily. Max Daily Amount: 1.5 mg. Indications: anxiety 3/29/19   Kelly Solis NP   carvedilol (COREG) 6.25 mg tablet Take 1 Tab by mouth two (2) times daily (with meals).  3/29/19   Irma DILLON Mendoza   amLODIPine (NORVASC) 10 mg tablet Take 1 Tab by mouth daily. Patient taking differently: Take 5 mg by mouth daily. 3/29/19   Kelly Solis NP   predniSONE (DELTASONE) 5 mg tablet Take 1 Tab by mouth two (2) times a day. 5mg BID  Patient taking differently: Take 10 mg by mouth two (2) times a day. 5mg BID 3/29/19   Kelly Solis NP   docusate sodium (COLACE) 100 mg capsule Take 1 Cap by mouth nightly. 3/4/19   Wil Cook DO   Lactobacillus acidophilus (ACIDOPHILUS) cap Take 1 Cap by mouth daily. Provider, Historical   insulin lispro (HUMALOG) 100 unit/mL kwikpen 5 Units by SubCUTAneous route Before breakfast, lunch, and dinner. Indications: 150-200= 2 units; 201-250= 4 units; 251-300= 6 units; >300 8 units    Provider, Historical   insulin detemir U-100 (LEVEMIR FLEXTOUCH) 100 unit/mL (3 mL) inpn 30 Units by SubCUTAneous route every morning. Indications: type 2 diabetes mellitus    Provider, Historical   mirtazapine (REMERON) 7.5 mg tablet Take 7.5 mg by mouth nightly. Provider, Historical   nystatin (MYCOSTATIN) powder Apply  to affected area three (3) times daily. Under breasts and ABD folds    Provider, Historical   potassium chloride (KLOR-CON) 10 mEq tablet Take 10 mEq by mouth two (2) times a day. Provider, Historical   acetaminophen (TYLENOL EXTRA STRENGTH) 500 mg tablet Take 500 mg by mouth every six (6) hours as needed for Pain. Provider, Historical   levothyroxine (SYNTHROID) 75 mcg tablet Take 75 mcg by mouth Daily (before breakfast). Provider, Historical   multivitamin with minerals (HAIR,SKIN AND NAILS PO) Take 1 Tab by mouth daily. Provider, Historical   CHOLECALCIFEROL, VITAMIN D3, (VITAMIN D3 PO) Take 1,000 mg by mouth daily. Provider, Historical   CALCIUM CARBONATE/VITAMIN D3 (CALCIUM 600 + D,3, PO) Take 600 mg by mouth daily. Provider, Historical   KRILL/OM-3/DHA/EPA/PHOSPHO/AST (MEGARED OMEGA-3 KRILL OIL PO) Take 1 Cap by mouth daily. Provider, Historical   aspirin delayed-release 81 mg tablet Take 81 mg by mouth nightly. Ok to continue per anesthesia guidelines. Provider, Historical     Allergies   Allergen Reactions    Contrast Agent [Iodine] Anaphylaxis    Actifed [Triprolidine-Pseudoephedrine] Nausea Only    Allopurinol Other (comments)     Elevated blood pressure & pt states could not walk or see until medication was out of her system.  Decongestant [Pseudoephedrine Hcl] Rash and Itching    Lexapro [Escitalopram Oxalate] Other (comments)     Lips/Mouth swelling    Lyrica [Pregabalin] Unknown (comments)    Minizide [Prazosin-Polythiazide] Unknown (comments)    Mobic [Meloxicam] Other (comments)     Facial swelling    Omnipaque [Iohexol] Hives, Swelling and Other (comments)     Wheezing and/or shortness of breath    Sertraline Other (comments)     Mouth, lips swell    Spironolactone Itching     Pt complains that she breaks out in clammy sweat with itching and stinging in her upper arms, dry mouth, funny feeling tongue, SOB, and increased anxiety.  Sulfamethazine Other (comments)     Mouth/lips swelling         Review of Systems:  CONSTITUTIONAL: No fever, chills  HEAD: No headache  EYES: No visual loss  ENT: No hearing loss  SKIN: No rash  CARDIOVASCULAR: No chest pain  RESPIRATORY: No shortness of breath  GASTROINTESTINAL: No nausea, vomiting  GENITOURINARY: No excessive urination  NEUROLOGICAL: No weakness  MUSCULOSKELETAL: + joint pain  HEMATOLOGIC: No easy bleeding  LYMPHATICS: No lymphedema. PSYCHIATRIC: No current depression  ENDOCRINOLOGIC: + high sugars  ALLERGIES: No history of asthma, hives, eczema or rhinitis.     Lab Results   Component Value Date/Time    Hemoglobin A1c 10.2 (H) 01/17/2019 10:03 AM        Immunization History   Administered Date(s) Administered    Influenza High Dose Vaccine PF 10/14/2014, 10/05/2017, 10/25/2018    Influenza Vaccine 09/16/2009, 09/09/2013, 10/12/2015, 09/15/2016    Influenza Vaccine (Whole Virus) 10/01/2011, 08/27/2012    Pneumococcal Conjugate (PCV-13) 03/18/2015    Pneumococcal Polysaccharide (PPSV-23) 10/01/2000    TB Skin Test (PPD) Intradermal 01/18/2019, 02/25/2019, 03/25/2019    Zoster Vaccine, Live 06/03/2013       Body mass index is 29.63 kg/m². Current medications:  Current Outpatient Medications   Medication Sig Dispense Refill    colchicine 0.6 mg tablet Take 0.6 mg by mouth two (2) times a day. Indications: for 3 days, end 5/20      oxybutynin chloride XL (DITROPAN XL) 5 mg CR tablet Take 5 mg by mouth daily.  torsemide (DEMADEX) 10 mg tablet Take 10 mg by mouth two (2) times a day.  methadone (DOLOPHINE) 5 mg tablet Take 1 Tab by mouth every six (6) hours. Max Daily Amount: 20 mg. Pt has been stable on this dose for many years, please do not taper or stop. Instructed to take DOS per Anesthesia guidelines. Indications: chronic pain, narcotic addiction 20 Tab 0    DULoxetine (CYMBALTA) 60 mg capsule Take 1 Cap by mouth daily. 10 Cap 0    clonazePAM (KLONOPIN) 0.5 mg tablet Take 1 Tab by mouth three (3) times daily. Max Daily Amount: 1.5 mg. Indications: anxiety 10 Tab 0    carvedilol (COREG) 6.25 mg tablet Take 1 Tab by mouth two (2) times daily (with meals). 10 Tab 0    amLODIPine (NORVASC) 10 mg tablet Take 1 Tab by mouth daily. (Patient taking differently: Take 5 mg by mouth daily. ) 10 Tab 0    predniSONE (DELTASONE) 5 mg tablet Take 1 Tab by mouth two (2) times a day. 5mg BID (Patient taking differently: Take 10 mg by mouth two (2) times a day. 5mg BID) 1 Tab 0    docusate sodium (COLACE) 100 mg capsule Take 1 Cap by mouth nightly. 1 Cap 0    Lactobacillus acidophilus (ACIDOPHILUS) cap Take 1 Cap by mouth daily.  insulin lispro (HUMALOG) 100 unit/mL kwikpen 5 Units by SubCUTAneous route Before breakfast, lunch, and dinner.  Indications: 150-200= 2 units; 201-250= 4 units; 251-300= 6 units; >300 8 units      insulin detemir U-100 (LEVEMIR FLEXTOUCH) 100 unit/mL (3 mL) inpn 30 Units by SubCUTAneous route every morning. Indications: type 2 diabetes mellitus      mirtazapine (REMERON) 7.5 mg tablet Take 7.5 mg by mouth nightly.  nystatin (MYCOSTATIN) powder Apply  to affected area three (3) times daily. Under breasts and ABD folds      potassium chloride (KLOR-CON) 10 mEq tablet Take 10 mEq by mouth two (2) times a day.  acetaminophen (TYLENOL EXTRA STRENGTH) 500 mg tablet Take 500 mg by mouth every six (6) hours as needed for Pain.  levothyroxine (SYNTHROID) 75 mcg tablet Take 75 mcg by mouth Daily (before breakfast).  multivitamin with minerals (HAIR,SKIN AND NAILS PO) Take 1 Tab by mouth daily.  CHOLECALCIFEROL, VITAMIN D3, (VITAMIN D3 PO) Take 1,000 mg by mouth daily.  CALCIUM CARBONATE/VITAMIN D3 (CALCIUM 600 + D,3, PO) Take 600 mg by mouth daily.  KRILL/OM-3/DHA/EPA/PHOSPHO/AST (MEGARED OMEGA-3 KRILL OIL PO) Take 1 Cap by mouth daily.  aspirin delayed-release 81 mg tablet Take 81 mg by mouth nightly. Ok to continue per anesthesia guidelines. Objective:     Physical Exam:     Visit Vitals  /69 (BP 1 Location: Right arm, BP Patient Position: Sitting)   Pulse 86   Temp 98 °F (36.7 °C)   Resp 16   Ht 5' 4\" (1.626 m)   Wt 78.3 kg (172 lb 9.6 oz)   SpO2 95%   BMI 29.63 kg/m²       General: well developed, well nourished  Psych: cooperative. Pleasant  Neuro: alert and oriented to person/place/situation. Otherwise nonfocal.  Derm: Normal turgor for age, dry skin  HEENT: Normocephalic, atraumatic. EOMI. Neck: Normal range of motion.   Chest: Respirations nonlabored  Cardio: RRR  Abdomen: Soft, nondistended  Lower extremities:   No hemosiderrosis  No significant varicosities  Capillary refill <3 sec    Right  1+ DP/PT    Left  1+ DP/PT                Ulcer Description:   Wound Heel Left (Active)   Dressing Status Clean, dry, and intact 9/13/2019 11:33 AM   Non-staged Wound Description Full thickness 8/23/2019  8:41 AM   Wound Length (cm) 0.9 cm 9/13/2019 11:33 AM   Wound Width (cm) 1.5 cm 9/13/2019 11:33 AM   Wound Depth (cm) 0.1 cm 9/13/2019 11:33 AM   Wound Volume (cm^3) 0.14 cm^3 9/13/2019 11:33 AM   Condition of Base Granulation 9/13/2019 11:33 AM   Tissue Type Percent Red 100 9/13/2019 11:33 AM   Drainage Amount Moderate 9/13/2019 11:33 AM   Drainage Color Serosanguinous 9/13/2019 11:33 AM   Wound Odor None 9/13/2019 11:33 AM   Cleansing and Cleansing Agents  Normal saline 9/13/2019 11:33 AM   Dressing Changed Changed/New 9/13/2019 11:33 AM   Procedure Tolerated Well 8/23/2019  8:41 AM   Number of days: 67       Wound Toe (Comment  which one) Distal;Right (Active)   Dressing Status Clean, dry, and intact 9/13/2019 11:33 AM   Non-staged Wound Description Full thickness 9/13/2019 11:33 AM   Wound Length (cm) 0.6 cm 9/13/2019 11:33 AM   Wound Width (cm) 0.5 cm 9/13/2019 11:33 AM   Wound Depth (cm) 0.1 cm 9/13/2019 11:33 AM   Wound Volume (cm^3) 0.03 cm^3 9/13/2019 11:33 AM   Condition of Base [place] 9/13/2019 11:33 AM   Tissue Type Percent Pink 100 9/13/2019 11:33 AM   Drainage Amount Small 9/13/2019 11:33 AM   Drainage Color Serosanguinous 9/13/2019 11:33 AM   Wound Odor None 9/13/2019 11:33 AM   Cleansing and Cleansing Agents  Normal saline 9/13/2019 11:33 AM   Number of days: 4       [REMOVED] Wound Heel Left (Removed)   Number of days: 151       [REMOVED] Wound Finger (specify in comments) Anterior; Left (Removed)   Number of days: 150       [REMOVED] Wound Buttocks Posterior;Right (Removed)   Number of days: 49       [REMOVED] Wound Heel Left (Removed)   Number of days: 52       [REMOVED] Wound Groin Right (Removed)   Number of days: 45       [REMOVED] Wound Heel Left (Removed)   Dressing Status Old drainage 6/28/2019  2:05 PM   Non-staged Wound Description Full thickness 6/28/2019  2:05 PM   Wound Length (cm) 4 cm 6/28/2019  2:05 PM   Wound Width (cm) 4 cm 6/28/2019  2:05 PM   Wound Depth (cm) 0.1 cm 6/28/2019  2:05 PM   Wound Volume (cm^3) 1.6 cm^3 6/28/2019  2:05 PM   Condition of Base Granulation;Slough 6/28/2019  2:05 PM   Assessment Black; Red 6/14/2019  2:37 PM   Tissue Type Percent Black 5 % 6/14/2019  2:37 PM   Tissue Type Percent Red 90 6/28/2019  2:05 PM   Tissue Type Percent Yellow 10 6/28/2019  2:05 PM   Drainage Amount Moderate 6/28/2019  2:05 PM   Drainage Color Serosanguinous; Yellow 6/28/2019  2:05 PM   Wound Odor None 6/28/2019  2:05 PM   [place]-wound Assessment Intact 6/28/2019  2:05 PM   Cleansing and Cleansing Agents  Normal saline 6/28/2019  2:05 PM   Dressing Changed Changed/New 6/14/2019  2:37 PM   Number of days: 22       [REMOVED] Wound Toe (Comment  which one) Left 4th Toe (Removed)   Dressing Status Dry 6/14/2019  2:37 PM   Wound Length (cm) 1.2 cm 6/14/2019  2:37 PM   Wound Width (cm) 1 cm 6/14/2019  2:37 PM   Wound Depth (cm) 0.1 cm 6/14/2019  2:37 PM   Wound Volume (cm^3) 0.12 cm^3 6/14/2019  2:37 PM   Condition of Base [place] 6/14/2019  2:37 PM   Tissue Type Percent Black 100 % 6/14/2019  2:37 PM   Drainage Amount None 6/14/2019  2:37 PM   Wound Odor None 6/14/2019  2:37 PM   Cleansing and Cleansing Agents  Normal saline 6/14/2019  2:37 PM   Dressing Changed Changed/New 6/14/2019  2:37 PM   Number of days: 0       [REMOVED] Wound Buttocks Left partial thickness skin loss, friction MASD (Removed)   Number of days: 2           Problem List  Date Reviewed: 5/25/2016          Codes Class Noted    Acute pulmonary edema ([facility] [address] ICD-10-CM: J81.0  ICD-9-CM: 518.4  7/16/2019        Sepsis secondary to UTI [facility]) ICD-10-CM: A41.9, N39.0  ICD-9-CM: 038.9, 995.91, 599.0  7/4/2019        Respiratory failure ([facility] ICD-10-CM: J96.90  ICD-9-CM: 518.81  7/3/2019        Non healing left heel wound ICD-10-CM: S91.302A  ICD-9-CM: 892.1  5/17/2019        Sepsis ([facility] ICD-10-CM: A41.9  ICD-9-CM: 038.9, 995.91  3/25/2019        Acute metabolic encephalopathy BYK-73-CG: G93.41  ICD-9-CM: 348.31  2/26/2019 Acute diarrhea ICD-10-CM: R19.7  ICD-9-CM: 787.91  2/26/2019        Oral thrush ICD-10-CM: B37.0  ICD-9-CM: 112.0  2/26/2019        Acute encephalopathy ICD-10-CM: G93.40  ICD-9-CM: 348.30  2/24/2019        Finger infection ICD-10-CM: L08.9  ICD-9-CM: 686.9  2/5/2019        Leukocytosis ICD-10-CM: D72.829  ICD-9-CM: 288.60  2/5/2019        RAYMON (acute kidney injury) ([facility] ICD-10-CM: N17.9  ICD-9-CM: 584.9  2/5/2019        Osteomyelitis of finger ([facility] ICD-10-CM: M86.9  ICD-9-CM: 730.24  2/5/2019        Closed rib fracture ICD-10-CM: S22.39XA  ICD-9-CM: 807.00  1/16/2019        Acute respiratory failure with hypoxia (HCC) ICD-10-CM: J96.01  ICD-9-CM: 518.81  1/16/2019        Acute prerenal azotemia ICD-10-CM: R79.89  ICD-9-CM: 790.6  1/16/2019        Chronic diastolic congestive heart failure (HCC) ICD-10-CM: I50.32  ICD-9-CM: 428.32, 428.0  1/16/2019        Acquired hypothyroidism ICD-10-CM: E03.9  ICD-9-CM: 244.9  1/16/2019        GERD (gastroesophageal reflux disease) ICD-10-CM: K21.9  ICD-9-CM: 530.81  1/16/2019        Mixed hyperlipidemia ICD-10-CM: E78.2  ICD-9-CM: 272.2  7/27/2018        DM2 (diabetes mellitus, type 2) ([facility] ICD-10-CM: E11.9  ICD-9-CM: 250.00  7/27/2018        Aortic valve sclerosis ICD-10-CM: I35.8  ICD-9-CM: 424.1  1/26/2018        Rapid heart rate ICD-10-CM: R00.0  ICD-9-CM: 785.0  1/26/2018        Essential hypertension with goal blood pressure less than 130/85 ICD-10-CM: I10  ICD-9-CM: 401.9  10/27/2017        Murmur ICD-10-CM: R01.1  ICD-9-CM: 785.2  10/4/2017        Bilateral leg edema ICD-10-CM: R60.0  ICD-9-CM: 782.3  10/4/2017        Swelling ICD-10-CM: R60.9  ICD-9-CM: 782.3  10/4/2017        Abnormal EKG ICD-10-CM: R94.31  ICD-9-CM: 794.31  10/4/2017        Urinary tract infection, site not specified ICD-10-CM: N39.0  ICD-9-CM: 599.0  4/22/2015                Assessment/Plan:     No slough in wound. Continue endoform.    Continue home health to assist daughter with dressing changes.   RTC 4 weeks    Signed By: Josep Rojas MD

## 2019-09-27 PROBLEM — M86.171 ACUTE OSTEOMYELITIS OF TOE OF RIGHT FOOT (HCC): Status: ACTIVE | Noted: 2019-01-01

## 2019-09-27 NOTE — ED PROVIDER NOTES
Patient presents the ER concerned about foot wound. Has had a chronic wound on the dorsal aspect of her second right toe. Was seen by wound care home health today. Reports when dressing was removed significant amount of foul-smelling drainage was expressed. Has noticed some erythema in the leg as well. Denies fevers, reports subjective chills. Chronically ill appearing here, dressing removed, open dime sized wound with surrounding erythema noted. Will obtain labs including blood cultures, x-ray of toe as well to rule out any gross signs of osteomyelitis. Triage examination and history was performed by me, further examination and history to be performed by other provider. Ashish Underwood MD 
 
 
Agree with Dr. Dao Garcia note. Wound became more painful yesterday and then opened and drained and was packed by wound care today. Robby Crawford MD 
 
The history is provided by the patient and a relative. Toe Pain This is a new problem. The current episode started yesterday. The problem occurs constantly. The problem has been gradually worsening. The pain is present in the right toes. The quality of the pain is described as aching and pounding. The pain is severe. Associated symptoms include limited range of motion. She has tried nothing for the symptoms. There has been no history of extremity trauma. Past Medical History:  
Diagnosis Date  Arthritis  Chronic diastolic congestive heart failure ([address] 1/16/2019  Chronic pain   
 bilat feet & hands  Edema BLE & bilat fingers; pt takes diuretic daily; pt states her swelling is related to the nerve disease she is being treated for at [facility]  GERD (gastroesophageal reflux disease) 1/16/2019  Gouty arthritis  History of MI (myocardial infarction) 1980's \"light heart attack\"; pt states MD at [facility] mentioned it to her; pt stated MD told her \"was on the back part of her heart & would not give her any problems\"; pt takes aspirin 81 mg daily  Hypertension   
 managed w/med  Hypothyroidism   
 s/p partial thyroidectomy 1960; managed w/med  Mild heartburn   
 takes tums prn  Neuropathy 1980's  
 pt states has been treated by [facility] for \"nerve problems in her feet & hands\"  Post-operative nausea and vomiting  Type 2 diabetes mellitus without complication, without long-term current use of insulin ([facility] 7/27/2018 Past Surgical History:  
Procedure Laterality Date  HX CYST REMOVAL Right 1950's  
 upper eyelid  HX DILATION AND CURETTAGE  [id number]  
 ectopic pregnancy; right BSO done  HX HEART CATHETERIZATION  late 1979  
 pt states no stents [address] [facility] [address] [facility]  
 pt states was exploratory abdominal surgery too  HX ORTHOPAEDIC Bilateral [id number] Foot; bone taken out of 5th toe on right foot; left foot great toe & 1st toe amputation  HX ORTHOPAEDIC Left 1992  
 nerve removed out of left leg [facility]  HX UROLOGICAL    
 cystoscopy Family History:  
Problem Relation Age of Onset  Cancer Mother  Heart Disease Father  Stroke Father Social History Socioeconomic History  Marital status:  Spouse name: Not on file  Number of children: Not on file  Years of education: Not on file  Highest education level: Not on file Occupational History  Not on file Social Needs  Financial resource strain: Not on file  Food insecurity:  
  Worry: Not on file Inability: Not on file  Transportation needs:  
  Medical: Not on file Non-medical: Not on file Tobacco Use  Smoking status: Never Smoker  Smokeless tobacco: Never Used Substance and Sexual Activity  Alcohol use: No  
 Drug use: No  
 Sexual activity: Not on file Lifestyle  Physical activity:  
  Days per week: Not on file Minutes per session: Not on file  Stress: Not on file Relationships  Social connections:  
  Talks on phone: Not on file Gets together: Not on file Attends Congregation service: Not on file Active member of club or organization: Not on file Attends meetings of clubs or organizations: Not on file Relationship status: Not on file  Intimate partner violence:  
  Fear of current or ex partner: Not on file Emotionally abused: Not on file Physically abused: Not on file Forced sexual activity: Not on file Other Topics Concern  Not on file Social History Narrative  Not on file ALLERGIES: Contrast agent [iodine]; Actifed [triprolidine-pseudoephedrine]; Allopurinol; Decongestant [pseudoephedrine hcl]; Lexapro [escitalopram oxalate]; Lyrica [pregabalin]; Minizide [prazosin-polythiazide]; Mobic [meloxicam]; Omnipaque [iohexol]; Sertraline; Spironolactone; and Sulfamethazine Review of Systems Constitutional: Negative for chills and fever. Respiratory: Negative for cough and shortness of breath. Cardiovascular: Positive for leg swelling. Musculoskeletal: Positive for joint swelling. Skin: Positive for color change and wound. All other systems reviewed and are negative. There were no vitals filed for this visit. Physical Exam  
Constitutional: She is oriented to person, place, and time. She appears well-developed and well-nourished. She appears distressed. Chronically ill-appearing mild distress HENT:  
Head: Normocephalic and atraumatic. Eyes: Conjunctivae are normal. Right eye exhibits no discharge. Left eye exhibits no discharge. Neck: Normal range of motion. Neck supple. Cardiovascular: Normal rate and regular rhythm. Pulmonary/Chest: Effort normal and breath sounds normal. No respiratory distress. Abdominal: Soft. She exhibits no distension. There is no tenderness.   
Musculoskeletal: She exhibits edema (4+ pitting bilaterally) and tenderness (Second toe with wound packing in 1 cm ulcer when packing is removed there is exposure of the bone). Normal distal pulses in right lower extremity left lower extremity with multiple toe amputations Neurological: She is alert and oriented to person, place, and time. No cranial nerve deficit. Skin: Skin is warm and dry. Capillary refill takes 2 to 3 seconds. She is not diaphoretic. There is erythema (Bilateral lower extremities right greater than left). Psychiatric: She has a normal mood and affect. Her behavior is normal.  
Nursing note and vitals reviewed. MDM Number of Diagnoses or Management Options Cellulitis of right lower extremity:  
Diabetic ulcer of toe of right foot associated with type 2 diabetes mellitus, with necrosis of bone ([facility]): Other acute osteomyelitis of right foot [facility]):  
Diagnosis management comments: Patient with necrotic ulcer and osteo-of second toe. Likely needs amputation. She also has cellulitic changes of the right lower extremity. She is given IV antibiotics and admitted to the hospitalist.  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 Urine studies and culture sent. Amount and/or Complexity of Data Reviewed Clinical lab tests: ordered and reviewed Tests in the radiology section of CPT®: ordered and reviewed Decide to obtain previous medical records or to obtain history from someone other than the patient: yes (Discussed with family) Review and summarize past medical records: yes (Patient is on chronic methadone and prednisone) Discuss the patient with other providers: yes Independent visualization of images, tracings, or specimens: yes (No ST elevation sinus rhythm) Risk of Complications, Morbidity, and/or Mortality Presenting problems: high Diagnostic procedures: moderate Management options: high Patient Progress Patient progress: stable Procedures

## 2019-09-28 NOTE — PROGRESS NOTES
Hospitalist Note Admit Date:  2019  6:15 PM  
Name:  Susan Somers Age:  80 y.o. 
:  1933 MRN:  [id number] PCP:  Javi Laws MD 
Treatment Team: Attending Provider: Yuliet Nielsen MD; Consulting Provider: Silvia Stephens MD; Charge Nurse: Pan Best; Physical Therapist: Perico Valenzuela PT 
 
HPI/Subjective:  
79 y/o WF with h/o obesity, DM2, HTN, chronic pain, gout, GERD, PAD s/p angioplasty, prior OM and left toe amputations presented on  with a right 2nd toe ulceration she noticed about 5 days PTA. Xray with bony changes and consideration of OM. Admitted for abx and surgical consultation. : Pain better. Eating lunch, appears comfortable. First noticed ulcer a few days ago. Clear drainage. Left heel ulcer (chronic) has been fine. No trauma to the foot. Minimal pain, some swelling of the foot. ROS neg otherwise. Objective:  
 
Patient Vitals for the past 24 hrs: 
 Temp Pulse Resp BP SpO2  
19 1109 98.4 °F (36.9 °C) 81 20 170/81 97 % 19 0817    196/80   
19 0723 97.7 °F (36.5 °C) 97 20 (!) 196/107 96 %  
19 0333 98.1 °F (36.7 °C) 88 20 143/68 91 %  
19 2148 98.7 °F (37.1 °C) 93 20 185/86 93 % 19  93 21 (!) 203/91 92 %  
19  91 19 196/89 94 % 19  91 15 185/88 92 %  
198     94 % 19 1652 97.9 °F (36.6 °C) (!) 110 18 (!) 126/93 94 % Oxygen Therapy O2 Sat (%): 97 % (19 1109) Pulse via Oximetry: 93 beats per minute (19) O2 Device: Room air (19) Estimated body mass index is 32.82 kg/m² as calculated from the following: 
  Height as of this encounter: 5' 3\" (1.6 m). Weight as of this encounter: 84.1 kg (185 lb 4.8 oz). Intake/Output Summary (Last 24 hours) at 2019 1207 Last data filed at 2019 7371 Gross per 24 hour Intake 600 ml Output 600 ml Net 0 ml *Note that automatically entered I/Os may not be accurate; dependent on patient compliance with collection and accurate  by [email or web address]. General:    Well nourished. Alert. Obese. CV:   RRR. No murmur, rub, or gallop. Lungs:   CTAB. No wheezing, rhonchi, or rales. Abdomen:   Soft, nontender, nondistended. Extremities: Warm and dry. No cyanosis or edema. Skin:     No rashes or jaundice. Chronic left heel ulceration, bandaged, overlying scab. Right second toe ulceration on dorsum, 2x2cm. 2+ pitting edema LE. Neuro:  No gross focal deficits Data Review: 
I have reviewed all labs, meds, and studies from the last 24 hours: 
 
Recent Results (from the past 24 hour(s)) CBC WITH AUTOMATED DIFF Collection Time: 09/27/19  4:58 PM  
Result Value Ref Range WBC 12.6 (H) 4.3 - 11.1 K/uL  
 RBC 4.70 4.05 - 5.2 M/uL  
 HGB 13.3 11.7 - 15.4 g/dL HCT 42.9 35.8 - 46.3 % MCV 91.3 79.6 - 97.8 FL  
 MCH 28.3 26.1 - 32.9 PG  
 MCHC 31.0 (L) 31.4 - 35.0 g/dL  
 RDW 17.2 (H) 11.9 - 14.6 % PLATELET 016 341 - 249 K/uL MPV 10.3 9.4 - 12.3 FL ABSOLUTE NRBC 0.00 0.0 - 0.2 K/uL  
 DF AUTOMATED NEUTROPHILS 78 43 - 78 % LYMPHOCYTES 12 (L) 13 - 44 % MONOCYTES 6 4.0 - 12.0 % EOSINOPHILS 0 (L) 0.5 - 7.8 % BASOPHILS 1 0.0 - 2.0 % IMMATURE GRANULOCYTES 4 0.0 - 5.0 %  
 ABS. NEUTROPHILS 9.8 (H) 1.7 - 8.2 K/UL  
 ABS. LYMPHOCYTES 1.5 0.5 - 4.6 K/UL  
 ABS. MONOCYTES 0.7 0.1 - 1.3 K/UL  
 ABS. EOSINOPHILS 0.0 0.0 - 0.8 K/UL  
 ABS. BASOPHILS 0.1 0.0 - 0.2 K/UL  
 ABS. IMM. GRANS. 0.5 0.0 - 0.5 K/UL METABOLIC PANEL, COMPREHENSIVE Collection Time: 09/27/19  4:58 PM  
Result Value Ref Range Sodium 140 136 - 145 mmol/L Potassium 3.8 3.5 - 5.1 mmol/L Chloride 98 98 - 107 mmol/L  
 CO2 35 (H) 21 - 32 mmol/L Anion gap 7 7 - 16 mmol/L Glucose 265 (H) 65 - 100 mg/dL BUN 38 (H) 8 - 23 MG/DL Creatinine 1.43 (H) 0.6 - 1.0 MG/DL  
 GFR est AA 45 (L) >60 ml/min/1.73m2 GFR est non-AA 37 (L) >60 ml/min/1.73m2 Calcium 7.3 (L) 8.3 - 10.4 MG/DL Bilirubin, total 0.2 0.2 - 1.1 MG/DL  
 ALT (SGPT) 37 12 - 65 U/L  
 AST (SGOT) 20 15 - 37 U/L Alk. phosphatase 84 50 - 136 U/L Protein, total 7.4 6.3 - 8.2 g/dL Albumin 2.9 (L) 3.2 - 4.6 g/dL Globulin 4.5 (H) 2.3 - 3.5 g/dL A-G Ratio 0.6 (L) 1.2 - 3.5 CULTURE, BLOOD Collection Time: 09/27/19  4:58 PM  
Result Value Ref Range Special Requests: LEFT Antecubital 
    
 Culture result: NO GROWTH AFTER 14 HOURS    
C REACTIVE PROTEIN, QT Collection Time: 09/27/19  4:58 PM  
Result Value Ref Range C-Reactive protein 2.7 (H) 0.0 - 0.9 mg/dL PROCALCITONIN Collection Time: 09/27/19  4:58 PM  
Result Value Ref Range Procalcitonin 0.1 ng/mL HEMOGLOBIN A1C WITH EAG Collection Time: 09/27/19  4:58 PM  
Result Value Ref Range Hemoglobin A1c 8.5 (H) 4.8 - 6.0 % Est. average glucose 197 mg/dL POC LACTIC ACID Collection Time: 09/27/19  4:59 PM  
Result Value Ref Range Lactic Acid (POC) 3.20 (H) 0.5 - 1.9 mmol/L  
CULTURE, BLOOD Collection Time: 09/27/19  6:44 PM  
Result Value Ref Range Special Requests: RIGHT Antecubital 
    
 Culture result: NO GROWTH AFTER 12 HOURS    
CULTURE, WOUND W GRAM STAIN Collection Time: 09/27/19  7:52 PM  
Result Value Ref Range Special Requests: NO SPECIAL REQUESTS    
 GRAM STAIN NO WBCS SEEN    
 GRAM STAIN NO DEFINITE ORGANISM SEEN Culture result:     
  NO GROWTH AFTER SHORT PERIOD OF INCUBATION. FURTHER RESULTS TO FOLLOW AFTER OVERNIGHT INCUBATION. URINALYSIS W/ RFLX MICROSCOPIC Collection Time: 09/27/19  7:52 PM  
Result Value Ref Range Color YELLOW Appearance CLEAR Specific gravity 1.011 1.001 - 1.023    
 pH (UA) 7.0 5.0 - 9.0 Protein 30 (A) NEG mg/dL Glucose 250 mg/dL Ketone NEGATIVE  NEG mg/dL Bilirubin NEGATIVE  NEG Blood NEGATIVE  NEG Urobilinogen 0.2 0.2 - 1.0 EU/dL Nitrites NEGATIVE  NEG Leukocyte Esterase NEGATIVE  NEG    
 WBC 0-3 0 /hpf  
 RBC 0-3 0 /hpf Epithelial cells 0 0 /hpf Bacteria 3+ (H) 0 /hpf Casts 0 0 /lpf EKG, 12 LEAD, INITIAL Collection Time: 09/27/19  7:52 PM  
Result Value Ref Range Ventricular Rate 91 BPM  
 Atrial Rate 91 BPM  
 P-R Interval 184 ms QRS Duration 126 ms  
 Q-T Interval 352 ms QTC Calculation (Bezet) 432 ms Calculated P Axis 70 degrees Calculated R Axis -19 degrees Calculated T Axis 15 degrees Diagnosis    
  !!! Poor data quality, interpretation may be adversely affected Sinus rhythm with Fusion complexes Right bundle branch block Inferior infarct (cited on or before 17-MAY-2019) Anteroseptal infarct (cited on or before 17-MAY-2019) Abnormal ECG When compared with ECG of 17-MAY-2019 23:47, 
Poor data quality in current ECG precludes serial comparison Confirmed by ST FITZ ALEXANDRE MD (), JANIS ANDRES ([id number]) on 9/28/2019 11:24:37 AM 
  
POC LACTIC ACID Collection Time: 09/27/19  8:46 PM  
Result Value Ref Range Lactic Acid (POC) 2.03 (H) 0.5 - 1.9 mmol/L  
CULTURE, WOUND W GRAM STAIN Collection Time: 09/27/19  9:15 PM  
Result Value Ref Range Special Requests: NO SPECIAL REQUESTS    
 GRAM STAIN NO WBCS SEEN    
 GRAM STAIN NO DEFINITE ORGANISM SEEN Culture result:     
  NO GROWTH AFTER SHORT PERIOD OF INCUBATION. FURTHER RESULTS TO FOLLOW AFTER OVERNIGHT INCUBATION. GLUCOSE, POC Collection Time: 09/27/19 10:34 PM  
Result Value Ref Range Glucose (POC) 150 (H) 65 - 100 mg/dL METABOLIC PANEL, BASIC Collection Time: 09/28/19  5:49 AM  
Result Value Ref Range Sodium 143 136 - 145 mmol/L Potassium 3.9 3.5 - 5.1 mmol/L Chloride 104 98 - 107 mmol/L  
 CO2 31 21 - 32 mmol/L Anion gap 8 7 - 16 mmol/L Glucose 290 (H) 65 - 100 mg/dL BUN 31 (H) 8 - 23 MG/DL Creatinine 1.15 (H) 0.6 - 1.0 MG/DL  
 GFR est AA 58 (L) >60 ml/min/1.73m2 GFR est non-AA 48 (L) >60 ml/min/1.73m2 Calcium 7.9 (L) 8.3 - 10.4 MG/DL  
CBC W/O DIFF Collection Time: 09/28/19  5:49 AM  
Result Value Ref Range WBC 11.0 4.3 - 11.1 K/uL  
 RBC 4.33 4.05 - 5.2 M/uL  
 HGB 12.2 11.7 - 15.4 g/dL HCT 40.0 35.8 - 46.3 % MCV 92.4 79.6 - 97.8 FL  
 MCH 28.2 26.1 - 32.9 PG  
 MCHC 30.5 (L) 31.4 - 35.0 g/dL  
 RDW 17.1 (H) 11.9 - 14.6 % PLATELET 761 552 - 121 K/uL MPV 10.4 9.4 - 12.3 FL ABSOLUTE NRBC 0.00 0.0 - 0.2 K/uL GLUCOSE, POC Collection Time: 09/28/19  7:19 AM  
Result Value Ref Range Glucose (POC) 232 (H) 65 - 100 mg/dL GLUCOSE, POC Collection Time: 09/28/19 11:11 AM  
Result Value Ref Range Glucose (POC) 161 (H) 65 - 100 mg/dL All Micro Results Procedure Component Value Units Date/Time Enrigue Raring STAIN [[id number]] Collected:  09/27/19 1952 Order Status:  Completed Specimen:  Wound from Toe Updated:  09/28/19 1140 Special Requests: NO SPECIAL REQUESTS     
  GRAM STAIN NO WBCS SEEN     
   NO DEFINITE ORGANISM SEEN Culture result:    
  NO GROWTH AFTER SHORT PERIOD OF INCUBATION. FURTHER RESULTS TO FOLLOW AFTER OVERNIGHT INCUBATION. Enrigue Raring STAIN [[id number]] Collected:  09/27/19 2115 Order Status:  Completed Specimen:  Wound from Toe Updated:  09/28/19 1137 Special Requests: NO SPECIAL REQUESTS     
  GRAM STAIN NO WBCS SEEN     
   NO DEFINITE ORGANISM SEEN Culture result:    
  NO GROWTH AFTER SHORT PERIOD OF INCUBATION. FURTHER RESULTS TO FOLLOW AFTER OVERNIGHT INCUBATION. CULTURE, BLOOD [[id number]] Collected:  09/27/19 1658 Order Status:  Completed Specimen:  Blood Updated:  09/28/19 5865 Special Requests: --     
  LEFT Antecubital 
  
  Culture result: NO GROWTH AFTER 14 HOURS     
 CULTURE, BLOOD [[id number]] Collected:  09/27/19 1844 Order Status:  Completed Specimen:  Blood Updated:  09/28/19 6783 Special Requests: --     
  RIGHT Antecubital 
  
  Culture result: NO GROWTH AFTER 12 HOURS No results found for this visit on 09/27/19. Current Meds: 
Current Facility-Administered Medications Medication Dose Route Frequency  amLODIPine (NORVASC) tablet 5 mg  5 mg Oral DAILY  aspirin delayed-release tablet 81 mg  81 mg Oral QHS  carvedilol (COREG) tablet 6.25 mg  6.25 mg Oral BID WITH MEALS  clonazePAM (KlonoPIN) tablet 0.5 mg  0.5 mg Oral TID  DULoxetine (CYMBALTA) capsule 60 mg  60 mg Oral DAILY  levothyroxine (SYNTHROID) tablet 75 mcg  75 mcg Oral ACB  methadone (DOLOPHINE) tablet 5 mg  5 mg Oral Q6H  
 mirtazapine (REMERON) tablet 7.5 mg  7.5 mg Oral QHS  oxybutynin chloride XL (DITROPAN XL) tablet 5 mg  5 mg Oral DAILY  potassium chloride (KLOR-CON) tablet 10 mEq  10 mEq Oral BID  predniSONE (DELTASONE) tablet 10 mg  10 mg Oral BID  sodium chloride (NS) flush 5-40 mL  5-40 mL IntraVENous Q8H  
 sodium chloride (NS) flush 5-40 mL  5-40 mL IntraVENous PRN  
 tuberculin injection 5 Units  5 Units IntraDERMal ONCE  
 acetaminophen (TYLENOL) tablet 650 mg  650 mg Oral Q6H PRN  
 HYDROmorphone (PF) (DILAUDID) injection 0.2 mg  0.2 mg IntraVENous Q4H PRN  
 naloxone (NARCAN) injection 0.4 mg  0.4 mg IntraVENous PRN  
 ondansetron (ZOFRAN) injection 4 mg  4 mg IntraVENous Q4H PRN  
 senna-docusate (PERICOLACE) 8.6-50 mg per tablet 1 Tab  1 Tab Oral DAILY  heparin (porcine) injection 5,000 Units  5,000 Units SubCUTAneous Q12H  piperacillin-tazobactam (ZOSYN) 3.375 g in 0.9% sodium chloride (MBP/ADV) 100 mL  3.375 g IntraVENous Q8H  
 Saccharomyces boulardii (FLORASTOR) capsule 500 mg  500 mg Oral BID  insulin glargine (LANTUS) injection 20 Units  20 Units SubCUTAneous QHS  insulin lispro (HUMALOG) injection   SubCUTAneous AC&HS  insulin glargine (LANTUS) injection 10 Units  10 Units SubCUTAneous ACB  hydrALAZINE (APRESOLINE) tablet 25 mg  25 mg Oral TID  influenza vaccine 2019-20 (6 mos+)(PF) (FLUARIX/FLULAVAL/FLUZONE QUAD) injection 0.5 mL  0.5 mL IntraMUSCular PRIOR TO DISCHARGE Other Studies (last 24 hours): Xr 2nd Toe Rt Min 2 V Result Date: 9/27/2019 Right second toe CLINICAL INDICATION: Open wound along the dorsal aspect of the second toe, evaluate for osteomyelitis. FINDINGS: Three views of the right second toe submitted. Irregular destructive changes are noted at the PIP joint. Osteomyelitis or septic arthrosis should be considered. There is marked soft tissue swelling diffusely about the second toe. A screw is noted through the IP joint of the great toe. IMPRESSION: Destructive changes at the PIP joint of the second toe. Osteomyelitis/septic arthritis should be considered. Duplex Lower Ext Artery Bilat Result Date: 9/28/2019 TITLE: Lower extremity arterial ultrasound examination. INDICATION: Peripheral arterial disease. Left heel ulcer. Hypertension, dyslipidemia, diabetes mellitus. TECHNIQUE: Grayscale, color, and Doppler interrogation performed. COMPARISON: 5/19/2019. RIGHT LOWER EXTREMITY:  Peak systolic velocities-- CFA = 133, PFA = 118, SFA = 118, popliteal = 94, peroneal = 46, PTA = 76, SEBASTIAN = 84 cm/sec. Although some areas are difficult to interpret, in general there is a triphasic waveform throughout. LEFT LOWER EXTREMITY:  Peak systolic velocities-- CFA = 129, PFA = 91, SFA = 119, popliteal = 226, peroneal = 58, PTA = 68, SEBASTIAN = 95 cm/sec. Biphasic/triphasic waveform throughout. ABDOMEN:  Aorta = 51 cm/s. Diameter cannot be measured due to overlying bowel gas. 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 Xr Chest [facility] Result Date: 9/27/2019 Portable chest x-ray CLINICAL INDICATION: Sepsis FINDINGS: Single AP view the chest compared to a similar exam dated 7/5/2019 show the lungs to be expanded and clear. No pleural effusion or pneumothorax. Old rib fractures are noted on the right. IMPRESSION: No acute cardiopulmonary abnormality. Assessment and Plan:  
 
Hospital Problems as of 9/28/2019 Date Reviewed: 5/25/2016 Codes Class Noted - Resolved POA Uncontrolled diabetes mellitus ([facility] ICD-10-CM: E11.65 ICD-9-CM: 250.02  9/27/2019 - Present Unknown Acute osteomyelitis of toe of right foot ([facility] ICD-10-CM: M86.171 ICD-9-CM: 730.07  9/27/2019 - Present Unknown Sepsis ([facility] ICD-10-CM: A41.9 ICD-9-CM: 038.9, 995.91  3/25/2019 - Present Unknown Plan: # Acute OM right 2nd toe with ulceration - H/o PAD and metabolic syndrome, h/o angioplasty with vascular so will consult them regarding the ulceration. Looks like ortho recommends the same. ABIs pending. Con't abx. # DM2 
 - Started on basal insulin. Con't bolus. F/u sugars and adjust insulin as needed. # Chronic pain - Methadone # HTN 
 - Home meds # H/o PAD 
 - asa, add statin DC planning/Dispo: Unclear. Diet:  DIET DIABETIC CONSISTENT CARB 
DVT ppx: Heparin Signed: 
Aylin Wheeler MD

## 2019-09-28 NOTE — PROGRESS NOTES
Spoke to Dr. Arnaldo Alan r/t pt 10/10 hand and feet pain, tearfulness, and /107. PRN Dilaudid given, scheduled Klonopin, and scheduled PO BP medications given. Will reassess in 10 minutes for effectiveness. Dr. Arnaldo Alan to review for further needs. Update: Pain level continues at 9/10, moaning continued but appears more calm, and /80. Dr. Arnaldo Alan aware. No new orders at this time. Pt going down for BLE duplex. Hospitalist to assess upon return for further tx.

## 2019-09-28 NOTE — PROGRESS NOTES
Patient refused PT today complaining of pain in the foot. Awaiting vascular consult. She states pain medication is not helping much and she requests either no PT or PT to return on 9/29/2019 to attempt further evaluation. Cheryl Quezada, PT, DPT, OCS.

## 2019-09-28 NOTE — H&P
HOSPITALIST H&P/CONSULT 
NAME:  Darrell Chin Age:  80 y.o. 
:   1933 MRN:   [id number] PCP: Arianne Doyle MD 
Consulting MD: Treatment Team: Attending Provider: Cordell Dos Santos MD; Primary Nurse: Howard Alvarenga RN 
HPI:  
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 Currently, pt reports redness and swelling in bilateral lower legs with deep ulcerated wound on the dorsum of right 2nd toe. Pt c/o pain, 7/10 in severity, constant, burning, not associated with fever, chills, night sweats. No other c/o chest pain, urinary symptoms, cough, SOB, palpitations, nausea/vomiting, diarrhea, abdominal pain. Foot x-ray showed destructive changes at the PIp joint of the 2nd toe with suspicion of osteomyelitis vs septic arthritis. HR ~110, WBC 12K, Cr 1.43, /89 Complete ROS done and is as stated in HPI or otherwise negative Past Medical History:  
Diagnosis Date  Arthritis  Chronic diastolic congestive heart failure ([address] 2019  Chronic pain   
 bilat feet & hands  Edema BLE & bilat fingers; pt takes diuretic daily; pt states her swelling is related to the nerve disease she is being treated for at [facility]  GERD (gastroesophageal reflux disease) 2019  Gouty arthritis  History of MI (myocardial infarction)  \"light heart attack\"; pt states MD at [facility] mentioned it to her; pt stated MD told her \"was on the back part of her heart & would not give her any problems\"; pt takes aspirin 81 mg daily  Hypertension   
 managed w/med  Hypothyroidism   
 s/p partial thyroidectomy ; managed w/med  Mild heartburn   
 takes tums prn  Neuropathy  pt states has been treated by [facility] for \"nerve problems in her feet & hands\"  Post-operative nausea and vomiting  Type 2 diabetes mellitus without complication, without long-term current use of insulin ([address] 7/27/2018 Past Surgical History:  
Procedure Laterality Date  HX CYST REMOVAL Right 1950's  
 upper eyelid  HX DILATION AND CURETTAGE  [id number]  
 ectopic pregnancy; right BSO done  HX HEART CATHETERIZATION  late 1979  
 pt states no stents [address] [facility] [address] [facility] [address] [facility]  
 pt states was exploratory abdominal surgery too  HX ORTHOPAEDIC Bilateral [id number] Foot; bone taken out of 5th toe on right foot; left foot great toe & 1st toe amputation  HX ORTHOPAEDIC Left 1992  
 nerve removed out of left leg [address] [facility]  HX UROLOGICAL    
 cystoscopy Prior to Admission Medications Prescriptions Last Dose Informant Patient Reported? Taking? CALCIUM CARBONATE/VITAMIN D3 (CALCIUM 600 + D,3, PO)   Yes Yes Sig: Take 600 mg by mouth daily. DULoxetine (CYMBALTA) 60 mg capsule   No Yes Sig: Take 1 Cap by mouth daily. Lactobacillus acidophilus (ACIDOPHILUS) cap   Yes Yes Sig: Take 1 Cap by mouth daily. acetaminophen (TYLENOL EXTRA STRENGTH) 500 mg tablet   Yes Yes Sig: Take 500 mg by mouth every six (6) hours as needed for Pain. amLODIPine (NORVASC) 10 mg tablet   No Yes Sig: Take 1 Tab by mouth daily. Patient taking differently: Take 5 mg by mouth daily. aspirin delayed-release 81 mg tablet   Yes Yes Sig: Take 81 mg by mouth nightly. Ok to continue per anesthesia guidelines. carvedilol (COREG) 6.25 mg tablet   No Yes Sig: Take 1 Tab by mouth two (2) times daily (with meals). clonazePAM (KLONOPIN) 0.5 mg tablet   No Yes Sig: Take 1 Tab by mouth three (3) times daily. Max Daily Amount: 1.5 mg. Indications: anxiety colchicine 0.6 mg tablet   Yes Yes Sig: Take 0.6 mg by mouth two (2) times a day. Indications: for 3 days, end   
docusate sodium (COLACE) 100 mg capsule   No Yes Sig: Take 1 Cap by mouth nightly. insulin detemir U-100 (LEVEMIR FLEXTOUCH) 100 unit/mL (3 mL) inpn   Yes Yes Si Units by SubCUTAneous route every morning. Indications: type 2 diabetes mellitus  
insulin lispro (HUMALOG) 100 unit/mL kwikpen   Yes Yes Si Units by SubCUTAneous route Before breakfast, lunch, and dinner. Indications: 150-200= 2 units; 201-250= 4 units; 251-300= 6 units; >300 8 units  
levothyroxine (SYNTHROID) 75 mcg tablet   Yes Yes Sig: Take 75 mcg by mouth Daily (before breakfast). methadone (DOLOPHINE) 5 mg tablet   No Yes Sig: Take 1 Tab by mouth every six (6) hours. Max Daily Amount: 20 mg. Pt has been stable on this dose for many years, please do not taper or stop. Instructed to take DOS per Anesthesia guidelines. Indications: chronic pain, narcotic addiction  
mirtazapine (REMERON) 7.5 mg tablet   Yes Yes Sig: Take 7.5 mg by mouth nightly. multivitamin with minerals (HAIR,SKIN AND NAILS PO)   Yes Yes Sig: Take 1 Tab by mouth daily. nystatin (MYCOSTATIN) powder   Yes Yes Sig: Apply  to affected area three (3) times daily. Under breasts and ABD folds  
oxybutynin chloride XL (DITROPAN XL) 5 mg CR tablet   Yes Yes Sig: Take 5 mg by mouth daily. potassium chloride (KLOR-CON) 10 mEq tablet   Yes Yes Sig: Take 10 mEq by mouth two (2) times a day. predniSONE (DELTASONE) 5 mg tablet   No Yes Sig: Take 1 Tab by mouth two (2) times a day. 5mg BID Patient taking differently: Take 10 mg by mouth two (2) times a day. 5mg BID  
torsemide (DEMADEX) 10 mg tablet   Yes Yes Sig: Take 10 mg by mouth two (2) times a day. Facility-Administered Medications: None Allergies Allergen Reactions  Contrast Agent [Iodine] Anaphylaxis  Actifed [Triprolidine-Pseudoephedrine] Nausea Only  Allopurinol Other (comments) Elevated blood pressure & pt states could not walk or see until medication was out of her system.  Decongestant [Pseudoephedrine Hcl] Rash and Itching  Lexapro [Escitalopram Oxalate] Other (comments) Lips/Mouth swelling  Lyrica [Pregabalin] Unknown (comments)  Minizide [Prazosin-Polythiazide] Unknown (comments)  Mobic [Meloxicam] Other (comments) Facial swelling  Omnipaque [Iohexol] Hives, Swelling and Other (comments) Wheezing and/or shortness of breath  Sertraline Other (comments) Mouth, lips swell  Spironolactone Itching Pt complains that she breaks out in clammy sweat with itching and stinging in her upper arms, dry mouth, funny feeling tongue, SOB, and increased anxiety.  Sulfamethazine Other (comments) Mouth/lips swelling Social History Tobacco Use  Smoking status: Never Smoker  Smokeless tobacco: Never Used Substance Use Topics  Alcohol use: No  
  
Family History Problem Relation Age of Onset  Cancer Mother  Heart Disease Father  Stroke Father Objective:  
 
Visit Vitals /88 Pulse 91 Temp 97.9 °F (36.6 °C) Resp 15 Ht 5' 3\" (1.6 m) Wt 77.1 kg (170 lb) SpO2 92% BMI 30.11 kg/m² Temp (24hrs), Av.9 °F (36.6 °C), Min:97.9 °F (36.6 °C), Max:97.9 °F (36.6 °C) Oxygen Therapy O2 Sat (%): 92 % (19) Pulse via Oximetry: 92 beats per minute (19) O2 Device: Room air (19) Physical Exam: 
General:    Alert, cooperative, morbidly obese, NAD Head:   Normocephalic, without obvious abnormality, atraumatic. Nose:  Nares normal. No drainage or sinus tenderness. Lungs:   Clear to auscultation bilaterally. No Wheezing or Rhonchi. No rales. Heart:   Regular rate and rhythm,  no murmur, rub or gallop. Abdomen:   Soft, non-tender. Not distended.   Bowel sounds normal.  
 Extremities: 3-4 mm deep ulcerated wound with bleeding on dorsum of right 2nd toe, dry eschar noted on tip of right big toe. Left foot wrapped, left 1st and 2nd toe amputated, erythema and slight warmth of lower 1/3rd of B/L LE 
Skin:     As mentioned above, multiple superficial bruises through out the body, mostly on extremities. Neurologic: GCS 15, no motor or sensory deficits, CN 2-12 intact Psych:             AO x3, mood and affect appropriate Data Review:  
Recent Results (from the past 24 hour(s)) CBC WITH AUTOMATED DIFF Collection Time: 09/27/19  4:58 PM  
Result Value Ref Range WBC 12.6 (H) 4.3 - 11.1 K/uL  
 RBC 4.70 4.05 - 5.2 M/uL  
 HGB 13.3 11.7 - 15.4 g/dL HCT 42.9 35.8 - 46.3 % MCV 91.3 79.6 - 97.8 FL  
 MCH 28.3 26.1 - 32.9 PG  
 MCHC 31.0 (L) 31.4 - 35.0 g/dL  
 RDW 17.2 (H) 11.9 - 14.6 % PLATELET 100 371 - 884 K/uL MPV 10.3 9.4 - 12.3 FL ABSOLUTE NRBC 0.00 0.0 - 0.2 K/uL  
 DF AUTOMATED NEUTROPHILS 78 43 - 78 % LYMPHOCYTES 12 (L) 13 - 44 % MONOCYTES 6 4.0 - 12.0 % EOSINOPHILS 0 (L) 0.5 - 7.8 % BASOPHILS 1 0.0 - 2.0 % IMMATURE GRANULOCYTES 4 0.0 - 5.0 %  
 ABS. NEUTROPHILS 9.8 (H) 1.7 - 8.2 K/UL  
 ABS. LYMPHOCYTES 1.5 0.5 - 4.6 K/UL  
 ABS. MONOCYTES 0.7 0.1 - 1.3 K/UL  
 ABS. EOSINOPHILS 0.0 0.0 - 0.8 K/UL  
 ABS. BASOPHILS 0.1 0.0 - 0.2 K/UL  
 ABS. IMM. GRANS. 0.5 0.0 - 0.5 K/UL METABOLIC PANEL, COMPREHENSIVE Collection Time: 09/27/19  4:58 PM  
Result Value Ref Range Sodium 140 136 - 145 mmol/L Potassium 3.8 3.5 - 5.1 mmol/L Chloride 98 98 - 107 mmol/L  
 CO2 35 (H) 21 - 32 mmol/L Anion gap 7 7 - 16 mmol/L Glucose 265 (H) 65 - 100 mg/dL BUN 38 (H) 8 - 23 MG/DL Creatinine 1.43 (H) 0.6 - 1.0 MG/DL  
 GFR est AA 45 (L) >60 ml/min/1.73m2 GFR est non-AA 37 (L) >60 ml/min/1.73m2 Calcium 7.3 (L) 8.3 - 10.4 MG/DL  Bilirubin, total 0.2 0.2 - 1.1 MG/DL  
 ALT (SGPT) 37 12 - 65 U/L  
 AST (SGOT) 20 15 - 37 U/L  
 Alk. phosphatase 84 50 - 136 U/L Protein, total 7.4 6.3 - 8.2 g/dL Albumin 2.9 (L) 3.2 - 4.6 g/dL Globulin 4.5 (H) 2.3 - 3.5 g/dL A-G Ratio 0.6 (L) 1.2 - 3.5 C REACTIVE PROTEIN, QT Collection Time: 09/27/19  4:58 PM  
Result Value Ref Range C-Reactive protein 2.7 (H) 0.0 - 0.9 mg/dL PROCALCITONIN Collection Time: 09/27/19  4:58 PM  
Result Value Ref Range Procalcitonin 0.1 ng/mL POC LACTIC ACID Collection Time: 09/27/19  4:59 PM  
Result Value Ref Range Lactic Acid (POC) 3.20 (H) 0.5 - 1.9 mmol/L Imaging Leisa Martines Leal Nazario All diagnostic imaging personally reviewed by me. Right second toe 
  
CLINICAL INDICATION: Open wound along the dorsal aspect of the second toe, 
evaluate for osteomyelitis. 
  
FINDINGS: Three views of the right second toe submitted. Irregular destructive 
changes are noted at the PIP joint. Osteomyelitis or septic arthrosis should be 
considered. There is marked soft tissue swelling diffusely about the second toe. A screw is noted through the IP joint of the great toe. 
  
IMPRESSION IMPRESSION: Destructive changes at the PIP joint of the second toe. Osteomyelitis/septic arthritis should be considered Assessment and Plan: Active Hospital Problems Diagnosis Date Noted  Uncontrolled diabetes mellitus ([facility] [address] 09/27/2019  Acute osteomyelitis of toe of right foot ([facility] [address] 09/27/2019  Sepsis ([facility] [address] 03/25/2019 PLAN 
· Admit as inpatient for sepsis due to acute osteomyelitis of right 2nd toe, with acute renal failure and uncontrolled DM-2 
· F/u with wound culture, sent from ER · Check blood cultures · Start on empiric IV Zosyn and Vancomycin (renally adjusted). Pharmacy consulted for vanc dosing · Elevated lactic acid, start on IV LR @ 100 cc/hr for 12 hours. Cautious IV fluids in view of hx of dCHF · Holding demadex in view of RAYMON.  Recheck BMP in AM 
· Check arterial doppler for JOCELYNE's 
 · Consult Ortho for possible right 2nd toe amputation vs I&D · Will benefit to discuss with ID if pt will need long term antibiotics. · Start Coreg and norvasc. Will add hydralazine for optimal BP control. Give 1 dose of labetalol for /91 · Check A1c. Start on lantus 20 units qhs and 10 units in AM. Start humalog SSI. Monitor POC glucose qachs · Resume all other home meds including methadone, prednisone · Wound care consult 
· PT/OT eval 
· PPD ordered · DVT prophylaxis with heparin Code Status: full High risk with opioids on board Anticipated discharge: >2MN Signed By: Louie Thomson MD   
 September 27, 2019

## 2019-09-28 NOTE — PROGRESS NOTES
Hourly rounding completed on this shift. All needs met. PRN pain medication given to manage pain. Pt is currently resting in bed. Will continue to monitor and give report to oncoming nurse.

## 2019-09-28 NOTE — PROGRESS NOTES
TRANSFER - IN REPORT: 
 
Verbal report received from [facility]  on Vijaya Perkins  being received from ED for routine progression of care Report consisted of patients Situation, Background, Assessment and  
Recommendations(SBAR). Information from the following report(s) SBAR, Kardex and ED Summary was reviewed with the receiving nurse. Opportunity for questions and clarification was provided. Assessment completed upon patients arrival to unit and care assumed.

## 2019-09-28 NOTE — PROGRESS NOTES
ORTHO: 
 
CONSULTED FOR RIGHT 2ND TOE OSTEOMYELITIS 
OUS  
PATIENT WITH PREVIOUS LEFT 1ST AND 2ND TOE AMPUTATIONS BY VASCULAR SURGERY AND FOLLOWED AT THE WOUND CENTER 
 
GIVEN HER HISTORY OF UNCONTROLLED DM AND CHRONIC VENOUS INSUFFICIENCY, VASCULAR SURGERY WOULD BE MORE HELPFUL.  
 
THIS WAS DISCUSSED WITH PRIMARY NURSE

## 2019-09-28 NOTE — ED NOTES
TRANSFER - OUT REPORT: 
 
Verbal report given to Oraliabennynena(name) on Amy Brice  being transferred to [facility](unit) for routine progression of care Report consisted of patients Situation, Background, Assessment and  
Recommendations(SBAR). Information from the following report(s) ED Summary was reviewed with the receiving nurse. Lines:  
Peripheral IV 09/27/19 Left Antecubital (Active) Site Assessment Clean, dry, & intact 9/27/2019  5:00 PM  
Phlebitis Assessment 0 9/27/2019  5:00 PM  
Infiltration Assessment 0 9/27/2019  5:00 PM  
Dressing Status Clean, dry, & intact 9/27/2019  5:00 PM  
Dressing Type 4 X 4;Transparent 9/27/2019  5:00 PM  
Hub Color/Line Status Pink 9/27/2019  5:00 PM  
   
Peripheral IV 09/27/19 Right Antecubital (Active) Site Assessment Clean, dry, & intact 9/27/2019  6:45 PM  
Phlebitis Assessment 0 9/27/2019  6:45 PM  
Infiltration Assessment 0 9/27/2019  6:45 PM  
Dressing Status Clean, dry, & intact 9/27/2019  6:45 PM  
Dressing Type 4 X 4;Transparent 9/27/2019  6:45 PM  
Hub Color/Line Status Pink 9/27/2019  6:45 PM  
  
 
Opportunity for questions and clarification was provided. Patient transported with: 
 Patient-specific medications from Pharmacy Vancomycin Lactated Ringers

## 2019-09-28 NOTE — PROGRESS NOTES
Dr. Silvino Ramirez contacted r/t pt returned from Ultrasound and pain continues at 10/10. New order for Dilaudid 0.5 mg x 1 dose.

## 2019-09-29 NOTE — PROGRESS NOTES
Attempted to tx. Pt sleepy. She will be having amputation surgery 9/30. Will return after surgery with new orders including WB status.

## 2019-09-29 NOTE — PROGRESS NOTES
Pt is knonw to CM from previous care. Per pt she has [facility] RN services. She also reports that she is to have surgery on Monday 9/30/2019. Will follow plan of care and assist with supportive care referrals as needed. Care Management Interventions PCP Verified by CM: Earnestine Abdi) Mode of Transport at Discharge: (family) Transition of Care Consult (CM Consult): Home Health(Pt is insured withpharmacy benefits. Per pt she has [facility] RN.) [address]: No 
Reason Outside [place]: Patient already serviced by other home care/hospice agency Discharge Durable Medical Equipment: No 
Physical Therapy Consult: Yes Occupational Therapy Consult: Yes Speech Therapy Consult: No 
Current Support Network: Relative's Home(Pt lives with her daughter Bryanna Weir [phone].) Confirm Follow Up Transport: Family Plan discussed with Pt/Family/Caregiver: Yes Freedom of Choice Offered: Yes [employer] Information Provided?: No 
Discharge Location Discharge Placement: Home with home health

## 2019-09-29 NOTE — CONSULTS
[facility] [address], [address] [address] Phone: [phone] Fax: [phone] Date of visit: 9/29/2019 Referring physician: No referring provider defined for this encounter. Reason for referral: Right second toe ulcer with osteomyelitis Kimberly Mcrae is a 80 y.o. female sent in consultation for Chief Complaint Patient presents with  Wound Check HPI: 80-year-old patient is well-known to me who previously has been treated for foot wounds on the left having undergone a first and second toe amputation. She presents today with a new wound on her right foot affecting the dorsum of the second toe. She states wound is been present for about a week and feels like it is significantly painful. She denies any fever or chills. She does not recall what caused the wound. Otherwise she seems to be in her usual state of health ROS: 
 
Constitutional: Negative for fever and chills. HENT: Negative for congestion and sore throat. Skin: Negative for rash and itching. Eyes: Negative for blurred vision and double vision. Respiratory: Negative for cough and shortness of breath. Cardiovascular: Negative for chest pain, palpitations, claudication and leg swelling. Gastrointestinal: Negative for nausea, vomiting and abdominal pain. Genitourinary: Negative for dysuria, hematuria and flank pain. Musculoskeletal: Negative for joint pain and falls. Neurological: Negative for dizziness, sensory change, focal weakness, loss of consciousness and headaches. Medical history:  
Past Medical History:  
Diagnosis Date  Arthritis  Chronic diastolic congestive heart failure ([address] 1/16/2019  Chronic pain   
 bilat feet & hands  Edema BLE & bilat fingers; pt takes diuretic daily; pt states her swelling is related to the nerve disease she is being treated for at [facility]  GERD (gastroesophageal reflux disease) 1/16/2019  Gouty arthritis  History of MI (myocardial infarction) 1980's \"light heart attack\"; pt states MD at [address] mentioned it to her; pt stated MD told her \"was on the back part of her heart & would not give her any problems\"; pt takes aspirin 81 mg daily  Hypertension   
 managed w/med  Hypothyroidism   
 s/p partial thyroidectomy 1960; managed w/med  Mild heartburn   
 takes tums prn  Neuropathy 1980's  
 pt states has been treated by [facility] for \"nerve problems in her feet & hands\"  Post-operative nausea and vomiting  Type 2 diabetes mellitus without complication, without long-term current use of insulin ([facility] [address] 7/27/2018 Surgical history:  
Past Surgical History:  
Procedure Laterality Date  HX CYST REMOVAL Right 1950's  
 upper eyelid  HX DILATION AND CURETTAGE  [id number]  
 ectopic pregnancy; right BSO done  HX HEART CATHETERIZATION  late 1979  
 pt states no stents [address] [facility] [address]  
 pt states was exploratory abdominal surgery too  HX ORTHOPAEDIC Bilateral [id number] Foot; bone taken out of 5th toe on right foot; left foot great toe & 1st toe amputation  HX ORTHOPAEDIC Left 1992  
 nerve removed out of left leg [facility]  HX UROLOGICAL    
 cystoscopy Allergies: Allergies Allergen Reactions  Contrast Agent [Iodine] Anaphylaxis  Actifed [Triprolidine-Pseudoephedrine] Nausea Only  Allopurinol Other (comments) Elevated blood pressure & pt states could not walk or see until medication was out of her system.  Decongestant [Pseudoephedrine Hcl] Rash and Itching  Lexapro [Escitalopram Oxalate] Other (comments) Lips/Mouth swelling  Lyrica [Pregabalin] Unknown (comments)  Minizide [Prazosin-Polythiazide] Unknown (comments)  Mobic [Meloxicam] Other (comments) Facial swelling  Omnipaque [Iohexol] Hives, Swelling and Other (comments) Wheezing and/or shortness of breath  Sertraline Other (comments) Mouth, lips swell  Spironolactone Itching Pt complains that she breaks out in clammy sweat with itching and stinging in her upper arms, dry mouth, funny feeling tongue, SOB, and increased anxiety.  Sulfamethazine Other (comments) Mouth/lips swelling Current medications:  
Current Facility-Administered Medications Medication Dose Route Frequency  atorvastatin (LIPITOR) tablet 40 mg  40 mg Oral QHS  amLODIPine (NORVASC) tablet 5 mg  5 mg Oral DAILY  aspirin delayed-release tablet 81 mg  81 mg Oral QHS  carvedilol (COREG) tablet 6.25 mg  6.25 mg Oral BID WITH MEALS  clonazePAM (KlonoPIN) tablet 0.5 mg  0.5 mg Oral TID  DULoxetine (CYMBALTA) capsule 60 mg  60 mg Oral DAILY  levothyroxine (SYNTHROID) tablet 75 mcg  75 mcg Oral ACB  methadone (DOLOPHINE) tablet 5 mg  5 mg Oral Q6H  
 mirtazapine (REMERON) tablet 7.5 mg  7.5 mg Oral QHS  oxybutynin chloride XL (DITROPAN XL) tablet 5 mg  5 mg Oral DAILY  potassium chloride (KLOR-CON) tablet 10 mEq  10 mEq Oral BID  predniSONE (DELTASONE) tablet 10 mg  10 mg Oral BID  sodium chloride (NS) flush 5-40 mL  5-40 mL IntraVENous Q8H  
 sodium chloride (NS) flush 5-40 mL  5-40 mL IntraVENous PRN  
 acetaminophen (TYLENOL) tablet 650 mg  650 mg Oral Q6H PRN  
 HYDROmorphone (PF) (DILAUDID) injection 0.2 mg  0.2 mg IntraVENous Q4H PRN  
 naloxone (NARCAN) injection 0.4 mg  0.4 mg IntraVENous PRN  
 ondansetron (ZOFRAN) injection 4 mg  4 mg IntraVENous Q4H PRN  
 senna-docusate (PERICOLACE) 8.6-50 mg per tablet 1 Tab  1 Tab Oral DAILY  heparin (porcine) injection 5,000 Units  5,000 Units SubCUTAneous Q12H  piperacillin-tazobactam (ZOSYN) 3.375 g in 0.9% sodium chloride (MBP/ADV) 100 mL  3.375 g IntraVENous Q8H  
  Saccharomyces boulardii (FLORASTOR) capsule 500 mg  500 mg Oral BID  insulin glargine (LANTUS) injection 20 Units  20 Units SubCUTAneous QHS  insulin lispro (HUMALOG) injection   SubCUTAneous AC&HS  insulin glargine (LANTUS) injection 10 Units  10 Units SubCUTAneous ACB  hydrALAZINE (APRESOLINE) tablet 25 mg  25 mg Oral TID  influenza vaccine 2019-20 (6 mos+)(PF) (FLUARIX/FLULAVAL/FLUZONE QUAD) injection 0.5 mL  0.5 mL IntraMUSCular PRIOR TO DISCHARGE Social history:  
Social History Tobacco Use Smoking Status Never Smoker Smokeless Tobacco Never Used Social History Substance and Sexual Activity Alcohol Use No  
 
 
Imaging:  
 
  
CLINICAL INDICATION: Open wound along the dorsal aspect of the second toe, 
evaluate for osteomyelitis. 
  
FINDINGS: Three views of the right second toe submitted. Irregular destructive 
changes are noted at the PIP joint. Osteomyelitis or septic arthrosis should be 
considered. There is marked soft tissue swelling diffusely about the second toe. A screw is noted through the IP joint of the great toe. 
  
IMPRESSION IMPRESSION: Destructive changes at the PIP joint of the second toe. Osteomyelitis/septic arthritis should be considered. We will Vitals:  
Vitals:  
 09/28/19 2230 09/29/19 1738 09/29/19 0429 09/29/19 2647 BP: 142/69  147/87 179/86 BP 1 Location: Left arm  Left arm Left arm BP Patient Position: At rest  At rest At rest  
Pulse: 81  82 88 Resp: 20  18 18 Temp: 98.2 °F (36.8 °C)  97.9 °F (36.6 °C) 97.8 °F (36.6 °C) SpO2: 94%  97% 93% Weight:  176 lb 3.2 oz (79.9 kg) Height:      
 
 
Physical exam: 
Visit Vitals /86 (BP 1 Location: Left arm, BP Patient Position: At rest) Pulse 88 Temp 97.8 °F (36.6 °C) Resp 18 Ht 5' 3\" (1.6 m) Wt 176 lb 3.2 oz (79.9 kg) SpO2 93% BMI 31.21 kg/m² General appearance: alert, cooperative, no distress, appears stated age Lungs: clear to auscultation bilaterally Heart: regular rate and rhythm, S1, S2 normal, no murmur, click, rub or gallop Abdomen: soft, non-tender. Bowel sounds normal. No masses,  no organomegaly Extremities: extremities normal except for a 1 x 1 cm full-thickness wound on the dorsum of the right second toe which extends down to the interphalangeal joint. Neurologic: Grossly normal 
Vascular Exam: 
 
Right:    LEFT:        
     
       Radial: 2+    Radial: 2+ Brachial: 2+   Brachial: 2+ Femoral: 2+   Femoral: 2+ Popliteal: 2+   Popliteal: 2+ 
       DP: 2+    DP: 2+ 
       PT: 2+    PT: 2+ 
       Carotid Bruit: No   Carotid Bruit: No 
 
Impression: ICD-10-CM ICD-9-CM 1. Diabetic ulcer of toe of right foot associated with type 2 diabetes mellitus, with necrosis of bone ([address] E11.621 250.80 L97.514 707.15   
2. Other acute osteomyelitis of right foot ([address] M86.171 730.07   
3. Cellulitis of right lower extremity L03.115 682.6 Plan: We will plan for a right second toe amputation. She has palpable pedal pulses and I believe has adequate perfusion for spontaneous healing of the surgical incision on the foot. I discussed this with the patient she indicates understanding willingness to proceed. We will make her n.p.o. after midnight tonight. Michel Saucedo MD 
 
Elements of this note have been dictated using speech recognition software. As a result, errors of speech recognition may have occurred.

## 2019-09-29 NOTE — PROGRESS NOTES
Pharmacokinetic Consult to Pharmacist 
 
Yamil Hung is a 80 y.o. female being treated with vancomycin. Height: 5' 3\" (160 cm)  Weight: 79.9 kg (176 lb 3.2 oz) Lab Results Component Value Date/Time BUN 29 (H) 09/29/2019 06:00 AM  
 Creatinine 0.89 09/29/2019 06:00 AM  
 WBC 10.6 09/29/2019 06:00 AM  
 Procalcitonin 0.1 09/27/2019 04:58 PM  
 Lactic acid 2.0 07/04/2019 08:14 PM  
 Lactic Acid (POC) 2.03 (H) 09/27/2019 08:46 PM  
  
Estimated Creatinine Clearance: 45.4 mL/min (based on SCr of 0.89 mg/dL). CULTURES: 
pending Day 1 of vancomycin. Goal trough is 15-20. Vancomycin dose initiated at 2000 mg x 1, then 1500 mg q 24 hours. Will continue to follow patient. Thank you, Adelso Agarwal, PharmD Clinical Pharmacist 
[phone]

## 2019-09-29 NOTE — PROGRESS NOTES
Problem: Diabetes Self-Management Goal: *Disease process and treatment process Description Define diabetes and identify own type of diabetes; list 3 options for treating diabetes. Outcome: Progressing Towards Goal 
  
Problem: Falls - Risk of 
Goal: *Absence of Falls Description Document [facility] Fall Risk and appropriate interventions in the flowsheet. Outcome: Progressing Towards Goal 
Note:  
Fall Risk Interventions: 
  
 
  
 
  
 
Elimination Interventions: Patient to call for help with toileting needs History of Falls Interventions: Door open when patient unattended

## 2019-09-29 NOTE — PROGRESS NOTES
Problem: Diabetes Self-Management Goal: *Disease process and treatment process Description Define diabetes and identify own type of diabetes; list 3 options for treating diabetes. 9/29/2019 1607 by Francy Carreon RN Outcome: Progressing Towards Goal 
9/29/2019 1600 by Francy Carreon RN Outcome: Progressing Towards Goal 
  
Problem: Falls - Risk of 
Goal: *Absence of Falls Description Document [facility] Fall Risk and appropriate interventions in the flowsheet. 9/29/2019 1607 by Francy Carreon RN Outcome: Progressing Towards Goal 
Note:  
Fall Risk Interventions: 
  
 
  
 
  
 
Elimination Interventions: Patient to call for help with toileting needs History of Falls Interventions: Door open when patient unattended 9/29/2019 1600 by Francy Carreon RN Outcome: Progressing Towards Goal 
Note:  
Fall Risk Interventions: 
  
 
  
 
  
 
Elimination Interventions: Patient to call for help with toileting needs History of Falls Interventions: Door open when patient unattended

## 2019-09-29 NOTE — PROGRESS NOTES
Hospitalist Note Admit Date:  2019  6:15 PM  
Name:  Manjinder Mendoza Age:  80 y.o. 
:  1933 MRN:  [id number] PCP:  Luis Hummel MD 
Treatment Team: Attending Provider: Maribell Rose MD; Consulting Provider: Sherline Soulier, MD; Utilization Review: Chuy Mack RN; Occupational Therapist: Trent Mendez OTR/L; Physical Therapist: Waleska Dobson DPT; Primary Nurse: Eveline Lovett RN 
 
HPI/Subjective:  
81 y/o WF with h/o obesity, DM2, HTN, chronic pain, gout, GERD, PAD s/p angioplasty, prior OM and left toe amputations presented on  with a right 2nd toe ulceration she noticed about 5 days PTA. Xray with bony changes and consideration of OM. Admitted for abx and surgical consultation. : Resting comfortably in bed. C/o pain of rt 2nd toe when she woke up. Objective:  
 
Patient Vitals for the past 24 hrs: 
 Temp Pulse Resp BP SpO2  
19 1122 97.8 °F (36.6 °C) 76 18 136/73 91 %  
19 0741 97.8 °F (36.6 °C) 88 18 179/86 93 % 19 0429 97.9 °F (36.6 °C) 82 18 147/87 97 % 19 2230 98.2 °F (36.8 °C) 81 20 142/69 94 % 19 1926 98.3 °F (36.8 °C) 83 20 138/71 92 %  
19 1502 97.7 °F (36.5 °C) 86 20 168/78 94 % Oxygen Therapy O2 Sat (%): 91 % (19 1122) Pulse via Oximetry: 93 beats per minute (19) O2 Device: Room air (19 183) Estimated body mass index is 31.21 kg/m² as calculated from the following: 
  Height as of this encounter: 5' 3\" (1.6 m). Weight as of this encounter: 79.9 kg (176 lb 3.2 oz). Intake/Output Summary (Last 24 hours) at 2019 1205 Last data filed at 2019 9263 Gross per 24 hour Intake 720 ml Output 1150 ml Net -430 ml  
   
*Note that automatically entered I/Os may not be accurate; dependent on patient compliance with collection and accurate  by techs. General:    Well nourished. Alert. Obese. CV:   RRR. No murmur, rub, or gallop. Lungs:   CTAB. No wheezing, rhonchi, or rales. Abdomen:   Soft, nontender, nondistended. Extremities: Warm and dry. No cyanosis. Left heel ulceration bandaged, not examined. Full thickness ulceration on dorsum of right second toe. Skin:     No rashes or jaundice. Neuro:  No gross focal deficits Data Review: 
I have reviewed all labs, meds, and studies from the last 24 hours: 
 
Recent Results (from the past 24 hour(s)) GLUCOSE, POC Collection Time: 09/28/19  3:56 PM  
Result Value Ref Range Glucose (POC) 312 (H) 65 - 100 mg/dL GLUCOSE, POC Collection Time: 09/28/19  9:22 PM  
Result Value Ref Range Glucose (POC) 258 (H) 65 - 100 mg/dL METABOLIC PANEL, BASIC Collection Time: 09/29/19  6:00 AM  
Result Value Ref Range Sodium 145 136 - 145 mmol/L Potassium 3.8 3.5 - 5.1 mmol/L Chloride 107 98 - 107 mmol/L  
 CO2 32 21 - 32 mmol/L Anion gap 6 (L) 7 - 16 mmol/L Glucose 135 (H) 65 - 100 mg/dL BUN 29 (H) 8 - 23 MG/DL Creatinine 0.89 0.6 - 1.0 MG/DL  
 GFR est AA >60 >60 ml/min/1.73m2 GFR est non-AA >60 >60 ml/min/1.73m2 Calcium 8.0 (L) 8.3 - 10.4 MG/DL  
CBC W/O DIFF Collection Time: 09/29/19  6:00 AM  
Result Value Ref Range WBC 10.6 4.3 - 11.1 K/uL  
 RBC 4.04 (L) 4.05 - 5.2 M/uL  
 HGB 11.4 (L) 11.7 - 15.4 g/dL HCT 37.8 35.8 - 46.3 % MCV 93.6 79.6 - 97.8 FL  
 MCH 28.2 26.1 - 32.9 PG  
 MCHC 30.2 (L) 31.4 - 35.0 g/dL  
 RDW 17.2 (H) 11.9 - 14.6 % PLATELET 322 947 - 987 K/uL MPV 9.9 9.4 - 12.3 FL ABSOLUTE NRBC 0.02 0.0 - 0.2 K/uL GLUCOSE, POC Collection Time: 09/29/19  7:40 AM  
Result Value Ref Range Glucose (POC) 101 (H) 65 - 100 mg/dL GLUCOSE, POC Collection Time: 09/29/19 11:22 AM  
Result Value Ref Range Glucose (POC) 138 (H) 65 - 100 mg/dL All Micro Results Procedure Component Value Units Date/Time CULTURE, BLOOD [[id number]] Collected:  09/27/19 6450 Order Status:  Completed Specimen:  Blood Updated:  09/29/19 1107 Special Requests: --     
  LEFT Antecubital 
  
  Culture result: NO GROWTH 2 DAYS     
 CULTURE, BLOOD [[id number]] Collected:  09/27/19 1844 Order Status:  Completed Specimen:  Blood Updated:  09/29/19 1107 Special Requests: --     
  RIGHT Antecubital 
  
  Culture result: NO GROWTH 2 DAYS     
 CULTURE, Nilton Carrasco STAIN [[id number]] Collected:  09/27/19 1952 Order Status:  Completed Specimen:  Wound from Toe Updated:  09/29/19 9892 Special Requests: NO SPECIAL REQUESTS     
  GRAM STAIN NO WBCS SEEN     
   NO DEFINITE ORGANISM SEEN Culture result: NO GROWTH 1 DAY     
 CULTURE, Casey Hogan [[id number]] Collected:  09/27/19 2115 Order Status:  Completed Specimen:  Wound from Toe Updated:  09/28/19 1137 Special Requests: NO SPECIAL REQUESTS     
  GRAM STAIN NO WBCS SEEN     
   NO DEFINITE ORGANISM SEEN Culture result:    
  NO GROWTH AFTER SHORT PERIOD OF INCUBATION. FURTHER RESULTS TO FOLLOW AFTER OVERNIGHT INCUBATION. No results found for this visit on 09/27/19. Current Meds: 
Current Facility-Administered Medications Medication Dose Route Frequency  methadone (DOLOPHINE) tablet 10 mg  10 mg Oral Q6H  
 HYDROmorphone (PF) (DILAUDID) injection 0.2 mg  0.2 mg IntraVENous Q4H PRN  
 atorvastatin (LIPITOR) tablet 40 mg  40 mg Oral QHS  amLODIPine (NORVASC) tablet 5 mg  5 mg Oral DAILY  aspirin delayed-release tablet 81 mg  81 mg Oral QHS  carvedilol (COREG) tablet 6.25 mg  6.25 mg Oral BID WITH MEALS  clonazePAM (KlonoPIN) tablet 0.5 mg  0.5 mg Oral TID  DULoxetine (CYMBALTA) capsule 60 mg  60 mg Oral DAILY  levothyroxine (SYNTHROID) tablet 75 mcg  75 mcg Oral ACB  mirtazapine (REMERON) tablet 7.5 mg  7.5 mg Oral QHS  oxybutynin chloride XL (DITROPAN XL) tablet 5 mg  5 mg Oral DAILY  potassium chloride (KLOR-CON) tablet 10 mEq  10 mEq Oral BID  
  predniSONE (DELTASONE) tablet 10 mg  10 mg Oral BID  sodium chloride (NS) flush 5-40 mL  5-40 mL IntraVENous Q8H  
 sodium chloride (NS) flush 5-40 mL  5-40 mL IntraVENous PRN  
 acetaminophen (TYLENOL) tablet 650 mg  650 mg Oral Q6H PRN  
 naloxone (NARCAN) injection 0.4 mg  0.4 mg IntraVENous PRN  
 ondansetron (ZOFRAN) injection 4 mg  4 mg IntraVENous Q4H PRN  
 senna-docusate (PERICOLACE) 8.6-50 mg per tablet 1 Tab  1 Tab Oral DAILY  heparin (porcine) injection 5,000 Units  5,000 Units SubCUTAneous Q12H  piperacillin-tazobactam (ZOSYN) 3.375 g in 0.9% sodium chloride (MBP/ADV) 100 mL  3.375 g IntraVENous Q8H  
 Saccharomyces boulardii (FLORASTOR) capsule 500 mg  500 mg Oral BID  insulin glargine (LANTUS) injection 20 Units  20 Units SubCUTAneous QHS  insulin lispro (HUMALOG) injection   SubCUTAneous AC&HS  insulin glargine (LANTUS) injection 10 Units  10 Units SubCUTAneous ACB  hydrALAZINE (APRESOLINE) tablet 25 mg  25 mg Oral TID  influenza vaccine 2019-20 (6 mos+)(PF) (FLUARIX/FLULAVAL/FLUZONE QUAD) injection 0.5 mL  0.5 mL IntraMUSCular PRIOR TO DISCHARGE Other Studies (last 24 hours): No results found. Assessment and Plan:  
 
Hospital Problems as of 9/29/2019 Date Reviewed: 5/25/2016 Codes Class Noted - Resolved POA Uncontrolled diabetes mellitus ([facility] ICD-10-CM: E11.65 ICD-9-CM: 250.02  9/27/2019 - Present Unknown * (Principal) Acute osteomyelitis of toe of right foot ([facility] ICD-10-CM: M86.171 ICD-9-CM: 730.07  9/27/2019 - Present Unknown Sepsis ([facility] ICD-10-CM: A41.9 ICD-9-CM: 038.9, 995.91  3/25/2019 - Present Unknown Plan: # Acute OM right 2nd toe with ulceration - Vascular consulted, plan for amputation tomorrow. NPO midnight. Con't abx (vanc was not on list, unsure why, readded to Zosyn. .likely can stop abx after sugery if considered adequate source control).   
 
# DM2 
 - Titrate insulin as needed. 
  
# Chronic pain - Methadone increased 
  
# HTN 
            - Home meds 
  
# H/o PAD 
            - asa, add statin DC planning/Dispo: Home with daughter when able? Diet:  DIET DIABETIC CONSISTENT CARB 
DIET NPO 
DVT ppx: Heparin Signed: 
Kaylah Salmon MD

## 2019-09-30 NOTE — PROGRESS NOTES
Initial visit with Ms. Willard Barrera and family. Ms. Willard Barrera was sleeping during my visit prior to surgery, but her daughter/nurse on 2nd floor [address] [facility] was present and daughter is known to me. I offered spiritual interventions, including empathic listening, affirmation of suzy & emotions, along with prayer as requested. Last Quinteros MDiv Board Certified [employer]

## 2019-09-30 NOTE — PROGRESS NOTES
Alert/oriented x4. Pain managed per MAR. Pulses palpated on BLLE. Dressing on 2nd toe of Rt foot changed 1x. Hourly rounds performed. All needs meet. Bed low/locked. Call light within reach. Patient denies needs at this time.   Will continue to monitor and report to oncoming RN

## 2019-09-30 NOTE — PROGRESS NOTES
Progress Note Patient: Ivy Marquez MRN: [id number]  SSN: [id number] YOB: 1933  Age: 80 y.o. Sex: female Admission Date: 2019 LOS: 3 days Day of Surgery PLANNED PROCEDURE(S): 
Right 2nd toe amputation Subjective:  
 
Patient reports significant pain in right foot. Objective:  
 
Vitals:  
 19 2319 19 0355 19 0530 19 0715 BP: 134/70 163/82  (!) 205/98 Pulse: 82 84  94 Resp: 18 16  20 Temp: 97.8 °F (36.6 °C) 98 °F (36.7 °C)  98.2 °F (36.8 °C) SpO2: 91% 94%  93% Weight:   176 lb 1.6 oz (79.9 kg) Height:      
  
 
Physical Exam:  
GENERAL: alert, cooperative, mild distress LUNG: clear to auscultation bilaterally, normal respiratory effort HEART: regular rate and rhythm ABDOMEN: soft, nontender, obese but not distended, bowel sounds infreqent EXTREMITIES: feet warm in [place] boots; right foot minimal edema, 2nd toe with ~7mm circular full-thickness ulceration at DIP and appearance of tendon at base, darkened discoloration of skin at toe base, 1st toe with dried ulceration at distal tip, no drainage at present; left foot s/p amputation of toes 1, 2, dressing at heel PULSES: easily palpable right DP Lab/Data Review: 
BMP:  
Lab Results Component Value Date/Time  2019 05:44 AM  
 K 4.2 2019 05:44 AM  
  2019 05:44 AM  
 CO2 31 2019 05:44 AM  
 AGAP 6 (L) 2019 05:44 AM  
  (H) 2019 05:44 AM  
 BUN 29 (H) 2019 05:44 AM  
 CREA 0.95 2019 05:44 AM  
 GFRAA >60 2019 05:44 AM  
 GFRNA 59 (L) 2019 05:44 AM  
 
CBC:  
Lab Results Component Value Date/Time WBC 9.9 2019 05:44 AM  
 HGB 10.8 (L) 2019 05:44 AM  
 HCT 35.5 (L) 2019 05:44 AM  
  2019 05:44 AM  
  
 
ImaginV XR Right second toe, 2019 IMPRESSION: Destructive changes at the PIP joint of the second toe. Osteomyelitis/septic arthritis should be considered. Lower extremity arterial ultrasound examination, 9/28/2019 RIGHT LOWER EXTREMITY:  Peak systolic velocities-- CFA = 133, PFA = 118, SFA = 118, popliteal = 94, peroneal = 46, PTA = 76, SEBASTIAN = 84 cm/sec. Although some 
areas are difficult to interpret, in general there is a triphasic waveform 
throughout. LEFT LOWER EXTREMITY:  Peak systolic velocities-- CFA = 129, PFA = 91, SFA = 
119, popliteal = 226, peroneal = 58, PTA = 68, SEBASTIAN = 95 cm/sec. Biphasic/triphasic waveform throughout. ABDOMEN:  Aorta = 51 cm/s. Diameter cannot be measured due to overlying bowel 
gas. 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 Assessment / Plan / Recommendations / Medical Decision Making:  
 
Principal Problem: 
  Acute osteomyelitis of toe of right foot ([facility] (9/27/2019) Active Problems: 
  Sepsis ([facility] [facility] (3/25/2019) Uncontrolled diabetes mellitus ([facility] [facility] (9/27/2019) To OR today for right 2nd toe amputation by Wilbur Valdez MD 
- remains NPO 
- consent 
- continue IV ABX (vanc, Zosyn) Signed By: Miguel Hill PA-C September 30, 2019 Physician Assistant with [facility].  Levi Méndez MD / Naila Phillips MD

## 2019-09-30 NOTE — PROGRESS NOTES
Hourly rounding completed on this shift. All needs met. PRN pain mediation given to mange pain. Pt is currently resting in bed with family at bedside. Will continue to monitor and give report to oncoming nurse.

## 2019-09-30 NOTE — PROGRESS NOTES
Pt reported 10/10 pain to another nurse assisting upon return from surgery. This nurse was getting pain medication and upon return pt snoring. Pt woken and report of 10/10 pain. Scheduled Methadone given. Pt unable to hold eyes open and back asleep when returned with requested milk.

## 2019-09-30 NOTE — PROGRESS NOTES
ANNETTA Cool stopped nurse to discuss pt pain. Pt was asleep but gave order for one time dose of Dilaudid 0.2 mg if pain continued. Pt awake now with pain 10/10. Will give one time dose.

## 2019-09-30 NOTE — PROGRESS NOTES
TRANSFER - IN REPORT: 
 
Verbal report received from [facility] on Juan Sings  being received from PACU for ordered procedure Report consisted of patients Situation, Background, Assessment and  
Recommendations(SBAR). Information from the following report(s) SBAR was reviewed with the receiving nurse. Opportunity for questions and clarification was provided. Assessment completed upon patients arrival to unit and care assumed.

## 2019-09-30 NOTE — BRIEF OP NOTE
BRIEF OPERATIVE NOTE Date of Procedure: 9/30/2019 Preoperative Diagnosis: INFECTED RIGHT 2ND TOE Postoperative Diagnosis: INFECTED RIGHT 2ND TOE Procedure(s): 
AMPUTATION TOE RIGHT 2ND Surgeon(s) and Role: Mitchell Sykes MD - Primary Surgical Assistant:  
 
Surgical Staff: 
Circ-1: Rama Ayers RN Scrub Tech-1: Bharti Tyler Event Time In Time Out Incision Start 09/30/2019  1:08 PM   
Incision Close 09/30/2019  1:18 PM   
 
Anesthesia: General  
Estimated Blood Loss: 20cc Specimens:  
ID Type Source Tests Collected by Time Destination 1 : RIGHT SECOND TOE Preservative Toe  Brian France MD 9/30/2019  1:11 PM Pathology Findings:   
Complications: None Implants: * No implants in log *

## 2019-09-30 NOTE — PROGRESS NOTES
Hospitalist Note Admit Date:  2019  6:15 PM  
Name:  Caterina Toscano Age:  80 y.o. 
:  1933 MRN:  [id number] PCP:  Shobha Bobby MD 
Treatment Team: Attending Provider: Marcel Radford MD; Consulting Provider: Cherie Ramon MD; Utilization Review: Tee Fitzpatrick RN; Care Manager: Stone Joyner RN; Physical Therapist: Kaylin Hines DPT; Occupational Therapist: Eliane Price OT 
 
HPI/Subjective:  
81 y/o WF with h/o obesity, DM2, HTN, chronic pain, gout, GERD, PAD s/p angioplasty, prior OM and left toe amputations presented on  with a right 2nd toe ulceration she noticed about 5 days PTA. Xray with bony changes and consideration of OM. Admitted for abx and surgical consultation. : In bed resting, c/o right foot pain when I turn the light on. BPs up, meds adjusted. To OR for toe amputation today. Objective:  
 
Patient Vitals for the past 24 hrs: 
 Temp Pulse Resp BP SpO2  
19 0715 98.2 °F (36.8 °C) 94 20 (!) 205/98 93 % 19 0355 98 °F (36.7 °C) 84 16 163/82 94 % 19 2319 97.8 °F (36.6 °C) 82 18 134/70 91 %  
19 1904 99.1 °F (37.3 °C) 88 18 141/73 91 %  
19 1522 98.1 °F (36.7 °C) 80 19 135/76 92 %  
19 1122 97.8 °F (36.6 °C) 76 18 136/73 91 % Oxygen Therapy O2 Sat (%): 93 % (19 0715) Pulse via Oximetry: 93 beats per minute (19) O2 Device: Room air (19) Estimated body mass index is 31.19 kg/m² as calculated from the following: 
  Height as of this encounter: 5' 3\" (1.6 m). Weight as of this encounter: 79.9 kg (176 lb 1.6 oz). Intake/Output Summary (Last 24 hours) at 2019 1135 Last data filed at 2019 0530 Gross per 24 hour Intake 2880 ml Output 450 ml Net 2430 ml  
   
*Note that automatically entered I/Os may not be accurate; dependent on patient compliance with collection and accurate  by techs. General:    Well nourished. Alert. CV: RRR. No murmur, rub, or gallop. Lungs:   CTAB. No wheezing, rhonchi, or rales. Abdomen:   Soft, nontender, nondistended. Extremities: Warm and dry. No cyanosis or edema. Left foot (heel ulcer) bandaged. Right toe full thickness on dorsum of right second toe, some purulence. Skin:     No rashes or jaundice. Neuro:  No gross focal deficits Data Review: 
I have reviewed all labs, meds, and studies from the last 24 hours: 
 
Recent Results (from the past 24 hour(s)) GLUCOSE, POC Collection Time: 09/29/19 11:22 AM  
Result Value Ref Range Glucose (POC) 138 (H) 65 - 100 mg/dL GLUCOSE, POC Collection Time: 09/29/19  3:44 PM  
Result Value Ref Range Glucose (POC) 143 (H) 65 - 100 mg/dL GLUCOSE, POC Collection Time: 09/29/19  8:46 PM  
Result Value Ref Range Glucose (POC) 249 (H) 65 - 100 mg/dL PLEASE READ & DOCUMENT PPD TEST IN 48 HRS Collection Time: 09/29/19 10:09 PM  
Result Value Ref Range PPD    
 mm Induration CBC W/O DIFF Collection Time: 09/30/19  5:44 AM  
Result Value Ref Range WBC 9.9 4.3 - 11.1 K/uL  
 RBC 3.83 (L) 4.05 - 5.2 M/uL  
 HGB 10.8 (L) 11.7 - 15.4 g/dL HCT 35.5 (L) 35.8 - 46.3 % MCV 92.7 79.6 - 97.8 FL  
 MCH 28.2 26.1 - 32.9 PG  
 MCHC 30.4 (L) 31.4 - 35.0 g/dL  
 RDW 17.0 (H) 11.9 - 14.6 % PLATELET 344 301 - 130 K/uL MPV 9.9 9.4 - 12.3 FL ABSOLUTE NRBC 0.03 0.0 - 0.2 K/uL METABOLIC PANEL, BASIC Collection Time: 09/30/19  5:44 AM  
Result Value Ref Range Sodium 144 136 - 145 mmol/L Potassium 4.2 3.5 - 5.1 mmol/L Chloride 107 98 - 107 mmol/L  
 CO2 31 21 - 32 mmol/L Anion gap 6 (L) 7 - 16 mmol/L Glucose 148 (H) 65 - 100 mg/dL BUN 29 (H) 8 - 23 MG/DL Creatinine 0.95 0.6 - 1.0 MG/DL  
 GFR est AA >60 >60 ml/min/1.73m2 GFR est non-AA 59 (L) >60 ml/min/1.73m2 Calcium 8.1 (L) 8.3 - 10.4 MG/DL  
GLUCOSE, POC Collection Time: 09/30/19  7:19 AM  
Result Value Ref Range Glucose (POC) 127 (H) 65 - 100 mg/dL All Micro Results Procedure Component Value Units Date/Time Daquan Acevedo STAIN [[id number]] Collected:  09/27/19 2115 Order Status:  Completed Specimen:  Wound from Toe Updated:  09/30/19 2997 Special Requests: NO SPECIAL REQUESTS     
  GRAM STAIN NO WBCS SEEN     
   NO DEFINITE ORGANISM SEEN Culture result: NO GROWTH 2 DAYS     
 CULTURE, Ajnine [place] STAIN [[id number]] Collected:  09/27/19 1952 Order Status:  Completed Specimen:  Wound from Toe Updated:  09/30/19 0719 Special Requests: NO SPECIAL REQUESTS     
  GRAM STAIN NO WBCS SEEN     
   NO DEFINITE ORGANISM SEEN Culture result: NO GROWTH 2 DAYS     
 CULTURE, BLOOD [[id number]] Collected:  09/27/19 1658 Order Status:  Completed Specimen:  Blood Updated:  09/29/19 1107 Special Requests: --     
  LEFT Antecubital 
  
  Culture result: NO GROWTH 2 DAYS     
 CULTURE, BLOOD [[id number]] Collected:  09/27/19 1844 Order Status:  Completed Specimen:  Blood Updated:  09/29/19 1107 Special Requests: --     
  RIGHT Antecubital 
  
  Culture result: NO GROWTH 2 DAYS No results found for this visit on 09/27/19. Current Meds: 
Current Facility-Administered Medications Medication Dose Route Frequency  amLODIPine (NORVASC) tablet 10 mg  10 mg Oral DAILY  carvedilol (COREG) tablet 12.5 mg  12.5 mg Oral BID WITH MEALS  methadone (DOLOPHINE) tablet 10 mg  10 mg Oral Q6H  
 HYDROmorphone (PF) (DILAUDID) injection 0.2 mg  0.2 mg IntraVENous Q4H PRN  
 vancomycin (VANCOCIN) 1500 mg in  ml infusion  1,500 mg IntraVENous Q24H  
 atorvastatin (LIPITOR) tablet 40 mg  40 mg Oral QHS  aspirin delayed-release tablet 81 mg  81 mg Oral QHS  clonazePAM (KlonoPIN) tablet 0.5 mg  0.5 mg Oral TID  DULoxetine (CYMBALTA) capsule 60 mg  60 mg Oral DAILY  levothyroxine (SYNTHROID) tablet 75 mcg  75 mcg Oral ACB  mirtazapine (REMERON) tablet 7.5 mg  7.5 mg Oral QHS  oxybutynin chloride XL (DITROPAN XL) tablet 5 mg  5 mg Oral DAILY  potassium chloride (KLOR-CON) tablet 10 mEq  10 mEq Oral BID  predniSONE (DELTASONE) tablet 10 mg  10 mg Oral BID  sodium chloride (NS) flush 5-40 mL  5-40 mL IntraVENous Q8H  
 sodium chloride (NS) flush 5-40 mL  5-40 mL IntraVENous PRN  
 acetaminophen (TYLENOL) tablet 650 mg  650 mg Oral Q6H PRN  
 naloxone (NARCAN) injection 0.4 mg  0.4 mg IntraVENous PRN  
 ondansetron (ZOFRAN) injection 4 mg  4 mg IntraVENous Q4H PRN  
 senna-docusate (PERICOLACE) 8.6-50 mg per tablet 1 Tab  1 Tab Oral DAILY  heparin (porcine) injection 5,000 Units  5,000 Units SubCUTAneous Q12H  piperacillin-tazobactam (ZOSYN) 3.375 g in 0.9% sodium chloride (MBP/ADV) 100 mL  3.375 g IntraVENous Q8H  
 Saccharomyces boulardii (FLORASTOR) capsule 500 mg  500 mg Oral BID  insulin glargine (LANTUS) injection 20 Units  20 Units SubCUTAneous QHS  insulin lispro (HUMALOG) injection   SubCUTAneous AC&HS  insulin glargine (LANTUS) injection 10 Units  10 Units SubCUTAneous ACB  hydrALAZINE (APRESOLINE) tablet 25 mg  25 mg Oral TID  influenza vaccine 2019-20 (6 mos+)(PF) (FLUARIX/FLULAVAL/FLUZONE QUAD) injection 0.5 mL  0.5 mL IntraMUSCular PRIOR TO DISCHARGE Other Studies (last 24 hours): No results found. Assessment and Plan:  
 
Hospital Problems as of 9/30/2019 Date Reviewed: 5/25/2016 Codes Class Noted - Resolved POA Uncontrolled diabetes mellitus ([facility] ICD-10-CM: E11.65 ICD-9-CM: 250.02  9/27/2019 - Present Unknown * (Principal) Acute osteomyelitis of toe of right foot ([facility].) ICD-10-CM: M86.171 ICD-9-CM: 730.07  9/27/2019 - Present Unknown Sepsis ([facility].) ICD-10-CM: A41.9 ICD-9-CM: 038.9, 995.91  3/25/2019 - Present Unknown Plan: # Acute OM right 2nd toe with ulceration             - Vascular consulted, plan for amputation today. - Con't abx. May be able to dc abx after today if considered to have source control with amputation. Will f/u post-op. 
  
# DM2 
            - Sugars better, hold this AM for surgery. 
 
  
# HTN 
            - Home meds titrated today # Chronic pain 
            - Methadone  
  
# H/o PAD 
            - asa, add statin DC planning/Dispo: Likely home with daughter, 1-2d pending clinical course. Diet:  DIET NPO 
DIET NPO 
DVT ppx: Heparin Signed: 
Ángel Strong MD

## 2019-09-30 NOTE — ANESTHESIA PREPROCEDURE EVALUATION
Relevant Problems No relevant active problems Anesthetic History PONV Review of Systems / Medical History Patient summary reviewed, nursing notes reviewed and pertinent labs reviewed Pulmonary Within defined limits Neuro/Psych Comments: Peripheral neuropathy Cardiovascular Hypertension: well controlled Valvular problems/murmurs: mitral insufficiency and aortic stenosis Exercise tolerance: <4 METS Comments: TTE may 2019- EF 50-55%, mild AS, Mild MR  
GI/Hepatic/Renal 
  
GERD: well controlled Endo/Other Diabetes: type 2 Hypothyroidism Arthritis and anemia Other Findings Comments: Chronic pain on methadone Non-healing wound to left heel Physical Exam 
 
Airway Mallampati: II 
 
Neck ROM: normal range of motion Mouth opening: Normal 
 
 Cardiovascular Rhythm: irregular Murmur: Grade 2, Aortic area Dental 
 
Dentition: Full upper dentures and Lower dentition intact Pulmonary Breath sounds clear to auscultation Abdominal 
 
 
 
 Other Findings Anesthetic Plan ASA: 3 Patient did not consent to regional anesthesiaAnesthesia type: general 
 
 
 
 
Induction: Intravenous Anesthetic plan and risks discussed with: Patient and Son / Daughter

## 2019-09-30 NOTE — PROGRESS NOTES
PT order received and chart reviewed. Pt is scheduled for R 2nd toe amputation today. Currently off floor for surgery. Will await MD recommendations and weightbearing. Thank you, TALITA JungT

## 2019-09-30 NOTE — PROGRESS NOTES
9/30/2019 OT order received and chart reviewed. Pt is scheduled for R 2nd toe amputation today. Will hold to see patient  after procedure. Will await MD recommendations and weightbearing. Thank you, Martha Wagoner, OT

## 2019-09-30 NOTE — PROGRESS NOTES
TRANSFER - IN REPORT: 
 
Verbal report received from [place] on Caterina Pin  being received from Pre-Op for ordered procedure Report consisted of patients Situation, Background, Assessment and  
Recommendations(SBAR). Information from the following report(s) SBAR was reviewed with the receiving nurse. Opportunity for questions and clarification was provided. Assessment completed upon patients arrival to unit and care assumed.

## 2019-09-30 NOTE — PROGRESS NOTES
Hourly rounds complete this shift, no new complaints at this time, Patient NPO: bed in low, locked position, call light and bedside table within reach,  all needs met. Will continue to monitor Report to day shift nurse.

## 2019-09-30 NOTE — PERIOP NOTES
TRANSFER - OUT REPORT: 
 
Verbal report given to Meme Grimes  on [facility] [place]  being transferred to   for routine post - op Report consisted of patients Situation, Background, Assessment and  
Recommendations(SBAR). Information from the following report(s) OR Summary, Procedure Summary, Intake/Output and MAR was reviewed with the receiving nurse. Lines:  
Peripheral IV 09/27/19 Right Antecubital (Active) Site Assessment Clean, dry, & intact 9/30/2019  1:36 PM  
Phlebitis Assessment 0 9/30/2019  1:36 PM  
Infiltration Assessment 0 9/30/2019  1:36 PM  
Dressing Status Clean, dry, & intact 9/30/2019  1:36 PM  
Dressing Type Tape;Transparent 9/30/2019  1:36 PM  
Hub Color/Line Status Infusing 9/30/2019  1:36 PM  
Alcohol Cap Used No 9/30/2019  4:30 AM  
  
 
Opportunity for questions and clarification was provided. Patient transported with: 
 O2 @ 3 liters VTE prophylaxis orders have not been written for [facility] [place]. Patient and family given floor number and nurses name. Family updated re: pt status after security code verified.

## 2019-10-01 NOTE — PROGRESS NOTES
Hospitalist Note Admit Date:  2019  6:15 PM  
Name:  Mat Okeefe Age:  80 y.o. 
:  1933 MRN:  [id number] PCP:  Nadeem Quinn MD 
Treatment Team: Attending Provider: Chip Gonzalez MD; Consulting Provider: Carla Stevens MD; Utilization Review: Tyrel Dee RN; Care Manager: Noble Lindsey RN; Physical Therapist: Ronnie William DPT; Occupational Therapist: Kaylah Rodas OT 
 
HPI/Subjective:  
81 y/o WF with h/o obesity, DM2, HTN, chronic pain, gout, GERD, PAD s/p angioplasty, prior OM and left toe amputations presented on  with a right 2nd toe ulceration she noticed about 5 days PTA. Xray with bony changes and consideration of OM. Admitted for abx and surgical consultation. 10/1: Dressing to left foot dry/intact S/P left 2nd toe amputation yesterday. WBC 11.8, afebrile. Discussed with patient and family discharge planning, patient needs STR but at this time is not willing to go. Daughter will be visiting later and further discussion to continue. CM assisting with this. Afebrile. PT pending evaluation. Creatinine elevated 1.61 today, ordering NS 75 ml/h and rechecking BMP in am. Wound culture NGTD. Vascular continues to follow along. Per notes, will remove dressing tomorrow, finished Zosyn course. Objective:  
 
Patient Vitals for the past 24 hrs: 
 Temp Pulse Resp BP SpO2  
10/01/19 1110 98.1 °F (36.7 °C) 77 18 148/86 98 % 10/01/19 0810    165/88   
10/01/19 0804 98.1 °F (36.7 °C) 82 17 (!) 187/113 98 % 10/01/19 0415 97.4 °F (36.3 °C) 73 16 165/89 90 % 19 2334 97.8 °F (36.6 °C) 71 18 136/72 94 % 19 1956 97.4 °F (36.3 °C) 69 18 151/89 92 % 19 1636 98.7 °F (37.1 °C) 77 16 150/77 97 % 19 1507 98 °F (36.7 °C) 75 14 154/74 98 % 19 1502  74 14 148/77 98 % 19 1452  76 14 153/72 100 % 19 1438  79 14 155/76 99 % 19 1428  79 14 160/76 98 % 19 1423  78 14 163/79 100 % 09/30/19 1419  77 14 172/84 100 % 09/30/19 1408  83 16 (!) 174/108 99 % 09/30/19 1357  77 16 146/69 100 % 09/30/19 1352  77 16 145/68 100 % 09/30/19 1347  80 16 149/76 100 % 09/30/19 1343  80 16 142/72 100 % 09/30/19 1338  79 16 145/71 96 % 09/30/19 1336 98.7 °F (37.1 °C) 83 15 128/71 96 % Oxygen Therapy O2 Sat (%): 98 % (10/01/19 1110) Pulse via Oximetry: 76 beats per minute (09/30/19 1507) O2 Device: Nasal cannula (09/30/19 1507) O2 Flow Rate (L/min): 3 l/min (09/30/19 1507) Estimated body mass index is 32.13 kg/m² as calculated from the following: 
  Height as of this encounter: 5' 3\" (1.6 m). Weight as of this encounter: 82.3 kg (181 lb 6.4 oz). Intake/Output Summary (Last 24 hours) at 10/1/2019 1224 Last data filed at 10/1/2019 8415 Gross per 24 hour Intake 580 ml Output 275 ml Net 305 ml *Note that automatically entered I/Os may not be accurate; dependent on patient compliance with collection and accurate  by [employer].. General:    Well nourished. Alert. CV:   RRR. No murmur, rub, or gallop. Lungs:   CTAB. No wheezing, rhonchi, or rales. Abdomen:   Soft, nontender, nondistended. Extremities: Warm and dry. No cyanosis or edema. Left foot (heel ulcer) bandaged. Right toe full thickness on dorsum of right second toe, some purulence. Skin:     No rashes or jaundice. Neuro:  No gross focal deficits Data Review: 
I have reviewed all labs, meds, and studies from the last 24 hours: 
 
Recent Results (from the past 24 hour(s)) GLUCOSE, POC Collection Time: 09/30/19  2:03 PM  
Result Value Ref Range Glucose (POC) 127 (H) 65 - 100 mg/dL GLUCOSE, POC Collection Time: 09/30/19  4:33 PM  
Result Value Ref Range Glucose (POC) 131 (H) 65 - 100 mg/dL GLUCOSE, POC Collection Time: 09/30/19  8:03 PM  
Result Value Ref Range Glucose (POC) 195 (H) 65 - 100 mg/dL CBC W/O DIFF  Collection Time: 10/01/19  5:57 AM  
 Result Value Ref Range WBC 11.8 (H) 4.3 - 11.1 K/uL  
 RBC 4.33 4.05 - 5.2 M/uL  
 HGB 12.4 11.7 - 15.4 g/dL HCT 40.8 35.8 - 46.3 % MCV 94.2 79.6 - 97.8 FL  
 MCH 28.6 26.1 - 32.9 PG  
 MCHC 30.4 (L) 31.4 - 35.0 g/dL  
 RDW 16.8 (H) 11.9 - 14.6 % PLATELET 253 621 - 684 K/uL MPV 10.3 9.4 - 12.3 FL ABSOLUTE NRBC 0.00 0.0 - 0.2 K/uL METABOLIC PANEL, BASIC Collection Time: 10/01/19  5:57 AM  
Result Value Ref Range Sodium 143 136 - 145 mmol/L Potassium 4.9 3.5 - 5.1 mmol/L Chloride 107 98 - 107 mmol/L  
 CO2 27 21 - 32 mmol/L Anion gap 9 7 - 16 mmol/L Glucose 114 (H) 65 - 100 mg/dL BUN 36 (H) 8 - 23 MG/DL Creatinine 1.61 (H) 0.6 - 1.0 MG/DL  
 GFR est AA 39 (L) >60 ml/min/1.73m2 GFR est non-AA 32 (L) >60 ml/min/1.73m2 Calcium 7.2 (L) 8.3 - 10.4 MG/DL  
GLUCOSE, POC Collection Time: 10/01/19  7:19 AM  
Result Value Ref Range Glucose (POC) 96 65 - 100 mg/dL GLUCOSE, POC Collection Time: 10/01/19 11:11 AM  
Result Value Ref Range Glucose (POC) 76 65 - 100 mg/dL GLUCOSE, POC Collection Time: 10/01/19 12:12 PM  
Result Value Ref Range Glucose (POC) 121 (H) 65 - 100 mg/dL All Micro Results Procedure Component Value Units Date/Time CULTURE, BLOOD [[id number]] Collected:  09/27/19 1658 Order Status:  Completed Specimen:  Blood Updated:  10/01/19 0919 Special Requests: --     
  LEFT Antecubital 
  
  Culture result: NO GROWTH 4 DAYS     
 CULTURE, BLOOD [[id number]] Collected:  09/27/19 1844 Order Status:  Completed Specimen:  Blood Updated:  10/01/19 0919 Special Requests: --     
  RIGHT Antecubital 
  
  Culture result: NO GROWTH 4 DAYS     
 CULTURE, Levorn [place] STAIN [[id number]] Collected:  09/27/19 2115 Order Status:  Completed Specimen:  Wound from Toe Updated:  09/30/19 2537   Special Requests: NO SPECIAL REQUESTS     
  GRAM STAIN NO WBCS SEEN     
   NO DEFINITE ORGANISM SEEN     
 Culture result: NO GROWTH 2 DAYS     
 CULTURE, Kassy [place] STAIN [[id number]] Collected:  09/27/19 1952 Order Status:  Completed Specimen:  Wound from Toe Updated:  09/30/19 0719 Special Requests: NO SPECIAL REQUESTS     
  GRAM STAIN NO WBCS SEEN     
   NO DEFINITE ORGANISM SEEN Culture result: NO GROWTH 2 DAYS No results found for this visit on 09/27/19. Current Meds: 
Current Facility-Administered Medications Medication Dose Route Frequency  sodium chloride (NS) flush 5-40 mL  5-40 mL IntraVENous Q8H  
 sodium chloride (NS) flush 5-40 mL  5-40 mL IntraVENous PRN  
 amLODIPine (NORVASC) tablet 10 mg  10 mg Oral DAILY  carvedilol (COREG) tablet 12.5 mg  12.5 mg Oral BID WITH MEALS  lidocaine (XYLOCAINE) 10 mg/mL (1 %) injection 0.1 mL  0.1 mL SubCUTAneous PRN  
 lactated Ringers infusion  50 mL/hr IntraVENous CONTINUOUS  
 albuterol (PROVENTIL VENTOLIN) nebulizer solution 2.5 mg  2.5 mg Inhalation PRN  
 oxyCODONE IR (ROXICODONE) tablet 5 mg  5 mg Oral ONCE PRN  
 HYDROmorphone (PF) (DILAUDID) injection 0.5 mg  0.5 mg IntraVENous Multiple  promethazine (PHENERGAN) with saline injection 6.25 mg  6.25 mg IntraVENous ONCE PRN  
 methadone (DOLOPHINE) tablet 10 mg  10 mg Oral Q6H  
 HYDROmorphone (PF) (DILAUDID) injection 0.2 mg  0.2 mg IntraVENous Q4H PRN  
 atorvastatin (LIPITOR) tablet 40 mg  40 mg Oral QHS  aspirin delayed-release tablet 81 mg  81 mg Oral QHS  clonazePAM (KlonoPIN) tablet 0.5 mg  0.5 mg Oral TID  DULoxetine (CYMBALTA) capsule 60 mg  60 mg Oral DAILY  levothyroxine (SYNTHROID) tablet 75 mcg  75 mcg Oral ACB  mirtazapine (REMERON) tablet 7.5 mg  7.5 mg Oral QHS  oxybutynin chloride XL (DITROPAN XL) tablet 5 mg  5 mg Oral DAILY  potassium chloride (KLOR-CON) tablet 10 mEq  10 mEq Oral BID  predniSONE (DELTASONE) tablet 10 mg  10 mg Oral BID  sodium chloride (NS) flush 5-40 mL  5-40 mL IntraVENous Q8H  
  sodium chloride (NS) flush 5-40 mL  5-40 mL IntraVENous PRN  
 acetaminophen (TYLENOL) tablet 650 mg  650 mg Oral Q6H PRN  
 naloxone (NARCAN) injection 0.4 mg  0.4 mg IntraVENous PRN  
 ondansetron (ZOFRAN) injection 4 mg  4 mg IntraVENous Q4H PRN  
 senna-docusate (PERICOLACE) 8.6-50 mg per tablet 1 Tab  1 Tab Oral DAILY  heparin (porcine) injection 5,000 Units  5,000 Units SubCUTAneous Q12H  
 Saccharomyces boulardii (FLORASTOR) capsule 500 mg  500 mg Oral BID  insulin glargine (LANTUS) injection 20 Units  20 Units SubCUTAneous QHS  insulin lispro (HUMALOG) injection   SubCUTAneous AC&HS  insulin glargine (LANTUS) injection 10 Units  10 Units SubCUTAneous ACB  hydrALAZINE (APRESOLINE) tablet 25 mg  25 mg Oral TID  influenza vaccine 2019-20 (6 mos+)(PF) (FLUARIX/FLULAVAL/FLUZONE QUAD) injection 0.5 mL  0.5 mL IntraMUSCular PRIOR TO DISCHARGE Other Studies (last 24 hours): No results found. Assessment and Plan:  
 
Hospital Problems as of 10/1/2019 Date Reviewed: 9/30/2019 Codes Class Noted - Resolved POA Uncontrolled diabetes mellitus ([facility].) ICD-10-CM: E11.65 ICD-9-CM: 250.02  9/27/2019 - Present Unknown * (Principal) Acute osteomyelitis of toe of right foot ([facility].) ICD-10-CM: M86.171 ICD-9-CM: 730.07  9/27/2019 - Present Unknown Sepsis ([facility].) ICD-10-CM: A41.9 ICD-9-CM: 038.9, 995.91  3/25/2019 - Present Unknown Plan: # Acute OM right 2nd toe with ulceration 
            - Vascular amputated digit yesterday, dressing dry/intact. Per notes, they will remove dressing tomorrow to re-eval site. No longer on antibiotics. 
  
# DM2 
            - Sugars better, hold this AM for surgery. 
 
  
# HTN 
            - Home meds # Chronic pain 
            - Methadone  
  
# H/o PAD 
            - asa, add statin DC planning/Dispo: Pending, may need STR, 1-2d pending clinical course. Diet:  DIET DIABETIC CONSISTENT CARB 
DVT ppx: Heparin Signed: 
Summer Wilde NP

## 2019-10-01 NOTE — PROGRESS NOTES
Patient currently admitted to hospital through the ER on 9/27/19 for acute osteomyelitis of toe of right foot Patient is an HRRP patient with RRAT score of 29 PLAN: 
Will outreach to patient for ISMA/CCM when discharged from hospital to home This note will not be viewable in [address]

## 2019-10-01 NOTE — PROGRESS NOTES
Interdisciplinary Rounds completed 10/01/19. Nursing, Case Management, Physician and PT present. Plan of care reviewed and updated. CM will Discuss discharge plans with daughter.

## 2019-10-01 NOTE — PROGRESS NOTES
Problem: Self Care Deficits Care Plan (Adult) Goal: *Acute Goals and Plan of Care (Insert Text) Description 1. Patient will complete upper body bathing/dressing with minimal assistance to improve participation with ADL tasks. 2. Patient will complete rolling side to side in bed with minimal assistance for hygiene and positioning. 3. Patient will complete dynamic sitting balance edge of bed with CGA to improve safety with unsupported sitting w/ ADL. 4. Patient will participate in therapeutic exercises for 8 minutes with minimal cues to improve strength for ADL/functional transfers. 5. Patient will attempt standing while maintaining weightbearing precautions within 3 visits . Timeframe: 7 visits Outcome: Progressing Towards Goal 
  
OCCUPATIONAL THERAPY: Initial Assessment, Daily Note and PM 10/1/2019 INPATIENT: OT Visit Days: 1 [place] / [place] PART A [place] B / Product Type: Medicare /  
R heel weightbearing only- toe amputation NWB in L heel- wound on L heel NAME/AGE/GENDER: Dez Mendoza is a 80 y.o. female PRIMARY DIAGNOSIS:  Acute osteomyelitis of toe of right foot ([facility] [place] Gonsalves Uncontrolled diabetes mellitus ([facility] [E11.65] Sepsis ([facility] [A41.9] Acute osteomyelitis of toe of right foot ([facility] Acute osteomyelitis of toe of right foot ([facility] Procedure(s) (LRB): 
AMPUTATION TOE RIGHT 2ND REQUEST TO FOLLOW (Right) 1 Day Post-Op ICD-10: Treatment Diagnosis:  
 · Generalized Muscle Weakness (M62.81) · Other lack of cordination (R27.8) · History of falling (Z91.81) · Abnormal posture (R29.3) Precautions/Allergies: 
   Contrast agent [iodine]; Actifed [triprolidine-pseudoephedrine]; Allopurinol; Decongestant [pseudoephedrine hcl]; Lexapro [escitalopram oxalate]; Lyrica [pregabalin]; Minizide [prazosin-polythiazide]; Mobic [meloxicam]; Omnipaque [iohexol]; Sertraline; Spironolactone; and Sulfamethazine ASSESSMENT:  
 
 Ms. Saleem Burrell was admitted with acute osteomyelitis and is s/p amputation of the 2nd toe and is heel down weightbearing the R foot. Pt's family reports she is NWB in the L heel due to a wound. Pt lives with her daughter at home with a tub/shower and is needs assistance from her nieces that stay with her while her daughter is at work. Family has been getting her up with a standing lift. Pt has needed a fair amount of assistance with bathing/dressing/toileting lately. Pt's niece reports the last day at home with her was very difficult. This session, pt presented supine in the bed. Pt is alert and pleasant and agrees to participate with OT. Pt demonstrated deficits in pain, balance, activity tolerance, stiffness, and strength impacting ADLs and functional transfers. Pt transferred to the edge of the bed with moderate to maximal assistance. Pt pushes posteriorly initially and needed maximal assistance to scoot to the edge of the bed. Pt's L hand appears to have contracture deformities and pt is weak. Pt di not tolerate sitting up well this pm and needed maximal assistance to return back to supine due to cramping in her R hip. Tried to discuss with patient the need for rehab but she is adamant that she will not go but does not appear to have a good solution. At the end of the session, pt was left supine in the bed with LEs elevated and with needs in reach. Pt presented below functional baseline and would benefit from skilled OT services to address deficits. This section established at most recent assessment PROBLEM LIST (Impairments causing functional limitations): 1. Decreased Strength 2. Decreased ADL/Functional Activities 3. Decreased Transfer Abilities 4. Decreased Ambulation Ability/Technique 5. Decreased Balance 6. Increased Pain 7. Decreased Activity Tolerance 8. Increased Shortness of Breath 9. Decreased Flexibility/Joint Mobility 10. Edema/Girth 11. Decreased Knowledge of Precautions 12. Decreased Skin Integrity/Hygeine 13. Decreased [place] with Home Exercise Program 
14. Decreased Cognition INTERVENTIONS PLANNED: (Benefits and precautions of occupational therapy have been discussed with the patient.) 1. Activities of daily living training 2. Adaptive equipment training 3. Balance training 4. Clothing management 5. Cognitive training 6. Donning&doffing training 7. Neuromuscular re-eduation 8. Therapeutic activity 9. Therapeutic exercise 10. Wheelchair management TREATMENT PLAN: Frequency/Duration: Follow patient 3 times per week to address above goals. Rehabilitation Potential For Stated Goals: Good REHAB RECOMMENDATIONS (at time of discharge pending progress):   
Placement: It is my opinion, based on this patient's performance to date, that Ms. Aby Morrison may benefit from intensive therapy at a [address] [place] [address] after discharge due to the functional deficits listed above that are likely to improve with skilled rehabilitation and concerns that he/she may be unsafe to be unsupervised at home due to risks for falls and further functional decline. Equipment: ? TBD  
    
 
 
 
OCCUPATIONAL PROFILE AND HISTORY:  
History of Present Injury/Illness (Reason for Referral): 
See H&P Past Medical History/Comorbidities: Ms. Aby Morrison  has a past medical history of Arthritis, Chronic diastolic congestive heart failure ([address] (1/16/2019), Chronic pain, Edema, GERD (gastroesophageal reflux disease) (1/16/2019), Gouty arthritis, History of MI (myocardial infarction) (1980's), Hypertension, Hypothyroidism, Mild heartburn, Neuropathy (1980's), Post-operative nausea and vomiting, and Type 2 diabetes mellitus without complication, without long-term current use of insulin ([address] (7/27/2018).  She also has no past medical history of Adverse effect of anesthesia, Aneurysm ([address] Arrhythmia, Asthma, Autoimmune disease ([address] CAD (coronary artery disease), Cancer ([address] Chronic kidney disease, Chronic obstructive pulmonary disease ([address] Coagulation disorder ([address] Difficult intubation, Endocarditis, Ill-defined condition, Liver disease, Malignant hyperthermia due to anesthesia, Morbid obesity ([address] Pseudocholinesterase deficiency, Psychiatric disorder, PUD (peptic ulcer disease), Rheumatic fever, Seizures ([address] Sleep apnea, Stroke ([address] or Thromboembolus ([address] Ms. Martha Montilla  has a past surgical history that includes hx partial thyroidectomy (1960); hx orthopaedic (Bilateral, 8620-9908); hx orthopaedic (Left, 1992); hx open cholecystectomy (1970); hx cyst removal (Right, 1950's); hx urological; hx heart catheterization (late 1979); hx hysterectomy (1972); hx gyn (1963); hx dilation and curettage (3194-6011); and hx left salpingo-oophorectomy (1965). Social History/Living Environment:  
Home Environment: Private residence One/Two Story Residence: Two story Living Alone: No 
Support Systems: Child(cassidy), Family member(s) Patient Expects to be Discharged to[de-identified] Unknown Current DME Used/Available at Home: Lift chair, Walker, rollator, Wheelchair, Wheelchair, power(Standing frame) Prior Level of Function/Work/Activity: 
Pt lives with her daughter at home with a tub/shower and is needs assistance from her nieces that stay with her while her daughter is at work. Family has been getting her up with a standing lift. Pt has needed a fair amount of assistance with bathing/dressing/toileting lately. Pt's niece reports the last day at home with her was very difficult. Personal Factors:   
      Past/Current Experience:  NWB in L heel; R LE heel down weightbearing Overall Behavior:  decreased insight to deficits Other factors that influence how disability is experienced by the patient:  multiple co-morbidities Number of Personal Factors/Comorbidities that affect the Plan of Care: Extensive review of physical, cognitive, and psychosocial performance (3+): HIGH COMPLEXITY ASSESSMENT OF OCCUPATIONAL PERFORMANCE[de-identified]  
Activities of Daily Living:  
Basic ADLs (From Assessment) Complex ADLs (From Assessment) Feeding: Minimum assistance Oral Facial Hygiene/Grooming: Minimum assistance Bathing: Maximum assistance Upper Body Dressing: Moderate assistance Lower Body Dressing: Total assistance Toileting: Total assistance Instrumental ADL Meal Preparation: Total assistance Homemaking: Total assistance Grooming/Bathing/Dressing Activities of Daily Living Cognitive Retraining Safety/Judgement: Fall prevention;Home safety Bed/Mat Mobility Rolling: Moderate assistance Supine to Sit: Maximum assistance Sit to Supine: Maximum assistance Scooting: Maximum assistance Most Recent Physical Functioning:  
Gross Assessment: 
AROM: Generally decreased, functional(B UE; contracted in the L hand) Strength: Generally decreased, functional(B UE) Coordination: Generally decreased, functional 
         
  
Posture: 
Posture (L): Exceptions to [facility] Posture Assessment: Forward head, Rounded shoulders, Trunk flexion Balance: 
Sitting: Impaired Sitting - Static: Fair (occasional) Sitting - Dynamic: Poor (constant support) Standing: (not assessed) Bed Mobility: 
Rolling: Moderate assistance Supine to Sit: Maximum assistance Sit to Supine: Maximum assistance Scooting: Maximum assistance Wheelchair Mobility: 
  
Transfers: 
   
 
    
 
Patient Vitals for the past 6 hrs: 
 BP SpO2 Pulse 10/01/19 1110 148/86 98 % 77 Mental Status Neurologic State: Alert, Confused Orientation Level: Disoriented to time, Oriented to person, Oriented to place Cognition: Decreased attention/concentration, Follows commands, Poor safety awareness Perception: Cues to maintain midline in sitting Perseveration: No perseveration noted Safety/Judgement: Fall prevention, Home safety Physical Skills Involved: 1. Range of Motion 2. Balance 3. Strength 4. Activity Tolerance 5. Sensation 6. Fine Motor Control 7. Gross Motor Control 8. Pain (acute) 9. Pain (Chronic) 10. Skin Integrity Cognitive Skills Affected (resulting in the inability to perform in a timely and safe manner): 1. Executive Function 2. Short Term Recall 3. Sustained Attention Psychosocial Skills Affected: 1. Habits/Routines 2. Self-Awareness Number of elements that affect the Plan of Care: 5+:  HIGH COMPLEXITY CLINICAL DECISION MAKIN[address] [place] [id number] AM-PAC 6 Clicks Daily Activity Inpatient Short Form How much help from another person does the patient currently need. .. Total A Lot A Little None 1. Putting on and taking off regular lower body clothing? ? 1   ? 2   ? 3   ? 4  
2. Bathing (including washing, rinsing, drying)? ? 1   ? 2   ? 3   ? 4  
3. Toileting, which includes using toilet, bedpan or urinal?   ? 1   ? 2   ? 3   ? 4  
4. Putting on and taking off regular upper body clothing? ? 1   ? 2   ? 3   ? 4  
5. Taking care of personal grooming such as brushing teeth? ? 1   ? 2   ? 3   ? 4  
6. Eating meals? ? 1   ? 2   ? 3   ? 4  
© , Trustees of [address] [place] [id number], under license to [employer]. All rights reserved Score:  Initial: 12 Most Recent: X (Date: -- ) Interpretation of Tool:  Represents activities that are increasingly more difficult (i.e. Bed mobility, Transfers, Gait). Medical Necessity:    
· Patient demonstrates good and fair ·  rehab potential due to higher previous functional level. Reason for Services/Other Comments: 
· Patient continues to require skilled intervention due to decreased independence with ADL/functional transfers · . Use of outcome tool(s) and clinical judgement create a POC that gives a: MODERATE COMPLEXITY  
 
 
 
TREATMENT:  
(In addition to Assessment/Re-Assessment sessions the following treatments were rendered) Pre-treatment Symptoms/Complaints:   
Pain: Initial: 0/10 Post Session:  0/10 Therapeutic Activity: (    8 minutes): Therapeutic activities including Bed transfers and sitting balance/tolerance edge of bed  to improve mobility, strength, balance and coordination. Required moderate to maximal assistance   to promote dynamic balance in sitting. N/a Braces/Orthotics/Lines/Etc:  
· IV Treatment/Session Assessment:   
· Response to Treatment:  Pt tolerated with cramping in the R hip and poor activity tolerance · Interdisciplinary Collaboration:  
o Occupational Therapist 
o Registered Nurse · After treatment position/precautions:  
o Supine in bed 
o Bed/Chair-wheels locked 
o Call light within reach 
o RN notified 
o Family at bedside · Compliance with Program/Exercises: Will assess as treatment progresses. · Recommendations/Intent for next treatment session: \"Next visit will focus on advancements to more challenging activities and reduction in assistance provided\". Total Treatment Duration: OT Patient Time In/Time Out Time In: 6622 Time Out: 9807 Aide Hernandez, OT

## 2019-10-01 NOTE — PROGRESS NOTES
Problem: Mobility Impaired (Adult and Pediatric) Goal: *Acute Goals and Plan of Care (Insert Text) Description LTG: 
(1.)Ms. Derick Perez will move from supine to sit and sit to supine , scoot up and down and roll side to side with SUPERVISION within 7 treatment day(s). (2.)Ms. Derick Perez will perform transfers to/from bed, wc, or recliner with or without standing lift while maintaining WB precautions to improve mobility and activity tolerance within 7 days. (3.)Ms. Derick Perez will perform seated exercises per HEP for 10+ minutes to improve strength and mobility within 7 days. ________________________________________________________________________________________________ Outcome: Progressing Towards Goal 
 
 
PHYSICAL THERAPY: Initial Assessment and AM 10/1/2019 INPATIENT:   
[place] / [place] / Product Type: Medicare /   
  
NAME/AGE/GENDER: Karena Malcolm is a 80 y.o. female PRIMARY DIAGNOSIS: Acute osteomyelitis of toe of right foot ([facility] [place] Uncontrolled diabetes mellitus ([facility] [E11.65] Sepsis ([facility] [A41.9] Acute osteomyelitis of toe of right foot ([facility] Acute osteomyelitis of toe of right foot ([facility] Procedure(s) (LRB): 
AMPUTATION TOE RIGHT 2ND REQUEST TO FOLLOW (Right) 1 Day Post-Op ICD-10: Treatment Diagnosis:  
 Generalized Muscle Weakness (M62.81) Difficulty in walking, Not elsewhere classified (R26.2) Other abnormalities of gait and mobility (R26.89) Precaution/Allergies: 
Contrast agent [iodine]; Actifed [triprolidine-pseudoephedrine]; Allopurinol; Decongestant [pseudoephedrine hcl]; Lexapro [escitalopram oxalate]; Lyrica [pregabalin]; Minizide [prazosin-polythiazide]; Mobic [meloxicam]; Omnipaque [iohexol]; Sertraline; Spironolactone; and Sulfamethazine ASSESSMENT:  
 
Ms. Derick Perez is an 80year old female admitted from home for acute right foot osteomyelitis and is s/p right second toe amputation.  Per nursing who spoke with vascular group after surgery, pt is to be heel weight bearing on right foot and she does have a post op shoe present in room. She is toe touch weight bearing on left foot due to left heel wound. Has been using a standing lift for all transfers for the past few weeks with help from family. Niece reports increased difficulty with bed mobility and transfer to sitting to prepare for transfers recently. Pt presents in supine without complaints and is agreeable to therapy. She requires additional time, cueing, and min-mod assist for bed mobility and transfer to sitting. Fair seated balance noted. C/o R foot increased pain in dependent position then reports right upper thigh cramping after a few minutes and requests to return to supine. Mod assist for sit to supine and max assist to perform supine scooting and repositioning. Nehemias Jackson appears to be functioning well below baseline with strength and mobility at this time. She will need continued therapy during hospital stay to address bed mobility, seated balance, and strengthening and may work on transfers via lift as she progresses. Will likely benefit from rehab at [place] based on conversation with family/caregiver. This section established at most recent assessment PROBLEM LIST (Impairments causing functional limitations): 
Decreased Strength Decreased ADL/Functional Activities Decreased Transfer Abilities Decreased Ambulation Ability/Technique Decreased Balance Decreased Activity Tolerance Decreased Knowledge of Precautions Decreased Skin Integrity/Hygeine Decreased Cognition INTERVENTIONS PLANNED: (Benefits and precautions of physical therapy have been discussed with the patient.) Balance Exercise Bed Mobility Home Exercise Program (HEP) Therapeutic Activites Therapeutic Exercise/Strengthening Transfer Training TREATMENT PLAN: Frequency/Duration: 3 times a week for duration of hospital stay Rehabilitation Potential For Stated Goals: Good REHAB RECOMMENDATIONS (at time of discharge pending progress):   
Placement: It is my opinion, based on this patient's performance to date, that Ms. Allyson Marie may benefit from intensive therapy at a [address] [place] [address] after discharge due to the functional deficits listed above that are likely to improve with skilled rehabilitation and concerns that he/she may be unsafe to be unsupervised at home due to weakness, WB restrictions, need for increased assistance . Equipment:  
Tbd pending progress HISTORY:  
History of Present Injury/Illness (Reason for Referral): 
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 Currently, pt reports redness and swelling in bilateral lower legs with deep ulcerated wound on the dorsum of right 2nd toe. Pt c/o pain, 7/10 in severity, constant, burning, not associated with fever, chills, night sweats. No other c/o chest pain, urinary symptoms, cough, SOB, palpitations, nausea/vomiting, diarrhea, abdominal pain. Foot x-ray showed destructive changes at the PIp joint of the 2nd toe with suspicion of osteomyelitis vs septic arthritis. HR ~110, WBC 12K, Cr 1.43, /89\" Past Medical History/Comorbidities: Ms. Allyson Marie  has a past medical history of Arthritis, Chronic diastolic congestive heart failure ([facility] (1/16/2019), Chronic pain, Edema, GERD (gastroesophageal reflux disease) (1/16/2019), Gouty arthritis, History of MI (myocardial infarction) (1980's), Hypertension, Hypothyroidism, Mild heartburn, Neuropathy (1980's), Post-operative nausea and vomiting, and Type 2 diabetes mellitus without complication, without long-term current use of insulin ([address] (7/27/2018). She also has no past medical history of Adverse effect of anesthesia, Aneurysm ([address] Arrhythmia, Asthma, Autoimmune disease ([address] CAD (coronary artery disease), Cancer ([address] Chronic kidney disease, Chronic obstructive pulmonary disease ([address] Coagulation disorder ([address] Difficult intubation, Endocarditis, Ill-defined condition, Liver disease, Malignant hyperthermia due to anesthesia, Morbid obesity ([address] Pseudocholinesterase deficiency, Psychiatric disorder, PUD (peptic ulcer disease), Rheumatic fever, Seizures ([address] Sleep apnea, Stroke ([address] or Thromboembolus ([address] Ms. Virgilio Diego  has a past surgical history that includes hx partial thyroidectomy (1960); hx orthopaedic (Bilateral, 7513-2284); hx orthopaedic (Left, 1992); hx open cholecystectomy (1970); hx cyst removal (Right, 1950's); hx urological; hx heart catheterization (late 1979); hx hysterectomy (1972); hx gyn (1963); hx dilation and curettage (0561-3501); and hx left salpingo-oophorectomy (1965). Social History/Living Environment:  
Home Environment: Private residence One/Two Story Residence: Two story Living Alone: No 
Support Systems: Child(cassidy), Family member(s), Home care staff Patient Expects to be Discharged to[de-identified] Unknown Current DME Used/Available at Home: Lift chair, Wheelchair(standing lift) Prior Level of Function/Work/Activity: 
Lives with daughter and son in law. Daughter works 3-4 days wk as RN and [employer] not always present. She has a niece and home care nursing staff who assist as well. Number of Personal Factors/Comorbidities that affect the Plan of Care: 1-2: MODERATE COMPLEXITY EXAMINATION:  
Most Recent Physical Functioning:  
Gross Assessment: 
AROM: Generally decreased, functional 
Strength: Generally decreased, functional 
Coordination: Generally decreased, functional 
         
  
Posture: Posture (WDL): Exceptions to [facility] Posture Assessment: Forward head, Rounded shoulders, Trunk flexion Balance: 
Sitting: Impaired Sitting - Static: Fair (occasional) Sitting - Dynamic: Fair (occasional) Standing: (not assessed) Bed Mobility: 
Rolling: Moderate assistance Supine to Sit: Moderate assistance Sit to Supine: Moderate assistance Scooting: Maximum assistance Wheelchair Mobility: 
  
Transfers: 
  
Gait: 
Right Side Weight Bearing: Heel Left Side Weight Bearing: Toe touch Body Structures Involved: Muscles Body Functions Affected: 
Mental 
Sensory/Pain Movement Related Skin Related Activities and Participation Affected: 
General Tasks and Demands Mobility Domestic Life Community, Social and [place] Number of elements that affect the Plan of Care: 4+: HIGH COMPLEXITY CLINICAL PRESENTATION:  
Presentation: Evolving clinical presentation with changing clinical characteristics: MODERATE COMPLEXITY CLINICAL DECISION MAKING:  
[employer] AM-PAC 6 Clicks Basic Mobility Inpatient Short Form How much difficulty does the patient currently have. .. Unable A Lot A Little None 1. Turning over in bed (including adjusting bedclothes, sheets and blankets)? ? 1   ? 2   ? 3   ? 4  
2. Sitting down on and standing up from a chair with arms ( e.g., wheelchair, bedside commode, etc.)   ? 1   ? 2   ? 3   ? 4  
3. Moving from lying on back to sitting on the side of the bed?   ? 1   ? 2   ? 3   ? 4 How much help from another person does the patient currently need. .. Total A Lot A Little None 4. Moving to and from a bed to a chair (including a wheelchair)? ? 1   ? 2   ? 3   ? 4  
5. Need to walk in hospital room? ? 1   ? 2   ? 3   ? 4  
6. Climbing 3-5 steps with a railing? ? 1   ? 2   ? 3   ? 4  
© 2007, Trustees of [facility] [employer], under license to [employer]. All rights reserved Score:  Initial 8 Most Recent: X (Date: -- ) Interpretation of Tool:  Represents activities that are increasingly more difficult (i.e. Bed mobility, Transfers, Gait). Medical Necessity:    
Patient demonstrates fair 
 rehab potential due to higher previous functional level. Reason for Services/Other Comments: 
Patient continues to demonstrate capacity to improve strength, mobility, balance, transfers which will increase independence, decrease amount of assistance required from caregiver and increase safety [facility] Use of outcome tool(s) and clinical judgement create a POC that gives a: Questionable prediction of patient's progress: MODERATE COMPLEXITY  
  
 
 
 
TREATMENT:  
(In addition to Assessment/Re-Assessment sessions the following treatments were rendered) Pre-treatment Symptoms/Complaints:  \"I think I can put some weight on these toes to transfer\" referring to R foot. Confused this AM per family at bedside. Pain: Initial:  
Pain Intensity 1: 0  Post Session:  0/10 comfortable in bed Assessment/Reassessment only, no treatment provided today Braces/Orthotics/Lines/Etc:  
O2 Device: Nasal cannula Treatment/Session Assessment:   
Response to Treatment:  pt requires min-mod assist for bed mobility. No standing attempts today. Has been using standing lift at home for several weeks. Interdisciplinary Collaboration:  
Physical Therapist 
Registered Nurse After treatment position/precautions:  
Supine in bed Bed/Chair-wheels locked Bed in low position Caregiver at bedside Call light within reach Compliance with Program/Exercises: Will assess as treatment progresses Recommendations/Intent for next treatment session: \"Next visit will focus on advancements to more challenging activities and reduction in assistance provided\". Total Treatment Duration: PT Patient Time In/Time Out Time In: 1573 Time Out: 1106 Nakia Pemberton DPT

## 2019-10-01 NOTE — OP NOTES
[address] [place] [address] 
OPERATIVE REPORT Name:  Deejay Henriquez 
MR#:  [id number] :  1933 ACCOUNT #:  [de-identified] DATE OF SERVICE:  2019 PREOPERATIVE DIAGNOSIS:  Right second toe osteomyelitis. POSTOPERATIVE DIAGNOSIS:  Right second toe osteomyelitis. PROCEDURE PERFORMED:  Right second toe amputation. SURGEON:  Apurva Smallwood MD 
 
ASSISTANT:  None. ANESTHESIA:  General endotracheal. 
 
COMPLICATIONS:  None. SPECIMENS REMOVED:  None. IMPLANTS:   
 
ESTIMATED BLOOD LOSS:  20 mL. DRAINS:  None. DESCRIPTION OF PROCEDURE:  The patient was brought to the operating room and placed on the operating table in supine position. Following adequate general endotracheal anesthesia and time-out procedure, the right foot was draped and prepped in sterile fashion circumferentially. An elliptical incision was made along the base of the second toe. The wound was deepened using Bovie to control bleeding. Dissection continued down the level of the proximal phalanx. The second toe was then divided at this level. The distal toe including the ulceration was removed. Next, the proximal phalanx was mobilized with a periosteal elevator and then debrided with the rongeur down to the articular surface of the metatarsal.  Next, the remainder of the wound was inspected for evidence of devitalized tissue or infection and none was seen. The wound was then irrigated copiously and then closed with interrupted nylon suture. Sterile dry dressings were applied to both. The patient was awakened from anesthesia and transported to the recovery room in stable condition. The patient tolerated the procedure well. No complications. All needle and sponge counts were correct. MD ZOEY Emanuel/[id number]/ 
D:  2019 16:40 
T:  10/01/2019 2:53 JOB #:  [id number]

## 2019-10-01 NOTE — PROGRESS NOTES
Progress Note Patient: Saray Maharaj MRN: [id number]  SSN: [id number] YOB: 1933  Age: 80 y.o. Sex: female Admission Date: 9/27/2019 LOS: 4 days 1 Day Post-Op PROCEDURE(S): 
Right second toe amputation Subjective:  
 
Patient resting comfortably, awakens only after several minutes manipulation of R LE. Objective:  
 
Vitals:  
 10/01/19 6009 10/01/19 5822 10/01/19 0804 10/01/19 9951 BP: 165/89  (!) 187/113 165/88 Pulse: 73  82 Resp: 16  17 Temp: 97.4 °F (36.3 °C)  98.1 °F (36.7 °C) SpO2: 90%  98% Weight:  181 lb 6.4 oz (82.3 kg) Height:      
  
 
Intake and Output: 
Current Shift: No intake/output data recorded. Physical Exam:  
GENERAL: dozing, cooperative LUNG: clear to auscultation bilaterally, normal respiratory effort HEART: regular rate and rhythm ABDOMEN: soft, nontender, obese and protuberant but not distended, bowel sounds normoactive EXTREMITIES: Rooke boots in place, R LE elevated, dressing intact at foot Lab/Data Review: 
BMP:  
Lab Results Component Value Date/Time  10/01/2019 05:57 AM  
 K 4.9 10/01/2019 05:57 AM  
  10/01/2019 05:57 AM  
 CO2 27 10/01/2019 05:57 AM  
 AGAP 9 10/01/2019 05:57 AM  
  (H) 10/01/2019 05:57 AM  
 BUN 36 (H) 10/01/2019 05:57 AM  
 CREA 1.61 (H) 10/01/2019 05:57 AM  
 GFRAA 39 (L) 10/01/2019 05:57 AM  
 GFRNA 32 (L) 10/01/2019 05:57 AM  
 
CBC:  
Lab Results Component Value Date/Time WBC 11.8 (H) 10/01/2019 05:57 AM  
 HGB 12.4 10/01/2019 05:57 AM  
 HCT 40.8 10/01/2019 05:57 AM  
  10/01/2019 05:57 AM  
  
 
Assessment / Plan / Recommendations / Medical Decision Making:  
 
Principal Problem: 
  Acute osteomyelitis of toe of right foot ([address] (9/27/2019) Active Problems: 
  Sepsis ([facility] (3/25/2019) Uncontrolled diabetes mellitus ([facility] (9/27/2019) Continue current care Vascular team will remove dressing, evaluate incision Wednesday, 10/2 Heel weight-bearing only on R LE 
CM - likely needs rehab due to weight-bearing status (wounds on contralateral foot), mobility issues Signed By: Tyree Jacobs PA-C October 1, 2019 Physician Assistant with [facility] Surgery Heidy Mccabe.  Amara Cali MD / Pily Felder MD

## 2019-10-01 NOTE — PROGRESS NOTES
Hourly rounds complete this shift, no new complaints at this time, patient pleasantly confused this evening, c/o foot pain, medication given, Dsg C/D/I bed in low, locked position, call light and bedside table within reach,  all needs met. Will continue to monitor Report to day shift nurse.

## 2019-10-02 NOTE — PROGRESS NOTES
Progress Note   
10/2/2019 Admit Date: 2019  6:15 PM  
NAME: Amy Brice :  1933 MRN:  [id number] Attending: Lizett Elizabeth MD 
PCP:  Meaghan Norton MD 
Treatment Team: Attending Provider: Lizett Elizabeth MD; Consulting Provider: Vanessa Tony MD; Utilization Review: Satinder Vazquez RN; Care Manager: Yoli Morgan RN; Physical Therapist: Kristin Tamayo DPT; Consulting Provider: Fam Allison MD; Consulting Provider: Ancel Baumgarten, MD 
 
Full Code SUBJECTIVE:  
Ms. Janine Stoddard is a 79 yo female with PMH of DM II, obesity, HTN, chronic pain, gout, GERD, PAD s/p angioplasty, prior osteomyelitis who presented with c/o right 2nd toe ulceration. Xray showed concern for osteomyelitis. Pt started on Vanc and Zosyn, finished course. Pt underwent right second toe amputation 10/1. Creatinine elevated yesterday, NS started. Creatinine worse today. Pt with anasarca. Pt c/o \"I'm full of fluid in my stomach\". Denies CP, SOB. Past Medical History:  
Diagnosis Date  Arthritis  Chronic diastolic congestive heart failure ([facility] 2019  Chronic pain   
 bilat feet & hands  Edema BLE & bilat fingers; pt takes diuretic daily; pt states her swelling is related to the nerve disease she is being treated for at [facility]  GERD (gastroesophageal reflux disease) 2019  Gouty arthritis  History of MI (myocardial infarction)  \"light heart attack\"; pt states MD at [facility] mentioned it to her; pt stated MD told her \"was on the back part of her heart & would not give her any problems\"; pt takes aspirin 81 mg daily  Hypertension   
 managed w/med  Hypothyroidism   
 s/p partial thyroidectomy ; managed w/med  Mild heartburn   
 takes tums prn  Neuropathy   
 pt states has been treated by [facility] for \"nerve problems in her feet & hands\"  Post-operative nausea and vomiting  Type 2 diabetes mellitus without complication, without long-term current use of insulin [facility] 7/27/2018 Recent Results (from the past 24 hour(s)) GLUCOSE, POC Collection Time: 10/01/19  8:46 PM  
Result Value Ref Range Glucose (POC) 175 (H) 65 - 100 mg/dL CBC WITH AUTOMATED DIFF Collection Time: 10/02/19  5:49 AM  
Result Value Ref Range WBC 10.4 4.3 - 11.1 K/uL  
 RBC 3.75 (L) 4.05 - 5.2 M/uL  
 HGB 10.5 (L) 11.7 - 15.4 g/dL HCT 35.0 (L) 35.8 - 46.3 % MCV 93.3 79.6 - 97.8 FL  
 MCH 28.0 26.1 - 32.9 PG  
 MCHC 30.0 (L) 31.4 - 35.0 g/dL  
 RDW 17.2 (H) 11.9 - 14.6 % PLATELET 849 290 - 761 K/uL MPV 10.0 9.4 - 12.3 FL ABSOLUTE NRBC 0.00 0.0 - 0.2 K/uL  
 DF AUTOMATED NEUTROPHILS 73 43 - 78 % LYMPHOCYTES 15 13 - 44 % MONOCYTES 7 4.0 - 12.0 % EOSINOPHILS 1 0.5 - 7.8 % BASOPHILS 0 0.0 - 2.0 % IMMATURE GRANULOCYTES 4 0.0 - 5.0 %  
 ABS. NEUTROPHILS 7.6 1.7 - 8.2 K/UL  
 ABS. LYMPHOCYTES 1.5 0.5 - 4.6 K/UL  
 ABS. MONOCYTES 0.7 0.1 - 1.3 K/UL  
 ABS. EOSINOPHILS 0.1 0.0 - 0.8 K/UL  
 ABS. BASOPHILS 0.0 0.0 - 0.2 K/UL  
 ABS. IMM. GRANS. 0.4 0.0 - 0.5 K/UL METABOLIC PANEL, BASIC Collection Time: 10/02/19  5:49 AM  
Result Value Ref Range Sodium 143 136 - 145 mmol/L Potassium 5.0 3.5 - 5.1 mmol/L Chloride 108 (H) 98 - 107 mmol/L  
 CO2 27 21 - 32 mmol/L Anion gap 8 7 - 16 mmol/L Glucose 105 (H) 65 - 100 mg/dL BUN 47 (H) 8 - 23 MG/DL Creatinine 2.08 (H) 0.6 - 1.0 MG/DL  
 GFR est AA 29 (L) >60 ml/min/1.73m2 GFR est non-AA 24 (L) >60 ml/min/1.73m2 Calcium 7.7 (L) 8.3 - 10.4 MG/DL  
GLUCOSE, POC Collection Time: 10/02/19  7:28 AM  
Result Value Ref Range Glucose (POC) 93 65 - 100 mg/dL GLUCOSE, POC Collection Time: 10/02/19 10:54 AM  
Result Value Ref Range Glucose (POC) 133 (H) 65 - 100 mg/dL Allergies Allergen Reactions  Contrast Agent [Iodine] Anaphylaxis  Actifed [Triprolidine-Pseudoephedrine] Nausea Only  Allopurinol Other (comments) Elevated blood pressure & pt states could not walk or see until medication was out of her system.  Decongestant [Pseudoephedrine Hcl] Rash and Itching  Lexapro [Escitalopram Oxalate] Other (comments) Lips/Mouth swelling  Lyrica [Pregabalin] Unknown (comments)  Minizide [Prazosin-Polythiazide] Unknown (comments)  Mobic [Meloxicam] Other (comments) Facial swelling  Omnipaque [Iohexol] Hives, Swelling and Other (comments) Wheezing and/or shortness of breath  Sertraline Other (comments) Mouth, lips swell  Spironolactone Itching Pt complains that she breaks out in clammy sweat with itching and stinging in her upper arms, dry mouth, funny feeling tongue, SOB, and increased anxiety.  Sulfamethazine Other (comments) Mouth/lips swelling Current Facility-Administered Medications Medication Dose Route Frequency Provider Last Rate Last Dose  
 0.9% sodium chloride infusion  100 mL/hr IntraVENous CONTINUOUS Dedra Fleischer B,  mL/hr at 10/02/19 1101 100 mL/hr at 10/02/19 1101  
 sodium chloride (NS) flush 5-40 mL  5-40 mL IntraVENous Q8H Toya Hamilton MD   10 mL at 10/02/19 3666  sodium chloride (NS) flush 5-40 mL  5-40 mL IntraVENous PRN Eduarda Elizondo MD      
 amLODIPine (NORVASC) tablet 10 mg  10 mg Oral DAILY Barbara Oakes MD   10 mg at 10/02/19 1119  carvedilol (COREG) tablet 12.5 mg  12.5 mg Oral BID WITH MEALS aBrbara Oakes MD   12.5 mg at 10/02/19 1109  lidocaine (XYLOCAINE) 10 mg/mL (1 %) injection 0.1 mL  0.1 mL SubCUTAneous PRN Andie Grace MD      
 albuterol (PROVENTIL VENTOLIN) nebulizer solution 2.5 mg  2.5 mg Inhalation PRN Andie Grace MD      
 HYDROmorphone (PF) (DILAUDID) injection 0.5 mg  0.5 mg IntraVENous Multiple Andie Grace MD   0.5 mg at 09/30/19 1416  methadone (DOLOPHINE) tablet 10 mg  10 mg Oral Q6H Barbara Oakes MD   10 mg at 10/02/19 1108  HYDROmorphone (PF) (DILAUDID) injection 0.2 mg  0.2 mg IntraVENous Q4H PRN Robyn Nash MD   0.2 mg at 10/02/19 1405  atorvastatin (LIPITOR) tablet 40 mg  40 mg Oral QHS Robyn Nash MD   40 mg at 10/01/19 2236  aspirin delayed-release tablet 81 mg  81 mg Oral QHS Phoenix Edwards MD   81 mg at 10/01/19 2237  clonazePAM (KlonoPIN) tablet 0.5 mg  0.5 mg Oral TID Phoenix Edwards MD   0.5 mg at 10/02/19 1118  DULoxetine (CYMBALTA) capsule 60 mg  60 mg Oral DAILY Phoenix Edwards MD   60 mg at 10/02/19 1107  levothyroxine (SYNTHROID) tablet 75 mcg  75 mcg Oral ACB Phoenix Edwards MD   75 mcg at 10/02/19 2793  mirtazapine (REMERON) tablet 7.5 mg  7.5 mg Oral QHS Phoenix Edwards MD   7.5 mg at 10/01/19 2236  oxybutynin chloride XL (DITROPAN XL) tablet 5 mg  5 mg Oral DAILY Phoenix Edwards MD   5 mg at 10/02/19 1107  predniSONE (DELTASONE) tablet 10 mg  10 mg Oral BID Phoenix Edwards MD   10 mg at 10/02/19 1108  sodium chloride (NS) flush 5-40 mL  5-40 mL IntraVENous Q8H Phoenix Edwards MD   10 mL at 10/02/19 4745  sodium chloride (NS) flush 5-40 mL  5-40 mL IntraVENous PRN Phoenix Edwards MD      
 acetaminophen (TYLENOL) tablet 650 mg  650 mg Oral Q6H PRN Phoenix Edwards MD      
 naloxone [facility]) injection 0.4 mg  0.4 mg IntraVENous PRN Phoenix Edwards MD      
 ondansetron [facility]) injection 4 mg  4 mg IntraVENous Q4H PRN Phoenix Edwards MD   4 mg at 10/01/19 2236  
 senna-docusate (PERICOLACE) 8.6-50 mg per tablet 1 Tab  1 Tab Oral DAILY Phoenix Edwards MD   1 Tab at 10/02/19 1117  
 heparin (porcine) injection 5,000 Units  5,000 Units SubCUTAneous Q12H Phoenix Edwards MD   5,000 Units at 10/02/19 1109  Saccharomyces boulardii (FLORASTOR) capsule 500 mg  500 mg Oral BID Phoenix Edwards MD   500 mg at 10/02/19 1108  insulin glargine (LANTUS) injection 20 Units  20 Units SubCUTAneous QHS Wicho Kraus MD   20 Units at 10/01/19 2236  
 insulin lispro (HUMALOG) injection   SubCUTAneous AC&HS Wicho Kraus MD   Stopped at 10/02/19 0730  
 insulin glargine (LANTUS) injection 10 Units  10 Units SubCUTAneous ACB Wicho Kraus MD   10 Units at 10/02/19 1116  hydrALAZINE (APRESOLINE) tablet 25 mg  25 mg Oral TID Wicho Kraus MD   25 mg at 10/02/19 1109  
 influenza vaccine - (6 mos+)(PF) (FLUARIX/FLULAVAL/FLUZONE QUAD) injection 0.5 mL  0.5 mL IntraMUSCular PRIOR TO DISCHARGE Wicho Kraus MD      
 
 
Review of Systems negative with exception of pertinent positives noted above PHYSICAL EXAM  
 
Visit Vitals /80 Pulse 72 Temp 98.1 °F (36.7 °C) Resp 16 Ht 5' 3\" (1.6 m) Wt 89.1 kg (196 lb 6.4 oz) SpO2 98% BMI 34.79 kg/m² Temp (24hrs), Av.2 °F (36.8 °C), Min:98.1 °F (36.7 °C), Max:98.3 °F (36.8 °C) Oxygen Therapy O2 Sat (%): 98 % (10/02/19 1523) Pulse via Oximetry: 76 beats per minute (19 1507) O2 Device: Nasal cannula (19 1507) O2 Flow Rate (L/min): 3 l/min (19 1507) Intake/Output Summary (Last 24 hours) at 10/2/2019 1551 Last data filed at 10/2/2019 1124 Gross per 24 hour Intake 1664 ml Output 600 ml Net 1064 ml General: No acute distress, appears chronically ill   
Lungs: CTA bilaterally. Resp even and nonlabored Heart:  S1S2 present without murmurs rubs gallops. RRR. 2+ LE/UE pitting edema, anasarca Abdomen: Anasarca. Distended. BS present Extremities: Moves ext spontaneously. No cyanosis Neurologic:  A/O X4. No focal deficits Results summary of Diagnostic Studies/Procedures copied from within [facility] EMR: 
 
 
ASSESSMENT Active Hospital Problems Diagnosis Date Noted  Uncontrolled diabetes mellitus ([address] 2019  Acute osteomyelitis of toe of right foot ([facility] 2019  Sepsis ([facility] 2019 Plan: 
Acute OM right 2nd toe with ulceration 
 Vascular following S/p right 2nd toe amputation 10/1 Dressing change today 
  
DM2 
 A1C 8.5 
 BGL controlled Continue current regimen 
  
 HTN 
 continue current regimen 
  
Chronic pain 
 continue Methadone  
  
 H/o PAD 
 on ASA, statin RAYMON on CKD, anasarca Obtain renal US Consult Nephrology Stop IVF Check urine lytes Check Vanc level Notes, labs, VS, diagnostic testing reviewed Time spent with pt 20 min DVT Prophylaxis: heparin sq Plan of Care Discussed with: Supervising MD Dr. Yamileth Reese, care team, pt, family at bedside Meg Pepe NP

## 2019-10-02 NOTE — PROGRESS NOTES
Hourly rounds complete this shift, no new complaints at this time, patient c/o: toe pain medication given  bed in low, locked position, call light and bedside table within reach,  all needs met. Will continue to monitor Report to day shift nurse.

## 2019-10-02 NOTE — PROGRESS NOTES
Call placed to patient's daughter to discuss discharge plan. Daughter would like for patient to discharge to SNF. Daughter would like referral sent to [facility]ric. Referral sent. Awaiting response.

## 2019-10-02 NOTE — PROGRESS NOTES
Progress Note Patient: Vi Horne MRN: [id number]  SSN: [id number] YOB: 1933  Age: 80 y.o. Sex: female Admission Date: 9/27/2019 LOS: 5 days 2 Days Post-Op PROCEDURE(S): 
Right second toe amputation Subjective:  
 
Patient dozing, groggy when awakens. Family in room report patient's good appetite. Patient seen in conjunction with Wilbur Valdez MD. Objective:  
 
Vitals:  
 10/01/19 1540 10/01/19 1853 10/02/19 0429 10/02/19 1496 BP: 136/82 124/66 143/88 136/82 Pulse: 81 74 86 78 Resp: 18 18 18 17 Temp: 98.1 °F (36.7 °C) 98.1 °F (36.7 °C) 98.3 °F (36.8 °C) 98.1 °F (36.7 °C) SpO2: 99% 97% 98% 96% Weight:   196 lb 6.4 oz (89.1 kg) Height:      
  
 
Physical Exam:  
GENERAL: drowsy, cooperative LUNG: normal respiratory effort EXTREMITIES: right 2nd toe amputation site intact with sutures, crusted serosanguineous drainage on dressing, mild edema, no erythema; right great toe with shallow ulceration at distal tip, slough in ulcer PULSES: easily palpable right DP and PT Lab/Data Review: 
BMP:  
Lab Results Component Value Date/Time  10/02/2019 05:49 AM  
 K 5.0 10/02/2019 05:49 AM  
  (H) 10/02/2019 05:49 AM  
 CO2 27 10/02/2019 05:49 AM  
 AGAP 8 10/02/2019 05:49 AM  
  (H) 10/02/2019 05:49 AM  
 BUN 47 (H) 10/02/2019 05:49 AM  
 CREA 2.08 (H) 10/02/2019 05:49 AM  
 GFRAA 29 (L) 10/02/2019 05:49 AM  
 GFRNA 24 (L) 10/02/2019 05:49 AM  
 
CBC:  
Lab Results Component Value Date/Time WBC 10.4 10/02/2019 05:49 AM  
 HGB 10.5 (L) 10/02/2019 05:49 AM  
 HCT 35.0 (L) 10/02/2019 05:49 AM  
  10/02/2019 05:49 AM  
  
 
Assessment / Plan / Recommendations / Medical Decision Making:  
 
Principal Problem: 
  [facility] ([facility]) Active Problems: 
  Sepsis ([facility] (3/25/2019) Uncontrolled diabetes mellitus ([facility] (9/27/2019) Continue care IV ABX per primary Consult ortho for right great toe R LE wound care discussed with primary RN, orders placed Heel weight-bearing only on R LE 
CM - likely needs rehab due to weight-bearing status (wounds on contralateral foot), mobility issues Signed By: Kira Saeed PA-C October 2, 2019 Physician Assistant with [facility] Vascular Surgery Jean Carlos Frost.  Manuel Ott MD / Jaclyn France MD

## 2019-10-02 NOTE — CONSULTS
[facility] Subjective: RAYMON   Renal consult dictated # [id number] Review of Systems -  
General ROS: negative for - fever, chills Respiratory ROS: no SOB, cough, LANDA Cardiovascular ROS: no CP, palpitations Gastrointestinal ROS: no N&V, abdominal pain, diarrhea Genito-Urinary ROS: no difficulty voiding, dysuria Neurological ROS: no seizures, focal weekness Objective: 
 
Vitals:  
 10/02/19 0429 10/02/19 7876 10/02/19 1113 10/02/19 1523 BP: 143/88 136/82 145/85 142/80 Pulse: 86 78 76 72 Resp: 18 17 16 16 Temp: 98.3 °F (36.8 °C) 98.1 °F (36.7 °C) 98.2 °F (36.8 °C) 98.1 °F (36.7 °C) SpO2: 98% 96% 97% 98% Weight: 89.1 kg (196 lb 6.4 oz) Height:      
 
 
PE 
Gen: in no acute distress CV:reg rate Chest:clear Abd: soft Ext/Access: 3+ edema Josh Octave LAB Recent Labs 10/02/19 
3276 10/01/19 
0557 09/30/19 2007 WBC 10.4 11.8* 9.9 HGB 10.5* 12.4 10.8* HCT 35.0* 40.8 35.5*  224 215 Recent Labs 10/02/19 
8234 10/01/19 
0557 09/30/19 
5399  143 144  
K 5.0 4.9 4.2 * 107 107 CO2 27 27 31 * 114* 148* BUN 47* 36* 29* CREA 2.08* 1.61* 0.95  
CA 7.7* 7.2* 8.1* Radiology A/P:  
Patient Active Problem List  
Diagnosis Code  Urinary tract infection, site not specified N39.0  Murmur R01.1  Bilateral leg edema R60.0  Swelling R60.9  Abnormal EKG R94.31  
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  
 Acute prerenal azotemia R79.89  Chronic diastolic congestive heart failure (HCC) I50.32  
 Acquired hypothyroidism E03.9  GERD (gastroesophageal reflux disease) K21.9  Finger infection L08.9  Leukocytosis D72.829  
  RAYMON (acute kidney injury) ([address] N17.9  Osteomyelitis of finger (HCC) M86.9  Acute encephalopathy G93.40  Acute metabolic encephalopathy J40.32  
 Acute diarrhea R19.7  Oral thrush B37.0  Sepsis ([address] A41.9  Non healing left heel wound S91.302A  Respiratory failure ([address] J96.90  
 Sepsis secondary to UTI ([address] A41.9, N39.0  Acute pulmonary edema (HCC) J81.0  
 Uncontrolled diabetes mellitus ([address] E11.65  Acute osteomyelitis of toe of right foot ([address] M86.171 RAYMON - the elevated BUN/creat ratio > 20/1 and mild alkalosis suggests pre-renal azotemia. Will check urine lytes and renal US ( she has remote history of stones). Will check Vacno level in am. 
 
Proteinuria -  She has 3 + edema , her urine protein/creat ratio in Feb 2019 was elevated at 3.1. Her s. Albumin on admission was 2.9 and she has hx of hyperlipidemia. She may have nephrotic syndrome from he DM. Osteo - s/p amputation DM 
 
HTN Abi Schaffer MD

## 2019-10-02 NOTE — PROGRESS NOTES
Interdisciplinary Rounds completed 10/02/19. Nursing, Case Management, Physician and PT present. Plan of care reviewed and updated. Vascular following,  Nephrology consulted d/t elevated creatinine.

## 2019-10-02 NOTE — PROGRESS NOTES
Attempted x2 to tx. Both in AM and PM and both times waiting for MD to observe wound and then get redressed. Will attempt again next visit.

## 2019-10-03 NOTE — PROGRESS NOTES
Progress Note Patient: Ciera Martinez MRN: [id number]  SSN: [id number] YOB: 1933  Age: 80 y.o. Sex: female Admission Date: 9/27/2019 LOS: 6 days 3 Days Post-Op PROCEDURE(S): 
Right second toe amputation Subjective:  
 
Patient dozing, alerts easily but returns to sleep quickly. Anasarca, renal function worse today. WBC 15.6. Objective:  
 
Vitals:  
 10/03/19 0030 10/03/19 1003 10/03/19 6054 10/03/19 1136 BP: 136/72 132/77  159/80 Pulse: 82 88  92 Resp: 18 20  18 Temp: 98.2 °F (36.8 °C) 98.2 °F (36.8 °C)  97.7 °F (36.5 °C) SpO2: 97%   95% Weight:   199 lb 12.8 oz (90.6 kg) Height:      
  
 
Physical Exam:  
GENERAL: drowsy, edematous LUNG: clear to auscultation bilaterally, normal respiratory effort HEART: regular rate and rhythm ABDOMEN: soft, nontender, obese and protuberant but nondistended, bowel sounds normoactive EXTREMITIES: right 2nd toe amputation site intact with sutures, less edema, no erythema / drainage, right great toe with shallow ulceration at distal tip, slough in ulcer PULSES: easily palpable right DP and PT Lab/Data Review: 
CMP:  
Lab Results Component Value Date/Time  10/03/2019 06:11 AM  
 K 5.3 (H) 10/03/2019 06:11 AM  
  (H) 10/03/2019 06:11 AM  
 CO2 24 10/03/2019 06:11 AM  
 AGAP 9 10/03/2019 06:11 AM  
  (H) 10/03/2019 06:11 AM  
 BUN 98 (H) 10/03/2019 06:11 AM  
 CREA 2.27 (H) 10/03/2019 06:11 AM  
 GFRAA 26 (L) 10/03/2019 06:11 AM  
 GFRNA 22 (L) 10/03/2019 06:11 AM  
 CA 8.0 (L) 10/03/2019 06:11 AM  
 PHOS 4.4 (H) 10/03/2019 06:11 AM  
 ALB 2.2 (L) 10/03/2019 06:11 AM  
 TP 5.4 (L) 10/03/2019 06:11 AM  
 AGRAT PENDING 10/03/2019 06:11 AM  
 
CBC:  
Lab Results Component Value Date/Time WBC 15.6 (H) 10/03/2019 06:11 AM  
 HGB 9.4 (L) 10/03/2019 06:11 AM  
 HCT 31.5 (L) 10/03/2019 06:11 AM  
  10/03/2019 06:11 AM  
 
 
Assessment / Plan / Recommendations / Medical Decision Making: Principal Problem: 
  Acute osteomyelitis of toe of right foot ([facility] (9/27/2019) Active Problems: 
  Sepsis ([facility] (3/25/2019) Uncontrolled diabetes mellitus ([facility] (9/27/2019) Continue care IV ABX per primary R LE wound care Hospice / Palliative consult? Signed By: Lizzie Velez PA-C October 3, 2019 Physician Assistant with [facility] Mookie Lomeli.  Jim Teran MD / Marquis Adan MD

## 2019-10-03 NOTE — PROGRESS NOTES
Telfa on backorder, difficult to get supply. Applied right foot dressing per orders. Incision well approximated with sutures to 2nd toe amp site. Great toe with small ulcer tip of toe with clean base, no erythema, edema, drainage. Packed fluff of gauze to tip of great toe, wrapped in Kerlix and applied ace bandage. Patient tolerated procedure well. Left foot dressing remains clean/dry/intact. Spoke with ortho to see patient.

## 2019-10-03 NOTE — CONSULTS
[address] [place] [address] 
CONSULTATION Name:  Serjio Sharp 
MR#:  [id number] :  1933 ACCOUNT #:  [de-identified] DATE OF SERVICE:  10/02/2019 NEPHROLOGY CONSULTATION 
 
REASON FOR CONSULTATION:  We are seeing the patient at the request of Nicci Villanueva  with the hospitalist service with regards to acute kidney injury. HISTORY OF PRESENT ILLNESS:  The patient is an 80-year-old  female who was admitted to [facility] on 2019 with a deep ulcerated wound on the dorsal surface of her right second toe. She subsequently was found to have osteo involving that toe and was admitted to the hospital for IV antibiotics. On 2019, she was taken by Dr. Charlee Gonzalez to the operating room and she underwent a right second toe amputation. Subsequent to that she has developed some renal insufficiency. Her review of lab work indicates that on admission she had a BUN of 29 and creatinine of 0.89. By 10/01/2019 yesterday her BUN was 36, creatinine 1.61. Today, she has a sodium of 143, potassium of 5, chloride 108, CO2 is 27, BUN 47, creatinine 2.08. It should be noted that on 2019 she had a creatinine of 1.43. Her serum albumin was 2.9 at that time. Her urinalysis on 2019 showed specific gravity 1.011 with 30 mg/dL of protein. Urine chemistries obtained in 2019 showed that she had nephrotic range proteinuria with urine protein creatinine ratio of 3.1, normal being less than 0.2. A renal ultrasound done in 2019 showed that the right kidney was 10.3 cm in length, left kidney measuring 10.4 cm in length with increased echogenicity bilaterally compatible with chronic medical renal disease. The patient has not been hypotensive during this hospitalization and has not been on any particular nephrotoxic drugs during this time.  
 
PAST MEDICAL HISTORY:  Significant for type 2 diabetes mellitus, chronic hypertension. 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 She has chronic lower extremity edema. She has history of hyperlipidemia, remote history of acute respiratory failure, and history of hypothyroidism. ALLERGIES:  SHE HAS A HISTORY OF MULTIPLE DRUG ALLERGIES INCLUDING IODINATED IV CONTRAST, ACTIFED, ALLOPURINOL, LEXAPRO, LYRICA, PRAZOSIN, MOBIC, SERTRALINE, SPIRONOLACTONE AND SULFA DRUGS. ALL OF THESE REPORTEDLY CAUSE HIVES AND ITCHING. CURRENT MEDICATIONS:  Include Norvasc 10 mg p.o. q.d., aspirin 81 mg p.o. q.d., Lipitor 40 mg p.o. q.d., Coreg 12.5 mg p.o. twice a day, Klonopin 0.5 mg p.o. three times a day, Cymbalta 60 mg p.o. daily, heparin 5000 units subcu q.12 h., Apresoline 25 mg p.o. q.d., Lantus 10 units subcu daily before breakfast and 20 units subcu before dinner, Humalog per sliding scale, Synthroid 75 mcg p.o. q.d., methadone 10 mg p.o. q.6 h., Remeron 7.5 mg p.o. at bedtime, potassium chloride 10 mEq p.o. twice a day, prednisone 10 mg p.o. twice a day, Kaley-Colace 1 tablet p.o. daily. SOCIAL HISTORY:  She is . She lives with her daughter. She does not smoke, does not drink any alcohol. FAMILY HISTORY:  Negative for any kidney disease. REVIEW OF SYSTEMS:  She denies any fevers or chills. She has had chronic shortness of breath for several years. No chest pain or palpitations. No nausea, vomiting, heartburn or abdominal pain. No constipation or diarrhea. No dysuria or polyuria. PHYSICAL EXAMINATION: 
GENERAL:  Reveals an elderly  female in no acute distress. VITAL SIGNS:  She is afebrile. Blood pressure is 142/80, pulse is 72, respirations are 16 and not labored. HEENT:  Her head is normocephalic. Eyes- pupils equal and reactive to light and accommodation. Extraocular muscles are intact. Fundi were not visualized. LUNGS:  She has bilateral breath sounds. HEART:  Regular rate and rhythm. ABDOMEN:  Soft. Bowel sounds are present. There is no hepatosplenomegaly. GENITAL/RECTAL:  Exam was deferred. EXTREMITIES:  She has about 2-3+ leg edema bilaterally. IMPRESSION: 
1. Acute kidney injury. The elevated BUN to creatinine ratio of 20:1 and mild alkalosis would suggest that this may be prerenal. 
2.  Chronic proteinuria in the nephrotic range. This may very well be nephrotic syndrome from some element of diabetic nephropathy. She has had abnormal renal ultrasound done in 02/2019 when she had an acute episode of renal failure with prerenal azotemia at that time. 3.  Peripheral vascular disease, status post second toe amputation for osteomyelitis. 4.  Type 2 diabetes mellitus. 5.  Hypothyroidism, on replacement therapy. 6.  History of diastolic dysfunction. PLAN:  We will check urine chemistries, quantitate proteinuria and a renal ultrasound. She says that she has a remote history of kidney stones although none were seen on her renal ultrasound of 02/2019. We will watch her Is and Os closely as well as her daily weights and adjust fluids accordingly; hopefully that this will end up being just prerenal azotemia and she may respond to some IV fluids. Thank you very much for allowing us to see her and helping in her care. Mena Santos MD 
 
 
[id number] 
D:  10/02/2019 15:29 
T:  10/02/2019 21:26 
JOB #:  [id number] CC:  MD Aysha Mckeon MD Verdene Jun, MD

## 2019-10-03 NOTE — PROGRESS NOTES
Progress Note   
10/3/2019 Admit Date: 2019  6:15 PM  
NAME: Fernanda Santos :  1933 MRN:  [id number] Attending: Petar Mena MD 
PCP:  Clarence Dhillon MD 
Treatment Team: Attending Provider: Petar Mena MD; Consulting Provider: Hollis Nieto MD; Utilization Review: Aixa Sarmiento RN; Care Manager: Opal Varma RN; Consulting Provider: Tameka Porter MD; Consulting Provider: Sandra Singh MD; Primary Nurse: Nancy Strong); Physical Therapist: Michelle Crawford DPT Full Code SUBJECTIVE:  
Ms. Yoni Foster is a 81 yo female with PMH of DM II, obesity, HTN, chronic pain, gout, GERD, PAD s/p angioplasty, prior osteomyelitis who presented with c/o right 2nd toe ulceration. Xray showed concern for osteomyelitis. Pt started on Vanc and Zosyn, finished course. Pt underwent right second toe amputation 10/1. Creatinine continues to rise, NS started 10/1, stopped 10/2 due to anasarca. Nephrology consulted and following. Renal function worse today. Long discussion with daughter via phone, she is interested in Hospice. CM aware, may be difficult for placement. Pt drowsy today. Anasarca better. Denies CP, SOB.   
  
 
Past Medical History:  
Diagnosis Date  Arthritis  Chronic diastolic congestive heart failure ([address] 2019  Chronic pain   
 bilat feet & hands  Edema BLE & bilat fingers; pt takes diuretic daily; pt states her swelling is related to the nerve disease she is being treated for at [facility]  GERD (gastroesophageal reflux disease) 2019  Gouty arthritis  History of MI (myocardial infarction)  \"light heart attack\"; pt states MD at [facility] mentioned it to her; pt stated MD told her \"was on the back part of her heart & would not give her any problems\"; pt takes aspirin 81 mg daily  Hypertension   
 managed w/med  Hypothyroidism   
 s/p partial thyroidectomy ; managed w/med  Mild heartburn   
 takes tums prn  
  Neuropathy 1980's  
 pt states has been treated by [facility] for \"nerve problems in her feet & hands\"  Post-operative nausea and vomiting  Type 2 diabetes mellitus without complication, without long-term current use of insulin ([address] 7/27/2018 Recent Results (from the past 24 hour(s)) GLUCOSE, POC Collection Time: 10/02/19  9:04 PM  
Result Value Ref Range Glucose (POC) 245 (H) 65 - 100 mg/dL PROTEIN/CREATININE RATIO, URINE Collection Time: 10/03/19  3:33 AM  
Result Value Ref Range Protein, urine random 41 (H) <11.9 mg/dL Creatinine, urine 36.80 mg/dL Protein/Creat. urine Ratio 1.1 SODIUM, UR, RANDOM Collection Time: 10/03/19  3:33 AM  
Result Value Ref Range Sodium,urine random 35 MMOL/L  
URINALYSIS W/ RFLX MICROSCOPIC Collection Time: 10/03/19  3:33 AM  
Result Value Ref Range Color YELLOW Appearance CLEAR Specific gravity 1.011 1.001 - 1.023    
 pH (UA) 5.0 5.0 - 9.0 Protein 30 (A) NEG mg/dL Glucose NEGATIVE  mg/dL Ketone NEGATIVE  NEG mg/dL Bilirubin NEGATIVE  NEG Blood NEGATIVE  NEG Urobilinogen 0.2 0.2 - 1.0 EU/dL Nitrites NEGATIVE  NEG Leukocyte Esterase NEGATIVE  NEG    
 WBC 0-3 0 /hpf  
 RBC 0-3 0 /hpf Epithelial cells 0-3 0 /hpf Bacteria 0 0 /hpf Casts 0-3 0 /lpf PROTEIN ELEC WITH LYNN, URINE RANDOM Collection Time: 10/03/19  3:34 AM  
Result Value Ref Range Protein, urine random 40 (H) <11.9 mg/dL A-G Ratio PENDING Albumin, urine PENDING mg/dL ALPHA 1 PENDING mg/dL ALPHA 2 PENDING mg/dL BETA PENDING mg/dL GAMMA PENDING mg/dL M-Carlos, mg/dL PENDING 0 mg/dL PEP Interpretation PENDING   
 LYNN Interpretation PENDING   
PROTEIN ELEC WITH LYNN, SERUM Collection Time: 10/03/19  6:11 AM  
Result Value Ref Range Protein, total 5.4 (L) 6.3 - 8.2 g/dL A-G Ratio PENDING    
 ALBUMIN PENDING g/dL Alpha-1 globulin PENDING g/dL ALPHA 2 PENDING g/dL Beta-globulin PENDING g/dL Gamma-globulin PENDING 10 - 12 g/dL M-Carlos PENDING 0 g/dL Immunoglobulin G 499 (L) 610 - 1,616 mg/dL Immunoglobulin A 223 85 - 499 mg/dL Immunoglobulin M 67 35 - 242 mg/dL PEP Interpretation PENDING   
 LYNN Interpretation PENDING   
FREE LIGHT CHAINS, KAPPA/LAMBDA, QT Collection Time: 10/03/19  6:11 AM  
Result Value Ref Range Kappa Free Light Chain 51.78 (H) 3.30 - 19.40 mg/L Lambda Free Light Chain 39.10 (H) 5.71 - 26.30 mg/L Kappa/Lambda Ratio 1.32 0.26 - 1.65 CBC WITH AUTOMATED DIFF Collection Time: 10/03/19  6:11 AM  
Result Value Ref Range WBC 15.6 (H) 4.3 - 11.1 K/uL  
 RBC 3.35 (L) 4.05 - 5.2 M/uL HGB 9.4 (L) 11.7 - 15.4 g/dL HCT 31.5 (L) 35.8 - 46.3 % MCV 94.0 79.6 - 97.8 FL  
 MCH 28.1 26.1 - 32.9 PG  
 MCHC 29.8 (L) 31.4 - 35.0 g/dL  
 RDW 17.1 (H) 11.9 - 14.6 % PLATELET 805 190 - 567 K/uL MPV 10.9 9.4 - 12.3 FL ABSOLUTE NRBC 0.05 0.0 - 0.2 K/uL  
 DF AUTOMATED NEUTROPHILS 74 43 - 78 % LYMPHOCYTES 13 13 - 44 % MONOCYTES 6 4.0 - 12.0 % EOSINOPHILS 0 (L) 0.5 - 7.8 % BASOPHILS 0 0.0 - 2.0 % IMMATURE GRANULOCYTES 6 (H) 0.0 - 5.0 %  
 ABS. NEUTROPHILS 11.6 (H) 1.7 - 8.2 K/UL  
 ABS. LYMPHOCYTES 2.1 0.5 - 4.6 K/UL  
 ABS. MONOCYTES 1.0 0.1 - 1.3 K/UL  
 ABS. EOSINOPHILS 0.0 0.0 - 0.8 K/UL  
 ABS. BASOPHILS 0.0 0.0 - 0.2 K/UL  
 ABS. IMM. GRANS. 0.9 (H) 0.0 - 0.5 K/UL RENAL FUNCTION PANEL Collection Time: 10/03/19  6:11 AM  
Result Value Ref Range Sodium 141 136 - 145 mmol/L Potassium 5.3 (H) 3.5 - 5.1 mmol/L Chloride 108 (H) 98 - 107 mmol/L  
 CO2 24 21 - 32 mmol/L Anion gap 9 7 - 16 mmol/L Glucose 215 (H) 65 - 100 mg/dL BUN 98 (H) 8 - 23 MG/DL Creatinine 2.27 (H) 0.6 - 1.0 MG/DL  
 GFR est AA 26 (L) >60 ml/min/1.73m2 GFR est non-AA 22 (L) >60 ml/min/1.73m2 Calcium 8.0 (L) 8.3 - 10.4 MG/DL Phosphorus 4.4 (H) 2.3 - 3.7 MG/DL Albumin 2.2 (L) 3.2 - 4.6 g/dL GLUCOSE, POC Collection Time: 10/03/19  7:36 AM  
Result Value Ref Range Glucose (POC) 189 (H) 65 - 100 mg/dL GLUCOSE, POC Collection Time: 10/03/19 10:51 AM  
Result Value Ref Range Glucose (POC) 255 (H) 65 - 100 mg/dL Allergies Allergen Reactions  Contrast Agent [Iodine] Anaphylaxis  Actifed [Triprolidine-Pseudoephedrine] Nausea Only  Allopurinol Other (comments) Elevated blood pressure & pt states could not walk or see until medication was out of her system.  Decongestant [Pseudoephedrine Hcl] Rash and Itching  Lexapro [Escitalopram Oxalate] Other (comments) Lips/Mouth swelling  Lyrica [Pregabalin] Unknown (comments)  Minizide [Prazosin-Polythiazide] Unknown (comments)  Mobic [Meloxicam] Other (comments) Facial swelling  Omnipaque [Iohexol] Hives, Swelling and Other (comments) Wheezing and/or shortness of breath  Sertraline Other (comments) Mouth, lips swell  Spironolactone Itching Pt complains that she breaks out in clammy sweat with itching and stinging in her upper arms, dry mouth, funny feeling tongue, SOB, and increased anxiety.  Sulfamethazine Other (comments) Mouth/lips swelling Current Facility-Administered Medications Medication Dose Route Frequency Provider Last Rate Last Dose  sodium chloride (NS) flush 5-40 mL  5-40 mL IntraVENous Q8H Monae Paredes MD   10 mL at 10/03/19 1325  sodium chloride (NS) flush 5-40 mL  5-40 mL IntraVENous PRN Monae Paredes MD      
 amLODIPine (NORVASC) tablet 10 mg  10 mg Oral DAILY Chela Loya MD   10 mg at 10/03/19 3170  carvedilol (COREG) tablet 12.5 mg  12.5 mg Oral BID WITH MEALS Chela Loya MD   12.5 mg at 10/03/19 3331  lidocaine (XYLOCAINE) 10 mg/mL (1 %) injection 0.1 mL  0.1 mL SubCUTAneous PRN Griselda Marts, MD      
 albuterol (PROVENTIL VENTOLIN) nebulizer solution 2.5 mg  2.5 mg Inhalation PRN Griselda Marts, MD      
  methadone (DOLOPHINE) tablet 10 mg  10 mg Oral Q6H Greer Carlson MD   10 mg at 10/03/19 1037  
 HYDROmorphone (PF) (DILAUDID) injection 0.2 mg  0.2 mg IntraVENous Q4H PRN Greer Carlson MD   0.2 mg at 10/03/19 1359  atorvastatin (LIPITOR) tablet 40 mg  40 mg Oral QHS Greer Carlson MD   40 mg at 10/02/19 2143  aspirin delayed-release tablet 81 mg  81 mg Oral QHS Rama Olson MD   81 mg at 10/02/19 2144  clonazePAM (KlonoPIN) tablet 0.5 mg  0.5 mg Oral TID Rama Olson MD   0.5 mg at 10/03/19 2308  DULoxetine (CYMBALTA) capsule 60 mg  60 mg Oral DAILY Rama Olson MD   60 mg at 10/03/19 1311  levothyroxine (SYNTHROID) tablet 75 mcg  75 mcg Oral ACB Rama Olson MD   75 mcg at 10/03/19 8701  mirtazapine (REMERON) tablet 7.5 mg  7.5 mg Oral QHS Rama Olson MD   7.5 mg at 10/02/19 2144  oxybutynin chloride XL (DITROPAN XL) tablet 5 mg  5 mg Oral DAILY Rama Olson MD   5 mg at 10/03/19 5828  predniSONE (DELTASONE) tablet 10 mg  10 mg Oral BID Rama Olson MD   10 mg at 10/03/19 0900  
 sodium chloride (NS) flush 5-40 mL  5-40 mL IntraVENous Q8H Rama Olson MD   10 mL at 10/03/19 9639  sodium chloride (NS) flush 5-40 mL  5-40 mL IntraVENous PRN Rama Olson MD      
 acetaminophen (TYLENOL) tablet 650 mg  650 mg Oral Q6H PRN Rama Olson MD      
 naloxone [facility]) injection 0.4 mg  0.4 mg IntraVENous PRN Rama Olson MD      
 ondansetron [facility]) injection 4 mg  4 mg IntraVENous Q4H PRN Rama Olson MD   4 mg at 10/03/19 0817  
 senna-docusate (PERICOLACE) 8.6-50 mg per tablet 1 Tab  1 Tab Oral DAILY Rama Olson MD   1 Tab at 10/03/19 0224  heparin (porcine) injection 5,000 Units  5,000 Units SubCUTAneous Q12H Rama Olson MD   5,000 Units at 10/03/19 1038  Saccharomyces boulardii (FLORASTOR) capsule 500 mg  500 mg Oral BID Rama Olson MD   500 mg at 10/03/19 0865  insulin glargine (LANTUS) injection 20 Units  20 Units SubCUTAneous QHS Amanda Smith MD   20 Units at 10/02/19 2200  
 insulin lispro (HUMALOG) injection   SubCUTAneous AC&HS Amanda Smith MD   6 Units at 10/03/19 1155  insulin glargine (LANTUS) injection 10 Units  10 Units SubCUTAneous ACB Amanda Smith MD   10 Units at 10/03/19 1599  hydrALAZINE (APRESOLINE) tablet 25 mg  25 mg Oral TID Amanda Smith MD   25 mg at 10/03/19 0819  
 influenza vaccine - (6 mos+)(PF) (FLUARIX/FLULAVAL/FLUZONE QUAD) injection 0.5 mL  0.5 mL IntraMUSCular PRIOR TO DISCHARGE Amanda Smith MD      
 
 
Review of Systems negative with exception of pertinent positives noted above PHYSICAL EXAM  
 
Visit Vitals /81 Pulse 77 Temp 98 °F (36.7 °C) Resp 18 Ht 5' 3\" (1.6 m) Wt 90.6 kg (199 lb 12.8 oz) SpO2 93% BMI 35.39 kg/m² Temp (24hrs), Av.1 °F (36.7 °C), Min:97.7 °F (36.5 °C), Max:98.4 °F (36.9 °C) Oxygen Therapy O2 Sat (%): 93 % (10/03/19 1217) Pulse via Oximetry: 76 beats per minute (19 1507) O2 Device: Nasal cannula (10/03/19 1217) O2 Flow Rate (L/min): 1 l/min (10/03/19 1217) Intake/Output Summary (Last 24 hours) at 10/3/2019 1439 Last data filed at 10/3/2019 6133 Gross per 24 hour Intake 120 ml Output 1450 ml Net -1330 ml General:       No acute distress, appears chronically ill, drowsy   
Lungs:          Diminished bilaterally. Resp even and nonlabored Heart:            S1S2 present without murmurs rubs gallops. RRR. 1+ LE/RUE pitting edema, anasarca. 2+ LUE edema Abdomen:    Anasarca. Distended. BS present Extremities: Moves ext spontaneously. No cyanosis Neurologic:  Drowsy, Oriented X4. No focal deficits Results summary of Diagnostic Studies/Procedures copied from within [facility] EMR: 
 
 
ASSESSMENT Active Hospital Problems Diagnosis Date Noted  Uncontrolled diabetes mellitus ([facility] 2019  Acute osteomyelitis of toe of right foot ([facility] 09/27/2019  Sepsis ([facility] 03/25/2019 Plan: 
 
Acute OM right 2nd toe with ulceration 
 Vascular following S/p right 2nd toe amputation 10/1 Dressing change 10/2 
  
DM2 
 A1C 8.5 
 BGL elevated Increase lantus 
  
 HTN 
 continue current regimen 
  
Chronic pain 
 continue Methadone  
  
 H/o PAD 
 on ASA, statin 
  
RAYMON on CKD, anasarca Renal US showed findings consistent with medical renal disease Consulted Nephrology Stopped IVF 10/2 Likely ATN 
 
LUE edema Check LUE US r/o DVT Will consult Hospice per daughter request.  Difficult DC plan. Daughter can't take care of her at home, she has limited STR days left, no funds for long term bed. CM helping with medicaid application.  
 
  
Notes, labs, VS, diagnostic testing reviewed Time spent with pt 20 min 
  
  
  
DVT Prophylaxis: heparin sq Plan of Care Discussed with: Supervising MD Dr. Vitaliy Pink, care team, pt, family at bedside 
  
  
  
Hilary Garrett NP

## 2019-10-03 NOTE — PROGRESS NOTES
[facility] Subjective: RAYMON   Feeling a little better today. Review of Systems -  
General ROS: negative for - fever, chills Respiratory ROS: no SOB, cough, LANDA Cardiovascular ROS: no CP, palpitations Gastrointestinal ROS: no N&V, abdominal pain, diarrhea Genito-Urinary ROS: no difficulty voiding, dysuria Neurological ROS: no seizures, focal weekness Objective: 
 
Vitals:  
 10/03/19 0030 10/03/19 1330 10/03/19 9161 10/03/19 3340 BP: 136/72 132/77  159/80 Pulse: 82 88  92 Resp: 18 20  18 Temp: 98.2 °F (36.8 °C) 98.2 °F (36.8 °C)  97.7 °F (36.5 °C) SpO2: 97%   95% Weight:   90.6 kg (199 lb 12.8 oz) Height:      
 
 
PE 
Gen: in no acute distress CV:reg rate Chest:clear Abd: soft Ext/Access: 3+ edema Josh Octave LAB Recent Labs 10/03/19 
8352 10/02/19 
4180 10/01/19 
8727 WBC 15.6* 10.4 11.8* HGB 9.4* 10.5* 12.4 HCT 31.5* 35.0* 40.8  210 224 Recent Labs 10/03/19 
3439 10/02/19 
5696 10/01/19 
6955  143 143  
K 5.3* 5.0 4.9 * 108* 107 CO2 24 27 27 * 105* 114* BUN 98* 47* 36* CREA 2.27* 2.08* 1.61* PHOS 4.4*  --   --   
CA 8.0* 7.7* 7.2* ALB 2.2*  --   --   
 
 
 
 
Radiology A/P:  
Patient Active Problem List  
Diagnosis Code  Urinary tract infection, site not specified N39.0  Murmur R01.1  Bilateral leg edema R60.0  Swelling R60.9  Abnormal EKG R94.31  
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  
 Acute prerenal azotemia R79.89  Chronic diastolic congestive heart failure ([facility]) I50.32  
 Acquired hypothyroidism E03.9  GERD (gastroesophageal reflux disease) K21.9  Finger infection L08.9  Leukocytosis D72.829  
  RAYMON (acute kidney injury) ([address] N17.9  Osteomyelitis of finger (HCC) M86.9  Acute encephalopathy G93.40  Acute metabolic encephalopathy S75.33  
 Acute diarrhea R19.7  Oral thrush B37.0  Sepsis ([address] A41.9  Non healing left heel wound S91.302A  Respiratory failure ([address] J96.90  
 Sepsis secondary to UTI ([address] A41.9, N39.0  Acute pulmonary edema (HCC) J81.0  
 Uncontrolled diabetes mellitus ([address] E11.65  Acute osteomyelitis of toe of right foot ([address] M86.171 RAYMON - the elevated BUN/creat ratio > 20/1 and mild alkalosis suggests pre-renal azotemia. The FENa is elevated at 13% suggesting ATN. Renal US shows echogenic kidneys. BUN rising faster than creatinine. Steroids may do this. Proteinuria -  Doesn't look like nephrotic range protein on yesterday's labs. Edeam - probably due to diastolic CHF. Osteo - s/p amputation DM 
 
HTN Ricco Pyle MD

## 2019-10-03 NOTE — PROGRESS NOTES
Problem: Falls - Risk of 
Goal: *Absence of Falls Description Document Vannesa Brown Fall Risk and appropriate interventions in the flowsheet. Outcome: Progressing Towards Goal 
Note:  
Fall Risk Interventions: 
  
 
  
 
Medication Interventions: Patient to call before getting OOB, Teach patient to arise slowly Elimination Interventions: Bed/chair exit alarm, Call light in reach, Patient to call for help with toileting needs, Stay With Me (per policy), Toileting schedule/hourly rounds History of Falls Interventions: Door open when patient unattended Problem: Patient Education: Go to Patient Education Activity Goal: Patient/Family Education Outcome: Progressing Towards Goal 
  
Problem: Pressure Injury - Risk of 
Goal: *Prevention of pressure injury Description Document Dimitry Scale and appropriate interventions in the flowsheet. Outcome: Progressing Towards Goal 
Note:  
Pressure Injury Interventions: 
  
 
Moisture Interventions: Absorbent underpads, Apply protective barrier, creams and emollients, Check for incontinence Q2 hours and as needed, Internal/External urinary devices, Limit adult briefs, Maintain skin hydration (lotion/cream), Moisture barrier, Minimize layers, Offer toileting Q_hr Activity Interventions: Increase time out of bed, Pressure redistribution bed/mattress(bed type), PT/OT evaluation Mobility Interventions: Assess need for specialty bed, HOB 30 degrees or less, Pressure redistribution bed/mattress (bed type), PT/OT evaluation, Turn and reposition approx. every two hours(pillow and wedges) Nutrition Interventions: Document food/fluid/supplement intake Friction and Shear Interventions: Foam dressings/transparent film/skin sealants, HOB 30 degrees or less, Lift sheet, Lift team/patient mobility team, Apply protective barrier, creams and emollients, Feet elevated on foot rest, Minimize layers, Sit at 90-degree angle Problem: Patient Education: Go to Patient Education Activity Goal: Patient/Family Education Outcome: Progressing Towards Goal

## 2019-10-03 NOTE — PROGRESS NOTES
Problem: Mobility Impaired (Adult and Pediatric) Goal: *Acute Goals and Plan of Care (Insert Text) Description LTG: 
(1.)Ms. Yoni Foster will move from supine to sit and sit to supine , scoot up and down and roll side to side with SUPERVISION within 7 treatment day(s). (2.)Ms. Yoni Foster will perform transfers to/from bed, wc, or recliner with or without standing lift while maintaining WB precautions to improve mobility and activity tolerance within 7 days. (3.)Ms. Yoni Foster will perform seated exercises per HEP for 10+ minutes to improve strength and mobility within 7 days. ________________________________________________________________________________________________ Outcome: Progressing Towards Goal 
 
 
PHYSICAL THERAPY: Daily Note and PM 10/3/2019 INPATIENT: PT Visit Days : 1 [place] / [place] / Product Type: Medicare /   
  
NAME/AGE/GENDER: Fernanda Santos is a 80 y.o. female PRIMARY DIAGNOSIS: Acute osteomyelitis of toe of right foot ([facility] Beth Almonte Uncontrolled diabetes mellitus ([facility] [E11.65] Sepsis ([facility] [A41.9] Acute osteomyelitis of toe of right foot ([facility] Acute osteomyelitis of toe of right foot ([facility] Procedure(s) (LRB): 
AMPUTATION TOE RIGHT 2ND REQUEST TO FOLLOW (Right) 3 Days Post-Op ICD-10: Treatment Diagnosis:  
 · Generalized Muscle Weakness (M62.81) · Difficulty in walking, Not elsewhere classified (R26.2) · Other abnormalities of gait and mobility (R26.89) Precaution/Allergies: 
Contrast agent [iodine]; Actifed [triprolidine-pseudoephedrine]; Allopurinol; Decongestant [pseudoephedrine hcl]; Lexapro [escitalopram oxalate]; Lyrica [pregabalin]; Minizide [prazosin-polythiazide]; Mobic [meloxicam]; Omnipaque [iohexol]; Sertraline; Spironolactone; and Sulfamethazine ASSESSMENT:  
 
Ms. Yoni Foster is an 80year old female admitted from home for acute right foot osteomyelitis and is s/p right second toe amputation.  Per nursing who spoke with vascular group after surgery, pt is to be heel weight bearing on right foot and she does have a post op shoe present in room. She is toe touch weight bearing on left foot due to left heel wound. Has been using a standing lift for all transfers for the past few weeks with help from family. Niece reports increased difficulty with bed mobility and transfer to sitting to prepare for transfers recently. 10/2- pt presents in supine with no complaints at rest. Attempted bed mobility and transfer to sitting; requires max assist and was unable to fully sit due to pushing back, trunk weakness, and c/o right hip pain. Tried bed in chair position however pt begins screaming out in pain and becomes very agitated. Difficulty localizing her pain and continues to scream despite efforts from therapist and family/caregiver to help with bed mobility/positioning. Eventually states pain is in right hip. Pt returned to supine, needs total assist to scoot and adjust in bed. Legs elevated. No progress noted today; pt seems more confused, weaker, and to have more severe R hip pain. Family/caregivers currently unable to care for pt in this condition. Considering hospice support but do not have 24/7 supervision at home. Will continue with therapy efforts as pt is interested and able to tolerate. This section established at most recent assessment PROBLEM LIST (Impairments causing functional limitations): 1. Decreased Strength 2. Decreased ADL/Functional Activities 3. Decreased Transfer Abilities 4. Decreased Ambulation Ability/Technique 5. Decreased Balance 6. Decreased Activity Tolerance 7. Decreased Knowledge of Precautions 8. Decreased Skin Integrity/Hygeine 9. Decreased Cognition INTERVENTIONS PLANNED: (Benefits and precautions of physical therapy have been discussed with the patient.) 1. Balance Exercise 2. Bed Mobility 3. Home Exercise Program (HEP) 4. Therapeutic Activites 5. Therapeutic Exercise/Strengthening 6. Transfer Training TREATMENT PLAN: Frequency/Duration: 3 times a week for duration of hospital stay Rehabilitation Potential For Stated Goals: Good REHAB RECOMMENDATIONS (at time of discharge pending progress):   
Placement: It is my opinion, based on this patient's performance to date, that Ms. Saleem Burrell may benefit from intensive therapy at a [address] [place] [address] after discharge due to the functional deficits listed above that are likely to improve with skilled rehabilitation and concerns that he/she may be unsafe to be unsupervised at home due to weakness, WB restrictions, need for increased assistance . Equipment: ? Tbd pending progress HISTORY:  
History of Present Injury/Illness (Reason for Referral): 
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 Currently, pt reports redness and swelling in bilateral lower legs with deep ulcerated wound on the dorsum of right 2nd toe. Pt c/o pain, 7/10 in severity, constant, burning, not associated with fever, chills, night sweats. No other c/o chest pain, urinary symptoms, cough, SOB, palpitations, nausea/vomiting, diarrhea, abdominal pain. Foot x-ray showed destructive changes at the PIp joint of the 2nd toe with suspicion of osteomyelitis vs septic arthritis. HR ~110, WBC 12K, Cr 1.43, /89\" Past Medical History/Comorbidities: Ms. Saleem Burrell  has a past medical history of Arthritis, Chronic diastolic congestive heart failure ([facility] (1/16/2019), Chronic pain, Edema, GERD (gastroesophageal reflux disease) (1/16/2019), Gouty arthritis, History of MI (myocardial infarction) (1980's), Hypertension, Hypothyroidism, Mild heartburn, Neuropathy (1980's), Post-operative nausea and vomiting, and Type 2 diabetes mellitus without complication, without long-term current use of insulin ([address] (7/27/2018). She also has no past medical history of Adverse effect of anesthesia, Aneurysm ([address] Arrhythmia, Asthma, Autoimmune disease ([address] CAD (coronary artery disease), Cancer ([address] Chronic kidney disease, Chronic obstructive pulmonary disease ([address] Coagulation disorder ([address] Difficult intubation, Endocarditis, Ill-defined condition, Liver disease, Malignant hyperthermia due to anesthesia, Morbid obesity ([address] Pseudocholinesterase deficiency, Psychiatric disorder, PUD (peptic ulcer disease), Rheumatic fever, Seizures ([address] Sleep apnea, Stroke ([address] or Thromboembolus ([address] Ms. Yoni Foster  has a past surgical history that includes hx partial thyroidectomy (1960); hx orthopaedic (Bilateral, 2980-0206); hx orthopaedic (Left, 1992); hx open cholecystectomy (1970); hx cyst removal (Right, 1950's); hx urological; hx heart catheterization (late 1979); hx hysterectomy (1972); hx gyn (1963); hx dilation and curettage (8989-3888); and hx left salpingo-oophorectomy (1965). Social History/Living Environment:  
Home Environment: Private residence One/Two Story Residence: Two story Living Alone: No 
Support Systems: Child(cassidy), Family member(s) Patient Expects to be Discharged to[de-identified] Unknown Current DME Used/Available at Home: Lift chair, Walker, rollator, Wheelchair, Wheelchair, power(Standing frame) Prior Level of Function/Work/Activity: 
Lives with daughter and son in law. Daughter works 3-4 days wk as RN and [employer] not always present. She has a niece and home care nursing staff who assist as well. Number of Personal Factors/Comorbidities that affect the Plan of Care: 1-2: MODERATE COMPLEXITY EXAMINATION:  
Most Recent Physical Functioning:  
Gross Assessment: AROM: Generally decreased, functional 
Strength: Generally decreased, functional 
Coordination: Generally decreased, functional 
         
  
Posture: 
Posture (WDL): Exceptions to [facility] Posture Assessment: Forward head, Rounded shoulders, Trunk flexion Balance: 
Sitting: Impaired Sitting - Static: Poor (constant support) Bed Mobility: 
Rolling: Maximum assistance Supine to Sit: Maximum assistance Sit to Supine: Maximum assistance Scooting: Total assistance Interventions: Verbal cues; Safety awareness training; Tactile cues Duration: 20 Minutes Wheelchair Mobility: 
  
Transfers: 
  
Gait: 
Right Side Weight Bearing: Heel Left Side Weight Bearing: Toe touch Body Structures Involved: 1. Muscles Body Functions Affected: 1. Mental 
2. Sensory/Pain 3. Movement Related 4. Skin Related Activities and Participation Affected: 1. General Tasks and Demands 2. Mobility 3. Domestic Life 4. Community, Social and Superior [place] Number of elements that affect the Plan of Care: 4+: HIGH COMPLEXITY CLINICAL PRESENTATION:  
Presentation: Evolving clinical presentation with changing clinical characteristics: MODERATE COMPLEXITY CLINICAL DECISION MAKIN [facility] [address] [id number] AM-PAC 6 Clicks Basic Mobility Inpatient Short Form How much difficulty does the patient currently have. .. Unable A Lot A Little None 1. Turning over in bed (including adjusting bedclothes, sheets and blankets)? ? 1   ? 2   ? 3   ? 4  
2. Sitting down on and standing up from a chair with arms ( e.g., wheelchair, bedside commode, etc.)   ? 1   ? 2   ? 3   ? 4  
3. Moving from lying on back to sitting on the side of the bed?   ? 1   ? 2   ? 3   ? 4 How much help from another person does the patient currently need. .. Total A Lot A Little None 4. Moving to and from a bed to a chair (including a wheelchair)? ? 1   ? 2   ? 3   ? 4  
5. Need to walk in hospital room?    ? 1   ? 2   ? 3   ? 4  
 6.  Climbing 3-5 steps with a railing? ? 1   ? 2   ? 3   ? 4  
© 2007, Trustees of [address] [place] [address] [address] [address], under license to [employer]. All rights reserved Score:  Initial 8 Most Recent: X (Date: -- ) Interpretation of Tool:  Represents activities that are increasingly more difficult (i.e. Bed mobility, Transfers, Gait). Medical Necessity:    
· Patient demonstrates fair ·  rehab potential due to higher previous functional level. Reason for Services/Other Comments: 
· Patient continues to demonstrate capacity to improve strength, mobility, balance, transfers which will increase independence, decrease amount of assistance required from caregiver and increase safety · . Use of outcome tool(s) and clinical judgement create a POC that gives a: Questionable prediction of patient's progress: MODERATE COMPLEXITY  
  
 
 
 
TREATMENT:  
(In addition to Assessment/Re-Assessment sessions the following treatments were rendered) Pre-treatment Symptoms/Complaints:  \"you're not listening to me!!!\" pt confused and agitated with attempts at mobility Pain: Initial:  
Pain Intensity 1: 5 Pain Location 1: Hip Pain Orientation 1: Right Pain Intervention(s) 1: Repositioned, Nurse notified  Post Session:  0/10 comfortable in bed Therapeutic Activity: (20 Minutes   ):  Therapeutic activities including Bed mobility (rolling, scooting, turning, and repositioning), attempts at supine to sit transfer, and attempts at chair position and exercises to improve mobility, strength and balance. Required maximal assist with mobility and increased cueing (manual, tactile, verbal, and visual)   to promote static and dynamic balance in standing and promote motor control of bilateral, lower extremity(s). Braces/Orthotics/Lines/Etc:  
· O2 Device: Nasal cannula Treatment/Session Assessment:   
· Response to Treatment:  No progress. More confused, weaker, increased R hip pain · Interdisciplinary Collaboration: o Physical Therapist 
o Registered Nurse · After treatment position/precautions:  
o Supine in bed 
o Bed/Chair-wheels locked 
o Bed in low position 
o Caregiver at bedside 
o Call light within reach 
o Family at bedside · Compliance with Program/Exercises: Will assess as treatment progresses · Recommendations/Intent for next treatment session: \"Next visit will focus on advancements to more challenging activities and reduction in assistance provided\". Total Treatment Duration: PT Patient Time In/Time Out Time In: 1325 Time Out: 4470 Ulla Cockayne, DPT

## 2019-10-03 NOTE — PROGRESS NOTES
Per daughter request, provided family with medicaid application and instructed to fill out as soon as possible.

## 2019-10-03 NOTE — PROGRESS NOTES
Call placed to patient's daughter to discuss facilities that could take patient short to long term medicaid pending. Facilities are [facility], [facility]. Provided daughter with this list, awaiting response.

## 2019-10-03 NOTE — PROGRESS NOTES
Interdisciplinary Rounds completed 10/03/19. Nursing, Case Management, Physician and PT present. Plan of care reviewed and updated. Hospice consulted.

## 2019-10-03 NOTE — HOSPICE
Discussion with daughter Dalila Vazquez who is a nurse on the 2nd floor at [employer]. Discussed hospice philosophy, levels of care and hospice services. At this time the patient meets routine in home hospice care. The daughter shared that they are looking for LTC but the patient does not have any funding for LTC. There are no caregivers for the patient to return to a home setting with hospice nor can they financially manage paid caregivers. The daughter works full time as well as the son who lives in [place], [place]. Son will be at the hospital later on today to discuss further options with the daughter and CM. Contact number left with daughter for any further questions or concerns. Thank you for this referral. 
 
Kel Lopez, RN, BSN Community Nurse Liaison [facility] [phone]

## 2019-10-04 NOTE — PROGRESS NOTES
Progress Note   
10/4/2019 Admit Date: 2019  6:15 PM  
NAME: Sujatha Bañuelos :  1933 MRN:  [id number] Attending: Melia Hale MD 
PCP:  Ashleigh Echols MD 
Treatment Team: Attending Provider: Melia Hale MD; Consulting Provider: Pepper Severe, MD; Utilization Review: Jose Rowell, RN; Care Manager: Joe Arriaga, RN; Consulting Provider: Khoa Lewis MD; Consulting Provider: Zaira Fernandez MD; Primary Nurse: Christos Roberson) DNR SUBJECTIVE:  
Ms. Willard Barrera is a 81 yo female with PMH of DM II, obesity, HTN, chronic pain, gout, GERD, PAD s/p angioplasty, prior osteomyelitis who presented with c/o right 2nd toe ulceration. Xray showed concern for osteomyelitis. Pt started on Vanc and Zosyn, finished course. Pt underwent right second toe amputation 10/1. Creatinine continues to rise, NS started 10/1, stopped 10/2 due to anasarca. Nephrology consulted and following. Renal function worse today. Long discussion with daughter via phone, she is interested in Hospice. CM aware, may be difficult for placement. Pt drowsy today. Anasarca better. Denies CP, SOB.   
 10/4:  
Patient alert and oriented x3. CM has contacted daughter about picking a facility today. Patient stable for discharge. WBC noted elevated to 17.4, noted on Prednisone since . At this time, no obvious signs of infection. Patient also went for right 2nd toe amputation 10/2. Finished IV antibiotics. Afebrile, no SOB or chest pain. Creatinine 2.21, seen by nephrology yesterday and per notes, may be partly due to steroid use. Patient also seen by hospice yesterday at request of daughter. Recommend home hospice. I am going to gently hydrate and recheck labs in am if patient remains here. Past Medical History:  
Diagnosis Date  Arthritis  Chronic diastolic congestive heart failure ([facility] [address] 2019  Chronic pain   
 bilat feet & hands  Edema BLE & bilat fingers; pt takes diuretic daily; pt states her swelling is related to the nerve disease she is being treated for at [facility]  GERD (gastroesophageal reflux disease) 1/16/2019  Gouty arthritis  History of MI (myocardial infarction) 1980's \"light heart attack\"; pt states MD at [facility] mentioned it to her; pt stated MD told her \"was on the back part of her heart & would not give her any problems\"; pt takes aspirin 81 mg daily  Hypertension   
 managed w/med  Hypothyroidism   
 s/p partial thyroidectomy 1960; managed w/med  Mild heartburn   
 takes tums prn  Neuropathy 1980's  
 pt states has been treated by [facility] for \"nerve problems in her feet & hands\"  Post-operative nausea and vomiting  Type 2 diabetes mellitus without complication, without long-term current use of insulin ([address] 7/27/2018 Recent Results (from the past 24 hour(s)) GLUCOSE, POC Collection Time: 10/03/19  3:52 PM  
Result Value Ref Range Glucose (POC) 305 (H) 65 - 100 mg/dL GLUCOSE, POC Collection Time: 10/03/19  8:46 PM  
Result Value Ref Range Glucose (POC) 254 (H) 65 - 100 mg/dL Nicky Sheridan Collection Time: 10/04/19  5:35 AM  
Result Value Ref Range Vancomycin, random 15.2 UG/ML  
CBC WITH AUTOMATED DIFF Collection Time: 10/04/19  5:35 AM  
Result Value Ref Range WBC 17.4 (H) 4.3 - 11.1 K/uL  
 RBC 2.82 (L) 4.05 - 5.2 M/uL HGB 8.1 (L) 11.7 - 15.4 g/dL HCT 26.8 (L) 35.8 - 46.3 % MCV 95.0 79.6 - 97.8 FL  
 MCH 28.7 26.1 - 32.9 PG  
 MCHC 30.2 (L) 31.4 - 35.0 g/dL  
 RDW 17.1 (H) 11.9 - 14.6 % PLATELET 249 630 - 501 K/uL MPV 10.9 9.4 - 12.3 FL ABSOLUTE NRBC 0.09 0.0 - 0.2 K/uL NEUTROPHILS 72 43 - 78 % LYMPHOCYTES 16 13 - 44 % MONOCYTES 6 4.0 - 12.0 % EOSINOPHILS 1 0.5 - 7.8 % BASOPHILS 0 0.0 - 2.0 % IMMATURE GRANULOCYTES 5 0.0 - 5.0 %  
 ABS. NEUTROPHILS 12.5 (H) 1.7 - 8.2 K/UL  
 ABS. LYMPHOCYTES 2.8 0.5 - 4.6 K/UL ABS. MONOCYTES 1.0 0.1 - 1.3 K/UL  
 ABS. EOSINOPHILS 0.2 0.0 - 0.8 K/UL  
 ABS. BASOPHILS 0.0 0.0 - 0.2 K/UL  
 ABS. IMM. GRANS. 0.9 (H) 0.0 - 0.5 K/UL  
 RBC COMMENTS SLIGHT 
ANISOCYTOSIS + POIKILOCYTOSIS 
    
 RBC COMMENTS OCCASIONAL 
POLYCHROMASIA 
    
 WBC COMMENTS Result Confirmed By Smear PLATELET COMMENTS ADEQUATE    
 DF AUTOMATED RENAL FUNCTION PANEL Collection Time: 10/04/19  5:35 AM  
Result Value Ref Range Sodium 141 136 - 145 mmol/L Potassium 5.4 (H) 3.5 - 5.1 mmol/L Chloride 110 (H) 98 - 107 mmol/L  
 CO2 23 21 - 32 mmol/L Anion gap 8 7 - 16 mmol/L Glucose 159 (H) 65 - 100 mg/dL  (H) 8 - 23 MG/DL Creatinine 2.21 (H) 0.6 - 1.0 MG/DL  
 GFR est AA 27 (L) >60 ml/min/1.73m2 GFR est non-AA 22 (L) >60 ml/min/1.73m2 Calcium 8.5 8.3 - 10.4 MG/DL Phosphorus 4.7 (H) 2.3 - 3.7 MG/DL Albumin 2.2 (L) 3.2 - 4.6 g/dL GLUCOSE, POC Collection Time: 10/04/19  7:18 AM  
Result Value Ref Range Glucose (POC) 153 (H) 65 - 100 mg/dL Allergies Allergen Reactions  Contrast Agent [Iodine] Anaphylaxis  Actifed [Triprolidine-Pseudoephedrine] Nausea Only  Allopurinol Other (comments) Elevated blood pressure & pt states could not walk or see until medication was out of her system.  Decongestant [Pseudoephedrine Hcl] Rash and Itching  Lexapro [Escitalopram Oxalate] Other (comments) Lips/Mouth swelling  Lyrica [Pregabalin] Unknown (comments)  Minizide [Prazosin-Polythiazide] Unknown (comments)  Mobic [Meloxicam] Other (comments) Facial swelling  Omnipaque [Iohexol] Hives, Swelling and Other (comments) Wheezing and/or shortness of breath  Sertraline Other (comments) Mouth, lips swell  Spironolactone Itching Pt complains that she breaks out in clammy sweat with itching and stinging in her upper arms, dry mouth, funny feeling tongue, SOB, and increased anxiety.  Sulfamethazine Other (comments) Mouth/lips swelling Current Facility-Administered Medications Medication Dose Route Frequency Provider Last Rate Last Dose  
 0.9% sodium chloride infusion  75 mL/hr IntraVENous CONTINUOUS Jessica Caro NP      
 insulin glargine (LANTUS) injection 25 Units  25 Units SubCUTAneous QHS Benny CALVILLO NP   25 Units at 10/03/19 2130  
 sodium chloride (NS) flush 5-40 mL  5-40 mL IntraVENous Q8H Melania Melton MD   10 mL at 10/04/19 0026  sodium chloride (NS) flush 5-40 mL  5-40 mL IntraVENous PRN Melania Melton MD      
 amLODIPine (NORVASC) tablet 10 mg  10 mg Oral DAILY Lizzie Sanches MD   10 mg at 10/04/19 2179  carvedilol (COREG) tablet 12.5 mg  12.5 mg Oral BID WITH MEALS Lizzie Sanches MD   12.5 mg at 10/04/19 6426  lidocaine (XYLOCAINE) 10 mg/mL (1 %) injection 0.1 mL  0.1 mL SubCUTAneous PRN Juana Philippe MD      
 albuterol (PROVENTIL VENTOLIN) nebulizer solution 2.5 mg  2.5 mg Inhalation PRN Juana Philippe MD      
 methadone (DOLOPHINE) tablet 10 mg  10 mg Oral Q6H Lizzie Sanches MD   10 mg at 10/04/19 1018  
 HYDROmorphone (PF) (DILAUDID) injection 0.2 mg  0.2 mg IntraVENous Q4H PRN Lizzie Sanches MD   0.2 mg at 10/03/19 1832  atorvastatin (LIPITOR) tablet 40 mg  40 mg Oral QHS Lizzie Sanches MD   40 mg at 10/03/19 2130  aspirin delayed-release tablet 81 mg  81 mg Oral QHS Brett Perdomo MD   81 mg at 10/03/19 2130  clonazePAM (KlonoPIN) tablet 0.5 mg  0.5 mg Oral TID Brett Perdomo MD   0.5 mg at 10/04/19 8968  DULoxetine (CYMBALTA) capsule 60 mg  60 mg Oral DAILY Brett Perdomo MD   60 mg at 10/04/19 6739  levothyroxine (SYNTHROID) tablet 75 mcg  75 mcg Oral ACB Brett Perdomo MD   75 mcg at 10/04/19 0510  mirtazapine (REMERON) tablet 7.5 mg  7.5 mg Oral QHS Brett Perdomo MD   7.5 mg at 10/03/19 2130  
 oxybutynin chloride XL (DITROPAN XL) tablet 5 mg  5 mg Oral DAILY Maddison Viera MD   5 mg at 10/04/19 3272  predniSONE (DELTASONE) tablet 10 mg  10 mg Oral BID Maddison Viera MD   10 mg at 10/04/19 0810  
 sodium chloride (NS) flush 5-40 mL  5-40 mL IntraVENous Q8H Maddison Viera MD   10 mL at 10/04/19 5945  sodium chloride (NS) flush 5-40 mL  5-40 mL IntraVENous PRN Maddison Viera MD      
 acetaminophen (TYLENOL) tablet 650 mg  650 mg Oral Q6H PRN Maddison Viera MD   650 mg at 10/03/19 2342  
 naloxone [facility]) injection 0.4 mg  0.4 mg IntraVENous PRN Maddison Viera MD      
 ondansetron [facility]) injection 4 mg  4 mg IntraVENous Q4H PRN Maddison Viera MD   4 mg at 10/03/19 0817  
 senna-docusate (PERICOLACE) 8.6-50 mg per tablet 1 Tab  1 Tab Oral DAILY Maddison Viera MD   1 Tab at 10/04/19 0811  
 heparin (porcine) injection 5,000 Units  5,000 Units SubCUTAneous Q12H Maddison Viera MD   5,000 Units at 10/04/19 1003  Saccharomyces boulardii (FLORASTOR) capsule 500 mg  500 mg Oral BID Maddison Viera MD   500 mg at 10/04/19 0810  
 insulin lispro (HUMALOG) injection   SubCUTAneous AC&HS Maddison Viera MD   2 Units at 10/04/19 0811  
 insulin glargine (LANTUS) injection 10 Units  10 Units SubCUTAneous ACB Maddison Viera MD   10 Units at 10/04/19 4478  hydrALAZINE (APRESOLINE) tablet 25 mg  25 mg Oral TID Maddison Viera MD   25 mg at 10/04/19 0810  
 influenza vaccine 2019- (6 mos+)(PF) (FLUARIX/FLULAVAL/FLUZONE QUAD) injection 0.5 mL  0.5 mL IntraMUSCular PRIOR TO DISCHARGE Maddison Viera MD      
 
 
Review of Systems negative with exception of pertinent positives noted above PHYSICAL EXAM  
 
Visit Vitals /57 Pulse 75 Temp 98 °F (36.7 °C) Resp 18 Ht 5' 3\" (1.6 m) Wt 85.3 kg (188 lb) SpO2 98% BMI 33.30 kg/m² Temp (24hrs), Av.1 °F (36.7 °C), Min:97.5 °F (36.4 °C), Max:99 °F (37.2 °C) Oxygen Therapy O2 Sat (%): 98 % (10/04/19 1057) Pulse via Oximetry: 76 beats per minute (09/30/19 1507) O2 Device: Nasal cannula (10/03/19 1217) O2 Flow Rate (L/min): 1 l/min (10/03/19 1217) Intake/Output Summary (Last 24 hours) at 10/4/2019 1105 Last data filed at 10/4/2019 6776 Gross per 24 hour Intake 220 ml Output 1050 ml Net -830 ml General:       No acute distress, appears chronically ill, drowsy   
Lungs:          Diminished bilaterally. Resp even and nonlabored Heart:            S1S2 present without murmurs rubs gallops. RRR. 1+ LE/RUE pitting edema, anasarca. 2+ LUE edema Abdomen:    Anasarca. Distended. BS present Extremities: Moves ext spontaneously. No cyanosis Neurologic:  Drowsy, Oriented X4. No focal deficits Results summary of Diagnostic Studies/Procedures copied from within [facility] EMR: 
 
 
ASSESSMENT Active Hospital Problems Diagnosis Date Noted  Uncontrolled diabetes mellitus ([facility] [address] 09/27/2019  Acute osteomyelitis of toe of right foot ([facility] [address] 09/27/2019  Sepsis ([facility] [address] 03/25/2019 Plan: 
 
Acute OM right 2nd toe with ulceration 
 Vascular following S/p right 2nd toe amputation 10/1 Dressing change 10/2 
  
DM2 
 A1C 8.5 
 BGL elevated Increase lantus 
  
 HTN 
 continue current regimen 
  
Chronic pain 
 continue Methadone  
  
 H/o PAD 
 on ASA, statin 
  
RAYMON on CKD, anasarca Renal US showed findings consistent with medical renal disease Consulted Nephrology Stopped IVF 10/2 Likely ATN 
 
LUE edema Check LUE US r/o DVT Will consult Hospice per daughter request.  Difficult DC plan. Daughter can't take care of her at home, she has limited STR days left, no funds for long term bed. CM helping with medicaid application.  
 
  
Notes, labs, VS, diagnostic testing reviewed Time spent with pt 20 min 
  
  
  
DVT Prophylaxis: heparin sq Plan of Care Discussed with: Supervising MD Dr. Kashmir Iglesias, care team, pt, family at bedside 
  
  
  
John Orozcoj [address]

## 2019-10-04 NOTE — PROGRESS NOTES
Problem: Diabetes Self-Management Goal: *Disease process and treatment process Description Define diabetes and identify own type of diabetes; list 3 options for treating diabetes. Outcome: Progressing Towards Goal 
  
Problem: Falls - Risk of 
Goal: *Absence of Falls Description Document Gio Sosa Fall Risk and appropriate interventions in the flowsheet. Outcome: Progressing Towards Goal 
Note:  
Fall Risk Interventions: 
  
 
  
 
Medication Interventions: Evaluate medications/consider consulting pharmacy, Patient to call before getting OOB Elimination Interventions: Call light in reach, Patient to call for help with toileting needs History of Falls Interventions: Evaluate medications/consider consulting pharmacy, Investigate reason for fall

## 2019-10-04 NOTE — PROGRESS NOTES
During shift assessment patient complained of chest pain of 6 out of 10 in sternum area. MD notified and Nitro given with possitive effect. Patient states the pain is better now. EKG and troponin also ordered stat. Will continue to monitor

## 2019-10-04 NOTE — PROGRESS NOTES
Patient lethargic most of shift. Able to arouse but unable to keep awake even for short periods of time. Oriented when asked questions, no respiratory distress or other sign of distress. Vital signs wnl. Morning dose of Methadone held. Patient more alert this am. Continues to be drowsy. Hourly rounds and needs met. Report given to [facility].

## 2019-10-04 NOTE — PROGRESS NOTES
[facility] Subjective: RAYMON   Feeling abput the same today. Review of Systems -  
General ROS: negative for - fever, chills Respiratory ROS: no SOB, cough, LANDA Cardiovascular ROS: no CP, palpitations Gastrointestinal ROS: no N&V, abdominal pain, diarrhea Genito-Urinary ROS: no difficulty voiding, dysuria Neurological ROS: no seizures, focal weekness Objective: 
 
Vitals:  
 10/03/19 2308 10/04/19 0342 10/04/19 0716 10/04/19 1057 BP: 117/58 127/65 134/74 121/57 Pulse: 86 78 78 75 Resp: 20 18 18 18 Temp: 97.9 °F (36.6 °C) 98.3 °F (36.8 °C) 98 °F (36.7 °C) 98 °F (36.7 °C) SpO2: 95% 90% 100% 98% Weight:  85.3 kg (188 lb) Height:      
 
 
PE 
Gen: in no acute distress CV:reg rate Chest:clear Abd: soft Ext/Access: 3+ edema Margaret Nims LAB Recent Labs 10/04/19 
8983 10/03/19 
5546 10/02/19 
4076 WBC 17.4* 15.6* 10.4 HGB 8.1* 9.4* 10.5* HCT 26.8* 31.5* 35.0*  
 245 210 Recent Labs 10/04/19 
5825 10/03/19 
2062 10/02/19 
7022  141 143  
K 5.4* 5.3* 5.0  
* 108* 108* CO2 23 24 27 * 215* 105* * 98* 47* CREA 2.21* 2.27* 2.08* PHOS 4.7* 4.4*  --   
CA 8.5 8.0* 7.7* ALB 2.2* 2.2*  --   
 
 
 
 
Radiology A/P:  
Patient Active Problem List  
Diagnosis Code  Urinary tract infection, site not specified N39.0  Murmur R01.1  Bilateral leg edema R60.0  Swelling R60.9  Abnormal EKG R94.31  
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  
 Acute prerenal azotemia R79.89  Chronic diastolic congestive heart failure ([facility]) I50.32  
 Acquired hypothyroidism E03.9  GERD (gastroesophageal reflux disease) K21.9  Finger infection L08.9  Leukocytosis D72.829  
 RAYMON (acute kidney injury) ([address] N17.9  Osteomyelitis of finger (HCC) M86.9  Acute encephalopathy G93.40  Acute metabolic encephalopathy V17.80  
 Acute diarrhea R19.7  Oral thrush B37.0  Sepsis ([address] A41.9  Non healing left heel wound S91.302A  Respiratory failure ([address] J96.90  
 Sepsis secondary to UTI ([address] A41.9, N39.0  Acute pulmonary edema (HCC) J81.0  
 Uncontrolled diabetes mellitus ([address] E11.65  Acute osteomyelitis of toe of right foot ([address] M86.171 RAYMON - Labs are stable, good urine volume. Will continue medical therapy and check labs in am. 
 
Proteinuria -  Doesn't look like nephrotic range protein on yesterday's labs. Edeam - probably due to diastolic CHF. Osteo - s/p amputation DM 
 
HTN Annette Lowe MD

## 2019-10-04 NOTE — PROGRESS NOTES
Progress Note Patient: Para Calvin MRN: [id number]  SSN: [id number] YOB: 1933  Age: 80 y.o. Sex: female Admission Date: 9/27/2019 LOS: 7 days 4 Days Post-Op PROCEDURE(S): 
Right second toe amputation Subjective:  
 
Patient more alert, oriented and vocal today. Denies right foot pain. States she believes she will never ambulate again. Objective:  
 
Vitals:  
 10/03/19 1856 10/03/19 2308 10/04/19 0342 10/04/19 3341 BP: 100/63 117/58 127/65 134/74 Pulse: 83 86 78 78 Resp: 18 20 18 18 Temp: 99 °F (37.2 °C) 97.9 °F (36.6 °C) 98.3 °F (36.8 °C) 98 °F (36.7 °C) SpO2: 93% 95% 90% 100% Weight:   188 lb (85.3 kg) Height:      
  
 
Physical Exam:  
GENERAL: alert, cooperative, no distress LUNG: clear to auscultation bilaterally, normal respiratory effort HEART: regular rate and rhythm ABDOMEN: soft, nontender, obese and protuberant but nondistended, bowel sounds normoactive EXTREMITIES: right 2nd toe amputation site intact with sutures, no edema / erythema / drainage, right great toe with shallow ulceration at distal tip, slough in ulcer PULSES: easily palpable right DP and PT Lab/Data Review: 
BMP:  
Lab Results Component Value Date/Time  10/04/2019 05:35 AM  
 K 5.4 (H) 10/04/2019 05:35 AM  
  (H) 10/04/2019 05:35 AM  
 CO2 23 10/04/2019 05:35 AM  
 AGAP 8 10/04/2019 05:35 AM  
  (H) 10/04/2019 05:35 AM  
  (H) 10/04/2019 05:35 AM  
 CREA 2.21 (H) 10/04/2019 05:35 AM  
 GFRAA 27 (L) 10/04/2019 05:35 AM  
 GFRNA 22 (L) 10/04/2019 05:35 AM  
 
CBC:  
Lab Results Component Value Date/Time WBC 17.4 (H) 10/04/2019 05:35 AM  
 HGB 8.1 (L) 10/04/2019 05:35 AM  
 HCT 26.8 (L) 10/04/2019 05:35 AM  
  10/04/2019 05:35 AM  
  
 
Assessment / Plan / Recommendations / Medical Decision Making:  
 
Principal Problem: 
  Acute osteomyelitis of toe of right foot ([facility] [address] (9/27/2019) Active Problems: Sepsis ([facility] [address] (3/25/2019) Uncontrolled diabetes mellitus ([facility] [address] (9/27/2019) Continue care IV ABX per primary R LE wound care Follow up in our office in 3wks for suture removal 
 
No indication for further surgical intervention by Vascular service. We will gladly be available if necessary but will sign off for now. Signed By: Kimi Ramirez PA-C October 4, 2019 Physician Assistant with [facility] Vascular Surgery Emmanuel Haines.  Saad Fernandez MD / Arely Davila MD

## 2019-10-04 NOTE — PROGRESS NOTES
Nutrition Reason for assessment: Length of stay day 6 (early) Assessment:  
Food/Nutrition Patient History:  Patient admitted for redness and swelling of bilateral lower extremities. She is now s/p right toe amputation 10/2/19. RAYMON with nephrology following. Patient seen sleeping with niece at bedside. All history provided by ronda. Lunch tray is present on bedside table. Niece states that pain medication was administered about 1 hour ago and that she plans to wake patient soon to assist with noon meal. She states that she has adaptive utensils at home and is able to self feed. She states that she is with the patient 8 hours each day and assists with all her meals. She states that patient is eating about 50% of meals. She states that patient complains of early satiety and some nausea. PMH: obesity, DM2, HTN, chronic pain on chronic steroids, GERD, MI 
Edema 2+ pitting to BLE 
 
DIET DIABETIC CONSISTENT CARB Regular Anthropometrics:Height: 5' 3\" (160 cm),  Weight: 85.3 kg (188 lb), Weight Source: Bed, Body mass index is 33.3 kg/m². BMI class of obese. Weight and BMI 10/4/2019 10/3/2019 10/2/2019 10/1/2019 Weight 85.276 kg 90.629 kg 89. 086 kg 82.283 kg BMI (calculated) 33.3 kg/m2 35.4 kg/m2 34.8 kg/m2 32.1 kg/m2 Weight and BMI 9/30/2019 9/29/2019 9/28/2019 Weight 79.878 kg 79.924 kg 84. 052 kg BMI (calculated) 31.2 kg/m2 31.2 kg/m2 32.8 kg/m2 Macronutrient needs: 85.3 kg listed body weight EER:  8839-0800 kcal /day (15-20 kcal/kg) EPR:  64-85 grams protein/day (20% kcal) Intake/Comparative Standards: Recorded meal(s): 44%. This is consistent with nijonnathan report of eating ~50% of meals. This potentially meets ~60% of kcal and ~60% of protein needs Nutrition Diagnosis: Inadequate energy intake related to early satiety and nausea as evidenced by family reported barriers to PO intake, current intake meeting ~60% EEN and EPN Intervention: 
Meals and snacks: Continue current diet. Nutrition Supplement Therapy: Add Glucerna TID Discharge Nutrition Plan: Too soon to determine. [address] [place] [address], [address] [facility]

## 2019-10-04 NOTE — PROGRESS NOTES
Call placed to daughter to see if she had picked a facility. Daughter states she will call CM back today.

## 2019-10-05 NOTE — PROGRESS NOTES
Problem: Diabetes Self-Management Goal: *Disease process and treatment process Description Define diabetes and identify own type of diabetes; list 3 options for treating diabetes. Outcome: Progressing Towards Goal 
  
Problem: Diabetes Self-Management Goal: *Using medications safely Description State effect of diabetes medications on diabetes; name diabetes medication taking, action and side effects. Outcome: Progressing Towards Goal 
  
Problem: Falls - Risk of 
Goal: *Absence of Falls Description Document Maribel Atkins Fall Risk and appropriate interventions in the flowsheet. Outcome: Progressing Towards Goal 
Note:  
Fall Risk Interventions: 
  
 
  
 
Medication Interventions: Patient to call before getting OOB, Teach patient to arise slowly Elimination Interventions: Call light in reach, Patient to call for help with toileting needs, Stay With Me (per policy), Toilet paper/wipes in reach, Toileting schedule/hourly rounds History of Falls Interventions: Door open when patient unattended, Investigate reason for fall

## 2019-10-05 NOTE — DISCHARGE SUMMARY
Hospitalist Discharge Summary Admit Date:  2019  6:15 PM  
Name:  Rosamaria Dior Age:  80 y.o. 
:  1933 MRN:  [id number] PCP:  Tino Price MD 
Treatment Team: Attending Provider: Lizzie Sanches MD; Utilization Review: Kathrin Spaulding RN; Care Manager: Abbey Le RN; Consulting Provider: Francoise Zambrano MD; Consulting Provider: Edmund Bhandari MD 
 
Problem List for this Hospitalization: 
Hospital Problems as of 10/5/2019 Date Reviewed: 2019 Codes Class Noted - Resolved POA Uncontrolled diabetes mellitus ([facility].) ICD-10-CM: E11.65 ICD-9-CM: 250.02  2019 - Present Unknown * (Principal) Acute osteomyelitis of toe of right foot ([facility] ICD-10-CM: M86.171 ICD-9-CM: 730.07  2019 - Present Unknown Sepsis ([facility].) ICD-10-CM: A41.9 ICD-9-CM: 038.9, 995.91  3/25/2019 - Present Unknown Admission HPI from 2019: Ms. Cruz Edmonds is a 79 yo female with PMH of DM II, obesity, HTN, chronic pain, gout, GERD, PAD s/p angioplasty, prior osteomyelitis who presented with c/o right 2nd toe ulceration. Dent Sark showed concern for osteomyelitis.  Pt started on Vanc and Zosyn, finished course.  Pt underwent right second toe amputation 10/1.  Creatinine continues to rise, NS started 10/1, stopped 10/2 due to anasarca. Nephrology consulted and following. Renal function worse today. Long discussion with daughter via phone, she is interested in Hospice. CM aware, may be difficult for placement. Anasarca better.   Denies CP, SOB.   
 
Hospital Course: 
10/5: 
Patient alert and oriented to self. Daughter and son in law at bedside. Creatinine 2.13 today, no further complaints of chest pain. Afebrile. WBC remain 17, daughter states patient has history of leukocytosis due to steroid use. Afebrile, no SOB. No obvious signs of infection.   Patient will need to follow up with vascular in 3 weeks for suture removal to right 2nd toe. Hospice referral outpatient done also. Daughter in agreement that patient would benefit from hospice. Plan for patient to convert to long term care at [facility] once [facility] begins. Follow up instructions and discharge meds at bottom of this note. Plan was discussed with patient, CM, family. All questions answered. Patient was stable at time of discharge. 10 systems reviewed and negative except as noted in HPI. Diagnostic Imaging/Tests:  
Xr 2nd Toe Rt Min 2 V Result Date: 9/27/2019 Right second toe CLINICAL INDICATION: Open wound along the dorsal aspect of the second toe, evaluate for osteomyelitis. FINDINGS: Three views of the right second toe submitted. Irregular destructive changes are noted at the PIP joint. Osteomyelitis or septic arthrosis should be considered. There is marked soft tissue swelling diffusely about the second toe. A screw is noted through the IP joint of the great toe. IMPRESSION: Destructive changes at the PIP joint of the second toe. Osteomyelitis/septic arthritis should be considered. Duplex Lower Ext Artery Bilat Result Date: 9/28/2019 TITLE: Lower extremity arterial ultrasound examination. INDICATION: Peripheral arterial disease. Left heel ulcer. Hypertension, dyslipidemia, diabetes mellitus. TECHNIQUE: Grayscale, color, and Doppler interrogation performed. COMPARISON: 5/19/2019. RIGHT LOWER EXTREMITY:  Peak systolic velocities-- CFA = 133, PFA = 118, SFA = 118, popliteal = 94, peroneal = 46, PTA = 76, SEBASTIAN = 84 cm/sec. Although some areas are difficult to interpret, in general there is a triphasic waveform throughout. LEFT LOWER EXTREMITY:  Peak systolic velocities-- CFA = 129, PFA = 91, SFA = 119, popliteal = 226, peroneal = 58, PTA = 68, SEBASTIAN = 95 cm/sec. Biphasic/triphasic waveform throughout. ABDOMEN:  Aorta = 51 cm/s. Diameter cannot be measured due to overlying bowel gas. 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 [facility] Result Date: 9/27/2019 Portable chest x-ray CLINICAL INDICATION: Sepsis FINDINGS: Single AP view the chest compared to a similar exam dated 7/5/2019 show the lungs to be expanded and clear. No pleural effusion or pneumothorax. Old rib fractures are noted on the right. IMPRESSION: No acute cardiopulmonary abnormality. Echocardiogram results: No results found for this visit on 09/27/19. All Micro Results Procedure Component Value Units Date/Time CULTURE, BLOOD [[id number]] Collected:  09/27/19 1844 Order Status:  Completed Specimen:  Blood Updated:  10/02/19 0269 Special Requests: --     
  RIGHT Antecubital 
  
  Culture result: NO GROWTH 5 DAYS     
 CULTURE, BLOOD [[id number]] Collected:  09/27/19 1658 Order Status:  Completed Specimen:  Blood Updated:  10/02/19 9891 Special Requests: --     
  LEFT Antecubital 
  
  Culture result: NO GROWTH 5 DAYS     
 CULTURE, Candy Sheer STAIN [[id number]] Collected:  09/27/19 2115 Order Status:  Completed Specimen:  Wound from Toe Updated:  09/30/19 2325 Special Requests: NO SPECIAL REQUESTS     
  GRAM STAIN NO WBCS SEEN     
   NO DEFINITE ORGANISM SEEN Culture result: NO GROWTH 2 DAYS     
 CULTURE, Candy Sheer STAIN [[id number]] Collected:  09/27/19 1952 Order Status:  Completed Specimen:  Wound from Toe Updated:  09/30/19 0719 Special Requests: NO SPECIAL REQUESTS     
  GRAM STAIN NO WBCS SEEN     
   NO DEFINITE ORGANISM SEEN Culture result: NO GROWTH 2 DAYS Labs: Results:  
   
BMP, Mg, Phos Recent Labs 10/05/19 
0151 10/04/19 
0535 10/03/19 
1685  141 141  
K 5.1 5.4* 5.3*  
* 110* 108* CO2 27 23 24 AGAP 6* 8 9 * 107* 98* CREA 2.13* 2.21* 2.27* CA 8.4 8.5 8.0*  
* 159* 215* PHOS  --  4.7* 4.4* CBC Recent Labs 10/05/19 
0151 10/04/19 
0535 10/03/19 
5350 WBC 17.4* 17.4* 15.6*  
RBC 2.63* 2.82* 3.35* HGB 7.5* 8.1* 9.4* HCT 25.1* 26.8* 31.5*  234 245 GRANS 78 72 74 LYMPH 12* 16 13 EOS 1 1 0* MONOS 5 6 6 BASOS 0 0 0 IG 5 5 6* ANEU 13.6* 12.5* 11.6* ABL 2.0 2.8 2.1 BOZENA 0.1 0.2 0.0 ABM 0.8 1.0 1.0 ABB 0.0 0.0 0.0 AIG 0.8* 0.9* 0.9* LFT Recent Labs 10/04/19 
3903 10/03/19 
0069 10/03/19 
2984 TP  --  5.4*  --   
ALB 2.2* 2.2*  --   
AGRAT  --  1.0 1.8 Cardiac Testing Lab Results Component Value Date/Time  (H) 07/05/2019 08:31 AM  
 BNP 99 (H) 05/17/2019 10:33 PM  
 BNP 63 (H) 01/21/2019 03:36 AM  
 B-type Natriuretic Peptide 67.8 10/04/2017 11:45 AM  
 CK 81 01/16/2019 03:35 PM  
 Troponin-I, Qt. 0.02 10/05/2019 01:51 AM  
 Troponin-I, Qt. <0.02 (L) 10/04/2019 07:59 PM  
  
Coagulation Tests Lab Results Component Value Date/Time Prothrombin time 10.5 05/18/2016 04:32 PM  
 INR 1.0 05/18/2016 04:32 PM  
 aPTT 30.1 05/18/2016 04:32 PM  
  
A1c Lab Results Component Value Date/Time Hemoglobin A1c 8.5 (H) 09/27/2019 04:58 PM  
 Hemoglobin A1c 10.2 (H) 01/17/2019 10:03 AM  
  
Lipid Panel No results found for: CHOL, CHOLPOCT, CHOLX, CHLST, CHOLV, 739242, HDL, HDLP, LDL, LDLC, DLDLP, 937955, VLDLC, VLDL, TGLX, TRIGL, TRIGP, TGLPOCT, CHHD, CHHDX Thyroid Panel Lab Results Component Value Date/Time TSH 0.429 02/25/2019 04:05 AM  
 TSH 2.530 05/18/2016 04:32 PM  
 T4, Total 8.3 05/18/2016 04:32 PM  
    
Most Recent UA Lab Results Component Value Date/Time  Color YELLOW 10/03/2019 03:33 AM  
 Appearance CLEAR 10/03/2019 03:33 AM  
 Specific gravity 1.011 10/03/2019 03:33 AM  
 pH (UA) 5.0 10/03/2019 03:33 AM  
 Protein 30 (A) 10/03/2019 03:33 AM  
 Glucose NEGATIVE  10/03/2019 03:33 AM  
 Ketone NEGATIVE  10/03/2019 03:33 AM  
 Bilirubin NEGATIVE  10/03/2019 03:33 AM  
 Blood NEGATIVE  10/03/2019 03:33 AM  
 Urobilinogen 0.2 10/03/2019 03:33 AM  
 Nitrites NEGATIVE  10/03/2019 03:33 AM  
 Leukocyte Esterase NEGATIVE  10/03/2019 03:33 AM  
  
 
Allergies Allergen Reactions  Contrast Agent [Iodine] Anaphylaxis  Actifed [Triprolidine-Pseudoephedrine] Nausea Only  Allopurinol Other (comments) Elevated blood pressure & pt states could not walk or see until medication was out of her system.  Decongestant [Pseudoephedrine Hcl] Rash and Itching  Lexapro [Escitalopram Oxalate] Other (comments) Lips/Mouth swelling  Lyrica [Pregabalin] Unknown (comments)  Minizide [Prazosin-Polythiazide] Unknown (comments)  Mobic [Meloxicam] Other (comments) Facial swelling  Omnipaque [Iohexol] Hives, Swelling and Other (comments) Wheezing and/or shortness of breath  Sertraline Other (comments) Mouth, lips swell  Spironolactone Itching Pt complains that she breaks out in clammy sweat with itching and stinging in her upper arms, dry mouth, funny feeling tongue, SOB, and increased anxiety.  Sulfamethazine Other (comments) Mouth/lips swelling Immunization History Administered Date(s) Administered  Influenza High Dose Vaccine PF 10/14/2014, 10/05/2017, 10/25/2018  Influenza Vaccine 09/16/2009, 09/09/2013, 10/12/2015, 09/15/2016  Influenza Vaccine (Whole Virus) 10/01/2011, 08/27/2012  Pneumococcal Conjugate (PCV-13) 03/18/2015  Pneumococcal Polysaccharide (PPSV-23) 10/01/2000  TB Skin Test (PPD) Intradermal 01/18/2019, 02/25/2019, 03/25/2019, 09/27/2019  Zoster Vaccine, Live 06/03/2013 All Labs from Last 24 Hrs: 
Recent Results (from the past 24 hour(s)) GLUCOSE, POC Collection Time: 10/04/19 11:00 AM  
Result Value Ref Range Glucose (POC) 178 (H) 65 - 100 mg/dL GLUCOSE, POC Collection Time: 10/04/19  4:18 PM  
Result Value Ref Range Glucose (POC) 198 (H) 65 - 100 mg/dL EKG, 12 LEAD, INITIAL Collection Time: 10/04/19  7:41 PM  
Result Value Ref Range  Ventricular Rate 75 BPM  
 Atrial Rate 75 BPM  
 P-R Interval 158 ms QRS Duration 132 ms Q-T Interval 402 ms QTC Calculation (Bezet) 448 ms Calculated P Axis 42 degrees Calculated R Axis -34 degrees Calculated T Axis 25 degrees Diagnosis Normal sinus rhythm Left anterior fascicular block Right bundle branch block Possible Lateral infarct (cited on or before 17-MAY-2019) Abnormal ECG When compared with ECG of 27-SEP-2019 19:52, Significant changes have occurred Confirmed by DEMETRICE PAUL (), Aide Sharpe ([id number]) on 10/5/2019 9:23:20 AM 
  
TROPONIN I Collection Time: 10/04/19  7:59 PM  
Result Value Ref Range Troponin-I, Qt. <0.02 (L) 0.02 - 0.05 NG/ML  
GLUCOSE, POC Collection Time: 10/04/19  9:08 PM  
Result Value Ref Range Glucose (POC) 187 (H) 65 - 100 mg/dL CBC WITH AUTOMATED DIFF Collection Time: 10/05/19  1:51 AM  
Result Value Ref Range WBC 17.4 (H) 4.3 - 11.1 K/uL  
 RBC 2.63 (L) 4.05 - 5.2 M/uL HGB 7.5 (L) 11.7 - 15.4 g/dL HCT 25.1 (L) 35.8 - 46.3 % MCV 95.4 79.6 - 97.8 FL  
 MCH 28.5 26.1 - 32.9 PG  
 MCHC 29.9 (L) 31.4 - 35.0 g/dL  
 RDW 17.2 (H) 11.9 - 14.6 % PLATELET 740 000 - 053 K/uL MPV 10.4 9.4 - 12.3 FL ABSOLUTE NRBC 0.09 0.0 - 0.2 K/uL  
 DF AUTOMATED NEUTROPHILS 78 43 - 78 % LYMPHOCYTES 12 (L) 13 - 44 % MONOCYTES 5 4.0 - 12.0 % EOSINOPHILS 1 0.5 - 7.8 % BASOPHILS 0 0.0 - 2.0 % IMMATURE GRANULOCYTES 5 0.0 - 5.0 %  
 ABS. NEUTROPHILS 13.6 (H) 1.7 - 8.2 K/UL  
 ABS. LYMPHOCYTES 2.0 0.5 - 4.6 K/UL  
 ABS. MONOCYTES 0.8 0.1 - 1.3 K/UL  
 ABS. EOSINOPHILS 0.1 0.0 - 0.8 K/UL  
 ABS. BASOPHILS 0.0 0.0 - 0.2 K/UL  
 ABS. IMM. GRANS. 0.8 (H) 0.0 - 0.5 K/UL METABOLIC PANEL, BASIC Collection Time: 10/05/19  1:51 AM  
Result Value Ref Range Sodium 141 136 - 145 mmol/L Potassium 5.1 3.5 - 5.1 mmol/L Chloride 108 (H) 98 - 107 mmol/L  
 CO2 27 21 - 32 mmol/L Anion gap 6 (L) 7 - 16 mmol/L Glucose 145 (H) 65 - 100 mg/dL   (H) 8 - 23 MG/DL  
 Creatinine 2.13 (H) 0.6 - 1.0 MG/DL  
 GFR est AA 28 (L) >60 ml/min/1.73m2 GFR est non-AA 23 (L) >60 ml/min/1.73m2 Calcium 8.4 8.3 - 10.4 MG/DL  
TROPONIN I Collection Time: 10/05/19  1:51 AM  
Result Value Ref Range Troponin-I, Qt. 0.02 0.02 - 0.05 NG/ML  
GLUCOSE, POC Collection Time: 10/05/19  7:14 AM  
Result Value Ref Range Glucose (POC) 77 65 - 100 mg/dL Discharge Exam: 
Patient Vitals for the past 24 hrs: 
 Temp Pulse Resp BP SpO2  
10/05/19 0836     94 % 10/05/19 0728 98.5 °F (36.9 °C) 77 18 147/65 95 % 10/05/19 0613 97.6 °F (36.4 °C) 84 20 138/72 98 % 10/05/19 0135 97.5 °F (36.4 °C) 73 19 148/66 100 % 10/04/19 2056  77  114/57   
10/04/19 2044 98.2 °F (36.8 °C) 78 18 128/54 97 % 10/04/19 1945  78 18 108/57 96 % 10/04/19 1612 97.8 °F (36.6 °C) 76 18 141/79 100 % 10/04/19 1057 98 °F (36.7 °C) 75 18 121/57 98 % Oxygen Therapy O2 Sat (%): 94 % (10/05/19 0836) Pulse via Oximetry: 76 beats per minute (09/30/19 1507) O2 Device: Nasal cannula (10/05/19 0836) O2 Flow Rate (L/min): 2 l/min (10/05/19 0836) Intake/Output Summary (Last 24 hours) at 10/5/2019 1050 Last data filed at 10/5/2019 9102 Gross per 24 hour Intake 220 ml Output 1725 ml Net -1505 ml Physical exam: 
General:       No acute distress, appears chronically ill, drowsy   
Lungs:          Diminished bilaterally. Resp even and nonlabored Heart:            S1S2 present without murmurs rubs gallops. RRR. 1+ LE/RUE pitting edema, anasarca. 2+ LUE edema Otto [place].  BS present Extremities: Moves ext spontaneously. No cyanosis  
Neurologic:  Drowsy, Oriented X4. No focal deficits  
  
  
 
Discharge Info:  
Current Discharge Medication List  
  
START taking these medications Details  
atorvastatin (LIPITOR) 40 mg tablet Take 1 Tab by mouth nightly. Qty: 30 Tab, Refills: 0 Saccharomyces boulardii (FLORASTOR) 250 mg capsule Take 2 Caps by mouth two (2) times a day for 7 days. Qty: 28 Cap, Refills: 0  
  
insulin lispro (HUMALOG) 100 unit/mL injection Per sliding scale Qty: 1 Vial, Refills: 0 CONTINUE these medications which have CHANGED Details DULoxetine (CYMBALTA) 60 mg capsule Take 1 Cap by mouth daily. Qty: 30 Cap, Refills: 0  
  
methadone (DOLOPHINE) 5 mg tablet Take 1 Tab by mouth every six (6) hours for 3 days. Max Daily Amount: 20 mg. Pt has been stable on this dose for many years, please do not taper or stop. Instructed to take DOS per Anesthesia guidelines. Indications: chronic pain, dependence on opioid-type drugs Qty: 12 Tab, Refills: 0 Comments: Pt has been stable on this dose for many years, please do not taper or stop. Associated Diagnoses: Osteomyelitis of finger ([address] clonazePAM (KLONOPIN) 0.5 mg tablet Take 1 Tab by mouth three (3) times daily for 3 days. Max Daily Amount: 1.5 mg. 
Qty: 9 Tab, Refills: 0 Associated Diagnoses: Anxiety CONTINUE these medications which have NOT CHANGED Details  
colchicine 0.6 mg tablet Take 0.6 mg by mouth two (2) times a day. Indications: for 3 days, end 5/20 oxybutynin chloride XL (DITROPAN XL) 5 mg CR tablet Take 5 mg by mouth daily. torsemide (DEMADEX) 10 mg tablet Take 10 mg by mouth two (2) times a day. carvedilol (COREG) 6.25 mg tablet Take 1 Tab by mouth two (2) times daily (with meals). Qty: 10 Tab, Refills: 0  
  
amLODIPine (NORVASC) 10 mg tablet Take 1 Tab by mouth daily. Qty: 10 Tab, Refills: 0  
  
predniSONE (DELTASONE) 5 mg tablet Take 1 Tab by mouth two (2) times a day. 5mg BID Qty: 1 Tab, Refills: 0  
  
docusate sodium (COLACE) 100 mg capsule Take 1 Cap by mouth nightly. Qty: 1 Cap, Refills: 0  
  
insulin detemir U-100 (LEVEMIR FLEXTOUCH) 100 unit/mL (3 mL) inpn 30 Units by SubCUTAneous route every morning. Indications: type 2 diabetes mellitus  
  
mirtazapine (REMERON) 7.5 mg tablet Take 7.5 mg by mouth nightly. potassium chloride (KLOR-CON) 10 mEq tablet Take 10 mEq by mouth two (2) times a day. levothyroxine (SYNTHROID) 75 mcg tablet Take 75 mcg by mouth Daily (before breakfast). CALCIUM CARBONATE/VITAMIN D3 (CALCIUM 600 + D,3, PO) Take 600 mg by mouth daily. aspirin delayed-release 81 mg tablet Take 81 mg by mouth nightly. Ok to continue per anesthesia guidelines. STOP taking these medications Lactobacillus acidophilus (ACIDOPHILUS) cap Comments:  
Reason for Stopping:   
   
 insulin lispro (HUMALOG) 100 unit/mL kwikpen Comments:  
Reason for Stopping:   
   
 nystatin (MYCOSTATIN) powder Comments:  
Reason for Stopping:   
   
 acetaminophen (TYLENOL EXTRA STRENGTH) 500 mg tablet Comments:  
Reason for Stopping:   
   
 multivitamin with minerals (HAIR,SKIN AND NAILS PO) Comments:  
Reason for Stopping:   
   
  
 
 
 
Disposition: long term care facility Activity: Activity as tolerated Diet: DIET DIABETIC CONSISTENT CARB Regular DIET NUTRITIONAL SUPPLEMENTS All Meals; Glucerna Shake Follow-up Appointments Procedures  FOLLOW UP VISIT Appointment in: 3 - [address] MD  
  Standing Status:   Standing Number of Occurrences:   1 Order Specific Question:   Appointment in Answer:   3 - 5 Days Follow-up Information Follow up With Specialties Details Why Contact Info Kelsey Otero MD Vascular Surgery Go on 10/24/2019 at 1:00 PM for right 2nd toe amputation site recheck, suture removal [address] [address] 
[phone] Marcel Lindsay MD [address] [facility] [address] 
[phone] Case reviewed with supervising physician - Dr. Michael Yu Time spent in patient discharge planning and coordination 35 minutes.  
 
Signed: 
YI Veloz

## 2019-10-05 NOTE — PROGRESS NOTES
[facility] Subjective: RAYMON on CKD   No new complaints. Review of Systems -  
General ROS: negative for - fever, chills Respiratory ROS: no SOB, cough, LADNA Cardiovascular ROS: no CP, palpitations Gastrointestinal ROS: no N&V, abdominal pain, diarrhea Genito-Urinary ROS: no difficulty voiding, dysuria Neurological ROS: no seizures, focal weekness Objective: 
 
Vitals:  
 10/05/19 4008 10/05/19 8596 10/05/19 9099 10/05/19 7854 BP: 148/66 138/72 147/65 Pulse: 73 84 77 Resp: 19 20 18 Temp: 97.5 °F (36.4 °C) 97.6 °F (36.4 °C) 98.5 °F (36.9 °C) SpO2: 100% 98% 95% 94% Weight: 90.1 kg (198 lb 11.2 oz) Height:      
 
 
PE 
Gen: in no acute distress CV:reg rate Chest:clear Abd: soft Ext/Access: 3+ edema Radha Gulling LAB Recent Labs 10/05/19 
0151 10/04/19 
0535 10/03/19 
2189 WBC 17.4* 17.4* 15.6* HGB 7.5* 8.1* 9.4* HCT 25.1* 26.8* 31.5*  234 245 Recent Labs 10/05/19 
0151 10/04/19 
1959 10/04/19 
0535 10/03/19 
9193   --  141 141  
K 5.1  --  5.4* 5.3*  
*  --  110* 108* CO2 27  --  23 24 *  --  159* 215* *  --  107* 98* CREA 2.13*  --  2.21* 2.27* PHOS  --   --  4.7* 4.4*  
CA 8.4  --  8.5 8.0*  
TROIQ 0.02 <0.02*  --   --   
ALB  --   --  2.2* 2.2* Radiology A/P:  
Patient Active Problem List  
Diagnosis Code  Urinary tract infection, site not specified N39.0  Murmur R01.1  Bilateral leg edema R60.0  Swelling R60.9  Abnormal EKG R94.31  
 Essential hypertension with goal blood pressure less than 130/85 I10  Aortic valve sclerosis I35.8  Rapid heart rate R00.0  Mixed hyperlipidemia E78.2  DM2 (diabetes mellitus, type 2) ([facility]) E11.9  Closed rib fracture S22.39XA  Acute respiratory failure with hypoxia ([facility]) J96.01  
 Acute prerenal azotemia R79.89  Chronic diastolic congestive heart failure ([facility]) I50.32  
 Acquired hypothyroidism E03.9  GERD (gastroesophageal reflux disease) K21.9  Finger infection L08.9  Leukocytosis D72.829  
 RAYMON (acute kidney injury) ([address] N17.9  Osteomyelitis of finger (HCC) M86.9  Acute encephalopathy G93.40  Acute metabolic encephalopathy T75.35  
 Acute diarrhea R19.7  Oral thrush B37.0  Sepsis ([address] A41.9  Non healing left heel wound S91.302A  Respiratory failure ([address] J96.90  
 Sepsis secondary to UTI ([address] A41.9, N39.0  Acute pulmonary edema (HCC) J81.0  
 Uncontrolled diabetes mellitus ([address] E11.65  Acute osteomyelitis of toe of right foot ([address] M86.171 RAYMON on CKD - Renal function is slowly improving, good urine volume. Will continue medical therapy and check labs in am. 
 
Proteinuria -  Doesn't look like nephrotic range protein on yesterday's labs. Edeam - probably due to diastolic CHF. Osteo - s/p amputation DM 
 
HTN Serena Severs, MD

## 2019-10-05 NOTE — PROGRESS NOTES
Chest pain resolved. Weeping from edema to left hand, wraped with abd pad and ajay. 3+ to 4+ pitting edema to upper extremities. no other weeping noted. Hourly rounds and needs met. Call light in reach, low bed and wheels locked. Will continue to monitor and report to oncoming shift

## 2019-10-05 NOTE — DISCHARGE INSTRUCTIONS
BMP and CBC Monday, October 7  Patient needs 3 week follow up with [facility]-needs appointment. If patient continues to decline would recommend hospice. Seen in hospital by [facility] hospice. Referral done if needed.

## 2019-10-05 NOTE — PROGRESS NOTES
Patient will be to [facility] and [address] [place] [address]. CM made an attempt to make contact with daughter Lida Estrada). Made contact with son and informed son that patient was being d/c today. Patient will be transported by [facility] at 1:30PM to room#102. Patient has met all treatment goals / milestones. CM will continue to monitor. Care Management Interventions PCP Verified by CM: Tarah Baez MD) Mode of Transport at Discharge: Other (see comment)([facility] ) Transition of Care Consult (CM Consult): Home Health, Discharge Planning, SNF(Pt is insured withpharmacy benefits. Per pt she has [facility] RN. Family has requested referrals be made to SNF facility) [address]: No 
Reason Outside [facility]: Patient already serviced by other home care/hospice agency Discharge Durable Medical Equipment: No 
Physical Therapy Consult: Yes Occupational Therapy Consult: Yes Speech Therapy Consult: No 
Current Support Network: Relative's Home(Pt lives with her daughter Rylee Temple [phone]) Confirm Follow Up Transport: Other (see comment)([facility]) Plan discussed with Pt/Family/Caregiver: Yes Freedom of Choice Offered: Yes [employer] Information Provided?: No 
Discharge Location Discharge Placement: Skilled nursing facility(Patient has been accpeted at [facility].)

## 2019-10-07 ENCOUNTER — PATIENT OUTREACH (OUTPATIENT)
Dept: CASE MANAGEMENT | Age: 84
End: 2019-10-07

## 2019-10-07 NOTE — PROGRESS NOTES
Patient admitted to [facility] on 10/5/19 Patient admitted to hospital through the ER from 9/27/19-10/5/19 for acute osteomyelitis of toe of right foot (amputation) Patient is an HRRP patient with RRAT score of 29 PLAN: 
Will outreach to patient for ISMA/CCM when discharged from SNF to home This note will not be viewable in [address]

## 2019-10-09 NOTE — CDMP QUERY
Patient admitted with acute osteomyelitis and had toe amputation. Noted documentation of ATN in progress notes and not on discharge summary. Please provide clinical indicators/treatment to support this diagnosis or if it was ruled out. Please document your response on the discharge summary: 
 
? ATN ruled in 
? ATN ruled out 
? Other, please specify ? Clinically unable to determine The medical record reflects the following:   
   Risk Factors: RAYMON Clinical Indicators: Nephrology note mentions, \"RAYMON - the elevated BUN/creat ratio > 20/1 and mild alkalosis suggests pre-renal azotemia. The FENa is elevated at 1.3% suggesting ATN. Renal US shows echogenic kidneys. BUN rising faster than creatinine. Steroids may do this. \", Treatment: NS @ 100 ml/hr stopped after 2 days, BMP check Thanks, Sushant Enriquez RN, BSN, CDS Clinical Documentation Improvement 
[phone]

## 2019-10-09 NOTE — CDMP QUERY
Patient admitted with acute osteomyelitis and had toe amputation. Noted documentation of Sepsis on progress note/discharge summary. Please document in progress notes clinical indicators (such as the ones below) to support this diagnosis or if Sepsis may have been ruled out after study. 1. At least 2 SIRS Criteria (Systemic Inflammatory Response Syndrome) (CMS) 
-Temp > 100.9 or < 96.8 
-HR > 90 
-RR > 20  
-WBC > 12,000 or < 4,000 or > 10% bands 2. Documented suspected or confirmed source of infection. (CMS) 3. Documented Organ Dysfunction by any ONE of the following. (CMS) 
     
 the CMS guidelines 
-AMS (from baseline if known) -SBP<90 or MAP<65 
-Documentation of respiratory failure and use of either invasive mechanical ventilation (intubation) or non-invasive mechanical ventilation (CPAP/BIPAP) -Creat. > 2.0 (with no history of Chronic Kidney Disease) -Bilirubin > 2 mg / dl (with no history of liver disease) -PLT < 100,000 (with no history of thrombocytopenia) -INR > 1.5 or aPTT > 60 sec (for patients not on Warfarin therapy) -Lactic Acid > 2 mmol/ L The medical record reflects the following: 
   Risk Factors: Osteomyelitis Clinical Indicators: Per D/C summary: \"WBC remain 16, daughter states patient has history of leukocytosis due to steroid use. Afebrile, no SOB. No obvious signs of infection\", lactic acid 3.2,  Treatment: Sienna Trevino Thanks, Gulshan Neri, RN, BSN, CDS Clinical Documentation Improvement 
[phone]

## 2019-11-04 ENCOUNTER — PATIENT OUTREACH (OUTPATIENT)
Dept: CASE MANAGEMENT | Age: 84
End: 2019-11-04

## 2020-12-02 NOTE — PROGRESS NOTES
09/27/19 2148 Dual Skin Pressure Injury Assessment Dual Skin Pressure Injury Assessment X Second Care Provider (Based on [address] [place] [address]) Oj Loredo RN Heel  Left Toes Right Toes 
(2nd toe) Dual skin assessment with Oj Loredo RN, patients bilateral legs are swollen, red and shiny. 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 The left heel has pressure sore. The right foot has sore on the the 2nd toe and heal sore on the Great toe. Small bruises on bilateral upper extremities. Skin overall is dry and flaky. standing/walking

## 2022-01-08 NOTE — ANESTHESIA POSTPROCEDURE EVALUATION
Procedure(s): 
AMPUTATION TOE RIGHT 2ND REQUEST TO FOLLOW. 
 
general 
 
Anesthesia Post Evaluation Multimodal analgesia: multimodal analgesia used between 6 hours prior to anesthesia start to PACU discharge Patient location during evaluation: PACU Patient participation: complete - patient participated Level of consciousness: responsive to verbal stimuli and awake Pain management: adequate Airway patency: patent Anesthetic complications: no 
Cardiovascular status: acceptable Respiratory status: acceptable, spontaneous ventilation and nonlabored ventilation Hydration status: acceptable Post anesthesia nausea and vomiting:  none Vitals Value Taken Time /76 9/30/2019  2:47 PM  
Temp 37.1 °C (98.7 °F) 9/30/2019  1:36 PM  
Pulse 78 9/30/2019  2:51 PM  
Resp 16 9/30/2019  2:08 PM  
SpO2 97 % 9/30/2019  2:47 PM  
Vitals shown include unvalidated device data.
 used

## 2024-07-08 NOTE — WOUND CARE
[address]  [address], [address]  Phone: [phone]  Fax: [phone]    Patient: Anitra Sears MRN: [id number]  SSN: [id number]    YOB: 1933  Age: 80 y.o. Sex: female       Return Appointment: 2 weeks with Jen Fernandez MD    Instructions: Left Heel Wound:  -Cleanse wound with normal saline or wound cleanser. DO NOT GET WET IN SHOWER AND NO SOAKING WOUND!  -Apply Endoform to wound base and cover with Hydrofera Ready and ABD pad; wrap with rolled gauze. -[place] to change dressing 3 times a week   -Elevate feet while sitting or laying and wear heel lift boots while in bed. Should you experience increased redness, swelling, pain, foul odor, size of wound(s), or have a temperature over 101 degrees please contact the [address] [place] [address] Road at [phone] or if after hours contact your primary care physician or go to the hospital emergency department.     Signed By: Drew Mendez PT, [facility]     June 28, 2019
done

## 2025-06-17 NOTE — PROGRESS NOTES
Outreach attempts to coordinate scheduling on the patient's Service to Ophthalmology order in workqueue [id number] requested on 4/4/2025 have been conducted. Patient has been contacted multiple times and no showed her appointment today.    Please contact Summer if further coordination is needed.    TRANSFER - OUT REPORT: 
 
Verbal report given to Samaria Friedman on Vishal Bill  being transferred to Pre op(unit) for ordered procedure Report consisted of patients Situation, Background, Assessment and  
Recommendations(SBAR). Information from the following report(s) SBAR, Kardex, Intake/Output, MAR and Recent Results was reviewed with the receiving nurse. Lines:  
Peripheral IV 05/17/19 Left Antecubital (Active) Site Assessment Clean, dry, & intact 5/22/2019  2:24 PM  
Phlebitis Assessment 0 5/22/2019  2:24 PM  
Infiltration Assessment 0 5/22/2019  2:24 PM  
Dressing Status Clean, dry, & intact 5/22/2019  2:24 PM  
Dressing Type Tape;Transparent 5/22/2019  2:24 PM  
Hub Color/Line Status Pink; Infusing;Flushed;Patent 5/22/2019  2:24 PM  
   
Peripheral IV 05/17/19 Right Forearm (Active) Site Assessment Clean, dry, & intact 5/22/2019  2:24 PM  
Phlebitis Assessment 0 5/22/2019  2:24 PM  
Infiltration Assessment 0 5/22/2019  2:24 PM  
Dressing Status Clean, dry, & intact 5/22/2019  2:24 PM  
Dressing Type Tape;Transparent 5/22/2019  2:24 PM  
Hub Color/Line Status Flushed;Patent 5/22/2019  2:24 PM  
  
 
Opportunity for questions and clarification was provided. Patient transported with: 
 [employer]

## (undated) DEVICE — DRAPE, FILM SHEET, 44X65 STERILE: Brand: MEDLINE

## (undated) DEVICE — SUTURE PERMAHAND SZ 2-0 L18IN NONABSORBABLE BLK L26MM SH C012D

## (undated) DEVICE — SOLUTION IV 1000ML 0.9% SOD CHL

## (undated) DEVICE — SUTURE VCRL SZ 3-0 L27IN ABSRB UD L26MM SH 1/2 CIR J416H

## (undated) DEVICE — AMD ANTIMICROBIAL SUPER SPONGES,MEDIUM: Brand: KERLIX

## (undated) DEVICE — SUTURE VCRL SZ 2-0 L27IN ABSRB UD L26MM SH 1/2 CIR J417H

## (undated) DEVICE — STERILE LATEX POWDER-FREE SURGICAL GLOVESWITH NITRILE COATING: Brand: PROTEXIS

## (undated) DEVICE — Device

## (undated) DEVICE — XEROFORM OCCLUSIVE GAUZE STRIP OVERWRAP, 3% BISMUTH TRIBROMOPHENATE IN PETROLATUM BLEND: Brand: XEROFORM

## (undated) DEVICE — STERILE HOOK LOCK LATEX FREE ELASTIC BANDAGE 4INX5YD: Brand: HOOK LOCK™

## (undated) DEVICE — SUT ETHLN 4-0 18IN PS2 BLK --

## (undated) DEVICE — SPONGE GZ W4XL4IN COT 12 PLY TYP VII WVN C FLD DSGN

## (undated) DEVICE — CONTAINER SPEC FRMLN 120ML --

## (undated) DEVICE — REM POLYHESIVE ADULT PATIENT RETURN ELECTRODE: Brand: VALLEYLAB

## (undated) DEVICE — BANDAGE,GAUZE,BULKEE II,4.5"X4.1YD,STRL: Brand: MEDLINE

## (undated) DEVICE — AMD ANTIMICROBIAL GAUZE SPONGES,12 PLY USP TYPE VII, 0.2% POLYHEXAMETHYLENE BIGUANIDE HCI (PHMB): Brand: CURITY

## (undated) DEVICE — AMD ANTIMICROBIAL BANDAGE ROLL,6 PLY: Brand: KERLIX

## (undated) DEVICE — BUTTON SWITCH PENCIL BLADE ELECTRODE, HOLSTER: Brand: EDGE

## (undated) DEVICE — 2000CC GUARDIAN II: Brand: GUARDIAN

## (undated) DEVICE — T-DRAPE,EXTREMITY,STERILE: Brand: MEDLINE

## (undated) DEVICE — CARDINAL HEALTH FLEXIBLE LIGHT HANDLE COVER: Brand: CARDINAL HEALTH